# Patient Record
Sex: FEMALE | Race: BLACK OR AFRICAN AMERICAN | NOT HISPANIC OR LATINO | Employment: OTHER | URBAN - METROPOLITAN AREA
[De-identification: names, ages, dates, MRNs, and addresses within clinical notes are randomized per-mention and may not be internally consistent; named-entity substitution may affect disease eponyms.]

---

## 2017-01-03 ENCOUNTER — OFFICE VISIT (OUTPATIENT)
Dept: PODIATRY | Facility: CLINIC | Age: 67
End: 2017-01-03
Payer: MEDICARE

## 2017-01-03 VITALS — HEIGHT: 62 IN | WEIGHT: 192.88 LBS | BODY MASS INDEX: 35.49 KG/M2

## 2017-01-03 DIAGNOSIS — E08.9 DIABETES MELLITUS DUE TO UNDERLYING CONDITION, CONTROLLED, WITHOUT COMPLICATION, WITHOUT LONG-TERM CURRENT USE OF INSULIN: Primary | ICD-10-CM

## 2017-01-03 DIAGNOSIS — B35.1 ONYCHOMYCOSIS DUE TO DERMATOPHYTE: ICD-10-CM

## 2017-01-03 DIAGNOSIS — M20.41 HAMMER TOES OF BOTH FEET: ICD-10-CM

## 2017-01-03 DIAGNOSIS — I87.2 VENOUS INSUFFICIENCY OF LOWER EXTREMITY, UNSPECIFIED LATERALITY: ICD-10-CM

## 2017-01-03 DIAGNOSIS — M20.42 HAMMER TOES OF BOTH FEET: ICD-10-CM

## 2017-01-03 PROCEDURE — 99214 OFFICE O/P EST MOD 30 MIN: CPT | Mod: S$PBB,,, | Performed by: PODIATRIST

## 2017-01-03 PROCEDURE — 99999 PR PBB SHADOW E&M-EST. PATIENT-LVL II: CPT | Mod: PBBFAC,,, | Performed by: PODIATRIST

## 2017-01-03 PROCEDURE — 99212 OFFICE O/P EST SF 10 MIN: CPT | Mod: PBBFAC,PO | Performed by: PODIATRIST

## 2017-01-03 NOTE — PROGRESS NOTES
Subjective:      Patient ID: Eugenie Coates is a 66 y.o. female.    Chief Complaint: Diabetic Foot Exam (PCP Ryan 12/23/16 A1C 11/25/16 7.2) and Nail Care    Eugenie is a 66 y.o. female who presents to the clinic for routine evaluation and treatment of diabetic feet. Eugenie has a past medical history of Allergy; Anemia; Anxiety; Arthritis; Asthma; Bell palsy; Depression; Diabetes mellitus; Endometriosis; Eye injury (2014); GERD (gastroesophageal reflux disease); Glaucoma; History of uterine fibroid; Hypertension; Nuclear sclerosis of both eyes (9/27/2016); and Sleep apnea. Patient relates no major problem with feet. Only complaints today consist of toenails in need of trimming.  Denies being painful with wearing shoe gear, however, states they are difficult to trim on her own.  Has been self treating by applying vicks vaporub to toenails daily x 3 months and continues to soak in a combination of listerine and apple cider vinegar.  Has noted no visible difference in the nails.  Denies any additional pedal complaints.      PCP: Rosalinda Davis MD    Date Last Seen by PCP: 12/23/16    Current shoe gear: Casual shoes    Hemoglobin A1C   Date Value Ref Range Status   11/25/2016 7.2 (H) 4.5 - 6.2 % Final     Comment:     According to ADA guidelines, hemoglobin A1C <7.0% represents  optimal control in non-pregnant diabetic patients.  Different  metrics may apply to specific populations.   Standards of Medical Care in Diabetes - 2016.  For the purpose of screening for the presence of diabetes:  <5.7%     Consistent with the absence of diabetes  5.7-6.4%  Consistent with increasing risk for diabetes   (prediabetes)  >or=6.5%  Consistent with diabetes  Currently no consensus exists for use of hemoglobin A1C  for diagnosis of diabetes for children.     05/11/2016 6.6 (H) 4.5 - 6.2 % Final   11/10/2015 6.7 (H) 4.5 - 6.2 % Final           Past Medical History   Diagnosis Date    Allergy     Anemia     Anxiety      Arthritis     Asthma     Bell palsy     Depression     Diabetes mellitus     Endometriosis     Eye injury      stuck with tree branch od ?     GERD (gastroesophageal reflux disease)     Glaucoma     History of uterine fibroid     Hypertension     Nuclear sclerosis of both eyes 2016    Sleep apnea      uses C pap       Past Surgical History   Procedure Laterality Date    Cholecystectomy       section      Hernia repair      Vein surgery  ,      Rt leg x2    Knee surgery Right 2008     (R) knee scope; torn meniscus    Glaucoma laser Bilateral        Family History   Problem Relation Age of Onset    No Known Problems Mother     No Known Problems Father     No Known Problems Sister     No Known Problems Brother     No Known Problems Maternal Aunt     No Known Problems Maternal Uncle     No Known Problems Paternal Aunt     No Known Problems Paternal Uncle     No Known Problems Maternal Grandmother     No Known Problems Maternal Grandfather     No Known Problems Paternal Grandmother     No Known Problems Paternal Grandfather     Amblyopia Neg Hx     Blindness Neg Hx     Cancer Neg Hx     Cataracts Neg Hx     Diabetes Neg Hx     Glaucoma Neg Hx     Hypertension Neg Hx     Macular degeneration Neg Hx     Retinal detachment Neg Hx     Strabismus Neg Hx     Stroke Neg Hx     Thyroid disease Neg Hx        Social History     Social History    Marital status:      Spouse name: N/A    Number of children: N/A    Years of education: N/A     Social History Main Topics    Smoking status: Never Smoker    Smokeless tobacco: Never Used    Alcohol use No    Drug use: No    Sexual activity: Yes     Partners: Male     Other Topics Concern    None     Social History Narrative       Current Outpatient Prescriptions   Medication Sig Dispense Refill    albuterol (PROAIR HFA) 90 mcg/actuation inhaler Inhale 2 puffs into the lungs every 4 (four) hours as needed  for Wheezing. 3 Inhaler 3    albuterol 90 mcg/actuation inhaler Inhale 2 puffs into the lungs every 6 (six) hours as needed for Wheezing. 3 Inhaler 2    amlodipine (NORVASC) 10 MG tablet Take 1 tablet (10 mg total) by mouth once daily. 90 tablet 3    JV ASPIRIN ORAL Take 81 mg by mouth once daily. Instructed to stop 5-23-13 until after surgery.      bimatoprost (LUMIGAN) 0.01 % Drop Place 1 drop into both eyes every evening. 7.5 mL 4    blood sugar diagnostic Strp Test daily.  [employer] viva test strips and lancets. 300 each 3    budesonide-formoterol 160-4.5 mcg (SYMBICORT) 160-4.5 mcg/actuation HFAA Inhale 2 puffs into the lungs every 12 (twelve) hours. 1 Inhaler 11    lancets (ACCU-CHEK SOFTCLIX LANCETS) Misc 1 Units by Misc.(Non-Drug; Combo Route) route once daily. 300 each 3    losartan-hydrochlorothiazide 100-25 mg (HYZAAR) 100-25 mg per tablet Take 1 tablet by mouth once daily. 90 tablet 3    metformin (GLUCOPHAGE) 500 MG tablet Take 1 tablet (500 mg total) by mouth 2 (two) times daily with meals. 180 tablet 3    omeprazole (PRILOSEC) 20 MG capsule TAKE 1 CAPSULE BY MOUTH EVERY DAY 90 capsule 0    pravastatin (PRAVACHOL) 40 MG tablet Take 1 tablet (40 mg total) by mouth once daily. 90 tablet 3    PREVNAR 13, PF, 0.5 mL Syrg ADM 0.5ML IM UTD  0    spironolactone (ALDACTONE) 25 MG tablet Take 1 tablet (25 mg total) by mouth once daily. 30 tablet 11    ZOSTAVAX, PF, 19,400 unit/0.65 mL injection       [DISCONTINUED] omeprazole (PRILOSEC OTC) 20 MG tablet Take 1 tablet (20 mg total) by mouth once daily. 90 tablet 3     No current facility-administered medications for this visit.        Review of patient's allergies indicates:   Allergen Reactions    Percodan [oxycodone hcl-oxycodone-asa] Hives and Itching         Review of Systems   Constitution: Negative for chills and fever.   Cardiovascular: Positive for leg swelling. Negative for claudication.   Skin: Positive for color change and nail  changes.   Musculoskeletal: Positive for joint swelling. Negative for joint pain, muscle cramps and muscle weakness.   Neurological: Negative for numbness and paresthesias.   Psychiatric/Behavioral: Negative for altered mental status.           Objective:      Physical Exam   Constitutional: She is oriented to person, place, and time. She appears well-developed and well-nourished. No distress.   Cardiovascular:   Pulses:       Dorsalis pedis pulses are 2+ on the right side, and 2+ on the left side.        Posterior tibial pulses are 1+ on the right side, and 1+ on the left side.   CFT <3 seconds bilateral.  Pedal hair growth decreased bilateral.  Varicosities noted bilateral.  1+ pitting edema noted to bilateral lower extremity.  Toes are cool to touch bilateral.     Musculoskeletal: She exhibits edema. She exhibits no tenderness.   Muscle strength 5/5 in all muscle groups bilateral.  No tenderness nor crepitation with ROM of foot/ankle joints bilateral.  No tenderness with palpation of bilateral foot and ankle.  Bilateral pes planus foot type.  Bilateral hallux abducto valgus.  Bilateral semi-reducible contracture of toes 2-5.     Neurological: She is alert and oriented to person, place, and time. She has normal strength. No sensory deficit.   Protective sensation per [place] monofilament intact bilateral.    Vibratory sensation intact bilateral.    Light touch intact bilateral.   Skin: Skin is warm, dry and intact. No abrasion, no bruising, no burn, no ecchymosis, no laceration, no lesion, no petechiae and no rash noted. She is not diaphoretic. No cyanosis or erythema. No pallor. Nails show no clubbing.   Pedal skin normal turgor, temperature, and texture bilateral.  Toenails x 10 appear thickened by 2 mm's, elongated by 2 mm's, and discolored with subungual debris. Examination of the skin reveals no evidence of significant maceration, rashes, open lesions, suspicious appearing nevi or other concerning  lesions.              Assessment:       Encounter Diagnoses   Name Primary?    Diabetes mellitus due to underlying condition, controlled, without complication, without long-term current use of insulin Yes    Venous insufficiency of lower extremity, unspecified laterality     Onychomycosis due to dermatophyte     Hammer toes of both feet          Plan:       Eugenie was seen today for diabetic foot exam and nail care.    Diagnoses and all orders for this visit:    Diabetes mellitus due to underlying condition, controlled, without complication, without long-term current use of insulin  -     DIABETIC SHOES FOR HOME USE    Venous insufficiency of lower extremity, unspecified laterality    Onychomycosis due to dermatophyte    Hammer toes of both feet  -     DIABETIC SHOES FOR HOME USE      I counseled the patient on her conditions, their implications and medical management.    Shoe inspection. Diabetic Foot Education. Patient reminded of the importance of good nutrition and blood sugar control to help prevent podiatric complications of diabetes. Patient instructed on proper foot hygeine. We discussed wearing proper shoe gear, daily foot inspections, never walking without protective shoe gear, never putting sharp instruments to feet    Advised to continue elevating the extremities and wearing compression stockings to help manage venous insufficiency.      New prescription written for diabetic shoes, as she failed to have the previous prescription filled last year.    Advised to continue applying vicks vaporub to toenails daily x 6 months to treat fungal nail infection.  Also, discussed regularly disinfecting shoe gear and limiting environmental exposure.    Discussed with patient that future routine nail care can be performed as a Procedure Brief, as she currently does not qualify based on today's physical exam.  Patient is amenable to this course of action and was given a handout in regards to this.      With  patient's permission, nails were aggressively reduced and debrided x 10 to their soft tissue attachment mechanically and with electric , removing all offending nail and debris. Patient relates relief following the procedure.  This was performed as a courtesy with today's exam.  She will continue to monitor the areas daily, inspect her feet, wear protective shoe gear when ambulatory, moisturizer to maintain skin integrity and follow in this office in approximately 6 months, sooner p.r.n.      Return in about 6 months (around 7/3/2017).    Giuliano Garnett DPM

## 2017-01-03 NOTE — MR AVS SNAPSHOT
[facility]  [address] [place] [address]  Phone: [phone]                  Eugenie Coates   1/3/2017 10:40 AM   Office Visit    Description:  Female : 1950   Provider:  Giuliano Garnett DPM   Department:  [facility]           Reason for Visit     Diabetic Foot Exam     Nail Care           Diagnoses this Visit        Comments    Diabetes mellitus due to underlying condition, controlled, without complication, without long-term current use of insulin    -  Primary     Venous insufficiency of lower extremity, unspecified laterality         Onychomycosis due to dermatophyte         Hammer toes of both feet                To Do List           Future Appointments        Provider Department Dept Phone    2017 11:20 AM Lilia Wayne PA-C [facility] [phone]    3/21/2017 9:00 AM Rosalinda Davis MD [facility] [phone]    3/21/2017 10:00 AM CARMEN, ENDOCRINE EDUCATOR [facility] [phone]      Goals (5 Years of Data)     None      [facility]     [facility] On [facility] Nurse Care Line -  Assistance  Registered nurses in the [facility] provide clinical advisement, health education, appointment booking, and other advisory services.  Call for this free service at [phone].             Medications           Message regarding Medications     Verify the changes and/or additions to your medication regime listed below are the same as discussed with your clinician today.  If any of these changes or additions are incorrect, please notify your healthcare provider.             Verify that the below list of medications is an accurate representation of the medications you are currently taking.  If none reported, the list may be blank. If incorrect, please contact your healthcare provider. Carry this list with you in case of emergency.           Current Medications     albuterol (PROAIR HFA) 90 mcg/actuation  "inhaler Inhale 2 puffs into the lungs every 4 (four) hours as needed for Wheezing.    albuterol 90 mcg/actuation inhaler Inhale 2 puffs into the lungs every 6 (six) hours as needed for Wheezing.    amlodipine (NORVASC) 10 MG tablet Take 1 tablet (10 mg total) by mouth once daily.    JV ASPIRIN ORAL Take 81 mg by mouth once daily. Instructed to stop 5-23-13 until after surgery.    bimatoprost (LUMIGAN) 0.01 % Drop Place 1 drop into both eyes every evening.    blood sugar diagnostic Strp Test daily.  [employer] viva test strips and lancets.    budesonide-formoterol 160-4.5 mcg (SYMBICORT) 160-4.5 mcg/actuation HFAA Inhale 2 puffs into the lungs every 12 (twelve) hours.    lancets (ACCU-CHEK SOFTCLIX LANCETS) Misc 1 Units by Misc.(Non-Drug; Combo Route) route once daily.    losartan-hydrochlorothiazide 100-25 mg (HYZAAR) 100-25 mg per tablet Take 1 tablet by mouth once daily.    metformin (GLUCOPHAGE) 500 MG tablet Take 1 tablet (500 mg total) by mouth 2 (two) times daily with meals.    omeprazole (PRILOSEC) 20 MG capsule TAKE 1 CAPSULE BY MOUTH EVERY DAY    pravastatin (PRAVACHOL) 40 MG tablet Take 1 tablet (40 mg total) by mouth once daily.    PREVNAR 13, PF, 0.5 mL Syrg ADM 0.5ML IM UTD    spironolactone (ALDACTONE) 25 MG tablet Take 1 tablet (25 mg total) by mouth once daily.    ZOSTAVAX, PF, 19,400 unit/0.65 mL injection            Clinical Reference Information           Vital Signs - Last Recorded  Most recent update: 1/3/2017 10:24 AM by Mandi Zurita LPN    Ht Wt BMI          5' 2" (1.575 m) 87.5 kg (192 lb 14.4 oz) 35.28 kg/m2        Allergies as of 1/3/2017     Percodan [Oxycodone Hcl-oxycodone-asa]      Immunizations Administered on Date of Encounter - 1/3/2017     None      Orders Placed During Today's Visit      Normal Orders This Visit    DIABETIC SHOES FOR HOME USE       "

## 2017-01-17 ENCOUNTER — TELEPHONE (OUTPATIENT)
Dept: FAMILY MEDICINE | Facility: CLINIC | Age: 67
End: 2017-01-17

## 2017-01-17 NOTE — TELEPHONE ENCOUNTER
----- Message from Tara Araujo sent at 1/12/2017 10:41 AM CST -----  Contact: self  [phone]  Pt is requesting that her diabetic shoe information be sent to [facility] fax # [phone]. Thanks.........Anna

## 2017-01-23 ENCOUNTER — TELEPHONE (OUTPATIENT)
Dept: FAMILY MEDICINE | Facility: CLINIC | Age: 67
End: 2017-01-23

## 2017-01-23 NOTE — TELEPHONE ENCOUNTER
----- Message from Ronny Astudillo sent at 1/19/2017  2:52 PM CST -----  Contact: Self  Pt called regarding obtaining clinical notes from her last visit with  in order to get her diabetic shoes. Pt can be reached @ [phone]

## 2017-01-26 ENCOUNTER — DOCUMENTATION ONLY (OUTPATIENT)
Dept: FAMILY MEDICINE | Facility: CLINIC | Age: 67
End: 2017-01-26

## 2017-01-26 NOTE — PROGRESS NOTES
Pre-Visit Chart Review  For Appointment Scheduled on 01/30/2017    Health Maintenance Due   Topic Date Due    Pneumococcal (65+) (1 of 2 - PCV13) 04/19/2016

## 2017-01-30 ENCOUNTER — OFFICE VISIT (OUTPATIENT)
Dept: FAMILY MEDICINE | Facility: CLINIC | Age: 67
End: 2017-01-30
Payer: MEDICARE

## 2017-01-30 VITALS
TEMPERATURE: 99 F | BODY MASS INDEX: 35.46 KG/M2 | HEART RATE: 70 BPM | WEIGHT: 192.69 LBS | HEIGHT: 62 IN | SYSTOLIC BLOOD PRESSURE: 130 MMHG | DIASTOLIC BLOOD PRESSURE: 68 MMHG

## 2017-01-30 DIAGNOSIS — I10 ESSENTIAL HYPERTENSION: Primary | ICD-10-CM

## 2017-01-30 DIAGNOSIS — E11.9 DIABETES MELLITUS TYPE 2 WITHOUT RETINOPATHY: ICD-10-CM

## 2017-01-30 DIAGNOSIS — K21.9 GASTROESOPHAGEAL REFLUX DISEASE WITHOUT ESOPHAGITIS: ICD-10-CM

## 2017-01-30 PROCEDURE — 99999 PR PBB SHADOW E&M-EST. PATIENT-LVL IV: CPT | Mod: PBBFAC,,, | Performed by: PHYSICIAN ASSISTANT

## 2017-01-30 PROCEDURE — 99213 OFFICE O/P EST LOW 20 MIN: CPT | Mod: S$PBB,,, | Performed by: PHYSICIAN ASSISTANT

## 2017-01-30 PROCEDURE — 99214 OFFICE O/P EST MOD 30 MIN: CPT | Mod: PBBFAC,PO | Performed by: PHYSICIAN ASSISTANT

## 2017-01-30 RX ORDER — AMLODIPINE BESYLATE 10 MG/1
10 TABLET ORAL DAILY
Qty: 90 TABLET | Refills: 3 | Status: SHIPPED | OUTPATIENT
Start: 2017-01-30 | End: 2017-09-18 | Stop reason: SDUPTHER

## 2017-01-30 RX ORDER — OMEPRAZOLE 20 MG/1
20 CAPSULE, DELAYED RELEASE ORAL DAILY
Qty: 90 CAPSULE | Refills: 3 | Status: SHIPPED | OUTPATIENT
Start: 2017-01-30 | End: 2018-02-27

## 2017-01-30 RX ORDER — SPIRONOLACTONE 25 MG/1
25 TABLET ORAL DAILY
Qty: 90 TABLET | Refills: 3 | Status: SHIPPED | OUTPATIENT
Start: 2017-01-30 | End: 2018-02-27 | Stop reason: SDUPTHER

## 2017-01-30 NOTE — PATIENT INSTRUCTIONS
Weight Management: Getting Started  Healthy bodies come in all shapes and sizes. Not all bodies are made to be thin. For some people, a healthy weight is higher than the average weight listed on weight charts. Your healthcare provider can help you decide on a healthy weight for you.    Reasons to lose weight  Losing weight can help with some health problems, such as high blood pressure, heart disease, diabetes, sleep apnea, and arthritis. You may also feel more energy.  Set your long-term goal  Your goal doesn't even have to be a specific weight. You may decide on a fitness goal (such as being able to walk 10 miles a week), or a health goal (such as lowering your blood pressure). Choose a goal that is measurable and reasonable, so you know when you've reached it. A goal of reaching a BMI of less than 25 is not always reasonable (or possible).   Make an action plan  Habits dont change overnight. Setting your goals too high can leave you feeling discouraged if you cant reach them. Be realistic. Choose one or two small changes you can make now. Set an action plan for how you are going to make these changes. When you can stick to this plan, keep making a few more small changes. Taking small steps will help you stay on the path to success.  Track your progress  Write down your goals. Then, keep a daily record of your progress. Write down what you eat and how active you are. This record lets you look back on how much youve done. It may also help when youre feeling frustrated. Reward yourself for success. Even if you dont reach every goal, give yourself credit for what you do get done.  Get support  Encouragement from others can help make losing weight easier. Ask your family members and friends for support. They may even want to join you. Also look to your healthcare provider, registered dietitian, and  for help. Your local hospital can give you more information about nutrition, exercise, and  weight loss.  © 2548-0066 [employer]. [address] [place] [address], [place] [address]. All rights reserved. This information is not intended as a substitute for professional medical care. Always follow your healthcare professional's instructions.        Walking for Fitness  Fitness walking has something for everyone, even people who are already fit. Walking is one of the safest ways to condition your body aerobically. It can boost energy, help you lose weight, and reduce stress.    Physical benefits  · Walking strengthens your heart and lungs, and tones your muscles.  · When walking, your feet land with less impact than in other sports. This reduces chances of muscle, bone, and joint injury.  · Regular walking improves your cholesterol levels and lowers your risk of heart disease. And it helps you control your blood sugar if you have diabetes.  · Walking is a weight-bearing activity, which helps maintain bone density. This can help prevent osteoporosis.  Personal rewards  · Taking walks can help you relax and manage stress. And fitness walking may make you feel better about yourself.  · Walking can help you sleep better at night and make you less likely to be depressed.  · Regular walking may help maintain your memory as you get older.  · Walking is a great way to spend extra time with friends and family members. Be sure to invite your dog along!  Q&A about fitness walking  Q: Will walking keep me fit?  A: Yes. Regular walking at the right pace gives you all the benefits of other aerobic activities, such as jogging and swimming.  Q: Will walking help me lose weight and keep it off?  A: Yes. Per mile, walking can burn as many calories as jogging. Your health care provider can help work walking into your weight-loss plan.  Q: Is walking safe for my health?  A: Yes. Walking is safe if you have high blood pressure, diabetes, heart disease, or other conditions. Talk to your health care provider before you  start.  © 3717-6870 [employer]. [address] [place] [address], [place] [address]. All rights reserved. This information is not intended as a substitute for professional medical care. Always follow your healthcare professional's instructions.        Diabetes (General Information)  Diabetes is a long-term health problem. It means your body does not make enough insulin. Or it may mean that your body cannot use the insulin it makes. Insulin is a hormone in your body. It lets blood sugar (glucose) reach the cells in your body. All of your cells need glucose for fuel.  When you have diabetes, the glucose in your blood builds up because it cannot get into the cells. This buildup is called high blood sugar (hyperglycemia).  Your blood sugar level depends on several things. It depends on what kind of food you eat and how much of it you eat. It also depends on how much exercise you get, and how much insulin you have in your body. Eating too much of the wrong kinds of food or not taking diabetes medicine on time can cause high blood sugar. Infections can cause high blood sugar even if you are taking medicines correctly.  These things can also cause low blood sugar:  · Missing meals  · Not eating enough food  · Taking too much diabetes medicine  Diabetes can cause serious problems over time if you do not get treated. These problems include heart disease, stroke, kidney failure, and blindness. They also include nerve pain or loss of feeling in your legs and feet, and gangrene of the feet. By keeping your blood sugar under control you can prevent or delay these problems.  Normal blood sugar levels are 80 to 100 before a meal and less than 180 in the 1 to 2 hours after a meal.  Home care  Follow these guidelines when caring for yourself at home:  · Follow the diet your healthcare provider gives you. Take insulin or other diabetes medicine exactly as told to.  · Watch your blood sugar as you are told to. Keep a log of your  results. This will help your provider change your medicines to keep your blood sugar under control.  · Try to reach your ideal weight. You may be able to cut back on or not have to take diabetes medicine if you eat the right foods and get exercise.  · Do not smoke. Smoking worsens the effects of diabetes on your circulation. You are much more likely to have a heart attack if you have diabetes and you smoke.  · Take good care of your feet. If you have lost feeling in your feet, you may not see an injury or infection. Check your feet and between your toes at least once a week.  · Wear a medical alert bracelet or necklace, or carry a card in your wallet that says you have diabetes. This will help healthcare providers give you the right care if you get very ill and cannot tell them that you have diabetes.  Sick day plan  If you get a cold, the flu, or a bacterial or viral infection, take these steps:  · Look at your diabetes sick plan and call your healthcare provider as you were told to. You may need to call your provider right away if:  ¨ Your blood sugar is above 240 while taking your diabetes medicine  ¨ Your urine ketone levels are above normal or high  ¨ You have been vomiting more than 6 hours  ¨ You have trouble breathing or your breath ha s a fruity smell  ¨ You have a high fever  ¨ You have a fever for several days and you are not getting better  ¨ You get light-headed and are sleepier than usual  · Keep taking your diabetes pills (oral medicine) even if you have been vomiting and are feeling sick. Call your provider right away because you may need insulin to lower your blood sugar until you recover from your illness.  · Keep taking your insulin even if you have been vomiting and are feeling sick. Call your provider right away to ask if you need to change your insulin dose. This will depend on your blood sugar results.  · Check your blood sugar every 2 to 4 hours, or at least 4 times a day.  · Check your  ketones often. If you are vomiting and having diarrhea, watch them more often.  · Do not skip meals. Try to eat small meals on a regular schedule. Do this even if you do not feel like eating.  · Drink water or other liquids that do not have caffeine or calories. This will keep you from getting dehydrated. If you are nauseated or vomiting, takes small sips every 5 minutes. To prevent dehydration try to drink a cup (8 ounces) of fluids every hour while you are awake.  General care  Always bring a source of fast-acting sugar with you in case you have symptoms of low blood sugar (below 70). At the first sign of low blood sugar, eat or drink 15 to 20 grams of fast-acting sugar to raise your blood sugar. Examples are:  · 3 to 4 glucose tablets. You can buy these at most [employer].  · 4 ounces (1/2 cup) of regular (not diet) soft drinks  · 4 ounces (1/2 cup) of any fruit juice  · 8 ounces (1 cup) of milk  · 5 to 6 pieces of hard candy  · 1 tablespoon of honey  Check your blood sugar 15 minutes after treating yourself. If it is still below 70, take 15 to 20 more grams of fast-acting sugar. Test again in 15 minutes. If it returns to normal (70 or above), eat a snack or meal to keep your blood sugar in a safe range. If it stays low, call your doctor or go to an emergency room.  Follow-up care  Follow-up with your healthcare provider, or as advised. For more information about diabetes, visit the American Diabetes Association website at www.diabetes.org or call 990-123-2247.  When to seek medical advice  Call your healthcare provider right away if you have any of these symptoms of high blood sugar:  · Frequent urination  · Dizziness  · Drowsiness  · Thirst  · Headache  · Nausea or vomiting  · Abdominal pain  · Eyesight changes  · Fast breathing  · Confusion or loss of consciousness  Also call your provider right away if you have any of these signs of low blood sugar:  · Fatigue  · Headache  · Shakes  · Excess  sweating  · Hunger  · Feeling anxious or restless  · Eyesight changes  · Drowsiness  · Weakness  · Confusion or loss of consciousness  Call 911  Call for emergency help right away if any of these occur:  · Chest pain or shortness of breath  · Dizziness or fainting  · Weakness of an arm or leg or one side of the face  · Trouble speaking or seeing   © 2000-2016 [employer]. [address] [place] [address], [place] [address]. All rights reserved. This information is not intended as a substitute for professional medical care. Always follow your healthcare professional's instructions.        Controlling High Blood Pressure  High blood pressure (hypertension) is often called the silent killer. This is because many people who have it dont know it. High blood pressure is defined as 140/90 mm Hg or higher. Know your blood pressure and remember to check it regularly. Doing so can save your life. Here are some things you can do to help control your blood pressure.    Choose heart-healthy foods  · Select low-salt, low-fat foods. Limit sodium intake to 2,400 mg per day or the amount suggested by your healthcare provider.  · Limit canned, dried, cured, packaged, and fast foods. These can contain a lot of salt.  · Eat 8 to 10 servings of fruits and vegetables every day.  · Choose lean meats, fish, or chicken.  · Eat whole-grain pasta, brown rice, and beans.  · Eat 2 to 3 servings of low-fat or fat-free dairy products.  · Ask your doctor about the DASH eating plan. This plan helps reduce blood pressure.  · When you go to a restaurant, ask that your meal be prepared with no added salt.  Maintain a healthy weight  · Ask your healthcare provider how many calories to eat a day. Then stick to that number.  · Ask your healthcare provider what weight range is healthiest for you. If you are overweight, a weight loss of only 3% to 5% of your body weight can help lower blood pressure. Generally, a good weight loss goal is to lose 10%  of your body weight in a year.  · Limit snacks and sweets.  · Get regular exercise.  Get up and get active  · Choose activities you enjoy. Find ones you can do with friends or family. This includes bicycling, dancing, walking, and jogging.  · Park farther away from building entrances.  · Use stairs instead of the elevator.  · When you can, walk or bike instead of driving.  · Mission leaves, garden, or do household repairs.  · Be active at a moderate to vigorous level of physical activity for at least 40 minutes for a minimum of 3 to 4 days a week.   Manage stress  · Make time to relax and enjoy life. Find time to laugh.  · Communicate your concerns with your loved ones and your healthcare provider.  · Visit with family and friends, and keep up with hobbies.  Limit alcohol and quit smoking  · Men should have no more than 2 drinks per day.  · Women should have no more than 1 drink per day.  · Talk with your healthcare provider about quitting smoking. Smoking significantly increases your risk for heart disease and stroke. Ask your healthcare provider about community smoking cessation programs and other options.  Medicines  If lifestyle changes arent enough, your healthcare provider may prescribe high blood pressure medicine. Take all medicines as prescribed. If you have any questions about your medicines, ask your healthcare provider before stopping or changing them.   © 4659-1169 [employer]. [address] [place] [address], [place] [address]. All rights reserved. This information is not intended as a substitute for professional medical care. Always follow your healthcare professional's instructions.

## 2017-01-30 NOTE — MR AVS SNAPSHOT
[facility]  [address]  [place] [address]  Phone: [phone]  Fax: [phone]                  Eugenie Coates   2017 11:40 AM   Office Visit    Description:  Female : 1950   Provider:  Lilia Wayne PA-C   Department:  [facility]           Reason for Visit     Follow-up           Diagnoses this Visit        Comments    Essential hypertension    -  Primary     Diabetes mellitus type 2 without retinopathy         Gastroesophageal reflux disease without esophagitis         Obesity, Class II, BMI 35-39.9, with comorbidity                To Do List           Future Appointments        Provider Department Dept Phone    2017 11:40 AM Lilia Wayne PA-C [facility] [phone]    3/21/2017 9:00 AM Rosalinda Davis MD [facility] Family [facility] [phone]    3/21/2017 10:00 AM COLLINS, ENDOCRINE EDUCATOR [facility] Diabetes Management [phone]      Goals (5 Years of Data)     None       These Medications        Disp Refills Start End    spironolactone (ALDACTONE) 25 MG tablet 90 tablet 3 2017    Take 1 tablet (25 mg total) by mouth once daily. - Oral    Pharmacy: [facility] [address] [place] [address]  AVE AT [facility] [address] Ph #: [phone]       amlodipine (NORVASC) 10 MG tablet 90 tablet 3 2017    Take 1 tablet (10 mg total) by mouth once daily. - Oral    Pharmacy: [facility] [address] [place] [address] [address]VE AT [facility] [address] Ph #: [phone]       omeprazole (PRILOSEC) 20 MG capsule 90 capsule 3 2017     Take 1 capsule (20 mg total) by mouth once daily. - Oral    Pharmacy: [facility] [address] [place] [address], [place] [address] [address] AT [facility] [address] Ph #: [phone]         [facility]     [facility] -  Assistance  Registered nurses in the [facility] provide clinical advisement, health  education, appointment booking, and other advisory services.  Call for this free service at [phone].             Medications           Message regarding Medications     Verify the changes and/or additions to your medication regime listed below are the same as discussed with your clinician today.  If any of these changes or additions are incorrect, please notify your healthcare provider.        CHANGE how you are taking these medications     Start Taking Instead of    omeprazole (PRILOSEC) 20 MG capsule omeprazole (PRILOSEC) 20 MG capsule    Dosage:  Take 1 capsule (20 mg total) by mouth once daily. Dosage:  TAKE 1 CAPSULE BY MOUTH EVERY DAY    Reason for Change:  Reorder       STOP taking these medications     ZOSTAVAX, PF, 19,400 unit/0.65 mL injection            Verify that the below list of medications is an accurate representation of the medications you are currently taking.  If none reported, the list may be blank. If incorrect, please contact your healthcare provider. Carry this list with you in case of emergency.           Current Medications     albuterol (PROAIR HFA) 90 mcg/actuation inhaler Inhale 2 puffs into the lungs every 4 (four) hours as needed for Wheezing.    albuterol 90 mcg/actuation inhaler Inhale 2 puffs into the lungs every 6 (six) hours as needed for Wheezing.    amlodipine (NORVASC) 10 MG tablet Take 1 tablet (10 mg total) by mouth once daily.    JV ASPIRIN ORAL Take 81 mg by mouth once daily. Instructed to stop 5-23-13 until after surgery.    bimatoprost (LUMIGAN) 0.01 % Drop Place 1 drop into both eyes every evening.    blood sugar diagnostic Strp Test daily.  Accucheck viva test strips and lancets.    budesonide-formoterol 160-4.5 mcg (SYMBICORT) 160-4.5 mcg/actuation HFAA Inhale 2 puffs into the lungs every 12 (twelve) hours.    lancets (ACCU-CHEK SOFTCLIX LANCETS) Misc 1 Units by Misc.(Non-Drug; Combo Route) route once daily.    losartan-hydrochlorothiazide 100-25 mg (HYZAAR)  "100-25 mg per tablet Take 1 tablet by mouth once daily.    metformin (GLUCOPHAGE) 500 MG tablet Take 1 tablet (500 mg total) by mouth 2 (two) times daily with meals.    omeprazole (PRILOSEC) 20 MG capsule Take 1 capsule (20 mg total) by mouth once daily.    pravastatin (PRAVACHOL) 40 MG tablet Take 1 tablet (40 mg total) by mouth once daily.    PREVNAR 13, PF, 0.5 mL Syrg ADM 0.5ML IM UTD    spironolactone (ALDACTONE) 25 MG tablet Take 1 tablet (25 mg total) by mouth once daily.           Clinical Reference Information           Vital Signs - Last Recorded  Most recent update: 1/30/2017 11:03 AM by Lynne Miller MA    BP Pulse Temp Ht Wt BMI    130/68 (BP Location: Right arm, Patient Position: Sitting, BP Method: Automatic) 70 99 °F (37.2 °C) (Oral) 5' 2" (1.575 m) 87.4 kg (192 lb 10.9 oz) 35.24 kg/m2      Blood Pressure          Most Recent Value    BP  130/68      Allergies as of 1/30/2017     Percodan [Oxycodone Hcl-oxycodone-asa]      Immunizations Administered on Date of Encounter - 1/30/2017     None      Instructions      Weight Management: Getting Started  Healthy bodies come in all shapes and sizes. Not all bodies are made to be thin. For some people, a healthy weight is higher than the average weight listed on weight charts. Your healthcare provider can help you decide on a healthy weight for you.    Reasons to lose weight  Losing weight can help with some health problems, such as high blood pressure, heart disease, diabetes, sleep apnea, and arthritis. You may also feel more energy.  Set your long-term goal  Your goal doesn't even have to be a specific weight. You may decide on a fitness goal (such as being able to walk 10 miles a week), or a health goal (such as lowering your blood pressure). Choose a goal that is measurable and reasonable, so you know when you've reached it. A goal of reaching a BMI of less than 25 is not always reasonable (or possible).   Make an action plan  Habits dont change " overnight. Setting your goals too high can leave you feeling discouraged if you cant reach them. Be realistic. Choose one or two small changes you can make now. Set an action plan for how you are going to make these changes. When you can stick to this plan, keep making a few more small changes. Taking small steps will help you stay on the path to success.  Track your progress  Write down your goals. Then, keep a daily record of your progress. Write down what you eat and how active you are. This record lets you look back on how much youve done. It may also help when youre feeling frustrated. Reward yourself for success. Even if you dont reach every goal, give yourself credit for what you do get done.  Get support  Encouragement from others can help make losing weight easier. Ask your family members and friends for support. They may even want to join you. Also look to your healthcare provider, registered dietitian, and  for help. Your local hospital can give you more information about nutrition, exercise, and weight loss.  © 8322-9521 [employer]. [address] [place] [address], [place] [address]. All rights reserved. This information is not intended as a substitute for professional medical care. Always follow your healthcare professional's instructions.        Walking for Fitness  Fitness walking has something for everyone, even people who are already fit. Walking is one of the safest ways to condition your body aerobically. It can boost energy, help you lose weight, and reduce stress.    Physical benefits  · Walking strengthens your heart and lungs, and tones your muscles.  · When walking, your feet land with less impact than in other sports. This reduces chances of muscle, bone, and joint injury.  · Regular walking improves your cholesterol levels and lowers your risk of heart disease. And it helps you control your blood sugar if you have diabetes.  · Walking is a weight-bearing  activity, which helps maintain bone density. This can help prevent osteoporosis.  Personal rewards  · Taking walks can help you relax and manage stress. And fitness walking may make you feel better about yourself.  · Walking can help you sleep better at night and make you less likely to be depressed.  · Regular walking may help maintain your memory as you get older.  · Walking is a great way to spend extra time with friends and family members. Be sure to invite your dog along!  Q&A about fitness walking  Q: Will walking keep me fit?  A: Yes. Regular walking at the right pace gives you all the benefits of other aerobic activities, such as jogging and swimming.  Q: Will walking help me lose weight and keep it off?  A: Yes. Per mile, walking can burn as many calories as jogging. Your health care provider can help work walking into your weight-loss plan.  Q: Is walking safe for my health?  A: Yes. Walking is safe if you have high blood pressure, diabetes, heart disease, or other conditions. Talk to your health care provider before you start.  © 4564-9764 [employer]. [address] [place] [address]. All rights reserved. This information is not intended as a substitute for professional medical care. Always follow your healthcare professional's instructions.        Diabetes (General Information)  Diabetes is a long-term health problem. It means your body does not make enough insulin. Or it may mean that your body cannot use the insulin it makes. Insulin is a hormone in your body. It lets blood sugar (glucose) reach the cells in your body. All of your cells need glucose for fuel.  When you have diabetes, the glucose in your blood builds up because it cannot get into the cells. This buildup is called high blood sugar (hyperglycemia).  Your blood sugar level depends on several things. It depends on what kind of food you eat and how much of it you eat. It also depends on how much exercise you get, and  how much insulin you have in your body. Eating too much of the wrong kinds of food or not taking diabetes medicine on time can cause high blood sugar. Infections can cause high blood sugar even if you are taking medicines correctly.  These things can also cause low blood sugar:  · Missing meals  · Not eating enough food  · Taking too much diabetes medicine  Diabetes can cause serious problems over time if you do not get treated. These problems include heart disease, stroke, kidney failure, and blindness. They also include nerve pain or loss of feeling in your legs and feet, and gangrene of the feet. By keeping your blood sugar under control you can prevent or delay these problems.  Normal blood sugar levels are 80 to 100 before a meal and less than 180 in the 1 to 2 hours after a meal.  Home care  Follow these guidelines when caring for yourself at home:  · Follow the diet your healthcare provider gives you. Take insulin or other diabetes medicine exactly as told to.  · Watch your blood sugar as you are told to. Keep a log of your results. This will help your provider change your medicines to keep your blood sugar under control.  · Try to reach your ideal weight. You may be able to cut back on or not have to take diabetes medicine if you eat the right foods and get exercise.  · Do not smoke. Smoking worsens the effects of diabetes on your circulation. You are much more likely to have a heart attack if you have diabetes and you smoke.  · Take good care of your feet. If you have lost feeling in your feet, you may not see an injury or infection. Check your feet and between your toes at least once a week.  · Wear a medical alert bracelet or necklace, or carry a card in your wallet that says you have diabetes. This will help healthcare providers give you the right care if you get very ill and cannot tell them that you have diabetes.  Sick day plan  If you get a cold, the flu, or a bacterial or viral infection, take these  steps:  · Look at your diabetes sick plan and call your healthcare provider as you were told to. You may need to call your provider right away if:  ¨ Your blood sugar is above 240 while taking your diabetes medicine  ¨ Your urine ketone levels are above normal or high  ¨ You have been vomiting more than 6 hours  ¨ You have trouble breathing or your breath ha s a fruity smell  ¨ You have a high fever  ¨ You have a fever for several days and you are not getting better  ¨ You get light-headed and are sleepier than usual  · Keep taking your diabetes pills (oral medicine) even if you have been vomiting and are feeling sick. Call your provider right away because you may need insulin to lower your blood sugar until you recover from your illness.  · Keep taking your insulin even if you have been vomiting and are feeling sick. Call your provider right away to ask if you need to change your insulin dose. This will depend on your blood sugar results.  · Check your blood sugar every 2 to 4 hours, or at least 4 times a day.  · Check your ketones often. If you are vomiting and having diarrhea, watch them more often.  · Do not skip meals. Try to eat small meals on a regular schedule. Do this even if you do not feel like eating.  · Drink water or other liquids that do not have caffeine or calories. This will keep you from getting dehydrated. If you are nauseated or vomiting, takes small sips every 5 minutes. To prevent dehydration try to drink a cup (8 ounces) of fluids every hour while you are awake.  General care  Always bring a source of fast-acting sugar with you in case you have symptoms of low blood sugar (below 70). At the first sign of low blood sugar, eat or drink 15 to 20 grams of fast-acting sugar to raise your blood sugar. Examples are:  · 3 to 4 glucose tablets. You can buy these at most drugstores.  · 4 ounces (1/2 cup) of regular (not diet) soft drinks  · 4 ounces (1/2 cup) of any fruit juice  · 8 ounces (1 cup) of  milk  · 5 to 6 pieces of hard candy  · 1 tablespoon of honey  Check your blood sugar 15 minutes after treating yourself. If it is still below 70, take 15 to 20 more grams of fast-acting sugar. Test again in 15 minutes. If it returns to normal (70 or above), eat a snack or meal to keep your blood sugar in a safe range. If it stays low, call your doctor or go to an emergency room.  Follow-up care  Follow-up with your healthcare provider, or as advised. For more information about diabetes, visit the American Diabetes Association website at www.diabetes.org or call 764-496-3971.  When to seek medical advice  Call your healthcare provider right away if you have any of these symptoms of high blood sugar:  · Frequent urination  · Dizziness  · Drowsiness  · Thirst  · Headache  · Nausea or vomiting  · Abdominal pain  · Eyesight changes  · Fast breathing  · Confusion or loss of consciousness  Also call your provider right away if you have any of these signs of low blood sugar:  · Fatigue  · Headache  · Shakes  · Excess sweating  · Hunger  · Feeling anxious or restless  · Eyesight changes  · Drowsiness  · Weakness  · Confusion or loss of consciousness  Call 911  Call for emergency help right away if any of these occur:  · Chest pain or shortness of breath  · Dizziness or fainting  · Weakness of an arm or leg or one side of the face  · Trouble speaking or seeing   © 6666-8659 [employer]. [address] [place] [address]. All rights reserved. This information is not intended as a substitute for professional medical care. Always follow your healthcare professional's instructions.        Controlling High Blood Pressure  High blood pressure (hypertension) is often called the silent killer. This is because many people who have it dont know it. High blood pressure is defined as 140/90 mm Hg or higher. Know your blood pressure and remember to check it regularly. Doing so can save your life. Here are some  things you can do to help control your blood pressure.    Choose heart-healthy foods  · Select low-salt, low-fat foods. Limit sodium intake to 2,400 mg per day or the amount suggested by your healthcare provider.  · Limit canned, dried, cured, packaged, and fast foods. These can contain a lot of salt.  · Eat 8 to 10 servings of fruits and vegetables every day.  · Choose lean meats, fish, or chicken.  · Eat whole-grain pasta, brown rice, and beans.  · Eat 2 to 3 servings of low-fat or fat-free dairy products.  · Ask your doctor about the DASH eating plan. This plan helps reduce blood pressure.  · When you go to a restaurant, ask that your meal be prepared with no added salt.  Maintain a healthy weight  · Ask your healthcare provider how many calories to eat a day. Then stick to that number.  · Ask your healthcare provider what weight range is healthiest for you. If you are overweight, a weight loss of only 3% to 5% of your body weight can help lower blood pressure. Generally, a good weight loss goal is to lose 10% of your body weight in a year.  · Limit snacks and sweets.  · Get regular exercise.  Get up and get active  · Choose activities you enjoy. Find ones you can do with friends or family. This includes bicycling, dancing, walking, and jogging.  · Park farther away from building entrances.  · Use stairs instead of the elevator.  · When you can, walk or bike instead of driving.  · [place] leaves, garden, or do household repairs.  · Be active at a moderate to vigorous level of physical activity for at least 40 minutes for a minimum of 3 to 4 days a week.   Manage stress  · Make time to relax and enjoy life. Find time to laugh.  · Communicate your concerns with your loved ones and your healthcare provider.  · Visit with family and friends, and keep up with hobbies.  Limit alcohol and quit smoking  · Men should have no more than 2 drinks per day.  · Women should have no more than 1 drink per day.  · Talk with your  healthcare provider about quitting smoking. Smoking significantly increases your risk for heart disease and stroke. Ask your healthcare provider about community smoking cessation programs and other options.  Medicines  If lifestyle changes arent enough, your healthcare provider may prescribe high blood pressure medicine. Take all medicines as prescribed. If you have any questions about your medicines, ask your healthcare provider before stopping or changing them.   © 1917-8715 [employer]. [address] [place] [address], [place] [address]. All rights reserved. This information is not intended as a substitute for professional medical care. Always follow your healthcare professional's instructions.

## 2017-01-30 NOTE — PROGRESS NOTES
Subjective:       Patient ID: Eugenie Coates is a 66 y.o. female.    Chief Complaint: Follow-up (HTN)    HPI Comments: Mrs. Coates is a 66 year old female who presents to clinic for follow up on uncontrolled hypertension, diabetes, and obesity. Since last visit, she states BP has been well controlled at home and brings in log with readings ranging from 116/60s-130s/70 with only 2 readings >140/90 in the last month. She states at some point she ran out of norvasc 10 mg and started taking 5 mg daily by mistake. She is tolerating higher dose of metformin and pravastatin without side effects. She was seen by Pulmonology this morning and sleep study was ordered. She states blood sugar this morning was 113. She is scheduled for diabetic education in March. She started riding her stationary bike at home one day per week. She reports getting pneumonia vaccine at her local pharmacy last year.     Review of Systems   Constitutional: Negative for activity change, appetite change, fatigue and fever.   Eyes: Negative for visual disturbance.   Respiratory: Negative for cough, chest tightness, shortness of breath and wheezing.    Cardiovascular: Negative for chest pain and palpitations.   Gastrointestinal: Negative for abdominal pain, constipation, diarrhea, nausea and vomiting.   Genitourinary: Negative for dysuria, flank pain and urgency.   Musculoskeletal: Negative for arthralgias and myalgias.   Neurological: Negative for dizziness, weakness, light-headedness and headaches.   Hematological: Negative for adenopathy.       Objective:      Vitals:    01/30/17 1103   BP: 130/68   Pulse:    Temp:      Physical Exam   Constitutional: She is oriented to person, place, and time. She appears well-developed.   Obese body habitus.   HENT:   Head: Normocephalic and atraumatic.   Right Ear: Hearing, tympanic membrane, external ear and ear canal normal.   Left Ear: Hearing, tympanic membrane, external ear and ear canal normal.   Eyes:  Conjunctivae, EOM and lids are normal. Pupils are equal, round, and reactive to light.   Cardiovascular: Regular rhythm, S1 normal and S2 normal.    Trace lower ext edema.   Pulmonary/Chest: Effort normal and breath sounds normal.   Neurological: She is alert and oriented to person, place, and time.   Skin: Skin is warm and dry.   Psychiatric: She has a normal mood and affect. Her speech is normal.   Vitals reviewed.      Assessment:       1. Essential hypertension    2. Diabetes mellitus type 2 without retinopathy    3. Gastroesophageal reflux disease without esophagitis    4. Obesity, Class II, BMI 35-39.9, with comorbidity        Plan:       Essential hypertension        - Improving control, continue current medications below:  -     spironolactone (ALDACTONE) 25 MG tablet; Take 1 tablet (25 mg total) by mouth once daily.  Dispense: 90 tablet; Refill: 3  -     amlodipine (NORVASC) 10 MG tablet; Take 1 tablet (10 mg total) by mouth once daily.  Dispense: 90 tablet; Refill: 3  - Losartan/hctz 100/25 mg daily  - I counseled the patient on HTN education, management and recommendations.  I recommended weight loss toward a BMI < 25, avoidance of salt and the DASH diet, regular cardio exercise a minimum of 150 minutes per week and medications if indicated.    - Encouraged weight loss and increasing exercise    Diabetes mellitus type 2 without retinopathy        - Uncontrolled, continue metformin as prescribed. F/U with Diabetic education as scheduled.     Gastroesophageal reflux disease without esophagitis        Stable, continue current medication  -     omeprazole (PRILOSEC) 20 MG capsule; Take 1 capsule (20 mg total) by mouth once daily.  Dispense: 90 capsule; Refill: 3    Obesity, Class II, BMI 35-39.9, with comorbidity        - Uncontrolled, encouraged weight loss and increasing physical activity    Patient readiness: acceptance and barriers:none    During the course of the visit the patient was educated and  counseled about the following:     Diabetes:  Discussed general issues about diabetes pathophysiology and management.  Hypertension:   Medication: no change.  Dietary sodium restriction.  Regular aerobic exercise.  Check blood pressures daily and record.  Obesity:   Regular aerobic exercise program discussed.    Goals: Diabetes: Maintain Hemoglobin A1C below 7, Hypertension: Reduce Blood Pressure and Obesity: Reduce calorie intake and BMI    Did patient meet goals/outcomes: Yes to HTN, No to DM/Obesity    The following self management tools provided: declined    Patient Instructions (the written plan) was given to the patient/family.     Time spent with patient: 15 minutes        Follow up with Dr. Davis in March 2017.

## 2017-03-20 ENCOUNTER — DOCUMENTATION ONLY (OUTPATIENT)
Dept: FAMILY MEDICINE | Facility: CLINIC | Age: 67
End: 2017-03-20

## 2017-03-20 NOTE — PROGRESS NOTES
Pre-Visit Chart Review  For Appointment Scheduled on 3-21-17    There are no preventive care reminders to display for this patient.

## 2017-03-20 NOTE — PATIENT INSTRUCTIONS
Diabetes (General Information)  Diabetes is a long-term health problem. It means your body does not make enough insulin. Or it may mean that your body cannot use the insulin it makes. Insulin is a hormone in your body. It lets blood sugar (glucose) reach the cells in your body. All of your cells need glucose for fuel.  When you have diabetes, the glucose in your blood builds up because it cannot get into the cells. This buildup is called high blood sugar (hyperglycemia).  Your blood sugar level depends on several things. It depends on what kind of food you eat and how much of it you eat. It also depends on how much exercise you get, and how much insulin you have in your body. Eating too much of the wrong kinds of food or not taking diabetes medicine on time can cause high blood sugar. Infections can cause high blood sugar even if you are taking medicines correctly.  These things can also cause low blood sugar:  · Missing meals  · Not eating enough food  · Taking too much diabetes medicine  Diabetes can cause serious problems over time if you do not get treated. These problems include heart disease, stroke, kidney failure, and blindness. They also include nerve pain or loss of feeling in your legs and feet, and gangrene of the feet. By keeping your blood sugar under control you can prevent or delay these problems.  Normal blood sugar levels are 80 to 100 before a meal and less than 180 in the 1 to 2 hours after a meal.  Home care  Follow these guidelines when caring for yourself at home:  · Follow the diet your healthcare provider gives you. Take insulin or other diabetes medicine exactly as told to.  · Watch your blood sugar as you are told to. Keep a log of your results. This will help your provider change your medicines to keep your blood sugar under control.  · Try to reach your ideal weight. You may be able to cut back on or not have to take diabetes medicine if you eat the right foods and get exercise.  · Do  not smoke. Smoking worsens the effects of diabetes on your circulation. You are much more likely to have a heart attack if you have diabetes and you smoke.  · Take good care of your feet. If you have lost feeling in your feet, you may not see an injury or infection. Check your feet and between your toes at least once a week.  · Wear a medical alert bracelet or necklace, or carry a card in your wallet that says you have diabetes. This will help healthcare providers give you the right care if you get very ill and cannot tell them that you have diabetes.  Sick day plan  If you get a cold, the flu, or a bacterial or viral infection, take these steps:  · Look at your diabetes sick plan and call your healthcare provider as you were told to. You may need to call your provider right away if:  ¨ Your blood sugar is above 240 while taking your diabetes medicine  ¨ Your urine ketone levels are above normal or high  ¨ You have been vomiting more than 6 hours  ¨ You have trouble breathing or your breath ha s a fruity smell  ¨ You have a high fever  ¨ You have a fever for several days and you are not getting better  ¨ You get light-headed and are sleepier than usual  · Keep taking your diabetes pills (oral medicine) even if you have been vomiting and are feeling sick. Call your provider right away because you may need insulin to lower your blood sugar until you recover from your illness.  · Keep taking your insulin even if you have been vomiting and are feeling sick. Call your provider right away to ask if you need to change your insulin dose. This will depend on your blood sugar results.  · Check your blood sugar every 2 to 4 hours, or at least 4 times a day.  · Check your ketones often. If you are vomiting and having diarrhea, watch them more often.  · Do not skip meals. Try to eat small meals on a regular schedule. Do this even if you do not feel like eating.  · Drink water or other liquids that do not have caffeine or  calories. This will keep you from getting dehydrated. If you are nauseated or vomiting, takes small sips every 5 minutes. To prevent dehydration try to drink a cup (8 ounces) of fluids every hour while you are awake.  General care  Always bring a source of fast-acting sugar with you in case you have symptoms of low blood sugar (below 70). At the first sign of low blood sugar, eat or drink 15 to 20 grams of fast-acting sugar to raise your blood sugar. Examples are:  · 3 to 4 glucose tablets. You can buy these at most [employer].  · 4 ounces (1/2 cup) of regular (not diet) soft drinks  · 4 ounces (1/2 cup) of any fruit juice  · 8 ounces (1 cup) of milk  · 5 to 6 pieces of hard candy  · 1 tablespoon of honey  Check your blood sugar 15 minutes after treating yourself. If it is still below 70, take 15 to 20 more grams of fast-acting sugar. Test again in 15 minutes. If it returns to normal (70 or above), eat a snack or meal to keep your blood sugar in a safe range. If it stays low, call your doctor or go to an emergency room.  Follow-up care  Follow-up with your healthcare provider, or as advised. For more information about diabetes, visit the American Diabetes Association website at www.diabetes.org or call 319-303-7936.  When to seek medical advice  Call your healthcare provider right away if you have any of these symptoms of high blood sugar:  · Frequent urination  · Dizziness  · Drowsiness  · Thirst  · Headache  · Nausea or vomiting  · Abdominal pain  · Eyesight changes  · Fast breathing  · Confusion or loss of consciousness  Also call your provider right away if you have any of these signs of low blood sugar:  · Fatigue  · Headache  · Shakes  · Excess sweating  · Hunger  · Feeling anxious or restless  · Eyesight changes  · Drowsiness  · Weakness  · Confusion or loss of consciousness  Call 911  Call for emergency help right away if any of these occur:  · Chest pain or shortness of breath  · Dizziness or  fainting  · Weakness of an arm or leg or one side of the face  · Trouble speaking or seeing   Date Last Reviewed: 6/1/2016 © 2000-2016 [employer]. [address] [place] [address], [place] [address]. All rights reserved. This information is not intended as a substitute for professional medical care. Always follow your healthcare professional's instructions.        Walking for Fitness  Fitness walking has something for everyone, even people who are already fit. Walking is one of the safest ways to condition your body aerobically. It can boost energy, help you lose weight, and reduce stress.    Physical benefits  · Walking strengthens your heart and lungs, and tones your muscles.  · When walking, your feet land with less impact than in other sports. This reduces chances of muscle, bone, and joint injury.  · Regular walking improves your cholesterol levels and lowers your risk of heart disease. And it helps you control your blood sugar if you have diabetes.  · Walking is a weight-bearing activity, which helps maintain bone density. This can help prevent osteoporosis.  Personal rewards  · Taking walks can help you relax and manage stress. And fitness walking may make you feel better about yourself.  · Walking can help you sleep better at night and make you less likely to be depressed.  · Regular walking may help maintain your memory as you get older.  · Walking is a great way to spend extra time with friends and family members. Be sure to invite your dog along!  Q&A about fitness walking  Q: Will walking keep me fit?  A: Yes. Regular walking at the right pace gives you all the benefits of other aerobic activities, such as jogging and swimming.  Q: Will walking help me lose weight and keep it off?  A: Yes. Per mile, walking can burn as many calories as jogging. Your health care provider can help work walking into your weight-loss plan.  Q: Is walking safe for my health?  A: Yes. Walking is safe if you have high  blood pressure, diabetes, heart disease, or other conditions. Talk to your health care provider before you start.  Date Last Reviewed: 5/9/2015 © 2000-2016 [employer]. [address] [place] [address], [place] [address]. All rights reserved. This information is not intended as a substitute for professional medical care. Always follow your healthcare professional's instructions.        Weight Management: Getting Started  Healthy bodies come in all shapes and sizes. Not all bodies are made to be thin. For some people, a healthy weight is higher than the average weight listed on weight charts. Your healthcare provider can help you decide on a healthy weight for you.    Reasons to lose weight  Losing weight can help with some health problems, such as high blood pressure, heart disease, diabetes, sleep apnea, and arthritis. You may also feel more energy.  Set your long-term goal  Your goal doesn't even have to be a specific weight. You may decide on a fitness goal (such as being able to walk 10 miles a week), or a health goal (such as lowering your blood pressure). Choose a goal that is measurable and reasonable, so you know when you've reached it. A goal of reaching a BMI of less than 25 is not always reasonable (or possible).   Make an action plan  Habits dont change overnight. Setting your goals too high can leave you feeling discouraged if you cant reach them. Be realistic. Choose one or two small changes you can make now. Set an action plan for how you are going to make these changes. When you can stick to this plan, keep making a few more small changes. Taking small steps will help you stay on the path to success.  Track your progress  Write down your goals. Then, keep a daily record of your progress. Write down what you eat and how active you are. This record lets you look back on how much youve done. It may also help when youre feeling frustrated. Reward yourself for success. Even if you dont reach  every goal, give yourself credit for what you do get done.  Get support  Encouragement from others can help make losing weight easier. Ask your family members and friends for support. They may even want to join you. Also look to your healthcare provider, registered dietitian, and  for help. Your local hospital can give you more information about nutrition, exercise, and weight loss.  Date Last Reviewed: 1/31/2016  © 1215-2362 [employer], [employer]. [address] [place] [address], [place] [address]. All rights reserved. This information is not intended as a substitute for professional medical care. Always follow your healthcare professional's instructions.

## 2017-03-21 ENCOUNTER — LAB VISIT (OUTPATIENT)
Dept: LAB | Facility: HOSPITAL | Age: 67
End: 2017-03-21
Attending: FAMILY MEDICINE
Payer: MEDICARE

## 2017-03-21 ENCOUNTER — OFFICE VISIT (OUTPATIENT)
Dept: FAMILY MEDICINE | Facility: CLINIC | Age: 67
End: 2017-03-21
Payer: MEDICARE

## 2017-03-21 ENCOUNTER — CLINICAL SUPPORT (OUTPATIENT)
Dept: DIABETES | Facility: CLINIC | Age: 67
End: 2017-03-21
Payer: MEDICARE

## 2017-03-21 VITALS — HEIGHT: 62 IN | WEIGHT: 192 LBS | BODY MASS INDEX: 35.33 KG/M2

## 2017-03-21 VITALS
SYSTOLIC BLOOD PRESSURE: 134 MMHG | HEART RATE: 78 BPM | BODY MASS INDEX: 35.38 KG/M2 | HEIGHT: 62 IN | TEMPERATURE: 98 F | WEIGHT: 192.25 LBS | DIASTOLIC BLOOD PRESSURE: 78 MMHG

## 2017-03-21 DIAGNOSIS — E78.5 HYPERLIPIDEMIA ASSOCIATED WITH TYPE 2 DIABETES MELLITUS: ICD-10-CM

## 2017-03-21 DIAGNOSIS — I10 HYPERTENSION, BENIGN: ICD-10-CM

## 2017-03-21 DIAGNOSIS — E11.9 DIABETES MELLITUS TYPE 2 WITHOUT RETINOPATHY: ICD-10-CM

## 2017-03-21 DIAGNOSIS — E11.69 HYPERLIPIDEMIA ASSOCIATED WITH TYPE 2 DIABETES MELLITUS: ICD-10-CM

## 2017-03-21 DIAGNOSIS — E55.9 VITAMIN D DEFICIENCY: ICD-10-CM

## 2017-03-21 DIAGNOSIS — I10 HYPERTENSION, BENIGN: Primary | ICD-10-CM

## 2017-03-21 DIAGNOSIS — M85.80 OSTEOPENIA: ICD-10-CM

## 2017-03-21 LAB
25(OH)D3+25(OH)D2 SERPL-MCNC: 47 NG/ML
ALBUMIN SERPL BCP-MCNC: 3.7 G/DL
ALP SERPL-CCNC: 102 U/L
ALT SERPL W/O P-5'-P-CCNC: 24 U/L
ANION GAP SERPL CALC-SCNC: 13 MMOL/L
AST SERPL-CCNC: 22 U/L
BASOPHILS # BLD AUTO: 0.04 K/UL
BASOPHILS NFR BLD: 0.4 %
BILIRUB SERPL-MCNC: 1.1 MG/DL
BUN SERPL-MCNC: 20 MG/DL
CALCIUM SERPL-MCNC: 10.3 MG/DL
CHLORIDE SERPL-SCNC: 103 MMOL/L
CHOLEST/HDLC SERPL: 4.4 {RATIO}
CO2 SERPL-SCNC: 25 MMOL/L
CREAT SERPL-MCNC: 1.1 MG/DL
DIFFERENTIAL METHOD: NORMAL
EOSINOPHIL # BLD AUTO: 0.2 K/UL
EOSINOPHIL NFR BLD: 1.6 %
ERYTHROCYTE [DISTWIDTH] IN BLOOD BY AUTOMATED COUNT: 14.5 %
EST. GFR  (AFRICAN AMERICAN): >60 ML/MIN/1.73 M^2
EST. GFR  (NON AFRICAN AMERICAN): 52.8 ML/MIN/1.73 M^2
GLUCOSE SERPL-MCNC: 129 MG/DL
HCT VFR BLD AUTO: 41.6 %
HDL/CHOLESTEROL RATIO: 22.7 %
HDLC SERPL-MCNC: 247 MG/DL
HDLC SERPL-MCNC: 56 MG/DL
HGB BLD-MCNC: 13.8 G/DL
LDLC SERPL CALC-MCNC: 168.4 MG/DL
LYMPHOCYTES # BLD AUTO: 3.1 K/UL
LYMPHOCYTES NFR BLD: 30.7 %
MCH RBC QN AUTO: 28 PG
MCHC RBC AUTO-ENTMCNC: 33.2 %
MCV RBC AUTO: 85 FL
MONOCYTES # BLD AUTO: 0.6 K/UL
MONOCYTES NFR BLD: 5.6 %
NEUTROPHILS # BLD AUTO: 6.2 K/UL
NEUTROPHILS NFR BLD: 61.1 %
NONHDLC SERPL-MCNC: 191 MG/DL
PLATELET # BLD AUTO: 309 K/UL
PMV BLD AUTO: 10.8 FL
POTASSIUM SERPL-SCNC: 3.5 MMOL/L
PROT SERPL-MCNC: 8 G/DL
RBC # BLD AUTO: 4.92 M/UL
SODIUM SERPL-SCNC: 141 MMOL/L
TRIGL SERPL-MCNC: 113 MG/DL
WBC # BLD AUTO: 10.19 K/UL

## 2017-03-21 PROCEDURE — 99213 OFFICE O/P EST LOW 20 MIN: CPT | Mod: PBBFAC,PO

## 2017-03-21 PROCEDURE — G0108 DIAB MANAGE TRN  PER INDIV: HCPCS | Mod: PBBFAC,PO | Performed by: DIETITIAN, REGISTERED

## 2017-03-21 PROCEDURE — 99214 OFFICE O/P EST MOD 30 MIN: CPT | Mod: S$PBB,,, | Performed by: FAMILY MEDICINE

## 2017-03-21 PROCEDURE — 99999 PR PBB SHADOW E&M-EST. PATIENT-LVL III: CPT | Mod: PBBFAC,,, | Performed by: FAMILY MEDICINE

## 2017-03-21 PROCEDURE — 99999 PR PBB SHADOW E&M-EST. PATIENT-LVL III: CPT | Mod: PBBFAC,,,

## 2017-03-21 NOTE — MR AVS SNAPSHOT
[facility]  [address]  [place] [address]  Phone: [phone]  Fax: [phone]                  Eugenie Coates   3/21/2017 9:00 AM   Office Visit    Description:  Female : 1950   Provider:  Rosalinda Davis MD   Department:  [facility]           Reason for Visit     Follow-up           Diagnoses this Visit        Comments    Hypertension, benign    -  Primary     Diabetes mellitus type 2 without retinopathy         Obesity, Class II, BMI 35-39.9, with comorbidity         Osteopenia         Hyperlipidemia associated with type 2 diabetes mellitus         Vitamin D deficiency                To Do List           Future Appointments        Provider Department Dept Phone    3/21/2017 9:45 AM LAB, COLL[facility] - Lab [phone]    3/21/2017 10:00 AM COLLINS, ENDOCRINE EDUCATOR [facility] [phone]    3/21/2017 11:00 AM SPECIMEN, COLLINS [facility] - Lab [phone]    2017 9:20 AM Rosalinda Davis MD [facility] [phone]      Goals (5 Years of Data)     None      Follow-Up and Disposition     Return in about 4 months (around 2017).      [facility] On Call     [facility] On Call Nurse Care Line -  Assistance  Registered nurses in the [facility] On Call Center provide clinical advisement, health education, appointment booking, and other advisory services.  Call for this free service at [phone].             Medications           Message regarding Medications     Verify the changes and/or additions to your medication regime listed below are the same as discussed with your clinician today.  If any of these changes or additions are incorrect, please notify your healthcare provider.        STOP taking these medications     PREVNAR 13, PF, 0.5 mL Syrg ADM 0.5ML IM UTD           Verify that the below list of medications is an accurate representation of the medications you are currently taking.  If none  "reported, the list may be blank. If incorrect, please contact your healthcare provider. Carry this list with you in case of emergency.           Current Medications     albuterol (PROAIR HFA) 90 mcg/actuation inhaler Inhale 2 puffs into the lungs every 4 (four) hours as needed for Wheezing.    amlodipine (NORVASC) 10 MG tablet Take 1 tablet (10 mg total) by mouth once daily.    JV ASPIRIN ORAL Take 81 mg by mouth once daily. Instructed to stop 5-23-13 until after surgery.    bimatoprost (LUMIGAN) 0.01 % Drop Place 1 drop into both eyes every evening.    blood sugar diagnostic Strp Test daily.  Accucheck viva test strips and lancets.    budesonide-formoterol 160-4.5 mcg (SYMBICORT) 160-4.5 mcg/actuation HFAA Inhale 2 puffs into the lungs every 12 (twelve) hours.    lancets (ACCU-CHEK SOFTCLIX LANCETS) Misc 1 Units by Misc.(Non-Drug; Combo Route) route once daily.    losartan-hydrochlorothiazide 100-25 mg (HYZAAR) 100-25 mg per tablet Take 1 tablet by mouth once daily.    metformin (GLUCOPHAGE) 500 MG tablet Take 1 tablet (500 mg total) by mouth 2 (two) times daily with meals.    omeprazole (PRILOSEC) 20 MG capsule Take 1 capsule (20 mg total) by mouth once daily.    pravastatin (PRAVACHOL) 40 MG tablet Take 1 tablet (40 mg total) by mouth once daily.    spironolactone (ALDACTONE) 25 MG tablet Take 1 tablet (25 mg total) by mouth once daily.           Clinical Reference Information           Your Vitals Were     BP Pulse Temp Height Weight BMI    134/78 (BP Location: Left arm, Patient Position: Sitting, BP Method: Manual) 78 98 °F (36.7 °C) (Oral) 5' 2" (1.575 m) 87.2 kg (192 lb 3.9 oz) 35.16 kg/m2      Blood Pressure          Most Recent Value    BP  134/78      Allergies as of 3/21/2017     Percodan [Oxycodone Hcl-oxycodone-asa]      Immunizations Administered on Date of Encounter - 3/21/2017     None      Orders Placed During Today's Visit     Future Labs/Procedures Expected by Expires    CBC auto differential  " 3/21/2017 5/20/2018    Comprehensive metabolic panel  3/21/2017 5/20/2018    Hemoglobin A1c  3/21/2017 5/20/2018    Lipid panel  3/21/2017 5/20/2018    Microalbumin/creatinine urine ratio  3/21/2017 5/20/2018    Vitamin D  3/21/2017 5/20/2018      Instructions      Diabetes (General Information)  Diabetes is a long-term health problem. It means your body does not make enough insulin. Or it may mean that your body cannot use the insulin it makes. Insulin is a hormone in your body. It lets blood sugar (glucose) reach the cells in your body. All of your cells need glucose for fuel.  When you have diabetes, the glucose in your blood builds up because it cannot get into the cells. This buildup is called high blood sugar (hyperglycemia).  Your blood sugar level depends on several things. It depends on what kind of food you eat and how much of it you eat. It also depends on how much exercise you get, and how much insulin you have in your body. Eating too much of the wrong kinds of food or not taking diabetes medicine on time can cause high blood sugar. Infections can cause high blood sugar even if you are taking medicines correctly.  These things can also cause low blood sugar:  · Missing meals  · Not eating enough food  · Taking too much diabetes medicine  Diabetes can cause serious problems over time if you do not get treated. These problems include heart disease, stroke, kidney failure, and blindness. They also include nerve pain or loss of feeling in your legs and feet, and gangrene of the feet. By keeping your blood sugar under control you can prevent or delay these problems.  Normal blood sugar levels are 80 to 100 before a meal and less than 180 in the 1 to 2 hours after a meal.  Home care  Follow these guidelines when caring for yourself at home:  · Follow the diet your healthcare provider gives you. Take insulin or other diabetes medicine exactly as told to.  · Watch your blood sugar as you are told to. Keep a log  of your results. This will help your provider change your medicines to keep your blood sugar under control.  · Try to reach your ideal weight. You may be able to cut back on or not have to take diabetes medicine if you eat the right foods and get exercise.  · Do not smoke. Smoking worsens the effects of diabetes on your circulation. You are much more likely to have a heart attack if you have diabetes and you smoke.  · Take good care of your feet. If you have lost feeling in your feet, you may not see an injury or infection. Check your feet and between your toes at least once a week.  · Wear a medical alert bracelet or necklace, or carry a card in your wallet that says you have diabetes. This will help healthcare providers give you the right care if you get very ill and cannot tell them that you have diabetes.  Sick day plan  If you get a cold, the flu, or a bacterial or viral infection, take these steps:  · Look at your diabetes sick plan and call your healthcare provider as you were told to. You may need to call your provider right away if:  ¨ Your blood sugar is above 240 while taking your diabetes medicine  ¨ Your urine ketone levels are above normal or high  ¨ You have been vomiting more than 6 hours  ¨ You have trouble breathing or your breath ha s a fruity smell  ¨ You have a high fever  ¨ You have a fever for several days and you are not getting better  ¨ You get light-headed and are sleepier than usual  · Keep taking your diabetes pills (oral medicine) even if you have been vomiting and are feeling sick. Call your provider right away because you may need insulin to lower your blood sugar until you recover from your illness.  · Keep taking your insulin even if you have been vomiting and are feeling sick. Call your provider right away to ask if you need to change your insulin dose. This will depend on your blood sugar results.  · Check your blood sugar every 2 to 4 hours, or at least 4 times a day.  · Check  your ketones often. If you are vomiting and having diarrhea, watch them more often.  · Do not skip meals. Try to eat small meals on a regular schedule. Do this even if you do not feel like eating.  · Drink water or other liquids that do not have caffeine or calories. This will keep you from getting dehydrated. If you are nauseated or vomiting, takes small sips every 5 minutes. To prevent dehydration try to drink a cup (8 ounces) of fluids every hour while you are awake.  General care  Always bring a source of fast-acting sugar with you in case you have symptoms of low blood sugar (below 70). At the first sign of low blood sugar, eat or drink 15 to 20 grams of fast-acting sugar to raise your blood sugar. Examples are:  · 3 to 4 glucose tablets. You can buy these at most [facility].  · 4 ounces (1/2 cup) of regular (not diet) soft drinks  · 4 ounces (1/2 cup) of any fruit juice  · 8 ounces (1 cup) of milk  · 5 to 6 pieces of hard candy  · 1 tablespoon of honey  Check your blood sugar 15 minutes after treating yourself. If it is still below 70, take 15 to 20 more grams of fast-acting sugar. Test again in 15 minutes. If it returns to normal (70 or above), eat a snack or meal to keep your blood sugar in a safe range. If it stays low, call your doctor or go to an emergency room.  Follow-up care  Follow-up with your healthcare provider, or as advised. For more information about diabetes, visit the American Diabetes Association website at www.diabetes.org or call 506-285-1811.  When to seek medical advice  Call your healthcare provider right away if you have any of these symptoms of high blood sugar:  · Frequent urination  · Dizziness  · Drowsiness  · Thirst  · Headache  · Nausea or vomiting  · Abdominal pain  · Eyesight changes  · Fast breathing  · Confusion or loss of consciousness  Also call your provider right away if you have any of these signs of low blood sugar:  · Fatigue  · Headache  · Shakes  · Excess  sweating  · Hunger  · Feeling anxious or restless  · Eyesight changes  · Drowsiness  · Weakness  · Confusion or loss of consciousness  Call 911  Call for emergency help right away if any of these occur:  · Chest pain or shortness of breath  · Dizziness or fainting  · Weakness of an arm or leg or one side of the face  · Trouble speaking or seeing   Date Last Reviewed: 6/1/2016 © 2000-2016 "[employer]. [address] [place] [address], [place] [address]. All rights reserved. This information is not intended as a substitute for professional medical care. Always follow your healthcare professional's instructions.        Walking for Fitness  Fitness walking has something for everyone, even people who are already fit. Walking is one of the safest ways to condition your body aerobically. It can boost energy, help you lose weight, and reduce stress.    Physical benefits  · Walking strengthens your heart and lungs, and tones your muscles.  · When walking, your feet land with less impact than in other sports. This reduces chances of muscle, bone, and joint injury.  · Regular walking improves your cholesterol levels and lowers your risk of heart disease. And it helps you control your blood sugar if you have diabetes.  · Walking is a weight-bearing activity, which helps maintain bone density. This can help prevent osteoporosis.  Personal rewards  · Taking walks can help you relax and manage stress. And fitness walking may make you feel better about yourself.  · Walking can help you sleep better at night and make you less likely to be depressed.  · Regular walking may help maintain your memory as you get older.  · Walking is a great way to spend extra time with friends and family members. Be sure to invite your dog along!  Q&A about fitness walking  Q: Will walking keep me fit?  A: Yes. Regular walking at the right pace gives you all the benefits of other aerobic activities, such as jogging and swimming.  Q: Will walking  help me lose weight and keep it off?  A: Yes. Per mile, walking can burn as many calories as jogging. Your health care provider can help work walking into your weight-loss plan.  Q: Is walking safe for my health?  A: Yes. Walking is safe if you have high blood pressure, diabetes, heart disease, or other conditions. Talk to your health care provider before you start.  Date Last Reviewed: 5/9/2015 © 2000-2016 [employer]. [address] [place] [address], [place] [address]. All rights reserved. This information is not intended as a substitute for professional medical care. Always follow your healthcare professional's instructions.        Weight Management: Getting Started  Healthy bodies come in all shapes and sizes. Not all bodies are made to be thin. For some people, a healthy weight is higher than the average weight listed on weight charts. Your healthcare provider can help you decide on a healthy weight for you.    Reasons to lose weight  Losing weight can help with some health problems, such as high blood pressure, heart disease, diabetes, sleep apnea, and arthritis. You may also feel more energy.  Set your long-term goal  Your goal doesn't even have to be a specific weight. You may decide on a fitness goal (such as being able to walk 10 miles a week), or a health goal (such as lowering your blood pressure). Choose a goal that is measurable and reasonable, so you know when you've reached it. A goal of reaching a BMI of less than 25 is not always reasonable (or possible).   Make an action plan  Habits dont change overnight. Setting your goals too high can leave you feeling discouraged if you cant reach them. Be realistic. Choose one or two small changes you can make now. Set an action plan for how you are going to make these changes. When you can stick to this plan, keep making a few more small changes. Taking small steps will help you stay on the path to success.  Track your progress  Write down your  goals. Then, keep a daily record of your progress. Write down what you eat and how active you are. This record lets you look back on how much youve done. It may also help when youre feeling frustrated. Reward yourself for success. Even if you dont reach every goal, give yourself credit for what you do get done.  Get support  Encouragement from others can help make losing weight easier. Ask your family members and friends for support. They may even want to join you. Also look to your healthcare provider, registered dietitian, and  for help. Your local hospital can give you more information about nutrition, exercise, and weight loss.  Date Last Reviewed: 1/31/2016 © 2000-2016 [employer]. [address] [place] [address], [place] [address]. All rights reserved. This information is not intended as a substitute for professional medical care. Always follow your healthcare professional's instructions.             Language Assistance Services     ATTENTION: Language assistance services are available, free of charge. Please call [phone].      ATENCIÓN: Si habla el, tiene a daniels disposición servicios gratuitos de asistencia lingüística. Llame al [phone].     ELIECER Ý: N?u b?n nói Ti?ng Vi?t, có các d?ch v? h? tr? ngôn ng? mi?n phí dành cho b?n. G?i s? [phone].         [facility] complies with applicable Federal civil rights laws and does not discriminate on the basis of race, color, national origin, age, disability, or sex.

## 2017-03-21 NOTE — PROGRESS NOTES
03/21/17 0000   Diabetes Education Visit   Diabetes Education Record Assessment/Progress Initial   Diabetes Type   Diabetes Type  Type II   Diabetes History   Diabetes Diagnosis 1-3 years   Nutrition   Meal Planning skipping meals;water   Meal Plan 24 Hour Recall - Breakfast (Did not eat this am but states she usually has oatmeal with 3 strips of chong or cheerios)   Meal Plan 24 Hour Recall - Lunch (Usually a sandwich with diet green tea or milk or water)   Meal Plan 24 Hour Recall - Dinner (Last night for dinner she had Jambalaya and gumbo with water)   Meal Plan 24 Hour Recall - Snack (She does not snack)   Monitoring    Monitoring Other   Self Monitoring  (Test blood sugar once per week on Monday, on 3/20 fasting was 124)   Blood Glucose Logs No   Exercise    Exercise Type use exercise equipment   Intensity Moderate   Frequency 3-5 Times per week   Duration 30 min   Current Diabetes Treatment    Current Treatment Oral Medication  (1000 mg Metformin BID)   Social History   Preferred Learning Method Face to Face   Primary Support Self;Family   Smoking Status Never a Smoker   Alcohol Use Never   Barriers to Change   Barriers to Change None   Learning Challenges  None   Readiness to Learn    Readiness to Learn  Acceptance   Diabetes Education Assessment/Progress   Acute Complications (preventing, detecting, and treating acute complications) DC   Chronic Complications (preventing, detecting, and treating chronic complications) DC;W Educated patient on the importance of improving A1C to prevent complications.   Diabetes Disease Process (diabetes disease process and treatment options) DC   Nutrition (Incorporating nutritional management into one's lifestyle) DC;W Educated patient on plate method with carb limit set to 30 grams per meal and 15 grams or less per snack.  Educated patient on how to read nutrition labels for carb, protein, fiber and fat content.  Provided Carb counting and meal planning book for  reference. Instructed patient on the importance of eating three times per day.   Physical Activity (incorporating physical activity into one's lifestyle) DC   Medications (states correct name, dose, onset, peak, duration, side effects & timing of meds) DC   Monitoring (monitoring blood glucose/other parameters & using results) DC Provided log sheet for patient to test at varying times for better assessment of glucose ranges.  Patient agreed to report back in 2 weeks.   Goal Setting and Problem Solving (verbalizes behavior change strategies & sets realistic goals) DC   Behavior Change (developing personal strategies to health & behavior change) DC   Psychosocial Issues (developing personal srategies to address psychosocial concerns) DC   Goals   Healthy Eating Set   Start Date 03/21/17   Target Date 06/21/17   Physical Activity In Progress   Monitoring In Progress   Medications In Progress   Problem Solving In Progress   Healthy Coping In Progress   Reducing Risks In Progress   Diabetes Self-Management Support Plan   Diabetes Learning other   Other learning (Carb counting and meal planning book)   Stress Management family;friends   Review Status Patient has selected and agrees to support plan.   Diabetes Care Plan/Intervention   Education Plan/Intervention In F/U DSMT   Diabetes Meal Plan   Restrictions Restricted Carbohydrate   Calories 1400   Carbohydrate Per Meal 20-30g   Carbohydrate Per Snack  7-15g   Protein (Lean protein with meals)   Education Units of Time    Time Spent 60 min

## 2017-03-22 LAB
ESTIMATED AVG GLUCOSE: 157 MG/DL
HBA1C MFR BLD HPLC: 7.1 %

## 2017-03-24 NOTE — PROGRESS NOTES
Subjective:       Patient ID: Eugenie Coates is a 66 y.o. female.    Chief Complaint: Follow-up    Patient Active Problem List   Diagnosis    Ventral hernia    Hypertension, benign    Obesity, Class II, BMI 35-39.9, with comorbidity    Depression    Primary open angle glaucoma of both eyes    Diabetes mellitus type 2 without retinopathy    Nuclear sclerosis of both eyes    Moderate persistent asthma without complication    Osteopenia   Sleep studies confirmed AFSHIN.  CPAP order for home use pending    HPI  Review of Systems   Constitutional: Negative for fatigue and unexpected weight change.   Respiratory: Negative for chest tightness and shortness of breath.    Cardiovascular: Negative for chest pain, palpitations and leg swelling.   Gastrointestinal: Negative for abdominal pain.   Musculoskeletal: Negative for arthralgias.   Neurological: Negative for dizziness, syncope, light-headedness and headaches.       Objective:      Physical Exam   Constitutional: She is oriented to person, place, and time. She appears well-developed and well-nourished.   Cardiovascular: Normal rate, regular rhythm and normal heart sounds.    Pulmonary/Chest: Effort normal and breath sounds normal.   Musculoskeletal: She exhibits no edema.   Neurological: She is alert and oriented to person, place, and time.   Skin: Skin is warm and dry.   Psychiatric: She has a normal mood and affect.   Nursing note and vitals reviewed.      Assessment:       1. Hypertension, benign    2. Diabetes mellitus type 2 without retinopathy    3. Obesity, Class II, BMI 35-39.9, with comorbidity    4. Osteopenia    5. Hyperlipidemia associated with type 2 diabetes mellitus    6. Vitamin D deficiency        Plan:       1. Hypertension, benign  Controlled on current medications.  Continue current medications.    - CBC auto differential; Future    2. Diabetes mellitus type 2 without retinopathy  Stable condition.  Continue current medications.  Will  "adjust based on lab findings or if condition changes.    - Comprehensive metabolic panel; Future  - Hemoglobin A1c; Future  - Microalbumin/creatinine urine ratio; Future    3. Obesity, Class II, BMI 35-39.9, with comorbidity  Counseled patient on his ideal body weight, health consequences of being obese and current recommendations including weekly exercise and a heart healthy diet.  Current BMI is:Estimated body mass index is 35.16 kg/(m^2) as calculated from the following:    Height as of this encounter: 5' 2" (1.575 m).    Weight as of this encounter: 87.2 kg (192 lb 3.9 oz)..  Patient is aware that ideal BMI < 25 or Weight in (lb) to have BMI = 25: 136.4.        4. Osteopenia  Stable condition.  Continue current medications.  Will adjust based on lab findings or if condition changes.      5. Hyperlipidemia associated with type 2 diabetes mellitus  Stable condition.  Continue current medications.  Will adjust based on lab findings or if condition changes.    - Lipid panel; Future    6. Vitamin D deficiency  Stable condition.  Continue current medications.  Will adjust based on lab findings or if condition changes.    - Vitamin D; Future    [facility] goal documentation:  Patient readiness: acceptance and barriers:readiness  During the course of the visit the patient was educated and counseled about the following: Diabetes:  Discussed general issues about diabetes pathophysiology and management.  Obesity:   Informal exercise measures discussed, e.g. taking stairs instead of elevator.  Regular aerobic exercise program discussed.  Goals: Diabetes: Maintain Hemoglobin A1C below 7 and Obesity: Reduce calorie intake and BMI  Goal/Outcomes met:type 2 DM  The following self management tools provided:none  Patient Instructions (the written plan) was given to the patient/family: Yes  Time spent with patient: 20 minutes    Patient with be reevaluated in 4 months or sooner prn    Greater than 50% of this visit was spent counseling " as described in above documentation:Yes

## 2017-03-30 ENCOUNTER — TELEPHONE (OUTPATIENT)
Dept: DIABETES | Facility: CLINIC | Age: 67
End: 2017-03-30

## 2017-03-30 NOTE — TELEPHONE ENCOUNTER
Patient called to report her blood sugars are doing well.  She states she is counting her carbs and eating no sugar.    Fasting 117, 127, 126, 113, 129  Before Dinner 120, 102, 116, 129

## 2017-04-23 RX ORDER — ROSUVASTATIN CALCIUM 10 MG/1
10 TABLET, COATED ORAL DAILY
Qty: 90 TABLET | Refills: 3 | Status: SHIPPED | OUTPATIENT
Start: 2017-04-23 | End: 2018-01-24

## 2017-04-25 ENCOUNTER — TELEPHONE (OUTPATIENT)
Dept: FAMILY MEDICINE | Facility: CLINIC | Age: 67
End: 2017-04-25

## 2017-04-25 NOTE — TELEPHONE ENCOUNTER
----- Message from Kait Alejandreza sent at 4/25/2017  7:45 AM CDT -----  Contact: self [phone]  The pharmacist told her that she cannot take pravastatin and rosuvastatin together.  Please call her with advice.  Thank you!

## 2017-04-25 NOTE — TELEPHONE ENCOUNTER
Patient states she didn't see her lab results on patient portal.  Results were given to patient along with medication change from Pravastain to Rosuvastain.  Patient verbalized understanding

## 2017-07-08 ENCOUNTER — DOCUMENTATION ONLY (OUTPATIENT)
Dept: FAMILY MEDICINE | Facility: CLINIC | Age: 67
End: 2017-07-08

## 2017-07-08 NOTE — PROGRESS NOTES
Pre-Visit Chart Review  For Appointment Scheduled on 7-18-17    There are no preventive care reminders to display for this patient.

## 2017-07-18 ENCOUNTER — OFFICE VISIT (OUTPATIENT)
Dept: FAMILY MEDICINE | Facility: CLINIC | Age: 67
End: 2017-07-18
Payer: MEDICARE

## 2017-07-18 ENCOUNTER — LAB VISIT (OUTPATIENT)
Dept: LAB | Facility: HOSPITAL | Age: 67
End: 2017-07-18
Attending: FAMILY MEDICINE
Payer: MEDICARE

## 2017-07-18 ENCOUNTER — OFFICE VISIT (OUTPATIENT)
Dept: PODIATRY | Facility: CLINIC | Age: 67
End: 2017-07-18
Payer: MEDICARE

## 2017-07-18 VITALS
BODY MASS INDEX: 34.69 KG/M2 | TEMPERATURE: 98 F | WEIGHT: 188.06 LBS | SYSTOLIC BLOOD PRESSURE: 129 MMHG | HEART RATE: 58 BPM | HEIGHT: 62 IN | BODY MASS INDEX: 34.61 KG/M2 | DIASTOLIC BLOOD PRESSURE: 78 MMHG | WEIGHT: 188.5 LBS | HEIGHT: 62 IN

## 2017-07-18 DIAGNOSIS — E11.69 HYPERLIPIDEMIA ASSOCIATED WITH TYPE 2 DIABETES MELLITUS: ICD-10-CM

## 2017-07-18 DIAGNOSIS — B35.1 ONYCHOMYCOSIS DUE TO DERMATOPHYTE: ICD-10-CM

## 2017-07-18 DIAGNOSIS — E78.5 HYPERLIPIDEMIA ASSOCIATED WITH TYPE 2 DIABETES MELLITUS: ICD-10-CM

## 2017-07-18 DIAGNOSIS — R60.0 PERIPHERAL EDEMA: ICD-10-CM

## 2017-07-18 DIAGNOSIS — L84 CORN OR CALLUS: ICD-10-CM

## 2017-07-18 DIAGNOSIS — E11.9 DIABETES MELLITUS TYPE 2 WITHOUT RETINOPATHY: ICD-10-CM

## 2017-07-18 DIAGNOSIS — E08.9 DIABETES MELLITUS DUE TO UNDERLYING CONDITION, CONTROLLED, WITHOUT COMPLICATION, WITHOUT LONG-TERM CURRENT USE OF INSULIN: Primary | ICD-10-CM

## 2017-07-18 DIAGNOSIS — M20.42 HAMMER TOES OF BOTH FEET: ICD-10-CM

## 2017-07-18 DIAGNOSIS — M20.41 HAMMER TOES OF BOTH FEET: ICD-10-CM

## 2017-07-18 DIAGNOSIS — I10 HYPERTENSION, BENIGN: Primary | ICD-10-CM

## 2017-07-18 DIAGNOSIS — I87.2 VENOUS INSUFFICIENCY OF LOWER EXTREMITY, UNSPECIFIED LATERALITY: ICD-10-CM

## 2017-07-18 LAB
ALBUMIN SERPL BCP-MCNC: 3.3 G/DL
ALP SERPL-CCNC: 92 U/L
ALT SERPL W/O P-5'-P-CCNC: 17 U/L
ANION GAP SERPL CALC-SCNC: 9 MMOL/L
AST SERPL-CCNC: 20 U/L
BASOPHILS # BLD AUTO: 0.05 K/UL
BASOPHILS NFR BLD: 0.5 %
BILIRUB SERPL-MCNC: 0.6 MG/DL
BUN SERPL-MCNC: 18 MG/DL
CALCIUM SERPL-MCNC: 9.5 MG/DL
CHLORIDE SERPL-SCNC: 105 MMOL/L
CHOLEST/HDLC SERPL: 4.9 {RATIO}
CO2 SERPL-SCNC: 26 MMOL/L
CREAT SERPL-MCNC: 1 MG/DL
DIFFERENTIAL METHOD: NORMAL
EOSINOPHIL # BLD AUTO: 0.2 K/UL
EOSINOPHIL NFR BLD: 2.3 %
ERYTHROCYTE [DISTWIDTH] IN BLOOD BY AUTOMATED COUNT: 13.7 %
EST. GFR  (AFRICAN AMERICAN): >60 ML/MIN/1.73 M^2
EST. GFR  (NON AFRICAN AMERICAN): 58.8 ML/MIN/1.73 M^2
GLUCOSE SERPL-MCNC: 129 MG/DL
HCT VFR BLD AUTO: 40.6 %
HDL/CHOLESTEROL RATIO: 20.5 %
HDLC SERPL-MCNC: 200 MG/DL
HDLC SERPL-MCNC: 41 MG/DL
HGB BLD-MCNC: 13.4 G/DL
LDLC SERPL CALC-MCNC: 129.4 MG/DL
LYMPHOCYTES # BLD AUTO: 2.5 K/UL
LYMPHOCYTES NFR BLD: 27.4 %
MCH RBC QN AUTO: 27.5 PG
MCHC RBC AUTO-ENTMCNC: 33 %
MCV RBC AUTO: 83 FL
MONOCYTES # BLD AUTO: 0.6 K/UL
MONOCYTES NFR BLD: 6.4 %
NEUTROPHILS # BLD AUTO: 5.7 K/UL
NEUTROPHILS NFR BLD: 63 %
NONHDLC SERPL-MCNC: 159 MG/DL
PLATELET # BLD AUTO: 255 K/UL
PMV BLD AUTO: 10.9 FL
POTASSIUM SERPL-SCNC: 3.8 MMOL/L
PROT SERPL-MCNC: 7 G/DL
RBC # BLD AUTO: 4.87 M/UL
SODIUM SERPL-SCNC: 140 MMOL/L
TRIGL SERPL-MCNC: 148 MG/DL
WBC # BLD AUTO: 9.12 K/UL

## 2017-07-18 PROCEDURE — 11721 DEBRIDE NAIL 6 OR MORE: CPT | Mod: S$PBB,Q9,59, | Performed by: PODIATRIST

## 2017-07-18 PROCEDURE — 11056 PARNG/CUTG B9 HYPRKR LES 2-4: CPT | Mod: PBBFAC,PO | Performed by: PODIATRIST

## 2017-07-18 PROCEDURE — 99214 OFFICE O/P EST MOD 30 MIN: CPT | Mod: S$PBB,25,, | Performed by: FAMILY MEDICINE

## 2017-07-18 PROCEDURE — 99999 PR PBB SHADOW E&M-EST. PATIENT-LVL III: CPT | Mod: PBBFAC,,, | Performed by: FAMILY MEDICINE

## 2017-07-18 PROCEDURE — 99499 UNLISTED E&M SERVICE: CPT | Mod: S$PBB,,, | Performed by: PODIATRIST

## 2017-07-18 PROCEDURE — 99213 OFFICE O/P EST LOW 20 MIN: CPT | Mod: PBBFAC,27,PO | Performed by: PODIATRIST

## 2017-07-18 PROCEDURE — 11056 PARNG/CUTG B9 HYPRKR LES 2-4: CPT | Mod: S$PBB,Q9,, | Performed by: PODIATRIST

## 2017-07-18 PROCEDURE — 99999 PR PBB SHADOW E&M-EST. PATIENT-LVL III: CPT | Mod: PBBFAC,,, | Performed by: PODIATRIST

## 2017-07-18 PROCEDURE — 11721 DEBRIDE NAIL 6 OR MORE: CPT | Mod: PBBFAC,PO,59 | Performed by: PODIATRIST

## 2017-07-18 NOTE — PROGRESS NOTES
Subjective:      Patient ID: Eugenie Coates is a 67 y.o. female.    Chief Complaint: Diabetic Foot Exam (PCP Ryan 7/18/17  A1C 3/21/17  7.1) and Nail Care    Eugenie is a 67 y.o. female who presents to the clinic for routine evaluation and treatment of diabetic feet. Eugenie has a past medical history of Allergy; Anemia; Anxiety; Arthritis; Asthma; Bell palsy; Depression; Diabetes mellitus; Endometriosis; Eye injury (2014); GERD (gastroesophageal reflux disease); Glaucoma; History of uterine fibroid; Hypertension; Nuclear sclerosis of both eyes (9/27/2016); and Sleep apnea. Patient relates no major problem with feet. Only complaints today consist of toenails in need of trimming.  Denies being painful with wearing shoe gear, however, states they are difficult to trim on her own.  Continues applying vicks vaporub to toenails daily and soaks nails in apple cider vinegar.  Has noted no visible difference in the nails.  Denies any additional pedal complaints.      PCP: Rosalinda Davis MD    Date Last Seen by PCP: 7/18/17    Hemoglobin A1C   Date Value Ref Range Status   03/21/2017 7.1 (H) 4.5 - 6.2 % Final     Comment:     According to ADA guidelines, hemoglobin A1C <7.0% represents  optimal control in non-pregnant diabetic patients.  Different  metrics may apply to specific populations.   Standards of Medical Care in Diabetes - 2016.  For the purpose of screening for the presence of diabetes:  <5.7%     Consistent with the absence of diabetes  5.7-6.4%  Consistent with increasing risk for diabetes   (prediabetes)  >or=6.5%  Consistent with diabetes  Currently no consensus exists for use of hemoglobin A1C  for diagnosis of diabetes for children.     11/25/2016 7.2 (H) 4.5 - 6.2 % Final     Comment:     According to ADA guidelines, hemoglobin A1C <7.0% represents  optimal control in non-pregnant diabetic patients.  Different  metrics may apply to specific populations.   Standards of Medical Care in Diabetes -  .  For the purpose of screening for the presence of diabetes:  <5.7%     Consistent with the absence of diabetes  5.7-6.4%  Consistent with increasing risk for diabetes   (prediabetes)  >or=6.5%  Consistent with diabetes  Currently no consensus exists for use of hemoglobin A1C  for diagnosis of diabetes for children.     2016 6.6 (H) 4.5 - 6.2 % Final           Past Medical History:   Diagnosis Date    Allergy     Anemia     Anxiety     Arthritis     Asthma     Bell palsy     Depression     Diabetes mellitus     Endometriosis     Eye injury     stuck with tree branch od ?     GERD (gastroesophageal reflux disease)     Glaucoma     History of uterine fibroid     Hypertension     Nuclear sclerosis of both eyes 2016    Sleep apnea     uses C pap       Past Surgical History:   Procedure Laterality Date     SECTION      CHOLECYSTECTOMY      glaucoma laser Bilateral     HERNIA REPAIR      KNEE SURGERY Right 2008    (R) knee scope; torn meniscus    VEIN SURGERY  ,     Rt leg x2       Family History   Problem Relation Age of Onset    No Known Problems Mother     No Known Problems Father     No Known Problems Sister     No Known Problems Brother     No Known Problems Maternal Aunt     No Known Problems Maternal Uncle     No Known Problems Paternal Aunt     No Known Problems Paternal Uncle     No Known Problems Maternal Grandmother     No Known Problems Maternal Grandfather     No Known Problems Paternal Grandmother     No Known Problems Paternal Grandfather     Amblyopia Neg Hx     Blindness Neg Hx     Cancer Neg Hx     Cataracts Neg Hx     Diabetes Neg Hx     Glaucoma Neg Hx     Hypertension Neg Hx     Macular degeneration Neg Hx     Retinal detachment Neg Hx     Strabismus Neg Hx     Stroke Neg Hx     Thyroid disease Neg Hx        Social History     Social History    Marital status:      Spouse name: N/A    Number of children: N/A     Years of education: N/A     Social History Main Topics    Smoking status: Never Smoker    Smokeless tobacco: Never Used    Alcohol use No    Drug use: No    Sexual activity: Yes     Partners: Male     Other Topics Concern    None     Social History Narrative    None       Current Outpatient Prescriptions   Medication Sig Dispense Refill    albuterol (PROAIR HFA) 90 mcg/actuation inhaler Inhale 2 puffs into the lungs every 4 (four) hours as needed for Wheezing. 3 Inhaler 3    amlodipine (NORVASC) 10 MG tablet Take 1 tablet (10 mg total) by mouth once daily. 90 tablet 3    JV ASPIRIN ORAL Take 81 mg by mouth once daily. Instructed to stop 5-23-13 until after surgery.      bimatoprost (LUMIGAN) 0.01 % Drop Place 1 drop into both eyes every evening. 7.5 mL 4    blood sugar diagnostic Strp Test daily.  Dali Wirelesseck viva test strips and lancets. 300 each 3    budesonide-formoterol 160-4.5 mcg (SYMBICORT) 160-4.5 mcg/actuation HFAA Inhale 2 puffs into the lungs every 12 (twelve) hours. 1 Inhaler 11    lancets (ACCU-CHEK SOFTCLIX LANCETS) Misc 1 Units by Misc.(Non-Drug; Combo Route) route once daily. 300 each 3    losartan-hydrochlorothiazide 100-25 mg (HYZAAR) 100-25 mg per tablet Take 1 tablet by mouth once daily. 90 tablet 3    metformin (GLUCOPHAGE) 500 MG tablet Take 1 tablet (500 mg total) by mouth 2 (two) times daily with meals. 180 tablet 3    omeprazole (PRILOSEC) 20 MG capsule Take 1 capsule (20 mg total) by mouth once daily. 90 capsule 3    rosuvastatin (CRESTOR) 10 MG tablet Take 1 tablet (10 mg total) by mouth once daily. 90 tablet 3    spironolactone (ALDACTONE) 25 MG tablet Take 1 tablet (25 mg total) by mouth once daily. 90 tablet 3     No current facility-administered medications for this visit.        Review of patient's allergies indicates:   Allergen Reactions    Percodan [oxycodone hcl-oxycodone-asa] Hives and Itching         Review of Systems   Constitution: Negative for chills and  fever.   Cardiovascular: Positive for leg swelling. Negative for claudication.   Skin: Positive for color change and nail changes.   Musculoskeletal: Positive for joint swelling. Negative for joint pain, muscle cramps and muscle weakness.   Neurological: Negative for numbness and paresthesias.   Psychiatric/Behavioral: Negative for altered mental status.           Objective:      Physical Exam   Constitutional: She is oriented to person, place, and time. She appears well-developed and well-nourished. No distress.   Cardiovascular:   Pulses:       Dorsalis pedis pulses are 2+ on the right side, and 2+ on the left side.        Posterior tibial pulses are 1+ on the right side, and 1+ on the left side.   CFT <3 seconds bilateral.  Pedal hair growth decreased bilateral.  Varicosities noted bilateral.  1+ pitting edema noted to bilateral lower extremity.  Toes are cool to touch bilateral.     Musculoskeletal: She exhibits edema. She exhibits no tenderness.   Muscle strength 5/5 in all muscle groups bilateral.  No tenderness nor crepitation with ROM of foot/ankle joints bilateral.  No tenderness with palpation of bilateral foot and ankle.  Bilateral pes planus foot type.  Bilateral hallux abducto valgus.  Bilateral semi-reducible contracture of toes 2-5.     Neurological: She is alert and oriented to person, place, and time. She has normal strength. No sensory deficit.   Protective sensation per [place] monofilament intact bilateral.    Vibratory sensation intact bilateral.    Light touch intact bilateral.   Skin: Skin is warm, dry and intact. No abrasion, no bruising, no burn, no ecchymosis, no laceration, no lesion, no petechiae and no rash noted. She is not diaphoretic. No cyanosis or erythema. No pallor. Nails show no clubbing.   Pedal skin appear edematous bilateral.  Toenails x 10 appear thickened by 2 mm's, elongated by 2 mm's, and discolored with subungual debris.  Focal hyperkeratotic lesion noted to  bilateral sub 5th metatarsal head and to the Lt. 5th toe lateral nail fold.  Examination of the skin reveals no evidence of significant maceration, rashes, open lesions, suspicious appearing nevi or other concerning lesions.              Assessment:       Encounter Diagnoses   Name Primary?    Diabetes mellitus due to underlying condition, controlled, without complication, without long-term current use of insulin Yes    Venous insufficiency of lower extremity, unspecified laterality     Peripheral edema     Hammer toes of both feet     Onychomycosis due to dermatophyte     Corn or callus          Plan:       Eugenie was seen today for diabetic foot exam and nail care.    Diagnoses and all orders for this visit:    Diabetes mellitus due to underlying condition, controlled, without complication, without long-term current use of insulin    Venous insufficiency of lower extremity, unspecified laterality    Peripheral edema    Hammer toes of both feet    Onychomycosis due to dermatophyte    Corn or callus      I counseled the patient on her conditions, their implications and medical management.    Advised to continue wearing shoes that accommodate for digital deformities.    Shoe inspection. Diabetic Foot Education. Patient reminded of the importance of good nutrition and blood sugar control to help prevent podiatric complications of diabetes. Patient instructed on proper foot hygeine. We discussed wearing proper shoe gear, daily foot inspections, never walking without protective shoe gear, never putting sharp instruments to feet    Advised to continue elevating the extremities and wearing compression stockings to help manage venous insufficiency.      Advised to continue applying vicks vaporub to toenails and soaking in apple cidar vinegar.  Also, discussed regularly disinfecting shoe gear and limiting environmental exposure.  Will prescribe a topical antifungal if no improvement is noted at the next visit.    Due  to patient venous insufficiency, she qualifies for routine nail care.  With patient's permission, nails were aggressively reduced and debrided x 10 to their soft tissue attachment mechanically and with electric , removing all offending nail and debris.  Also, a sterile #15 scalpel was used to trim lesions x 3 down to smooth appearing skinPatient relates relief following the procedure.  This was performed as a courtesy with today's exam.  She will continue to monitor the areas daily, inspect her feet, wear protective shoe gear when ambulatory, moisturizer to maintain skin integrity and follow in this office in approximately 6 months, sooner p.r.n.    Return in about 6 months (around 1/18/2018).    Giuliano Garnett DPM

## 2017-07-18 NOTE — PROGRESS NOTES
Subjective:       Patient ID: Eugenie Coates is a 67 y.o. female.    Chief Complaint: Follow-up    Patient Active Problem List   Diagnosis    Ventral hernia    Hypertension, benign    Obesity, Class II, BMI 35-39.9, with comorbidity    Depression    Primary open angle glaucoma of both eyes    Diabetes mellitus type 2 without retinopathy    Nuclear sclerosis of both eyes    Moderate persistent asthma without complication    Osteopenia   lost 4 lbs since 3/17.  Been riding bike and working in yard  Diabetes follow-up:  Patient is compliant widiet and meds  Patient checks blood sugars-1 x q 3 d  Hypoglycemic episodes? no   Last Hemoglobin A1c:   Lab Results   Component Value Date    HGBA1C 7.1 (H) 03/21/2017     Patient does not smoke.  Smoking cessation counseling was provided if yes.  Last eye exam: 12/19/16  Last foot exam: Dr. fenton today  Blood pressure is well controlled.  Patient is on an ACE/ARB.  Last urine microalbumin was No results found for: MICROALBUR, [id number]  Lipids are well controlled.  Patient is on a statin.  Last lipids were   Lab Results   Component Value Date    CHOL 247 (H) 03/21/2017    CHOL 248 (H) 11/25/2016    CHOL 220 (H) 05/11/2016     Lab Results   Component Value Date    HDL 56 03/21/2017    HDL 42 11/25/2016    HDL 43 05/11/2016     Lab Results   Component Value Date    LDLCALC 168.4 (H) 03/21/2017    LDLCALC 166.4 (H) 11/25/2016    LDLCALC 143.0 05/11/2016     Lab Results   Component Value Date    TRIG 113 03/21/2017    TRIG 198 (H) 11/25/2016    TRIG 170 (H) 05/11/2016     Lab Results   Component Value Date    CHOLHDL 22.7 03/21/2017    CHOLHDL 16.9 (L) 11/25/2016    CHOLHDL 19.5 (L) 05/11/2016     Patient is on aspirin therapy.    HPI  Review of Systems   Constitutional: Negative for fatigue and unexpected weight change.   Respiratory: Negative for chest tightness and shortness of breath.    Cardiovascular: Negative for chest pain, palpitations and leg swelling.  "  Gastrointestinal: Negative for abdominal pain.   Endocrine: Negative for polydipsia, polyphagia and polyuria.   Musculoskeletal: Negative for arthralgias.   Neurological: Negative for dizziness, syncope, light-headedness and headaches.       Objective:      Physical Exam   Constitutional: She is oriented to person, place, and time. She appears well-developed and well-nourished.   Cardiovascular: Normal rate, regular rhythm and normal heart sounds.    Pulmonary/Chest: Effort normal and breath sounds normal.   Musculoskeletal: She exhibits no edema.   Neurological: She is alert and oriented to person, place, and time.   Skin: Skin is warm and dry.   Psychiatric: She has a normal mood and affect.   Nursing note and vitals reviewed.      Assessment:       1. Hypertension, benign    2. Diabetes mellitus type 2 without retinopathy    3. Obesity, Class II, BMI 35-39.9, with comorbidity    4. Hyperlipidemia associated with type 2 diabetes mellitus        Plan:       1. Hypertension, benign  Controlled on current medications.  Continue current medications.      2. Diabetes mellitus type 2 without retinopathy  Stable condition.  Continue current medications.  Will adjust based on lab findings or if condition changes.    - CBC auto differential; Future  - Comprehensive metabolic panel; Future  - Hemoglobin A1c; Future    3. Obesity, Class II, BMI 35-39.9, with comorbidity  Counseled patient on his ideal body weight, health consequences of being obese and current recommendations including weekly exercise and a heart healthy diet.  Current BMI is:Estimated body mass index is 34.48 kg/m² as calculated from the following:    Height as of this encounter: 5' 2" (1.575 m).    Weight as of this encounter: 85.5 kg (188 lb 7.9 oz)..  Patient is aware that ideal BMI < 25 or Weight in (lb) to have BMI = 25: 136.4.        4. Hyperlipidemia associated with type 2 diabetes mellitus  Stable condition.  Continue current medications.  Will " adjust based on lab findings or if condition changes.    - Lipid panel; Future      [facility] goal documentation:  Patient readiness: acceptance and barriers:readiness  During the course of the visit the patient was educated and counseled about the following: Diabetes:  Discussed general issues about diabetes pathophysiology and management.  Hypertension:   Dietary sodium restriction.  Regular aerobic exercise.  Obesity:   General weight loss/lifestyle modification strategies discussed (elicit support from others; identify saboteurs; non-food rewards, etc).  Goals: Diabetes: Maintain Hemoglobin A1C below 7, Hypertension: Reduce Blood Pressure and Obesity: Reduce calorie intake and BMI  Goal/Outcomes met:Hypertension and type 2 DM  The following self management tools provided:none  Patient Instructions (the written plan) was given to the patient/family: Yes  Time spent with patient: 20 minutes    Patient with be reevaluated in 4 months or sooner prn    Greater than 50% of this visit was spent counseling as described in above documentation:Yes

## 2017-07-18 NOTE — PATIENT INSTRUCTIONS
Diabetes (General Information)  Diabetes is a long-term health problem. It means your body does not make enough insulin. Or it may mean that your body cannot use the insulin it makes. Insulin is a hormone in your body. It lets blood sugar (glucose) reach the cells in your body. All of your cells need glucose for fuel.  When you have diabetes, the glucose in your blood builds up because it cannot get into the cells. This buildup is called high blood sugar (hyperglycemia).  Your blood sugar level depends on several things. It depends on what kind of food you eat and how much of it you eat. It also depends on how much exercise you get, and how much insulin you have in your body. Eating too much of the wrong kinds of food or not taking diabetes medicine on time can cause high blood sugar. Infections can cause high blood sugar even if you are taking medicines correctly.  These things can also cause low blood sugar:  · Missing meals  · Not eating enough food  · Taking too much diabetes medicine  Diabetes can cause serious problems over time if you do not get treated. These problems include heart disease, stroke, kidney failure, and blindness. They also include nerve pain or loss of feeling in your legs and feet, and gangrene of the feet. By keeping your blood sugar under control you can prevent or delay these problems.  Normal blood sugar levels are 80 to 100 before a meal and less than 180 in the 1 to 2 hours after a meal.  Home care  Follow these guidelines when caring for yourself at home:  · Follow the diet your healthcare provider gives you. Take insulin or other diabetes medicine exactly as told to.  · Watch your blood sugar as you are told to. Keep a log of your results. This will help your provider change your medicines to keep your blood sugar under control.  · Try to reach your ideal weight. You may be able to cut back on or not have to take diabetes medicine if you eat the right foods and get exercise.  · Do  not smoke. Smoking worsens the effects of diabetes on your circulation. You are much more likely to have a heart attack if you have diabetes and you smoke.  · Take good care of your feet. If you have lost feeling in your feet, you may not see an injury or infection. Check your feet and between your toes at least once a week.  · Wear a medical alert bracelet or necklace, or carry a card in your wallet that says you have diabetes. This will help healthcare providers give you the right care if you get very ill and cannot tell them that you have diabetes.  Sick day plan  If you get a cold, the flu, or a bacterial or viral infection, take these steps:  · Look at your diabetes sick plan and call your healthcare provider as you were told to. You may need to call your provider right away if:  ¨ Your blood sugar is above 240 while taking your diabetes medicine  ¨ Your urine ketone levels are above normal or high  ¨ You have been vomiting more than 6 hours  ¨ You have trouble breathing or your breath ha s a fruity smell  ¨ You have a high fever  ¨ You have a fever for several days and you are not getting better  ¨ You get light-headed and are sleepier than usual  · Keep taking your diabetes pills (oral medicine) even if you have been vomiting and are feeling sick. Call your provider right away because you may need insulin to lower your blood sugar until you recover from your illness.  · Keep taking your insulin even if you have been vomiting and are feeling sick. Call your provider right away to ask if you need to change your insulin dose. This will depend on your blood sugar results.  · Check your blood sugar every 2 to 4 hours, or at least 4 times a day.  · Check your ketones often. If you are vomiting and having diarrhea, watch them more often.  · Do not skip meals. Try to eat small meals on a regular schedule. Do this even if you do not feel like eating.  · Drink water or other liquids that do not have caffeine or  calories. This will keep you from getting dehydrated. If you are nauseated or vomiting, takes small sips every 5 minutes. To prevent dehydration try to drink a cup (8 ounces) of fluids every hour while you are awake.  General care  Always bring a source of fast-acting sugar with you in case you have symptoms of low blood sugar (below 70). At the first sign of low blood sugar, eat or drink 15 to 20 grams of fast-acting sugar to raise your blood sugar. Examples are:  · 3 to 4 glucose tablets. You can buy these at most [employer].  · 4 ounces (1/2 cup) of regular (not diet) soft drinks  · 4 ounces (1/2 cup) of any fruit juice  · 8 ounces (1 cup) of milk  · 5 to 6 pieces of hard candy  · 1 tablespoon of honey  Check your blood sugar 15 minutes after treating yourself. If it is still below 70, take 15 to 20 more grams of fast-acting sugar. Test again in 15 minutes. If it returns to normal (70 or above), eat a snack or meal to keep your blood sugar in a safe range. If it stays low, call your doctor or go to an emergency room.  Follow-up care  Follow-up with your healthcare provider, or as advised. For more information about diabetes, visit the American Diabetes Association website at www.diabetes.org or call 294-725-3119.  When to seek medical advice  Call your healthcare provider right away if you have any of these symptoms of high blood sugar:  · Frequent urination  · Dizziness  · Drowsiness  · Thirst  · Headache  · Nausea or vomiting  · Abdominal pain  · Eyesight changes  · Fast breathing  · Confusion or loss of consciousness  Also call your provider right away if you have any of these signs of low blood sugar:  · Fatigue  · Headache  · Shakes  · Excess sweating  · Hunger  · Feeling anxious or restless  · Eyesight changes  · Drowsiness  · Weakness  · Confusion or loss of consciousness  Call 911  Call for emergency help right away if any of these occur:  · Chest pain or shortness of breath  · Dizziness or  fainting  · Weakness of an arm or leg or one side of the face  · Trouble speaking or seeing   Date Last Reviewed: 6/1/2016 © 2000-2016 [employer]. [address] [place] [address], [place] [address]. All rights reserved. This information is not intended as a substitute for professional medical care. Always follow your healthcare professional's instructions.        Walking for Fitness  Fitness walking has something for everyone, even people who are already fit. Walking is one of the safest ways to condition your body aerobically. It can boost energy, help you lose weight, and reduce stress.    Physical benefits  · Walking strengthens your heart and lungs, and tones your muscles.  · When walking, your feet land with less impact than in other sports. This reduces chances of muscle, bone, and joint injury.  · Regular walking improves your cholesterol levels and lowers your risk of heart disease. And it helps you control your blood sugar if you have diabetes.  · Walking is a weight-bearing activity, which helps maintain bone density. This can help prevent osteoporosis.  Personal rewards  · Taking walks can help you relax and manage stress. And fitness walking may make you feel better about yourself.  · Walking can help you sleep better at night and make you less likely to be depressed.  · Regular walking may help maintain your memory as you get older.  · Walking is a great way to spend extra time with friends and family members. Be sure to invite your dog along!  Q&A about fitness walking  Q: Will walking keep me fit?  A: Yes. Regular walking at the right pace gives you all the benefits of other aerobic activities, such as jogging and swimming.  Q: Will walking help me lose weight and keep it off?  A: Yes. Per mile, walking can burn as many calories as jogging. Your health care provider can help work walking into your weight-loss plan.  Q: Is walking safe for my health?  A: Yes. Walking is safe if you have high  blood pressure, diabetes, heart disease, or other conditions. Talk to your health care provider before you start.  Date Last Reviewed: 5/9/2015 © 2000-2016 [employer]. [address] [place] [address], [place] [address]. All rights reserved. This information is not intended as a substitute for professional medical care. Always follow your healthcare professional's instructions.        Weight Management: Getting Started  Healthy bodies come in all shapes and sizes. Not all bodies are made to be thin. For some people, a healthy weight is higher than the average weight listed on weight charts. Your healthcare provider can help you decide on a healthy weight for you.    Reasons to lose weight  Losing weight can help with some health problems, such as high blood pressure, heart disease, diabetes, sleep apnea, and arthritis. You may also feel more energy.  Set your long-term goal  Your goal doesn't even have to be a specific weight. You may decide on a fitness goal (such as being able to walk 10 miles a week), or a health goal (such as lowering your blood pressure). Choose a goal that is measurable and reasonable, so you know when you've reached it. A goal of reaching a BMI of less than 25 is not always reasonable (or possible).   Make an action plan  Habits dont change overnight. Setting your goals too high can leave you feeling discouraged if you cant reach them. Be realistic. Choose one or two small changes you can make now. Set an action plan for how you are going to make these changes. When you can stick to this plan, keep making a few more small changes. Taking small steps will help you stay on the path to success.  Track your progress  Write down your goals. Then, keep a daily record of your progress. Write down what you eat and how active you are. This record lets you look back on how much youve done. It may also help when youre feeling frustrated. Reward yourself for success. Even if you dont reach  every goal, give yourself credit for what you do get done.  Get support  Encouragement from others can help make losing weight easier. Ask your family members and friends for support. They may even want to join you. Also look to your healthcare provider, registered dietitian, and  for help. Your local hospital can give you more information about nutrition, exercise, and weight loss.  Date Last Reviewed: 1/31/2016  © 9864-8673 [employer], [employer]. [address] [place] [address], [place] [address]. All rights reserved. This information is not intended as a substitute for professional medical care. Always follow your healthcare professional's instructions.

## 2017-07-19 LAB
ESTIMATED AVG GLUCOSE: 151 MG/DL
HBA1C MFR BLD HPLC: 6.9 %

## 2017-07-25 ENCOUNTER — OFFICE VISIT (OUTPATIENT)
Dept: OPTOMETRY | Facility: CLINIC | Age: 67
End: 2017-07-25
Payer: MEDICARE

## 2017-07-25 DIAGNOSIS — H40.1134 PRIMARY OPEN ANGLE GLAUCOMA OF BOTH EYES, INDETERMINATE STAGE: ICD-10-CM

## 2017-07-25 DIAGNOSIS — H11.32 SUBCONJUNCTIVAL HEMORRHAGE, LEFT: ICD-10-CM

## 2017-07-25 DIAGNOSIS — H57.89 EYE IRRITATION: Primary | ICD-10-CM

## 2017-07-25 PROCEDURE — 92012 INTRM OPH EXAM EST PATIENT: CPT | Mod: S$PBB,,, | Performed by: OPTOMETRIST

## 2017-07-25 PROCEDURE — 99212 OFFICE O/P EST SF 10 MIN: CPT | Mod: PBBFAC,PO | Performed by: OPTOMETRIST

## 2017-07-25 PROCEDURE — 99999 PR PBB SHADOW E&M-EST. PATIENT-LVL II: CPT | Mod: PBBFAC,,, | Performed by: OPTOMETRIST

## 2017-07-25 NOTE — PROGRESS NOTES
HPI     Presenting Complaint: Pt here today red left eye x 5 days. Pt states she   was out in the yard and something blew in her eye. She rinsed eye with   water. Since Friday left eye has been irritated and red.     (+) Pain level today 5 left eye  (+) Pt has been using cool compress a few times a day  (+) Lumigan drops both eyes at bedtime    (-) No vision changes    (-) headaches  (-) diplopia   (-) flashes / (-) floaters      Last edited by Maurilio Doran, OD on 7/25/2017  9:36 AM. (History)            Assessment /Plan     For exam results, see Encounter Report.    Eye irritation    Subconjunctival hemorrhage, left    Primary open angle glaucoma of both eyes, indeterminate stage  -     bimatoprost (LUMIGAN) 0.01 % Drop; Place 1 drop into both eyes every evening.  Dispense: 2.5 mL; Refill: 4      Eye irritation OS with subconj heme nasal. No fb on upper lid eversion. Neg corneal stain. AC quiet. Recommend OTC refresh gel drop qid OS, caution transient blurring of vision with gel use. Return if symptoms worsening or fail to resolve.     Increased IOP today, pt has not been using lumigan. Discussed possible permanent loss of vision if uncompliant with drops. Pt reports good understanding. Resume lumigan qPM OU. Return in 1 month for IOP check.       RTC in 1 month for IOP check, or sooner prn.

## 2017-08-24 ENCOUNTER — OFFICE VISIT (OUTPATIENT)
Dept: OPTOMETRY | Facility: CLINIC | Age: 67
End: 2017-08-24
Payer: MEDICARE

## 2017-08-24 DIAGNOSIS — H40.1134 PRIMARY OPEN ANGLE GLAUCOMA OF BOTH EYES, INDETERMINATE STAGE: Primary | ICD-10-CM

## 2017-08-24 PROCEDURE — 99212 OFFICE O/P EST SF 10 MIN: CPT | Mod: PBBFAC,PO | Performed by: OPTOMETRIST

## 2017-08-24 PROCEDURE — 99999 PR PBB SHADOW E&M-EST. PATIENT-LVL II: CPT | Mod: PBBFAC,,, | Performed by: OPTOMETRIST

## 2017-08-24 PROCEDURE — 92012 INTRM OPH EXAM EST PATIENT: CPT | Mod: S$PBB,,, | Performed by: OPTOMETRIST

## 2017-08-24 NOTE — PROGRESS NOTES
HPI     Presenting Complaint: Pt here today for 1 month IOP check.    (+)Pt states left eye has healed up and has no issues from last month eye   injury. Pt uses OTC gel drops 3 to 4 x day in left eye.   (+) Lumigan 1 % at evening both eyes      (-) headaches  (-) diplopia   (-) flashes / (-) floaters      Last edited by Maurilio Doran, OD on 8/24/2017 10:52 AM. (History)            Assessment /Plan     For exam results, see Encounter Report.    Primary open angle glaucoma of both eyes, indeterminate stage      Much improved IOP today with use of consistent drops. Continue Lumigan: 1 gt qPM OU. Scheduled to return for yearly glc eval and comprehensive eye exam.

## 2017-08-25 ENCOUNTER — DOCUMENTATION ONLY (OUTPATIENT)
Dept: FAMILY MEDICINE | Facility: CLINIC | Age: 67
End: 2017-08-25

## 2017-08-25 NOTE — PROGRESS NOTES
Pre-Visit Chart Review  For Appointment Scheduled on 9/1/17    Health Maintenance Due   Topic Date Due    Influenza Vaccine  08/01/2017    Foot Exam  09/27/2017

## 2017-09-01 ENCOUNTER — OFFICE VISIT (OUTPATIENT)
Dept: FAMILY MEDICINE | Facility: CLINIC | Age: 67
End: 2017-09-01
Payer: MEDICARE

## 2017-09-01 ENCOUNTER — HOSPITAL ENCOUNTER (OUTPATIENT)
Dept: RADIOLOGY | Facility: HOSPITAL | Age: 67
Discharge: HOME | End: 2017-09-01
Attending: PHYSICIAN ASSISTANT
Payer: MEDICARE

## 2017-09-01 VITALS
TEMPERATURE: 99 F | SYSTOLIC BLOOD PRESSURE: 148 MMHG | RESPIRATION RATE: 14 BRPM | BODY MASS INDEX: 36.1 KG/M2 | DIASTOLIC BLOOD PRESSURE: 88 MMHG | WEIGHT: 196.19 LBS | HEART RATE: 63 BPM | HEIGHT: 62 IN | OXYGEN SATURATION: 96 %

## 2017-09-01 DIAGNOSIS — R29.898 LEG HEAVINESS: ICD-10-CM

## 2017-09-01 DIAGNOSIS — H61.21 IMPACTED CERUMEN OF RIGHT EAR: ICD-10-CM

## 2017-09-01 DIAGNOSIS — R42 DIZZINESS: ICD-10-CM

## 2017-09-01 DIAGNOSIS — R82.90 ABNORMAL URINALYSIS: ICD-10-CM

## 2017-09-01 DIAGNOSIS — E11.9 DIABETES MELLITUS TYPE 2 WITHOUT RETINOPATHY: ICD-10-CM

## 2017-09-01 DIAGNOSIS — R26.89 BALANCE PROBLEM: ICD-10-CM

## 2017-09-01 DIAGNOSIS — I10 HYPERTENSION, BENIGN: Primary | ICD-10-CM

## 2017-09-01 PROCEDURE — 3079F DIAST BP 80-89 MM HG: CPT | Mod: ,,, | Performed by: PHYSICIAN ASSISTANT

## 2017-09-01 PROCEDURE — 99999 PR PBB SHADOW E&M-EST. PATIENT-LVL V: CPT | Mod: PBBFAC,,, | Performed by: PHYSICIAN ASSISTANT

## 2017-09-01 PROCEDURE — 99214 OFFICE O/P EST MOD 30 MIN: CPT | Mod: S$PBB,,, | Performed by: PHYSICIAN ASSISTANT

## 2017-09-01 PROCEDURE — 93010 ELECTROCARDIOGRAM REPORT: CPT | Mod: ,,, | Performed by: INTERNAL MEDICINE

## 2017-09-01 PROCEDURE — 70450 CT HEAD/BRAIN W/O DYE: CPT | Mod: TC

## 2017-09-01 PROCEDURE — 3077F SYST BP >= 140 MM HG: CPT | Mod: ,,, | Performed by: PHYSICIAN ASSISTANT

## 2017-09-01 PROCEDURE — 87086 URINE CULTURE/COLONY COUNT: CPT

## 2017-09-01 PROCEDURE — 1159F MED LIST DOCD IN RCRD: CPT | Mod: ,,, | Performed by: PHYSICIAN ASSISTANT

## 2017-09-01 PROCEDURE — 70450 CT HEAD/BRAIN W/O DYE: CPT | Mod: 26,,, | Performed by: RADIOLOGY

## 2017-09-01 PROCEDURE — 4010F ACE/ARB THERAPY RXD/TAKEN: CPT | Mod: ,,, | Performed by: PHYSICIAN ASSISTANT

## 2017-09-01 PROCEDURE — 1125F AMNT PAIN NOTED PAIN PRSNT: CPT | Mod: ,,, | Performed by: PHYSICIAN ASSISTANT

## 2017-09-01 PROCEDURE — 93005 ELECTROCARDIOGRAM TRACING: CPT | Mod: PBBFAC,PO | Performed by: PHYSICIAN ASSISTANT

## 2017-09-01 PROCEDURE — 3044F HG A1C LEVEL LT 7.0%: CPT | Mod: ,,, | Performed by: PHYSICIAN ASSISTANT

## 2017-09-01 PROCEDURE — 99215 OFFICE O/P EST HI 40 MIN: CPT | Mod: PBBFAC,PO | Performed by: PHYSICIAN ASSISTANT

## 2017-09-01 NOTE — PATIENT INSTRUCTIONS
Diabetes (General Information)  Diabetes is a long-term health problem. It means your body does not make enough insulin. Or it may mean that your body cannot use the insulin it makes. Insulin is a hormone in your body. It lets blood sugar (glucose) reach the cells in your body. All of your cells need glucose for fuel.  When you have diabetes, the glucose in your blood builds up because it cannot get into the cells. This buildup is called high blood sugar (hyperglycemia).  Your blood sugar level depends on several things. It depends on what kind of food you eat and how much of it you eat. It also depends on how much exercise you get, and how much insulin you have in your body. Eating too much of the wrong kinds of food or not taking diabetes medicine on time can cause high blood sugar. Infections can cause high blood sugar even if you are taking medicines correctly.  These things can also cause low blood sugar:  · Missing meals  · Not eating enough food  · Taking too much diabetes medicine  Diabetes can cause serious problems over time if you do not get treated. These problems include heart disease, stroke, kidney failure, and blindness. They also include nerve pain or loss of feeling in your legs and feet, and gangrene of the feet. By keeping your blood sugar under control you can prevent or delay these problems.  Normal blood sugar levels are 80 to 100 before a meal and less than 180 in the 1 to 2 hours after a meal.  Home care  Follow these guidelines when caring for yourself at home:  · Follow the diet your healthcare provider gives you. Take insulin or other diabetes medicine exactly as told to.  · Watch your blood sugar as you are told to. Keep a log of your results. This will help your provider change your medicines to keep your blood sugar under control.  · Try to reach your ideal weight. You may be able to cut back on or not have to take diabetes medicine if you eat the right foods and get exercise.  · Do  not smoke. Smoking worsens the effects of diabetes on your circulation. You are much more likely to have a heart attack if you have diabetes and you smoke.  · Take good care of your feet. If you have lost feeling in your feet, you may not see an injury or infection. Check your feet and between your toes at least once a week.  · Wear a medical alert bracelet or necklace, or carry a card in your wallet that says you have diabetes. This will help healthcare providers give you the right care if you get very ill and cannot tell them that you have diabetes.  Sick day plan  If you get a cold, the flu, or a bacterial or viral infection, take these steps:  · Look at your diabetes sick plan and call your healthcare provider as you were told to. You may need to call your provider right away if:  ¨ Your blood sugar is above 240 while taking your diabetes medicine  ¨ Your urine ketone levels are above normal or high  ¨ You have been vomiting more than 6 hours  ¨ You have trouble breathing or your breath ha s a fruity smell  ¨ You have a high fever  ¨ You have a fever for several days and you are not getting better  ¨ You get light-headed and are sleepier than usual  · Keep taking your diabetes pills (oral medicine) even if you have been vomiting and are feeling sick. Call your provider right away because you may need insulin to lower your blood sugar until you recover from your illness.  · Keep taking your insulin even if you have been vomiting and are feeling sick. Call your provider right away to ask if you need to change your insulin dose. This will depend on your blood sugar results.  · Check your blood sugar every 2 to 4 hours, or at least 4 times a day.  · Check your ketones often. If you are vomiting and having diarrhea, watch them more often.  · Do not skip meals. Try to eat small meals on a regular schedule. Do this even if you do not feel like eating.  · Drink water or other liquids that do not have caffeine or  calories. This will keep you from getting dehydrated. If you are nauseated or vomiting, takes small sips every 5 minutes. To prevent dehydration try to drink a cup (8 ounces) of fluids every hour while you are awake.  General care  Always bring a source of fast-acting sugar with you in case you have symptoms of low blood sugar (below 70). At the first sign of low blood sugar, eat or drink 15 to 20 grams of fast-acting sugar to raise your blood sugar. Examples are:  · 3 to 4 glucose tablets. You can buy these at most [employer].  · 4 ounces (1/2 cup) of regular (not diet) soft drinks  · 4 ounces (1/2 cup) of any fruit juice  · 8 ounces (1 cup) of milk  · 5 to 6 pieces of hard candy  · 1 tablespoon of honey  Check your blood sugar 15 minutes after treating yourself. If it is still below 70, take 15 to 20 more grams of fast-acting sugar. Test again in 15 minutes. If it returns to normal (70 or above), eat a snack or meal to keep your blood sugar in a safe range. If it stays low, call your doctor or go to an emergency room.  Follow-up care  Follow-up with your healthcare provider, or as advised. For more information about diabetes, visit the American Diabetes Association website at www.diabetes.org or call 345-895-3620.  When to seek medical advice  Call your healthcare provider right away if you have any of these symptoms of high blood sugar:  · Frequent urination  · Dizziness  · Drowsiness  · Thirst  · Headache  · Nausea or vomiting  · Abdominal pain  · Eyesight changes  · Fast breathing  · Confusion or loss of consciousness  Also call your provider right away if you have any of these signs of low blood sugar:  · Fatigue  · Headache  · Shakes  · Excess sweating  · Hunger  · Feeling anxious or restless  · Eyesight changes  · Drowsiness  · Weakness  · Confusion or loss of consciousness  Call 911  Call for emergency help right away if any of these occur:  · Chest pain or shortness of breath  · Dizziness or  fainting  · Weakness of an arm or leg or one side of the face  · Trouble speaking or seeing   Date Last Reviewed: 6/1/2016 © 2000-2016 [employer]. [address] [place] [address], [place] [address]. All rights reserved. This information is not intended as a substitute for professional medical care. Always follow your healthcare professional's instructions.            Walking for Fitness  Fitness walking has something for everyone, even people who are already fit. Walking is one of the safest ways to condition your body aerobically. It can boost energy, help you lose weight, and reduce stress.    Physical benefits  · Walking strengthens your heart and lungs, and tones your muscles.  · When walking, your feet land with less impact than in other sports. This reduces chances of muscle, bone, and joint injury.  · Regular walking improves your cholesterol levels and lowers your risk of heart disease. And it helps you control your blood sugar if you have diabetes.  · Walking is a weight-bearing activity, which helps maintain bone density. This can help prevent osteoporosis.  Personal rewards  · Taking walks can help you relax and manage stress. And fitness walking may make you feel better about yourself.  · Walking can help you sleep better at night and make you less likely to be depressed.  · Regular walking may help maintain your memory as you get older.  · Walking is a great way to spend extra time with friends and family members. Be sure to invite your dog along!  Q&A about fitness walking  Q: Will walking keep me fit?  A: Yes. Regular walking at the right pace gives you all the benefits of other aerobic activities, such as jogging and swimming.  Q: Will walking help me lose weight and keep it off?  A: Yes. Per mile, walking can burn as many calories as jogging. Your health care provider can help work walking into your weight-loss plan.  Q: Is walking safe for my health?  A: Yes. Walking is safe if you have high  blood pressure, diabetes, heart disease, or other conditions. Talk to your health care provider before you start.  Date Last Reviewed: 5/9/2015 © 2000-2016 [employer]. [address] [place] [address], [place] [address]. All rights reserved. This information is not intended as a substitute for professional medical care. Always follow your healthcare professional's instructions.            Weight Management: Getting Started  Healthy bodies come in all shapes and sizes. Not all bodies are made to be thin. For some people, a healthy weight is higher than the average weight listed on weight charts. Your healthcare provider can help you decide on a healthy weight for you.    Reasons to lose weight  Losing weight can help with some health problems, such as high blood pressure, heart disease, diabetes, sleep apnea, and arthritis. You may also feel more energy.  Set your long-term goal  Your goal doesn't even have to be a specific weight. You may decide on a fitness goal (such as being able to walk 10 miles a week), or a health goal (such as lowering your blood pressure). Choose a goal that is measurable and reasonable, so you know when you've reached it. A goal of reaching a BMI of less than 25 is not always reasonable (or possible).   Make an action plan  Habits dont change overnight. Setting your goals too high can leave you feeling discouraged if you cant reach them. Be realistic. Choose one or two small changes you can make now. Set an action plan for how you are going to make these changes. When you can stick to this plan, keep making a few more small changes. Taking small steps will help you stay on the path to success.  Track your progress  Write down your goals. Then, keep a daily record of your progress. Write down what you eat and how active you are. This record lets you look back on how much youve done. It may also help when youre feeling frustrated. Reward yourself for success. Even if you dont reach  every goal, give yourself credit for what you do get done.  Get support  Encouragement from others can help make losing weight easier. Ask your family members and friends for support. They may even want to join you. Also look to your healthcare provider, registered dietitian, and  for help. Your local hospital can give you more information about nutrition, exercise, and weight loss.  Date Last Reviewed: 1/31/2016  © 1171-1305 [employer], [employer]. [address] [place] [address], [place] [address]. All rights reserved. This information is not intended as a substitute for professional medical care. Always follow your healthcare professional's instructions.

## 2017-09-01 NOTE — PROGRESS NOTES
"Subjective:       Patient ID: Eugenie Coates is a 67 y.o. female.    Chief Complaint: Other (Feeling off balance)    Ms. Coates comes to clinic today complaining that her "balance is off." She reports that she feels like she is going to fall. She also admits to a heaviness in her legs. She reports that she feels like she has to work hard to move her legs.She reports this sensation happens about 2 times per day. The patient also admits to some dizziness and "itching ears." The patient denies recent fall, head trauma, LOC, or headache. She denies confusion, word finding difficulties, or weakness.       Review of Systems   Constitutional: Negative for activity change, appetite change and fever.   HENT: Negative for postnasal drip, rhinorrhea and sinus pressure.    Eyes: Negative for visual disturbance.   Respiratory: Negative for cough and shortness of breath.    Cardiovascular: Negative for chest pain.   Gastrointestinal: Negative for abdominal distention and abdominal pain.   Genitourinary: Negative for difficulty urinating and dysuria.   Musculoskeletal: Negative for arthralgias and myalgias.   Neurological: Positive for dizziness and light-headedness. Negative for headaches.   Hematological: Negative for adenopathy.   Psychiatric/Behavioral: The patient is not nervous/anxious.        Objective:      Physical Exam   Constitutional: She is oriented to person, place, and time.   HENT:   Mouth/Throat: Oropharynx is clear and moist. No oropharyngeal exudate.   Right TM unable to be visualized due to presence of cerumen  Cerumen removed by irrigation  No erythema or bulging of TM   Eyes: Conjunctivae are normal. Pupils are equal, round, and reactive to light.   Cardiovascular: Normal rate and regular rhythm.    Pulmonary/Chest: Effort normal and breath sounds normal. She has no wheezes.   Abdominal: Soft. Bowel sounds are normal. She exhibits no distension. There is no tenderness.   Musculoskeletal: She exhibits no " edema.   Lymphadenopathy:     She has no cervical adenopathy.   Neurological: She is alert and oriented to person, place, and time.   Skin: No erythema.   Psychiatric: Her behavior is normal.       Assessment:       1. Hypertension, benign    2. Diabetes mellitus type 2 without retinopathy    3. Balance problem    4. Leg heaviness    5. Abnormal urinalysis    6. Impacted cerumen of right ear    7. Dizziness        Plan:   Eugenie was seen today for other.    Diagnoses and all orders for this visit:    Hypertension, benign  -     TSH; Future  -     Comprehensive metabolic panel; Future  -     CBC auto differential; Future  -     Hemoglobin A1c; Future  -     EKG 12-lead  -     Creatinine, serum; Future  Elevated; home readings controlled  Monitor and record blood pressures  Follow up in 2 weeks, at that time bring blood pressure log and meter with her  Diabetes mellitus type 2 without retinopathy  -     Comprehensive metabolic panel; Future  -     Hemoglobin A1c; Future    Balance problem  -     TSH; Future  -     Comprehensive metabolic panel; Future  -     CBC auto differential; Future  -     Hemoglobin A1c; Future  -     POCT URINE DIPSTICK WITHOUT MICROSCOPE  -     Urine culture  -     EKG 12-lead  -     CT Head W Wo Contrast; Future  -     Creatinine, serum; Future    Leg heaviness  -     TSH; Future  -     Comprehensive metabolic panel; Future  -     CBC auto differential; Future  -     Hemoglobin A1c; Future  -     POCT URINE DIPSTICK WITHOUT MICROSCOPE  -     Urine culture  -     CT Head W Wo Contrast; Future  -     Creatinine, serum; Future    Abnormal urinalysis  -     Urine culture    Impacted cerumen of right ear  Cerumen removed by irrigation; patient tolerated this well.   Dizziness  -     CT Head W Wo Contrast; Future  -     Creatinine, serum; Future      If all blood work and imaging returns normal, we will consider using meclizine to help with possible vertigo.   Follow up in 2 weeks or sooner  If  symptoms worsen or if new concerning symptoms develop, seek urgent medical attention.

## 2017-09-03 LAB — BACTERIA UR CULT: NORMAL

## 2017-09-13 ENCOUNTER — DOCUMENTATION ONLY (OUTPATIENT)
Dept: FAMILY MEDICINE | Facility: CLINIC | Age: 67
End: 2017-09-13

## 2017-09-13 NOTE — PROGRESS NOTES
Pre-Visit Chart Review  For Appointment Scheduled on 9/18/17    Health Maintenance Due   Topic Date Due    Influenza Vaccine  08/01/2017    Pneumococcal (65+) (2 of 2 - PPSV23) 09/06/2017    Foot Exam  09/27/2017

## 2017-09-18 ENCOUNTER — OFFICE VISIT (OUTPATIENT)
Dept: FAMILY MEDICINE | Facility: CLINIC | Age: 67
End: 2017-09-18
Payer: MEDICARE

## 2017-09-18 VITALS
WEIGHT: 194.88 LBS | DIASTOLIC BLOOD PRESSURE: 80 MMHG | BODY MASS INDEX: 35.86 KG/M2 | HEART RATE: 59 BPM | SYSTOLIC BLOOD PRESSURE: 122 MMHG | OXYGEN SATURATION: 97 % | HEIGHT: 62 IN | RESPIRATION RATE: 14 BRPM | TEMPERATURE: 99 F

## 2017-09-18 DIAGNOSIS — Z23 NEED FOR PNEUMOCOCCAL VACCINATION: ICD-10-CM

## 2017-09-18 DIAGNOSIS — E11.9 DIABETES MELLITUS TYPE 2 WITHOUT RETINOPATHY: ICD-10-CM

## 2017-09-18 DIAGNOSIS — I10 ESSENTIAL HYPERTENSION: Primary | ICD-10-CM

## 2017-09-18 PROCEDURE — 90732 PPSV23 VACC 2 YRS+ SUBQ/IM: CPT | Mod: PBBFAC,PO

## 2017-09-18 PROCEDURE — 3044F HG A1C LEVEL LT 7.0%: CPT | Mod: ,,, | Performed by: PHYSICIAN ASSISTANT

## 2017-09-18 PROCEDURE — 1159F MED LIST DOCD IN RCRD: CPT | Mod: ,,, | Performed by: PHYSICIAN ASSISTANT

## 2017-09-18 PROCEDURE — 99214 OFFICE O/P EST MOD 30 MIN: CPT | Mod: PBBFAC,PO,25 | Performed by: PHYSICIAN ASSISTANT

## 2017-09-18 PROCEDURE — G0009 ADMIN PNEUMOCOCCAL VACCINE: HCPCS | Mod: PBBFAC,PO

## 2017-09-18 PROCEDURE — 3074F SYST BP LT 130 MM HG: CPT | Mod: ,,, | Performed by: PHYSICIAN ASSISTANT

## 2017-09-18 PROCEDURE — 4010F ACE/ARB THERAPY RXD/TAKEN: CPT | Mod: ,,, | Performed by: PHYSICIAN ASSISTANT

## 2017-09-18 PROCEDURE — 1126F AMNT PAIN NOTED NONE PRSNT: CPT | Mod: ,,, | Performed by: PHYSICIAN ASSISTANT

## 2017-09-18 PROCEDURE — 99214 OFFICE O/P EST MOD 30 MIN: CPT | Mod: S$PBB,,, | Performed by: PHYSICIAN ASSISTANT

## 2017-09-18 PROCEDURE — 3079F DIAST BP 80-89 MM HG: CPT | Mod: ,,, | Performed by: PHYSICIAN ASSISTANT

## 2017-09-18 PROCEDURE — 99999 PR PBB SHADOW E&M-EST. PATIENT-LVL IV: CPT | Mod: PBBFAC,,, | Performed by: PHYSICIAN ASSISTANT

## 2017-09-18 RX ORDER — AMLODIPINE BESYLATE 10 MG/1
10 TABLET ORAL NIGHTLY
Qty: 90 TABLET | Refills: 3 | Status: SHIPPED | OUTPATIENT
Start: 2017-09-18 | End: 2018-02-27 | Stop reason: SDUPTHER

## 2017-09-18 RX ORDER — TERAZOSIN 1 MG/1
1 CAPSULE ORAL NIGHTLY
Qty: 30 CAPSULE | Refills: 2 | Status: SHIPPED | OUTPATIENT
Start: 2017-09-18 | End: 2017-09-18 | Stop reason: CLARIF

## 2017-09-18 NOTE — PROGRESS NOTES
Subjective:       Patient ID: Eugenie Coates is a 67 y.o. female.    Chief Complaint: Follow-up (2wk f/u hypertension)    Ms. Coates comes to clinic today for 2 week follow up. The dizziness she was experiencing has now resolved. The patient has been monitoring and recording her blood pressures. The blood pressure record reveals elevated morning readings before taking her blood pressure medications. She reports that she takes all of her blood pressure medication together in the morning. The patient takes no blood pressure medications before bed at night. The patient reports overall she is feeling well and she has no complaints today. She is due for pneumonia shot today and for a foot exam.       Review of Systems   Constitutional: Negative for activity change, appetite change and fever.   HENT: Negative for postnasal drip, rhinorrhea and sinus pressure.    Eyes: Negative for visual disturbance.   Respiratory: Negative for cough and shortness of breath.    Cardiovascular: Negative for chest pain.   Gastrointestinal: Negative for abdominal distention and abdominal pain.   Genitourinary: Negative for difficulty urinating and dysuria.   Musculoskeletal: Negative for arthralgias and myalgias.   Neurological: Negative for headaches.        Dizziness has now resolved   Hematological: Negative for adenopathy.   Psychiatric/Behavioral: The patient is not nervous/anxious.        Objective:      Physical Exam   Constitutional: She is oriented to person, place, and time.   HENT:   Mouth/Throat: Oropharynx is clear and moist. No oropharyngeal exudate.   Eyes: Conjunctivae are normal. Pupils are equal, round, and reactive to light.   Cardiovascular: Normal rate and regular rhythm.    Pulses:       Dorsalis pedis pulses are 1+ on the right side, and 1+ on the left side.        Posterior tibial pulses are 1+ on the right side, and 1+ on the left side.   Pulmonary/Chest: Effort normal and breath sounds normal. She has no wheezes.    Abdominal: Soft. Bowel sounds are normal. She exhibits no distension. There is no tenderness.   Musculoskeletal: She exhibits no edema.   Feet:   Right Foot:   Protective Sensation: 10 sites tested. 10 sites sensed.   Skin Integrity: Negative for ulcer, blister, callus or dry skin.   Left Foot:   Protective Sensation: 10 sites tested. 10 sites sensed.   Skin Integrity: Negative for ulcer, blister, callus or dry skin.   Lymphadenopathy:     She has no cervical adenopathy.   Neurological: She is alert and oriented to person, place, and time.   Skin: No erythema.   Psychiatric: Her behavior is normal.       Assessment:       1. Essential hypertension    2. Obesity, Class II, BMI 35-39.9, with comorbidity    3. Hypertension, benign    4. Diabetes mellitus type 2 without retinopathy    5. Need for pneumococcal vaccination        Plan:   Eugenie was seen today for follow-up.    Diagnoses and all orders for this visit:    Essential hypertension  -     amlodipine (NORVASC) 10 MG tablet; Take 1 tablet (10 mg total) by mouth every evening.  Continue current medications but start taking amlodipine at night  Low salt diet  Continue to monitor and record blood pressures and follow up with Dr. Davis as scheduled.   Obesity, Class II, BMI 35-39.9, with comorbidity  Low carbohydrate, high fiber diet    Diabetes mellitus type 2 without retinopathy  Diabetic diet  Continue current medication  Need for pneumococcal vaccination  -     (In Office Administered) Pneumococcal Polysaccharide Vaccine (23 Valent) (SQ/IM)            Patient readiness: acceptance and barriers:none    During the course of the visit the patient was educated and counseled about the following:     Diabetes:  Discussed general issues about diabetes pathophysiology and management.  Educational material distributed.  Addressed ADA diet.  Suggested low cholesterol diet.  Encouraged aerobic exercise.  Discussed foot care.  Reminded to get yearly retinal  exam.  Hypertension:   Medication: start taking amlodipine at night. Continue hyzaar and spironolactone in the morning  Dietary sodium restriction.  Regular aerobic exercise.  Obesity:   General weight loss/lifestyle modification strategies discussed (elicit support from others; identify saboteurs; non-food rewards, etc).  Informal exercise measures discussed, e.g. taking stairs instead of elevator.  Regular aerobic exercise program discussed.    Goals: Diabetes: Maintain Hemoglobin A1C below 7, Hypertension: Reduce Blood Pressure and Obesity: Reduce calorie intake and BMI    Did patient meet goals/outcomes: Yes    The following self management tools provided: declined    Patient Instructions (the written plan) was given to the patient/family.     Time spent with patient: 30 minutes

## 2017-10-16 ENCOUNTER — OFFICE VISIT (OUTPATIENT)
Dept: OPTOMETRY | Facility: CLINIC | Age: 67
End: 2017-10-16
Payer: MEDICARE

## 2017-10-16 ENCOUNTER — CLINICAL SUPPORT (OUTPATIENT)
Dept: OPHTHALMOLOGY | Facility: CLINIC | Age: 67
End: 2017-10-16
Payer: MEDICARE

## 2017-10-16 DIAGNOSIS — H40.1134 PRIMARY OPEN ANGLE GLAUCOMA OF BOTH EYES, INDETERMINATE STAGE: ICD-10-CM

## 2017-10-16 DIAGNOSIS — H40.1134 PRIMARY OPEN ANGLE GLAUCOMA OF BOTH EYES, INDETERMINATE STAGE: Primary | ICD-10-CM

## 2017-10-16 PROCEDURE — 99211 OFF/OP EST MAY X REQ PHY/QHP: CPT | Mod: PBBFAC,PO,25 | Performed by: OPTOMETRIST

## 2017-10-16 PROCEDURE — 76514 ECHO EXAM OF EYE THICKNESS: CPT | Mod: 26,S$PBB,, | Performed by: OPTOMETRIST

## 2017-10-16 PROCEDURE — 92133 CPTRZD OPH DX IMG PST SGM ON: CPT | Mod: PBBFAC,PO

## 2017-10-16 PROCEDURE — 92083 EXTENDED VISUAL FIELD XM: CPT | Mod: PBBFAC,PO

## 2017-10-16 PROCEDURE — 99999 PR PBB SHADOW E&M-EST. PATIENT-LVL I: CPT | Mod: PBBFAC,,, | Performed by: OPTOMETRIST

## 2017-10-16 PROCEDURE — 92012 INTRM OPH EXAM EST PATIENT: CPT | Mod: S$PBB,,, | Performed by: OPTOMETRIST

## 2017-10-16 PROCEDURE — 92133 CPTRZD OPH DX IMG PST SGM ON: CPT | Mod: 26,S$PBB,, | Performed by: OPTOMETRIST

## 2017-10-16 PROCEDURE — 92083 EXTENDED VISUAL FIELD XM: CPT | Mod: 26,S$PBB,, | Performed by: OPTOMETRIST

## 2017-10-16 PROCEDURE — 76514 ECHO EXAM OF EYE THICKNESS: CPT | Mod: PBBFAC,PO | Performed by: OPTOMETRIST

## 2017-10-16 NOTE — PROGRESS NOTES
HPI     Glaucoma    Additional comments: Glaucoma work up           Comments   DLS: 8/24/17    Pt states no changes since last visit. Does she still need to use the gel   drops?    Gtts: Lumigan QHS OU       Last edited by Cheryle Quintana on 10/16/2017  1:36 PM. (History)            Assessment /Plan     For exam results, see Encounter Report.    Primary open angle glaucoma of both eyes, indeterminate stage      POAG OU vs ocular HTN. OCT and HVF within normal limits, continue lumigan: 1 gt qPM OU. Pt to report mail-order pharmacy when ready for refills. Return in 4 months for IOP check.

## 2017-10-23 ENCOUNTER — PATIENT MESSAGE (OUTPATIENT)
Dept: OPTOMETRY | Facility: CLINIC | Age: 67
End: 2017-10-23

## 2017-10-23 DIAGNOSIS — H40.1134 PRIMARY OPEN ANGLE GLAUCOMA OF BOTH EYES, INDETERMINATE STAGE: ICD-10-CM

## 2017-10-30 ENCOUNTER — PATIENT MESSAGE (OUTPATIENT)
Dept: OPTOMETRY | Facility: CLINIC | Age: 67
End: 2017-10-30

## 2017-11-13 ENCOUNTER — DOCUMENTATION ONLY (OUTPATIENT)
Dept: FAMILY MEDICINE | Facility: CLINIC | Age: 67
End: 2017-11-13

## 2017-11-13 NOTE — PROGRESS NOTES
Pre-Visit Chart Review  For Appointment Scheduled on 11-14-17    Health Maintenance Due   Topic Date Due    Influenza Vaccine  08/01/2017

## 2017-11-14 ENCOUNTER — OFFICE VISIT (OUTPATIENT)
Dept: FAMILY MEDICINE | Facility: CLINIC | Age: 67
End: 2017-11-14
Payer: MEDICARE

## 2017-11-14 VITALS
HEIGHT: 62 IN | HEART RATE: 85 BPM | TEMPERATURE: 98 F | DIASTOLIC BLOOD PRESSURE: 80 MMHG | WEIGHT: 194.69 LBS | SYSTOLIC BLOOD PRESSURE: 150 MMHG | BODY MASS INDEX: 35.83 KG/M2

## 2017-11-14 DIAGNOSIS — E11.69 HYPERLIPIDEMIA ASSOCIATED WITH TYPE 2 DIABETES MELLITUS: ICD-10-CM

## 2017-11-14 DIAGNOSIS — I10 HYPERTENSION, BENIGN: Primary | ICD-10-CM

## 2017-11-14 DIAGNOSIS — Z23 FLU VACCINE NEED: ICD-10-CM

## 2017-11-14 DIAGNOSIS — E78.5 HYPERLIPIDEMIA ASSOCIATED WITH TYPE 2 DIABETES MELLITUS: ICD-10-CM

## 2017-11-14 DIAGNOSIS — E11.9 DIABETES MELLITUS TYPE 2 WITHOUT RETINOPATHY: ICD-10-CM

## 2017-11-14 PROCEDURE — 99213 OFFICE O/P EST LOW 20 MIN: CPT | Mod: PBBFAC,PO | Performed by: FAMILY MEDICINE

## 2017-11-14 PROCEDURE — 99214 OFFICE O/P EST MOD 30 MIN: CPT | Mod: S$PBB,,, | Performed by: FAMILY MEDICINE

## 2017-11-14 PROCEDURE — 99999 PR PBB SHADOW E&M-EST. PATIENT-LVL III: CPT | Mod: PBBFAC,,, | Performed by: FAMILY MEDICINE

## 2017-11-14 RX ORDER — HYDRALAZINE HYDROCHLORIDE 25 MG/1
25 TABLET, FILM COATED ORAL EVERY 12 HOURS
Qty: 60 TABLET | Refills: 11 | Status: SHIPPED | OUTPATIENT
Start: 2017-11-14 | End: 2018-02-27 | Stop reason: SDUPTHER

## 2017-11-14 NOTE — PATIENT INSTRUCTIONS
Hold statin (crestor-rosuvastatin) for 2 weeks.  Restart and if muscle pain returns or worsen stop it and notify MD.    OTC tylenol safe for daily use.  Use Ibuprofen as needed as directed for occasional pain        Controlling High Blood Pressure  High blood pressure (hypertension) is often called the silent killer. This is because many people who have it dont know it. High blood pressure is defined as 140/90 mm Hg or higher. Know your blood pressure and remember to check it regularly. Doing so can save your life. Here are some things you can do to help control your blood pressure.    Choose heart-healthy foods  · Select low-salt, low-fat foods. Limit sodium intake to 2,400 mg per day or the amount suggested by your healthcare provider.  · Limit canned, dried, cured, packaged, and fast foods. These can contain a lot of salt.  · Eat 8 to 10 servings of fruits and vegetables every day.  · Choose lean meats, fish, or chicken.  · Eat whole-grain pasta, brown rice, and beans.  · Eat 2 to 3 servings of low-fat or fat-free dairy products.  · Ask your doctor about the DASH eating plan. This plan helps reduce blood pressure.  · When you go to a restaurant, ask that your meal be prepared with no added salt.  Maintain a healthy weight  · Ask your healthcare provider how many calories to eat a day. Then stick to that number.  · Ask your healthcare provider what weight range is healthiest for you. If you are overweight, a weight loss of only 3% to 5% of your body weight can help lower blood pressure. Generally, a good weight loss goal is to lose 10% of your body weight in a year.  · Limit snacks and sweets.  · Get regular exercise.  Get up and get active  · Choose activities you enjoy. Find ones you can do with friends or family. This includes bicycling, dancing, walking, and jogging.  · Park farther away from building entrances.  · Use stairs instead of the elevator.  · When you can, walk or bike instead of driving.  · [place]  leaves, garden, or do household repairs.  · Be active at a moderate to vigorous level of physical activity for at least 40 minutes for a minimum of 3 to 4 days a week.   Manage stress  · Make time to relax and enjoy life. Find time to laugh.  · Communicate your concerns with your loved ones and your healthcare provider.  · Visit with family and friends, and keep up with hobbies.  Limit alcohol and quit smoking  · Men should have no more than 2 drinks per day.  · Women should have no more than 1 drink per day.  · Talk with your healthcare provider about quitting smoking. Smoking significantly increases your risk for heart disease and stroke. Ask your healthcare provider about community smoking cessation programs and other options.  Medicines  If lifestyle changes arent enough, your healthcare provider may prescribe high blood pressure medicine. Take all medicines as prescribed. If you have any questions about your medicines, ask your healthcare provider before stopping or changing them.   Date Last Reviewed: 4/27/2016 © 2000-2017 [employer]. [address] [place] [address]. All rights reserved. This information is not intended as a substitute for professional medical care. Always follow your healthcare professional's instructions.        Walking for Fitness  Fitness walking has something for everyone, even people who are already fit. Walking is one of the safest ways to condition your body aerobically. It can boost energy, help you lose weight, and reduce stress.    Physical benefits  · Walking strengthens your heart and lungs, and tones your muscles.  · When walking, your feet land with less impact than in other sports. This reduces chances of muscle, bone, and joint injury.  · Regular walking improves your cholesterol levels and lowers your risk of heart disease. And it helps you control your blood sugar if you have diabetes.  · Walking is a weight-bearing activity, which helps maintain  bone density. This can help prevent osteoporosis.  Personal rewards  · Taking walks can help you relax and manage stress. And fitness walking may make you feel better about yourself.  · Walking can help you sleep better at night and make you less likely to be depressed.  · Regular walking may help maintain your memory as you get older.  · Walking is a great way to spend extra time with friends and family members. Be sure to invite your dog along!  Q&A about fitness walking  Q: Will walking keep me fit?  A: Yes. Regular walking at the right pace gives you all the benefits of other aerobic activities, such as jogging and swimming.  Q: Will walking help me lose weight and keep it off?  A: Yes. Per mile, walking can burn as many calories as jogging. Your health care provider can help work walking into your weight-loss plan.  Q: Is walking safe for my health?  A: Yes. Walking is safe if you have high blood pressure, diabetes, heart disease, or other conditions. Talk to your healthcare provider before you start.  Date Last Reviewed: 4/1/2017 © 2000-2017 [employer]. [address] [place] [address]. All rights reserved. This information is not intended as a substitute for professional medical care. Always follow your healthcare professional's instructions.        Weight Management: Getting Started  Healthy bodies come in all shapes and sizes. Not all bodies are made to be thin. For some people, a healthy weight is higher than the average weight listed on weight charts. Your healthcare provider can help you decide on a healthy weight for you.    Reasons to lose weight  Losing weight can help with some health problems, such as high blood pressure, heart disease, diabetes, sleep apnea, and arthritis. You may also feel more energy.  Set your long-term goal  Your goal doesn't even have to be a specific weight. You may decide on a fitness goal (such as being able to walk 10 miles a week), or a health  goal (such as lowering your blood pressure). Choose a goal that is measurable and reasonable, so you know when you've reached it. A goal of reaching a BMI of less than 25 is not always reasonable (or possible).   Make an action plan  Habits dont change overnight. Setting your goals too high can leave you feeling discouraged if you cant reach them. Be realistic. Choose one or two small changes you can make now. Set an action plan for how you are going to make these changes. When you can stick to this plan, keep making a few more small changes. Taking small steps will help you stay on the path to success.  Track your progress  Write down your goals. Then, keep a daily record of your progress. Write down what you eat and how active you are. This record lets you look back on how much youve done. It may also help when youre feeling frustrated. Reward yourself for success. Even if you dont reach every goal, give yourself credit for what you do get done.  Get support  Encouragement from others can help make losing weight easier. Ask your family members and friends for support. They may even want to join you. Also look to your healthcare provider, registered dietitian, and  for help. Your local hospital can give you more information about nutrition, exercise, and weight loss.  Date Last Reviewed: 1/31/2016  © 9469-7667 [employer]. [address] [place] [address], [place] [address]. All rights reserved. This information is not intended as a substitute for professional medical care. Always follow your healthcare professional's instructions.

## 2017-11-14 NOTE — PROGRESS NOTES
Subjective:       Patient ID: Eugenie Coates is a 67 y.o. female.    Chief Complaint: Follow-up    Patient Active Problem List   Diagnosis    Ventral hernia    Hypertension, benign    Obesity, Class II, BMI 35-39.9, with comorbidity    Depression    Primary open angle glaucoma of both eyes    Diabetes mellitus type 2 without retinopathy    Nuclear sclerosis of both eyes    Moderate persistent asthma without complication    Osteopenia   Reviewed recent labs   No visits with results within 1 Month(s) from this visit.   Latest known visit with results is:   Lab Visit on 09/01/2017   Component Date Value Ref Range Status    TSH 09/01/2017 0.487  0.400 - 4.000 uIU/mL Final    Sodium 09/01/2017 142  136 - 145 mmol/L Final    Potassium 09/01/2017 4.1  3.5 - 5.1 mmol/L Final    Chloride 09/01/2017 107  95 - 110 mmol/L Final    CO2 09/01/2017 27  23 - 29 mmol/L Final    Glucose 09/01/2017 119* 70 - 110 mg/dL Final    BUN, Bld 09/01/2017 19  8 - 23 mg/dL Final    Creatinine 09/01/2017 1.1  0.5 - 1.4 mg/dL Final    Calcium 09/01/2017 9.6  8.7 - 10.5 mg/dL Final    Total Protein 09/01/2017 7.1  6.0 - 8.4 g/dL Final    Albumin 09/01/2017 3.3* 3.5 - 5.2 g/dL Final    Total Bilirubin 09/01/2017 0.7  0.1 - 1.0 mg/dL Final    Alkaline Phosphatase 09/01/2017 85  55 - 135 U/L Final    AST 09/01/2017 20  10 - 40 U/L Final    ALT 09/01/2017 26  10 - 44 U/L Final    Anion Gap 09/01/2017 8  8 - 16 mmol/L Final    eGFR if African American 09/01/2017 >60  >60 mL/min/1.73 m^2 Final    eGFR if non African American 09/01/2017 52* >60 mL/min/1.73 m^2 Final    WBC 09/01/2017 8.50  3.90 - 12.70 K/uL Final    RBC 09/01/2017 4.80  4.00 - 5.40 M/uL Final    Hemoglobin 09/01/2017 13.3  12.0 - 16.0 g/dL Final    Hematocrit 09/01/2017 40.2  37.0 - 48.5 % Final    MCV 09/01/2017 84  82 - 98 fL Final    MCH 09/01/2017 27.6  27.0 - 31.0 pg Final    MCHC 09/01/2017 33.0  32.0 - 36.0 g/dL Final    RDW 09/01/2017 14.2   11.5 - 14.5 % Final    Platelets 09/01/2017 230  150 - 350 K/uL Final    MPV 09/01/2017 8.1* 9.2 - 12.9 fL Final    Gran # 09/01/2017 5.2  1.8 - 7.7 K/uL Final    Lymph # 09/01/2017 2.4  1.0 - 4.8 K/uL Final    Mono # 09/01/2017 0.7  0.3 - 1.0 K/uL Final    Eos # 09/01/2017 0.3  0.0 - 0.5 K/uL Final    Baso # 09/01/2017 0.00  0.00 - 0.20 K/uL Final    Gran% 09/01/2017 60.6  38.0 - 73.0 % Final    Lymph% 09/01/2017 27.9  18.0 - 48.0 % Final    Mono% 09/01/2017 7.7  4.0 - 15.0 % Final    Eosinophil% 09/01/2017 3.3  0.0 - 8.0 % Final    Basophil% 09/01/2017 0.5  0.0 - 1.9 % Final    Differential Method 09/01/2017 Automated   Final    Hemoglobin A1C 09/02/2017 6.7* 4.0 - 5.6 % Final    Estimated Avg Glucose 09/02/2017 146* 68 - 131 mg/dL Final    Creatinine 09/01/2017 1.1  0.5 - 1.4 mg/dL Final    eGFR if African American 09/01/2017 >60  >60 mL/min/1.73 m^2 Final    eGFR if non African American 09/01/2017 52* >60 mL/min/1.73 m^2 Final   4 month follow up    HPI  Review of Systems   Constitutional: Negative for activity change and unexpected weight change.   HENT: Negative for hearing loss and rhinorrhea.    Eyes: Negative for discharge and visual disturbance.   Respiratory: Negative for chest tightness and wheezing.    Cardiovascular: Negative for chest pain and palpitations.   Gastrointestinal: Negative for blood in stool, constipation, diarrhea and vomiting.   Endocrine: Negative for polyuria.   Genitourinary: Negative for difficulty urinating, dysuria, hematuria and menstrual problem.   Musculoskeletal: Negative for arthralgias, joint swelling and neck pain.   Neurological: Negative for weakness and headaches.   Psychiatric/Behavioral: Negative for dysphoric mood.       Objective:      Physical Exam   Constitutional: She is oriented to person, place, and time. She appears well-developed and well-nourished.   Cardiovascular: Normal rate, regular rhythm and normal heart sounds.    Pulmonary/Chest:  "Effort normal and breath sounds normal.   Musculoskeletal: She exhibits no edema.   Neurological: She is alert and oriented to person, place, and time.   Skin: Skin is warm and dry.   Psychiatric: She has a normal mood and affect.   Nursing note and vitals reviewed.      Assessment:       1. Hypertension, benign    2. Obesity, Class II, BMI 35-39.9, with comorbidity    3. Diabetes mellitus type 2 without retinopathy    4. Hyperlipidemia associated with type 2 diabetes mellitus    5. Flu vaccine need        Plan:       1. Hypertension, benign  Uncontrolled.  Add:  - hydrALAZINE (APRESOLINE) 25 MG tablet; Take 1 tablet (25 mg total) by mouth every 12 (twelve) hours.  Dispense: 60 tablet; Refill: 11    2. Obesity, Class II, BMI 35-39.9, with comorbidity  Counseled patient on his ideal body weight, health consequences of being obese and current recommendations including weekly exercise and a heart healthy diet.  Current BMI is:Estimated body mass index is 35.6 kg/m² as calculated from the following:    Height as of this encounter: 5' 2" (1.575 m).    Weight as of this encounter: 88.3 kg (194 lb 10.7 oz)..  Patient is aware that ideal BMI < 25 or Weight in (lb) to have BMI = 25: 136.4.        3. Diabetes mellitus type 2 without retinopathy  Stable condition.  Continue current medications.  Will adjust based on lab findings or if condition changes.    - Comprehensive metabolic panel; Future  - Hemoglobin A1c; Future  - Microalbumin/creatinine urine ratio; Future    4. Hyperlipidemia associated with type 2 diabetes mellitus  Stable condition.  Continue current medications.  Will adjust based on lab findings or if condition changes.\  - CBC auto differential; Future  - Lipid panel; Future    5. Flu vaccine need  Immunize today.  Counseled patient on risks, benefits and side effects.  Patient elected to proceed with vaccination.      [facility] goal documentation:  Patient readiness: acceptance and barriers:readiness  During the " course of the visit the patient was educated and counseled about the following: Diabetes:  Discussed general issues about diabetes pathophysiology and management.  Hypertension:   Dietary sodium restriction.  Regular aerobic exercise.  Obesity:   General weight loss/lifestyle modification strategies discussed (elicit support from others; identify saboteurs; non-food rewards, etc).  Goals: Diabetes: Maintain Hemoglobin A1C below 7, Hypertension: Reduce Blood Pressure and Obesity: Reduce calorie intake and BMI  Goal/Outcomes met:Hypertension and type 2 DM  The following self management tools provided:none  Patient Instructions (the written plan) was given to the patient/family: Yes  Time spent with patient: 20 minutes    Patient with be reevaluated in 3 months or sooner prn    Greater than 50% of this visit was spent counseling as described in above documentation:yes

## 2017-11-20 ENCOUNTER — HOSPITAL ENCOUNTER (EMERGENCY)
Facility: HOSPITAL | Age: 67
Discharge: HOME | End: 2017-11-20
Attending: EMERGENCY MEDICINE
Payer: MEDICARE

## 2017-11-20 VITALS
SYSTOLIC BLOOD PRESSURE: 208 MMHG | RESPIRATION RATE: 12 BRPM | WEIGHT: 195 LBS | HEART RATE: 77 BPM | TEMPERATURE: 99 F | HEIGHT: 62 IN | OXYGEN SATURATION: 96 % | BODY MASS INDEX: 35.88 KG/M2 | DIASTOLIC BLOOD PRESSURE: 108 MMHG

## 2017-11-20 DIAGNOSIS — I82.4Y1 ACUTE DEEP VEIN THROMBOSIS (DVT) OF PROXIMAL VEIN OF RIGHT LOWER EXTREMITY: Primary | ICD-10-CM

## 2017-11-20 PROCEDURE — 99284 EMERGENCY DEPT VISIT MOD MDM: CPT

## 2017-11-20 PROCEDURE — 25000003 PHARM REV CODE 250: Performed by: EMERGENCY MEDICINE

## 2017-11-20 RX ADMIN — APIXABAN 10 MG: 2.5 TABLET, FILM COATED ORAL at 11:11

## 2017-11-20 NOTE — ED PROVIDER NOTES
"Encounter Date: 2017    SCRIBE #1 NOTE: I, Mandi Eng, am scribing for, and in the presence of, Dr. Gaxiola.       History     Chief Complaint   Patient presents with    Leg Swelling     R leg x 1 week, denies injury.       2017 10:17 AM     Chief complaint: RLE Swelling      Eugenie Coates is a 67 y.o. female with history of HTN, DM, arthritis who presents to the ED with complaint of right lower extremity swelling x 1 week. She reports associated "burning" pain in the RLE. Patient states she was possibly bitten by red ants prior to onset of the symptoms. No known injury to the leg. She has history of arthritis and venous insuffiencey and has baseline LE pain, but denies significant LE swelling in the past. She states that her legs are normally the same size. She denies numbness, weakness, abdominal pain, or abdominal distension. Patient reports taking two antihypertensive medications in the morning and one at night and she is compliant with these medications.       The history is provided by the patient.     Review of patient's allergies indicates:   Allergen Reactions    Percodan [oxycodone hcl-oxycodone-asa] Hives and Itching     Past Medical History:   Diagnosis Date    Allergy     Anemia     Anxiety     Arthritis     Asthma     Bell palsy     Depression     Diabetes mellitus     Endometriosis     Eye injury     stuck with tree branch od ?     GERD (gastroesophageal reflux disease)     Glaucoma     History of uterine fibroid     Hypertension     Nuclear sclerosis of both eyes 2016    Sleep apnea     uses C pap     Past Surgical History:   Procedure Laterality Date     SECTION      CHOLECYSTECTOMY      glaucoma laser Bilateral     HERNIA REPAIR      KNEE SURGERY Right     (R) knee scope; torn meniscus    VEIN SURGERY  ,     Rt leg x2     Family History   Problem Relation Age of Onset    No Known Problems Mother     No Known Problems " Father     No Known Problems Sister     No Known Problems Brother     No Known Problems Maternal Aunt     No Known Problems Maternal Uncle     No Known Problems Paternal Aunt     No Known Problems Paternal Uncle     No Known Problems Maternal Grandmother     No Known Problems Maternal Grandfather     No Known Problems Paternal Grandmother     No Known Problems Paternal Grandfather     Amblyopia Neg Hx     Blindness Neg Hx     Cancer Neg Hx     Cataracts Neg Hx     Diabetes Neg Hx     Glaucoma Neg Hx     Hypertension Neg Hx     Macular degeneration Neg Hx     Retinal detachment Neg Hx     Strabismus Neg Hx     Stroke Neg Hx     Thyroid disease Neg Hx      Social History   Substance Use Topics    Smoking status: Never Smoker    Smokeless tobacco: Never Used    Alcohol use No     Review of Systems   Constitutional: Negative for fever.   HENT: Negative for sore throat.    Respiratory: Negative for shortness of breath.    Cardiovascular: Positive for leg swelling (RLE). Negative for chest pain.   Gastrointestinal: Negative for abdominal distention and abdominal pain.   Genitourinary: Negative for dysuria.   Musculoskeletal: Positive for myalgias (RLE pain).   Skin: Negative for rash.   Neurological: Negative for weakness and numbness.   Hematological: Does not bruise/bleed easily.       Physical Exam     Initial Vitals [11/20/17 0945]   BP Pulse Resp Temp SpO2   (!) 229/100 77 12 98.6 °F (37 °C) 96 %      MAP       143         Physical Exam    Nursing note and vitals reviewed.  Constitutional: She appears well-developed and well-nourished. No distress.   HENT:   Head: Normocephalic and atraumatic.   Mouth/Throat: Oropharynx is clear and moist.   Eyes: Conjunctivae are normal.   Neck: Neck supple.   Cardiovascular: Normal rate, regular rhythm, normal heart sounds and intact distal pulses. Exam reveals no gallop and no friction rub.    No murmur heard.  Pulses:       Dorsalis pedis pulses are 2+ on  the right side, and 2+ on the left side.        Posterior tibial pulses are 2+ on the right side, and 2+ on the left side.   Pulmonary/Chest: Breath sounds normal. She has no wheezes. She has no rhonchi. She has no rales.   Abdominal: Soft. She exhibits no distension. There is no tenderness.   Musculoskeletal: She exhibits edema and tenderness.   Mild diffuse TTP to the RLE between the knee and ankle. No joint effusion. Full active ROM of the knee and ankle without pain. Right calf tenderness present with 1+ edema on the right.   Neurological: She is alert and oriented to person, place, and time.   5/5 strength and sensation.   Skin: No erythema.   Increased pigmentation to the anterior tibial area on the right, appears chronic. No erythema.   Psychiatric: She has a normal mood and affect. Her speech is normal and behavior is normal. Judgment and thought content normal.         ED Course   Procedures  Labs Reviewed - No data to display     Imaging Results          US Lower Extremity Veins Right (Final result)  Result time 11/20/17 11:19:49    Final result by Greg Gómez MD (11/20/17 11:19:49)                 Impression:     Positive examination demonstrating extensive right lower extremity DVT.        Dr. Greg Gómez observed the finding of right lower extremity DVT at 11:15 AM and discussed these results with Dr. VANESA DICKEY at 11:18 AM 11/20/17.      Electronically signed by: Greg Gómez MD  Date:     11/20/17  Time:    11:19              Narrative:    RIGHT LOWER EXTREMITY VENOUS DOPPLER    Comparison: None    Technique: Grayscale, color Doppler, and spectral Doppler sonographic imaging of the right lower extremity venous systems was performed.    Findings: There is occlusive thrombus extending from the proximal right femoral vein through the popliteal vein, and extending into the posterior tibial and one of 2 peroneal veins.                          (discussed with interpreting  radiologist)       Medical Decision Making:   History:   Old Medical Records: I decided to obtain old medical records.  Clinical Tests:   Radiological Study: Ordered and Reviewed            Scribe Attestation:   Scribe #1: I performed the above scribed service and the documentation accurately describes the services I performed. I attest to the accuracy of the note.    I, Dr. Daniel Gaxiola, personally performed the services described in this documentation. All medical record entries made by the scribe were at my direction and in my presence.  I have reviewed the chart and agree that the record reflects my personal performance and is accurate and complete. Daniel Gaxiola MD.  3:26 PM 11/20/2017    Eugenie Coates is a 67 y.o. female presenting with right leg swelling and pain consistent with DVT.    No sign of compartment syndrome or neurovascular compromise.  Apixaban initiated after discussion of bleeding/fall risks.  Follow up with PCP.  No sign of arterial compromise.  Low suspicion for cellulitis or fracture.  Return precautions reviewed.          ED Course      Clinical Impression:   The encounter diagnosis was Acute deep vein thrombosis (DVT) of proximal vein of right lower extremity.    Disposition:   Disposition: Discharged  Condition: Stable                        Daniel Gaxiola MD  11/20/17 1528

## 2017-12-13 DIAGNOSIS — J45.40 MODERATE PERSISTENT ASTHMA WITHOUT COMPLICATION: ICD-10-CM

## 2017-12-13 RX ORDER — BUDESONIDE AND FORMOTEROL FUMARATE DIHYDRATE 160; 4.5 UG/1; UG/1
AEROSOL RESPIRATORY (INHALATION)
Qty: 10.2 G | Refills: 11 | Status: SHIPPED | OUTPATIENT
Start: 2017-12-13 | End: 2018-08-08

## 2018-01-03 DIAGNOSIS — I10 ESSENTIAL HYPERTENSION: ICD-10-CM

## 2018-01-03 DIAGNOSIS — E11.9 TYPE 2 DIABETES MELLITUS WITHOUT COMPLICATION, WITHOUT LONG-TERM CURRENT USE OF INSULIN: ICD-10-CM

## 2018-01-04 RX ORDER — LOSARTAN POTASSIUM AND HYDROCHLOROTHIAZIDE 25; 100 MG/1; MG/1
1 TABLET ORAL DAILY
Qty: 90 TABLET | Refills: 3 | Status: SHIPPED | OUTPATIENT
Start: 2018-01-04 | End: 2018-02-27 | Stop reason: SDUPTHER

## 2018-01-04 RX ORDER — METFORMIN HYDROCHLORIDE 500 MG/1
500 TABLET ORAL 2 TIMES DAILY WITH MEALS
Qty: 180 TABLET | Refills: 3 | Status: SHIPPED | OUTPATIENT
Start: 2018-01-04 | End: 2018-02-27 | Stop reason: SDUPTHER

## 2018-01-23 ENCOUNTER — LAB VISIT (OUTPATIENT)
Dept: LAB | Facility: HOSPITAL | Age: 68
End: 2018-01-23
Attending: FAMILY MEDICINE
Payer: MEDICARE

## 2018-01-23 ENCOUNTER — OFFICE VISIT (OUTPATIENT)
Dept: FAMILY MEDICINE | Facility: CLINIC | Age: 68
End: 2018-01-23
Payer: MEDICARE

## 2018-01-23 VITALS
HEIGHT: 62 IN | OXYGEN SATURATION: 97 % | HEART RATE: 97 BPM | TEMPERATURE: 98 F | WEIGHT: 193.81 LBS | BODY MASS INDEX: 35.66 KG/M2 | DIASTOLIC BLOOD PRESSURE: 78 MMHG | SYSTOLIC BLOOD PRESSURE: 128 MMHG

## 2018-01-23 DIAGNOSIS — Z12.11 COLON CANCER SCREENING: ICD-10-CM

## 2018-01-23 DIAGNOSIS — F32.A DEPRESSION, UNSPECIFIED DEPRESSION TYPE: ICD-10-CM

## 2018-01-23 DIAGNOSIS — Z12.39 SCREENING FOR MALIGNANT NEOPLASM OF BREAST: ICD-10-CM

## 2018-01-23 DIAGNOSIS — E78.5 HYPERLIPIDEMIA, UNSPECIFIED HYPERLIPIDEMIA TYPE: ICD-10-CM

## 2018-01-23 DIAGNOSIS — Z11.59 NEED FOR HEPATITIS C SCREENING TEST: ICD-10-CM

## 2018-01-23 DIAGNOSIS — E11.65 TYPE 2 DIABETES MELLITUS WITH HYPERGLYCEMIA, WITHOUT LONG-TERM CURRENT USE OF INSULIN: ICD-10-CM

## 2018-01-23 DIAGNOSIS — Z53.1 REFUSAL OF BLOOD TRANSFUSIONS AS PATIENT IS JEHOVAH'S WITNESS: ICD-10-CM

## 2018-01-23 DIAGNOSIS — H40.1130 PRIMARY OPEN ANGLE GLAUCOMA OF BOTH EYES, UNSPECIFIED GLAUCOMA STAGE: ICD-10-CM

## 2018-01-23 DIAGNOSIS — Z23 NEED FOR INFLUENZA VACCINATION: ICD-10-CM

## 2018-01-23 DIAGNOSIS — I82.4Y9 ACUTE DEEP VEIN THROMBOSIS (DVT) OF PROXIMAL VEIN OF LOWER EXTREMITY, UNSPECIFIED LATERALITY: Primary | ICD-10-CM

## 2018-01-23 DIAGNOSIS — I10 HYPERTENSION, BENIGN: ICD-10-CM

## 2018-01-23 DIAGNOSIS — J45.40 MODERATE PERSISTENT ASTHMA WITHOUT COMPLICATION: ICD-10-CM

## 2018-01-23 DIAGNOSIS — K21.9 GASTROESOPHAGEAL REFLUX DISEASE, ESOPHAGITIS PRESENCE NOT SPECIFIED: ICD-10-CM

## 2018-01-23 DIAGNOSIS — M85.80 OSTEOPENIA, UNSPECIFIED LOCATION: ICD-10-CM

## 2018-01-23 DIAGNOSIS — F41.9 ANXIETY: ICD-10-CM

## 2018-01-23 PROCEDURE — 36415 COLL VENOUS BLD VENIPUNCTURE: CPT | Mod: PO

## 2018-01-23 PROCEDURE — 80053 COMPREHEN METABOLIC PANEL: CPT

## 2018-01-23 PROCEDURE — 84443 ASSAY THYROID STIM HORMONE: CPT

## 2018-01-23 PROCEDURE — 84439 ASSAY OF FREE THYROXINE: CPT

## 2018-01-23 PROCEDURE — 83036 HEMOGLOBIN GLYCOSYLATED A1C: CPT

## 2018-01-23 PROCEDURE — 86803 HEPATITIS C AB TEST: CPT

## 2018-01-23 PROCEDURE — 99214 OFFICE O/P EST MOD 30 MIN: CPT | Mod: PBBFAC,PO | Performed by: FAMILY MEDICINE

## 2018-01-23 PROCEDURE — 99999 PR PBB SHADOW E&M-EST. PATIENT-LVL IV: CPT | Mod: PBBFAC,,, | Performed by: FAMILY MEDICINE

## 2018-01-23 PROCEDURE — 80061 LIPID PANEL: CPT

## 2018-01-23 PROCEDURE — G0402 INITIAL PREVENTIVE EXAM: HCPCS | Mod: ,,, | Performed by: FAMILY MEDICINE

## 2018-01-23 NOTE — PATIENT INSTRUCTIONS
Understanding Fat and Cholesterol  Too much cholesterol in your blood can lead to many problems such as blocked arteries. This can lead to heart attack and stroke. One of the best ways to manage heart and blood vessel disease is to lower your blood cholesterol. Planning meals that are low in saturated fat and cholesterol helps reduce the level of cholesterol in your blood. Below are eating tips to help lower your blood cholesterol levels.  Eat less fat  A healthy goal is to have less than 25% to 35% of your daily calories come from fat. Instead of fats, eat more fruits, whole-grains, and vegetables. This also helps control your weight, and can even reduce your risk for some cancers. There are different kinds of fats in foods. Fats can be saturated, unsaturated, or trans fats. The best fats to choose are unsaturated fats. But fats are high in calories, so eat even unsaturated fats sparingly.  Limit foods high in saturated fats  Saturated fats come from animals and certain plants (such as coconut and palm). Eating too much saturated fat can raise your blood cholesterol levels and make your artery problems worse. Your goal is to eat less saturated fat. Below are some examples of foods that contain lots of saturated fat:  · Fatty cuts of meat (lamb, ham, beef)  · Many pastries, cakes, cookies, and candies  · Cream, ice cream, sour cream, cheese, and butter, and foods made with them  · Sauces made with butter or cream  · Salad dressings with saturated fats  · Foods that contain palm or coconut oil  Choose unsaturated fats  Unsaturated fats are usually liquid at room temperature. They are better choices for your heart than saturated fat. There are two types of unsaturated fats: polyunsaturated fat and monounsaturated fat. Aim to replace saturated fats with polyunsaturated or monounsaturated fats.  · Polyunsaturated fats are found in corn oil, safflower oil, sunflower oil, and other vegetable oils.  · Monounsaturated  fats are found in olive oil, canola oil, and peanut oil. Some margarines and spreads are now made with these oils, too. Avocados are also high in monounsaturated fat.  Of all fats, monounsaturated fats are the least harmful to your heart.  Avoid trans fats  Like saturated fats, trans fats have been linked to heart disease. Even a small amount can harm your health. Trans fats are found in liquid oils that have been changed to be solid at room temperature. Margarine, which is often made from vegetable oil, is one example. Vegetable shortening is another. Trans fats are often found in packaged goods. Check ingredients for the words hydrogenated or partially hydrogenated. They mean the foods contain trans fat.  What about triglycerides?  Triglycerides are a type of fat in your blood. Like cholesterol, high levels of triglycerides can lead to blocked arteries. High triglyceride levels can be reduced 20% to 50%  by limiting added sugars in your diet, susbstituting healthier fats for saturated and trans fats, getting more physical activity, and losing weight if your are overweight. You may also be advised to avoid or limi alcohol.    Reading food labels  Luckily, most foods now have labels giving you the facts about what youre eating. Reading food labels helps you make healthy choices. Look for the words highlighted below.  · Serving Size. This is the amount of food in 1 serving. If you eat larger portions, be sure to count more of everything: fat, calories, and cholesterol.  · Total Fat. Tells you how many grams (g) of fat are in 1 serving.  · Calories from Fat. This tells you the total number of calories from fat in 1 serving (there are 9 calories per gram of fat). Look for foods with the fewest calories from fat.  · Saturated Fat. Tells you how many grams (g) of saturated fat are in 1 serving.  · Trans Fat. Tells how many grams (g) of trans fat are in 1 serving.  · Cholesterol. Tells you how many milligrams (mg) of  cholesterol are in 1 serving.  Date Last Reviewed: 5/11/2015  © 9891-9504 [employer]. [address] [place] [address], [place] [address]. All rights reserved. This information is not intended as a substitute for professional medical care. Always follow your healthcare professional's instructions.      Low-Salt Diet  This diet removes foods that are high in salt. It also limits the amount of salt you use when cooking. It is most often used for people with high blood pressure, edema (fluid retention), and kidney, liver, or heart disease.  Table salt contains the mineral sodium. Your body needs sodium to work normally. But too much sodium can make your health problems worse. Your healthcare provider is recommending a low-salt (also called low-sodium) diet for you. Your total daily allowance of salt is 1,500 to 2,300 milligrams (mg). It is less than 1 teaspoon of table salt. This means you can have only about 500 to 700 mg of sodium at each meal. People with certain health problems should limit salt intake to the lower end of the recommended range.    When you cook, dont add much salt. If you can cook without using salt, even better. Dont add salt to your food at the table.  When shopping, read food labels. Salt is often called sodium on the label. Choose foods that are salt-free, low salt, or very low salt. Note that foods with reduced salt may not lower your salt intake enough.    Beans, potatoes, and pasta  Ok: Dry beans, split peas, lentils, potatoes, rice, macaroni, pasta, spaghetti without added salt  Avoid: Potato chips, tortilla chips, and similar products  Breads and cereals  Ok: Low-sodium breads, rolls, cereals, and cakes; low-salt crackers, matzo crackers  Avoid: Salted crackers, pretzels, popcorn, Kyrgyz toast, pancakes, muffins  Dairy  Ok: Milk, chocolate milk, hot chocolate mix, low-salt cheeses, and yogurt  Avoid: Processed cheese and cheese spreads; Roquefort, Camembert, and cottage cheese;  buttermilk, instant breakfast drink  Desserts  Ok: Ice cream, frozen yogurt, juice bars, gelatin, cookies and pies, sugar, honey, jelly, hard candy  Avoid: Most pies, cakes and cookies prepared or processed with salt; instant pudding  Drinks  Ok: Tea, coffee, fizzy (carbonated) drinks, juices  Avoid: Flavored coffees, electrolyte replacement drinks, sports drinks  Meats  Ok: All fresh meat, fish, poultry, low-salt tuna, eggs, egg substitute  Avoid: Smoked, pickled, brine-cured, or salted meats and fish. This includes chong, chipped beef, corned beef, hot dogs, deli meats, ham, kosher meats, salt pork, sausage, canned tuna, salted codfish, smoked salmon, herring, sardines, or anchovies.  Seasonings and spices  Ok: Most seasonings are okay. Good substitutes for salt include: fresh herb blends, hot sauce, lemon, garlic, geronimo, vinegar, dry mustard, parsley, cilantro, horseradish, tomato paste, regular margarine, mayonnaise, unsalted butter, cream cheese, vegetable oil, cream, low-salt salad dressing and gravy.  Avoid: Regular ketchup, relishes, pickles, soy sauce, teriyaki sauce, Worcestershire sauce, BBQ sauce, tartar sauce, meat tenderizer, chili sauce, regular gravy, regular salad dressing, salted butter  Soups  Ok: Low-salt soups and broths made with allowed foods  Avoid: Bouillon cubes, soups with smoked or salted meats, regular soup and broth  Vegetables  Ok: Most vegetables are okay; also low-salt tomato and vegetable juices  Avoid: Sauerkraut and other brine-soaked vegetables; pickles and other pickled vegetables; tomato juice, olives  Date Last Reviewed: 8/1/2016  © 5589-2934 [employer]. [address] [place] [address]. All rights reserved. This information is not intended as a substitute for professional medical care. Always follow your healthcare professional's instructions.        4 Steps for Eating Healthier  Changing the way you eat can improve your health. It can lower your  cholesterol and blood pressure, and help you stay at a healthy weight. Your diet doesnt have to be bland and boring to be healthy. Just watch your calories and follow these steps:    1. Eat fewer unhealthy fats  · Choose more fish and lean meats instead of fatty cuts of meat.  · Skip butter and lard, and use less margarine.  · Pass on foods that have palm, coconut, or hydrogenated oils.  · Eat fewer high-fat dairy foods like cheese, ice cream, and whole milk.  · Get a heart-healthy cookbook and try some low-fat recipes.  2. Go light on salt  · Keep the saltshaker off the table.  · Limit high-salt ingredients, such as soy sauce, bouillon, and garlic salt.  · Instead of adding salt when cooking, season your food with herbs and flavorings. Try lemon, garlic, and onion.  · Limit convenience foods, such as boxed or canned foods and restaurant food.  · Read food labels and choose lower-sodium options.  3. Limit sugar  · Pause before you add sugars to pancakes, cereal, coffee, or tea. This includes white and brown table sugar, syrup, honey, and molasses. Cut your usual amount by half.  · Use non-sugar sweeteners. Stevia, aspartame, and sucralose can satisfy a sweet tooth without adding calories.  · Swap out sugar-filled soda and other drinks. Buy sugar-free or low-calorie beverages. Remember water is always the best choice.  · Read labels and choose foods with less added sugar. Keep in mind that dairy foods and foods with fruit will have some natural sugar.  · Cut the sugar in recipes by 1/3 to 1/2. Boost the flavor with extracts like almond, vanilla, or orange. Or add spices such as cinnamon or nutmeg.  4. Eat more fiber  · Eat fresh fruits and vegetables every day.  · Boost your diet with whole grains. Go for oats, whole-grain rice, and bran.  · Add beans and lentils to your meals.  · Drink more water to match your fiber increase. This is to help prevent constipation.  Date Last Reviewed: 5/11/2015  © 2100-3664 [employer]. [address] [place] [address], [place] [address]. All rights reserved. This information is not intended as a substitute for professional medical care. Always follow your healthcare professional's instructions.      Diabetes Management Status  Statin: Taking  ACE/ARB: Taking  Screening or Prevention Patient's value Goal Complete/Controlled?   HgA1C Testing and Control   Lab Results   Component Value Date    HGBA1C 6.7 (H) 09/01/2017      Annually/Less than 8% Yes   Lipid profile : 07/18/2017 Annually Yes   LDL control Lab Results   Component Value Date    LDLCALC 129.4 07/18/2017    Annually/Less than 100 mg/dl  No   Nephropathy screening Lab Results   Component Value Date    LABMICR 4.0 03/21/2017     Lab Results   Component Value Date    PROTEINUA Negative 11/29/2016    Annually Yes   Blood pressure BP Readings from Last 1 Encounters:   01/23/18 128/78    Less than 140/90 Yes   Dilated retinal exam : 10/16/2017 Annually Yes   Foot exam   : 09/18/2017 Annually Yes

## 2018-01-23 NOTE — PROGRESS NOTES
"Subjective:       Patient ID: Eugenie Coates is a 67 y.o. female.    Chief Complaint: Annual Exam (Est care)    Patient ID: Eugenie Coates is a 67 y.o. female who presents today for a follow-up Medicare AWV today.     Abdominal aortic aneurysm screening: N/A  Alcohol misuse screening and counseling: N/A  Bone mass measurements: reviewed results from 2016  Cardiovascular disease screening laboratory tests: Lipid panel   Colorectal Cancer Screenin, in [place], records pending  Depression screening: PHQ9 Score: 18  Diabetes/Nutrition: referral given to educator  Glaucoma test: to see eye doctor, Dr. Doran  Hepatitis C screening test: ordered  Influenza vaccine: received at [facility] in [place]  Lung cancer screening - Low dose computed tomography (LD-CT): N/A  Pneumococcal vaccination: UTD  Shingles Vaccine: UTD  Sexually transmitted infection (STI) screenings and high intensity behavioral counseling: discussed with patient    Gender Specific:  Mammogram: discussed risks and benefits with patient. One year ago, in [place], [place]  Pap Smear: discussed risks and benefits with patient. 5 years ago, in [place], [place]. Has OB/GYN, Dr. Mary Jane Pimentel    Last eye exam:   Glaucoma History: sees eye doctor     Safety Screening:   Help with the phone, transportation, shopping, meals, housework, laundry, medications, or managing money: No  Rugs at home in the hallway, lack grab bars in the bathroom, or have poor lighting: No. She does endorse that she lacks handrails on the stairs, however she doesn't have a "real staircase" just a few steps on her porch and the front door  Have you noticed any hearing difficulties: No    Advanced Directives:  Full Code, POA: . Patient is a Jain, no blood products    Social: lives with her . He travels back and forth with his wife to [place] and back to [place]. Her son lives in [place]. She is retired; she used to work at " the [employer] until 2011. No pets at home. No smokers at home.     She feels like her health makes her feel anxious/depression.     The following components were reviewed and updated:  Medical history, Family History, Social history, Allergies and Current Medications, Health Risk Assessment, Health Maintenance, Care Team    The following assessments were completed:  Depression Screening: PHQ9: 16  Cognitive function Screening: RCS Score: 8  Timed Get Up Test: normal  Whisper Test: normal    Recommendations were developed using the USPSTF age appropriate recommendations. Education, counseling, and referrals were provided as needed.  After Visit Summary printed and given to patient which includes a list of additional screenings\tests needed.        Review of Systems   Constitutional: Negative for chills, fever and unexpected weight change.   Respiratory: Negative for cough.    Gastrointestinal: Negative for constipation, diarrhea, nausea and vomiting.   Endocrine: Negative for polyphagia and polyuria.   Genitourinary: Negative for difficulty urinating and dyspareunia.   Musculoskeletal: Positive for gait problem.   Neurological: Negative for dizziness and headaches.   Psychiatric/Behavioral: Negative for suicidal ideas.         There are no preventive care reminders to display for this patient.  Immunization History   Administered Date(s) Administered    Influenza 11/25/2016    Pneumococcal Conjugate - 13 Valent 09/06/2016    Pneumococcal Polysaccharide - 23 Valent 09/18/2017    Tdap 11/30/2016    Zoster 11/25/2016    influenza - Quadrivalent 11/03/2014, 11/10/2015       Objective:     Vitals:    01/23/18 1345   BP: 128/78   Pulse: 97   Temp: 98.4 °F (36.9 °C)        Physical Exam   Constitutional: She is oriented to person, place, and time. She appears well-developed and well-nourished.   HENT:   Head: Normocephalic and atraumatic.   Eyes: Conjunctivae are normal.   Cardiovascular: Normal rate and regular  rhythm.    Pulmonary/Chest: Effort normal and breath sounds normal.   Abdominal: Soft.   Musculoskeletal: She exhibits no edema.   Neurological: She is alert and oriented to person, place, and time.   Skin: Skin is warm.   Psychiatric: She has a normal mood and affect. Her behavior is normal. Judgment and thought content normal.   Nursing note and vitals reviewed.      Assessment:       1. Acute deep vein thrombosis (DVT) of proximal vein of lower extremity, unspecified laterality    2. Depression, unspecified depression type    3. Primary open angle glaucoma of both eyes, unspecified glaucoma stage    4. Moderate persistent asthma without complication    5. Hypertension, benign    6. Obesity, Class II, BMI 35-39.9, with comorbidity    7. Osteopenia, unspecified location    8. Hyperlipidemia, unspecified hyperlipidemia type    9. Gastroesophageal reflux disease, esophagitis presence not specified    10. Type 2 diabetes mellitus with hyperglycemia, without long-term current use of insulin    11. Refusal of blood transfusions as patient is Druze    12. Anxiety    13. Screening for malignant neoplasm of breast    14. Need for influenza vaccination    15. Colon cancer screening    16. Need for hepatitis C screening test        Plan:       Travels between here and [place]  Records pending  PHQ9: 18  GAD7: 16  RCS: 8  SNAQ: 14  FRAIL: 2 (prefrail).   Sees ortho for chronic arthritis  Sees cardiology, follow up in May is scheduled  Sees optho    Acute deep vein thrombosis (DVT) of proximal vein of lower extremity, unspecified laterality  Continue blood thinner per cardiology    Depression, unspecified depression type  -     TSH; Future; Expected date: 01/23/2018  -     Ambulatory consult to Psychiatry    Primary open angle glaucoma of both eyes, unspecified glaucoma stage  See's optho    Moderate persistent asthma without complication  Controlled for now  Continue medication regimen    Hypertension,  benign  Continue medication regimen  -     Comprehensive metabolic panel; Future; Expected date: 01/23/2018    Obesity, Class II, BMI 35-39.9, with comorbidity  Small sustainable changes  Increase intake of fiber  Decrease carb intake  Decrease drinks with added sugar such as soda or juice  Increase exercise  Handouts given    Osteopenia, unspecified location  Reviewed previous DEXA    Hyperlipidemia, unspecified hyperlipidemia type  Continue statin for now, will likely increase to HIS  -     Lipid panel; Future; Expected date: 01/23/2018    Gastroesophageal reflux disease, esophagitis presence not specified  Continue PPI for now    Type 2 diabetes mellitus with hyperglycemia, without long-term current use of insulin  -     Hemoglobin A1c; Future; Expected date: 01/23/2018  -     Microalbumin/creatinine urine ratio; Standing  -     Ambulatory Referral to Diabetes Education    Refusal of blood transfusions as patient is Jewish  Noted    Anxiety  -     Ambulatory consult to Psychiatry    Screening for malignant neoplasm of breast  Records pending    Need for influenza vaccination  Records pending    Colon cancer screening  Records pending    Need for hepatitis C screening test  -     Hepatitis C antibody; Future; Expected date: 01/23/2018      Warning signs discussed, patient to call with any further issues or worsening of symptoms.

## 2018-01-24 ENCOUNTER — TELEPHONE (OUTPATIENT)
Dept: FAMILY MEDICINE | Facility: CLINIC | Age: 68
End: 2018-01-24

## 2018-01-24 DIAGNOSIS — E78.5 HYPERLIPIDEMIA, UNSPECIFIED HYPERLIPIDEMIA TYPE: ICD-10-CM

## 2018-01-24 DIAGNOSIS — E05.90 SUBCLINICAL HYPERTHYROIDISM: Primary | ICD-10-CM

## 2018-01-24 DIAGNOSIS — N18.30 CKD (CHRONIC KIDNEY DISEASE) STAGE 3, GFR 30-59 ML/MIN: ICD-10-CM

## 2018-01-24 LAB
ALBUMIN SERPL BCP-MCNC: 3.8 G/DL
ALP SERPL-CCNC: 94 U/L
ALT SERPL W/O P-5'-P-CCNC: 16 U/L
ANION GAP SERPL CALC-SCNC: 11 MMOL/L
AST SERPL-CCNC: 17 U/L
BILIRUB SERPL-MCNC: 0.7 MG/DL
BUN SERPL-MCNC: 22 MG/DL
CALCIUM SERPL-MCNC: 10 MG/DL
CHLORIDE SERPL-SCNC: 104 MMOL/L
CHOLEST SERPL-MCNC: 244 MG/DL
CHOLEST/HDLC SERPL: 5.5 {RATIO}
CO2 SERPL-SCNC: 27 MMOL/L
CREAT SERPL-MCNC: 1.3 MG/DL
EST. GFR  (AFRICAN AMERICAN): 49.4 ML/MIN/1.73 M^2
EST. GFR  (NON AFRICAN AMERICAN): 42.9 ML/MIN/1.73 M^2
ESTIMATED AVG GLUCOSE: 146 MG/DL
GLUCOSE SERPL-MCNC: 137 MG/DL
HBA1C MFR BLD HPLC: 6.7 %
HCV AB SERPL QL IA: NEGATIVE
HDLC SERPL-MCNC: 44 MG/DL
HDLC SERPL: 18 %
LDLC SERPL CALC-MCNC: 168.2 MG/DL
NONHDLC SERPL-MCNC: 200 MG/DL
POTASSIUM SERPL-SCNC: 3.6 MMOL/L
PROT SERPL-MCNC: 8 G/DL
SODIUM SERPL-SCNC: 142 MMOL/L
T4 FREE SERPL-MCNC: 1.35 NG/DL
TRIGL SERPL-MCNC: 159 MG/DL
TSH SERPL DL<=0.005 MIU/L-ACNC: 0.33 UIU/ML

## 2018-01-24 RX ORDER — ATORVASTATIN CALCIUM 40 MG/1
40 TABLET, FILM COATED ORAL DAILY
Qty: 90 TABLET | Refills: 3 | Status: SHIPPED | OUTPATIENT
Start: 2018-01-24 | End: 2018-02-27 | Stop reason: SDUPTHER

## 2018-01-24 NOTE — TELEPHONE ENCOUNTER
Please call patient and let her know that her thyroid function was slightly decreased. I have ordered some follow up labs, please schedule them for her. Her A1c was well controlled at 6.7, but given her history of Htn and DM along with her elevated cholesterol results, she should be on a different cholesterol medication. I have sent that medication to her pharmacy (it is atorvastatin 40 mg)

## 2018-01-24 NOTE — TELEPHONE ENCOUNTER
Spoke with patient and informed her of lab results and scheduled patient for labs on 01/30/2018 for 10:30 patient voice understanding.

## 2018-01-26 ENCOUNTER — TELEPHONE (OUTPATIENT)
Dept: FAMILY MEDICINE | Facility: CLINIC | Age: 68
End: 2018-01-26

## 2018-01-26 NOTE — TELEPHONE ENCOUNTER
Spoke with patient and Informed her that she have to call psychiatry to schedule appointment. Patient voice understanding.

## 2018-01-26 NOTE — TELEPHONE ENCOUNTER
----- Message from Kacey Naqvi sent at 1/25/2018  2:29 PM CST -----  Contact: [phone]/self  Patient requesting to speak with you regarding getting an appt to see a psychiatrist. Please call and advise.

## 2018-01-30 ENCOUNTER — LAB VISIT (OUTPATIENT)
Dept: LAB | Facility: HOSPITAL | Age: 68
End: 2018-01-30
Attending: FAMILY MEDICINE
Payer: MEDICARE

## 2018-01-30 DIAGNOSIS — E05.90 SUBCLINICAL HYPERTHYROIDISM: ICD-10-CM

## 2018-01-30 LAB
T3FREE SERPL-MCNC: 2.5 PG/ML
T4 FREE SERPL-MCNC: 1.17 NG/DL
TSH SERPL DL<=0.005 MIU/L-ACNC: 0.34 UIU/ML

## 2018-01-30 PROCEDURE — 84439 ASSAY OF FREE THYROXINE: CPT

## 2018-01-30 PROCEDURE — 84481 FREE ASSAY (FT-3): CPT

## 2018-01-30 PROCEDURE — 84443 ASSAY THYROID STIM HORMONE: CPT

## 2018-01-31 LAB — TSH RECEP AB SER-ACNC: <1 IU/L

## 2018-02-01 ENCOUNTER — TELEPHONE (OUTPATIENT)
Dept: FAMILY MEDICINE | Facility: CLINIC | Age: 68
End: 2018-02-01

## 2018-02-01 DIAGNOSIS — E05.90 SUBCLINICAL HYPERTHYROIDISM: Primary | ICD-10-CM

## 2018-02-01 NOTE — TELEPHONE ENCOUNTER
Called patient and discussed her lab results with her.  She has subclinical hyperthyroidism with negative antibodies.  As she does not have Graves' disease, I will defer to endocrine to further manage this along with possibly ordering uptake tests.  All of this was discussed with the patient and she expressed understanding.  Referral to endocrine placed.

## 2018-02-08 ENCOUNTER — OFFICE VISIT (OUTPATIENT)
Dept: OPTOMETRY | Facility: CLINIC | Age: 68
End: 2018-02-08
Payer: MEDICARE

## 2018-02-08 DIAGNOSIS — H40.1134 PRIMARY OPEN ANGLE GLAUCOMA OF BOTH EYES, INDETERMINATE STAGE: Primary | ICD-10-CM

## 2018-02-08 PROCEDURE — 99212 OFFICE O/P EST SF 10 MIN: CPT | Mod: PBBFAC,PO | Performed by: OPTOMETRIST

## 2018-02-08 PROCEDURE — 99999 PR PBB SHADOW E&M-EST. PATIENT-LVL II: CPT | Mod: PBBFAC,,, | Performed by: OPTOMETRIST

## 2018-02-08 PROCEDURE — 92012 INTRM OPH EXAM EST PATIENT: CPT | Mod: S$PBB,,, | Performed by: OPTOMETRIST

## 2018-02-08 RX ORDER — BRIMONIDINE TARTRATE 2 MG/ML
1 SOLUTION/ DROPS OPHTHALMIC 2 TIMES DAILY
Qty: 5 ML | Refills: 1 | Status: SHIPPED | OUTPATIENT
Start: 2018-02-08 | End: 2018-03-08 | Stop reason: SDUPTHER

## 2018-02-08 NOTE — PROGRESS NOTES
HPI     Pt here today for 4 month IOP check.     (+) Lumigan QHS OU Pt states compliant with drops  (+) Art gel drops at bedtime     Last edited by Vasu Wilhelm on 2/8/2018 10:08 AM. (History)            Assessment /Plan     For exam results, see Encounter Report.    Primary open angle glaucoma of both eyes, indeterminate stage  -     brimonidine 0.2% (ALPHAGAN) 0.2 % Drop; Place 1 drop into both eyes 2 (two) times daily.  Dispense: 5 mL; Refill: 1  -     bimatoprost (LUMIGAN) 0.01 % Drop; Place 1 drop into both eyes every evening.  Dispense: 3 Bottle; Refill: 4      POAG OU, increased IOP today. Discussed options. Continue Lumigan: 1 gt qPM OU. Start Alphagan 0.2%: 1 gt bid OU. Return in 1 month for IOP check, sooner prn.

## 2018-02-12 ENCOUNTER — OFFICE VISIT (OUTPATIENT)
Dept: PODIATRY | Facility: CLINIC | Age: 68
End: 2018-02-12
Payer: MEDICARE

## 2018-02-12 VITALS — BODY MASS INDEX: 35.73 KG/M2 | WEIGHT: 194.13 LBS | HEIGHT: 62 IN

## 2018-02-12 DIAGNOSIS — E08.9 DIABETES MELLITUS DUE TO UNDERLYING CONDITION, CONTROLLED, WITHOUT COMPLICATION, WITHOUT LONG-TERM CURRENT USE OF INSULIN: Primary | ICD-10-CM

## 2018-02-12 DIAGNOSIS — B35.1 ONYCHOMYCOSIS DUE TO DERMATOPHYTE: ICD-10-CM

## 2018-02-12 DIAGNOSIS — M20.42 HAMMER TOES OF BOTH FEET: ICD-10-CM

## 2018-02-12 DIAGNOSIS — L84 CORN OR CALLUS: ICD-10-CM

## 2018-02-12 DIAGNOSIS — M20.41 HAMMER TOES OF BOTH FEET: ICD-10-CM

## 2018-02-12 PROCEDURE — 1159F MED LIST DOCD IN RCRD: CPT | Mod: ,,, | Performed by: PODIATRIST

## 2018-02-12 PROCEDURE — 11721 DEBRIDE NAIL 6 OR MORE: CPT | Mod: PBBFAC,59,PN | Performed by: PODIATRIST

## 2018-02-12 PROCEDURE — 99213 OFFICE O/P EST LOW 20 MIN: CPT | Mod: 25,S$PBB,, | Performed by: PODIATRIST

## 2018-02-12 PROCEDURE — 11056 PARNG/CUTG B9 HYPRKR LES 2-4: CPT | Mod: PBBFAC,PN | Performed by: PODIATRIST

## 2018-02-12 PROCEDURE — 99999 PR PBB SHADOW E&M-EST. PATIENT-LVL III: CPT | Mod: PBBFAC,,, | Performed by: PODIATRIST

## 2018-02-12 PROCEDURE — 99213 OFFICE O/P EST LOW 20 MIN: CPT | Mod: PBBFAC,PN,25 | Performed by: PODIATRIST

## 2018-02-12 PROCEDURE — 11056 PARNG/CUTG B9 HYPRKR LES 2-4: CPT | Mod: S$PBB,Q9,, | Performed by: PODIATRIST

## 2018-02-12 PROCEDURE — 1126F AMNT PAIN NOTED NONE PRSNT: CPT | Mod: ,,, | Performed by: PODIATRIST

## 2018-02-12 PROCEDURE — 11721 DEBRIDE NAIL 6 OR MORE: CPT | Mod: S$PBB,59,Q9, | Performed by: PODIATRIST

## 2018-02-13 NOTE — PROGRESS NOTES
Subjective:      Patient ID: Eugenie Coates is a 67 y.o. female.    Chief Complaint: Diabetes Mellitus ([place] 1/23/18 A1c 1/23/18 6.7) and Diabetic Foot Exam    Eugenie is a 67 y.o. female who presents to the clinic for routine evaluation and treatment of diabetic feet. Eugenie has a past medical history of Allergy; Anemia; Anxiety; Arthritis; Asthma; Bell palsy; Depression; Diabetes mellitus; DVT (deep venous thrombosis); Endometriosis; Eye injury (2014); GERD (gastroesophageal reflux disease); Glaucoma; History of uterine fibroid; Hypertension; Nuclear sclerosis of both eyes (9/27/2016); and Sleep apnea. Patient relates no major problem with feet. Only complaints today consist of toenails in need of trimming.  Denies being painful with wearing shoe gear.  Has not attempted to self treat.  Denies any additional pedal complaints.      PCP: Haile Paul DO    Date Last Seen by PCP: 1/23/18    Hemoglobin A1C   Date Value Ref Range Status   01/23/2018 6.7 (H) 4.0 - 5.6 % Final     Comment:     According to ADA guidelines, hemoglobin A1c <7.0% represents  optimal control in non-pregnant diabetic patients. Different  metrics may apply to specific patient populations.   Standards of Medical Care in Diabetes-2016.  For the purpose of screening for the presence of diabetes:  <5.7%     Consistent with the absence of diabetes  5.7-6.4%  Consistent with increasing risk for diabetes   (prediabetes)  >or=6.5%  Consistent with diabetes  Currently, no consensus exists for use of hemoglobin A1c  for diagnosis of diabetes for children.  This Hemoglobin A1c assay has significant interference with fetal   hemoglobin   (HbF). The results are invalid for patients with abnormal amounts of   HbF,   including those with known Hereditary Persistence   of Fetal Hemoglobin. Heterozygous hemoglobin variants (HbAS, HbAC,   HbAD, HbAE, HbA2) do not significantly interfere with this assay;   however, presence of multiple variants in a  sample may impact the %   interference.     09/01/2017 6.7 (H) 4.0 - 5.6 % Final     Comment:     According to ADA guidelines, hemoglobin A1c <7.0% represents  optimal control in non-pregnant diabetic patients. Different  metrics may apply to specific patient populations.   Standards of Medical Care in Diabetes-2016.  For the purpose of screening for the presence of diabetes:  <5.7%     Consistent with the absence of diabetes  5.7-6.4%  Consistent with increasing risk for diabetes   (prediabetes)  >or=6.5%  Consistent with diabetes  Currently, no consensus exists for use of hemoglobin A1c  for diagnosis of diabetes for children.  This Hemoglobin A1c assay has significant interference with fetal   hemoglobin   (HbF). The results are invalid for patients with abnormal amounts of   HbF,   including those with known Hereditary Persistence   of Fetal Hemoglobin. Heterozygous hemoglobin variants (HbAS, HbAC,   HbAD, HbAE, HbA2) do not significantly interfere with this assay;   however, presence of multiple variants in a sample may impact the %   interference.     07/18/2017 6.9 (H) 4.0 - 5.6 % Final     Comment:     According to ADA guidelines, hemoglobin A1c <7.0% represents  optimal control in non-pregnant diabetic patients. Different  metrics may apply to specific patient populations.   Standards of Medical Care in Diabetes-2016.  For the purpose of screening for the presence of diabetes:  <5.7%     Consistent with the absence of diabetes  5.7-6.4%  Consistent with increasing risk for diabetes   (prediabetes)  >or=6.5%  Consistent with diabetes  Currently, no consensus exists for use of hemoglobin A1c  for diagnosis of diabetes for children.  This Hemoglobin A1c assay has significant interference with fetal   hemoglobin   (HbF). The results are invalid for patients with abnormal amounts of   HbF,   including those with known Hereditary Persistence   of Fetal Hemoglobin. Heterozygous hemoglobin variants (HbAS, HbAC,    HbAD, HbAE, HbA2) do not significantly interfere with this assay;   however, presence of multiple variants in a sample may impact the %   interference.             Past Medical History:   Diagnosis Date    Allergy     Anemia     Anxiety     Arthritis     Asthma     Bell palsy     Depression     Diabetes mellitus     DVT (deep venous thrombosis)     Endometriosis     Eye injury     stuck with tree branch od ?     GERD (gastroesophageal reflux disease)     Glaucoma     History of uterine fibroid     Hypertension     Nuclear sclerosis of both eyes 2016    Sleep apnea     uses C pap       Past Surgical History:   Procedure Laterality Date     SECTION      CHOLECYSTECTOMY      glaucoma laser Bilateral     HERNIA REPAIR      KNEE SURGERY Right 2008    (R) knee scope; torn meniscus    VEIN SURGERY  ,     Rt leg x2       Family History   Problem Relation Age of Onset    No Known Problems Mother     No Known Problems Father     No Known Problems Sister     No Known Problems Brother     No Known Problems Maternal Aunt     No Known Problems Maternal Uncle     No Known Problems Paternal Aunt     No Known Problems Paternal Uncle     No Known Problems Maternal Grandmother     No Known Problems Maternal Grandfather     No Known Problems Paternal Grandmother     No Known Problems Paternal Grandfather     Amblyopia Neg Hx     Blindness Neg Hx     Cancer Neg Hx     Cataracts Neg Hx     Diabetes Neg Hx     Glaucoma Neg Hx     Hypertension Neg Hx     Macular degeneration Neg Hx     Retinal detachment Neg Hx     Strabismus Neg Hx     Stroke Neg Hx     Thyroid disease Neg Hx        Social History     Social History    Marital status:      Spouse name: N/A    Number of children: N/A    Years of education: N/A     Social History Main Topics    Smoking status: Never Smoker    Smokeless tobacco: Never Used    Alcohol use No    Drug use: No    Sexual  activity: Yes     Partners: Male     Other Topics Concern    None     Social History Narrative    None       Current Outpatient Prescriptions   Medication Sig Dispense Refill    albuterol (PROAIR HFA) 90 mcg/actuation inhaler Inhale 2 puffs into the lungs every 4 (four) hours as needed for Wheezing. 3 Inhaler 3    amlodipine (NORVASC) 10 MG tablet Take 1 tablet (10 mg total) by mouth every evening. 90 tablet 3    atorvastatin (LIPITOR) 40 MG tablet Take 1 tablet (40 mg total) by mouth once daily. 90 tablet 3    JV ASPIRIN ORAL Take 81 mg by mouth once daily. Instructed to stop 5-23-13 until after surgery.      bimatoprost (LUMIGAN) 0.01 % Drop Place 1 drop into both eyes every evening. 3 Bottle 4    blood sugar diagnostic Strp Test daily.  Accucheck viva test strips and lancets. 300 each 3    brimonidine 0.2% (ALPHAGAN) 0.2 % Drop Place 1 drop into both eyes 2 (two) times daily. 5 mL 1    ELIQUIS 5 mg Tab TAKE 1 TABLET(5 MG) BY MOUTH TWICE DAILY 60 tablet 0    hydrALAZINE (APRESOLINE) 25 MG tablet Take 1 tablet (25 mg total) by mouth every 12 (twelve) hours. 60 tablet 11    lancets (ACCU-CHEK SOFTCLIX LANCETS) Misc 1 Units by Misc.(Non-Drug; Combo Route) route once daily. 300 each 3    losartan-hydrochlorothiazide 100-25 mg (HYZAAR) 100-25 mg per tablet Take 1 tablet by mouth once daily. 90 tablet 3    metFORMIN (GLUCOPHAGE) 500 MG tablet Take 1 tablet (500 mg total) by mouth 2 (two) times daily with meals. 180 tablet 3    omeprazole (PRILOSEC) 20 MG capsule Take 1 capsule (20 mg total) by mouth once daily. 90 capsule 3    spironolactone (ALDACTONE) 25 MG tablet Take 1 tablet (25 mg total) by mouth once daily. 90 tablet 3    SYMBICORT 160-4.5 mcg/actuation HFAA INHALE 2 PUFFS INTO THE LUNGS EVERY 12 HOURS 10.2 g 11     No current facility-administered medications for this visit.        Review of patient's allergies indicates:   Allergen Reactions    Percodan [oxycodone hcl-oxycodone-asa] Hives  and Itching         Review of Systems   Constitution: Negative for chills and fever.   Cardiovascular: Positive for leg swelling. Negative for claudication.   Skin: Positive for color change and nail changes.   Musculoskeletal: Positive for joint swelling. Negative for joint pain, muscle cramps and muscle weakness.   Neurological: Positive for paresthesias. Negative for numbness.   Psychiatric/Behavioral: Negative for altered mental status.           Objective:      Physical Exam   Constitutional: She is oriented to person, place, and time. She appears well-developed and well-nourished. No distress.   Cardiovascular:   Pulses:       Dorsalis pedis pulses are 2+ on the right side, and 2+ on the left side.        Posterior tibial pulses are 1+ on the right side, and 1+ on the left side.   CFT <3 seconds bilateral.  Pedal hair growth decreased bilateral.  Varicosities noted bilateral.  1+ pitting edema noted to bilateral lower extremity.  Toes are cool to touch bilateral.     Musculoskeletal: She exhibits edema. She exhibits no tenderness.   Muscle strength 5/5 in all muscle groups bilateral.  No tenderness nor crepitation with ROM of foot/ankle joints bilateral.  No tenderness with palpation of bilateral foot and ankle.  Bilateral pes planus foot type.  Bilateral hallux abducto valgus.  Bilateral semi-reducible contracture of toes 2-5.     Neurological: She is alert and oriented to person, place, and time. She has normal strength. A sensory deficit is present.   Protective sensation per [place]-Heron monofilament intact bilateral.    Vibratory sensation decreased bilateral.    Light touch intact bilateral.   Skin: Skin is warm, dry and intact. Lesion noted. No abrasion, no bruising, no burn, no ecchymosis, no laceration, no petechiae and no rash noted. She is not diaphoretic. No cyanosis or erythema. No pallor. Nails show no clubbing.   Pedal skin appear edematous bilateral.  Toenails x 10 appear thickened by 2  mm's, elongated by 2 mm's, and discolored with subungual debris.  Focal hyperkeratotic lesion noted to bilateral sub 5th metatarsal head and to the Lt. 5th toe lateral nail fold.  Examination of the skin reveals no evidence of significant maceration, rashes, open lesions, suspicious appearing nevi or other concerning lesions.              Assessment:       Encounter Diagnoses   Name Primary?    Diabetes mellitus due to underlying condition, controlled, without complication, without long-term current use of insulin Yes    Hammer toes of both feet     Corn or callus     Onychomycosis due to dermatophyte          Plan:       Eugenie was seen today for diabetes mellitus and diabetic foot exam.    Diagnoses and all orders for this visit:    Diabetes mellitus due to underlying condition, controlled, without complication, without long-term current use of insulin  -     DIABETIC SHOES FOR HOME USE    Hammer toes of both feet  -     DIABETIC SHOES FOR HOME USE    Corn or callus  -     DIABETIC SHOES FOR HOME USE    Onychomycosis due to dermatophyte      I counseled the patient on her conditions, their implications and medical management.    Advised to continue wearing shoes that accommodate for digital deformities.    Shoe inspection. Diabetic Foot Education. Patient reminded of the importance of good nutrition and blood sugar control to help prevent podiatric complications of diabetes. Patient instructed on proper foot hygeine. We discussed wearing proper shoe gear, daily foot inspections, never walking without protective shoe gear, never putting sharp instruments to feet    Advised to continue wearing shoe gear that accommodates digital deformities until diabetic shoes/insoles are obtained.    Advised to regularly apply moisturizer to bilateral foot.    Due to patient venous insufficiency, she qualifies for routine nail care.  With patient's permission, nails were aggressively reduced and debrided x 10 to their soft  tissue attachment mechanically and with electric , removing all offending nail and debris.  Also, a sterile #15 scalpel was used to trim lesions x 3 down to smooth appearing skin.  Patient relates relief following the procedure.  She will continue to monitor the areas daily, inspect her feet, wear protective shoe gear when ambulatory, moisturizer to maintain skin integrity and follow in this office in approximately 3 months, sooner p.r.n.    Follow-up in about 3 months (around 5/12/2018).    Giuliano Garnett DPM

## 2018-02-16 ENCOUNTER — CLINICAL SUPPORT (OUTPATIENT)
Dept: DIABETES | Facility: CLINIC | Age: 68
End: 2018-02-16
Payer: MEDICARE

## 2018-02-16 VITALS — WEIGHT: 192 LBS | BODY MASS INDEX: 35.12 KG/M2

## 2018-02-16 DIAGNOSIS — E11.9 DIABETES MELLITUS TYPE 2 WITHOUT RETINOPATHY: ICD-10-CM

## 2018-02-16 PROCEDURE — 99999 PR PBB SHADOW E&M-EST. PATIENT-LVL I: CPT | Mod: PBBFAC,,,

## 2018-02-16 PROCEDURE — G0108 DIAB MANAGE TRN  PER INDIV: HCPCS | Mod: PBBFAC,PO | Performed by: REGISTERED NURSE

## 2018-02-16 PROCEDURE — 99211 OFF/OP EST MAY X REQ PHY/QHP: CPT | Mod: PBBFAC,PO | Performed by: REGISTERED NURSE

## 2018-02-16 NOTE — PROGRESS NOTES
Diabetes Education  Author: Gianluca Jackson RN  Date: 2/16/2018    Diabetes Education Visit  Diabetes Education Record Assessment/Progress: Initial  Diabetes Type  Diabetes Type : Type II  Diabetes History  Diabetes Diagnosis: 3-5 years  Nutrition  Meal Planning: water, 3 meals per day, eats out seldom, snacks between meal  What type of sweetener do you use?: Equal  What type of beverages do you drink?: milk, water  Meal Plan 24 Hour Recall - Breakfast: eggs, toast  Meal Plan 24 Hour Recall - Lunch: baked shrimp, rice  Meal Plan 24 Hour Recall - Dinner: steak, mashed potatoes  Monitoring   Self Monitoring : pt has a meter and is testing every 3 days fasting. Pt instructed to test once a day fasting. instructed pt on the normal bs ranges. instructed pt to keep a record of her bs's and to bring the record to her md visits.  Blood Glucose Logs: No  Exercise   Exercise Type: bike riding  Intensity: Low  Frequency: 3-5 Times per week  Duration: 30 min  Current Diabetes Treatment   Current Treatment: Oral Medication, Diet, Exercise  Social History  Preferred Learning Method: Face to Face, Group Education, Demonstration, Reading Materials  Primary Support: Spouse  Educational Level: High School  Occupation: retired  Smoking Status: Never a Smoker  Alcohol Use: Never  DDS-2 Score  ( > 3 = SIGNIFICANT DISTRESS): 1      Barriers to Change  Barriers to Change: None  Learning Challenges : None  Readiness to Learn   Readiness to Learn : Acceptance  Cultural Influences  Cultural Influences: None  Diabetes Education Assessment/Progress  Diabetes Disease Process (diabetes disease process and treatment options): Discussion, Instructed, Individual Session, Demonstrates Understanding/Competency(verbalizes/demonstrates), Written Materials Provided  Nutrition (Incorporating nutritional management into one's lifestyle): Discussion, Instructed, Individual Session, Demonstrates Understanding/Competency (verbalizes/demonstrates), Written  Materials Provided  Physical Activity (incorporating physical activity into one's lifestyle): Discussion, Instructed, Individual Session, Demonstrates Understanding/Competency (verbalizes/demonstrates), Written Materials Provided  Medications (states correct name, dose, onset, peak, duration, side effects & timing of meds): Discussion, Instructed, Individual Session, Demonstrates Understanding/Competency(verbalizes/demonstrates), Written Materials Provided  Monitoring (monitoring blood glucose/other parameters & using results): Discussion, Instructed, Individual Session, Demonstrates Understanding/Competency (verbalizes/demonstrates), Written Materials Provided  Acute Complications (preventing, detecting, and treating acute complications): Discussion, Instructed, Individual Session, Demonstrates Understanding/Competency (verbalizes/demonstrates), Written Materials Provided  Chronic Complications (preventing, detecting, and treating chronic complications): Discussion, Instructed, Individual Session, Demonstrates Understanding/Competency (verbalizes/demonstrates), Written Materials Provided  Clinical (diabetes and other pertinent medical history): Discussion, Instructed, Individual Session, Demonstrates Understanding/Competency (verbalizes/demonstrates)  Cognitive (knowledge of self-management skills, functional health literacy): Discussion, Instructed, Individual Session, Demonstrates Understanding/Competency (verbalizes/demonstrates)  Psychosocial (emotional response to diabetes): Discussion, Instructed, Individual Session, Demonstrates Understanding/Competency (verbalizes/demonstrates)  Diabetes Distress and Support Systems: Discussion, Instructed, Individual Session, Demonstrates Understanding/Competency (verbalizes/demonstrates)  Behavioral (readiness for change, lifestyle practices, self-care behaviors): Discussion, Instructed, Individual Session, Demonstrates Understanding/Competency  (verbalizes/demonstrates)  Goals  Patient has selected/evaluated goals during today's session: Yes, selected  Healthy Eating: Set  Start Date: 02/16/18  Target Date: 05/16/18  Diabetes Care Plan/Intervention  Education Plan/Intervention: Group Follow-Up MNT, Dental Exam  Diabetes Meal Plan  Restrictions: Restricted Carbohydrate  Calories: 1800  Carbohydrate Per Meal: 30-45g  Carbohydrate Per Snack : 15-20g  Discussed eating balanced and portioned meals with pt. Pt needs to add food to her meal plan to eat more balanced.          Health Maintenance Topics with due status: Not Due       Topic Last Completion Date    Colonoscopy 08/01/2012    Mammogram 09/15/2016    DEXA SCAN 11/25/2016    TETANUS VACCINE 11/30/2016    Foot Exam 09/18/2017    Lipid Panel 01/23/2018    Hemoglobin A1c 01/23/2018    Low Dose Statin 02/08/2018    Eye Exam 02/08/2018     There are no preventive care reminders to display for this patient.

## 2018-02-22 ENCOUNTER — OFFICE VISIT (OUTPATIENT)
Dept: ENDOCRINOLOGY | Facility: CLINIC | Age: 68
End: 2018-02-22
Payer: MEDICARE

## 2018-02-22 VITALS
DIASTOLIC BLOOD PRESSURE: 68 MMHG | WEIGHT: 189.63 LBS | HEIGHT: 62 IN | HEART RATE: 72 BPM | BODY MASS INDEX: 34.89 KG/M2 | SYSTOLIC BLOOD PRESSURE: 126 MMHG

## 2018-02-22 DIAGNOSIS — E05.90 SUBCLINICAL HYPERTHYROIDISM: Primary | ICD-10-CM

## 2018-02-22 DIAGNOSIS — E11.65 TYPE 2 DIABETES MELLITUS WITH HYPERGLYCEMIA, WITHOUT LONG-TERM CURRENT USE OF INSULIN: ICD-10-CM

## 2018-02-22 DIAGNOSIS — E78.5 HYPERLIPIDEMIA, UNSPECIFIED HYPERLIPIDEMIA TYPE: ICD-10-CM

## 2018-02-22 DIAGNOSIS — M85.80 OSTEOPENIA, UNSPECIFIED LOCATION: ICD-10-CM

## 2018-02-22 DIAGNOSIS — E11.9 DIABETES MELLITUS TYPE 2 WITHOUT RETINOPATHY: ICD-10-CM

## 2018-02-22 DIAGNOSIS — E11.9 TYPE 2 DIABETES MELLITUS WITHOUT COMPLICATION, WITHOUT LONG-TERM CURRENT USE OF INSULIN: ICD-10-CM

## 2018-02-22 DIAGNOSIS — I10 HYPERTENSION, BENIGN: ICD-10-CM

## 2018-02-22 DIAGNOSIS — K21.9 GASTROESOPHAGEAL REFLUX DISEASE, ESOPHAGITIS PRESENCE NOT SPECIFIED: ICD-10-CM

## 2018-02-22 DIAGNOSIS — N18.30 CKD (CHRONIC KIDNEY DISEASE) STAGE 3, GFR 30-59 ML/MIN: ICD-10-CM

## 2018-02-22 PROCEDURE — 99204 OFFICE O/P NEW MOD 45 MIN: CPT | Mod: S$PBB,GC,, | Performed by: INTERNAL MEDICINE

## 2018-02-22 PROCEDURE — 1159F MED LIST DOCD IN RCRD: CPT | Mod: GC,,, | Performed by: INTERNAL MEDICINE

## 2018-02-22 PROCEDURE — 99214 OFFICE O/P EST MOD 30 MIN: CPT | Mod: PBBFAC | Performed by: INTERNAL MEDICINE

## 2018-02-22 PROCEDURE — 99999 PR PBB SHADOW E&M-EST. PATIENT-LVL IV: CPT | Mod: PBBFAC,GC,, | Performed by: INTERNAL MEDICINE

## 2018-02-22 PROCEDURE — 1126F AMNT PAIN NOTED NONE PRSNT: CPT | Mod: GC,,, | Performed by: INTERNAL MEDICINE

## 2018-02-22 RX ORDER — LANCETS
1 EACH MISCELLANEOUS 2 TIMES DAILY
Qty: 300 EACH | Refills: 3 | Status: SHIPPED | OUTPATIENT
Start: 2018-02-22

## 2018-02-22 NOTE — PROGRESS NOTES
Subjective:      Patient ID: Eugenie Coates is a 67 y.o. female who presents to clinic today for initial visit  Chief Complaint   Patient presents with    Hyperthyroidism       With regards to the diabetes:    Diagnosed:     Current regimen:  Metformin 500 mg bid    Missed doses?   no    # times a day testin   BG - today 136, usually 110s -150s.     Hypoglycemic event? None       Last eye exam: 2018  Last podiatry exam: 18     Typical meals: trying to keep to a healthy diet  Avoiding carbs, increasing salad and protein.  Does not drink soda.  Drinks crystal lite, water.  Sugar substitutes used if needed.   Avoids cakes, cookies, sweets, chips     Education - last visit: 18, scheduled to see them again on Friday.    Exercise - tried to stay active, does 30min on the bike daily.  Also walks.    Endorses nocturia, polyuria, history of glaucoma  Denies polydipsia, vision changes  Denies numbness or tingling of hands/feet     Has medic card with medical history.    With regards to hypertension:  Tolerating ACEI or ARB    With regards to dyslipidemia:  Tolerating statin     With regards to hyperthyroidism:  Current hyperthyroid medication: none    Notes that she has heartburn, on antacids.      current symptoms:   Nightly CP - sharp, feels like indigestion    Denies:  Diarrhea/constipation  Palpations  Weight change - just normal fluctuation  Hair loss  Brittle nails  No skin changes  tremor   anxiety    Depression - since , used to see psychologist from [id number].  Never prescribed medication, solely managed by verbal therapy.  Retired in .  Dr. Paul requested psyc, but she is currently on the waiting list on the [facility]    Last BMD:   see below    Denies new eye symptoms:    With regards to osteopenia:  On calcium 500mg   On Vit D3 1000iU daily    Recent falls:  No  No history of fracture.    No family history of osteoporosis.    Fall proofed home - stairs with rails,  lighting, rugs etc.    No history of cancer, radiation, kidney disease, bone disease,   Does have significant reflux, CKD 3      Review of Systems   Constitutional: Negative for unexpected weight change.   Eyes: Negative for visual disturbance.   Respiratory: Negative for shortness of breath.    Cardiovascular: Negative for chest pain.   Gastrointestinal: Negative for abdominal pain.   Genitourinary: Negative for urgency.   Musculoskeletal: Negative for arthralgias.   Skin: Negative for wound.   Neurological: Negative for headaches.   Hematological: Does not bruise/bleed easily.   Psychiatric/Behavioral: Negative for sleep disturbance.       Objective:     Vitals:    02/22/18 1016   BP: 126/68   Pulse: 72     Body mass index is 34.68 kg/m².    Physical Exam   Constitutional: She appears well-developed.   HENT:   Right Ear: External ear normal.   Left Ear: External ear normal.   Nose: Nose normal.   Hearing normal  Dentition good   Neck: No tracheal deviation present. No thyromegaly present.   Cardiovascular: Normal rate.    No murmur heard.  Pulmonary/Chest: Effort normal and breath sounds normal.   Abdominal: Soft. There is no tenderness. No hernia.   Musculoskeletal: She exhibits no edema.   Feet no cuts or  scratches  Shoes appropriate   Neurological: She has normal reflexes. No cranial nerve deficit.   Skin: No rash noted.   No nodules     Psychiatric: She has a normal mood and affect. Judgment normal.   Vitals reviewed.         DXA 11/2016  Findings: The L1 to L4 vertebral bone mineral density is equal to 1.22 g/cm squared with a T score of -0.5 and a Z score of 1.6.    The left femoral neck bone mineral density is equal to 0.75 g/cm squared with a T score of -1.4 and a Z score of -0.1.    IMPRESSION:  Osteopenia of the left hip.  Normal bone mineral density of the lumbar spine -- there is an approximately 5.1% risk of a major osteoporotic fracture and a 0.4% risk of hip fracture in the next 10 years  (FRAX).    Relevant labs and images have been reviewed.     BMP  Lab Results   Component Value Date     01/23/2018    K 3.6 01/23/2018     01/23/2018    CO2 27 01/23/2018    BUN 22 01/23/2018    CREATININE 1.3 01/23/2018    CALCIUM 10.0 01/23/2018    ANIONGAP 11 01/23/2018    ESTGFRAFRICA 49.4 (A) 01/23/2018    EGFRNONAA 42.9 (A) 01/23/2018       Lab Results   Component Value Date    HGBA1C 6.7 (H) 01/23/2018       Lab Results   Component Value Date    CHOL 244 (H) 01/23/2018    CHOL 200 (H) 07/18/2017    CHOL 247 (H) 03/21/2017     Lab Results   Component Value Date    HDL 44 01/23/2018    HDL 41 07/18/2017    HDL 56 03/21/2017     Lab Results   Component Value Date    LDLCALC 168.2 (H) 01/23/2018    LDLCALC 129.4 07/18/2017    LDLCALC 168.4 (H) 03/21/2017     Lab Results   Component Value Date    TRIG 159 (H) 01/23/2018    TRIG 148 07/18/2017    TRIG 113 03/21/2017     Lab Results   Component Value Date    CHOLHDL 18.0 (L) 01/23/2018    CHOLHDL 20.5 07/18/2017    CHOLHDL 22.7 03/21/2017         Assessment/Plan:     Osteopenia  Risk factors - chronic steroid injections, menopausal    Reassured no recent fracture.    On calcium/vitamin D supplements.  [email or web address]    Repeat DXA 11/2018.    Gastroesophageal reflux disease  Contraindication to oral bisphosphonates    Type 2 diabetes mellitus with hyperglycemia, without long-term current use of insulin  Reviewed goals of therapy are to get the best control we can without hypoglycemia    Well controlled on current regimen. No change at this time.    Reviewed patient's current insulin regimen. Clarified proper insulin dose and timing in relation to meals, etc. Insulin injection sites and proper rotation instructed.      Advised frequent self blood glucose monitoring.  Patient encouraged to document glucose results and bring them to every clinic visit      Hypoglycemia precautions discussed. Instructed on precautions before driving.      Close adherence to  lifestyle changes recommended.      Periodic follow ups for eye evaluations, foot care and dental care suggested.    Vaccinations - up to date.  Defer to PCP      Obesity, Class II, BMI 35-39.9, with comorbidity  Discussed continued weight loss.    Subclinical hyperthyroidism  Biochemical evidence of subclinical hyperthyroidism.  Patient is currently asymptomatic.     Risks and benefits discussed with patient.  Increased risk of mortality and/or conversion to overt hyperthyroidism when:  TSH <0.1, heart disease (heart failure, CMPY), arrhythmia (ie afib), bone disease, and underlying nodular thyroid disease.    Diagnosis confirmed.  US ordered.    Discussed possible treatment options - observation, Methimazole, ANGULO.   If we observe, patient is at risk for spontaneous resolution, stable disease, or progression to overt hyperthyroidism.  Patient should proceed with caution if he/she needs iodine load for imaging or requires iodine containing medication (ie. Amiodarone).  Medication side effects discussed. ANGULO is associated with resolution of disease and/or progression to hypothyroidism.    Patient chose to proceed with observation. Will repeat TFTs in 6 months.       Diabetes mellitus type 2 without retinopathy  See diabetes as above    Hypertension, benign  Tolerating ARB    Hyperlipidemia  Tolerating statin    CKD (chronic kidney disease) stage 3, GFR 30-59 ml/min  Avoid hypoglycemia.      Jess Harris MD  Endocrinology Fellow       This case has been discussed with staff, [place].

## 2018-02-22 NOTE — ASSESSMENT & PLAN NOTE
Risk factors - chronic steroid injections, menopausal    Reassured no recent fracture.    On calcium/vitamin D supplements.  [email or web address]    Repeat DXA 11/2018.

## 2018-02-23 ENCOUNTER — CLINICAL SUPPORT (OUTPATIENT)
Dept: DIABETES | Facility: CLINIC | Age: 68
End: 2018-02-23
Payer: MEDICARE

## 2018-02-23 DIAGNOSIS — E11.9 DIABETES MELLITUS TYPE 2 WITHOUT RETINOPATHY: ICD-10-CM

## 2018-02-23 PROCEDURE — G0109 DIAB MANAGE TRN IND/GROUP: HCPCS | Mod: PBBFAC,PO | Performed by: REGISTERED NURSE

## 2018-02-23 NOTE — PROGRESS NOTES
Diabetes Education  Author: Gianluca Jackson RN  Date: 2/23/2018    Diabetes Education Visit  Diabetes Education Record Assessment/Progress: Comprehensive/Group    Diabetes Education Assessment/Progress  Diabetes Disease Process (diabetes disease process and treatment options): Discussion, Lecture, Instructed, Class, Demonstrates Understanding/Competency(verbalizes/demonstrates), Written Materials Provided  Nutrition (Incorporating nutritional management into one's lifestyle): Discussion, Lecture, Instructed, Class, Demonstrates Understanding/Competency (verbalizes/demonstrates), Written Materials Provided  Physical Activity (incorporating physical activity into one's lifestyle): Discussion, Lecture, Instructed, Class, Demonstrates Understanding/Competency (verbalizes/demonstrates), Written Materials Provided  Medications (states correct name, dose, onset, peak, duration, side effects & timing of meds): Not Covered/Deferred  Acute Complications (preventing, detecting, and treating acute complications): Discussion, Lecture, Instructed, Class, Demonstrates Understanding/Competency (verbalizes/demonstrates), Written Materials Provided  Chronic Complications (preventing, detecting, and treating chronic complications): Discussion, Lecture, Instructed, Class, Demonstrates Understanding/Competency (verbalizes/demonstrates), Written Materials Provided  Clinical (diabetes, other pertinent medical history, and relevant comorbidities reviewed during visit): Not Covered/Deferred  Cognitive (knowledge of self-management skills, functional health literacy): Not Covered/Deferred  Psychosocial (emotional response to diabetes): Lecture, Instructed, Class, Demonstrates Understanding/Competency (verbalizes/demonstrates), Written Materials Provided  Diabetes Distress and Support Systems: Not Covered/Deferred  Behavioral (readiness for change, lifestyle practices, self-care behaviors): Discussion, Lecture, Instructed, Class,  Demonstrates Understanding/Competency (verbalizes/demonstrates)    Education Units of Time   Time Spent: 180 min  Post test score 93  Health Maintenance was reviewed today with patient. Discussed with patient importance of routine eye exams, foot exams/foot care, blood work (i.e.: A1c, microalbumin, and lipid), dental visits, yearly flu vaccine, and pneumonia vaccine as indicated by PCP. Patient verbalized understanding.     Health Maintenance Topics with due status: Not Due       Topic Last Completion Date    Colonoscopy 08/01/2012    Mammogram 09/15/2016    DEXA SCAN 11/25/2016    TETANUS VACCINE 11/30/2016    Foot Exam 09/18/2017    Lipid Panel 01/23/2018    Hemoglobin A1c 01/23/2018    Low Dose Statin 02/08/2018    Eye Exam 02/08/2018     There are no preventive care reminders to display for this patient.

## 2018-02-26 NOTE — ASSESSMENT & PLAN NOTE
Biochemical evidence of subclinical hyperthyroidism.  Patient is currently asymptomatic.     Risks and benefits discussed with patient.  Increased risk of mortality and/or conversion to overt hyperthyroidism when:  TSH <0.1, heart disease (heart failure, CMPY), arrhythmia (ie afib), bone disease, and underlying nodular thyroid disease.    Diagnosis confirmed.  US ordered.    Discussed possible treatment options - observation, Methimazole, ANGULO.   If we observe, patient is at risk for spontaneous resolution, stable disease, or progression to overt hyperthyroidism.  Patient should proceed with caution if he/she needs iodine load for imaging or requires iodine containing medication (ie. Amiodarone).  Medication side effects discussed. ANGULO is associated with resolution of disease and/or progression to hypothyroidism.    Patient chose to proceed with observation. Will repeat TFTs in 6 months.

## 2018-02-26 NOTE — ASSESSMENT & PLAN NOTE
Reviewed goals of therapy are to get the best control we can without hypoglycemia    Well controlled on current regimen. No change at this time.    Reviewed patient's current insulin regimen. Clarified proper insulin dose and timing in relation to meals, etc. Insulin injection sites and proper rotation instructed.      Advised frequent self blood glucose monitoring.  Patient encouraged to document glucose results and bring them to every clinic visit      Hypoglycemia precautions discussed. Instructed on precautions before driving.      Close adherence to lifestyle changes recommended.      Periodic follow ups for eye evaluations, foot care and dental care suggested.    Vaccinations - up to date.  Defer to PCP

## 2018-02-27 ENCOUNTER — TELEPHONE (OUTPATIENT)
Dept: CARDIOLOGY | Facility: CLINIC | Age: 68
End: 2018-02-27

## 2018-02-27 ENCOUNTER — OFFICE VISIT (OUTPATIENT)
Dept: FAMILY MEDICINE | Facility: CLINIC | Age: 68
End: 2018-02-27
Payer: MEDICARE

## 2018-02-27 VITALS
TEMPERATURE: 99 F | DIASTOLIC BLOOD PRESSURE: 80 MMHG | BODY MASS INDEX: 34.89 KG/M2 | HEART RATE: 108 BPM | HEIGHT: 62 IN | OXYGEN SATURATION: 97 % | WEIGHT: 189.63 LBS | SYSTOLIC BLOOD PRESSURE: 140 MMHG

## 2018-02-27 DIAGNOSIS — I82.4Y9 ACUTE DEEP VEIN THROMBOSIS (DVT) OF PROXIMAL VEIN OF LOWER EXTREMITY, UNSPECIFIED LATERALITY: ICD-10-CM

## 2018-02-27 DIAGNOSIS — I10 ESSENTIAL HYPERTENSION: ICD-10-CM

## 2018-02-27 DIAGNOSIS — K21.9 GASTROESOPHAGEAL REFLUX DISEASE WITHOUT ESOPHAGITIS: ICD-10-CM

## 2018-02-27 DIAGNOSIS — E78.5 HYPERLIPIDEMIA, UNSPECIFIED HYPERLIPIDEMIA TYPE: ICD-10-CM

## 2018-02-27 DIAGNOSIS — E11.9 TYPE 2 DIABETES MELLITUS WITHOUT COMPLICATION, WITHOUT LONG-TERM CURRENT USE OF INSULIN: ICD-10-CM

## 2018-02-27 DIAGNOSIS — E05.90 SUBCLINICAL HYPERTHYROIDISM: ICD-10-CM

## 2018-02-27 DIAGNOSIS — N18.30 CKD (CHRONIC KIDNEY DISEASE) STAGE 3, GFR 30-59 ML/MIN: ICD-10-CM

## 2018-02-27 DIAGNOSIS — I10 HYPERTENSION, BENIGN: ICD-10-CM

## 2018-02-27 DIAGNOSIS — R07.89 CHEST TIGHTNESS: Primary | ICD-10-CM

## 2018-02-27 PROCEDURE — 99999 PR PBB SHADOW E&M-EST. PATIENT-LVL IV: CPT | Mod: PBBFAC,,, | Performed by: FAMILY MEDICINE

## 2018-02-27 PROCEDURE — 1159F MED LIST DOCD IN RCRD: CPT | Mod: ,,, | Performed by: FAMILY MEDICINE

## 2018-02-27 PROCEDURE — 1126F AMNT PAIN NOTED NONE PRSNT: CPT | Mod: ,,, | Performed by: FAMILY MEDICINE

## 2018-02-27 PROCEDURE — 99214 OFFICE O/P EST MOD 30 MIN: CPT | Mod: S$PBB,,, | Performed by: FAMILY MEDICINE

## 2018-02-27 PROCEDURE — 93005 ELECTROCARDIOGRAM TRACING: CPT | Mod: PBBFAC,PO | Performed by: FAMILY MEDICINE

## 2018-02-27 PROCEDURE — 99214 OFFICE O/P EST MOD 30 MIN: CPT | Mod: PBBFAC,PO | Performed by: FAMILY MEDICINE

## 2018-02-27 PROCEDURE — 93010 ELECTROCARDIOGRAM REPORT: CPT | Mod: ,,, | Performed by: INTERNAL MEDICINE

## 2018-02-27 RX ORDER — AMLODIPINE BESYLATE 10 MG/1
10 TABLET ORAL NIGHTLY
Qty: 90 TABLET | Refills: 2 | Status: SHIPPED | OUTPATIENT
Start: 2018-02-27 | End: 2019-03-14 | Stop reason: SDUPTHER

## 2018-02-27 RX ORDER — SPIRONOLACTONE 25 MG/1
25 TABLET ORAL DAILY
Qty: 90 TABLET | Refills: 2 | Status: SHIPPED | OUTPATIENT
Start: 2018-02-27 | End: 2018-11-29 | Stop reason: SDUPTHER

## 2018-02-27 RX ORDER — HYDRALAZINE HYDROCHLORIDE 25 MG/1
25 TABLET, FILM COATED ORAL EVERY 12 HOURS
Qty: 180 TABLET | Refills: 2 | Status: SHIPPED | OUTPATIENT
Start: 2018-02-27 | End: 2018-06-14 | Stop reason: SDUPTHER

## 2018-02-27 RX ORDER — METFORMIN HYDROCHLORIDE 500 MG/1
500 TABLET ORAL 2 TIMES DAILY WITH MEALS
Qty: 180 TABLET | Refills: 2 | Status: SHIPPED | OUTPATIENT
Start: 2018-02-27 | End: 2018-05-08 | Stop reason: SDUPTHER

## 2018-02-27 RX ORDER — LOSARTAN POTASSIUM AND HYDROCHLOROTHIAZIDE 25; 100 MG/1; MG/1
1 TABLET ORAL DAILY
Qty: 90 TABLET | Refills: 2 | Status: SHIPPED | OUTPATIENT
Start: 2018-02-27 | End: 2018-08-08 | Stop reason: ALTCHOICE

## 2018-02-27 RX ORDER — ATORVASTATIN CALCIUM 40 MG/1
40 TABLET, FILM COATED ORAL DAILY
Qty: 90 TABLET | Refills: 2 | Status: SHIPPED | OUTPATIENT
Start: 2018-02-27 | End: 2019-01-18 | Stop reason: SDUPTHER

## 2018-02-27 RX ORDER — OMEPRAZOLE 40 MG/1
40 CAPSULE, DELAYED RELEASE ORAL DAILY
Qty: 90 CAPSULE | Refills: 2 | Status: SHIPPED | OUTPATIENT
Start: 2018-02-27 | End: 2018-08-08

## 2018-02-27 NOTE — TELEPHONE ENCOUNTER
----- Message from Caryl Higgins sent at 2/27/2018  2:16 PM CST -----  Good afternoon,   Dr. Paul is referring the patient. She is scheduled for some testing in May and Dr. Paul would like to know if Dr. Carrizales would like her to have it sooner. Please let me know.    Thank you

## 2018-02-27 NOTE — PATIENT INSTRUCTIONS
4 Steps for Eating Healthier  Changing the way you eat can improve your health. It can lower your cholesterol and blood pressure, and help you stay at a healthy weight. Your diet doesnt have to be bland and boring to be healthy. Just watch your calories and follow these steps:    1. Eat fewer unhealthy fats  · Choose more fish and lean meats instead of fatty cuts of meat.  · Skip butter and lard, and use less margarine.  · Pass on foods that have palm, coconut, or hydrogenated oils.  · Eat fewer high-fat dairy foods like cheese, ice cream, and whole milk.  · Get a heart-healthy cookbook and try some low-fat recipes.  2. Go light on salt  · Keep the saltshaker off the table.  · Limit high-salt ingredients, such as soy sauce, bouillon, and garlic salt.  · Instead of adding salt when cooking, season your food with herbs and flavorings. Try lemon, garlic, and onion.  · Limit convenience foods, such as boxed or canned foods and restaurant food.  · Read food labels and choose lower-sodium options.  3. Limit sugar  · Pause before you add sugars to pancakes, cereal, coffee, or tea. This includes white and brown table sugar, syrup, honey, and molasses. Cut your usual amount by half.  · Use non-sugar sweeteners. Stevia, aspartame, and sucralose can satisfy a sweet tooth without adding calories.  · Swap out sugar-filled soda and other drinks. Buy sugar-free or low-calorie beverages. Remember water is always the best choice.  · Read labels and choose foods with less added sugar. Keep in mind that dairy foods and foods with fruit will have some natural sugar.  · Cut the sugar in recipes by 1/3 to 1/2. Boost the flavor with extracts like almond, vanilla, or orange. Or add spices such as cinnamon or nutmeg.  4. Eat more fiber  · Eat fresh fruits and vegetables every day.  · Boost your diet with whole grains. Go for oats, whole-grain rice, and bran.  · Add beans and lentils to your meals.  · Drink more water to match your  fiber increase. This is to help prevent constipation.  Date Last Reviewed: 5/11/2015  © 0518-1652 [employer]. [address] [place] [address], [place] [address]. All rights reserved. This information is not intended as a substitute for professional medical care. Always follow your healthcare professional's instructions.        Noncardiac Chest Pain    Based on your visit today, the healthcare provider doesnt know what is causing your chest pain. In most cases, people who come to the emergency department with chest pain dont have a problem with their heart. Instead, the pain is caused by other conditions. It's important for the healthcare team to be sure you are not having a life threatening cause for chest pain such as a heart attack, blood clot in the lungs, collapsed lung, ruptured esophagus, or tearing of the aorta. Once these major causes have been ruled out, you may have further evaluation for non-heart causes of chest pain. These may be problems with the lungs, muscles, bones, digestive tract, nerves, or mental health.  Lung problems  · Inflammation around the lungs (pleurisy)  · Collapsed lung (pneumothorax)  · Fluid around the lungs (pleural effusion)  · Lung cancer (a rare cause of chest pain)  Muscle or bone problems  · Inflamed cartilage between the ribs (costochondritis)  · Fibromyalgia  · Rheumatoid arthritis  · Chest wall strain  Digestive system problems  · Reflux  · Stomach ulcer  · Spasms of the esophagus  · Gall stones  · Gallbladder inflammation  Mental health conditions  · Panic or anxiety attacks  · Emotional distress  Your condition doesnt seem serious and your pain doesnt appear to be coming from your heart. But sometimes the signs of a serious problem take more time to appear. Watch for the warning signs listed below.  Home care  Follow these guidelines when caring for yourself at home:  · Rest today and avoid strenuous activity.  · Take any prescribed medicine as  directed.  Follow-up care  Follow up with your healthcare provider, or as advised, if you dont start to feel better within 24 hours.  When to seek medical advice  Call your healthcare provider right away if any of these occur:  · A change in the type of pain. Call if it feels different, becomes more serious, lasts longer, or begins to spread into your shoulder, arm, neck, jaw, or back.  · Shortness of breath  · You feel more pain when you breathe  · Cough with dark-colored mucus or blood  · Weakness, dizziness, or fainting  · Fever of 100.4ºF (38ºC) or higher, or as directed by your healthcare provider  · Swelling, pain, or redness in one leg  Date Last Reviewed: 12/1/2016  © 4519-8824 [employer]. [address] [place] [address]. All rights reserved. This information is not intended as a substitute for professional medical care. Always follow your healthcare professional's instructions.            Diabetes Management Status  Statin: Taking  ACE/ARB: Taking  Screening or Prevention Patient's value Goal Complete/Controlled?   HgA1C Testing and Control   Lab Results   Component Value Date    HGBA1C 6.7 (H) 01/23/2018      Annually/Less than 8% Yes   Lipid profile : 01/23/2018 Annually Yes   LDL control Lab Results   Component Value Date    LDLCALC 168.2 (H) 01/23/2018    Annually/Less than 100 mg/dl  No   Nephropathy screening Lab Results   Component Value Date    LABMICR 12.0 01/23/2018     Lab Results   Component Value Date    PROTEINUA Negative 11/29/2016    Annually Yes   Blood pressure BP Readings from Last 1 Encounters:   02/27/18 (!) 160/80    Less than 140/90 No   Dilated retinal exam : 02/08/2018 Annually Yes   Foot exam   : 09/18/2017 Annually Yes

## 2018-02-27 NOTE — PROGRESS NOTES
Subjective:       Patient ID: Eugenie Coates is a 67 y.o. female.    Chief Complaint: Follow-up (1 month HTN)    Yazmin is a 67 y.o. female who presents today as a follow up on her HTN and other medical issues.     She is having chest tightness that occurs at night. This seems to be slightly above her epigastric region and goes around her left side. She states that it only happens at night. She states that it spontaneously resolves in the Am. She is not having chest pain currently.     Since her last visit she has seen, treated.  She was diagnosed with glaucoma and given drops.    She has seen orthopedics for her chronic knee pain.  They did steroid injections and her knee pain has improved.    She also saw podiatry for her diabetic foot exam.  She was given diabetic shoes.  She was also diagnosed with onychomycosis and due to her venous insufficiency she apparently qualifies for nail care.  Her nails were reduced and debrided at this visit.    She has been seeing diabetic education and states this is being very helpful.    She is also seeing endocrine for her subclinical hyperthyroidism.  Endocrine reviewed her many medical issues including her diabetes.  She does not currently want any medication for her subclinical hyperthyroidism.      Chest Pain    This is a chronic problem. The current episode started more than 1 month ago. The onset quality is undetermined. The problem has been gradually worsening. The pain is present in the lateral region. The pain is mild. The quality of the pain is described as sharp. Radiates to: across her chest. Pertinent negatives include no abdominal pain, diaphoresis, dizziness, exertional chest pressure, fever, headaches, hemoptysis, irregular heartbeat, leg pain, nausea, near-syncope, palpitations, shortness of breath, syncope, vomiting or weakness. The pain is aggravated by nothing. She has tried nothing for the symptoms. The treatment provided no relief. Risk factors include  being elderly and obesity.     Review of Systems   Constitutional: Negative for diaphoresis and fever.   Respiratory: Negative for hemoptysis and shortness of breath.    Cardiovascular: Positive for chest pain. Negative for palpitations, syncope and near-syncope.   Gastrointestinal: Negative for abdominal pain, nausea and vomiting.   Neurological: Negative for dizziness, weakness and headaches.       Results for orders placed or performed in visit on 01/30/18   T3, free   Result Value Ref Range    T3, Free 2.5 2.3 - 4.2 pg/mL   THYROTROPIN RECEPTOR ANTIBODY   Result Value Ref Range    Thyrotropin Receptor Ab <1.00 0.00 - 1.75 IU/L   TSH   Result Value Ref Range    TSH 0.345 (L) 0.400 - 4.000 uIU/mL   T4, FREE   Result Value Ref Range    Free T4 1.17 0.71 - 1.51 ng/dL     Objective:     Vitals:    02/27/18 1307   BP: (!) 140/80   Pulse: 108   Temp: 98.7 °F (37.1 °C)        Physical Exam   Constitutional: She is oriented to person, place, and time. She appears well-developed and well-nourished.   HENT:   Head: Normocephalic and atraumatic.   Neck: Normal range of motion.   Cardiovascular: Normal rate and regular rhythm.    Pulmonary/Chest: Effort normal and breath sounds normal. No respiratory distress. She has no wheezes. She has no rales.   Neurological: She is alert and oriented to person, place, and time.   Skin: Skin is warm.   Psychiatric: She has a normal mood and affect. Her behavior is normal. Judgment and thought content normal.   Nursing note and vitals reviewed.      Assessment:       1. Chest tightness    2. Essential hypertension    3. Gastroesophageal reflux disease without esophagitis    4. Type 2 diabetes mellitus without complication, without long-term current use of insulin    5. Hypertension, benign    6. Hyperlipidemia, unspecified hyperlipidemia type    7. Acute deep vein thrombosis (DVT) of proximal vein of lower extremity, unspecified laterality    8. CKD (chronic kidney disease) stage 3, GFR  30-59 ml/min    9. Subclinical hyperthyroidism        Plan:         Chest tightness  Has seen cardiology in the past and a stress test was ordered for this May.  A new cardiology referral was placed in a location that is more convenient for the patient  Message was sent to cardiologist to see if he wanted this test done prior  EKG was done today and that did not show any acute changes from her previous exams  No ST segment elevation was noted  Warning signs such as chest pain that will not go away or chest pain that gets worse with exertion or chest pain associated with diaphoresis was discussed extensively with the patient.  She is aware if any new symptoms develop she is to go to the ED immediately.  -     Ambulatory referral to Cardiology  -     EKG 12-lead; Future    Essential hypertension  Show blood pressure was 160/80 repeat was 140/80  Continue blood pressure medications  DASH diet  Weight loss  Consider referral to renal if pressures stay elevated  -     spironolactone (ALDACTONE) 25 MG tablet; Take 1 tablet (25 mg total) by mouth once daily.  Dispense: 90 tablet; Refill: 2  -     losartan-hydrochlorothiazide 100-25 mg (HYZAAR) 100-25 mg per tablet; Take 1 tablet by mouth once daily.  Dispense: 90 tablet; Refill: 2  -     hydrALAZINE (APRESOLINE) 25 MG tablet; Take 1 tablet (25 mg total) by mouth every 12 (twelve) hours.  Dispense: 180 tablet; Refill: 2  -     amLODIPine (NORVASC) 10 MG tablet; Take 1 tablet (10 mg total) by mouth every evening.  Dispense: 90 tablet; Refill: 2  -     Ambulatory referral to Cardiology    Gastroesophageal reflux disease without esophagitis  The chest pain may be related to her reflux  I've increased her medication from 20 mg to 40 mg  take it every morning  Handouts and been given for GERD  -     omeprazole (PRILOSEC) 40 MG capsule; Take 1 capsule (40 mg total) by mouth once daily.  Dispense: 90 capsule; Refill: 2    Type 2 diabetes mellitus without complication, without  long-term current use of insulin  -     metFORMIN (GLUCOPHAGE) 500 MG tablet; Take 1 tablet (500 mg total) by mouth 2 (two) times daily with meals.  Dispense: 180 tablet; Refill: 2  -     Ambulatory referral to Cardiology  -     Hemoglobin A1c; Future; Expected date: 02/27/2018  -     Comprehensive metabolic panel; Future; Expected date: 02/27/2018    Hypertension, benign  -     Comprehensive metabolic panel; Future; Expected date: 02/27/2018    Hyperlipidemia, unspecified hyperlipidemia type  -     atorvastatin (LIPITOR) 40 MG tablet; Take 1 tablet (40 mg total) by mouth once daily.  Dispense: 90 tablet; Refill: 2  -     Ambulatory referral to Cardiology    Acute deep vein thrombosis (DVT) of proximal vein of lower extremity, unspecified laterality  Continue blood thinner    CKD (chronic kidney disease) stage 3, GFR 30-59 ml/min  Repeat labs and a1c in 10 weeks  -     Comprehensive metabolic panel; Future; Expected date: 02/27/2018    Subclinical hyperthyroidism  Followed by endocrine    Warning signs discussed, patient to call with any further issues or worsening of symptoms.   Parts of the above note were dictated using a voice dictation software. Please excuse any grammatical or typographical errors.

## 2018-02-27 NOTE — PROGRESS NOTES
I have reviewed the case, examined the patient, discussed with the fellow, and concur with the fellow's history, physical, assessment, and plan.    Alba Cast MD

## 2018-03-08 ENCOUNTER — TELEPHONE (OUTPATIENT)
Dept: PODIATRY | Facility: CLINIC | Age: 68
End: 2018-03-08

## 2018-03-08 ENCOUNTER — OFFICE VISIT (OUTPATIENT)
Dept: OPTOMETRY | Facility: CLINIC | Age: 68
End: 2018-03-08
Payer: MEDICARE

## 2018-03-08 DIAGNOSIS — H40.1134 PRIMARY OPEN ANGLE GLAUCOMA OF BOTH EYES, INDETERMINATE STAGE: ICD-10-CM

## 2018-03-08 PROCEDURE — 99999 PR PBB SHADOW E&M-EST. PATIENT-LVL II: CPT | Mod: PBBFAC,,, | Performed by: OPTOMETRIST

## 2018-03-08 PROCEDURE — 92012 INTRM OPH EXAM EST PATIENT: CPT | Mod: S$PBB,,, | Performed by: OPTOMETRIST

## 2018-03-08 PROCEDURE — 99212 OFFICE O/P EST SF 10 MIN: CPT | Mod: PBBFAC,PO | Performed by: OPTOMETRIST

## 2018-03-08 RX ORDER — BRIMONIDINE TARTRATE 2 MG/ML
1 SOLUTION/ DROPS OPHTHALMIC 3 TIMES DAILY
Qty: 15 ML | Refills: 4 | Status: SHIPPED | OUTPATIENT
Start: 2018-03-08 | End: 2018-06-07 | Stop reason: SDUPTHER

## 2018-03-08 NOTE — PROGRESS NOTES
HPI     Presenting Complaint: Pt here today for 1 month glaucoma IOP check  Pt states complaint with drops.  (+) Alphagan 1 drop both eyes 2 times day  (+) Lumigan 1 drop both eyes at bedtime  OTC art tear gel at bedtime. Pt states dryness has improved.     Last edited by Vasu Wilhelm on 3/8/2018 10:07 AM. (History)            Assessment /Plan     For exam results, see Encounter Report.    Primary open angle glaucoma of both eyes, indeterminate stage  -     brimonidine 0.2% (ALPHAGAN) 0.2 % Drop; Place 1 drop into both eyes 3 (three) times daily.  Dispense: 15 mL; Refill: 4      Mild improvement in IOP, will increase Alphagan to tid OU. Continue latanoprost qPM OU. OTC gel drop at bedtime. Wait 10 minutes between drops. Return in 3 months for IOP check, sooner prn.

## 2018-03-08 NOTE — TELEPHONE ENCOUNTER
----- Message from Pa Nolan sent at 3/8/2018 11:21 AM CST -----  Contact: Favian/Emilia Nieto called in regarding the attached patient and stated she is still waiting for clinical notes for a Rx that was sent over in early February?  Rx is for patients diabetic foot ware.     Fax number is: [phone] and call back number is [phone]

## 2018-03-12 ENCOUNTER — OFFICE VISIT (OUTPATIENT)
Dept: CARDIOLOGY | Facility: CLINIC | Age: 68
End: 2018-03-12
Payer: MEDICARE

## 2018-03-12 VITALS
HEIGHT: 63 IN | BODY MASS INDEX: 33.04 KG/M2 | WEIGHT: 186.5 LBS | DIASTOLIC BLOOD PRESSURE: 75 MMHG | HEART RATE: 89 BPM | OXYGEN SATURATION: 97 % | SYSTOLIC BLOOD PRESSURE: 127 MMHG

## 2018-03-12 DIAGNOSIS — G47.33 OSA (OBSTRUCTIVE SLEEP APNEA): ICD-10-CM

## 2018-03-12 DIAGNOSIS — R01.1 CARDIAC MURMUR: ICD-10-CM

## 2018-03-12 DIAGNOSIS — I10 HYPERTENSION, BENIGN: ICD-10-CM

## 2018-03-12 DIAGNOSIS — E78.2 MIXED HYPERLIPIDEMIA: ICD-10-CM

## 2018-03-12 DIAGNOSIS — I82.4Y1 ACUTE DEEP VEIN THROMBOSIS (DVT) OF PROXIMAL VEIN OF RIGHT LOWER EXTREMITY: Primary | ICD-10-CM

## 2018-03-12 PROCEDURE — 99213 OFFICE O/P EST LOW 20 MIN: CPT | Mod: PBBFAC,PO | Performed by: INTERNAL MEDICINE

## 2018-03-12 PROCEDURE — 99204 OFFICE O/P NEW MOD 45 MIN: CPT | Mod: S$PBB,,, | Performed by: INTERNAL MEDICINE

## 2018-03-12 PROCEDURE — 99999 PR PBB SHADOW E&M-EST. PATIENT-LVL III: CPT | Mod: PBBFAC,,, | Performed by: INTERNAL MEDICINE

## 2018-03-12 NOTE — PROGRESS NOTES
Subjective:   Patient ID:  Eugenie Coates is a 67 y.o. female who presents for follow-up of Establish Care and Hypertension      Problem List Items Addressed This Visit        Cardiac/Vascular    Hypertension, benign    Relevant Orders    2D Echo w/ Color Flow Doppler    Hyperlipidemia       Hematology    Acute deep vein thrombosis (DVT) of proximal vein of lower extremity - Primary    Relevant Orders    2D Echo w/ Color Flow Doppler       Endocrine    Obesity, Class II, BMI 35-39.9, with comorbidity       Other    AFSHIN (obstructive sleep apnea)      Other Visit Diagnoses     Cardiac murmur        Relevant Orders    2D Echo w/ Color Flow Doppler          HPI: 68 y/o A female with PMH of HTN, obesity and unprovoked DVT of right leg on Eliquis present secondary to continue chest pain describe as tightness located mid sternally with radiation under both breast. Pain occurs only at night. Pain is affected by changing positions. She does not get dyspnea with exertion unless climbing stairs. BP is controlled. No orthopnea but does have PND. Interestingly she is already diagnosed with AFSHIN and wearing CPAP. She never smoked.   She works out on a exercise bike daily for 30 minutes alternating between slow and fast and never have chest pain.     Review of Systems   Constitution: Negative.   HENT: Negative.    Eyes: Negative.    Cardiovascular: Positive for dyspnea on exertion.   Respiratory: Negative.    Endocrine: Negative.    Hematologic/Lymphatic: Negative.    Skin: Negative.    Musculoskeletal: Negative.    Gastrointestinal: Negative.    Neurological: Negative.    Psychiatric/Behavioral: Negative.    Allergic/Immunologic: Negative.        Patient's Medications   New Prescriptions    No medications on file   Previous Medications    ALBUTEROL (PROAIR HFA) 90 MCG/ACTUATION INHALER    Inhale 2 puffs into the lungs every 4 (four) hours as needed for Wheezing.    AMLODIPINE (NORVASC) 10 MG TABLET    Take 1 tablet (10 mg  total) by mouth every evening.    ATORVASTATIN (LIPITOR) 40 MG TABLET    Take 1 tablet (40 mg total) by mouth once daily.    JV ASPIRIN ORAL    Take 81 mg by mouth once daily. Instructed to stop 5-23-13 until after surgery.    BIMATOPROST (LUMIGAN) 0.01 % DROP    Place 1 drop into both eyes every evening.    BLOOD SUGAR DIAGNOSTIC STRP    Test twice daily.  Accucheck viva test strips and lancets.    BRIMONIDINE 0.2% (ALPHAGAN) 0.2 % DROP    Place 1 drop into both eyes 3 (three) times daily.    ELIQUIS 5 MG TAB    TAKE 1 TABLET(5 MG) BY MOUTH TWICE DAILY    HYDRALAZINE (APRESOLINE) 25 MG TABLET    Take 1 tablet (25 mg total) by mouth every 12 (twelve) hours.    LANCETS (ACCU-CHEK SOFTCLIX LANCETS) MISC    1 Units by Misc.(Non-Drug; Combo Route) route 2 (two) times daily.    LOSARTAN-HYDROCHLOROTHIAZIDE 100-25 MG (HYZAAR) 100-25 MG PER TABLET    Take 1 tablet by mouth once daily.    METFORMIN (GLUCOPHAGE) 500 MG TABLET    Take 1 tablet (500 mg total) by mouth 2 (two) times daily with meals.    OMEPRAZOLE (PRILOSEC) 40 MG CAPSULE    Take 1 capsule (40 mg total) by mouth once daily.    SPIRONOLACTONE (ALDACTONE) 25 MG TABLET    Take 1 tablet (25 mg total) by mouth once daily.    SYMBICORT 160-4.5 MCG/ACTUATION HFAA    INHALE 2 PUFFS INTO THE LUNGS EVERY 12 HOURS   Modified Medications    No medications on file   Discontinued Medications    No medications on file       Objective:   Physical Exam   Constitutional: She is oriented to person, place, and time. She appears well-developed and well-nourished. No distress.   Examination of the digits showed no clubbing or cyanosis   HENT:   Head: Normocephalic and atraumatic.   Eyes: Conjunctivae are normal. Pupils are equal, round, and reactive to light. Right eye exhibits no discharge.   Neck: Normal range of motion. Neck supple. No JVD present. No thyromegaly present.   No carotid bruits   Cardiovascular: Normal rate, regular rhythm, S1 normal, S2 normal, intact distal  pulses and normal pulses.  PMI is not displaced.  Exam reveals no gallop, no friction rub and no opening snap.    Murmur heard.  Pulmonary/Chest: Effort normal and breath sounds normal. No respiratory distress. She has no wheezes. She has no rales. She exhibits no tenderness.   Abdominal: Soft. Bowel sounds are normal. She exhibits no distension and no mass. There is no tenderness. There is no guarding.   No hepatosplenomegaly   Musculoskeletal: Normal range of motion. She exhibits no edema or tenderness.   Lymphadenopathy:     She has no cervical adenopathy.   Neurological: She is alert and oriented to person, place, and time.   Skin: Skin is warm. No rash noted. She is not diaphoretic. No erythema.   Psychiatric: She has a normal mood and affect.   Nursing note and vitals reviewed.      ECGs reviewed-NSR with LAD and LVH  LABS reviewed      Assessment:     1. Acute deep vein thrombosis (DVT) of proximal vein of right lower extremity    2. Mixed hyperlipidemia    3. Hypertension, benign    4. Obesity, Class II, BMI 35-39.9, with comorbidity    5. AFSHIN (obstructive sleep apnea)    6. Cardiac murmur        Plan:     Chest discomfort non cardiac  2d echo to investigate cardiac murmur  Continu current medications  Weight loss  Activity as tolerate  Low salt diet  F/u in 3 months. Will stop Eliquis. First occurrence of DVT, and I think DVT is provoked by traveling. She travels a lot between [facility] and [facility].     Clinic time spent with patient discussing and treating medical condition including reviewing images, ECG, labs and medications > 20 minutes.

## 2018-03-12 NOTE — LETTER
March 12, 2018      Haile Paul, DO  [address]  [place] [address]           [place] [facility]  [address], [address]  [facility] [address]  Phone: [phone]          Patient: Eugenie Coates   MR Number: [id number]   YOB: 1950   Date of Visit: 3/12/2018       Dear Dr. Haile Paul:    Thank you for referring Eugenie Coates to me for evaluation. Attached you will find relevant portions of my assessment and plan of care.    If you have questions, please do not hesitate to call me. I look forward to following Eugenie Coates along with you.    Sincerely,    Lonnie Carrizales MD    Enclosure  CC:  No Recipients    If you would like to receive this communication electronically, please contact [email or web address] or [phone] to request more information on [facility] [facility] access.    For providers and/or their staff who would like to refer a patient to [facility], please contact us through our one-stop-shop provider referral line, [facility] , at [phone].    If you feel you have received this communication in error or would no longer like to receive these types of communications, please e-mail [email or web address]

## 2018-03-16 ENCOUNTER — PATIENT MESSAGE (OUTPATIENT)
Dept: FAMILY MEDICINE | Facility: CLINIC | Age: 68
End: 2018-03-16

## 2018-03-16 ENCOUNTER — TELEPHONE (OUTPATIENT)
Dept: FAMILY MEDICINE | Facility: CLINIC | Age: 68
End: 2018-03-16

## 2018-03-16 NOTE — TELEPHONE ENCOUNTER
Spoke with patient and verified that paper work was sent to the [employer]. Patient voice understanding.

## 2018-04-17 ENCOUNTER — TELEPHONE (OUTPATIENT)
Dept: OPTOMETRY | Facility: CLINIC | Age: 68
End: 2018-04-17

## 2018-04-17 ENCOUNTER — PATIENT MESSAGE (OUTPATIENT)
Dept: OPTOMETRY | Facility: CLINIC | Age: 68
End: 2018-04-17

## 2018-04-17 NOTE — TELEPHONE ENCOUNTER
Spoke to Mrs. Coates about drops and she found glaucoma drops and has 3 bottles. Pt states does not need a refill at the time.

## 2018-04-27 ENCOUNTER — TELEPHONE (OUTPATIENT)
Dept: ENDOCRINOLOGY | Facility: CLINIC | Age: 68
End: 2018-04-27

## 2018-04-27 ENCOUNTER — HOSPITAL ENCOUNTER (OUTPATIENT)
Dept: ENDOCRINOLOGY | Facility: CLINIC | Age: 68
Discharge: HOME | End: 2018-04-27
Attending: INTERNAL MEDICINE
Payer: MEDICARE

## 2018-04-27 DIAGNOSIS — E05.90 SUBCLINICAL HYPERTHYROIDISM: ICD-10-CM

## 2018-04-27 PROCEDURE — 76536 US EXAM OF HEAD AND NECK: CPT | Mod: ,,, | Performed by: INTERNAL MEDICINE

## 2018-04-27 NOTE — TELEPHONE ENCOUNTER
Called patient to discuss US results.    Thyroid nodules on US, with isthmus nodule recommended for FNA.  Given her history of subclinical hyperthyroidism, would like to perform NM Uptake scan prior to biopsy (determine if the nodules are hyperfunctioning or cold).    If cold, will proceed with FNA.    If hot, will need to discuss plan of care with patient.   With subclinical hyperthyroidism, there is an increased associated risk of atrial fibrillation in patients over age 60 to 65 years and, in postmenopausal women, a decrease in BMD.  Whether we start treatment or not will depend on patient decision and degree of TSH suppression on repeat.  If she would like treatment at that time, will start low dose.        Jess Harris MD  Endocrinology Fellow

## 2018-05-01 ENCOUNTER — TELEPHONE (OUTPATIENT)
Dept: ENDOCRINOLOGY | Facility: CLINIC | Age: 68
End: 2018-05-01

## 2018-05-01 DIAGNOSIS — E04.2 MULTINODULAR THYROID: ICD-10-CM

## 2018-05-01 DIAGNOSIS — E05.90 SUBCLINICAL HYPERTHYROIDISM: Primary | ICD-10-CM

## 2018-05-01 NOTE — TELEPHONE ENCOUNTER
Called patient to discuss thyroid US, in lieu of subclinical hyperthyroidism.      US results reviewed.  The thyroid is moderately enlarged, normal echogenicity, with normal vascularity.  There is a large dominant isthmus nodule and a smaller L sided nodule.   Cervical LN are benign appearing.    Patient to have NM scan.  Notes that she is menopausal and not pregnant or breastfeeding. Uptake scan to help determine status of nodule.    If cold, will proceed with FNA.     If hot, will need to discuss plan of care with patient. Whether we start treatment or not will depend on patient decision and degree of TSH suppression on repeat.  If she would like treatment at that time, will start low dose.    Likely to require low dose MMZ daily.    She is not on antithyroid medication at this time.  She understands that this test is completed over the span of two days.   No food or drink 2 hours before and after pill.

## 2018-05-01 NOTE — TELEPHONE ENCOUNTER
----- Message from Roseline Carter sent at 5/1/2018 10:56 AM CDT -----  Contact: Self [phone]  PT returned call.

## 2018-05-02 ENCOUNTER — TELEPHONE (OUTPATIENT)
Dept: ENDOCRINOLOGY | Facility: CLINIC | Age: 68
End: 2018-05-02

## 2018-05-02 NOTE — TELEPHONE ENCOUNTER
----- Message from Jackie Arango MA sent at 5/1/2018  4:25 PM CDT -----      ----- Message -----  From: Jess Harris MD  Sent: 5/1/2018   4:24 PM  To: [facility] Staff    Please let patient know that she has been scheduled for May 10th and 11th for her Uptake Scan - both days are scheduled for 1130.  Advise no food or drink two hours before pill or 2 hours after pill on the first day.  No iodinated contrasted studies prior to Uptake scan. Thanks.

## 2018-05-02 NOTE — TELEPHONE ENCOUNTER
Left vm telling her dr erickson instruction for her for her nuclear med christiano. I sent out a reminder and wrote instruction on their so patient will know.

## 2018-05-04 ENCOUNTER — LAB VISIT (OUTPATIENT)
Dept: LAB | Facility: HOSPITAL | Age: 68
End: 2018-05-04
Attending: FAMILY MEDICINE
Payer: MEDICARE

## 2018-05-04 DIAGNOSIS — N18.30 CKD (CHRONIC KIDNEY DISEASE) STAGE 3, GFR 30-59 ML/MIN: ICD-10-CM

## 2018-05-04 DIAGNOSIS — E11.9 TYPE 2 DIABETES MELLITUS WITHOUT COMPLICATION, WITHOUT LONG-TERM CURRENT USE OF INSULIN: ICD-10-CM

## 2018-05-04 DIAGNOSIS — I10 HYPERTENSION, BENIGN: ICD-10-CM

## 2018-05-04 LAB
ALBUMIN SERPL BCP-MCNC: 3.3 G/DL
ALP SERPL-CCNC: 71 U/L
ALT SERPL W/O P-5'-P-CCNC: 12 U/L
ANION GAP SERPL CALC-SCNC: 10 MMOL/L
AST SERPL-CCNC: 15 U/L
BILIRUB SERPL-MCNC: 0.5 MG/DL
BUN SERPL-MCNC: 21 MG/DL
CALCIUM SERPL-MCNC: 9.7 MG/DL
CHLORIDE SERPL-SCNC: 107 MMOL/L
CO2 SERPL-SCNC: 23 MMOL/L
CREAT SERPL-MCNC: 1.1 MG/DL
EST. GFR  (AFRICAN AMERICAN): >60 ML/MIN/1.73 M^2
EST. GFR  (NON AFRICAN AMERICAN): 52.1 ML/MIN/1.73 M^2
ESTIMATED AVG GLUCOSE: 137 MG/DL
GLUCOSE SERPL-MCNC: 121 MG/DL
HBA1C MFR BLD HPLC: 6.4 %
POTASSIUM SERPL-SCNC: 3.7 MMOL/L
PROT SERPL-MCNC: 6.8 G/DL
SODIUM SERPL-SCNC: 140 MMOL/L

## 2018-05-04 PROCEDURE — 36415 COLL VENOUS BLD VENIPUNCTURE: CPT | Mod: PO

## 2018-05-04 PROCEDURE — 80053 COMPREHEN METABOLIC PANEL: CPT

## 2018-05-04 PROCEDURE — 83036 HEMOGLOBIN GLYCOSYLATED A1C: CPT

## 2018-05-08 ENCOUNTER — CLINICAL SUPPORT (OUTPATIENT)
Dept: DIABETES | Facility: CLINIC | Age: 68
End: 2018-05-08
Payer: MEDICARE

## 2018-05-08 ENCOUNTER — OFFICE VISIT (OUTPATIENT)
Dept: FAMILY MEDICINE | Facility: CLINIC | Age: 68
End: 2018-05-08
Payer: MEDICARE

## 2018-05-08 VITALS
DIASTOLIC BLOOD PRESSURE: 76 MMHG | HEIGHT: 63 IN | WEIGHT: 183.44 LBS | TEMPERATURE: 98 F | HEART RATE: 57 BPM | OXYGEN SATURATION: 97 % | SYSTOLIC BLOOD PRESSURE: 130 MMHG | BODY MASS INDEX: 32.5 KG/M2

## 2018-05-08 VITALS — WEIGHT: 183 LBS | BODY MASS INDEX: 32.94 KG/M2

## 2018-05-08 DIAGNOSIS — E78.2 MIXED HYPERLIPIDEMIA: ICD-10-CM

## 2018-05-08 DIAGNOSIS — N18.30 CKD (CHRONIC KIDNEY DISEASE) STAGE 3, GFR 30-59 ML/MIN: ICD-10-CM

## 2018-05-08 DIAGNOSIS — E11.9 DIABETES MELLITUS TYPE 2 WITHOUT RETINOPATHY: ICD-10-CM

## 2018-05-08 DIAGNOSIS — Z51.81 ENCOUNTER FOR MONITORING LONG-TERM PROTON PUMP INHIBITOR THERAPY: ICD-10-CM

## 2018-05-08 DIAGNOSIS — E11.65 TYPE 2 DIABETES MELLITUS WITH HYPERGLYCEMIA, WITHOUT LONG-TERM CURRENT USE OF INSULIN: Primary | ICD-10-CM

## 2018-05-08 DIAGNOSIS — Z79.899 ENCOUNTER FOR MONITORING LONG-TERM PROTON PUMP INHIBITOR THERAPY: ICD-10-CM

## 2018-05-08 DIAGNOSIS — I10 HYPERTENSION, BENIGN: ICD-10-CM

## 2018-05-08 DIAGNOSIS — K21.9 GASTROESOPHAGEAL REFLUX DISEASE, ESOPHAGITIS PRESENCE NOT SPECIFIED: ICD-10-CM

## 2018-05-08 PROCEDURE — 99214 OFFICE O/P EST MOD 30 MIN: CPT | Mod: PBBFAC,27,PO | Performed by: FAMILY MEDICINE

## 2018-05-08 PROCEDURE — 99214 OFFICE O/P EST MOD 30 MIN: CPT | Mod: S$PBB,,, | Performed by: FAMILY MEDICINE

## 2018-05-08 PROCEDURE — 99211 OFF/OP EST MAY X REQ PHY/QHP: CPT | Mod: PBBFAC,PO | Performed by: REGISTERED NURSE

## 2018-05-08 PROCEDURE — G0108 DIAB MANAGE TRN  PER INDIV: HCPCS | Mod: PBBFAC,PO | Performed by: REGISTERED NURSE

## 2018-05-08 PROCEDURE — 99999 PR PBB SHADOW E&M-EST. PATIENT-LVL I: CPT | Mod: PBBFAC,,,

## 2018-05-08 PROCEDURE — 99999 PR PBB SHADOW E&M-EST. PATIENT-LVL IV: CPT | Mod: PBBFAC,,, | Performed by: FAMILY MEDICINE

## 2018-05-08 RX ORDER — METFORMIN HYDROCHLORIDE 500 MG/1
500 TABLET ORAL
Qty: 90 TABLET | Refills: 3
Start: 2018-05-08 | End: 2019-01-18 | Stop reason: SDUPTHER

## 2018-05-08 NOTE — LETTER
May 8, 2018      Haile Paul, DO  [address]  [place] [address]         Patient: Yazmin Coates   MR Number: [id number]   YOB: 1950   Date of Visit: 5/8/2018       Dear Dr. Paul:    Thank you for referring Yazmin for diabetes self-management education and support. She has completed all components of our Diabetes Management Program and her Self-Management Support Plan. Below is a summary of her clinical outcomes and goal progress.    Patient Outcomes:    A1c Status:   Lab Results   Component Value Date    HGBA1C 6.4 (H) 05/04/2018    HGBA1C 6.7 (H) 01/23/2018     Goals  Patient has selected/evaluated goals during today's session: Yes, evaluated  Healthy Eating: % Met  Met Percentage : 75%    Diabetes Self-Management Support Plan  Diabetes Learning: DM support group  Exercise/Nutrition: join a gym  Review Status: Patient has selected and agrees to support plan., Patient was provided Diabetes Self-Management Support Plan document that includes support options.    Follow up:   · Yazmin to follow diabetes support plan indicated above  · Yazmin to attend medical appointments as scheduled  · Yazmin to update you on her DM education progress as needed      If you have questions, please do not hesitate to call me. I look forward to providing additional education and support as needed.    Sincerely,    Gianluca Jackson RN

## 2018-05-08 NOTE — PATIENT INSTRUCTIONS
Follow up in 3 months. Labs before if able and convenient     Diabetes Management Status  Statin: Taking  ACE/ARB: Taking  Screening or Prevention Patient's value Goal Complete/Controlled?   HgA1C Testing and Control   Lab Results   Component Value Date    HGBA1C 6.4 (H) 05/04/2018      Annually/Less than 8% Yes   Lipid profile : 01/23/2018 Annually Yes   LDL control Lab Results   Component Value Date    LDLCALC 168.2 (H) 01/23/2018    Annually/Less than 100 mg/dl  No   Nephropathy screening Lab Results   Component Value Date    LABMICR 12.0 01/23/2018     Lab Results   Component Value Date    PROTEINUA Negative 11/29/2016    Annually Yes   Blood pressure BP Readings from Last 1 Encounters:   05/08/18 130/76    Less than 140/90 Yes   Dilated retinal exam : 03/08/2018 Annually Yes   Foot exam   : 09/18/2017 Annually Yes     Long-term risks of PPI therapy have been reviewed with the patient. Periodic bone density studies and annual Vitamin B12, Vitamin D and magnesium levels are recommended. GERD precautions reviewed and educational material given      Tips to Control Acid Reflux    To control acid reflux, youll need to make some basic diet and lifestyle changes. The simple steps outlined below may be all youll need to ease discomfort.  Watch what you eat  · Avoid fatty foods and spicy foods.  · Eat fewer acidic foods, such as citrus and tomato-based foods. These can increase symptoms.  · Limit drinking alcohol, caffeine, and fizzy beverages. All increase acid reflux.  · Try limiting chocolate, peppermint, and spearmint. These can worsen acid reflux in some people.  Watch when you eat  · Avoid lying down for 3 hours after eating.  · Do not snack before going to bed.  Raise your head  Raising your head and upper body by 4 to 6 inches helps limit reflux when youre lying down. Put blocks under the head of your bed frame to raise it.  Other changes  · Lose weight, if you need to  · Dont exercise near bedtime  · Avoid  tight-fitting clothes  · Limit aspirin and ibuprofen  · Stop smoking   Date Last Reviewed: 7/1/2016  © 2496-3511 [email or web address] [address] [place] [address], [place] [address]. All rights reserved. This information is not intended as a substitute for professional medical care. Always follow your healthcare professional's instructions.          Low-Salt Diet  This diet removes foods that are high in salt. It also limits the amount of salt you use when cooking. It is most often used for people with high blood pressure, edema (fluid retention), and kidney, liver, or heart disease.  Table salt contains the mineral sodium. Your body needs sodium to work normally. But too much sodium can make your health problems worse. Your healthcare provider is recommending a low-salt (also called low-sodium) diet for you. Your total daily allowance of salt is 1,500 to 2,300 milligrams (mg). It is less than 1 teaspoon of table salt. This means you can have only about 500 to 700 mg of sodium at each meal. People with certain health problems should limit salt intake to the lower end of the recommended range.    When you cook, dont add much salt. If you can cook without using salt, even better. Dont add salt to your food at the table.  When shopping, read food labels. Salt is often called sodium on the label. Choose foods that are salt-free, low salt, or very low salt. Note that foods with reduced salt may not lower your salt intake enough.    Beans, potatoes, and pasta  Ok: Dry beans, split peas, lentils, potatoes, rice, macaroni, pasta, spaghetti without added salt  Avoid: Potato chips, tortilla chips, and similar products  Breads and cereals  Ok: Low-sodium breads, rolls, cereals, and cakes; low-salt crackers, matzo crackers  Avoid: Salted crackers, pretzels, popcorn, Croatian toast, pancakes, muffins  Dairy  Ok: Milk, chocolate milk, hot chocolate mix, low-salt cheeses, and yogurt  Avoid: Processed cheese and cheese spreads;  Roquefort, Camembert, and cottage cheese; buttermilk, instant breakfast drink  Desserts  Ok: Ice cream, frozen yogurt, juice bars, gelatin, cookies and pies, sugar, honey, jelly, hard candy  Avoid: Most pies, cakes and cookies prepared or processed with salt; instant pudding  Drinks  Ok: Tea, coffee, fizzy (carbonated) drinks, juices  Avoid: Flavored coffees, electrolyte replacement drinks, sports drinks  Meats  Ok: All fresh meat, fish, poultry, low-salt tuna, eggs, egg substitute  Avoid: Smoked, pickled, brine-cured, or salted meats and fish. This includes chong, chipped beef, corned beef, hot dogs, deli meats, ham, kosher meats, salt pork, sausage, canned tuna, salted codfish, smoked salmon, herring, sardines, or anchovies.  Seasonings and spices  Ok: Most seasonings are okay. Good substitutes for salt include: fresh herb blends, hot sauce, lemon, garlic, geronimo, vinegar, dry mustard, parsley, cilantro, horseradish, tomato paste, regular margarine, mayonnaise, unsalted butter, cream cheese, vegetable oil, cream, low-salt salad dressing and gravy.  Avoid: Regular ketchup, relishes, pickles, soy sauce, teriyaki sauce, Worcestershire sauce, BBQ sauce, tartar sauce, meat tenderizer, chili sauce, regular gravy, regular salad dressing, salted butter  Soups  Ok: Low-salt soups and broths made with allowed foods  Avoid: Bouillon cubes, soups with smoked or salted meats, regular soup and broth  Vegetables  Ok: Most vegetables are okay; also low-salt tomato and vegetable juices  Avoid: Sauerkraut and other brine-soaked vegetables; pickles and other pickled vegetables; tomato juice, olives  Date Last Reviewed: 8/1/2016  © 9378-6412 [employer]. [address] [place] [address], [place] [address]. All rights reserved. This information is not intended as a substitute for professional medical care. Always follow your healthcare professional's instructions.      Understanding Fat and Cholesterol  Too much cholesterol in your  blood can lead to many problems such as blocked arteries. This can lead to heart attack and stroke. One of the best ways to manage heart and blood vessel disease is to lower your blood cholesterol. Planning meals that are low in saturated fat and cholesterol helps reduce the level of cholesterol in your blood. Below are eating tips to help lower your blood cholesterol levels.  Eat less fat  A healthy goal is to have less than 25% to 35% of your daily calories come from fat. Instead of fats, eat more fruits, whole-grains, and vegetables. This also helps control your weight, and can even reduce your risk for some cancers. There are different kinds of fats in foods. Fats can be saturated, unsaturated, or trans fats. The best fats to choose are unsaturated fats. But fats are high in calories, so eat even unsaturated fats sparingly.  Limit foods high in saturated fats  Saturated fats come from animals and certain plants (such as coconut and palm). Eating too much saturated fat can raise your blood cholesterol levels and make your artery problems worse. Your goal is to eat less saturated fat. Below are some examples of foods that contain lots of saturated fat:  · Fatty cuts of meat (lamb, ham, beef)  · Many pastries, cakes, cookies, and candies  · Cream, ice cream, sour cream, cheese, and butter, and foods made with them  · Sauces made with butter or cream  · Salad dressings with saturated fats  · Foods that contain palm or coconut oil  Choose unsaturated fats  Unsaturated fats are usually liquid at room temperature. They are better choices for your heart than saturated fat. There are two types of unsaturated fats: polyunsaturated fat and monounsaturated fat. Aim to replace saturated fats with polyunsaturated or monounsaturated fats.  · Polyunsaturated fats are found in corn oil, safflower oil, sunflower oil, and other vegetable oils.  · Monounsaturated fats are found in olive oil, canola oil, and peanut oil. Some  margarines and spreads are now made with these oils, too. Avocados are also high in monounsaturated fat.  Of all fats, monounsaturated fats are the least harmful to your heart.  Avoid trans fats  Like saturated fats, trans fats have been linked to heart disease. Even a small amount can harm your health. Trans fats are found in liquid oils that have been changed to be solid at room temperature. Margarine, which is often made from vegetable oil, is one example. Vegetable shortening is another. Trans fats are often found in packaged goods. Check ingredients for the words hydrogenated or partially hydrogenated. They mean the foods contain trans fat.  What about triglycerides?  Triglycerides are a type of fat in your blood. Like cholesterol, high levels of triglycerides can lead to blocked arteries. High triglyceride levels can be reduced 20% to 50%  by limiting added sugars in your diet, susbstituting healthier fats for saturated and trans fats, getting more physical activity, and losing weight if your are overweight. You may also be advised to avoid or limi alcohol.    Reading food labels  Luckily, most foods now have labels giving you the facts about what youre eating. Reading food labels helps you make healthy choices. Look for the words highlighted below.  · Serving Size. This is the amount of food in 1 serving. If you eat larger portions, be sure to count more of everything: fat, calories, and cholesterol.  · Total Fat. Tells you how many grams (g) of fat are in 1 serving.  · Calories from Fat. This tells you the total number of calories from fat in 1 serving (there are 9 calories per gram of fat). Look for foods with the fewest calories from fat.  · Saturated Fat. Tells you how many grams (g) of saturated fat are in 1 serving.  · Trans Fat. Tells how many grams (g) of trans fat are in 1 serving.  · Cholesterol. Tells you how many milligrams (mg) of cholesterol are in 1 serving.  Date Last Reviewed:  5/11/2015  © 3693-6497 [employer]. [address] [place] [address], [place] [address]. All rights reserved. This information is not intended as a substitute for professional medical care. Always follow your healthcare professional's instructions.

## 2018-05-08 NOTE — PROGRESS NOTES
Diabetes Education  Author: Gianluca Jackson RN  Date: 5/8/2018    Diabetes Education Visit  Diabetes Education Record Assessment/Progress: Post Program/Follow-up  Diabetes Type  Diabetes Type : Type II  Nutrition  Meal Planning: water, 3 meals per day, eats out seldom, snacks between meal  What type of sweetener do you use?: Splenda  What type of beverages do you drink?: diet soda/tea, milk, water  Meal Plan 24 Hour Recall - Breakfast: 1 slice bread, margarine, egg/ham  Meal Plan 24 Hour Recall - Lunch: chicken, salad, salad dressing  Meal Plan 24 Hour Recall - Dinner: talapia, mustard greens, cabbage  Meal Plan 24 Hour Recall - Snack: fruit  Monitoring   Self Monitoring : pt has a meter and is checking once a day fasting in the am.   Blood Glucose Logs: Yes  Do you use a personal glucose monitor?: No  In the last month, how often have you had a low blood sugar reaction?: once  What are your symptoms of low blood sugar?: felt weak, horrible  How do you treat low blood sugar?: drank glucerna  Can you tell when your blood sugar is too high?: no  Exercise   Exercise Type: walking, bike riding  Intensity: Low  Frequency: 3-5 Times per week  Duration: 30 min  Current Diabetes Treatment   Current Treatment: Oral Medication, Diet, Exercise  Social History  Preferred Learning Method: Face to Face, Reading Materials  DDS-2 Score  ( > 3 = SIGNIFICANT DISTRESS): 1   Barriers to Change  Barriers to Change: None  Readiness to Learn   Readiness to Learn : Acceptance  Cultural Influences  Cultural Influences: None  Diabetes Education Assessment/Progress  Diabetes Disease Process (diabetes disease process and treatment options): Discussion, Instructed, Individual Session, Demonstrates Understanding/Competency(verbalizes/demonstrates)  Nutrition (Incorporating nutritional management into one's lifestyle): Discussion, Demonstration, Instructed, Individual Session, Demonstrates Understanding/Competency (verbalizes/demonstrates),  Written Materials Provided  Physical Activity (incorporating physical activity into one's lifestyle): Discussion, Instructed, Individual Session, Demonstrates Understanding/Competency (verbalizes/demonstrates)  Medications (states correct name, dose, onset, peak, duration, side effects & timing of meds): Discussion, Instructed, Individual Session, Demonstrates Understanding/Competency(verbalizes/demonstrates)  Monitoring (monitoring blood glucose/other parameters & using results): Discussion, Instructed, Individual Session, Demonstrates Understanding/Competency (verbalizes/demonstrates)  Acute Complications (preventing, detecting, and treating acute complications): Discussion, Instructed, Individual Session, Demonstrates Understanding/Competency (verbalizes/demonstrates)  Chronic Complications (preventing, detecting, and treating chronic complications): Not Covered/Deferred  Clinical (diabetes, other pertinent medical history, and relevant comorbidities reviewed during visit): Not Covered/Deferred  Cognitive (knowledge of self-management skills, functional health literacy): Instructed, Individual Session, Demonstrates Understanding/Competency (verbalizes/demonstrates)  Psychosocial (emotional response to diabetes): Discussion, Instructed, Individual Session, Demonstrates Understanding/Competency (verbalizes/demonstrates)  Diabetes Distress and Support Systems: Discussion, Instructed, Individual Session, Demonstrates Understanding/Competency (verbalizes/demonstrates)  Behavioral (readiness for change, lifestyle practices, self-care behaviors): Discussion, Instructed, Individual Session, Demonstrates Understanding/Competency (verbalizes/demonstrates)  Goals  Patient has selected/evaluated goals during today's session: Yes, evaluated  Healthy Eating: % Met  Met Percentage : 75%  Diabetes Self-Management Support Plan  Diabetes Learning: DM support group  Exercise/Nutrition: join a gym  Review Status: Patient has  selected and agrees to support plan., Patient was provided Diabetes Self-Management Support Plan document that includes support options.  Diabetes Meal Plan  Restrictions: Restricted Carbohydrate  Calories: 1800  Carbohydrate Per Meal: 30-45g  Carbohydrate Per Snack : 15-20g  24 hour meal recall showed that pt is not eating enough carbs at her meals. Pt states that she is trying very hard to eat the balanced meals. Pt did have some misunderstanding about the amount of carbs that she needs at a meal. The food groups, how to read labels and put meals together was discussed at length with pt. Pt had better understanding of meal plan and how to put her meals together. Compliance with meal planning is hoped for. Pt was advised to call for any problems or questions. Pt will return to clinic prn.  Education Units of Time   Time Spent: 60 min    Health Maintenance was reviewed today with patient. Discussed with patient importance of routine eye exams, foot exams/foot care, blood work (i.e.: A1c, microalbumin, and lipid), dental visits, yearly flu vaccine, and pneumonia vaccine as indicated by PCP. Patient verbalized understanding.     Health Maintenance Topics with due status: Not Due       Topic Last Completion Date    Colonoscopy 08/01/2012    DEXA SCAN 11/25/2016    TETANUS VACCINE 11/30/2016    Foot Exam 09/18/2017    Mammogram 09/28/2017    Influenza Vaccine 10/02/2017    Lipid Panel 01/23/2018    Eye Exam 03/08/2018    Hemoglobin A1c 05/04/2018    Low Dose Statin 05/08/2018     There are no preventive care reminders to display for this patient.

## 2018-05-08 NOTE — PROGRESS NOTES
"Subjective:       Patient ID: Eugenie Coates is a 68 y.o. female.    Chief Complaint: Follow-up    Yazmin is a 68 y.o. female who presents today for her chronic medical conditions including DM, HTN, DLD, GERD, and obesity.   She has lost 3 pounds! She went to the diabetes education and found that to be beneficial. She has been drinking more water, lemon water.     DM: a1c improved to 6.4!   HTN: improved from previous visit. She is taking "all of her medication now." No side effects  GERD: improved significantly with PPI  DLD: tolerating statin well. No myalgias, calf pain.       Diabetes   She presents for her follow-up diabetic visit. She has type 2 diabetes mellitus. Her disease course has been improving. There are no hypoglycemic associated symptoms. Pertinent negatives for hypoglycemia include no headaches or sweats. Associated symptoms include weight loss. Pertinent negatives for diabetes include no blurred vision, no chest pain, no fatigue, no foot paresthesias, no foot ulcerations, no polydipsia, no polyphagia, no polyuria, no visual change and no weakness. Symptoms are stable. Risk factors for coronary artery disease include diabetes mellitus and dyslipidemia. Current diabetic treatment includes oral agent (monotherapy). She is compliant with treatment all of the time. She is following a diabetic diet. She has had a previous visit with a dietitian. Her home blood glucose trend is decreasing steadily. An ACE inhibitor/angiotensin II receptor blocker is being taken. She sees a podiatrist.Eye exam is current.   Hypertension   This is a chronic problem. The current episode started more than 1 year ago. The problem has been gradually improving since onset. The problem is controlled. Pertinent negatives include no blurred vision, chest pain, headaches, orthopnea, palpitations, peripheral edema, shortness of breath or sweats. There are no associated agents to hypertension. Risk factors for coronary artery " disease include diabetes mellitus, dyslipidemia and obesity. Past treatments include calcium channel blockers, diuretics, angiotensin blockers and lifestyle changes. The current treatment provides significant improvement. There are no compliance problems.    Hyperlipidemia   This is a chronic problem. The problem is uncontrolled. Exacerbating diseases include diabetes. Pertinent negatives include no chest pain, focal sensory loss, focal weakness, leg pain, myalgias or shortness of breath. Current antihyperlipidemic treatment includes diet change and statins. There are no compliance problems.  Risk factors for coronary artery disease include dyslipidemia, diabetes mellitus, hypertension and obesity.     Review of Systems   Constitutional: Positive for weight loss. Negative for fatigue.   Eyes: Negative for blurred vision.   Respiratory: Negative for shortness of breath.    Cardiovascular: Negative for chest pain, palpitations and orthopnea.   Endocrine: Negative for polydipsia, polyphagia and polyuria.   Musculoskeletal: Negative for myalgias.   Neurological: Negative for focal weakness, weakness and headaches.       Results for orders placed or performed in visit on 05/04/18   Hemoglobin A1c   Result Value Ref Range    Hemoglobin A1C 6.4 (H) 4.0 - 5.6 %    Estimated Avg Glucose 137 (H) 68 - 131 mg/dL   Comprehensive metabolic panel    eGFR if African American >60.0 >60 mL/min/1.73 m^2    eGFR if non  52.1 (A) >60 mL/min/1.73 m^2     Objective:     Vitals:    05/08/18 1007   BP: 130/76   Pulse: (!) 57   Temp: 98.2 °F (36.8 °C)        Physical Exam   Constitutional: She is oriented to person, place, and time. She appears well-developed and well-nourished.   HENT:   Head: Normocephalic and atraumatic.   Eyes: Conjunctivae are normal.   Cardiovascular: Normal rate and regular rhythm.    Pulmonary/Chest: Effort normal and breath sounds normal.   Abdominal: Soft. There is no tenderness.   Musculoskeletal:  She exhibits no edema.   Neurological: She is alert and oriented to person, place, and time.   Skin: Skin is warm. No rash noted.   Psychiatric: She has a normal mood and affect. Her behavior is normal. Judgment and thought content normal.   Nursing note and vitals reviewed.      Assessment:       1. Type 2 diabetes mellitus with hyperglycemia, without long-term current use of insulin    2. Hypertension, benign    3. Mixed hyperlipidemia    4. CKD (chronic kidney disease) stage 3, GFR 30-59 ml/min    5. Gastroesophageal reflux disease, esophagitis presence not specified    6. Encounter for monitoring long-term proton pump inhibitor therapy    7. BMI 33.0-33.9,adult        Plan:         Type 2 diabetes mellitus with hyperglycemia, without long-term current use of insulin  Decrease metformin to once daily  No refills needed  Foot exam at next visit  Continue healthy eating  a1c in 3 months  Log as below at the end of this note  -     metFORMIN (GLUCOPHAGE) 500 MG tablet; Take 1 tablet (500 mg total) by mouth daily with breakfast.  Dispense: 90 tablet; Refill: 3  -     Hemoglobin A1c; Future; Expected date: 05/08/2018    Hypertension, benign  Continue current medications  BP well controlled  Weight loss, can consider decreasing medications if patient loses weight  DASH diet  Ambulatory BP monitoring PRN  Follow up in 3 months  -     Comprehensive metabolic panel; Future; Expected date: 05/08/2018    Mixed hyperlipidemia  Continue statin  Recheck in 3 months  -     Lipid panel; Future; Expected date: 05/08/2018    CKD (chronic kidney disease) stage 3, GFR 30-59 ml/min  Improved significantly  No NSAIDs  Avoid red meat  Increase fluids  Repeat one more time then annually  -     Comprehensive metabolic panel; Future; Expected date: 05/08/2018    Gastroesophageal reflux disease, esophagitis presence not specified/Encounter for monitoring long-term proton pump inhibitor therapy  Long-term risks of PPI therapy have been  reviewed with the patient. Periodic bone density studies and annual Vitamin B12, Vitamin D and magnesium levels are recommended. GERD precautions reviewed and educational material given  -     Magnesium; Future; Expected date: 05/08/2018  -     Vitamin D; Future; Expected date: 05/08/2018  -     Vitamin B12; Future; Expected date: 05/08/2018    BMI 33.0-33.9,adult  Continue good eating habits  See AVS              Warning signs discussed, patient to call with any further issues or worsening of symptoms.

## 2018-05-10 ENCOUNTER — HOSPITAL ENCOUNTER (OUTPATIENT)
Dept: RADIOLOGY | Facility: HOSPITAL | Age: 68
Discharge: HOME | End: 2018-05-10
Attending: INTERNAL MEDICINE
Payer: MEDICARE

## 2018-05-10 DIAGNOSIS — E04.2 MULTINODULAR THYROID: ICD-10-CM

## 2018-05-10 DIAGNOSIS — E05.90 SUBCLINICAL HYPERTHYROIDISM: ICD-10-CM

## 2018-05-10 PROCEDURE — 78014 THYROID IMAGING W/BLOOD FLOW: CPT | Mod: 26,,, | Performed by: RADIOLOGY

## 2018-05-11 ENCOUNTER — HOSPITAL ENCOUNTER (OUTPATIENT)
Dept: RADIOLOGY | Facility: HOSPITAL | Age: 68
Discharge: HOME | End: 2018-05-11
Attending: INTERNAL MEDICINE
Payer: MEDICARE

## 2018-05-11 PROCEDURE — A9516 IODINE I-123 SOD IODIDE MIC: HCPCS

## 2018-05-14 ENCOUNTER — OFFICE VISIT (OUTPATIENT)
Dept: PODIATRY | Facility: CLINIC | Age: 68
End: 2018-05-14
Payer: MEDICARE

## 2018-05-14 VITALS — BODY MASS INDEX: 32.5 KG/M2 | HEIGHT: 63 IN | WEIGHT: 183.44 LBS

## 2018-05-14 DIAGNOSIS — B35.1 ONYCHOMYCOSIS DUE TO DERMATOPHYTE: ICD-10-CM

## 2018-05-14 DIAGNOSIS — M20.42 HAMMER TOES OF BOTH FEET: ICD-10-CM

## 2018-05-14 DIAGNOSIS — E11.9 TYPE 2 DIABETES MELLITUS WITHOUT COMPLICATION, WITHOUT LONG-TERM CURRENT USE OF INSULIN: Primary | ICD-10-CM

## 2018-05-14 DIAGNOSIS — M20.41 HAMMER TOES OF BOTH FEET: ICD-10-CM

## 2018-05-14 PROCEDURE — 11721 DEBRIDE NAIL 6 OR MORE: CPT | Mod: PBBFAC,PN | Performed by: PODIATRIST

## 2018-05-14 PROCEDURE — 99499 UNLISTED E&M SERVICE: CPT | Mod: S$PBB,,, | Performed by: PODIATRIST

## 2018-05-14 PROCEDURE — 99999 PR PBB SHADOW E&M-EST. PATIENT-LVL III: CPT | Mod: PBBFAC,,, | Performed by: PODIATRIST

## 2018-05-14 PROCEDURE — 99213 OFFICE O/P EST LOW 20 MIN: CPT | Mod: PBBFAC,PN | Performed by: PODIATRIST

## 2018-05-14 PROCEDURE — 11721 DEBRIDE NAIL 6 OR MORE: CPT | Mod: S$PBB,Q9,, | Performed by: PODIATRIST

## 2018-05-14 NOTE — PROGRESS NOTES
Subjective:      Patient ID: Eugenie Coates is a 68 y.o. female.    Chief Complaint: Diabetes Mellitus ([place] 2/27*/18 A1c 5/4/18 6.4) and Diabetic Foot Exam    Eugenie is a 68 y.o. female who presents to the clinic for routine evaluation and treatment of diabetic feet. Eugenie has a past medical history of Allergy; Anemia; Anxiety; Arthritis; Asthma; Bell palsy; Depression; Diabetes mellitus, type 2; DVT (deep venous thrombosis); Endometriosis; Eye injury (2014); GERD (gastroesophageal reflux disease); Glaucoma; History of uterine fibroid; Hypertension; Nuclear sclerosis of both eyes (9/27/2016); and Sleep apnea. Patient relates no major problem with feet. Only complaints today consist of toenails in need of trimming.  Denies being painful with wearing shoe gear.  Has not attempted to self treat.  Denies any additional pedal complaints.      PCP: Haile Paul,     Date Last Seen by PCP: 2/27/18    Hemoglobin A1C   Date Value Ref Range Status   05/04/2018 6.4 (H) 4.0 - 5.6 % Final     Comment:     According to ADA guidelines, hemoglobin A1c <7.0% represents  optimal control in non-pregnant diabetic patients. Different  metrics may apply to specific patient populations.   Standards of Medical Care in Diabetes-2016.  For the purpose of screening for the presence of diabetes:  <5.7%     Consistent with the absence of diabetes  5.7-6.4%  Consistent with increasing risk for diabetes   (prediabetes)  >or=6.5%  Consistent with diabetes  Currently, no consensus exists for use of hemoglobin A1c  for diagnosis of diabetes for children.  This Hemoglobin A1c assay has significant interference with fetal   hemoglobin   (HbF). The results are invalid for patients with abnormal amounts of   HbF,   including those with known Hereditary Persistence   of Fetal Hemoglobin. Heterozygous hemoglobin variants (HbAS, HbAC,   HbAD, HbAE, HbA2) do not significantly interfere with this assay;   however, presence of multiple  variants in a sample may impact the %   interference.     01/23/2018 6.7 (H) 4.0 - 5.6 % Final     Comment:     According to ADA guidelines, hemoglobin A1c <7.0% represents  optimal control in non-pregnant diabetic patients. Different  metrics may apply to specific patient populations.   Standards of Medical Care in Diabetes-2016.  For the purpose of screening for the presence of diabetes:  <5.7%     Consistent with the absence of diabetes  5.7-6.4%  Consistent with increasing risk for diabetes   (prediabetes)  >or=6.5%  Consistent with diabetes  Currently, no consensus exists for use of hemoglobin A1c  for diagnosis of diabetes for children.  This Hemoglobin A1c assay has significant interference with fetal   hemoglobin   (HbF). The results are invalid for patients with abnormal amounts of   HbF,   including those with known Hereditary Persistence   of Fetal Hemoglobin. Heterozygous hemoglobin variants (HbAS, HbAC,   HbAD, HbAE, HbA2) do not significantly interfere with this assay;   however, presence of multiple variants in a sample may impact the %   interference.     09/01/2017 6.7 (H) 4.0 - 5.6 % Final     Comment:     According to ADA guidelines, hemoglobin A1c <7.0% represents  optimal control in non-pregnant diabetic patients. Different  metrics may apply to specific patient populations.   Standards of Medical Care in Diabetes-2016.  For the purpose of screening for the presence of diabetes:  <5.7%     Consistent with the absence of diabetes  5.7-6.4%  Consistent with increasing risk for diabetes   (prediabetes)  >or=6.5%  Consistent with diabetes  Currently, no consensus exists for use of hemoglobin A1c  for diagnosis of diabetes for children.  This Hemoglobin A1c assay has significant interference with fetal   hemoglobin   (HbF). The results are invalid for patients with abnormal amounts of   HbF,   including those with known Hereditary Persistence   of Fetal Hemoglobin. Heterozygous hemoglobin variants  (HbAS, HbAC,   HbAD, HbAE, HbA2) do not significantly interfere with this assay;   however, presence of multiple variants in a sample may impact the %   interference.             Past Medical History:   Diagnosis Date    Allergy     Anemia     Anxiety     Arthritis     Asthma     Bell palsy     Depression     Diabetes mellitus, type 2     DVT (deep venous thrombosis)     Endometriosis     Eye injury     stuck with tree branch od ?     GERD (gastroesophageal reflux disease)     Glaucoma     History of uterine fibroid     Hypertension     Nuclear sclerosis of both eyes 2016    Sleep apnea     uses C pap       Past Surgical History:   Procedure Laterality Date     SECTION      CHOLECYSTECTOMY      glaucoma laser Bilateral     HERNIA REPAIR      KNEE SURGERY Right 2008    (R) knee scope; torn meniscus    VEIN SURGERY  ,     Rt leg x2       Family History   Problem Relation Age of Onset    Diabetes Mother     No Known Problems Father     No Known Problems Sister     No Known Problems Brother     No Known Problems Maternal Aunt     No Known Problems Maternal Uncle     No Known Problems Paternal Aunt     No Known Problems Paternal Uncle     No Known Problems Maternal Grandmother     No Known Problems Maternal Grandfather     No Known Problems Paternal Grandmother     No Known Problems Paternal Grandfather     Amblyopia Neg Hx     Blindness Neg Hx     Cancer Neg Hx     Cataracts Neg Hx     Glaucoma Neg Hx     Hypertension Neg Hx     Macular degeneration Neg Hx     Retinal detachment Neg Hx     Strabismus Neg Hx     Stroke Neg Hx     Thyroid disease Neg Hx        Social History     Social History    Marital status:      Spouse name: N/A    Number of children: N/A    Years of education: N/A     Social History Main Topics    Smoking status: Never Smoker    Smokeless tobacco: Never Used    Alcohol use No    Drug use: No    Sexual activity: Yes      Partners: Male     Other Topics Concern    None     Social History Narrative    None       Current Outpatient Prescriptions   Medication Sig Dispense Refill    albuterol (PROAIR HFA) 90 mcg/actuation inhaler Inhale 2 puffs into the lungs every 4 (four) hours as needed for Wheezing. 3 Inhaler 3    amLODIPine (NORVASC) 10 MG tablet Take 1 tablet (10 mg total) by mouth every evening. 90 tablet 2    atorvastatin (LIPITOR) 40 MG tablet Take 1 tablet (40 mg total) by mouth once daily. 90 tablet 2    blood sugar diagnostic Strp Test twice daily.  [place] viva test strips and lancets. 300 each 3    brimonidine 0.2% (ALPHAGAN) 0.2 % Drop Place 1 drop into both eyes 3 (three) times daily. 15 mL 4    ELIQUIS 5 mg Tab TAKE 1 TABLET(5 MG) BY MOUTH TWICE DAILY 60 tablet 0    hydrALAZINE (APRESOLINE) 25 MG tablet Take 1 tablet (25 mg total) by mouth every 12 (twelve) hours. 180 tablet 2    losartan-hydrochlorothiazide 100-25 mg (HYZAAR) 100-25 mg per tablet Take 1 tablet by mouth once daily. 90 tablet 2    metFORMIN (GLUCOPHAGE) 500 MG tablet Take 1 tablet (500 mg total) by mouth daily with breakfast. 90 tablet 3    omeprazole (PRILOSEC) 40 MG capsule Take 1 capsule (40 mg total) by mouth once daily. 90 capsule 2    spironolactone (ALDACTONE) 25 MG tablet Take 1 tablet (25 mg total) by mouth once daily. 90 tablet 2    SYMBICORT 160-4.5 mcg/actuation HFAA INHALE 2 PUFFS INTO THE LUNGS EVERY 12 HOURS 10.2 g 11    JV ASPIRIN ORAL Take 81 mg by mouth once daily. Instructed to stop 5-23-13 until after surgery.      bimatoprost (LUMIGAN) 0.01 % Drop Place 1 drop into both eyes every evening. 3 Bottle 4    lancets (ACCU-CHEK SOFTCLIX LANCETS) Misc 1 Units by Misc.(Non-Drug; Combo Route) route 2 (two) times daily. 300 each 3     No current facility-administered medications for this visit.        Review of patient's allergies indicates:   Allergen Reactions    Percodan [oxycodone hcl-oxycodone-asa] Hives and  Itching         Review of Systems   Constitution: Negative for chills and fever.   Cardiovascular: Positive for leg swelling. Negative for claudication.   Skin: Positive for color change and nail changes.   Musculoskeletal: Positive for joint swelling. Negative for joint pain, muscle cramps and muscle weakness.   Neurological: Positive for paresthesias. Negative for numbness.   Psychiatric/Behavioral: Negative for altered mental status.           Objective:      Physical Exam   Constitutional: She is oriented to person, place, and time. She appears well-developed and well-nourished. No distress.   Cardiovascular:   Pulses:       Dorsalis pedis pulses are 2+ on the right side, and 2+ on the left side.        Posterior tibial pulses are 1+ on the right side, and 1+ on the left side.   CFT <3 seconds bilateral.  Pedal hair growth decreased bilateral.  Varicosities noted bilateral.  1+ pitting edema noted to bilateral lower extremity.  Toes are cool to touch bilateral.     Musculoskeletal: She exhibits edema. She exhibits no tenderness.   Muscle strength 5/5 in all muscle groups bilateral.  No tenderness nor crepitation with ROM of foot/ankle joints bilateral.  No tenderness with palpation of bilateral foot and ankle.  Bilateral pes planus foot type.  Bilateral hallux abducto valgus.  Bilateral semi-reducible contracture of toes 2-5.     Neurological: She is alert and oriented to person, place, and time. She has normal strength. A sensory deficit is present.   Protective sensation per Chilo-Heron monofilament intact bilateral.    Vibratory sensation decreased bilateral.    Light touch intact bilateral.   Skin: Skin is warm, dry and intact. No abrasion, no bruising, no burn, no ecchymosis, no laceration, no lesion, no petechiae and no rash noted. She is not diaphoretic. No cyanosis or erythema. No pallor. Nails show no clubbing.   Pedal skin appear edematous bilateral.  Toenails x 10 appear thickened by 2 mm's,  elongated by 2 mm's, and discolored with subungual debris.  Examination of the skin reveals no evidence of significant maceration, rashes, open lesions, suspicious appearing nevi or other concerning lesions.              Assessment:       Encounter Diagnoses   Name Primary?    Hammer toes of both feet     Onychomycosis due to dermatophyte     Type 2 diabetes mellitus without complication, without long-term current use of insulin Yes         Plan:       Eugenie was seen today for diabetes mellitus and diabetic foot exam.    Diagnoses and all orders for this visit:    Type 2 diabetes mellitus without complication, without long-term current use of insulin    Hammer toes of both feet    Onychomycosis due to dermatophyte      I counseled the patient on her conditions, their implications and medical management.    Shoe inspection. Diabetic Foot Education. Patient reminded of the importance of good nutrition and blood sugar control to help prevent podiatric complications of diabetes. Patient instructed on proper foot hygeine. We discussed wearing proper shoe gear, daily foot inspections, never walking without protective shoe gear, never putting sharp instruments to feet    Advised to continue wearing diabetic shoes/insoles that accommodate for digital deformities.    With patient's permission, nails were aggressively reduced and debrided x 10 to their soft tissue attachment mechanically and with electric , removing all offending nail and debris. Patient relates relief following the procedure.  She will continue to monitor the areas daily, inspect her feet, wear protective shoe gear when ambulatory, moisturizer to maintain skin integrity and follow in this office in approximately 3 months, sooner p.r.n.    Follow-up in about 3 months (around 8/14/2018).    Giuliano Garnett DPM

## 2018-05-21 ENCOUNTER — TELEPHONE (OUTPATIENT)
Dept: ENDOCRINOLOGY | Facility: CLINIC | Age: 68
End: 2018-05-21

## 2018-05-21 NOTE — TELEPHONE ENCOUNTER
----- Message from Virgen Alfonso sent at 5/21/2018  9:59 AM CDT -----  Contact: Self  Pt is calling asking for her test results on her thyroid on 5/11.

## 2018-05-22 ENCOUNTER — TELEPHONE (OUTPATIENT)
Dept: ENDOCRINOLOGY | Facility: CLINIC | Age: 68
End: 2018-05-22

## 2018-05-22 ENCOUNTER — PATIENT MESSAGE (OUTPATIENT)
Dept: ENDOCRINOLOGY | Facility: CLINIC | Age: 68
End: 2018-05-22

## 2018-05-22 NOTE — PATIENT INSTRUCTIONS
Common Thyroid Problems    The thyroid is a gland in the neck. It makes thyroid hormone. A hormone is a chemical messenger for the body. If there is a problem with the thyroid, the level of thyroid hormone may change. This can lead to symptoms.  Hypothyroidism  This is when the thyroid gland doesnt make enough hormone. The most common cause of hypothyroidism is Hashimoto thyroiditis. This occurs when the bodys immune system attacks the thyroid gland. Hypothyroidism may also occur if theres a severe deficiency of iodine in the body. The thyroid needs iodine to make hormone. Problems with the pituitary gland can lead to not enough production of thyroid hormone. Hypothyroidism will also occur if the thyroid gland is removed during surgery.  Common symptoms include:  · Low energy and tiredness  · Depression  · Feeling cold  · Muscle pain  · Slowed thinking      · Constipation  · Heavier menstrual periods with prolonged bleeding   · Weight gain  · Dry and brittle skin, hair, nails  · Excessive sleepiness  Hyperthyroidism  This is when the thyroid gland makes too much hormone. The most common cause is Graves disease. This is due to the bodys immune system telling the thyroid to make too much hormone. Another cause is a small bump (nodule) in the thyroid gland. This can cause hyperthyroidism if the cells in the nodule make too much thyroid hormone and stop hormone production in the rest of the thyroid gland.  Common symptoms include:  · Shaking, nervousness, irritability  · Feeling hot  · A rapid, irregular heartbeat  · Muscle weakness, fatigue  · More frequent bowel movements  · [place], lighter menstrual periods  · Weight loss  · Hair loss  · Bulging eyes  Nodules  Nodules are lumps of tissue in the thyroid gland. Most often, the cause of nodules isnt known. But they may be more common in people whove had medical radiation to the head or neck. Sometimes nodules can be felt on the outside of the neck. Most of the  time, cause no symptoms and dont affect the amount of thyroid hormone. Most nodules are not cancer. But sometimes a nodule may be cancer.  What is a goiter?  A goiter is the enlargement of the thyroid gland. When the gland enlarges, you may see or feel a swelling on your neck. A goiter can develop in someone with a normal thyroid, overactive thyroid, or underactive thyroid.   Date Last Reviewed: 11/1/2016 © 2000-2017 [employer]. [address] [place] [address], [place] [address]. All rights reserved. This information is not intended as a substitute for professional medical care. Always follow your healthcare professional's instructions.        Treating Thyroid Problems    Your healthcare provider has diagnosed a thyroid problem and will work with you to create a treatment plan. Even if you dont have symptoms, getting proper care is important. These are the most common types of thyroid disorders and their treatments.  Treating hypothyroidism  Hypothyroidism is an underactive thyroid. There is no cure for this condition. But treatment can relieve most or all of your symptoms caused by the low thyroid hormone levels. Treatment is done with daily thyroid hormone pills. Thyroid hormone pills replace the hormone your thyroid doesnt make. You will likely need to take a daily pill for the rest of your life.  Your healthcare provider will adjust your dose to achieve the right hormone levels. Take the thyroid hormone pill on an empty stomach, without other medicines. This is to make sure it works as it should.  Over time, your dose may be adjusted. The medicine has minimal side effects if the dosage is correct. But if the dosage is too high, you may have symptoms of an overactive thyroid. These include nervousness, irritability, fast heartbeat, tremors, trouble sleeping, and brittle hair. If the dosage is too low, you may have symptoms of an underactive thyroid. These include dry skin, low energy, sleepiness, and  memory problems. Tell your healthcare provider if you notice any symptoms of thyroid problems.  Treating hyperthyroidism  Hyperthyroidism is an overactive thyroid. The treatments include:  · Anti-thyroid medicine. This can reduce the amount of thyroid hormone made by your thyroid gland. Reactions from this medicine are rare. But in some cases, it can cause a dangerous drop in white blood cells, and liver damage. Talk with your healthcare provider for more information. If you have a fever or sore throat while taking this medicine, tell your healthcare provider right away.  · Radioactive iodine ablation. This is the most common treatment for hyperthyroidism. Its done by taking a pill or liquid dose of radioactive iodine. This treatment destroys the thyroid cells that are making too much hormone. You may need daily thyroid hormone pills after this treatment.  · Surgery. This is done by removing part or all of your thyroid gland. After surgery, you may need to take daily thyroid hormone pills.  · Beta-blockers. The treatments above may be used alone or with beta-blockers. These are medicines that can reduce symptoms caused by too much thyroid hormone. In some case, symptoms from too much thyroid hormone can be controlled by beta-blockers alone.  Treating nodules  If you have benign nodules, you may not need treatment right away. Instead, your healthcare provider may advise regular exams and ultrasound tests to see if the nodules grow. If treatment is needed, it may include:  · Anti-thyroid medicine. If a benign thyroid nodule is causing an overactive thyroid, your healthcare provider may first treat it with anti-thyroid medicine. If this doesnt work, you may need one of the below treatments.  · Radioactive iodine ablation. This is the most common treatment for hyperthyroidism. Its done by taking a pill or liquid dose of radioactive iodine. This treatment destroys the thyroid cells that are making too much hormone.  You may need daily thyroid hormone pills after this treatment.  · Surgery. This may be done to treat nodules that are causing symptoms, such as choking, or trouble swallowing. Surgery is done by removing part or all of your thyroid gland. Surgery to remove cancerous nodules may be followed by radioiodine iodine ablation. After surgery, you may need to take daily thyroid hormone pills.  If you have radioactive iodine ablation  Even though it uses tiny amounts of radiation, radioactive iodine ablation is a safe treatment. Your healthcare provider will talk with you about any risks and possible complications. You will likely receive the iodine at the hospital and go home the same day. The risk from the radiation to yourself and others is very small. However, you may need to stay away from other people for several days. It is most important to avoid children and pregnant women during this time.   Date Last Reviewed: 11/1/2016  © 6223-7088 [employer]. [address] [place] [address], [place] [address]. All rights reserved. This information is not intended as a substitute for professional medical care. Always follow your healthcare professional's instructions.

## 2018-05-22 NOTE — TELEPHONE ENCOUNTER
Called patient to discuss NM Uptake and scan.    No answer.    Patient has subclinical hyperthyroidism, with a hyperfunctioning nodule.  Would likely benefit from ANGULO (recommended 30 mCi) for treatment.     Will wait until able to discuss this with the patient.       Jess Harris MD  Endocrinology Fellow

## 2018-05-31 ENCOUNTER — HISTORICAL (OUTPATIENT)
Dept: ADMINISTRATIVE | Facility: HOSPITAL | Age: 68
End: 2018-05-31

## 2018-06-04 ENCOUNTER — TELEPHONE (OUTPATIENT)
Dept: ENDOCRINOLOGY | Facility: CLINIC | Age: 68
End: 2018-06-04

## 2018-06-04 LAB
LAB AP COMMENTS: NORMAL
LAB AP GENERAL CAT - HISTORICAL: NORMAL
LAB AP INTERPRETATION/RESULT - HISTORICAL: NORMAL
LAB AP SPECIMEN ADEQUACY - HISTORICAL: NORMAL
LAB AP SPECIMEN SUBMITTED - HISTORICAL: NORMAL

## 2018-06-04 NOTE — TELEPHONE ENCOUNTER
Subclinical hyperthyroidism with nodular disease.    I am still unable to discuss diagnosis and results of NM scan with patient.  Will attempt to call patient again tomorrow.    Jess Harris MD  Endocrinology Fellow

## 2018-06-07 ENCOUNTER — OFFICE VISIT (OUTPATIENT)
Dept: OPTOMETRY | Facility: CLINIC | Age: 68
End: 2018-06-07
Payer: MEDICARE

## 2018-06-07 DIAGNOSIS — H40.1134 PRIMARY OPEN ANGLE GLAUCOMA OF BOTH EYES, INDETERMINATE STAGE: ICD-10-CM

## 2018-06-07 PROCEDURE — 92012 INTRM OPH EXAM EST PATIENT: CPT | Mod: S$PBB,,, | Performed by: OPTOMETRIST

## 2018-06-07 PROCEDURE — 99212 OFFICE O/P EST SF 10 MIN: CPT | Mod: PBBFAC,PO | Performed by: OPTOMETRIST

## 2018-06-07 PROCEDURE — 99999 PR PBB SHADOW E&M-EST. PATIENT-LVL II: CPT | Mod: PBBFAC,,, | Performed by: OPTOMETRIST

## 2018-06-07 RX ORDER — BRIMONIDINE TARTRATE 2 MG/ML
1 SOLUTION/ DROPS OPHTHALMIC 3 TIMES DAILY
Qty: 15 ML | Refills: 3 | Status: SHIPPED | OUTPATIENT
Start: 2018-06-07 | End: 2018-10-15 | Stop reason: SDUPTHER

## 2018-06-07 NOTE — PROGRESS NOTES
HPI     Presenting Complaint: Pt here  Today for 3 month IOP check. No new ocular   complaints. Pt states complaint with drops.     Ophthalmic medication / drops:  (+) Alphagan 1 drop both eyes 2 x day  (+) Lumigan 1 drop both eyes at bedtime    (-) headaches  (-) diplopia   (-) flashes / (-) floaters      Last edited by Vasu Wilhelm on 6/7/2018  9:13 AM. (History)            Assessment /Plan     For exam results, see Encounter Report.    Primary open angle glaucoma of both eyes, indeterminate stage  -     brimonidine 0.2% (ALPHAGAN) 0.2 % Drop; Place 1 drop into both eyes 3 (three) times daily.  Dispense: 15 mL; Refill: 3  -     bimatoprost (LUMIGAN) 0.01 % Drop; Place 1 drop into both eyes every evening.  Dispense: 3 Bottle; Refill: 4      POAG OU, pt only using alphagan twice daily. Discussed importance of using tid, pt reports good understanding. Will increase Alphagan: 1 gt three times daily in both eyes  Lumigan: 1 gt at bedtime in both eyes. Return as scheduled for yearly eye exam, glc eval. If no improvement to pressures, will add medication.

## 2018-06-14 ENCOUNTER — OFFICE VISIT (OUTPATIENT)
Dept: CARDIOLOGY | Facility: CLINIC | Age: 68
End: 2018-06-14
Payer: MEDICARE

## 2018-06-14 VITALS
SYSTOLIC BLOOD PRESSURE: 165 MMHG | BODY MASS INDEX: 32.6 KG/M2 | DIASTOLIC BLOOD PRESSURE: 84 MMHG | HEART RATE: 67 BPM | OXYGEN SATURATION: 99 % | HEIGHT: 63 IN | WEIGHT: 184 LBS

## 2018-06-14 DIAGNOSIS — I82.4Y1 ACUTE DEEP VEIN THROMBOSIS (DVT) OF PROXIMAL VEIN OF RIGHT LOWER EXTREMITY: ICD-10-CM

## 2018-06-14 DIAGNOSIS — G47.33 OSA (OBSTRUCTIVE SLEEP APNEA): ICD-10-CM

## 2018-06-14 DIAGNOSIS — I10 HYPERTENSION, BENIGN: Primary | ICD-10-CM

## 2018-06-14 DIAGNOSIS — I10 ESSENTIAL HYPERTENSION: ICD-10-CM

## 2018-06-14 DIAGNOSIS — E78.2 MIXED HYPERLIPIDEMIA: ICD-10-CM

## 2018-06-14 PROCEDURE — 99999 PR PBB SHADOW E&M-EST. PATIENT-LVL III: CPT | Mod: PBBFAC,,, | Performed by: INTERNAL MEDICINE

## 2018-06-14 PROCEDURE — 99214 OFFICE O/P EST MOD 30 MIN: CPT | Mod: S$PBB,,, | Performed by: INTERNAL MEDICINE

## 2018-06-14 PROCEDURE — 99213 OFFICE O/P EST LOW 20 MIN: CPT | Mod: PBBFAC,PO | Performed by: INTERNAL MEDICINE

## 2018-06-14 RX ORDER — HYDRALAZINE HYDROCHLORIDE 100 MG/1
100 TABLET, FILM COATED ORAL EVERY 12 HOURS
Qty: 180 TABLET | Refills: 2 | Status: SHIPPED | OUTPATIENT
Start: 2018-06-14 | End: 2019-02-06

## 2018-06-14 NOTE — PROGRESS NOTES
Subjective:   Patient ID:  Eugenie Coates is a 68 y.o. female who presents for follow-up of Follow-up      Problem List Items Addressed This Visit        Cardiac/Vascular    Hypertension, benign - Primary    Hyperlipidemia       Hematology    Acute deep vein thrombosis (DVT) of proximal vein of lower extremity       Endocrine    Obesity, Class II, BMI 35-39.9, with comorbidity       Other    AFSHIN (obstructive sleep apnea)          HPI: 68 y/o A female with PMH of HTN, obesity and DVT present as f/u. Patient is doing well except for continued Dyspnea on exertion. She denies chest pain. Echo was ordered on previous visit but was not done. BP is elevated today. Patient said she took medications today and everyday and is compliant. Patient is still on Eliquis. One episode of DVT.   Repeat /86. She is currently on norvasc 10 mg po daily, Hyzaar 100-25 mg po daily, aldactone 25 mg po daily and hydralazine 25 mg po bid.   Weight is stable.     Review of Systems   Constitution: Negative.   HENT: Negative.    Eyes: Negative.    Cardiovascular: Positive for dyspnea on exertion.   Respiratory: Negative.    Endocrine: Negative.    Hematologic/Lymphatic: Negative.    Skin: Negative.    Musculoskeletal: Negative.    Gastrointestinal: Negative.    Neurological: Negative.    Psychiatric/Behavioral: Negative.    Allergic/Immunologic: Negative.        Patient's Medications   New Prescriptions    No medications on file   Previous Medications    ALBUTEROL (PROAIR HFA) 90 MCG/ACTUATION INHALER    Inhale 2 puffs into the lungs every 4 (four) hours as needed for Wheezing.    AMLODIPINE (NORVASC) 10 MG TABLET    Take 1 tablet (10 mg total) by mouth every evening.    ATORVASTATIN (LIPITOR) 40 MG TABLET    Take 1 tablet (40 mg total) by mouth once daily.    JV ASPIRIN ORAL    Take 81 mg by mouth once daily. Instructed to stop 5-23-13 until after surgery.    BIMATOPROST (LUMIGAN) 0.01 % DROP    Place 1 drop into both eyes every  evening.    BLOOD SUGAR DIAGNOSTIC STRP    Test twice daily.  [employer] viva test strips and lancets.    BRIMONIDINE 0.2% (ALPHAGAN) 0.2 % DROP    Place 1 drop into both eyes 3 (three) times daily.    ELIQUIS 5 MG TAB    TAKE 1 TABLET(5 MG) BY MOUTH TWICE DAILY    HYDRALAZINE (APRESOLINE) 25 MG TABLET    Take 1 tablet (25 mg total) by mouth every 12 (twelve) hours.    LANCETS (ACCU-CHEK SOFTCLIX LANCETS) MISC    1 Units by Misc.(Non-Drug; Combo Route) route 2 (two) times daily.    LOSARTAN-HYDROCHLOROTHIAZIDE 100-25 MG (HYZAAR) 100-25 MG PER TABLET    Take 1 tablet by mouth once daily.    METFORMIN (GLUCOPHAGE) 500 MG TABLET    Take 1 tablet (500 mg total) by mouth daily with breakfast.    OMEPRAZOLE (PRILOSEC) 40 MG CAPSULE    Take 1 capsule (40 mg total) by mouth once daily.    SPIRONOLACTONE (ALDACTONE) 25 MG TABLET    Take 1 tablet (25 mg total) by mouth once daily.    SYMBICORT 160-4.5 MCG/ACTUATION HFAA    INHALE 2 PUFFS INTO THE LUNGS EVERY 12 HOURS   Modified Medications    No medications on file   Discontinued Medications    No medications on file       Objective:   Physical Exam   Constitutional: She is oriented to person, place, and time. She appears well-developed and well-nourished. No distress.   Examination of the digits showed no clubbing or cyanosis   HENT:   Head: Normocephalic and atraumatic.   Eyes: Conjunctivae are normal. Pupils are equal, round, and reactive to light. Right eye exhibits no discharge.   Neck: Normal range of motion. Neck supple. No JVD present. No thyromegaly present.   No carotid bruits   Cardiovascular: Normal rate, regular rhythm, S1 normal, S2 normal, intact distal pulses and normal pulses.  PMI is not displaced.  Exam reveals no gallop, no friction rub and no opening snap.    Murmur heard.  Pulmonary/Chest: Effort normal and breath sounds normal. No respiratory distress. She has no wheezes. She has no rales. She exhibits no tenderness.   Abdominal: Soft. Bowel sounds are  normal. She exhibits no distension and no mass. There is no tenderness. There is no guarding.   No hepatosplenomegaly   Musculoskeletal: Normal range of motion. She exhibits no edema or tenderness.   Lymphadenopathy:     She has no cervical adenopathy.   Neurological: She is alert and oriented to person, place, and time.   Skin: Skin is warm. No rash noted. She is not diaphoretic. No erythema.   Psychiatric: She has a normal mood and affect.   Nursing note and vitals reviewed.      ECGs reviewed-NSR with LAD and LVH  LABS reviewed      Assessment:     1. Hypertension, benign    2. Mixed hyperlipidemia    3. AFSHIN (obstructive sleep apnea)    4. Obesity, Class II, BMI 35-39.9, with comorbidity    5. Acute deep vein thrombosis (DVT) of proximal vein of right lower extremity        Plan:     Continu current medications. Except Eliquis will stop. Now 6 months of treatment. Will also increase hydralazine to 100 mg po bid for uncontrolled HTN.  Weight loss  Activity as tolerate  Venous US of legs bilaterally  Low salt diet  F/u in 2 months      Clinic time spent with patient discussing and treating medical condition including reviewing images, ECG, labs and medications > 20 minutes.

## 2018-07-11 ENCOUNTER — TELEPHONE (OUTPATIENT)
Dept: CARDIOLOGY | Facility: CLINIC | Age: 68
End: 2018-07-11

## 2018-07-11 NOTE — TELEPHONE ENCOUNTER
----- Message from Deepika Soto sent at 7/11/2018 10:01 AM CDT -----  Contact: self / [phone]  Patient is returning a call from your office. Please advise

## 2018-07-11 NOTE — TELEPHONE ENCOUNTER
----- Message from Deepika Soto sent at 7/11/2018 10:01 AM CDT -----  Contact: self / [phone]  Patient is returning a call from your office. Please advise

## 2018-07-11 NOTE — TELEPHONE ENCOUNTER
----- Message from Helena Rangel sent at 7/11/2018 10:26 AM CDT -----  Contact: [phone]/ self   Patient called in returning your call. Please advise

## 2018-08-06 ENCOUNTER — LAB VISIT (OUTPATIENT)
Dept: LAB | Facility: HOSPITAL | Age: 68
End: 2018-08-06
Attending: FAMILY MEDICINE
Payer: MEDICARE

## 2018-08-06 DIAGNOSIS — N18.30 CKD (CHRONIC KIDNEY DISEASE) STAGE 3, GFR 30-59 ML/MIN: ICD-10-CM

## 2018-08-06 DIAGNOSIS — E11.65 TYPE 2 DIABETES MELLITUS WITH HYPERGLYCEMIA, WITHOUT LONG-TERM CURRENT USE OF INSULIN: ICD-10-CM

## 2018-08-06 DIAGNOSIS — Z51.81 ENCOUNTER FOR MONITORING LONG-TERM PROTON PUMP INHIBITOR THERAPY: ICD-10-CM

## 2018-08-06 DIAGNOSIS — Z79.899 ENCOUNTER FOR MONITORING LONG-TERM PROTON PUMP INHIBITOR THERAPY: ICD-10-CM

## 2018-08-06 DIAGNOSIS — E78.2 MIXED HYPERLIPIDEMIA: ICD-10-CM

## 2018-08-06 DIAGNOSIS — I10 HYPERTENSION, BENIGN: ICD-10-CM

## 2018-08-06 LAB
25(OH)D3+25(OH)D2 SERPL-MCNC: 39 NG/ML
ALBUMIN SERPL BCP-MCNC: 3.3 G/DL
ALP SERPL-CCNC: 85 U/L
ALT SERPL W/O P-5'-P-CCNC: 13 U/L
ANION GAP SERPL CALC-SCNC: 8 MMOL/L
AST SERPL-CCNC: 15 U/L
BILIRUB SERPL-MCNC: 0.7 MG/DL
BUN SERPL-MCNC: 18 MG/DL
CALCIUM SERPL-MCNC: 9.9 MG/DL
CHLORIDE SERPL-SCNC: 106 MMOL/L
CHOLEST SERPL-MCNC: 205 MG/DL
CHOLEST/HDLC SERPL: 4.7 {RATIO}
CO2 SERPL-SCNC: 26 MMOL/L
CREAT SERPL-MCNC: 1 MG/DL
EST. GFR  (AFRICAN AMERICAN): >60 ML/MIN/1.73 M^2
EST. GFR  (NON AFRICAN AMERICAN): 58.4 ML/MIN/1.73 M^2
ESTIMATED AVG GLUCOSE: 140 MG/DL
GLUCOSE SERPL-MCNC: 114 MG/DL
HBA1C MFR BLD HPLC: 6.5 %
HDLC SERPL-MCNC: 44 MG/DL
HDLC SERPL: 21.5 %
LDLC SERPL CALC-MCNC: 134.8 MG/DL
MAGNESIUM SERPL-MCNC: 1.9 MG/DL
NONHDLC SERPL-MCNC: 161 MG/DL
POTASSIUM SERPL-SCNC: 4 MMOL/L
PROT SERPL-MCNC: 6.9 G/DL
SODIUM SERPL-SCNC: 140 MMOL/L
TRIGL SERPL-MCNC: 131 MG/DL
VIT B12 SERPL-MCNC: 627 PG/ML

## 2018-08-06 PROCEDURE — 82607 VITAMIN B-12: CPT

## 2018-08-06 PROCEDURE — 82306 VITAMIN D 25 HYDROXY: CPT

## 2018-08-06 PROCEDURE — 80053 COMPREHEN METABOLIC PANEL: CPT

## 2018-08-06 PROCEDURE — 83735 ASSAY OF MAGNESIUM: CPT

## 2018-08-06 PROCEDURE — 36415 COLL VENOUS BLD VENIPUNCTURE: CPT | Mod: PO

## 2018-08-06 PROCEDURE — 83036 HEMOGLOBIN GLYCOSYLATED A1C: CPT

## 2018-08-06 PROCEDURE — 80061 LIPID PANEL: CPT

## 2018-08-08 ENCOUNTER — TELEPHONE (OUTPATIENT)
Dept: FAMILY MEDICINE | Facility: CLINIC | Age: 68
End: 2018-08-08

## 2018-08-08 ENCOUNTER — OFFICE VISIT (OUTPATIENT)
Dept: FAMILY MEDICINE | Facility: CLINIC | Age: 68
End: 2018-08-08
Payer: MEDICARE

## 2018-08-08 VITALS
OXYGEN SATURATION: 96 % | BODY MASS INDEX: 31.68 KG/M2 | SYSTOLIC BLOOD PRESSURE: 160 MMHG | DIASTOLIC BLOOD PRESSURE: 90 MMHG | HEIGHT: 63 IN | WEIGHT: 178.81 LBS | TEMPERATURE: 99 F

## 2018-08-08 DIAGNOSIS — E11.9 DIABETES MELLITUS TYPE 2 WITHOUT RETINOPATHY: ICD-10-CM

## 2018-08-08 DIAGNOSIS — E11.65 TYPE 2 DIABETES MELLITUS WITH HYPERGLYCEMIA, WITHOUT LONG-TERM CURRENT USE OF INSULIN: Primary | ICD-10-CM

## 2018-08-08 DIAGNOSIS — I10 HYPERTENSION, BENIGN: ICD-10-CM

## 2018-08-08 DIAGNOSIS — N18.30 CKD (CHRONIC KIDNEY DISEASE) STAGE 3, GFR 30-59 ML/MIN: ICD-10-CM

## 2018-08-08 DIAGNOSIS — K21.9 GASTROESOPHAGEAL REFLUX DISEASE, ESOPHAGITIS PRESENCE NOT SPECIFIED: ICD-10-CM

## 2018-08-08 DIAGNOSIS — J45.909 ASTHMA, UNSPECIFIED ASTHMA SEVERITY, UNSPECIFIED WHETHER COMPLICATED, UNSPECIFIED WHETHER PERSISTENT: ICD-10-CM

## 2018-08-08 DIAGNOSIS — E78.2 MIXED HYPERLIPIDEMIA: ICD-10-CM

## 2018-08-08 PROCEDURE — 99214 OFFICE O/P EST MOD 30 MIN: CPT | Mod: S$PBB,,, | Performed by: FAMILY MEDICINE

## 2018-08-08 PROCEDURE — 99999 PR PBB SHADOW E&M-EST. PATIENT-LVL III: CPT | Mod: PBBFAC,,, | Performed by: FAMILY MEDICINE

## 2018-08-08 PROCEDURE — 99213 OFFICE O/P EST LOW 20 MIN: CPT | Mod: PBBFAC,PO | Performed by: FAMILY MEDICINE

## 2018-08-08 RX ORDER — OMEPRAZOLE 20 MG/1
20 CAPSULE, DELAYED RELEASE ORAL DAILY
Qty: 90 CAPSULE | Refills: 1 | Status: SHIPPED | OUTPATIENT
Start: 2018-08-08 | End: 2018-12-10 | Stop reason: SDUPTHER

## 2018-08-08 RX ORDER — LISINOPRIL AND HYDROCHLOROTHIAZIDE 20; 25 MG/1; MG/1
1 TABLET ORAL DAILY
Qty: 90 TABLET | Refills: 0 | Status: SHIPPED | OUTPATIENT
Start: 2018-08-08 | End: 2018-09-19 | Stop reason: ALTCHOICE

## 2018-08-08 RX ORDER — ALBUTEROL SULFATE 90 UG/1
2 AEROSOL, METERED RESPIRATORY (INHALATION) EVERY 4 HOURS PRN
Qty: 3 INHALER | Refills: 3 | Status: SHIPPED | OUTPATIENT
Start: 2018-08-08 | End: 2018-09-19

## 2018-08-08 NOTE — PROGRESS NOTES
"Subjective:       Patient ID: Eugenie Coates is a 68 y.o. female.    Chief Complaint: Follow-up    Yazmin is a 68 y.o. female who presents today for her chronic medical conditions including DM, HTN, DLD, GERD, and obesity.     She has lost 6 pounds! She went to the diabetes education and found that to be beneficial. She has been drinking more water, lemon water.      DM: a1c stable at 6.5 from 6.4 previously. She was decreased from metformin 1000 mg daily to 500 mg daily! She is watching "that sugar." She has been reading labels and she has been watching her diet.   HTN: her pressures have been mildly elevated. Her BP medication (hydralazine) was increased by cards at the last visit. She states that her previous combination pill, lisinopril/HCTZ controlled her pressures better and that she prefers that.   GERD: improved significantly with PPI. It has also improved as she has changed her diet.   DLD: tolerating statin well. No myalgias, calf pain.   Breathing: she is unsure why she was started on symbicort. She has been using it but it has not changed her "breathing." she find that it is expensive.     Labs reviewed as below, in detail.       Review of Systems   Constitutional: Negative for activity change and unexpected weight change.   HENT: Negative for hearing loss, rhinorrhea and trouble swallowing.    Eyes: Negative for discharge and visual disturbance.   Respiratory: Negative for chest tightness and wheezing.    Cardiovascular: Negative for chest pain and palpitations.   Gastrointestinal: Negative for blood in stool, constipation, diarrhea and vomiting.   Endocrine: Negative for polydipsia and polyuria.   Genitourinary: Negative for difficulty urinating, dysuria, hematuria and menstrual problem.   Musculoskeletal: Positive for arthralgias and joint swelling. Negative for neck pain.   Neurological: Negative for weakness and headaches.   Psychiatric/Behavioral: Negative for confusion and dysphoric mood.     "   Results for orders placed or performed in visit on 08/06/18   Hemoglobin A1c   Result Value Ref Range    Hemoglobin A1C 6.5 (H) 4.0 - 5.6 %    Estimated Avg Glucose 140 (H) 68 - 131 mg/dL   Comprehensive metabolic panel   Result Value Ref Range    Sodium 140 136 - 145 mmol/L    Potassium 4.0 3.5 - 5.1 mmol/L    Chloride 106 95 - 110 mmol/L    CO2 26 23 - 29 mmol/L    Glucose 114 (H) 70 - 110 mg/dL    BUN, Bld 18 8 - 23 mg/dL    Creatinine 1.0 0.5 - 1.4 mg/dL    Calcium 9.9 8.7 - 10.5 mg/dL    Total Protein 6.9 6.0 - 8.4 g/dL    Albumin 3.3 (L) 3.5 - 5.2 g/dL    Total Bilirubin 0.7 0.1 - 1.0 mg/dL    Alkaline Phosphatase 85 55 - 135 U/L    AST 15 10 - 40 U/L    ALT 13 10 - 44 U/L    Anion Gap 8 8 - 16 mmol/L    eGFR if African American >60.0 >60 mL/min/1.73 m^2    eGFR if non  58.4 (A) >60 mL/min/1.73 m^2   Lipid panel   Result Value Ref Range    Cholesterol 205 (H) 120 - 199 mg/dL    Triglycerides 131 30 - 150 mg/dL    HDL 44 40 - 75 mg/dL    LDL Cholesterol 134.8 63.0 - 159.0 mg/dL    HDL/Chol Ratio 21.5 20.0 - 50.0 %    Total Cholesterol/HDL Ratio 4.7 2.0 - 5.0    Non-HDL Cholesterol 161 mg/dL   Magnesium   Result Value Ref Range    Magnesium 1.9 1.6 - 2.6 mg/dL   Vitamin D   Result Value Ref Range    Vit D, 25-Hydroxy 39 30 - 96 ng/mL   Vitamin B12   Result Value Ref Range    Vitamin B-12 627 210 - 950 pg/mL       Objective:     Vitals:    08/08/18 1035   BP: (!) 160/90   Temp: 98.9 °F (37.2 °C)        Physical Exam   Constitutional: She is oriented to person, place, and time. She appears well-developed and well-nourished.   HENT:   Head: Normocephalic and atraumatic.   Eyes: Conjunctivae are normal.   Neck: Normal range of motion. Neck supple.   Cardiovascular: Normal rate and regular rhythm.    Murmur heard.  Pulmonary/Chest: Effort normal and breath sounds normal.   Abdominal: Soft.   Musculoskeletal: She exhibits no edema.   Feet:   Right Foot:   Protective Sensation: 10 sites tested. 9  "sites sensed.   Skin Integrity: Negative for ulcer, blister, skin breakdown, erythema, warmth, callus or dry skin.   Left Foot:   Protective Sensation: 10 sites tested. 9 sites sensed.   Skin Integrity: Negative for ulcer, blister, skin breakdown, erythema, warmth, callus or dry skin.   Neurological: She is alert and oriented to person, place, and time. No sensory deficit.   Skin: Skin is warm.   Psychiatric: She has a normal mood and affect. Her behavior is normal. Judgment and thought content normal.   Nursing note and vitals reviewed.      Assessment:       1. Type 2 diabetes mellitus with hyperglycemia, without long-term current use of insulin    2. Diabetes mellitus type 2 without retinopathy    3. BMI 32.0-32.9,adult    4. CKD (chronic kidney disease) stage 3, GFR 30-59 ml/min    5. Hypertension, benign    6. Mixed hyperlipidemia    7. Gastroesophageal reflux disease, esophagitis presence not specified    8. Asthma, unspecified asthma severity, unspecified whether complicated, unspecified whether persistent        Plan:       Switch to old BP medication: from losartan/hctz to lisinopril/hctz (patient reports that is controlled her pressures better)  Continue aldactone, amlodipine, and hydralazine (recently increased by cards)  Decrease PPI to 20 mg qAM  Continue metformin once daily  Continue statin    Continue eliquis for now? Message sent to [email or web address] regarding this.     Please decrease your intake of white bread, white rice, corn, pasta, potatoes and sugar to prevent worsening diabetes. Regular daily exercise will also decrease your risk of developing worsening diabetes.    Diet changes as per last AVS handout    Decrease salt intake, increase fluid intake, avoid NSAIDS and red meat for your kidneys    Spirometry evaluation for breathing. None noted on file. Patient doesn't want to take her symbicort. She states that it "doesn't help" and it "too expensive"    Type 2 diabetes mellitus with hyperglycemia, " without long-term current use of insulin    Diabetes mellitus type 2 without retinopathy    BMI 32.0-32.9,adult    CKD (chronic kidney disease) stage 3, GFR 30-59 ml/min    Hypertension, benign  -     lisinopril-hydrochlorothiazide (PRINZIDE,ZESTORETIC) 20-25 mg Tab; Take 1 tablet by mouth once daily.  Dispense: 90 tablet; Refill: 0    Mixed hyperlipidemia    Gastroesophageal reflux disease, esophagitis presence not specified  -     omeprazole (PRILOSEC) 20 MG capsule; Take 1 capsule (20 mg total) by mouth once daily.  Dispense: 90 capsule; Refill: 1    Asthma, unspecified asthma severity, unspecified whether complicated, unspecified whether persistent  -     Complete PFT with bronchodilator; Future    Other orders  -     albuterol (PROAIR HFA) 90 mcg/actuation inhaler; Inhale 2 puffs into the lungs every 4 (four) hours as needed for Wheezing.  Dispense: 3 Inhaler; Refill: 3      Follow up in 6 weeks for HTN then in January for MAWV      Warning signs discussed, patient to call with any further issues or worsening of symptoms.

## 2018-08-08 NOTE — PATIENT INSTRUCTIONS
Switch to old BP medication: from losartan/hctz to lisinopril/hctz  Decrease PPI to 20 mg  Continue metformin once daily    Please decrease your intake of white bread, white rice, corn, pasta, potatoes and sugar to prevent worsening diabetes. Regular daily exercise will also decrease your risk of developing worsening diabetes.    Diet changes as per last AVS handout    Decrease salt intake, increase fluid intake, avoid NSAIDS and red meat for your kidneys        LDL - bad type - improves with diet and meds. (typically statins)             May not improve significantly with exercise alone             Ideal range 60 -100         HDL - good type - improves with exercise             Ideal range 50 - 65    TGs (triglycerides) - also bad - can change very quickly and considerably with food - improves with diet and exercise            In some cases a low carbohydrate diet will lower TGs better than a low fat diet.            Ideal range       Fat and cholesterol in food:    Most foods that are high in cholesterol are also high in saturated fat. A handful of foods are high in cholesterol but do not contain any saturated fat: eggs, shrimp, crab legs and crawfish. Its OK to eat these foods but do not deep maya them.    Saturated fat is the bad fat - you should limit your intake of that. Deep fried foods, meats and other animal fats are high in saturated fat. Cookies and most dessert and cakes are usually high in saturated fat and sugar.      Unsaturated fat is the good fat. It contains the same number of calories as saturated fat but does not contribute to blockages. The Mediterranean style diet encourages the intake of unsaturated fat - olive oil, avocado and unsalted nuts.    Do not use stick butter or stick margarine. Butter that comes in a tub or margarine such as Benecol is OK to use.    Trans fats should also be avoided. Most foods that are labelled as containing 0 gms of trans fat can still contain several hundred  milligrams of trans fat: creamer, margarine, refrigerator dough, deep fried foods, ready made frosting, potato, corn and torilla chips, cakes, cookies, pie crusts and crackers containing shortening made with hydrogenated vegetable oil.    Diabetes Management Status    Statin: Taking  ACE/ARB: Taking    Screening or Prevention Patient's value Goal Complete/Controlled?   HgA1C Testing and Control   Lab Results   Component Value Date    HGBA1C 6.5 (H) 08/06/2018      Annually/Less than 8% Yes   Lipid profile : 08/06/2018 Annually Yes   LDL control Lab Results   Component Value Date    LDLCALC 134.8 08/06/2018    Annually/Less than 100 mg/dl  No   Nephropathy screening Lab Results   Component Value Date    LABMICR 12.0 01/23/2018     Lab Results   Component Value Date    PROTEINUA Negative 11/29/2016    Annually Yes   Blood pressure BP Readings from Last 1 Encounters:   08/08/18 (!) 160/90    Less than 140/90 No   Dilated retinal exam : 06/07/2018 Annually Yes   Foot exam   : 09/18/2017 Annually Yes

## 2018-08-08 NOTE — TELEPHONE ENCOUNTER
----- Message from Lonnie Carrizales MD sent at 8/8/2018 12:26 PM CDT -----  She should stop Eliquis. It has been more than 6 months for treatment of 1 episode of DVT.    ----- Message -----  From: Haile Paul DO  Sent: 8/8/2018  10:49 AM  To: Lonnie Carrizales MD    Hi,   I was just contacting you to clarify Ms. Coates's anticoagulation regimen. It appears that in your last note you wanted to stop her Eliquis, however she is saying that this was not done. Just wanted to make sure that she is to continue taking this medication until she follows up with you.     Thanks.    Haile Paul

## 2018-08-14 ENCOUNTER — OFFICE VISIT (OUTPATIENT)
Dept: PODIATRY | Facility: CLINIC | Age: 68
End: 2018-08-14
Payer: MEDICARE

## 2018-08-14 VITALS — HEIGHT: 63 IN | WEIGHT: 178 LBS | BODY MASS INDEX: 31.54 KG/M2 | RESPIRATION RATE: 20 BRPM

## 2018-08-14 DIAGNOSIS — E11.65 TYPE 2 DIABETES MELLITUS WITH HYPERGLYCEMIA, WITHOUT LONG-TERM CURRENT USE OF INSULIN: Primary | ICD-10-CM

## 2018-08-14 DIAGNOSIS — M20.42 HAMMER TOES OF BOTH FEET: ICD-10-CM

## 2018-08-14 DIAGNOSIS — B35.1 ONYCHOMYCOSIS DUE TO DERMATOPHYTE: ICD-10-CM

## 2018-08-14 DIAGNOSIS — M20.41 HAMMER TOES OF BOTH FEET: ICD-10-CM

## 2018-08-14 PROCEDURE — 99999 PR PBB SHADOW E&M-EST. PATIENT-LVL III: CPT | Mod: PBBFAC,,, | Performed by: PODIATRIST

## 2018-08-14 PROCEDURE — 11721 DEBRIDE NAIL 6 OR MORE: CPT | Mod: S$PBB,Q9,, | Performed by: PODIATRIST

## 2018-08-14 PROCEDURE — 99499 UNLISTED E&M SERVICE: CPT | Mod: S$PBB,,, | Performed by: PODIATRIST

## 2018-08-14 PROCEDURE — 11721 DEBRIDE NAIL 6 OR MORE: CPT | Mod: PBBFAC,PN | Performed by: PODIATRIST

## 2018-08-14 PROCEDURE — 99213 OFFICE O/P EST LOW 20 MIN: CPT | Mod: PBBFAC,PN,25 | Performed by: PODIATRIST

## 2018-08-15 NOTE — PROGRESS NOTES
Subjective:      Patient ID: Eugenie Coates is a 68 y.o. female.    Chief Complaint: Diabetic Foot Exam (3 month follow up); Diabetes Mellitus; and Other Misc (PCP: Beverly 8/8/18 ---- A1C: 6.5)    Eugenie is a 68 y.o. female who presents to the clinic for routine evaluation and treatment of diabetic feet. Eugenie has a past medical history of Allergy, Anemia, Anxiety, Arthritis, Asthma, Bell palsy, Depression, Diabetes mellitus, type 2, DVT (deep venous thrombosis), Endometriosis, Eye injury (2014), GERD (gastroesophageal reflux disease), Glaucoma, History of uterine fibroid, Hypertension, Nuclear sclerosis of both eyes (9/27/2016), and Sleep apnea. Patient relates no major problem with feet. Only complaints today consist of toenails in need of trimming.  Denies being painful with wearing shoe gear.  Has not attempted to self treat.  Relates decent control of her blood glucose through dieting,  Denies any additional pedal complaints.      PCP: Haile Paul, DO    Date Last Seen by PCP: 8/8/18    Hemoglobin A1C   Date Value Ref Range Status   08/06/2018 6.5 (H) 4.0 - 5.6 % Final     Comment:     ADA Screening Guidelines:  5.7-6.4%  Consistent with prediabetes  >or=6.5%  Consistent with diabetes  High levels of fetal hemoglobin interfere with the HbA1C  assay. Heterozygous hemoglobin variants (HbS, HgC, etc)do  not significantly interfere with this assay.   However, presence of multiple variants may affect accuracy.     05/04/2018 6.4 (H) 4.0 - 5.6 % Final     Comment:     According to ADA guidelines, hemoglobin A1c <7.0% represents  optimal control in non-pregnant diabetic patients. Different  metrics may apply to specific patient populations.   Standards of Medical Care in Diabetes-2016.  For the purpose of screening for the presence of diabetes:  <5.7%     Consistent with the absence of diabetes  5.7-6.4%  Consistent with increasing risk for diabetes   (prediabetes)  >or=6.5%  Consistent with  diabetes  Currently, no consensus exists for use of hemoglobin A1c  for diagnosis of diabetes for children.  This Hemoglobin A1c assay has significant interference with fetal   hemoglobin   (HbF). The results are invalid for patients with abnormal amounts of   HbF,   including those with known Hereditary Persistence   of Fetal Hemoglobin. Heterozygous hemoglobin variants (HbAS, HbAC,   HbAD, HbAE, HbA2) do not significantly interfere with this assay;   however, presence of multiple variants in a sample may impact the %   interference.     01/23/2018 6.7 (H) 4.0 - 5.6 % Final     Comment:     According to ADA guidelines, hemoglobin A1c <7.0% represents  optimal control in non-pregnant diabetic patients. Different  metrics may apply to specific patient populations.   Standards of Medical Care in Diabetes-2016.  For the purpose of screening for the presence of diabetes:  <5.7%     Consistent with the absence of diabetes  5.7-6.4%  Consistent with increasing risk for diabetes   (prediabetes)  >or=6.5%  Consistent with diabetes  Currently, no consensus exists for use of hemoglobin A1c  for diagnosis of diabetes for children.  This Hemoglobin A1c assay has significant interference with fetal   hemoglobin   (HbF). The results are invalid for patients with abnormal amounts of   HbF,   including those with known Hereditary Persistence   of Fetal Hemoglobin. Heterozygous hemoglobin variants (HbAS, HbAC,   HbAD, HbAE, HbA2) do not significantly interfere with this assay;   however, presence of multiple variants in a sample may impact the %   interference.             Past Medical History:   Diagnosis Date    Allergy     Anemia     Anxiety     Arthritis     Asthma     Bell palsy     Depression     Diabetes mellitus, type 2     DVT (deep venous thrombosis)     Endometriosis     Eye injury 2014    stuck with tree branch od ?     GERD (gastroesophageal reflux disease)     Glaucoma     History of uterine fibroid      Hypertension     Nuclear sclerosis of both eyes 2016    Sleep apnea     uses C pap       Past Surgical History:   Procedure Laterality Date     SECTION      CHOLECYSTECTOMY      glaucoma laser Bilateral     HERNIA REPAIR      KNEE SURGERY Right 2008    (R) knee scope; torn meniscus    VEIN SURGERY  ,     Rt leg x2       Family History   Problem Relation Age of Onset    Diabetes Mother     No Known Problems Father     No Known Problems Sister     No Known Problems Brother     No Known Problems Maternal Aunt     No Known Problems Maternal Uncle     No Known Problems Paternal Aunt     No Known Problems Paternal Uncle     No Known Problems Maternal Grandmother     No Known Problems Maternal Grandfather     No Known Problems Paternal Grandmother     No Known Problems Paternal Grandfather     Amblyopia Neg Hx     Blindness Neg Hx     Cancer Neg Hx     Cataracts Neg Hx     Glaucoma Neg Hx     Hypertension Neg Hx     Macular degeneration Neg Hx     Retinal detachment Neg Hx     Strabismus Neg Hx     Stroke Neg Hx     Thyroid disease Neg Hx        Social History     Socioeconomic History    Marital status:      Spouse name: None    Number of children: None    Years of education: None    Highest education level: None   Social Needs    Financial resource strain: None    Food insecurity - worry: None    Food insecurity - inability: None    Transportation needs - medical: None    Transportation needs - non-medical: None   Occupational History    None   Tobacco Use    Smoking status: Never Smoker    Smokeless tobacco: Never Used   Substance and Sexual Activity    Alcohol use: No     Alcohol/week: 0.0 oz    Drug use: No    Sexual activity: Yes     Partners: Male   Other Topics Concern    None   Social History Narrative    None       Current Outpatient Medications   Medication Sig Dispense Refill    albuterol (PROAIR HFA) 90 mcg/actuation inhaler Inhale 2  puffs into the lungs every 4 (four) hours as needed for Wheezing. 3 Inhaler 3    amLODIPine (NORVASC) 10 MG tablet Take 1 tablet (10 mg total) by mouth every evening. 90 tablet 2    atorvastatin (LIPITOR) 40 MG tablet Take 1 tablet (40 mg total) by mouth once daily. 90 tablet 2    JV ASPIRIN ORAL Take 81 mg by mouth once daily. Instructed to stop 5-23-13 until after surgery.      bimatoprost (LUMIGAN) 0.01 % Drop Place 1 drop into both eyes every evening. 3 Bottle 4    blood sugar diagnostic Strp Test twice daily.  Accucheck viva test strips and lancets. 300 each 3    brimonidine 0.2% (ALPHAGAN) 0.2 % Drop Place 1 drop into both eyes 3 (three) times daily. 15 mL 3    hydrALAZINE (APRESOLINE) 100 MG tablet Take 1 tablet (100 mg total) by mouth every 12 (twelve) hours. 180 tablet 2    lancets (ACCU-CHEK SOFTCLIX LANCETS) Misc 1 Units by Misc.(Non-Drug; Combo Route) route 2 (two) times daily. 300 each 3    lisinopril-hydrochlorothiazide (PRINZIDE,ZESTORETIC) 20-25 mg Tab Take 1 tablet by mouth once daily. 90 tablet 0    metFORMIN (GLUCOPHAGE) 500 MG tablet Take 1 tablet (500 mg total) by mouth daily with breakfast. 90 tablet 3    omeprazole (PRILOSEC) 20 MG capsule Take 1 capsule (20 mg total) by mouth once daily. 90 capsule 1    spironolactone (ALDACTONE) 25 MG tablet Take 1 tablet (25 mg total) by mouth once daily. 90 tablet 2     No current facility-administered medications for this visit.        Review of patient's allergies indicates:   Allergen Reactions    Percodan [oxycodone hcl-oxycodone-asa] Hives and Itching         Review of Systems   Constitution: Negative for chills and fever.   Cardiovascular: Positive for leg swelling. Negative for claudication.   Skin: Positive for color change and nail changes.   Musculoskeletal: Positive for joint swelling. Negative for joint pain, muscle cramps and muscle weakness.   Neurological: Positive for paresthesias. Negative for numbness.    Psychiatric/Behavioral: Negative for altered mental status.           Objective:      Physical Exam   Constitutional: She is oriented to person, place, and time. She appears well-developed and well-nourished. No distress.   Cardiovascular:   Pulses:       Dorsalis pedis pulses are 2+ on the right side, and 2+ on the left side.        Posterior tibial pulses are 1+ on the right side, and 1+ on the left side.   CFT <3 seconds bilateral.  Pedal hair growth decreased bilateral.  Varicosities noted bilateral.  Moderate non pitting edema noted to bilateral lower extremity.  Toes are cool to touch bilateral.     Musculoskeletal: She exhibits edema. She exhibits no tenderness.   Muscle strength 5/5 in all muscle groups bilateral.  No tenderness nor crepitation with ROM of foot/ankle joints bilateral.  No tenderness with palpation of bilateral foot and ankle.  Bilateral pes planus foot type.  Bilateral hallux abducto valgus.  Bilateral semi-reducible contracture of toes 2-5.     Neurological: She is alert and oriented to person, place, and time. She has normal strength. A sensory deficit is present.   Protective sensation per [place]on-Heron monofilament intact bilateral.    Vibratory sensation decreased bilateral.    Light touch intact bilateral.   Skin: Skin is warm, dry and intact. No abrasion, no bruising, no burn, no ecchymosis, no laceration, no lesion, no petechiae and no rash noted. She is not diaphoretic. No cyanosis or erythema. No pallor. Nails show no clubbing.   Pedal skin appear edematous bilateral.  Toenails x 10 appear thickened by 2 mm's, elongated by 2 mm's, and discolored with subungual debris.  Examination of the skin reveals no evidence of significant maceration, rashes, open lesions, suspicious appearing nevi or other concerning lesions.              Assessment:       Encounter Diagnoses   Name Primary?    Type 2 diabetes mellitus with hyperglycemia, without long-term current use of insulin Yes     Hammer toes of both feet     Onychomycosis due to dermatophyte          Plan:       Eugenie was seen today for diabetic foot exam, diabetes mellitus and other misc.    Diagnoses and all orders for this visit:    Type 2 diabetes mellitus with hyperglycemia, without long-term current use of insulin    Hammer toes of both feet    Onychomycosis due to dermatophyte      I counseled the patient on her conditions, their implications and medical management.    Shoe inspection. Diabetic Foot Education. Patient reminded of the importance of good nutrition and blood sugar control to help prevent podiatric complications of diabetes. Patient instructed on proper foot hygeine. We discussed wearing proper shoe gear, daily foot inspections, never walking without protective shoe gear, never putting sharp instruments to feet    Advised to continue wearing diabetic shoes/insoles that accommodate for digital deformities.    With patient's permission, nails were aggressively reduced and debrided x 10 to their soft tissue attachment mechanically and with electric , removing all offending nail and debris. Patient relates relief following the procedure.  She will continue to monitor the areas daily, inspect her feet, wear protective shoe gear when ambulatory, moisturizer to maintain skin integrity and follow in this office in approximately 3 months, sooner p.r.n.    Follow-up in about 3 months (around 11/14/2018).    Giuliano Garnett DPM

## 2018-08-21 ENCOUNTER — HOSPITAL ENCOUNTER (OUTPATIENT)
Dept: CARDIOLOGY | Facility: HOSPITAL | Age: 68
Discharge: HOME | End: 2018-08-21
Attending: INTERNAL MEDICINE
Payer: MEDICARE

## 2018-08-21 ENCOUNTER — OFFICE VISIT (OUTPATIENT)
Dept: CARDIOLOGY | Facility: CLINIC | Age: 68
End: 2018-08-21
Payer: MEDICARE

## 2018-08-21 VITALS
HEART RATE: 82 BPM | BODY MASS INDEX: 35.79 KG/M2 | HEIGHT: 60 IN | DIASTOLIC BLOOD PRESSURE: 79 MMHG | SYSTOLIC BLOOD PRESSURE: 139 MMHG | WEIGHT: 182.31 LBS

## 2018-08-21 DIAGNOSIS — I10 HYPERTENSION, BENIGN: ICD-10-CM

## 2018-08-21 DIAGNOSIS — R01.1 CARDIAC MURMUR: ICD-10-CM

## 2018-08-21 DIAGNOSIS — E78.2 MIXED HYPERLIPIDEMIA: ICD-10-CM

## 2018-08-21 DIAGNOSIS — I10 HYPERTENSION, BENIGN: Primary | ICD-10-CM

## 2018-08-21 DIAGNOSIS — I82.4Y1 ACUTE DEEP VEIN THROMBOSIS (DVT) OF PROXIMAL VEIN OF RIGHT LOWER EXTREMITY: ICD-10-CM

## 2018-08-21 DIAGNOSIS — G47.33 OSA (OBSTRUCTIVE SLEEP APNEA): ICD-10-CM

## 2018-08-21 LAB
DIASTOLIC DYSFUNCTION: YES
ESTIMATED PA SYSTOLIC PRESSURE: 33.91
MITRAL VALVE MOBILITY: NORMAL
RETIRED EF AND QEF - SEE NOTES: 60 (ref 55–65)
TRICUSPID VALVE REGURGITATION: ABNORMAL

## 2018-08-21 PROCEDURE — 99213 OFFICE O/P EST LOW 20 MIN: CPT | Mod: PBBFAC,PO,25 | Performed by: INTERNAL MEDICINE

## 2018-08-21 PROCEDURE — 93306 TTE W/DOPPLER COMPLETE: CPT

## 2018-08-21 PROCEDURE — 93306 TTE W/DOPPLER COMPLETE: CPT | Mod: 26,,, | Performed by: INTERNAL MEDICINE

## 2018-08-21 PROCEDURE — 99999 PR PBB SHADOW E&M-EST. PATIENT-LVL III: CPT | Mod: PBBFAC,,, | Performed by: INTERNAL MEDICINE

## 2018-08-21 PROCEDURE — 99213 OFFICE O/P EST LOW 20 MIN: CPT | Mod: S$PBB,,, | Performed by: INTERNAL MEDICINE

## 2018-08-21 NOTE — PROGRESS NOTES
Subjective:   Patient ID:  Eugenie Coates is a 68 y.o. female who presents for follow-up of Follow-up      Problem List Items Addressed This Visit        Cardiac/Vascular    Hypertension, benign - Primary    Hyperlipidemia       Other    AFSHIN (obstructive sleep apnea)          HPI: 68 y/o A female with PMH of HTN, obesity and DVT present as f/u. She is doing better with medication changes. BP now controlled. No chest pain and dyspnea is better. She is compliant with medications.   Losartan/HCTZ changed to lisinopril/HCTZ.    Review of Systems   Constitution: Negative.   HENT: Negative.    Eyes: Negative.    Cardiovascular: Positive for dyspnea on exertion.   Respiratory: Negative.    Endocrine: Negative.    Hematologic/Lymphatic: Negative.    Skin: Negative.    Musculoskeletal: Negative.    Gastrointestinal: Negative.    Neurological: Negative.    Psychiatric/Behavioral: Negative.    Allergic/Immunologic: Negative.        Patient's Medications   New Prescriptions    No medications on file   Previous Medications    ALBUTEROL (PROAIR HFA) 90 MCG/ACTUATION INHALER    Inhale 2 puffs into the lungs every 4 (four) hours as needed for Wheezing.    AMLODIPINE (NORVASC) 10 MG TABLET    Take 1 tablet (10 mg total) by mouth every evening.    ATORVASTATIN (LIPITOR) 40 MG TABLET    Take 1 tablet (40 mg total) by mouth once daily.    BIMATOPROST (LUMIGAN) 0.01 % DROP    Place 1 drop into both eyes every evening.    BLOOD SUGAR DIAGNOSTIC STRP    Test twice daily.  Accucheck viva test strips and lancets.    BRIMONIDINE 0.2% (ALPHAGAN) 0.2 % DROP    Place 1 drop into both eyes 3 (three) times daily.    HYDRALAZINE (APRESOLINE) 100 MG TABLET    Take 1 tablet (100 mg total) by mouth every 12 (twelve) hours.    LANCETS (ACCU-CHEK SOFTCLIX LANCETS) MISC    1 Units by Misc.(Non-Drug; Combo Route) route 2 (two) times daily.    LISINOPRIL-HYDROCHLOROTHIAZIDE (PRINZIDE,ZESTORETIC) 20-25 MG TAB    Take 1 tablet by mouth once daily.     METFORMIN (GLUCOPHAGE) 500 MG TABLET    Take 1 tablet (500 mg total) by mouth daily with breakfast.    OMEPRAZOLE (PRILOSEC) 20 MG CAPSULE    Take 1 capsule (20 mg total) by mouth once daily.    SPIRONOLACTONE (ALDACTONE) 25 MG TABLET    Take 1 tablet (25 mg total) by mouth once daily.   Modified Medications    No medications on file   Discontinued Medications    JV ASPIRIN ORAL    Take 81 mg by mouth once daily. Instructed to stop 5-23-13 until after surgery.       Objective:   Physical Exam   Constitutional: She is oriented to person, place, and time. She appears well-developed and well-nourished. No distress.   Examination of the digits showed no clubbing or cyanosis   HENT:   Head: Normocephalic and atraumatic.   Eyes: Conjunctivae are normal. Pupils are equal, round, and reactive to light. Right eye exhibits no discharge.   Neck: Normal range of motion. Neck supple. No JVD present. No thyromegaly present.   No carotid bruits   Cardiovascular: Normal rate, regular rhythm, S1 normal, S2 normal, intact distal pulses and normal pulses. PMI is not displaced. Exam reveals no gallop, no friction rub and no opening snap.   Murmur heard.  Pulmonary/Chest: Effort normal and breath sounds normal. No respiratory distress. She has no wheezes. She has no rales. She exhibits no tenderness.   Abdominal: Soft. Bowel sounds are normal. She exhibits no distension and no mass. There is no tenderness. There is no guarding.   No hepatosplenomegaly   Musculoskeletal: Normal range of motion. She exhibits no edema or tenderness.   Lymphadenopathy:     She has no cervical adenopathy.   Neurological: She is alert and oriented to person, place, and time.   Skin: Skin is warm. No rash noted. She is not diaphoretic. No erythema.   Psychiatric: She has a normal mood and affect.   Nursing note and vitals reviewed.      ECGs reviewed-NSR with LAD and LVH  LABS reviewed      Assessment:     1. Hypertension, benign    2. Mixed hyperlipidemia     3. AFSHIN (obstructive sleep apnea)        Plan:     Continu current medications. .   Weight loss  Activity as tolerate  Low salt diet  No AS found on echo likely high flow murmur  F/u in 6 months      Clinic time spent with patient discussing and treating medical condition including reviewing images, ECG, labs and medications > 20 minutes.

## 2018-08-22 ENCOUNTER — HOSPITAL ENCOUNTER (OUTPATIENT)
Dept: RESPIRATORY THERAPY | Facility: HOSPITAL | Age: 68
Discharge: HOME | End: 2018-08-22
Attending: FAMILY MEDICINE
Payer: MEDICARE

## 2018-08-22 DIAGNOSIS — E04.2 MULTINODULAR THYROID: ICD-10-CM

## 2018-08-22 DIAGNOSIS — E05.90 SUBCLINICAL HYPERTHYROIDISM: Primary | ICD-10-CM

## 2018-08-22 DIAGNOSIS — J45.909 ASTHMA, UNSPECIFIED ASTHMA SEVERITY, UNSPECIFIED WHETHER COMPLICATED, UNSPECIFIED WHETHER PERSISTENT: ICD-10-CM

## 2018-08-22 PROCEDURE — 94060 EVALUATION OF WHEEZING: CPT

## 2018-08-22 PROCEDURE — 94727 GAS DIL/WSHOT DETER LNG VOL: CPT

## 2018-08-22 PROCEDURE — 94729 DIFFUSING CAPACITY: CPT

## 2018-08-23 LAB
BRPFT: ABNORMAL
DLCO ADJ PRE: 16.16 ML/(MIN*MMHG) (ref 14.63–26.1)
DLCO PRE: 16.16 ML/(MIN*MMHG) (ref 14.63–26.1)
DLCO SINGLE BREATH LLN: 14.63
DLCO SINGLE BREATH PRE REF: 79.4 %
DLCO SINGLE BREATH REF: 20.36
DLCOC SBVA LLN: 2.87
DLCOC SBVA PRE REF: 116.9 %
DLCOC SBVA REF: 4.45
DLCOC SINGLE BREATH LLN: 14.63
DLCOC SINGLE BREATH PRE REF: 79.4 %
DLCOC SINGLE BREATH REF: 20.36
DLCOVA LLN: 2.87
DLCOVA PRE REF: 116.9 %
DLCOVA PRE: 5.21 ML/(MIN*MMHG*L) (ref 2.87–6.04)
DLCOVA REF: 4.45
DLVAADJ PRE: 5.21 ML/(MIN*MMHG*L) (ref 2.87–6.04)
ERV LLN: 0.67
ERV REF: 0.67
ERVN2 LLN: 0.67
ERVN2 PRE REF: 57.8 %
ERVN2 PRE: 0.39 L (ref 0.67–0.67)
ERVN2 REF: 0.67
FEF 25 75 CHG: 14.9 %
FEF 25 75 LLN: 0.65
FEF 25 75 POST REF: 83.6 %
FEF 25 75 POST: 1.39 L/S (ref 0.65–2.66)
FEF 25 75 PRE REF: 72.7 %
FEF 25 75 PRE: 1.21 L/S (ref 0.65–2.66)
FEF 25 75 REF: 1.66
FET100 CHG: -8.9 %
FET100 POST: 7.46 SEC
FET100 PRE: 8.18 SEC
FEV1 CHG: -1.7 %
FEV1 FVC CHG: 5.1 %
FEV1 FVC LLN: 67
FEV1 FVC POST REF: 98.6 %
FEV1 FVC POST: 78.1 % (ref 66.58–91.79)
FEV1 FVC PRE REF: 93.8 %
FEV1 FVC PRE: 74.29 % (ref 66.58–91.79)
FEV1 FVC REF: 79
FEV1 LLN: 1.29
FEV1 POST REF: 84.5 %
FEV1 POST: 1.55 L (ref 1.29–2.37)
FEV1 PRE REF: 86 %
FEV1 PRE: 1.57 L (ref 1.29–2.37)
FEV1 REF: 1.83
FEV6 CHG: -5.9 %
FEV6 LLN: 1.47
FEV6 POST REF: 92.4 %
FEV6 POST: 1.97 L (ref 1.47–2.78)
FEV6 PRE REF: 98.2 %
FEV6 PRE: 2.09 L (ref 1.47–2.78)
FEV6 REF: 2.13
FRCN2 LLN: 1.76
FRCN2 PRE REF: 62.9 %
FRCN2 PRE: 1.63 L
FRCN2 REF: 2.58
FRCPLETH LLN: 1.76
FRCPLETH REF: 2.58
FVC CHG: -6.5 %
FVC LLN: 1.66
FVC POST REF: 85.4 %
FVC POST: 1.98 L (ref 1.66–2.98)
FVC PRE REF: 91.3 %
FVC PRE: 2.12 L (ref 1.66–2.98)
FVC REF: 2.32
IVC PRE: 2 L (ref 1.66–2.98)
IVC SINGLE BREATH LLN: 1.66
IVC SINGLE BREATH PRE REF: 86 %
IVC SINGLE BREATH REF: 2.32
MVV LLN: 66
MVV REF: 77
PEF CHG: -6.9 %
PEF LLN: 2.87
PEF POST REF: 85.8 %
PEF POST: 4.07 L/S (ref 2.87–6.61)
PEF PRE REF: 92.1 %
PEF PRE: 4.37 L/S (ref 2.87–6.61)
PEF REF: 4.74
RAW LLN: 3.06
RAW REF: 3.06
RV LLN: 1.34
RV REF: 1.91
RVN2 LLN: 1.34
RVN2 PRE REF: 64.7 %
RVN2 PRE: 1.24 L (ref 1.34–2.49)
RVN2 REF: 1.91
RVN2TLCN2 LLN: 32
RVN2TLCN2 PRE REF: 84.8 %
RVN2TLCN2 PRE: 35.39 % (ref 32.15–51.33)
RVN2TLCN2 REF: 42
RVTLC LLN: 32
RVTLC REF: 42
TLC LLN: 3.59
TLC REF: 4.57
TLCN2 LLN: 3.59
TLCN2 PRE REF: 76.5 %
TLCN2 PRE: 3.5 L (ref 3.58–5.56)
TLCN2 REF: 4.57
VA PRE: 3.1 L (ref 4.42–4.42)
VA SINGLE BREATH LLN: 4.42
VA SINGLE BREATH PRE REF: 70.2 %
VA SINGLE BREATH REF: 4.42
VC LLN: 1.66
VC REF: 2.32
VCMAXN2 LLN: 1.66
VCMAXN2 PRE REF: 97.3 %
VCMAXN2 PRE: 2.26 L (ref 1.66–2.98)
VCMAXN2 REF: 2.32

## 2018-09-19 ENCOUNTER — OFFICE VISIT (OUTPATIENT)
Dept: FAMILY MEDICINE | Facility: CLINIC | Age: 68
End: 2018-09-19
Payer: MEDICARE

## 2018-09-19 ENCOUNTER — TELEPHONE (OUTPATIENT)
Dept: ENDOCRINOLOGY | Facility: CLINIC | Age: 68
End: 2018-09-19

## 2018-09-19 VITALS
OXYGEN SATURATION: 97 % | HEIGHT: 60 IN | SYSTOLIC BLOOD PRESSURE: 148 MMHG | HEART RATE: 85 BPM | TEMPERATURE: 99 F | BODY MASS INDEX: 35.62 KG/M2 | WEIGHT: 181.44 LBS | DIASTOLIC BLOOD PRESSURE: 72 MMHG

## 2018-09-19 DIAGNOSIS — I10 HYPERTENSION, BENIGN: Primary | ICD-10-CM

## 2018-09-19 DIAGNOSIS — I50.30 HEART FAILURE WITH PRESERVED EJECTION FRACTION: ICD-10-CM

## 2018-09-19 DIAGNOSIS — J98.4 RESTRICTIVE LUNG DISEASE: ICD-10-CM

## 2018-09-19 DIAGNOSIS — E05.90 SUBCLINICAL HYPERTHYROIDISM: ICD-10-CM

## 2018-09-19 DIAGNOSIS — Z23 NEED FOR INFLUENZA VACCINATION: ICD-10-CM

## 2018-09-19 PROCEDURE — 99213 OFFICE O/P EST LOW 20 MIN: CPT | Mod: PBBFAC,PO,25 | Performed by: FAMILY MEDICINE

## 2018-09-19 PROCEDURE — 99999 PR PBB SHADOW E&M-EST. PATIENT-LVL III: CPT | Mod: PBBFAC,,, | Performed by: FAMILY MEDICINE

## 2018-09-19 PROCEDURE — 90662 IIV NO PRSV INCREASED AG IM: CPT | Mod: PBBFAC,PO

## 2018-09-19 PROCEDURE — 99214 OFFICE O/P EST MOD 30 MIN: CPT | Mod: S$PBB,,, | Performed by: FAMILY MEDICINE

## 2018-09-19 RX ORDER — LISINOPRIL AND HYDROCHLOROTHIAZIDE 12.5; 2 MG/1; MG/1
2 TABLET ORAL DAILY
Qty: 180 TABLET | Refills: 0 | Status: SHIPPED | OUTPATIENT
Start: 2018-09-19 | End: 2018-12-01 | Stop reason: SDUPTHER

## 2018-09-19 NOTE — PROGRESS NOTES
Subjective:       Patient ID: Eugenie Coates is a 68 y.o. female.    Chief Complaint: Follow-up and Hypertension    Yazmin is a 68 y.o. female who presents today for follow up on her chronic medical conditions.    HTN: continues to be elevated despite change from ARB to ACE. Increase to lisinopril 40. Patient prefers 2 of the ACE/HCTZ combination pill daily.   Obesity: 1 pound weight loss. Still net increase since initial visits  Thyroid Issues: following with endocrine. Most recent labs were normal. Unclear if she needs medication? Message sent to endocrine  Pulmonary Issues: mild restriction. No obstruction on PFT. These results were reviewed with her in detail. She is having no benefit from inhalers. Stop these today          Hypertension   This is a chronic problem. The current episode started more than 1 year ago. The problem is uncontrolled. Pertinent negatives include no anxiety, blurred vision, chest pain, headaches, malaise/fatigue, neck pain, orthopnea, palpitations, peripheral edema, shortness of breath or sweats. There are no associated agents to hypertension. Risk factors for coronary artery disease include obesity. Past treatments include calcium channel blockers, diuretics and ACE inhibitors. The current treatment provides moderate improvement. There are no compliance problems.      Review of Systems   Constitutional: Negative for activity change, malaise/fatigue and unexpected weight change.   HENT: Negative for hearing loss, rhinorrhea and trouble swallowing.    Eyes: Negative for blurred vision, discharge and visual disturbance.   Respiratory: Negative for chest tightness, shortness of breath and wheezing.    Cardiovascular: Negative for chest pain, palpitations and orthopnea.   Gastrointestinal: Negative for blood in stool, constipation, diarrhea and vomiting.   Endocrine: Negative for polydipsia and polyuria.   Genitourinary: Negative for difficulty urinating, dysuria, hematuria and  menstrual problem.   Musculoskeletal: Negative for arthralgias, joint swelling and neck pain.   Neurological: Negative for weakness and headaches.   Psychiatric/Behavioral: Negative for confusion and dysphoric mood.       Objective:     Vitals:    09/19/18 1000   BP: (!) 148/72   Pulse: 85   Temp: 98.8 °F (37.1 °C)        Physical Exam   Constitutional: She is oriented to person, place, and time. She appears well-developed and well-nourished. No distress.   HENT:   Head: Normocephalic and atraumatic.   Neck: Normal range of motion. Neck supple.   Cardiovascular: Normal rate and regular rhythm.   Murmur heard.  Pulmonary/Chest: Effort normal and breath sounds normal.   Abdominal: Soft. There is no tenderness.   obese   Musculoskeletal: She exhibits no edema.   Neurological: She is alert and oriented to person, place, and time. No sensory deficit.   Skin: Skin is warm. She is not diaphoretic.   Psychiatric: She has a normal mood and affect. Her behavior is normal. Judgment and thought content normal.   Nursing note and vitals reviewed.      Assessment:       1. Hypertension, benign    2. Restrictive lung disease    3. Heart failure with preserved ejection fraction    4. BMI 35.0-35.9,adult    5. Subclinical hyperthyroidism    6. Need for influenza vaccination        Plan:       HTN: continue aldactone 25, amlodipine, and hydralazine. Increase ACE/HCTZ to 20/12.5, two pills daily. Recheck in 3 weeks. Weight loss. Dash Diet. Check BMP prior to next visit.     Stop albuterol. Can send to pulm for mild restrictive lung disease of symptoms develop    HFwPEF: sees cardiology    Message sent to endocrine. Unclear if she needs to be on 30.0 millicuries of iodine 131 orally? Last labs were normal. Will defer to endocrine.     Anticoagulation was stopped at last visit per cardiology recommendation.     Hypertension, benign  -     lisinopril-hydrochlorothiazide (PRINZIDE,ZESTORETIC) 20-12.5 mg per tablet; Take 2 tablets by mouth  once daily.  Dispense: 180 tablet; Refill: 0  -     Basic metabolic panel; Future; Expected date: 09/19/2018    Restrictive lung disease    Heart failure with preserved ejection fraction    BMI 35.0-35.9,adult    Subclinical hyperthyroidism    Need for influenza vaccination  -     Influenza - High Dose (65+) (PF) (IM)      Warning signs discussed, patient to call with any further issues or worsening of symptoms.

## 2018-09-19 NOTE — PATIENT INSTRUCTIONS
Low-Salt Diet  This diet removes foods that are high in salt. It also limits the amount of salt you use when cooking. It is most often used for people with high blood pressure, edema (fluid retention), and kidney, liver, or heart disease.  Table salt contains the mineral sodium. Your body needs sodium to work normally. But too much sodium can make your health problems worse. Your healthcare provider is recommending a low-salt (also called low-sodium) diet for you. Your total daily allowance of salt is 1,500 to 2,300 milligrams (mg). It is less than 1 teaspoon of table salt. This means you can have only about 500 to 700 mg of sodium at each meal. People with certain health problems should limit salt intake to the lower end of the recommended range.    When you cook, dont add much salt. If you can cook without using salt, even better. Dont add salt to your food at the table.  When shopping, read food labels. Salt is often called sodium on the label. Choose foods that are salt-free, low salt, or very low salt. Note that foods with reduced salt may not lower your salt intake enough.    Beans, potatoes, and pasta  Ok: Dry beans, split peas, lentils, potatoes, rice, macaroni, pasta, spaghetti without added salt  Avoid: Potato chips, tortilla chips, and similar products  Breads and cereals  Ok: Low-sodium breads, rolls, cereals, and cakes; low-salt crackers, matzo crackers  Avoid: Salted crackers, pretzels, popcorn, [place] toast, pancakes, muffins  Dairy  Ok: Milk, chocolate milk, hot chocolate mix, low-salt cheeses, and yogurt  Avoid: Processed cheese and cheese spreads; Roquefort, Camembert, and cottage cheese; buttermilk, instant breakfast drink  Desserts  Ok: Ice cream, frozen yogurt, juice bars, gelatin, cookies and pies, sugar, honey, jelly, hard candy  Avoid: Most pies, cakes and cookies prepared or processed with salt; instant pudding  Drinks  Ok: Tea, coffee, fizzy (carbonated) drinks, juices  Avoid: Flavored  coffees, electrolyte replacement drinks, sports drinks  Meats  Ok: All fresh meat, fish, poultry, low-salt tuna, eggs, egg substitute  Avoid: Smoked, pickled, brine-cured, or salted meats and fish. This includes chong, chipped beef, corned beef, hot dogs, deli meats, ham, kosher meats, salt pork, sausage, canned tuna, salted codfish, smoked salmon, herring, sardines, or anchovies.  Seasonings and spices  Ok: Most seasonings are okay. Good substitutes for salt include: fresh herb blends, hot sauce, lemon, garlic, geronimo, vinegar, dry mustard, parsley, cilantro, horseradish, tomato paste, regular margarine, mayonnaise, unsalted butter, cream cheese, vegetable oil, cream, low-salt salad dressing and gravy.  Avoid: Regular ketchup, relishes, pickles, soy sauce, teriyaki sauce, Worcestershire sauce, BBQ sauce, tartar sauce, meat tenderizer, chili sauce, regular gravy, regular salad dressing, salted butter  Soups  Ok: Low-salt soups and broths made with allowed foods  Avoid: Bouillon cubes, soups with smoked or salted meats, regular soup and broth  Vegetables  Ok: Most vegetables are okay; also low-salt tomato and vegetable juices  Avoid: Sauerkraut and other brine-soaked vegetables; pickles and other pickled vegetables; tomato juice, olives  Date Last Reviewed: 8/1/2016 © 2000-2017 [employer]. [address] [place] [id number]. All rights reserved. This information is not intended as a substitute for professional medical care. Always follow your healthcare professional's instructions.        4 Steps for Eating Healthier  Changing the way you eat can improve your health. It can lower your cholesterol and blood pressure, and help you stay at a healthy weight. Your diet doesnt have to be bland and boring to be healthy. Just watch your calories and follow these steps:    1. Eat fewer unhealthy fats  · Choose more fish and lean meats instead of fatty cuts of meat.  · Skip butter and lard, and use less  margarine.  · Pass on foods that have palm, coconut, or hydrogenated oils.  · Eat fewer high-fat dairy foods like cheese, ice cream, and whole milk.  · Get a heart-healthy cookbook and try some low-fat recipes.  2. Go light on salt  · Keep the saltshaker off the table.  · Limit high-salt ingredients, such as soy sauce, bouillon, and garlic salt.  · Instead of adding salt when cooking, season your food with herbs and flavorings. Try lemon, garlic, and onion.  · Limit convenience foods, such as boxed or canned foods and restaurant food.  · Read food labels and choose lower-sodium options.  3. Limit sugar  · Pause before you add sugars to pancakes, cereal, coffee, or tea. This includes white and brown table sugar, syrup, honey, and molasses. Cut your usual amount by half.  · Use non-sugar sweeteners. Stevia, aspartame, and sucralose can satisfy a sweet tooth without adding calories.  · Swap out sugar-filled soda and other drinks. Buy sugar-free or low-calorie beverages. Remember water is always the best choice.  · Read labels and choose foods with less added sugar. Keep in mind that dairy foods and foods with fruit will have some natural sugar.  · Cut the sugar in recipes by 1/3 to 1/2. Boost the flavor with extracts like almond, vanilla, or orange. Or add spices such as cinnamon or nutmeg.  4. Eat more fiber  · Eat fresh fruits and vegetables every day.  · Boost your diet with whole grains. Go for oats, whole-grain rice, and bran.  · Add beans and lentils to your meals.  · Drink more water to match your fiber increase. This is to help prevent constipation.  Date Last Reviewed: 5/11/2015  © 4762-5941 [employer]. [address] [place] [address], [place] [address]. All rights reserved. This information is not intended as a substitute for professional medical care. Always follow your healthcare professional's instructions.

## 2018-09-19 NOTE — TELEPHONE ENCOUNTER
----- Message from Jackie Arango MA sent at 9/19/2018 10:50 AM CDT -----  Contact: [phone] / self       ----- Message -----  From: Caryl Higgins  Sent: 9/19/2018  10:34 AM  To: Steven Mercado Staff    Patient was seen today at [facility] by Dr. Haile Paul. The patient is requesting a follow up visit with Dr. Harris to discuss her thyroid. I attempted to schedule but nothing shows available. The patient's last visit with Dr. Harris was 02/2018. Can someone contact the patient to schedule?    Thank you

## 2018-09-20 ENCOUNTER — TELEPHONE (OUTPATIENT)
Dept: ENDOCRINOLOGY | Facility: CLINIC | Age: 68
End: 2018-09-20

## 2018-09-20 NOTE — TELEPHONE ENCOUNTER
Talk to pt I apologize our December scheduled is not out yet. I will send a reminder letter in October for pt I make a appt with dr erickson.

## 2018-09-20 NOTE — TELEPHONE ENCOUNTER
Talk to pt let her know that she will see dr erickson Tuwilnerday sept 25 at 10:30am. She agreed and will come in.

## 2018-09-25 ENCOUNTER — OFFICE VISIT (OUTPATIENT)
Dept: ENDOCRINOLOGY | Facility: CLINIC | Age: 68
End: 2018-09-25
Payer: MEDICARE

## 2018-09-25 VITALS
DIASTOLIC BLOOD PRESSURE: 52 MMHG | HEIGHT: 60 IN | HEART RATE: 68 BPM | SYSTOLIC BLOOD PRESSURE: 111 MMHG | WEIGHT: 181.69 LBS | BODY MASS INDEX: 35.67 KG/M2

## 2018-09-25 DIAGNOSIS — E05.90 SUBCLINICAL HYPERTHYROIDISM: ICD-10-CM

## 2018-09-25 DIAGNOSIS — G47.33 OSA (OBSTRUCTIVE SLEEP APNEA): ICD-10-CM

## 2018-09-25 DIAGNOSIS — M85.80 OSTEOPENIA, UNSPECIFIED LOCATION: ICD-10-CM

## 2018-09-25 DIAGNOSIS — E04.2 MULTINODULAR THYROID: Primary | ICD-10-CM

## 2018-09-25 DIAGNOSIS — I10 HYPERTENSION, BENIGN: ICD-10-CM

## 2018-09-25 DIAGNOSIS — E78.2 MIXED HYPERLIPIDEMIA: ICD-10-CM

## 2018-09-25 PROCEDURE — 99214 OFFICE O/P EST MOD 30 MIN: CPT | Mod: PBBFAC | Performed by: INTERNAL MEDICINE

## 2018-09-25 PROCEDURE — 99999 PR PBB SHADOW E&M-EST. PATIENT-LVL IV: CPT | Mod: PBBFAC,GC,, | Performed by: INTERNAL MEDICINE

## 2018-09-25 PROCEDURE — 99214 OFFICE O/P EST MOD 30 MIN: CPT | Mod: S$PBB,GC,, | Performed by: INTERNAL MEDICINE

## 2018-09-25 NOTE — PROGRESS NOTES
Subjective:      Patient ID: Eugenie Coates is a 68 y.o. female who presents to clinic today for initial visit  Chief Complaint   Patient presents with    Hyperthyroidism       With regards to the diabetes:    Diagnosed:     Current regimen:  Metformin 500 mg bid    Missed doses?   no    # times a day testin   BG - today 136, usually 110s -150s.     Hypoglycemic event? None       Last eye exam: 2018  Last podiatry exam: 18     Typical meals: trying to keep to a healthy diet  Avoiding carbs, increasing salad and protein.  Does not drink soda.  Drinks crystal lite, water.  Sugar substitutes used if needed.   Avoids cakes, cookies, sweets, chips     Education - last visit: 18, scheduled to see them again on Friday.    Exercise - tried to stay active, does 30min on the bike daily.  Also walks.    Endorses nocturia, polyuria, history of glaucoma  Denies polydipsia, vision changes  Denies numbness or tingling of hands/feet     Has medic card with medical history.    With regards to hypertension:  Tolerating ACEI or ARB    With regards to dyslipidemia:  Tolerating statin     With regards to hyperthyroidism:  Current hyperthyroid medication: none    Notes that she has heartburn, on antacids.      current symptoms:   Nightly CP - sharp, feels like indigestion    Denies:  Diarrhea/constipation  Palpations  Weight change - just normal fluctuation  Hair loss  Brittle nails  No skin changes  tremor   anxiety    Depression - since , used to see psychologist from [id number].  Never prescribed medication, solely managed by verbal therapy.  Retired in .  Dr. Paul requested psyc, but she is currently on the waiting list on the [employer] [facility]    Last BMD:   see below    Denies new eye symptoms:    With regards to osteopenia:  On calcium 500mg   On Vit D3 1000iU daily    Recent falls:  No  No history of fracture.    No family history of osteoporosis.    Fall proofed home - stairs with rails,  lighting, rugs etc.    No history of cancer, radiation, kidney disease, bone disease,   Does have significant reflux, CKD 3      Review of Systems   Constitutional: Negative for unexpected weight change.   Eyes: Negative for visual disturbance.   Respiratory: Negative for shortness of breath.    Cardiovascular: Negative for chest pain.   Gastrointestinal: Negative for abdominal pain.   Genitourinary: Negative for urgency.   Musculoskeletal: Negative for arthralgias.   Skin: Negative for wound.   Neurological: Negative for headaches.   Hematological: Does not bruise/bleed easily.   Psychiatric/Behavioral: Negative for sleep disturbance.       Objective:     Vitals:    09/25/18 1033   BP: (!) 111/52   Pulse: 68     Body mass index is 35.48 kg/m².    Physical Exam   Constitutional: She appears well-developed.   HENT:   Right Ear: External ear normal.   Left Ear: External ear normal.   Nose: Nose normal.   Hearing normal  Dentition good   Neck: No tracheal deviation present. No thyromegaly present.   Cardiovascular: Normal rate.   No murmur heard.  Pulmonary/Chest: Effort normal and breath sounds normal.   Abdominal: Soft. There is no tenderness. No hernia.   Musculoskeletal: She exhibits no edema.   Feet no cuts or  scratches  Shoes appropriate   Neurological: She has normal reflexes. No cranial nerve deficit.   Skin: No rash noted.   No nodules     Psychiatric: She has a normal mood and affect. Judgment normal.   Vitals reviewed.         DXA 11/2016  Findings: The L1 to L4 vertebral bone mineral density is equal to 1.22 g/cm squared with a T score of -0.5 and a Z score of 1.6.    The left femoral neck bone mineral density is equal to 0.75 g/cm squared with a T score of -1.4 and a Z score of -0.1.    IMPRESSION:  Osteopenia of the left hip.  Normal bone mineral density of the lumbar spine -- there is an approximately 5.1% risk of a major osteoporotic fracture and a 0.4% risk of hip fracture in the next 10 years  (FRAX).    Relevant labs and images have been reviewed.     BMP  Lab Results   Component Value Date     08/06/2018    K 4.0 08/06/2018     08/06/2018    CO2 26 08/06/2018    BUN 18 08/06/2018    CREATININE 1.0 08/06/2018    CALCIUM 9.9 08/06/2018    ANIONGAP 8 08/06/2018    ESTGFRAFRICA >60.0 08/06/2018    EGFRNONAA 58.4 (A) 08/06/2018       Lab Results   Component Value Date    HGBA1C 6.5 (H) 08/06/2018       Lab Results   Component Value Date    CHOL 205 (H) 08/06/2018    CHOL 244 (H) 01/23/2018    CHOL 200 (H) 07/18/2017     Lab Results   Component Value Date    HDL 44 08/06/2018    HDL 44 01/23/2018    HDL 41 07/18/2017     Lab Results   Component Value Date    LDLCALC 134.8 08/06/2018    LDLCALC 168.2 (H) 01/23/2018    LDLCALC 129.4 07/18/2017     Lab Results   Component Value Date    TRIG 131 08/06/2018    TRIG 159 (H) 01/23/2018    TRIG 148 07/18/2017     Lab Results   Component Value Date    CHOLHDL 21.5 08/06/2018    CHOLHDL 18.0 (L) 01/23/2018    CHOLHDL 20.5 07/18/2017     TSH   Date Value Ref Range Status   08/22/2018 0.656 0.400 - 4.000 uIU/mL Final     Free T4   Date Value Ref Range Status   08/22/2018 1.11 0.71 - 1.51 ng/dL Final     T3, Free   Date Value Ref Range Status   08/22/2018 2.2 (L) 2.3 - 4.2 pg/mL Final     Thyroid US - 4/27/18 personally reviewed  The thyroid is moderately enlarged, normal echogenicity, with normal vascularity.    There is a large dominant isthmus nodule and a smaller L sided nodule.    Cervical LN are benign appearing.    NM Uptake and Scan 5/11/18  The 24 hr uptake is 18.3%.  There is a hyperfunctioning hot nodule lower pole left suppressing the rest of the gland.   disease.  Recommended treatment dose 30.0 millicuries of iodine 131 orally.    Assessment:     1. Multinodular thyroid    2. Subclinical hyperthyroidism    3. Osteopenia, unspecified location    4. AFSHIN (obstructive sleep apnea)    5. BMI 35.0-35.9,adult    6. Hypertension, benign    7.  Mixed hyperlipidemia      Plan:     Multinodular thyroid  US confirmed multiple nodules.  In setting of subclinical hyperthyroidism, it was correlated with NM uptake scan.  NM U&S showed hot nodules, recommending ANGULO.    Repeat US prior to next visit.  Consider FNA at that time of isthmus nodule.    Subclinical hyperthyroidism  Biochemical evidence of subclinical hyperthyroidism, now normal.  Patient is currently asymptomatic.      Risks and benefits discussed with patient.  Increased risk of mortality and/or conversion to overt hyperthyroidism when:  TSH <0.1, heart disease (heart failure, CMPY), arrhythmia (ie afib), bone disease, and underlying nodular thyroid disease.     Diagnosis confirmed.    US showed nodules, and NM uptake and scan showed hot nodule.       Discussed possible treatment options - observation, Methimazole, ANGULO.     If we observe, patient is at risk for spontaneous resolution, stable disease, or progression to overt hyperthyroidism.  Patient should proceed with caution if she needs iodine load for imaging or requires iodine containing medication (ie. Amiodarone).  Medication side effects discussed. ANGULO is associated with resolution of disease and/or progression to hypothyroidism.     Patient chose to proceed with observation. Will repeat TFTs in 6 months. Okay with observation at this time as her TSH is 0.6 and she remains asx.  Will keep in mind that TSH levels tend to increase with age, and will not maintain false re-assurance with normal TSH.  But it is important that she is not <0.1.    Osteopenia  Risk factors - chronic steroid injections, menopausal     Reassured no recent fracture.    On calcium/vitamin D supplements.  [email or web address]      Repeat DXA 11/2018 - requested primary care to order at the same facility.  If bone disease has progressed, consider ANGULO treatment.    Type 2 diabetes mellitus with hyperglycemia, without long-term current use of insulin  Reviewed goals of therapy are to get  the best control we can without hypoglycemia     Well controlled on current regimen. No change at this time.  Lab Results   Component Value Date    HGBA1C 6.5 (H) 08/06/2018        Reviewed patient's current insulin regimen. Clarified proper insulin dose and timing in relation to meals, etc. Insulin injection sites and proper rotation instructed.       Advised frequent self blood glucose monitoring.  Patient encouraged to document glucose results and bring them to every clinic visit       Hypoglycemia precautions discussed. Instructed on precautions before driving.       Close adherence to lifestyle changes recommended.       Periodic follow ups for eye evaluations, foot care and dental care suggested.     Vaccinations - up to date.  Defer to PCP    Obesity, Class II, BMI 35-39.9, with comorbidity  Body mass index is 35.48 kg/m².  Discussed continued weight loss and compliance with medical therapy (CPAP, DM diet, and exercise).  Notes 5lbs weight loss over last 6 months.    Diabetes mellitus type 2 without retinopathy  See diabetes as above     Hypertension, benign  Tolerating ARB  Titrating per primary.  BP today at goal.     Hyperlipidemia  Tolerating statin     CKD (chronic kidney disease) stage 3, GFR 30-59 ml/min  Avoid hypoglycemia.    AFSHIN  Tolerating CPAP at night.  Compliant with therapy.        RTC in 6 months - after US and repeat labs.      Jess Harris MD  Endocrinology Fellow       This case has been discussed with staff, Dr. Lucio.

## 2018-09-27 ENCOUNTER — TELEPHONE (OUTPATIENT)
Dept: FAMILY MEDICINE | Facility: CLINIC | Age: 68
End: 2018-09-27

## 2018-09-27 NOTE — TELEPHONE ENCOUNTER
----- Message from Kacey Naqvi sent at 9/27/2018  1:42 PM CDT -----  Contact: [phone]/self  Patient requesting to speak with you regarding her upcoming appt.

## 2018-09-27 NOTE — TELEPHONE ENCOUNTER
patient called requesting an order for Dexa but she is not due until 2019.  Patient verbalized understanding.

## 2018-10-08 ENCOUNTER — LAB VISIT (OUTPATIENT)
Dept: LAB | Facility: HOSPITAL | Age: 68
End: 2018-10-08
Attending: FAMILY MEDICINE
Payer: MEDICARE

## 2018-10-08 DIAGNOSIS — I10 HYPERTENSION, BENIGN: ICD-10-CM

## 2018-10-08 LAB
ANION GAP SERPL CALC-SCNC: 10 MMOL/L
BUN SERPL-MCNC: 26 MG/DL
CALCIUM SERPL-MCNC: 9.9 MG/DL
CHLORIDE SERPL-SCNC: 107 MMOL/L
CO2 SERPL-SCNC: 24 MMOL/L
CREAT SERPL-MCNC: 1.1 MG/DL
EST. GFR  (AFRICAN AMERICAN): >60 ML/MIN/1.73 M^2
EST. GFR  (NON AFRICAN AMERICAN): 52.1 ML/MIN/1.73 M^2
GLUCOSE SERPL-MCNC: 121 MG/DL
POTASSIUM SERPL-SCNC: 4.3 MMOL/L
SODIUM SERPL-SCNC: 141 MMOL/L

## 2018-10-08 PROCEDURE — 80048 BASIC METABOLIC PNL TOTAL CA: CPT

## 2018-10-08 PROCEDURE — 36415 COLL VENOUS BLD VENIPUNCTURE: CPT | Mod: PO

## 2018-10-15 ENCOUNTER — OFFICE VISIT (OUTPATIENT)
Dept: OPTOMETRY | Facility: CLINIC | Age: 68
End: 2018-10-15
Payer: MEDICARE

## 2018-10-15 ENCOUNTER — CLINICAL SUPPORT (OUTPATIENT)
Dept: OPHTHALMOLOGY | Facility: CLINIC | Age: 68
End: 2018-10-15
Payer: MEDICARE

## 2018-10-15 DIAGNOSIS — H40.1134 PRIMARY OPEN ANGLE GLAUCOMA OF BOTH EYES, INDETERMINATE STAGE: Primary | ICD-10-CM

## 2018-10-15 DIAGNOSIS — H25.13 NUCLEAR SCLEROSIS, BILATERAL: ICD-10-CM

## 2018-10-15 DIAGNOSIS — E11.9 DIABETES MELLITUS WITHOUT OPHTHALMIC MANIFESTATIONS: ICD-10-CM

## 2018-10-15 DIAGNOSIS — H52.7 REFRACTIVE ERROR: ICD-10-CM

## 2018-10-15 PROCEDURE — 76514 ECHO EXAM OF EYE THICKNESS: CPT | Mod: 26,S$PBB,, | Performed by: OPTOMETRIST

## 2018-10-15 PROCEDURE — 99999 PR PBB SHADOW E&M-EST. PATIENT-LVL II: CPT | Mod: PBBFAC,,, | Performed by: OPTOMETRIST

## 2018-10-15 PROCEDURE — 76514 ECHO EXAM OF EYE THICKNESS: CPT | Mod: PBBFAC,PO | Performed by: OPTOMETRIST

## 2018-10-15 PROCEDURE — 92014 COMPRE OPH EXAM EST PT 1/>: CPT | Mod: S$PBB,,, | Performed by: OPTOMETRIST

## 2018-10-15 PROCEDURE — 92083 EXTENDED VISUAL FIELD XM: CPT | Mod: PBBFAC,PO | Performed by: OPTOMETRIST

## 2018-10-15 PROCEDURE — 99212 OFFICE O/P EST SF 10 MIN: CPT | Mod: PBBFAC,PO | Performed by: OPTOMETRIST

## 2018-10-15 PROCEDURE — 92015 DETERMINE REFRACTIVE STATE: CPT | Mod: ,,, | Performed by: OPTOMETRIST

## 2018-10-15 PROCEDURE — 92133 CPTRZD OPH DX IMG PST SGM ON: CPT | Mod: PBBFAC,PO | Performed by: OPTOMETRIST

## 2018-10-15 RX ORDER — BRIMONIDINE TARTRATE 2 MG/ML
1 SOLUTION/ DROPS OPHTHALMIC 3 TIMES DAILY
Qty: 15 ML | Refills: 4 | Status: SHIPPED | OUTPATIENT
Start: 2018-10-15 | End: 2019-02-25 | Stop reason: SDUPTHER

## 2018-10-15 NOTE — PROGRESS NOTES
HPI     Presenting Complaint: Pt here today for yearly diabetic eye exam and   yearly glaucoma eval. Pt states blood sugar levels have been controlled.     Ophthalmic medication / drops: Pt states compliant with drops.   Lumigan 1% 1 drop both eyes at bedtime  Brimonidine1 drop both eyes 3 times a day    Pt states distance vision has been blurry occasionally.     (-) headaches  (-) diplopia   (-) flashes / (-) floaters    Hemoglobin A1C       Date                     Value               Ref Range             Status                08/06/2018               6.5 (H)             4.0 - 5.6 %           Final                 05/04/2018               6.4 (H)             4.0 - 5.6 %           Final                 01/23/2018               6.7 (H)             4.0 - 5.6 %           Final            ----------        Last edited by Maurilio Doran, OD on 10/15/2018  1:53 PM. (History)            Assessment /Plan     For exam results, see Encounter Report.    Primary open angle glaucoma of both eyes, indeterminate stage  -     Posterior Segment OCT Optic Nerve- Both eyes  -     Hancock Visual Field - OU - Extended - Both Eyes  -     bimatoprost (LUMIGAN) 0.01 % Drop; Place 1 drop into both eyes every evening.  Dispense: 3 Bottle; Refill: 4  -     brimonidine 0.2% (ALPHAGAN) 0.2 % Drop; Place 1 drop into both eyes 3 (three) times daily.  Dispense: 15 mL; Refill: 4    Diabetes mellitus without ophthalmic manifestations    Nuclear sclerosis, bilateral    Refractive error      POAG OU, IOP stable with use of drops. Refilled today to [facility] mail order. Continue lumigan qpm ou  alphagan tid ou. OCT no RNFL thinning, unreliable fields. Scheduled to return for repeat HVF 24-2 SS, will call with results. Return in 4 months for IOP check, sooner prn.     DM type 2 w/o ocular retinopathy OU. Discussed possible ocular affects of uncontrolled blood sugar with patient. Recommended continued strong blood sugar control and continued care  with PCP. Monitor yearly.     Moderate NS OU. Discussed possible ocular affects of cataracts. Acceptable BCVA OU. Discussed treatment options. Surgery not recommended at this time. Monitor yearly.     Dispensed updated spectacle Rx. Discussed various spectacle lens options. Discussed adaptation period to new specs.  Demonstrated new spec Rx vs current specs in phoropter with patient satisfaction.        RTC for repeat HVF 24-2 SS.

## 2018-10-15 NOTE — PROGRESS NOTES
Pt had HVF 24-2 sf done today and OCT N started test over times 2. Pt would not follow directions per eye moving with flashes.  TF

## 2018-10-18 ENCOUNTER — OFFICE VISIT (OUTPATIENT)
Dept: FAMILY MEDICINE | Facility: CLINIC | Age: 68
End: 2018-10-18
Payer: MEDICARE

## 2018-10-18 VITALS
OXYGEN SATURATION: 96 % | HEART RATE: 80 BPM | WEIGHT: 181 LBS | HEIGHT: 60 IN | BODY MASS INDEX: 35.53 KG/M2 | DIASTOLIC BLOOD PRESSURE: 64 MMHG | SYSTOLIC BLOOD PRESSURE: 152 MMHG

## 2018-10-18 DIAGNOSIS — I50.30 HEART FAILURE WITH PRESERVED EJECTION FRACTION: ICD-10-CM

## 2018-10-18 DIAGNOSIS — I10 UNCONTROLLED HYPERTENSION: Primary | ICD-10-CM

## 2018-10-18 DIAGNOSIS — I10 HYPERTENSION, BENIGN: ICD-10-CM

## 2018-10-18 DIAGNOSIS — N18.30 CKD (CHRONIC KIDNEY DISEASE) STAGE 3, GFR 30-59 ML/MIN: ICD-10-CM

## 2018-10-18 PROCEDURE — 99999 PR PBB SHADOW E&M-EST. PATIENT-LVL IV: CPT | Mod: PBBFAC,,, | Performed by: FAMILY MEDICINE

## 2018-10-18 PROCEDURE — 99214 OFFICE O/P EST MOD 30 MIN: CPT | Mod: PBBFAC,PO | Performed by: FAMILY MEDICINE

## 2018-10-18 PROCEDURE — 99214 OFFICE O/P EST MOD 30 MIN: CPT | Mod: S$PBB,,, | Performed by: FAMILY MEDICINE

## 2018-10-18 NOTE — PROGRESS NOTES
"Subjective:       Patient ID: Eugenie Coates is a 68 y.o. female.    Chief Complaint: Follow-up (3 wks recheck BP) and Hypertension    Yazmin is a 68 y.o. female who presents today for follow up on her chronic medical issues and her uncontrolled HTN.     HTN: taking aldactone 25, amlodipine, and hydralazine. Increase ACE/HCTZ to 20/12.5, two pills daily. She takes her pressures at home and her ambulatory readings are around 120/70. She has chronic LE edema but this is improving. Her pressure is not controlled today.     Chest Pain: she has this intermittently at night when she "gets up." She has discussed this with her cardiologist. ECHO done by cards showed diastolic dysfunction. There is no orthopnea at night. This doesn't happen with activity. She is not having any pain currently. She has no issues walking up stairs or walking in general.   Breathing: has been good without inhalers.   Exercise: she has been biking about 30 min about 5 days a week. She is also doing yard work. She has no issues with biking or doing yard work.       Hypertension   This is a chronic problem. The current episode started more than 1 year ago. The problem has been waxing and waning since onset. The problem is uncontrolled. Associated symptoms include chest pain, neck pain and peripheral edema. Pertinent negatives include no anxiety, blurred vision, headaches, malaise/fatigue, orthopnea, palpitations, PND, shortness of breath or sweats. There are no associated agents to hypertension. Risk factors for coronary artery disease include obesity and dyslipidemia. Past treatments include ACE inhibitors, diuretics, calcium channel blockers and lifestyle changes. The current treatment provides significant improvement. There are no compliance problems.      Review of Systems   Constitutional: Negative for malaise/fatigue.   Eyes: Negative for blurred vision.   Respiratory: Negative for shortness of breath.    Cardiovascular: Positive for " chest pain. Negative for palpitations, orthopnea and PND.   Musculoskeletal: Positive for neck pain.   Neurological: Negative for headaches.         Objective:     Vitals:    10/18/18 0951   BP: (!) 152/64   Pulse: 80        Physical Exam   Constitutional: She is oriented to person, place, and time. She appears well-developed and well-nourished. No distress.   HENT:   Head: Normocephalic and atraumatic.   Neck: Normal range of motion. Neck supple.   Cardiovascular: Normal rate and regular rhythm.   Murmur heard.  Pulmonary/Chest: Effort normal and breath sounds normal.   Abdominal: Soft. There is no tenderness.   obese   Musculoskeletal: She exhibits no edema.   Neurological: She is alert and oriented to person, place, and time. No sensory deficit.   Skin: Skin is warm. She is not diaphoretic.   Psychiatric: She has a normal mood and affect. Her behavior is normal. Judgment and thought content normal.   Nursing note and vitals reviewed.      Assessment:       1. Uncontrolled hypertension    2. BMI 35.0-35.9,adult    3. Hypertension, benign    4. Heart failure with preserved ejection fraction    5. CKD (chronic kidney disease) stage 3, GFR 30-59 ml/min        Plan:       BP still elevated despite 5 medications. Repeat on left and right arm were similar to nurses reading.     No NSAIDS such as ibuprofen or naproxen. Avoid Red meats. Aggressive fluids. Continue monitoring CMP q3-6 months. Renal consult and US of the kidneys.     Continue taking current BP medication for now.   BRING HOME CUFF TO RENAL DOCTORS APPOINTMENT    No breathing issues. Chest pain is likely MSK. She has discussed this with cardiology in the past, follow up and when to go to the ED was discussed with her.     Uncontrolled hypertension  -     US Retroperitoneal Complete; Future; Expected date: 10/18/2018  -     Ambulatory Referral to Nephrology    BMI 35.0-35.9,adult    Hypertension, benign  -     US Retroperitoneal Complete; Future; Expected  date: 10/18/2018  -     Ambulatory Referral to Nephrology    Heart failure with preserved ejection fraction    CKD (chronic kidney disease) stage 3, GFR 30-59 ml/min  -     US Retroperitoneal Complete; Future; Expected date: 10/18/2018  -     Ambulatory Referral to Nephrology      Warning signs discussed, patient to call with any further issues or worsening of symptoms.

## 2018-10-18 NOTE — PATIENT INSTRUCTIONS
No NSAIDS such as ibuprofen or naproxen. Avoid Red meats. Aggressive fluids. Continue monitoring CMP q3-6 months. Renal consult    Continue taking current BP medication  BRING HOME CUFF TO RENAL DOCTORS APPOINTMENT

## 2018-10-21 NOTE — PROGRESS NOTES
I, Munira Lucio, have personally taken the history and physical exam and I agree with Dr. Harris's assessment and plan.

## 2018-10-30 ENCOUNTER — HOSPITAL ENCOUNTER (OUTPATIENT)
Dept: RADIOLOGY | Facility: CLINIC | Age: 68
Discharge: HOME | End: 2018-10-30
Attending: FAMILY MEDICINE
Payer: MEDICARE

## 2018-10-30 DIAGNOSIS — N18.30 CKD (CHRONIC KIDNEY DISEASE) STAGE 3, GFR 30-59 ML/MIN: ICD-10-CM

## 2018-10-30 DIAGNOSIS — I10 UNCONTROLLED HYPERTENSION: ICD-10-CM

## 2018-10-30 DIAGNOSIS — I10 HYPERTENSION, BENIGN: ICD-10-CM

## 2018-10-30 PROCEDURE — 76770 US EXAM ABDO BACK WALL COMP: CPT | Mod: 26,,, | Performed by: RADIOLOGY

## 2018-10-30 PROCEDURE — 76770 US EXAM ABDO BACK WALL COMP: CPT | Mod: TC,PO

## 2018-11-05 ENCOUNTER — OFFICE VISIT (OUTPATIENT)
Dept: NEPHROLOGY | Facility: CLINIC | Age: 68
End: 2018-11-05
Payer: MEDICARE

## 2018-11-05 VITALS
BODY MASS INDEX: 35.17 KG/M2 | OXYGEN SATURATION: 98 % | DIASTOLIC BLOOD PRESSURE: 66 MMHG | HEART RATE: 66 BPM | SYSTOLIC BLOOD PRESSURE: 148 MMHG | WEIGHT: 179.13 LBS | HEIGHT: 60 IN

## 2018-11-05 DIAGNOSIS — I10 ESSENTIAL HYPERTENSION: Primary | ICD-10-CM

## 2018-11-05 PROCEDURE — 99213 OFFICE O/P EST LOW 20 MIN: CPT | Mod: PBBFAC,PO | Performed by: INTERNAL MEDICINE

## 2018-11-05 PROCEDURE — 99204 OFFICE O/P NEW MOD 45 MIN: CPT | Mod: S$PBB,,, | Performed by: INTERNAL MEDICINE

## 2018-11-05 PROCEDURE — 99999 PR PBB SHADOW E&M-EST. PATIENT-LVL III: CPT | Mod: PBBFAC,,, | Performed by: INTERNAL MEDICINE

## 2018-11-05 NOTE — LETTER
November 5, 2018      Haile Paul, DO  [address] [address]           [facility]  [address] [facility] [address] [place] [address]  Phone: [phone]          Patient: Eugenie Coates   MR Number: [id number]   YOB: 1950   Date of Visit: 11/5/2018       Dear Dr. Haile Paul:    Thank you for referring Eugenie Coates to me for evaluation. Attached you will find relevant portions of my assessment and plan of care.    If you have questions, please do not hesitate to call me. I look forward to following Eugenie Coates along with you.    Sincerely,    Zac Chacon MD    Enclosure  CC:  No Recipients    If you would like to receive this communication electronically, please contact [email or web address] or [phone] to request more information on [facility] access.    For providers and/or their staff who would like to refer a patient to [facility], please contact us through our one-stop-shop provider referral line, [facility] , at [phone].    If you feel you have received this communication in error or would no longer like to receive these types of communications, please e-mail [email or web address]

## 2018-11-05 NOTE — PROGRESS NOTES
Subjective:       Patient ID: Eugenie Coates is a 68 y.o. Black or  female who presents for new patient evaluation for hypertension.    Eugenie Coates is referred by Haile Paul DO to be evaluated for hypertension.  She has an 18 year history of HTN and 4 year history of diabetes.  She did take NSAIDs regularly up until late last year.  She does have some intermittent chronic right flank pain.  She states that lisinopril works well for her blood pressure which at home has been 120s/60s.      Review of Systems   Constitutional: Negative for appetite change, chills and fever.   Eyes: Negative for visual disturbance.   Respiratory: Negative for cough and shortness of breath.    Cardiovascular: Positive for leg swelling. Negative for chest pain.   Gastrointestinal: Negative for diarrhea, nausea and vomiting.   Genitourinary: Positive for flank pain (right). Negative for difficulty urinating, dysuria and hematuria.   Musculoskeletal: Positive for arthralgias. Negative for myalgias.   Skin: Negative for rash.   Neurological: Negative for headaches.   Psychiatric/Behavioral: Negative for sleep disturbance.       The past medical, family and social histories were reviewed for this encounter.     Past Medical History:   Diagnosis Date    Allergy     Anemia     Anxiety     Arthritis     Asthma     Bell palsy     Depression     Diabetes mellitus, type 2     DVT (deep venous thrombosis)     Endometriosis     Eye injury     stuck with tree branch od ?     GERD (gastroesophageal reflux disease)     Glaucoma     History of uterine fibroid     Hypertension     Nuclear sclerosis of both eyes 2016    Sleep apnea     uses C pap     Past Surgical History:   Procedure Laterality Date     SECTION      CHOLECYSTECTOMY      glaucoma laser Bilateral     HERNIA REPAIR      KNEE SURGERY Right     (R) knee scope; torn meniscus    REPAIR, HERNIA, VENTRAL, LAPAROSCOPIC  N/A 5/29/2013    [facility]    VEIN SURGERY  2003, 2004    Rt leg x2     Social History     Socioeconomic History    Marital status:      Spouse name: Not on file    Number of children: Not on file    Years of education: Not on file    Highest education level: Not on file   Social Needs    Financial resource strain: Not on file    Food insecurity - worry: Not on file    Food insecurity - inability: Not on file    Transportation needs - medical: Not on file    Transportation needs - non-medical: Not on file   Occupational History    Not on file   Tobacco Use    Smoking status: Never Smoker    Smokeless tobacco: Never Used   Substance and Sexual Activity    Alcohol use: No     Alcohol/week: 0.0 oz    Drug use: No    Sexual activity: Yes     Partners: Male   Other Topics Concern    Not on file   Social History Narrative    Not on file     Current Outpatient Medications   Medication Sig    amLODIPine (NORVASC) 10 MG tablet Take 1 tablet (10 mg total) by mouth every evening.    atorvastatin (LIPITOR) 40 MG tablet Take 1 tablet (40 mg total) by mouth once daily.    bimatoprost (LUMIGAN) 0.01 % Drop Place 1 drop into both eyes every evening.    blood sugar diagnostic Strp Test twice daily.  Accucheck viva test strips and lancets.    brimonidine 0.2% (ALPHAGAN) 0.2 % Drop Place 1 drop into both eyes 3 (three) times daily.    hydrALAZINE (APRESOLINE) 100 MG tablet Take 1 tablet (100 mg total) by mouth every 12 (twelve) hours.    lancets (ACCU-CHEK SOFTCLIX LANCETS) Misc 1 Units by Misc.(Non-Drug; Combo Route) route 2 (two) times daily.    lisinopril-hydrochlorothiazide (PRINZIDE,ZESTORETIC) 20-12.5 mg per tablet Take 2 tablets by mouth once daily.    metFORMIN (GLUCOPHAGE) 500 MG tablet Take 1 tablet (500 mg total) by mouth daily with breakfast.    omeprazole (PRILOSEC) 20 MG capsule Take 1 capsule (20 mg total) by mouth once daily.    spironolactone  (ALDACTONE) 25 MG tablet Take 1 tablet (25 mg total) by mouth once daily.     No current facility-administered medications for this visit.        BP (!) 148/66 (BP Location: Left arm, Patient Position: Sitting)   Pulse 66   Ht 5' (1.524 m)   Wt 81.2 kg (179 lb 1.6 oz)   SpO2 98%   BMI 34.98 kg/m²     Objective:      Physical Exam   Constitutional: She is oriented to person, place, and time. She appears well-developed and well-nourished. No distress.   HENT:   Head: Normocephalic and atraumatic.   Eyes: Conjunctivae are normal.   Neck: Neck supple. No JVD present.   Cardiovascular: Normal rate, regular rhythm and normal heart sounds. Exam reveals no gallop and no friction rub.   No murmur heard.  Pulmonary/Chest: Effort normal and breath sounds normal. No respiratory distress. She has no wheezes. She has no rales.   Abdominal: Soft. Bowel sounds are normal. She exhibits no distension. There is no tenderness.   Musculoskeletal: She exhibits no edema.   Neurological: She is alert and oriented to person, place, and time.   Skin: Skin is warm and dry. No rash noted.   Psychiatric: She has a normal mood and affect.   Vitals reviewed.      Assessment:       1. Essential hypertension        Plan:   Return to clinic in 3 months.  Labs for next visit include rp, upc.  Baseline creatinine is 1.1.  Renal US shows R 9.5 cm, L 10.1 cm.  My preference would be to increase the lisinopril and attempt to stop the hydralazine.  I gave her the option of participating in Digital HTN Medicine and she was receptive to this.  I do not see evidence of secondary HTN in her evaluation but will follow her.

## 2018-11-06 ENCOUNTER — TELEPHONE (OUTPATIENT)
Dept: NEPHROLOGY | Facility: CLINIC | Age: 68
End: 2018-11-06

## 2018-11-06 NOTE — TELEPHONE ENCOUNTER
----- Message from Joselito Bravo sent at 11/6/2018  9:10 AM CST -----  Contact: patient  Eugenie, [phone]. Requesting to speak with the nurse. Please advise. Thanks.

## 2018-11-20 ENCOUNTER — OFFICE VISIT (OUTPATIENT)
Dept: PODIATRY | Facility: CLINIC | Age: 68
End: 2018-11-20
Payer: MEDICARE

## 2018-11-20 ENCOUNTER — PATIENT MESSAGE (OUTPATIENT)
Dept: ADMINISTRATIVE | Facility: OTHER | Age: 68
End: 2018-11-20

## 2018-11-20 VITALS — BODY MASS INDEX: 35.14 KG/M2 | HEIGHT: 60 IN | WEIGHT: 179 LBS

## 2018-11-20 DIAGNOSIS — M20.42 HAMMER TOES OF BOTH FEET: ICD-10-CM

## 2018-11-20 DIAGNOSIS — E11.65 TYPE 2 DIABETES MELLITUS WITH HYPERGLYCEMIA, WITHOUT LONG-TERM CURRENT USE OF INSULIN: Primary | ICD-10-CM

## 2018-11-20 DIAGNOSIS — M20.41 HAMMER TOES OF BOTH FEET: ICD-10-CM

## 2018-11-20 DIAGNOSIS — B35.1 ONYCHOMYCOSIS DUE TO DERMATOPHYTE: ICD-10-CM

## 2018-11-20 PROCEDURE — 11721 DEBRIDE NAIL 6 OR MORE: CPT | Mod: PBBFAC,PN | Performed by: PODIATRIST

## 2018-11-20 PROCEDURE — 11721 DEBRIDE NAIL 6 OR MORE: CPT | Mod: Q9,S$PBB,, | Performed by: PODIATRIST

## 2018-11-20 PROCEDURE — 99212 OFFICE O/P EST SF 10 MIN: CPT | Mod: PBBFAC,PN | Performed by: PODIATRIST

## 2018-11-20 PROCEDURE — 99999 PR PBB SHADOW E&M-EST. PATIENT-LVL II: CPT | Mod: PBBFAC,,, | Performed by: PODIATRIST

## 2018-11-20 PROCEDURE — 99499 UNLISTED E&M SERVICE: CPT | Mod: S$PBB,,, | Performed by: PODIATRIST

## 2018-11-20 NOTE — PROGRESS NOTES
Subjective:      Patient ID: Eugenie Coates is a 68 y.o. female.    Chief Complaint: Diabetes Mellitus ([place] 8/6/18 6.5) and Diabetic Foot Exam    Eugenie is a 68 y.o. female who presents to the clinic for routine evaluation and treatment of diabetic feet. Eugenie has a past medical history of Allergy, Anemia, Anxiety, Arthritis, Asthma, Bell palsy, Depression, Diabetes mellitus, type 2, DVT (deep venous thrombosis), Endometriosis, Eye injury (2014), GERD (gastroesophageal reflux disease), Glaucoma, History of uterine fibroid, Hypertension, Nuclear sclerosis of both eyes (9/27/2016), and Sleep apnea. Patient relates no major problem with feet. Only complaints today consist of toenails in need of trimming.  Denies being painful with wearing shoe gear.  Has not attempted to self treat. Denies any additional pedal complaints.      PCP: Haile Paul, DO    Date Last Seen by PCP: 8/6/18    Hemoglobin A1C   Date Value Ref Range Status   08/06/2018 6.5 (H) 4.0 - 5.6 % Final     Comment:     ADA Screening Guidelines:  5.7-6.4%  Consistent with prediabetes  >or=6.5%  Consistent with diabetes  High levels of fetal hemoglobin interfere with the HbA1C  assay. Heterozygous hemoglobin variants (HbS, HgC, etc)do  not significantly interfere with this assay.   However, presence of multiple variants may affect accuracy.     05/04/2018 6.4 (H) 4.0 - 5.6 % Final     Comment:     According to ADA guidelines, hemoglobin A1c <7.0% represents  optimal control in non-pregnant diabetic patients. Different  metrics may apply to specific patient populations.   Standards of Medical Care in Diabetes-2016.  For the purpose of screening for the presence of diabetes:  <5.7%     Consistent with the absence of diabetes  5.7-6.4%  Consistent with increasing risk for diabetes   (prediabetes)  >or=6.5%  Consistent with diabetes  Currently, no consensus exists for use of hemoglobin A1c  for diagnosis of diabetes for children.  This  Hemoglobin A1c assay has significant interference with fetal   hemoglobin   (HbF). The results are invalid for patients with abnormal amounts of   HbF,   including those with known Hereditary Persistence   of Fetal Hemoglobin. Heterozygous hemoglobin variants (HbAS, HbAC,   HbAD, HbAE, HbA2) do not significantly interfere with this assay;   however, presence of multiple variants in a sample may impact the %   interference.     01/23/2018 6.7 (H) 4.0 - 5.6 % Final     Comment:     According to ADA guidelines, hemoglobin A1c <7.0% represents  optimal control in non-pregnant diabetic patients. Different  metrics may apply to specific patient populations.   Standards of Medical Care in Diabetes-2016.  For the purpose of screening for the presence of diabetes:  <5.7%     Consistent with the absence of diabetes  5.7-6.4%  Consistent with increasing risk for diabetes   (prediabetes)  >or=6.5%  Consistent with diabetes  Currently, no consensus exists for use of hemoglobin A1c  for diagnosis of diabetes for children.  This Hemoglobin A1c assay has significant interference with fetal   hemoglobin   (HbF). The results are invalid for patients with abnormal amounts of   HbF,   including those with known Hereditary Persistence   of Fetal Hemoglobin. Heterozygous hemoglobin variants (HbAS, HbAC,   HbAD, HbAE, HbA2) do not significantly interfere with this assay;   however, presence of multiple variants in a sample may impact the %   interference.             Past Medical History:   Diagnosis Date    Allergy     Anemia     Anxiety     Arthritis     Asthma     Bell palsy     Depression     Diabetes mellitus, type 2     DVT (deep venous thrombosis)     Endometriosis     Eye injury 2014    stuck with tree branch od ?     GERD (gastroesophageal reflux disease)     Glaucoma     History of uterine fibroid     Hypertension     Nuclear sclerosis of both eyes 9/27/2016    Sleep apnea     uses C pap       Past Surgical  History:   Procedure Laterality Date     SECTION      CHOLECYSTECTOMY      glaucoma laser Bilateral     HERNIA REPAIR      KNEE SURGERY Right 2008    (R) knee scope; torn meniscus    REPAIR, HERNIA, VENTRAL, LAPAROSCOPIC N/A 2013    [facility]    VEIN SURGERY  ,     Rt leg x2       Family History   Problem Relation Age of Onset    Diabetes Mother     No Known Problems Father     No Known Problems Sister     No Known Problems Brother     No Known Problems Maternal Aunt     No Known Problems Maternal Uncle     No Known Problems Paternal Aunt     No Known Problems Paternal Uncle     No Known Problems Maternal Grandmother     No Known Problems Maternal Grandfather     No Known Problems Paternal Grandmother     No Known Problems Paternal Grandfather     Amblyopia Neg Hx     Blindness Neg Hx     Cancer Neg Hx     Cataracts Neg Hx     Glaucoma Neg Hx     Hypertension Neg Hx     Macular degeneration Neg Hx     Retinal detachment Neg Hx     Strabismus Neg Hx     Stroke Neg Hx     Thyroid disease Neg Hx        Social History     Socioeconomic History    Marital status:      Spouse name: None    Number of children: None    Years of education: None    Highest education level: None   Social Needs    Financial resource strain: None    Food insecurity - worry: None    Food insecurity - inability: None    Transportation needs - medical: None    Transportation needs - non-medical: None   Occupational History    None   Tobacco Use    Smoking status: Never Smoker    Smokeless tobacco: Never Used   Substance and Sexual Activity    Alcohol use: No     Alcohol/week: 0.0 oz    Drug use: No    Sexual activity: Yes     Partners: Male   Other Topics Concern    None   Social History Narrative    None       Current Outpatient Medications   Medication Sig Dispense Refill    amLODIPine (NORVASC) 10 MG tablet Take 1 tablet (10 mg total) by  mouth every evening. 90 tablet 2    atorvastatin (LIPITOR) 40 MG tablet Take 1 tablet (40 mg total) by mouth once daily. 90 tablet 2    bimatoprost (LUMIGAN) 0.01 % Drop Place 1 drop into both eyes every evening. 3 Bottle 4    blood sugar diagnostic Strp Test twice daily.  Accucheck viva test strips and lancets. 300 each 3    brimonidine 0.2% (ALPHAGAN) 0.2 % Drop Place 1 drop into both eyes 3 (three) times daily. 15 mL 4    hydrALAZINE (APRESOLINE) 100 MG tablet Take 1 tablet (100 mg total) by mouth every 12 (twelve) hours. 180 tablet 2    lancets (ACCU-CHEK SOFTCLIX LANCETS) Misc 1 Units by Misc.(Non-Drug; Combo Route) route 2 (two) times daily. 300 each 3    lisinopril-hydrochlorothiazide (PRINZIDE,ZESTORETIC) 20-12.5 mg per tablet Take 2 tablets by mouth once daily. 180 tablet 0    metFORMIN (GLUCOPHAGE) 500 MG tablet Take 1 tablet (500 mg total) by mouth daily with breakfast. 90 tablet 3    omeprazole (PRILOSEC) 20 MG capsule Take 1 capsule (20 mg total) by mouth once daily. 90 capsule 1    spironolactone (ALDACTONE) 25 MG tablet Take 1 tablet (25 mg total) by mouth once daily. 90 tablet 2     No current facility-administered medications for this visit.        Review of patient's allergies indicates:   Allergen Reactions    Percodan [oxycodone hcl-oxycodone-asa] Hives and Itching         Review of Systems   Constitution: Negative for chills and fever.   Cardiovascular: Positive for leg swelling. Negative for claudication.   Skin: Positive for color change and nail changes.   Musculoskeletal: Positive for joint swelling. Negative for joint pain, muscle cramps and muscle weakness.   Neurological: Positive for paresthesias. Negative for numbness.   Psychiatric/Behavioral: Negative for altered mental status.           Objective:      Physical Exam   Constitutional: She is oriented to person, place, and time. She appears well-developed and well-nourished. No distress.   Cardiovascular:   Pulses:        Dorsalis pedis pulses are 2+ on the right side, and 2+ on the left side.        Posterior tibial pulses are 1+ on the right side, and 1+ on the left side.   CFT <3 seconds bilateral.  Pedal hair growth decreased bilateral.  Varicosities noted bilateral.  Moderate non pitting edema noted to bilateral lower extremity.  Toes are cool to touch bilateral.     Musculoskeletal: She exhibits edema. She exhibits no tenderness.   Muscle strength 5/5 in all muscle groups bilateral.  No tenderness nor crepitation with ROM of foot/ankle joints bilateral.  No tenderness with palpation of bilateral foot and ankle.  Bilateral pes planus foot type.  Bilateral hallux abducto valgus.  Bilateral semi-reducible contracture of toes 2-5.     Neurological: She is alert and oriented to person, place, and time. She has normal strength. A sensory deficit is present.   Protective sensation per [place] monofilament intact bilateral.    Vibratory sensation decreased bilateral.    Light touch intact bilateral.   Skin: Skin is warm, dry and intact. No abrasion, no bruising, no burn, no ecchymosis, no laceration, no lesion, no petechiae and no rash noted. She is not diaphoretic. No cyanosis or erythema. No pallor. Nails show no clubbing.   Pedal skin appear edematous bilateral.  Toenails x 10 appear thickened by 2 mm's, elongated by 2 mm's, and discolored with subungual debris.  Examination of the skin reveals no evidence of significant maceration, rashes, open lesions, suspicious appearing nevi or other concerning lesions.              Assessment:       Encounter Diagnoses   Name Primary?    Type 2 diabetes mellitus with hyperglycemia, without long-term current use of insulin Yes    Hammer toes of both feet     Onychomycosis due to dermatophyte          Plan:       Eugenie was seen today for diabetes mellitus and diabetic foot exam.    Diagnoses and all orders for this visit:    Type 2 diabetes mellitus with hyperglycemia, without  long-term current use of insulin    Hammer toes of both feet    Onychomycosis due to dermatophyte      I counseled the patient on her conditions, their implications and medical management.    Exam largely unchanged in comparison to the prior one.  Continues to maintain tight control over her blood glucose.    Shoe inspection. Diabetic Foot Education. Patient reminded of the importance of good nutrition and blood sugar control to help prevent podiatric complications of diabetes. Patient instructed on proper foot hygeine. We discussed wearing proper shoe gear, daily foot inspections, never walking without protective shoe gear, never putting sharp instruments to feet    Advised to continue wearing diabetic shoes/insoles that accommodate for digital deformities.    With patient's permission, nails were aggressively reduced and debrided x 10 to their soft tissue attachment mechanically and with electric , removing all offending nail and debris. Patient relates relief following the procedure.  She will continue to monitor the areas daily, inspect her feet, wear protective shoe gear when ambulatory, moisturizer to maintain skin integrity and follow in this office in approximately 3 months, sooner p.r.n.    Follow-up in about 3 months (around 2/20/2019).    Giuliano Garnett DPM

## 2018-11-21 ENCOUNTER — PATIENT MESSAGE (OUTPATIENT)
Dept: ADMINISTRATIVE | Facility: OTHER | Age: 68
End: 2018-11-21

## 2018-11-29 DIAGNOSIS — I10 ESSENTIAL HYPERTENSION: ICD-10-CM

## 2018-11-30 RX ORDER — SPIRONOLACTONE 25 MG/1
TABLET ORAL
Qty: 90 TABLET | Refills: 0 | Status: SHIPPED | OUTPATIENT
Start: 2018-11-30 | End: 2019-02-06 | Stop reason: ALTCHOICE

## 2018-12-01 DIAGNOSIS — I10 HYPERTENSION, BENIGN: ICD-10-CM

## 2018-12-02 RX ORDER — LISINOPRIL AND HYDROCHLOROTHIAZIDE 12.5; 2 MG/1; MG/1
TABLET ORAL
Qty: 180 TABLET | Refills: 0 | Status: SHIPPED | OUTPATIENT
Start: 2018-12-02 | End: 2019-01-18 | Stop reason: SDUPTHER

## 2018-12-10 DIAGNOSIS — K21.9 GASTROESOPHAGEAL REFLUX DISEASE, ESOPHAGITIS PRESENCE NOT SPECIFIED: ICD-10-CM

## 2018-12-11 ENCOUNTER — PATIENT OUTREACH (OUTPATIENT)
Dept: OTHER | Facility: OTHER | Age: 68
End: 2018-12-11

## 2018-12-11 RX ORDER — OMEPRAZOLE 20 MG/1
CAPSULE, DELAYED RELEASE ORAL
Qty: 90 CAPSULE | Refills: 0 | Status: SHIPPED | OUTPATIENT
Start: 2018-12-11 | End: 2019-01-18 | Stop reason: SDUPTHER

## 2018-12-11 NOTE — LETTER
Ann Bryant, PharmD  [address] [place] [address]     Dear Eugenie Coates,    Welcome to the [facility]!           My name is Ann Bryant PharmD and I am your dedicated Digital Medicine clinician.  As an expert in medication management, I will help ensure that the medications you are taking continue to provide you with the intended benefits.        I am Giuliano Lyman and I will be your health  for the duration of the program.  My  job is to help you identify lifestyle changes to improve your blood pressure control.  We will talk about nutrition, exercise, and other ways that you may be able to adjust your current habits to better your health. Together, we will work to improve your overall health and encourage you to meet your goals for a healthier lifestyle.    What we expect from YOU:    You will need to take blood pressure readings multiple times a week and no less than one reading per week.   It is important that you take your measurements at different times during the day, when possible.     What you should expect from your Digital Medicine Care Team:   We will provide you with education about high blood pressure, including lifestyle changes that could help you to control your blood pressure.   We will review your weekly readings and provide you with monthly blood pressure progress reports after you have been in the program for more than 30 days.   We will send monthly progress reports on your blood pressure control to your physician so they can follow along with your progress as well.    You will be able to reach me by phone at [phone] or through your [facility] account by clicking my name under Care Team on the right side of the home screen.    I look forward to working with you to achieve your blood pressure goals!    Sincerely,  Ann Bryant PharmD  Your personal clinician    Please visit  [email or web address] to learn more about high blood pressure and what you can do lower your blood pressure.                                                                                           Eugenie Coates  [address] [place] [address]

## 2018-12-11 NOTE — LETTER
Lillie Brunner, PharmD  [address] [place] [address]     Dear Eugenie Coates,    Welcome to the [facility]!           My name is Lillie Brunner PharmD and I am your dedicated Digital Medicine clinician.  As an expert in medication management, I will help ensure that the medications you are taking continue to provide you with the intended benefits.        I am Giuliano Lyman and I will be your health  for the duration of the program.  My  job is to help you identify lifestyle changes to improve your blood pressure control.  We will talk about nutrition, exercise, and other ways that you may be able to adjust your current habits to better your health. Together, we will work to improve your overall health and encourage you to meet your goals for a healthier lifestyle.    What we expect from YOU:    You will need to take blood pressure readings multiple times a week and no less than one reading per week.   It is important that you take your measurements at different times during the day, when possible.     What you should expect from your Digital Medicine Care Team:   We will provide you with education about high blood pressure, including lifestyle changes that could help you to control your blood pressure.   We will review your weekly readings and provide you with monthly blood pressure progress reports after you have been in the program for more than 30 days.   We will send monthly progress reports on your blood pressure control to your physician so they can follow along with your progress as well.    You will be able to reach me by phone at [phone] or through your [facility] account by clicking my name under Care Team on the right side of the home screen.    I look forward to working with you to achieve your blood pressure goals!    Sincerely,  Lillie Brunner PharmD  Your personal clinician    Please visit  [email or web address] to learn more about high blood pressure and what you can do lower your blood pressure.                                                                                           Eugenie Coates  [address] [place] [address]

## 2018-12-11 NOTE — PROGRESS NOTES
Digital Medicine Enrollment Call    Introduced Mrs. Eugenie Coates to Digital Medicine.     Discussed program expectations and requirements.  Introduced digital medicine care team.   Reviewed the importance of self-monitoring and proper blood pressure technique.      Reviewed that the Digital Medicine team is not available for emergencies and instructed the patient to call 911 or [facility] ([phone] or [phone]) if one arises.        1st attempt enrollment call. Left voicemail.      Last 5 Patient Entered Readings                                      Current 30 Day Average: 128/72     Recent Readings 12/10/2018 12/10/2018 12/9/2018 12/8/2018 12/7/2018    SBP (mmHg) 115 141 131 135 124    DBP (mmHg) 66 78 66 69 73    Pulse 57 57 63 79 78

## 2018-12-26 ENCOUNTER — PATIENT OUTREACH (OUTPATIENT)
Dept: OTHER | Facility: OTHER | Age: 68
End: 2018-12-26

## 2018-12-26 NOTE — PROGRESS NOTES
Last 5 Patient Entered Readings                                      Current 30 Day Average: 128/71     Recent Readings 12/25/2018 12/24/2018 12/24/2018 12/22/2018 12/21/2018    SBP (mmHg) 107 147 145 122 126    DBP (mmHg) 54 73 74 77 70    Pulse 60 58 56 71 57          Digital Medicine: Health  Introduction Call     12/26: Left voicemail and requested call back in order to complete digital medicine health  introduction call.

## 2018-12-28 ENCOUNTER — TELEPHONE (OUTPATIENT)
Dept: OPHTHALMOLOGY | Facility: CLINIC | Age: 68
End: 2018-12-28

## 2018-12-28 ENCOUNTER — PATIENT MESSAGE (OUTPATIENT)
Dept: OPHTHALMOLOGY | Facility: CLINIC | Age: 68
End: 2018-12-28

## 2018-12-28 NOTE — TELEPHONE ENCOUNTER
----- Message from Adelina Aaron sent at 12/28/2018  8:03 AM CST -----  Type: Needs to reschedule this morning's appointment    Who Called:  Patient  Best Call Back Number: [phone]  Additional Information: Patient requesting to reschedule this morning's 10:00 appointment/please call patient back to reschedule or advise.

## 2019-01-02 ENCOUNTER — PATIENT MESSAGE (OUTPATIENT)
Dept: ENDOCRINOLOGY | Facility: CLINIC | Age: 69
End: 2019-01-02

## 2019-01-03 NOTE — TELEPHONE ENCOUNTER
Talk to pt she just wanted to rescheduled her current appt for US and f/u visited to see dr erickson. Pt is aware of her new appts.

## 2019-01-15 NOTE — PROGRESS NOTES
Last 5 Patient Entered Readings                                      Current 30 Day Average: 131/71     Recent Readings 1/15/2019 1/14/2019 1/14/2019 1/12/2019 1/12/2019    SBP (mmHg) 107 146 144 136 152    DBP (mmHg) 69 75 72 69 70    Pulse 83 57 52 78 82          Digital Medicine: Health  Introduction Call     1/15: Left voicemail and requested call back in order to complete Digital Medicine health  introduction call.   Sending [employer] message.

## 2019-01-18 ENCOUNTER — LAB VISIT (OUTPATIENT)
Dept: LAB | Facility: HOSPITAL | Age: 69
End: 2019-01-18
Attending: FAMILY MEDICINE
Payer: MEDICARE

## 2019-01-18 ENCOUNTER — OFFICE VISIT (OUTPATIENT)
Dept: FAMILY MEDICINE | Facility: CLINIC | Age: 69
End: 2019-01-18
Payer: MEDICARE

## 2019-01-18 VITALS
DIASTOLIC BLOOD PRESSURE: 78 MMHG | SYSTOLIC BLOOD PRESSURE: 144 MMHG | WEIGHT: 183.44 LBS | BODY MASS INDEX: 36.02 KG/M2 | HEART RATE: 81 BPM | TEMPERATURE: 98 F | HEIGHT: 60 IN | OXYGEN SATURATION: 98 %

## 2019-01-18 DIAGNOSIS — Z00.00 ROUTINE MEDICAL EXAM: Primary | ICD-10-CM

## 2019-01-18 DIAGNOSIS — K21.9 GASTROESOPHAGEAL REFLUX DISEASE, ESOPHAGITIS PRESENCE NOT SPECIFIED: ICD-10-CM

## 2019-01-18 DIAGNOSIS — E05.90 SUBCLINICAL HYPERTHYROIDISM: ICD-10-CM

## 2019-01-18 DIAGNOSIS — I10 HYPERTENSION, BENIGN: ICD-10-CM

## 2019-01-18 DIAGNOSIS — F32.A DEPRESSION, UNSPECIFIED DEPRESSION TYPE: ICD-10-CM

## 2019-01-18 DIAGNOSIS — E11.9 DIABETES MELLITUS TYPE 2 WITHOUT RETINOPATHY: ICD-10-CM

## 2019-01-18 DIAGNOSIS — I50.30 HEART FAILURE WITH PRESERVED EJECTION FRACTION: ICD-10-CM

## 2019-01-18 DIAGNOSIS — N18.30 CKD (CHRONIC KIDNEY DISEASE) STAGE 3, GFR 30-59 ML/MIN: ICD-10-CM

## 2019-01-18 DIAGNOSIS — Z53.1 REFUSAL OF BLOOD TRANSFUSIONS AS PATIENT IS JEHOVAH'S WITNESS: ICD-10-CM

## 2019-01-18 DIAGNOSIS — H40.1130 PRIMARY OPEN ANGLE GLAUCOMA OF BOTH EYES, UNSPECIFIED GLAUCOMA STAGE: ICD-10-CM

## 2019-01-18 DIAGNOSIS — M85.859 OSTEOPENIA OF HIP, UNSPECIFIED LATERALITY: ICD-10-CM

## 2019-01-18 DIAGNOSIS — N95.9 MENOPAUSAL AND PERIMENOPAUSAL DISORDER: ICD-10-CM

## 2019-01-18 DIAGNOSIS — E11.65 TYPE 2 DIABETES MELLITUS WITH HYPERGLYCEMIA, WITHOUT LONG-TERM CURRENT USE OF INSULIN: ICD-10-CM

## 2019-01-18 DIAGNOSIS — E78.2 MIXED HYPERLIPIDEMIA: ICD-10-CM

## 2019-01-18 LAB
ANION GAP SERPL CALC-SCNC: 8 MMOL/L
BASOPHILS # BLD AUTO: 0.05 K/UL
BASOPHILS NFR BLD: 0.6 %
BUN SERPL-MCNC: 20 MG/DL
CALCIUM SERPL-MCNC: 9.9 MG/DL
CHLORIDE SERPL-SCNC: 105 MMOL/L
CO2 SERPL-SCNC: 28 MMOL/L
CREAT SERPL-MCNC: 1.1 MG/DL
DIFFERENTIAL METHOD: ABNORMAL
EOSINOPHIL # BLD AUTO: 0.2 K/UL
EOSINOPHIL NFR BLD: 2.9 %
ERYTHROCYTE [DISTWIDTH] IN BLOOD BY AUTOMATED COUNT: 13.2 %
EST. GFR  (AFRICAN AMERICAN): >60 ML/MIN/1.73 M^2
EST. GFR  (NON AFRICAN AMERICAN): 52.1 ML/MIN/1.73 M^2
ESTIMATED AVG GLUCOSE: 151 MG/DL
GLUCOSE SERPL-MCNC: 110 MG/DL
HBA1C MFR BLD HPLC: 6.9 %
HCT VFR BLD AUTO: 43.4 %
HGB BLD-MCNC: 13.4 G/DL
IMM GRANULOCYTES # BLD AUTO: 0.05 K/UL
IMM GRANULOCYTES NFR BLD AUTO: 0.6 %
LYMPHOCYTES # BLD AUTO: 2.4 K/UL
LYMPHOCYTES NFR BLD: 28.9 %
MCH RBC QN AUTO: 27.2 PG
MCHC RBC AUTO-ENTMCNC: 30.9 G/DL
MCV RBC AUTO: 88 FL
MONOCYTES # BLD AUTO: 0.6 K/UL
MONOCYTES NFR BLD: 7.5 %
NEUTROPHILS # BLD AUTO: 5 K/UL
NEUTROPHILS NFR BLD: 59.5 %
NRBC BLD-RTO: 0 /100 WBC
PLATELET # BLD AUTO: 260 K/UL
PMV BLD AUTO: 10.6 FL
POTASSIUM SERPL-SCNC: 4 MMOL/L
RBC # BLD AUTO: 4.93 M/UL
SODIUM SERPL-SCNC: 141 MMOL/L
WBC # BLD AUTO: 8.35 K/UL

## 2019-01-18 PROCEDURE — 36415 COLL VENOUS BLD VENIPUNCTURE: CPT | Mod: PO

## 2019-01-18 PROCEDURE — 99397 PR PREVENTIVE VISIT,EST,65 & OVER: ICD-10-PCS | Mod: S$GLB,,, | Performed by: FAMILY MEDICINE

## 2019-01-18 PROCEDURE — 99214 OFFICE O/P EST MOD 30 MIN: CPT | Mod: PBBFAC,PO | Performed by: FAMILY MEDICINE

## 2019-01-18 PROCEDURE — 80048 BASIC METABOLIC PNL TOTAL CA: CPT

## 2019-01-18 PROCEDURE — 99999 PR PBB SHADOW E&M-EST. PATIENT-LVL IV: CPT | Mod: PBBFAC,,, | Performed by: FAMILY MEDICINE

## 2019-01-18 PROCEDURE — 83036 HEMOGLOBIN GLYCOSYLATED A1C: CPT

## 2019-01-18 PROCEDURE — 85025 COMPLETE CBC W/AUTO DIFF WBC: CPT

## 2019-01-18 PROCEDURE — 99397 PER PM REEVAL EST PAT 65+ YR: CPT | Mod: S$GLB,,, | Performed by: FAMILY MEDICINE

## 2019-01-18 PROCEDURE — 99999 PR PBB SHADOW E&M-EST. PATIENT-LVL IV: ICD-10-PCS | Mod: PBBFAC,,, | Performed by: FAMILY MEDICINE

## 2019-01-18 RX ORDER — ATORVASTATIN CALCIUM 40 MG/1
40 TABLET, FILM COATED ORAL DAILY
Qty: 90 TABLET | Refills: 2 | Status: SHIPPED | OUTPATIENT
Start: 2019-01-18 | End: 2019-09-11

## 2019-01-18 RX ORDER — METFORMIN HYDROCHLORIDE 500 MG/1
500 TABLET ORAL
Qty: 90 TABLET | Refills: 3 | Status: SHIPPED | OUTPATIENT
Start: 2019-01-18 | End: 2019-01-20

## 2019-01-18 RX ORDER — OMEPRAZOLE 20 MG/1
CAPSULE, DELAYED RELEASE ORAL
Qty: 90 CAPSULE | Refills: 1 | Status: SHIPPED | OUTPATIENT
Start: 2019-01-18 | End: 2019-06-05 | Stop reason: SDUPTHER

## 2019-01-18 RX ORDER — LISINOPRIL AND HYDROCHLOROTHIAZIDE 12.5; 2 MG/1; MG/1
2 TABLET ORAL DAILY
Qty: 180 TABLET | Refills: 0 | Status: SHIPPED | OUTPATIENT
Start: 2019-01-18 | End: 2019-05-14

## 2019-01-18 NOTE — PATIENT INSTRUCTIONS
Follow up with the kidney doctor for blood pressure  Endocrine for the thyroid  Continue seeing the eye doctor for glaucoma  Continue your atorvastatin 40 mg (cholesterol)  Continue metformin (diabetes)  DEXA scan was ordered  Follow up in 8 weeks for depression, call psychology and the counselor, consider medication?  BRING ALL MEDICATIONS TO YOUR NEXT VISIT  Continue monitoring pressures at home.     No TV in bedroom  Sleep hygiene   Exercise 3 x/week  Make plans at least once a week with a friend  Journal - 3 good things every night along with anything else on your mind  Every bad day is a good day to journal  Consider Counseling visit in the future  Consider SSRI    Books to Read:  The Feeling Good Handbook by Abel Diaz  What you can change and what you can't by Pilo Crowe    Diabetes Management Status  Statin: Taking  ACE/ARB: Taking    Screening or Prevention Patient's value Goal Complete/Controlled?   HgA1C Testing and Control   Lab Results   Component Value Date    HGBA1C 6.5 (H) 08/06/2018      Annually/Less than 8% Yes   Lipid profile : 08/06/2018 Annually Yes   LDL control Lab Results   Component Value Date    LDLCALC 134.8 08/06/2018    Annually/Less than 100 mg/dl  No   Nephropathy screening Lab Results   Component Value Date    LABMICR 12.0 01/23/2018     Lab Results   Component Value Date    PROTEINUA Negative 11/29/2016    Annually Yes   Blood pressure BP Readings from Last 1 Encounters:   01/18/19 (!) 144/78    Less than 140/90 No   Dilated retinal exam : 10/15/2018 Annually Yes   Foot exam   : 08/08/2018 Annually Yes

## 2019-01-18 NOTE — PROGRESS NOTES
Subjective:       Patient ID: Eugenie Coates is a 68 y.o. female.    Chief Complaint: Medicare AWV    Patient ID: Eugenie Coates is a 68 y.o. female who presents today for a follow-up Medicare AWV today.     Thyroid Abnormalities: followed by endocrine  Elevated BP with HTN: on many medications. Followed by renal  Depression: declined medication. See AVS for full handout details    Abdominal aortic aneurysm screening: N/A  Alcohol misuse screening and counseling: N/A  Bone mass measurements: ordered  Cardiovascular disease screening laboratory tests: Lipid panel done in August  Colorectal Cancer Screenin  Depression screening: PHQ9 Score: 13  Diabetes/Nutrition: referral given to educator  Glaucoma test: to see eye doctor  Hepatitis C screening test: negative  Influenza vaccine: UTD  Lung cancer screening - Low dose computed tomography (LD-CT): N/A  Pneumococcal vaccination: UTD  Shingles Vaccine: UTD  Sexually transmitted infection (STI) screenings and high intensity behavioral counseling: discussed with patient    Gender Specific:  Mammogram: done in [place]. Records are pending.     Last eye exam:   Glaucoma History: yes, see eye doctor. Used two eye drops.     Safety Screening:   Help with the phone, transportation, shopping, meals, housework, laundry, medications, or managing money: No  Rugs at home in the hallway, lack grab bars in the bathroom, lack handrails on the stairs, or have poor lighting: No  Have you noticed any hearing difficulties: No    Advanced Directives:  Full Code, POA: her .     Social: lives with her . They have multiple children, but the youngest is 38. No children at home right now. No pets at home. Splits time between here and [place]. Her son lives in [place].     The following components were reviewed and updated:  Medical history, Family History, Social history, Allergies and Current Medications, Health Risk Assessment, Health Maintenance, Care  Team    The following assessments were completed:  Depression Screening: PHQ9: 13  Cognitive function Screening: RCS Score: 9/10  Timed Get Up Test: normal  Whisper Test: normal      Recommendations were developed using the USPSTF age appropriate recommendations. Education, counseling, and referrals were provided as needed.  After Visit Summary printed and given to patient which includes a list of additional screenings\tests needed.        Review of Systems   Constitutional: Negative for activity change and unexpected weight change.   HENT: Negative for hearing loss, rhinorrhea and trouble swallowing.    Eyes: Negative for discharge and visual disturbance.   Respiratory: Negative for chest tightness and wheezing.    Cardiovascular: Negative for chest pain and palpitations.   Gastrointestinal: Negative for blood in stool, constipation, diarrhea and vomiting.   Endocrine: Negative for polydipsia and polyuria.   Genitourinary: Negative for difficulty urinating, dysuria, hematuria and menstrual problem.   Musculoskeletal: Positive for arthralgias. Negative for joint swelling and neck pain.   Neurological: Negative for weakness and headaches.   Psychiatric/Behavioral: Positive for dysphoric mood. Negative for confusion.         Health Maintenance Due   Topic Date Due    DEXA SCAN  11/25/2018     Immunization History   Administered Date(s) Administered    Influenza 11/25/2016    Influenza - High Dose 09/19/2018    Influenza - Quadrivalent 11/03/2014, 11/10/2015    Pneumococcal Conjugate - 13 Valent 09/06/2016    Pneumococcal Polysaccharide - 23 Valent 09/18/2017    Tdap 11/30/2016    Zoster 11/25/2016         Objective:     Vitals:    01/18/19 1016   BP: (!) 144/78   Pulse: 81   Temp: 98.4 °F (36.9 °C)        Physical Exam   Constitutional: She is oriented to person, place, and time. She appears well-developed and well-nourished. No distress.   HENT:   Head: Normocephalic and atraumatic.   Neck: Normal range of  motion. Neck supple.   Cardiovascular: Normal rate and regular rhythm.   Murmur heard.  Pulmonary/Chest: Effort normal and breath sounds normal.   Abdominal: Soft. There is no tenderness.   obese   Musculoskeletal: She exhibits no edema.   Neurological: She is alert and oriented to person, place, and time. No sensory deficit.   Skin: Skin is warm. She is not diaphoretic.   Psychiatric: She has a normal mood and affect. Her behavior is normal. Judgment and thought content normal.   Nursing note and vitals reviewed.      Assessment:       1. Routine medical exam    2. Depression, unspecified depression type    3. Primary open angle glaucoma of both eyes, unspecified glaucoma stage    4. Hypertension, benign    5. Mixed hyperlipidemia    6. Heart failure with preserved ejection fraction    7. CKD (chronic kidney disease) stage 3, GFR 30-59 ml/min    8. Refusal of blood transfusions as patient is Taoist    9. Type 2 diabetes mellitus with hyperglycemia, without long-term current use of insulin    10. Diabetes mellitus type 2 without retinopathy    11. Subclinical hyperthyroidism    12. Gastroesophageal reflux disease, esophagitis presence not specified    13. Osteopenia of hip, unspecified laterality    14. Menopausal and perimenopausal disorder     15. BMI 35.0-35.9,adult        Plan:       Follow up with the kidney doctor for blood pressure. Continue BP medications for now. Appears to be uncontrolled today and at home (Media Tab).  Continue monitoring pressures at home. (digital HTN)  Endocrine for the thyroid  Continue seeing the eye doctor for glaucoma  Continue your atorvastatin 40 mg (cholesterol)  Continue metformin (diabetes)  DEXA scan was ordered  PHQ9: positive. Follow up in 8 weeks for depression, call psychology and the counselor, consider medication?  BRING ALL MEDICATIONS TO YOUR NEXT VISIT    See AVS for full handout details.     Routine medical exam    Depression, unspecified depression  type  -     Ambulatory referral to Psychology    Primary open angle glaucoma of both eyes, unspecified glaucoma stage    Hypertension, benign  -     lisinopril-hydrochlorothiazide (PRINZIDE,ZESTORETIC) 20-12.5 mg per tablet; Take 2 tablets by mouth once daily.  Dispense: 180 tablet; Refill: 0    Mixed hyperlipidemia  -     Cancel: Lipid panel; Future; Expected date: 01/18/2019  -     atorvastatin (LIPITOR) 40 MG tablet; Take 1 tablet (40 mg total) by mouth once daily.  Dispense: 90 tablet; Refill: 2    Heart failure with preserved ejection fraction    CKD (chronic kidney disease) stage 3, GFR 30-59 ml/min  -     Basic metabolic panel; Future; Expected date: 01/18/2019  -     CBC auto differential; Future; Expected date: 01/18/2019    Refusal of blood transfusions as patient is Orthodox    Type 2 diabetes mellitus with hyperglycemia, without long-term current use of insulin  -     Hemoglobin A1c; Future; Expected date: 01/18/2019  -     metFORMIN (GLUCOPHAGE) 500 MG tablet; Take 1 tablet (500 mg total) by mouth daily with breakfast.  Dispense: 90 tablet; Refill: 3    Diabetes mellitus type 2 without retinopathy    Subclinical hyperthyroidism    Gastroesophageal reflux disease, esophagitis presence not specified  -     omeprazole (PRILOSEC) 20 MG capsule; TAKE 1 CAPSULE(20 MG) BY MOUTH EVERY DAY  Dispense: 90 capsule; Refill: 1    Osteopenia of hip, unspecified laterality  -     DXA Bone Density Spine And Hip; Future; Expected date: 01/18/2019    Menopausal and perimenopausal disorder   -     DXA Bone Density Spine And Hip; Future; Expected date: 01/18/2019    BMI 35.0-35.9,adult

## 2019-01-20 DIAGNOSIS — E11.65 TYPE 2 DIABETES MELLITUS WITH HYPERGLYCEMIA, WITHOUT LONG-TERM CURRENT USE OF INSULIN: ICD-10-CM

## 2019-01-20 RX ORDER — METFORMIN HYDROCHLORIDE 500 MG/1
500 TABLET ORAL 2 TIMES DAILY WITH MEALS
Qty: 180 TABLET | Refills: 3
Start: 2019-01-20 | End: 2019-05-14 | Stop reason: SDUPTHER

## 2019-01-22 NOTE — PROGRESS NOTES
"Last 5 Patient Entered Readings                                      Current 30 Day Average: 128/70     Recent Readings 1/22/2019 1/22/2019 1/21/2019 1/20/2019 1/19/2019    SBP (mmHg) 135 150 117 122 129    DBP (mmHg) 77 73 67 71 72    Pulse 72 74 74 88 81        Digital Medicine: Health  Introduction    Introduced Ms. Eugenie Coates to Digital Medicine. Discussed health  role and recommended lifestyle modifications.  Reviewed proper timing, body position, and technique for taking BP readings. Patient acknowledged.     Lifestyle Assessment:  Current Dietary Habits(i.e. low sodium, food labels, dining out):  Patient reports she uses herbs in place salt. Patient states she uses garlic, onions, and bell peppers.  Patient reports she has not used a salt shaker in "years".  Patient states she also pays attention to food labels when grocery shopping and opts for low/no sodium options.  Encouraged patient to stay under the recommended <2,000mg/day.     Exercise:  Patient reports she walks around the house or will do her stationary bike for 30 minutes almost every day.  Patient states she will take a break on the weekends "sometimes".  I encouraged patient to keep up the great work.      Alcohol/Tobacco:  None    Medication Adherence:   has been compliant with the medicaiton regimen     Other goals:  Will discuss next call.     Reviewed AHA/AACE recommendations:  Limit sodium intake to <2000mg/day. Patient acknowledged     Reviewed the importance of self-monitoring, medication adherence, and that the health  can be used as a resource for lifestyle modifications to help reduce or maintain a healthy lifestyle.  Reviewed that the Digital Medicine team is not available for emergencies and instructed the patient to call 911 or [facility] ([phone] or [phone]) if one arises.        "

## 2019-01-23 ENCOUNTER — PATIENT OUTREACH (OUTPATIENT)
Dept: OTHER | Facility: OTHER | Age: 69
End: 2019-01-23

## 2019-01-23 NOTE — PROGRESS NOTES
Last 5 Patient Entered Readings                                      Current 30 Day Average: 128/70     Recent Readings 1/22/2019 1/22/2019 1/21/2019 1/20/2019 1/19/2019    SBP (mmHg) 135 150 117 122 129    DBP (mmHg) 77 73 67 71 72    Pulse 72 74 74 88 81        LMFCB to do her initial pharmacist call. Patient seen by her PCP 1/18 who informed patient to follow-up with nephrology for her BP.

## 2019-01-23 NOTE — LETTER
February 6, 2019     Eugenie Coates  [address] [place] [address]       Dear Eugenie,    Thank you for enrolling in [facility] Program. To participate, we ask that you submit information at least once weekly through your [facility] account and maintain regular contact with your Care Team. We have not received any data or heard from you in some time.     The Digital Medicine Care Team has attempted to reach you on multiple occasions to determine if you would like to continue participating in the program. While we encourage you to continue participating fully, we understand that circumstances may change.     To continue participating in the program, please contact me at . If we do not hear back, you will be un-enrolled, and your physician will be notified of your decision.    If you have submitted data and believe you are receiving this letter in error, please call the Digital Medicine Patient Support Line at [phone] for troubleshooting.      We look forward to hearing from you soon.    Sincerely,     Giuliano Lyman  [facility]

## 2019-02-06 NOTE — PROGRESS NOTES
Last 5 Patient Entered Readings                                      Current 30 Day Average: 125/70     Recent Readings 2/6/2019 2/5/2019 2/3/2019 2/2/2019 2/2/2019    SBP (mmHg) 95 127 134 125 144    DBP (mmHg) 58 61 79 65 72    Pulse 83 84 98 61 60      HPI:  69 yo female with a PMH listed below.   Past Medical History:   Diagnosis Date    Allergy     Anemia     Anxiety     Arthritis     Asthma     Bell palsy     Depression     Diabetes mellitus, type 2     DVT (deep venous thrombosis)     Endometriosis     Eye injury 2014    stuck with tree branch od ?     GERD (gastroesophageal reflux disease)     Glaucoma     History of uterine fibroid     Hypertension     Nuclear sclerosis of both eyes 9/27/2016    Sleep apnea     uses C pap      Patient endorses adherence to medication regimen. Patient has hypotensive s/sx of fatigue and denies lightheadedness, dizziness, nausea. Patient denies hypertensive s/sx (SOB, CP, severe headaches, changes in vision, dizziness, fatigue, confusion, anxiety, nosebleeds). Instructed patient to seek medical care if BP > 180/110 and is accompanied by hypertensive s/sx associated, patient confirms understanding.     Assessment:  Reviewed recent readings. Per 2017 ACC/ AHA HTN guidelines (goal of BP < 130/80), current 30-day average is well controlled.     Plan:  Tonight avoid amlodipine unless your blood pressure rises above 120/80. Resume her HTN medications tomorrow. I will continue to monitor regularly and will follow-up in 2 to 3 weeks, sooner if blood pressure begins to trend upward or downward.     Current medication regimen:  Hypertension Medications             amLODIPine (NORVASC) 10 MG tablet Take 1 tablet (10 mg total) by mouth every evening.    lisinopril-hydrochlorothiazide (PRINZIDE,ZESTORETIC) 20-12.5 mg per tablet Take 2 tablets by mouth once daily.        Patient denies having questions or concerns. Patient has my contact information and knows to call with  any concerns or clinical changes.

## 2019-02-20 ENCOUNTER — PATIENT OUTREACH (OUTPATIENT)
Dept: OTHER | Facility: OTHER | Age: 69
End: 2019-02-20

## 2019-02-20 NOTE — PROGRESS NOTES
Last 5 Patient Entered Readings                                      Current 30 Day Average: 124/68     Recent Readings 2/19/2019 2/18/2019 2/17/2019 2/14/2019 2/13/2019    SBP (mmHg) 131 121 136 131 106    DBP (mmHg) 64 62 73 73 61    Pulse 64 54 89 73 71      LMFCB to discuss if she's continued to experience hypotension symptoms.    3/1: LMFCB to discuss the above and discuss her BP after taking medications. Patient is concerned that it isn't coming down fast enough.

## 2019-02-20 NOTE — LETTER
April 10, 2019     Eugenie Coates  [address] [place] [address]       Dear Eugenie,    Thank you for enrolling in [facility] Program. To participate, we ask that you submit information at least once weekly through your [facility] account and maintain regular contact with your Care Team. We have not received any data or heard from you in some time.     The Digital Medicine Care Team has attempted to reach you on multiple occasions to determine if you would like to continue participating in the program. While we encourage you to continue participating fully, we understand that circumstances may change.     To continue participating in the program, please contact me at . If we do not hear back, you will be un-enrolled, and your physician will be notified of your decision.    If you have submitted data and believe you are receiving this letter in error, please call the Digital Medicine Patient Support Line at [phone] for troubleshooting.      We look forward to hearing from you soon.    Sincerely,     Giuilano Lyman  [facility]

## 2019-02-22 ENCOUNTER — HOSPITAL ENCOUNTER (OUTPATIENT)
Dept: RADIOLOGY | Facility: HOSPITAL | Age: 69
Discharge: HOME | End: 2019-02-22
Attending: FAMILY MEDICINE
Payer: MEDICARE

## 2019-02-22 ENCOUNTER — HOSPITAL ENCOUNTER (OUTPATIENT)
Dept: RADIOLOGY | Facility: CLINIC | Age: 69
Discharge: HOME | End: 2019-02-22
Attending: INTERNAL MEDICINE
Payer: MEDICARE

## 2019-02-22 DIAGNOSIS — E04.2 MULTINODULAR THYROID: ICD-10-CM

## 2019-02-22 DIAGNOSIS — N95.9 MENOPAUSAL AND PERIMENOPAUSAL DISORDER: ICD-10-CM

## 2019-02-22 DIAGNOSIS — M85.859 OSTEOPENIA OF HIP, UNSPECIFIED LATERALITY: ICD-10-CM

## 2019-02-22 PROCEDURE — 76536 US EXAM OF HEAD AND NECK: CPT | Mod: TC,PO

## 2019-02-22 PROCEDURE — 77080 DEXA BONE DENSITY SPINE HIP: ICD-10-PCS | Mod: 26,,, | Performed by: RADIOLOGY

## 2019-02-22 PROCEDURE — 77080 DXA BONE DENSITY AXIAL: CPT | Mod: 26,,, | Performed by: RADIOLOGY

## 2019-02-22 PROCEDURE — 77080 DXA BONE DENSITY AXIAL: CPT | Mod: TC

## 2019-02-22 PROCEDURE — 76536 US SOFT TISSUE HEAD NECK THYROID: ICD-10-PCS | Mod: 26,,, | Performed by: RADIOLOGY

## 2019-02-22 PROCEDURE — 76536 US EXAM OF HEAD AND NECK: CPT | Mod: 26,,, | Performed by: RADIOLOGY

## 2019-02-25 ENCOUNTER — OFFICE VISIT (OUTPATIENT)
Dept: OPTOMETRY | Facility: CLINIC | Age: 69
End: 2019-02-25
Attending: OPTOMETRIST
Payer: MEDICARE

## 2019-02-25 ENCOUNTER — CLINICAL SUPPORT (OUTPATIENT)
Dept: OPHTHALMOLOGY | Facility: CLINIC | Age: 69
End: 2019-02-25
Attending: OPTOMETRIST
Payer: MEDICARE

## 2019-02-25 DIAGNOSIS — H40.1134 PRIMARY OPEN ANGLE GLAUCOMA OF BOTH EYES, INDETERMINATE STAGE: ICD-10-CM

## 2019-02-25 PROCEDURE — 92083 EXTENDED VISUAL FIELD XM: CPT | Mod: 26,S$PBB,, | Performed by: OPTOMETRIST

## 2019-02-25 PROCEDURE — 92083 EXTENDED VISUAL FIELD XM: CPT | Mod: PBBFAC,PO

## 2019-02-25 PROCEDURE — 92012 PR EYE EXAM, EST PATIENT,INTERMED: ICD-10-PCS | Mod: S$PBB,,, | Performed by: OPTOMETRIST

## 2019-02-25 PROCEDURE — 99999 PR PBB SHADOW E&M-EST. PATIENT-LVL I: CPT | Mod: PBBFAC,,,

## 2019-02-25 PROCEDURE — 99999 PR PBB SHADOW E&M-EST. PATIENT-LVL III: CPT | Mod: PBBFAC,,, | Performed by: OPTOMETRIST

## 2019-02-25 PROCEDURE — 99999 PR PBB SHADOW E&M-EST. PATIENT-LVL I: ICD-10-PCS | Mod: PBBFAC,,,

## 2019-02-25 PROCEDURE — 99999 PR PBB SHADOW E&M-EST. PATIENT-LVL III: ICD-10-PCS | Mod: PBBFAC,,, | Performed by: OPTOMETRIST

## 2019-02-25 PROCEDURE — 99211 OFF/OP EST MAY X REQ PHY/QHP: CPT | Mod: PBBFAC,PO,25

## 2019-02-25 PROCEDURE — 99213 OFFICE O/P EST LOW 20 MIN: CPT | Mod: PBBFAC,25,27,PO | Performed by: OPTOMETRIST

## 2019-02-25 PROCEDURE — 92012 INTRM OPH EXAM EST PATIENT: CPT | Mod: S$PBB,,, | Performed by: OPTOMETRIST

## 2019-02-25 PROCEDURE — 92083 HUMPHREY VISUAL FIELD - OU - BOTH EYES: ICD-10-PCS | Mod: 26,S$PBB,, | Performed by: OPTOMETRIST

## 2019-02-25 RX ORDER — BRIMONIDINE TARTRATE 2 MG/ML
1 SOLUTION/ DROPS OPHTHALMIC 3 TIMES DAILY
Qty: 15 ML | Refills: 4 | Status: SHIPPED | OUTPATIENT
Start: 2019-02-25 | End: 2019-06-25 | Stop reason: SDUPTHER

## 2019-02-25 NOTE — PROGRESS NOTES
HPI     Pt here today for 4 month IOP check and repeat HVF 24-2. Pt states vision   has been stable. No new ocular complaints.     (+) Brimonidine 3 x day both eyes  (+) Lumigan 1 drop both eyes at bedtime     Last edited by Maurilio Doran, OD on 2/25/2019 10:31 AM. (History)            Assessment /Plan     For exam results, see Encounter Report.    Primary open angle glaucoma of both eyes, indeterminate stage  -     bimatoprost (LUMIGAN) 0.01 % Drop; Place 1 drop into both eyes every evening.  Dispense: 3 Bottle; Refill: 4  -     brimonidine 0.2% (ALPHAGAN) 0.2 % Drop; Place 1 drop into both eyes 3 (three) times daily.  Dispense: 15 mL; Refill: 4      POAG OU, mild stage. IOP doing well with use of drops. Continue lumigan qPM OU  alphagan tid OU. Refilled drops today. Return in 4 months for IOP check, sooner prn.

## 2019-02-26 ENCOUNTER — OFFICE VISIT (OUTPATIENT)
Dept: NEPHROLOGY | Facility: CLINIC | Age: 69
End: 2019-02-26
Payer: MEDICARE

## 2019-02-26 ENCOUNTER — OFFICE VISIT (OUTPATIENT)
Dept: PODIATRY | Facility: CLINIC | Age: 69
End: 2019-02-26
Payer: MEDICARE

## 2019-02-26 VITALS — HEIGHT: 60 IN | BODY MASS INDEX: 35.67 KG/M2 | WEIGHT: 181.69 LBS

## 2019-02-26 VITALS
SYSTOLIC BLOOD PRESSURE: 160 MMHG | WEIGHT: 181.69 LBS | HEART RATE: 59 BPM | BODY MASS INDEX: 35.67 KG/M2 | HEIGHT: 60 IN | OXYGEN SATURATION: 98 % | DIASTOLIC BLOOD PRESSURE: 72 MMHG

## 2019-02-26 DIAGNOSIS — I10 ESSENTIAL HYPERTENSION: Primary | ICD-10-CM

## 2019-02-26 DIAGNOSIS — B35.1 ONYCHOMYCOSIS DUE TO DERMATOPHYTE: ICD-10-CM

## 2019-02-26 DIAGNOSIS — L84 CORN OR CALLUS: ICD-10-CM

## 2019-02-26 DIAGNOSIS — E11.65 TYPE 2 DIABETES MELLITUS WITH HYPERGLYCEMIA, WITHOUT LONG-TERM CURRENT USE OF INSULIN: Primary | ICD-10-CM

## 2019-02-26 DIAGNOSIS — E66.01 SEVERE OBESITY (BMI 35.0-35.9 WITH COMORBIDITY): ICD-10-CM

## 2019-02-26 DIAGNOSIS — M20.42 HAMMER TOES OF BOTH FEET: ICD-10-CM

## 2019-02-26 DIAGNOSIS — M20.41 HAMMER TOES OF BOTH FEET: ICD-10-CM

## 2019-02-26 PROCEDURE — 99999 PR PBB SHADOW E&M-EST. PATIENT-LVL III: ICD-10-PCS | Mod: PBBFAC,,, | Performed by: PODIATRIST

## 2019-02-26 PROCEDURE — 99213 OFFICE O/P EST LOW 20 MIN: CPT | Mod: PBBFAC,27,PN,25 | Performed by: PODIATRIST

## 2019-02-26 PROCEDURE — 11721 PR DEBRIDEMENT OF NAILS, 6 OR MORE: ICD-10-PCS | Mod: S$PBB,Q9,, | Performed by: PODIATRIST

## 2019-02-26 PROCEDURE — 99213 PR OFFICE/OUTPT VISIT, EST, LEVL III, 20-29 MIN: ICD-10-PCS | Mod: 25,S$PBB,, | Performed by: PODIATRIST

## 2019-02-26 PROCEDURE — 99213 OFFICE O/P EST LOW 20 MIN: CPT | Mod: 25,S$PBB,, | Performed by: PODIATRIST

## 2019-02-26 PROCEDURE — 11721 DEBRIDE NAIL 6 OR MORE: CPT | Mod: S$PBB,Q9,, | Performed by: PODIATRIST

## 2019-02-26 PROCEDURE — 99214 OFFICE O/P EST MOD 30 MIN: CPT | Mod: S$PBB,,, | Performed by: INTERNAL MEDICINE

## 2019-02-26 PROCEDURE — 99999 PR PBB SHADOW E&M-EST. PATIENT-LVL III: ICD-10-PCS | Mod: PBBFAC,,, | Performed by: INTERNAL MEDICINE

## 2019-02-26 PROCEDURE — 11721 DEBRIDE NAIL 6 OR MORE: CPT | Mod: PBBFAC,PN | Performed by: PODIATRIST

## 2019-02-26 PROCEDURE — 99214 PR OFFICE/OUTPT VISIT, EST, LEVL IV, 30-39 MIN: ICD-10-PCS | Mod: S$PBB,,, | Performed by: INTERNAL MEDICINE

## 2019-02-26 PROCEDURE — 99213 OFFICE O/P EST LOW 20 MIN: CPT | Mod: PBBFAC,PO,25 | Performed by: INTERNAL MEDICINE

## 2019-02-26 PROCEDURE — 99999 PR PBB SHADOW E&M-EST. PATIENT-LVL III: CPT | Mod: PBBFAC,,, | Performed by: PODIATRIST

## 2019-02-26 PROCEDURE — 99999 PR PBB SHADOW E&M-EST. PATIENT-LVL III: CPT | Mod: PBBFAC,,, | Performed by: INTERNAL MEDICINE

## 2019-02-26 NOTE — PROGRESS NOTES
Subjective:      Patient ID: Eugenie Coates is a 68 y.o. female.    Chief Complaint: Diabetes Mellitus ([place] 3/14/19 1/18/19 6.9) and Diabetic Foot Exam    Eugenie is a 68 y.o. female who presents to the clinic for routine evaluation and treatment of diabetic feet. Eugenie has a past medical history of Allergy, Anemia, Anxiety, Arthritis, Asthma, Bell palsy, Depression, Diabetes mellitus, type 2, DVT (deep venous thrombosis), Endometriosis, Eye injury (2014), GERD (gastroesophageal reflux disease), Glaucoma, History of uterine fibroid, Hypertension, Nuclear sclerosis of both eyes (9/27/2016), and Sleep apnea. Patient relates no major problem with feet. Only complaints today consist of toenails in need of trimming.  Denies being painful with wearing shoe gear.  Has not attempted to self treat. Denies any additional pedal complaints.      PCP: Haile Paul,     Date Last Seen by PCP: 3/14/19    Hemoglobin A1C   Date Value Ref Range Status   01/18/2019 6.9 (H) 4.0 - 5.6 % Final     Comment:     ADA Screening Guidelines:  5.7-6.4%  Consistent with prediabetes  >or=6.5%  Consistent with diabetes  High levels of fetal hemoglobin interfere with the HbA1C  assay. Heterozygous hemoglobin variants (HbS, HgC, etc)do  not significantly interfere with this assay.   However, presence of multiple variants may affect accuracy.     08/06/2018 6.5 (H) 4.0 - 5.6 % Final     Comment:     ADA Screening Guidelines:  5.7-6.4%  Consistent with prediabetes  >or=6.5%  Consistent with diabetes  High levels of fetal hemoglobin interfere with the HbA1C  assay. Heterozygous hemoglobin variants (HbS, HgC, etc)do  not significantly interfere with this assay.   However, presence of multiple variants may affect accuracy.     05/04/2018 6.4 (H) 4.0 - 5.6 % Final     Comment:     According to ADA guidelines, hemoglobin A1c <7.0% represents  optimal control in non-pregnant diabetic patients. Different  metrics may apply to specific  patient populations.   Standards of Medical Care in Diabetes-2016.  For the purpose of screening for the presence of diabetes:  <5.7%     Consistent with the absence of diabetes  5.7-6.4%  Consistent with increasing risk for diabetes   (prediabetes)  >or=6.5%  Consistent with diabetes  Currently, no consensus exists for use of hemoglobin A1c  for diagnosis of diabetes for children.  This Hemoglobin A1c assay has significant interference with fetal   hemoglobin   (HbF). The results are invalid for patients with abnormal amounts of   HbF,   including those with known Hereditary Persistence   of Fetal Hemoglobin. Heterozygous hemoglobin variants (HbAS, HbAC,   HbAD, HbAE, HbA2) do not significantly interfere with this assay;   however, presence of multiple variants in a sample may impact the %   interference.             Past Medical History:   Diagnosis Date    Allergy     Anemia     Anxiety     Arthritis     Asthma     Bell palsy     Depression     Diabetes mellitus, type 2     DVT (deep venous thrombosis)     Endometriosis     Eye injury     stuck with tree branch od ?     GERD (gastroesophageal reflux disease)     Glaucoma     History of uterine fibroid     Hypertension     Nuclear sclerosis of both eyes 2016    Sleep apnea     uses C pap       Past Surgical History:   Procedure Laterality Date     SECTION      CHOLECYSTECTOMY      glaucoma laser Bilateral     HERNIA REPAIR      KNEE SURGERY Right     (R) knee scope; torn meniscus    REPAIR, HERNIA, VENTRAL, LAPAROSCOPIC N/A 2013    Performed by Aron Bourne MD at [facility] OR    VEIN SURGERY  ,     Rt leg x2       Family History   Problem Relation Age of Onset    Diabetes Mother     No Known Problems Father     No Known Problems Sister     No Known Problems Brother     No Known Problems Maternal Aunt     No Known Problems Maternal Uncle     No Known Problems Paternal Aunt     No Known Problems  Paternal Uncle     No Known Problems Maternal Grandmother     No Known Problems Maternal Grandfather     No Known Problems Paternal Grandmother     No Known Problems Paternal Grandfather     Amblyopia Neg Hx     Blindness Neg Hx     Cancer Neg Hx     Cataracts Neg Hx     Glaucoma Neg Hx     Hypertension Neg Hx     Macular degeneration Neg Hx     Retinal detachment Neg Hx     Strabismus Neg Hx     Stroke Neg Hx     Thyroid disease Neg Hx        Social History     Socioeconomic History    Marital status:      Spouse name: Not on file    Number of children: Not on file    Years of education: Not on file    Highest education level: Not on file   Social Needs    Financial resource strain: Not on file    Food insecurity - worry: Not on file    Food insecurity - inability: Not on file    Transportation needs - medical: Not on file    Transportation needs - non-medical: Not on file   Occupational History    Not on file   Tobacco Use    Smoking status: Never Smoker    Smokeless tobacco: Never Used   Substance and Sexual Activity    Alcohol use: No     Alcohol/week: 0.0 oz    Drug use: No    Sexual activity: Yes     Partners: Male   Other Topics Concern    Not on file   Social History Narrative    1/18/19: lives with her . They have multiple children, but the youngest is 38. No children at home right now. No pets at home. Splits time between here and [place]. Her son lives in [place].        Current Outpatient Medications   Medication Sig Dispense Refill    amLODIPine (NORVASC) 10 MG tablet Take 1 tablet (10 mg total) by mouth every evening. 90 tablet 2    atorvastatin (LIPITOR) 40 MG tablet Take 1 tablet (40 mg total) by mouth once daily. 90 tablet 2    bimatoprost (LUMIGAN) 0.01 % Drop Place 1 drop into both eyes every evening. 3 Bottle 4    blood sugar diagnostic Strp Test twice daily.  Accucheck viva test strips and lancets. 300 each 3    brimonidine 0.2% (ALPHAGAN) 0.2  % Drop Place 1 drop into both eyes 3 (three) times daily. 15 mL 4    lancets (ACCU-CHEK SOFTCLIX LANCETS) Misc 1 Units by Misc.(Non-Drug; Combo Route) route 2 (two) times daily. 300 each 3    lisinopril-hydrochlorothiazide (PRINZIDE,ZESTORETIC) 20-12.5 mg per tablet Take 2 tablets by mouth once daily. 180 tablet 0    metFORMIN (GLUCOPHAGE) 500 MG tablet Take 1 tablet (500 mg total) by mouth 2 (two) times daily with meals. (Patient taking differently: Take 500 mg by mouth daily with breakfast. ) 180 tablet 3    omeprazole (PRILOSEC) 20 MG capsule TAKE 1 CAPSULE(20 MG) BY MOUTH EVERY DAY 90 capsule 1     No current facility-administered medications for this visit.        Review of patient's allergies indicates:   Allergen Reactions    Percodan [oxycodone hcl-oxycodone-asa] Hives and Itching         Review of Systems   Constitution: Negative for chills and fever.   Cardiovascular: Positive for leg swelling. Negative for claudication.   Skin: Positive for color change and nail changes.   Musculoskeletal: Positive for joint swelling. Negative for joint pain, muscle cramps and muscle weakness.   Neurological: Positive for paresthesias. Negative for numbness.   Psychiatric/Behavioral: Negative for altered mental status.           Objective:      Physical Exam   Constitutional: She is oriented to person, place, and time. She appears well-developed and well-nourished. No distress.   Cardiovascular:   Pulses:       Dorsalis pedis pulses are 2+ on the right side, and 2+ on the left side.        Posterior tibial pulses are 1+ on the right side, and 1+ on the left side.   CFT <3 seconds bilateral.  Pedal hair growth decreased bilateral.  Varicosities noted bilateral.  Mild non pitting edema noted to bilateral lower extremity.  Toes are cool to touch bilateral.     Musculoskeletal: She exhibits edema. She exhibits no tenderness.   Muscle strength 5/5 in all muscle groups bilateral.  No tenderness nor crepitation with ROM of  foot/ankle joints bilateral.  No tenderness with palpation of bilateral foot and ankle.  Bilateral pes planus foot type.  Bilateral hallux abducto valgus.  Bilateral semi-reducible contracture of toes 2-5.     Neurological: She is alert and oriented to person, place, and time. She has normal strength. A sensory deficit is present.   Protective sensation per [place] monofilament intact bilateral.    Vibratory sensation decreased bilateral.    Light touch intact bilateral.   Skin: Skin is warm, dry and intact. No abrasion, no bruising, no burn, no ecchymosis, no laceration, no lesion, no petechiae and no rash noted. She is not diaphoretic. No cyanosis or erythema. No pallor. Nails show no clubbing.   Pedal skin appears edematous bilateral.  Toenails x 10 appear thickened by 2 mm's, elongated by 2 mm's, and discolored with subungual debris.  Light hyperkeratotic build up noted to the tips of the toes. Examination of the skin reveals no evidence of significant maceration, rashes, open lesions, suspicious appearing nevi or other concerning lesions.              Assessment:       Encounter Diagnoses   Name Primary?    Type 2 diabetes mellitus with hyperglycemia, without long-term current use of insulin Yes    Hammer toes of both feet     Corn or callus     Severe obesity (BMI 35.0-35.9 with comorbidity)     Onychomycosis due to dermatophyte          Plan:       Eugenie was seen today for diabetes mellitus and diabetic foot exam.    Diagnoses and all orders for this visit:    Type 2 diabetes mellitus with hyperglycemia, without long-term current use of insulin  -     DIABETIC SHOES FOR HOME USE    Hammer toes of both feet  -     DIABETIC SHOES FOR HOME USE    Corn or callus  -     DIABETIC SHOES FOR HOME USE    Severe obesity (BMI 35.0-35.9 with comorbidity)    Onychomycosis due to dermatophyte      I counseled the patient on her conditions, their implications and medical management.    Shoe inspection.  Diabetic Foot Education. Patient reminded of the importance of good nutrition and blood sugar control to help prevent podiatric complications of diabetes. Patient instructed on proper foot hygeine. We discussed wearing proper shoe gear, daily foot inspections, never walking without protective shoe gear, never putting sharp instruments to feet    Advised to continue wearing diabetic shoes/insoles that accommodate for digital deformities.  New prescription written with today's exam.    Discussed the importance of diet and exercise as they pertain to diabetes management and maintaining a healthy BMI.    With patient's permission, nails were aggressively reduced and debrided x 10 to their soft tissue attachment mechanically and with electric , removing all offending nail and debris. Patient relates relief following the procedure.  She will continue to monitor the areas daily, inspect her feet, wear protective shoe gear when ambulatory, moisturizer to maintain skin integrity and follow in this office in approximately 3 months, sooner p.r.n.    Follow-up in about 3 months (around 5/26/2019).    Giuliano Garnett DPM

## 2019-02-26 NOTE — PROGRESS NOTES
"Subjective:       Patient ID: Eugenie Coates is a 68 y.o. Black or  female who presents for return patient evaluation for hypertension.    She has no uremic or urinary symptoms and is in her usual state of health.  There have been no recent illnesses, hospitalizations or procedures.  She states her blood pressures are high in the am before she takes her medication.  She then states that she uses her "old cuff" when she checks this.  Otherwise she is feeling better.      Review of Systems   Constitutional: Negative for appetite change, chills and fever.   Eyes: Negative for visual disturbance.   Respiratory: Negative for cough and shortness of breath.    Cardiovascular: Positive for leg swelling (occasional). Negative for chest pain.   Gastrointestinal: Negative for diarrhea, nausea and vomiting.   Genitourinary: Negative for difficulty urinating, dysuria, flank pain and hematuria.   Musculoskeletal: Positive for arthralgias. Negative for myalgias.   Skin: Negative for rash.   Neurological: Negative for headaches.   Psychiatric/Behavioral: Negative for sleep disturbance.       The past medical, family and social histories were reviewed for this encounter.     BP (!) 160/72 (BP Location: Right arm, Patient Position: Sitting)   Pulse (!) 59   Ht 5' (1.524 m)   Wt 82.4 kg (181 lb 11.2 oz)   SpO2 98%   BMI 35.49 kg/m²     Objective:      Physical Exam   Constitutional: She is oriented to person, place, and time. She appears well-developed and well-nourished. No distress.   HENT:   Head: Normocephalic and atraumatic.   Eyes: Conjunctivae are normal.   Neck: Neck supple. No JVD present.   Cardiovascular: Normal rate and regular rhythm. Exam reveals no gallop and no friction rub.   Murmur heard.  Pulmonary/Chest: Effort normal and breath sounds normal. No respiratory distress. She has no wheezes. She has no rales.   Abdominal: Soft. Bowel sounds are normal. She exhibits no distension. There is no " tenderness.   Musculoskeletal: She exhibits no edema.   Neurological: She is alert and oriented to person, place, and time.   Skin: Skin is warm and dry. No rash noted.   Psychiatric: She has a normal mood and affect.   Vitals reviewed.      Assessment:       1. Essential hypertension        Plan:   Return to clinic in 9 months.  Baseline creatinine is 1.1.  Renal US shows R 9.5 cm, L 10.1 cm.  Her pressure is well controlled according to her [facility] data.  I asked her to stop using her old BP cuff.  Continue current medications as prescribed and reviewed.

## 2019-02-27 ENCOUNTER — TELEPHONE (OUTPATIENT)
Dept: ENDOCRINOLOGY | Facility: CLINIC | Age: 69
End: 2019-02-27

## 2019-02-27 DIAGNOSIS — E04.2 MULTINODULAR THYROID: Primary | ICD-10-CM

## 2019-02-27 DIAGNOSIS — E05.90 SUBCLINICAL HYPERTHYROIDISM: ICD-10-CM

## 2019-02-27 NOTE — TELEPHONE ENCOUNTER
Called patient in regards to thyroid nodules found on US and thyroid labs.    History of subclinical hyperthyroidism, now euthyroid x2 lab draws.  No medication necessary.    In regards to thyroid US, two lesions requiring biopsy (isthmus and L inferior nodule).  Discussed extensively with patient about her options for nodule management including observation, FNA, and surgery.    Discussed surgical indications which she does not meet at this time.  Provided s/sx to look for in regards to compressive symptoms.  Patient v/u.    Discussed option and indications for FNA, which she does meet.  Discussed possible FNA results (benign, FLUS,  follicular or hurthle lesion, suspicious for cancer, cancer and non diagnostic)     Reviewed that a non diagnostic or FLUS would require a repeat FNA.  But with repeat FNA, we would also send off molecular markers to assist in conclusive diagnosis on repeat.    Will proceed with multi FNA - isthmus and L inferior.    Discussed indications for repeat FNA as well as surgical indications (abnormal FNA, compressive symptoms or interval change)       If FNA is negative then plan follow up in 1 year with TSH and thyroid u/s prior    Discussed procedure in detail along with risks and benefits associated with procedure.  Patient is not on any blood thinner type medications.  Patient was able to ask questions.      Jess Harris MD  Endocrinology Fellow

## 2019-03-01 ENCOUNTER — PATIENT OUTREACH (OUTPATIENT)
Dept: OTHER | Facility: OTHER | Age: 69
End: 2019-03-01

## 2019-03-01 NOTE — PROGRESS NOTES
Last 5 Patient Entered Readings                                      Current 30 Day Average: 124/68     Recent Readings 2/27/2019 2/27/2019 2/24/2019 2/23/2019 2/22/2019    SBP (mmHg) 148 159 135 113 130    DBP (mmHg) 71 70 73 59 58    Pulse 79 68 66 59 55        Digital Medicine: Health  Follow Up    Confirmed patent is no longer feeling s/sx of hypotension.  Patient denies feeling s/sx of hypertension.     When asked about elevated readings as of late, patient states she is worried they will be high because she will take her medication and her readings are not coming down fast enough.  Reminded patient to wait 1hour or longer to take her BP reading after taking BP medication.  Patient acknowledged.     Lifestyle Modifications:    1.Dietary Modifications (Sodium intake <2,000mg/day, food labels, dining out):   Deferred    2.Physical Activity:   Deferred    3.Medication Therapy:   Patient has been compliant with the medication regimen.    4.Patient has the following medication side effects/concerns: None  (Frequency/Alleviating factors/Precipitating factors, etc.)     Follow up with Ms. Eugenie Coates completed. No further questions or concerns. Will continue to follow up to achieve health goals.  Patient is currently at goal, 124/68 mmHg does not exceed <130/80 mmHg.

## 2019-03-06 DIAGNOSIS — E04.2 MULTINODULAR THYROID: Primary | ICD-10-CM

## 2019-03-13 ENCOUNTER — HOSPITAL ENCOUNTER (OUTPATIENT)
Dept: ENDOCRINOLOGY | Facility: CLINIC | Age: 69
Discharge: HOME | End: 2019-03-13
Attending: INTERNAL MEDICINE
Payer: MEDICARE

## 2019-03-13 DIAGNOSIS — E04.2 MULTINODULAR THYROID: ICD-10-CM

## 2019-03-13 PROCEDURE — 10006 FNA BX W/US GDN EA ADDL: CPT

## 2019-03-13 PROCEDURE — 10005 FNA BX W/US GDN 1ST LES: CPT

## 2019-03-13 PROCEDURE — 10006 FNA BX W/US GDN EA ADDL: CPT | Mod: ,,, | Performed by: INTERNAL MEDICINE

## 2019-03-13 PROCEDURE — 10005 US FINE NEEDLE ASPIRATION THYROID, FIRST LESION: ICD-10-PCS | Mod: ,,, | Performed by: INTERNAL MEDICINE

## 2019-03-13 PROCEDURE — 10005 FNA BX W/US GDN 1ST LES: CPT | Mod: ,,, | Performed by: INTERNAL MEDICINE

## 2019-03-13 PROCEDURE — 88173 CYTOLOGY SPECIMEN-FNA NON-RADIOLOGY CLINICIAN PERFORMED W/O ON SITE: ICD-10-PCS | Mod: 26,,, | Performed by: PATHOLOGY

## 2019-03-13 PROCEDURE — 10006 US FINE NEEDLE ASPIRATION THYROID EA ADDITIONAL LESION: ICD-10-PCS | Mod: ,,, | Performed by: INTERNAL MEDICINE

## 2019-03-13 PROCEDURE — 88173 CYTOPATH EVAL FNA REPORT: CPT | Performed by: PATHOLOGY

## 2019-03-14 ENCOUNTER — OFFICE VISIT (OUTPATIENT)
Dept: FAMILY MEDICINE | Facility: CLINIC | Age: 69
End: 2019-03-14
Payer: MEDICARE

## 2019-03-14 VITALS
DIASTOLIC BLOOD PRESSURE: 90 MMHG | SYSTOLIC BLOOD PRESSURE: 160 MMHG | HEART RATE: 86 BPM | BODY MASS INDEX: 35.67 KG/M2 | OXYGEN SATURATION: 97 % | WEIGHT: 181.69 LBS | TEMPERATURE: 98 F | HEIGHT: 60 IN

## 2019-03-14 DIAGNOSIS — E11.65 TYPE 2 DIABETES MELLITUS WITH HYPERGLYCEMIA, WITHOUT LONG-TERM CURRENT USE OF INSULIN: ICD-10-CM

## 2019-03-14 DIAGNOSIS — E78.2 MIXED HYPERLIPIDEMIA: ICD-10-CM

## 2019-03-14 DIAGNOSIS — E66.01 SEVERE OBESITY (BMI 35.0-35.9 WITH COMORBIDITY): ICD-10-CM

## 2019-03-14 DIAGNOSIS — I10 WHITE COAT SYNDROME WITH DIAGNOSIS OF HYPERTENSION: Primary | ICD-10-CM

## 2019-03-14 DIAGNOSIS — F41.9 ANXIETY: ICD-10-CM

## 2019-03-14 DIAGNOSIS — N18.30 CKD (CHRONIC KIDNEY DISEASE) STAGE 3, GFR 30-59 ML/MIN: ICD-10-CM

## 2019-03-14 DIAGNOSIS — I10 ESSENTIAL HYPERTENSION: ICD-10-CM

## 2019-03-14 PROCEDURE — 99214 OFFICE O/P EST MOD 30 MIN: CPT | Mod: S$PBB,,, | Performed by: FAMILY MEDICINE

## 2019-03-14 PROCEDURE — 99214 PR OFFICE/OUTPT VISIT, EST, LEVL IV, 30-39 MIN: ICD-10-PCS | Mod: S$PBB,,, | Performed by: FAMILY MEDICINE

## 2019-03-14 PROCEDURE — 99999 PR PBB SHADOW E&M-EST. PATIENT-LVL III: CPT | Mod: PBBFAC,,, | Performed by: FAMILY MEDICINE

## 2019-03-14 PROCEDURE — 99999 PR PBB SHADOW E&M-EST. PATIENT-LVL III: ICD-10-PCS | Mod: PBBFAC,,, | Performed by: FAMILY MEDICINE

## 2019-03-14 PROCEDURE — 99213 OFFICE O/P EST LOW 20 MIN: CPT | Mod: PBBFAC,PO | Performed by: FAMILY MEDICINE

## 2019-03-14 RX ORDER — AMLODIPINE BESYLATE 10 MG/1
10 TABLET ORAL NIGHTLY
Qty: 90 TABLET | Refills: 2 | Status: SHIPPED | OUTPATIENT
Start: 2019-03-14 | End: 2019-05-23 | Stop reason: SDUPTHER

## 2019-03-14 NOTE — PROGRESS NOTES
"Subjective:       Patient ID: Eugenie Coates is a 68 y.o. female.    Chief Complaint: Follow-up    Yazmin is a 68 y.o. female who presents today for follow up on her mood and on her HTN.     HTN: she is taking 2 lisinopril 20-12.5 mg tables in the AM and 10 mg of amlodipine at night time. We tried multiple different BP regimens but this one seems to control her pressure optimally. We have tried ARB, hydralazine, multiple other medications. At home, reviewed digital HTN log, and pressures over the last month have all been controlled! At this point, I believe that there is a component of white coat syndrome in addition to underlying HTN. Will not change regimen at this time, continue digital HTN!    Today's visit was for follow up on her mood. She states that she "tries not to talk mind of what other people say." She tries not to worry about other people's problems. She has tried medication over the years, but doesn't want any medications at this time. They are still trying to move, but she is not sure yet. She has been doing the stationary bike for 30 minutes every day. This has been helping her mood. She has been out in the sun, in her yard and that is helping. The winter weather was making her mood worse also. She has not started using a journal yet.     She had a thyroid biopsy yesterday. This was due to thyroid nodules. She is a little sore, but is otherwise doing well.     Chest pain that she self reports is chronic and stable and she has discussed this with cardiology in the past.         Review of Systems   Constitutional: Negative for activity change and unexpected weight change.   HENT: Negative for hearing loss, rhinorrhea and trouble swallowing.    Eyes: Negative for discharge and visual disturbance.   Respiratory: Negative for chest tightness and wheezing.    Cardiovascular: Negative for palpitations.   Gastrointestinal: Negative for blood in stool, constipation, diarrhea and vomiting.   Endocrine: " Negative for polydipsia and polyuria.   Genitourinary: Negative for difficulty urinating, dysuria, hematuria and menstrual problem.   Musculoskeletal: Negative for arthralgias, joint swelling and neck pain.   Neurological: Negative for weakness and headaches.   Psychiatric/Behavioral: Negative for confusion and dysphoric mood.         Objective:     Vitals:    03/14/19 1031   BP: (!) 160/90   Pulse: 86   Temp: 98 °F (36.7 °C)        Physical Exam   Constitutional: She is oriented to person, place, and time. She appears well-developed and well-nourished. No distress.   HENT:   Head: Normocephalic and atraumatic.   Neck: Normal range of motion. Neck supple.   Cardiovascular: Normal rate and regular rhythm.   Murmur heard.  Pulmonary/Chest: Effort normal and breath sounds normal.   Abdominal: Soft. There is no tenderness.   obese   Musculoskeletal: She exhibits no edema.   Neurological: She is alert and oriented to person, place, and time.   Skin: Skin is warm. She is not diaphoretic.   Psychiatric: She has a normal mood and affect. Her behavior is normal. Judgment and thought content normal.   Nursing note and vitals reviewed.      Assessment:       1. White coat syndrome with diagnosis of hypertension    2. Type 2 diabetes mellitus with hyperglycemia, without long-term current use of insulin    3. CKD (chronic kidney disease) stage 3, GFR 30-59 ml/min    4. Mixed hyperlipidemia    5. Severe obesity (BMI 35.0-35.9 with comorbidity)    6. Anxiety    7. Essential hypertension        Plan:         CMP and a1c prior to visit in May  Will need to order Lipids and further tests prior to august visit for DM.   Continue current BP regimen. She has white coat syndrome also.  We tried multiple different BP regimens but this one seems to control her pressure optimally. At home, reviewed digital HTN log, and pressures over the last month have all been controlled! Will not change regimen at this time, continue digital HTN!    See  AVS for behavioral treatment of anxiety; continue no medications at this time.       White coat syndrome with diagnosis of hypertension  -     amLODIPine (NORVASC) 10 MG tablet; Take 1 tablet (10 mg total) by mouth every evening.  Dispense: 90 tablet; Refill: 2    Type 2 diabetes mellitus with hyperglycemia, without long-term current use of insulin  -     Hemoglobin A1c; Future; Expected date: 03/14/2019    CKD (chronic kidney disease) stage 3, GFR 30-59 ml/min  -     Comprehensive metabolic panel; Future; Expected date: 03/14/2019    Mixed hyperlipidemia    Severe obesity (BMI 35.0-35.9 with comorbidity)    Anxiety    Essential hypertension  -     amLODIPine (NORVASC) 10 MG tablet; Take 1 tablet (10 mg total) by mouth every evening.  Dispense: 90 tablet; Refill: 2        Warning signs discussed, patient to call with any further issues or worsening of symptoms.

## 2019-03-14 NOTE — PATIENT INSTRUCTIONS
No TV in bedroom  Sleep hygiene   Exercise 3 x/week  Make plans at least once a week with a friend  Find hobbies that you enjoy  Journal - 3 good things every night  Every bad day is a good day to journal  Consider Counseling visit in the future    Books to Read:  The Feeling Good Handbook by Abel Diaz  What you can change and what you can't by Pilo Crowe

## 2019-03-29 ENCOUNTER — PATIENT OUTREACH (OUTPATIENT)
Dept: OTHER | Facility: OTHER | Age: 69
End: 2019-03-29

## 2019-03-29 NOTE — PROGRESS NOTES
"Last 5 Patient Entered Readings                                      Current 30 Day Average: 118/66     Recent Readings 3/28/2019 3/26/2019 3/24/2019 3/23/2019 3/22/2019    SBP (mmHg) 112 116 136 123 115    DBP (mmHg) 65 65 64 71 59    Pulse 77 69 58 67 86        Digital Medicine: Health  Follow Up    Patient reports she is doing much better than last time and has learned to calm herself down from her "depression and stress".  Gave praises and encouragement to patient.    Lifestyle Modifications:    1.Dietary Modifications (Sodium intake <2,000mg/day, food labels, dining out):   Patient reports watching her sugar and salt intake in particular.     2.Physical Activity:   Patient reports she has been using her stationary bike every day. Patient reports she also has been doing yard work outside and has been walking whenever she can.  Encouraged patient to keep up the great work.      3.Medication Therapy:   Patient has been compliant with the medication regimen.    4.Patient has the following medication side effects/concerns: None  (Frequency/Alleviating factors/Precipitating factors, etc.)     Follow up with Ms. Eugenie Coates completed. No further questions or concerns. Will continue to follow up to achieve health goals.  Patient is currently at goal, 118/66 mmHg does not exceed <130/80 mmHg.      "

## 2019-04-03 ENCOUNTER — TELEPHONE (OUTPATIENT)
Dept: ENDOCRINOLOGY | Facility: HOSPITAL | Age: 69
End: 2019-04-03

## 2019-04-03 DIAGNOSIS — E04.2 MULTINODULAR THYROID: Primary | ICD-10-CM

## 2019-04-03 NOTE — TELEPHONE ENCOUNTER
Called patient to discuss biopsy results.    Unsat in the isthmus, and AUS in the L lobe.  Discussed significance, and risk of cancer.    Recommended repeat biopsy.  Discussed possibility of repeat results.  Will hold for molecular markers.    Patient feels well, with some soreness in her throat.  Drinking warm tea.  Discussed tylenol and warm compresses for further management.  Recommended to call if doesn't improve soon, or if it worsens.  Patient v/u.    Plan for repeat biopsy 6-8 weeks.      Jess Harris MD  Endocrinology Fellow

## 2019-04-04 ENCOUNTER — PATIENT MESSAGE (OUTPATIENT)
Dept: ENDOCRINOLOGY | Facility: CLINIC | Age: 69
End: 2019-04-04

## 2019-04-15 ENCOUNTER — PATIENT OUTREACH (OUTPATIENT)
Dept: OTHER | Facility: OTHER | Age: 69
End: 2019-04-15

## 2019-04-15 NOTE — PROGRESS NOTES
Last 5 Patient Entered Readings                                      Current 30 Day Average: 110/61     Recent Readings 4/15/2019 4/10/2019 4/8/2019 4/7/2019 4/5/2019    SBP (mmHg) 106 92 98 92 107    DBP (mmHg) 58 54 53 60 58    Pulse 67 83 64 85 82      HPI:  Called patient to follow up on her lower readings.   Patient endorses adherence to medication regimen.   Patient has hypotensive s/sx (lightheadedness, dizziness, nausea, fatigue) associated with lower readings.  Patient denies hypertensive s/sx (SOB, CP, severe headaches, changes in vision). Instructed patient to seek medical care if BP > 180/110 and is accompanied by hypertensive s/sx associated, patient confirms understanding.     Assessment:  Reviewed recent readings. Per 2017 ACC/ AHA HTN guidelines (goal of BP < 130/80), current 30-day average is well controlled.     Plan:  Reduce amlodipine from 10mg daily to 5mg daily.   I will continue to monitor regularly and will follow-up in 2 to 3 weeks, sooner if blood pressure begins to trend upward or downward.     Current medication regimen:  Hypertension Medications             amLODIPine (NORVASC) 10 MG tablet Take 1 tablet (10 mg total) by mouth every evening.    lisinopril-hydrochlorothiazide (PRINZIDE,ZESTORETIC) 20-12.5 mg per tablet Take 2 tablets by mouth once daily.          Patient denies having questions or concerns. Patient has my contact information and knows to call with any concerns or clinical changes.

## 2019-04-15 NOTE — PROGRESS NOTES
"Last 5 Patient Entered Readings                                      Current 30 Day Average: 110/61     Recent Readings 4/15/2019 4/10/2019 4/8/2019 4/7/2019 4/5/2019    SBP (mmHg) 106 92 98 92 107    DBP (mmHg) 58 54 53 60 58    Pulse 67 83 64 85 82        4/15: Patient called and left message with tech support requesting that I call her back.  Returned call to patient.  Patient reports "she received my letter".  Patient was referring to the [place] letter that her PharmD sent.  Instructed patient to call PharmD and leave a VM if she did not answer.  Patient verbalized understanding.     "

## 2019-04-29 ENCOUNTER — PATIENT OUTREACH (OUTPATIENT)
Dept: OTHER | Facility: OTHER | Age: 69
End: 2019-04-29

## 2019-04-29 NOTE — PROGRESS NOTES
Last 5 Patient Entered Readings                                      Current 30 Day Average: 116/62     Recent Readings 4/29/2019 4/28/2019 4/27/2019 4/25/2019 4/23/2019    SBP (mmHg) 115 115 141 136 113    DBP (mmHg) 69 66 75 65 63    Pulse 70 69 58 51 55      LMFCB to assess how she is feeling since reducing amlodipine to 5mg daily from 10mg daily.

## 2019-05-10 ENCOUNTER — LAB VISIT (OUTPATIENT)
Dept: LAB | Facility: HOSPITAL | Age: 69
End: 2019-05-10
Attending: FAMILY MEDICINE
Payer: MEDICARE

## 2019-05-10 DIAGNOSIS — E11.65 TYPE 2 DIABETES MELLITUS WITH HYPERGLYCEMIA, WITHOUT LONG-TERM CURRENT USE OF INSULIN: ICD-10-CM

## 2019-05-10 DIAGNOSIS — N18.30 CKD (CHRONIC KIDNEY DISEASE) STAGE 3, GFR 30-59 ML/MIN: ICD-10-CM

## 2019-05-10 LAB
ALBUMIN SERPL BCP-MCNC: 3.5 G/DL (ref 3.5–5.2)
ALP SERPL-CCNC: 90 U/L (ref 55–135)
ALT SERPL W/O P-5'-P-CCNC: 14 U/L (ref 10–44)
ANION GAP SERPL CALC-SCNC: 9 MMOL/L (ref 8–16)
AST SERPL-CCNC: 18 U/L (ref 10–40)
BILIRUB SERPL-MCNC: 0.8 MG/DL (ref 0.1–1)
BUN SERPL-MCNC: 19 MG/DL (ref 8–23)
CALCIUM SERPL-MCNC: 9.8 MG/DL (ref 8.7–10.5)
CHLORIDE SERPL-SCNC: 107 MMOL/L (ref 95–110)
CO2 SERPL-SCNC: 26 MMOL/L (ref 23–29)
CREAT SERPL-MCNC: 1.1 MG/DL (ref 0.5–1.4)
EST. GFR  (AFRICAN AMERICAN): 59.6 ML/MIN/1.73 M^2
EST. GFR  (NON AFRICAN AMERICAN): 51.7 ML/MIN/1.73 M^2
ESTIMATED AVG GLUCOSE: 146 MG/DL (ref 68–131)
GLUCOSE SERPL-MCNC: 113 MG/DL (ref 70–110)
HBA1C MFR BLD HPLC: 6.7 % (ref 4–5.6)
POTASSIUM SERPL-SCNC: 3.9 MMOL/L (ref 3.5–5.1)
PROT SERPL-MCNC: 7.2 G/DL (ref 6–8.4)
SODIUM SERPL-SCNC: 142 MMOL/L (ref 136–145)

## 2019-05-10 PROCEDURE — 36415 COLL VENOUS BLD VENIPUNCTURE: CPT | Mod: PO

## 2019-05-10 PROCEDURE — 80053 COMPREHEN METABOLIC PANEL: CPT

## 2019-05-10 PROCEDURE — 83036 HEMOGLOBIN GLYCOSYLATED A1C: CPT

## 2019-05-14 ENCOUNTER — HOSPITAL ENCOUNTER (EMERGENCY)
Facility: HOSPITAL | Age: 69
Discharge: HOME | End: 2019-05-14
Attending: EMERGENCY MEDICINE
Payer: MEDICARE

## 2019-05-14 ENCOUNTER — OFFICE VISIT (OUTPATIENT)
Dept: FAMILY MEDICINE | Facility: CLINIC | Age: 69
End: 2019-05-14
Payer: MEDICARE

## 2019-05-14 VITALS
SYSTOLIC BLOOD PRESSURE: 190 MMHG | HEIGHT: 60 IN | HEART RATE: 76 BPM | TEMPERATURE: 98 F | WEIGHT: 182.75 LBS | BODY MASS INDEX: 35.88 KG/M2 | OXYGEN SATURATION: 98 % | DIASTOLIC BLOOD PRESSURE: 92 MMHG

## 2019-05-14 VITALS
DIASTOLIC BLOOD PRESSURE: 74 MMHG | SYSTOLIC BLOOD PRESSURE: 169 MMHG | HEIGHT: 63 IN | WEIGHT: 182 LBS | TEMPERATURE: 98 F | BODY MASS INDEX: 32.25 KG/M2 | RESPIRATION RATE: 17 BRPM | OXYGEN SATURATION: 98 % | HEART RATE: 51 BPM

## 2019-05-14 DIAGNOSIS — I10 HYPERTENSION, BENIGN: ICD-10-CM

## 2019-05-14 DIAGNOSIS — E05.90 SUBCLINICAL HYPERTHYROIDISM: ICD-10-CM

## 2019-05-14 DIAGNOSIS — I16.1 HYPERTENSIVE EMERGENCY: ICD-10-CM

## 2019-05-14 DIAGNOSIS — E11.65 TYPE 2 DIABETES MELLITUS WITH HYPERGLYCEMIA, WITHOUT LONG-TERM CURRENT USE OF INSULIN: ICD-10-CM

## 2019-05-14 DIAGNOSIS — R07.89 OTHER CHEST PAIN: Primary | ICD-10-CM

## 2019-05-14 DIAGNOSIS — R06.02 SOB (SHORTNESS OF BREATH): ICD-10-CM

## 2019-05-14 DIAGNOSIS — R06.00 DYSPNEA: ICD-10-CM

## 2019-05-14 DIAGNOSIS — E11.9 DIABETES MELLITUS TYPE 2 WITHOUT RETINOPATHY: ICD-10-CM

## 2019-05-14 DIAGNOSIS — I47.10 PAROXYSMAL SVT (SUPRAVENTRICULAR TACHYCARDIA): Primary | ICD-10-CM

## 2019-05-14 DIAGNOSIS — R00.2 PALPITATIONS: ICD-10-CM

## 2019-05-14 LAB
ALBUMIN SERPL BCP-MCNC: 3.7 G/DL (ref 3.5–5.2)
ALP SERPL-CCNC: 91 U/L (ref 55–135)
ALT SERPL W/O P-5'-P-CCNC: 13 U/L (ref 10–44)
ANION GAP SERPL CALC-SCNC: 4 MMOL/L (ref 8–16)
AST SERPL-CCNC: 15 U/L (ref 10–40)
BASOPHILS # BLD AUTO: 0.03 K/UL (ref 0–0.2)
BASOPHILS NFR BLD: 0.4 % (ref 0–1.9)
BILIRUB SERPL-MCNC: 0.6 MG/DL (ref 0.1–1)
BUN SERPL-MCNC: 15 MG/DL (ref 8–23)
CALCIUM SERPL-MCNC: 9.8 MG/DL (ref 8.7–10.5)
CHLORIDE SERPL-SCNC: 106 MMOL/L (ref 95–110)
CO2 SERPL-SCNC: 31 MMOL/L (ref 23–29)
CREAT SERPL-MCNC: 1 MG/DL (ref 0.5–1.4)
DIFFERENTIAL METHOD: NORMAL
EOSINOPHIL # BLD AUTO: 0.2 K/UL (ref 0–0.5)
EOSINOPHIL NFR BLD: 2.5 % (ref 0–8)
ERYTHROCYTE [DISTWIDTH] IN BLOOD BY AUTOMATED COUNT: 13.7 % (ref 11.5–14.5)
EST. GFR  (AFRICAN AMERICAN): >60 ML/MIN/1.73 M^2
EST. GFR  (NON AFRICAN AMERICAN): 58 ML/MIN/1.73 M^2
GLUCOSE SERPL-MCNC: 109 MG/DL (ref 70–110)
HCT VFR BLD AUTO: 40.6 % (ref 37–48.5)
HGB BLD-MCNC: 13.1 G/DL (ref 12–16)
LYMPHOCYTES # BLD AUTO: 2.3 K/UL (ref 1–4.8)
LYMPHOCYTES NFR BLD: 31.5 % (ref 18–48)
MCH RBC QN AUTO: 27.5 PG (ref 27–31)
MCHC RBC AUTO-ENTMCNC: 32.3 G/DL (ref 32–36)
MCV RBC AUTO: 85 FL (ref 82–98)
MONOCYTES # BLD AUTO: 0.5 K/UL (ref 0.3–1)
MONOCYTES NFR BLD: 6.5 % (ref 4–15)
NEUTROPHILS # BLD AUTO: 4.3 K/UL (ref 1.8–7.7)
NEUTROPHILS NFR BLD: 58.8 % (ref 38–73)
PLATELET # BLD AUTO: 242 K/UL (ref 150–350)
PMV BLD AUTO: 9.9 FL (ref 9.2–12.9)
POTASSIUM SERPL-SCNC: 3.7 MMOL/L (ref 3.5–5.1)
PROT SERPL-MCNC: 7.5 G/DL (ref 6–8.4)
RBC # BLD AUTO: 4.76 M/UL (ref 4–5.4)
SODIUM SERPL-SCNC: 141 MMOL/L (ref 136–145)
T4 FREE SERPL-MCNC: 1.12 NG/DL (ref 0.71–1.51)
TROPONIN I SERPL DL<=0.01 NG/ML-MCNC: 0.01 NG/ML (ref 0–0.03)
TSH SERPL DL<=0.005 MIU/L-ACNC: 0.3 UIU/ML (ref 0.4–4)
WBC # BLD AUTO: 7.24 K/UL (ref 3.9–12.7)

## 2019-05-14 PROCEDURE — 25000003 PHARM REV CODE 250: Performed by: EMERGENCY MEDICINE

## 2019-05-14 PROCEDURE — 93005 ELECTROCARDIOGRAM TRACING: CPT

## 2019-05-14 PROCEDURE — 99999 PR PBB SHADOW E&M-EST. PATIENT-LVL IV: CPT | Mod: PBBFAC,,, | Performed by: FAMILY MEDICINE

## 2019-05-14 PROCEDURE — 85025 COMPLETE CBC W/AUTO DIFF WBC: CPT

## 2019-05-14 PROCEDURE — 84484 ASSAY OF TROPONIN QUANT: CPT

## 2019-05-14 PROCEDURE — 99999 PR PBB SHADOW E&M-EST. PATIENT-LVL IV: ICD-10-PCS | Mod: PBBFAC,,, | Performed by: FAMILY MEDICINE

## 2019-05-14 PROCEDURE — 80053 COMPREHEN METABOLIC PANEL: CPT

## 2019-05-14 PROCEDURE — 99214 OFFICE O/P EST MOD 30 MIN: CPT | Mod: PBBFAC,25,PO | Performed by: FAMILY MEDICINE

## 2019-05-14 PROCEDURE — 93010 ELECTROCARDIOGRAM REPORT: CPT | Mod: S$PBB,,, | Performed by: STUDENT IN AN ORGANIZED HEALTH CARE EDUCATION/TRAINING PROGRAM

## 2019-05-14 PROCEDURE — 84439 ASSAY OF FREE THYROXINE: CPT

## 2019-05-14 PROCEDURE — 99285 EMERGENCY DEPT VISIT HI MDM: CPT | Mod: 25,27

## 2019-05-14 PROCEDURE — 99214 OFFICE O/P EST MOD 30 MIN: CPT | Mod: S$PBB,,, | Performed by: FAMILY MEDICINE

## 2019-05-14 PROCEDURE — 99214 PR OFFICE/OUTPT VISIT, EST, LEVL IV, 30-39 MIN: ICD-10-PCS | Mod: S$PBB,,, | Performed by: FAMILY MEDICINE

## 2019-05-14 PROCEDURE — 84443 ASSAY THYROID STIM HORMONE: CPT

## 2019-05-14 PROCEDURE — 93010 EKG 12-LEAD: ICD-10-PCS | Mod: S$PBB,,, | Performed by: STUDENT IN AN ORGANIZED HEALTH CARE EDUCATION/TRAINING PROGRAM

## 2019-05-14 PROCEDURE — 93005 ELECTROCARDIOGRAM TRACING: CPT | Mod: PBBFAC,PO | Performed by: STUDENT IN AN ORGANIZED HEALTH CARE EDUCATION/TRAINING PROGRAM

## 2019-05-14 RX ORDER — METOPROLOL TARTRATE 50 MG/1
50 TABLET ORAL 2 TIMES DAILY
Qty: 60 TABLET | Refills: 0 | Status: SHIPPED | OUTPATIENT
Start: 2019-05-14 | End: 2019-05-23 | Stop reason: SDUPTHER

## 2019-05-14 RX ORDER — METFORMIN HYDROCHLORIDE 500 MG/1
500 TABLET ORAL
Qty: 90 TABLET | Refills: 3 | Status: SHIPPED | OUTPATIENT
Start: 2019-05-14 | End: 2020-01-22

## 2019-05-14 RX ORDER — METOPROLOL TARTRATE 50 MG/1
50 TABLET ORAL
Status: COMPLETED | OUTPATIENT
Start: 2019-05-14 | End: 2019-05-14

## 2019-05-14 RX ORDER — LISINOPRIL 40 MG/1
40 TABLET ORAL DAILY
Qty: 30 TABLET | Refills: 0 | Status: SHIPPED | OUTPATIENT
Start: 2019-05-14 | End: 2019-05-23 | Stop reason: SDUPTHER

## 2019-05-14 RX ORDER — ADENOSINE 3 MG/ML
6 INJECTION, SOLUTION INTRAVENOUS
Status: DISCONTINUED | OUTPATIENT
Start: 2019-05-14 | End: 2019-05-14

## 2019-05-14 RX ADMIN — METOPROLOL TARTRATE 50 MG: 50 TABLET ORAL at 12:05

## 2019-05-14 NOTE — PATIENT INSTRUCTIONS
GO to the ED  eval for causes of symptoms  Check thyroid  DVT?  Check labs    Follow up based on hospital course

## 2019-05-14 NOTE — ED NOTES
Pt states she has been feeling like her heart is racing intermittently  x 1 month. This episode started this AM epigastric CP, HTN, and palpitations.

## 2019-05-14 NOTE — PROGRESS NOTES
"Subjective:       Patient ID: Eugenie Coates is a 69 y.o. female.    Chief Complaint: Follow-up and Diabetes    Yazmin is a 69 y.o. female who presents today for follow up on her chronic medical issues.     However, she is complaining of palpitations, chest pain, and SOB today. She has a history of a DVT. Her heart has been racing "since her thyroid biopsy" in April. Her BP has been elevated since then also. She is also having CP that started around the same time. She feels her hear racing all the time. At rest, at night, and with activity. The chest pain is present even at rest. She is not sure what makes it go away.     DVT in 2017.     HTN: Elevated. Taking lisinopril 20/12.5 2 pills daily and amlodipine.  DM: taking metformin once daily. A1c has decreased. She has been watching what she eats. She has also been watching her sugar intake.   DLD: on statin.         Hypertension   This is a recurrent problem. The current episode started more than 1 year ago. The problem has been gradually improving since onset. The problem is controlled. Associated symptoms include chest pain, palpitations and shortness of breath. Pertinent negatives include no orthopnea, peripheral edema, PND or sweats. There are no associated agents to hypertension. There are no known risk factors for coronary artery disease. Past treatments include nothing. The current treatment provides mild improvement. There are no compliance problems.      Review of Systems   Constitutional: Negative for chills and fever.   Respiratory: Positive for shortness of breath.    Cardiovascular: Positive for chest pain and palpitations. Negative for orthopnea and PND.             Results for orders placed or performed in visit on 05/10/19   Comprehensive metabolic panel   Result Value Ref Range    Sodium 142 136 - 145 mmol/L    Potassium 3.9 3.5 - 5.1 mmol/L    Chloride 107 95 - 110 mmol/L    CO2 26 23 - 29 mmol/L    Glucose 113 (H) 70 - 110 mg/dL    BUN, Bld " 19 8 - 23 mg/dL    Creatinine 1.1 0.5 - 1.4 mg/dL    Calcium 9.8 8.7 - 10.5 mg/dL    Total Protein 7.2 6.0 - 8.4 g/dL    Albumin 3.5 3.5 - 5.2 g/dL    Total Bilirubin 0.8 0.1 - 1.0 mg/dL    Alkaline Phosphatase 90 55 - 135 U/L    AST 18 10 - 40 U/L    ALT 14 10 - 44 U/L    Anion Gap 9 8 - 16 mmol/L    eGFR if African American 59.6 (A) >60 mL/min/1.73 m^2    eGFR if non  51.7 (A) >60 mL/min/1.73 m^2   Hemoglobin A1c   Result Value Ref Range    Hemoglobin A1C 6.7 (H) 4.0 - 5.6 %    Estimated Avg Glucose 146 (H) 68 - 131 mg/dL       Objective:     Vitals:    05/14/19 0947   BP: (!) 190/92   Pulse: 76   Temp: 98.3 °F (36.8 °C)        Physical Exam   Constitutional: She appears well-developed. She appears distressed.   Cardiovascular: Normal rate and regular rhythm.   Murmur heard.  Pulmonary/Chest: Effort normal and breath sounds normal. No stridor. No respiratory distress.   Musculoskeletal: She exhibits edema.   Trace pitting edema BL  Right calf: 40 cm  Left calf: 38 cm   Neurological: She is alert.   Skin: Skin is warm.   Psychiatric: She has a normal mood and affect. Her behavior is normal. Judgment and thought content normal.   Nursing note and vitals reviewed.      Assessment:       1. Other chest pain    2. Hypertensive emergency    3. SOB (shortness of breath)    4. Type 2 diabetes mellitus with hyperglycemia, without long-term current use of insulin    5. Diabetes mellitus type 2 without retinopathy    6. Subclinical hyperthyroidism    7. Hypertension, benign        Plan:         Repeat BP: 200/90 x 2 (BL Arms) taken manually  Discussed with endocrine, possible overt hyperthyroidism?  Recommend TSH/free T4/total T3 eval  Do not believe this can be done in the outpatient given her symptoms of Chest pain and SOB, history of DVT, and elevated BP which is much higher then baseline in a historically compliant patient who reports taking her medications    Refer to ED, recommend CBC, CMP, possible  cards eval along with labs as above.     In regards to chronic medical issues: stable. Continue current regimen. Will reiterate again at hospital d/c follow up.     Other chest pain  -     EKG 12-lead; Future  -     Refer to Emergency Dept.    Hypertensive emergency    SOB (shortness of breath)  -     EKG 12-lead; Future  -     Refer to Emergency Dept.    Type 2 diabetes mellitus with hyperglycemia, without long-term current use of insulin  -     metFORMIN (GLUCOPHAGE) 500 MG tablet; Take 1 tablet (500 mg total) by mouth daily with breakfast.  Dispense: 90 tablet; Refill: 3    Diabetes mellitus type 2 without retinopathy    Subclinical hyperthyroidism    Hypertension, benign        Warning signs discussed, patient to call with any further issues or worsening of symptoms.

## 2019-05-14 NOTE — ED PROVIDER NOTES
"Encounter Date: 2019    SCRIBE #1 NOTE: I, Naresh Eagle, am scribing for, and in the presence of,  Dr. Raoul Cano. I have scribed the entire note.       History     Chief Complaint   Patient presents with    Palpitations     Reports intermittent episodes of palpitations over past month, reports this episode started this AM. Also reports has been having HTN.      69 year old female presents to the ED due to palpitations and chest pain x 1 month. Patient reports she has intermittent episodes of palpitations associated with chest pain, SOB, and some dizziness. States her episodes improve with rest. She reports typical chest pain is "under my breasts" and only present with palpitations. States she is fully compliant with home medications but her blood pressure has been elevated lately.     The history is provided by the patient.     Review of patient's allergies indicates:   Allergen Reactions    Percodan [oxycodone hcl-oxycodone-asa] Hives and Itching     Past Medical History:   Diagnosis Date    Allergy     Anemia     Anxiety     Arthritis     Asthma     Bell palsy     Depression     Diabetes mellitus, type 2     DVT (deep venous thrombosis)     Endometriosis     Eye injury     stuck with tree branch od ?     GERD (gastroesophageal reflux disease)     Glaucoma     History of uterine fibroid     Hypertension     Nuclear sclerosis of both eyes 2016    Sleep apnea     uses C pap     Past Surgical History:   Procedure Laterality Date     SECTION      CHOLECYSTECTOMY      glaucoma laser Bilateral     HERNIA REPAIR      KNEE SURGERY Right     (R) knee scope; torn meniscus    REPAIR, HERNIA, VENTRAL, LAPAROSCOPIC N/A 2013    [facility]formed [facility]    VEIN SURGERY  , 2004    Rt leg x2     Family History   Problem Relation Age of Onset    Diabetes Mother     No Known Problems Father     No Known Problems Sister     No Known Problems Brother  "    No Known Problems Maternal Aunt     No Known Problems Maternal Uncle     No Known Problems Paternal Aunt     No Known Problems Paternal Uncle     No Known Problems Maternal Grandmother     No Known Problems Maternal Grandfather     No Known Problems Paternal Grandmother     No Known Problems Paternal Grandfather     Amblyopia Neg Hx     Blindness Neg Hx     Cancer Neg Hx     Cataracts Neg Hx     Glaucoma Neg Hx     Hypertension Neg Hx     Macular degeneration Neg Hx     Retinal detachment Neg Hx     Strabismus Neg Hx     Stroke Neg Hx     Thyroid disease Neg Hx      Social History     Tobacco Use    Smoking status: Never Smoker    Smokeless tobacco: Never Used   Substance Use Topics    Alcohol use: No     Alcohol/week: 0.0 oz     Frequency: Never     Drinks per session: Patient refused     Binge frequency: Never    Drug use: No     Review of Systems   Constitutional: Negative for chills and fever.   HENT: Negative for congestion, rhinorrhea and sore throat.    Eyes: Negative for redness and visual disturbance.   Respiratory: Positive for shortness of breath. Negative for cough and wheezing.    Cardiovascular: Positive for chest pain and palpitations.   Gastrointestinal: Negative for abdominal pain, diarrhea, nausea and vomiting.   Genitourinary: Negative for dysuria and hematuria.   Musculoskeletal: Negative for back pain, myalgias and neck pain.   Skin: Negative for rash.   Neurological: Positive for dizziness. Negative for weakness and light-headedness.   Psychiatric/Behavioral: Negative for confusion.   All other systems reviewed and are negative.      Physical Exam     Initial Vitals [05/14/19 1107]   BP Pulse Resp Temp SpO2   (!) 202/87 61 20 97.6 °F (36.4 °C) 97 %      MAP       --         Physical Exam    Nursing note and vitals reviewed.  Constitutional: She appears well-developed and well-nourished. No distress.   HENT:   Head: Normocephalic and atraumatic.   Eyes: EOM are normal.  Pupils are equal, round, and reactive to light.   Neck: Normal range of motion. Neck supple.   Cardiovascular: Normal rate, regular rhythm, normal heart sounds and intact distal pulses.   Pulmonary/Chest: Breath sounds normal. No respiratory distress. She has no wheezes.   Abdominal: Soft. She exhibits no distension. There is no tenderness.   Musculoskeletal: Normal range of motion. She exhibits edema.   1+ bilateral pedal edema   Neurological: She is alert and oriented to person, place, and time.   Skin: Skin is warm and dry.         ED Course   Procedures  Labs Reviewed   COMPREHENSIVE METABOLIC PANEL - Abnormal; Notable for the following components:       Result Value    CO2 31 (*)     Anion Gap 4 (*)     eGFR if non  58 (*)     All other components within normal limits   TSH - Abnormal; Notable for the following components:    TSH 0.302 (*)     All other components within normal limits   CBC W/ AUTO DIFFERENTIAL   TROPONIN I   T4, FREE     EKG Readings: (Independently Interpreted)   Initial Reading: No STEMI. Rhythm: Normal Sinus Rhythm. Heart Rate: 75. Ectopy: No Ectopy. Conduction: Normal. ST Segments: Normal ST Segments. T Waves: Normal. Clinical Impression: Normal Sinus Rhythm       Imaging Results          X-Ray Chest PA And Lateral (Final result)  Result time 05/14/19 11:53:00    Final result by Olivia Hodges MD (05/14/19 11:53:00)                 Impression:      No acute abnormality.      Electronically signed by: Olivia Hodges MD  Date:    05/14/2019  Time:    11:53             Narrative:    EXAMINATION:  XR CHEST PA AND LATERAL    CLINICAL HISTORY:  Dyspnea, unspecified    TECHNIQUE:  PA and lateral views of the chest were performed.    COMPARISON:  None    FINDINGS:  EKG wires.The lungs are clear, with normal appearance of pulmonary vasculature and no pleural effusion or pneumothorax.    The cardiac silhouette is normal in size. The hilar and mediastinal contours are  unremarkable.    Bones are intact. Surgical clips right upper abdominal quadrant.                                 Medical Decision Making:   Clinical Tests:   Lab Tests: Ordered and Reviewed  Radiological Study: Ordered and Reviewed  Medical Tests: Ordered and Reviewed  ED Management:  12:22 PM  Case discussed with Cardiology, Dr. Carrizales. He recommends changing lisinopril-hctz to lisinopril and add metoprolol 50mg bid. F/u in clinic    Labs wnl. Thyroid studies unremarkable. Pt observed for over an hour following metoprolol with no further arrhythmias. She was instructed to return to the ed if no improvement in her condition              Attending Attestation:           Physician Attestation for Scribe:  Physician Attestation Statement for Scribe #1: I, Dr. Raoul Cano, reviewed documentation, as scribed by Naresh Eagle in my presence, and it is both accurate and complete.                    Clinical Impression:       ICD-10-CM ICD-9-CM   1. Paroxysmal SVT (supraventricular tachycardia) I47.1 427.0   2. Palpitations R00.2 785.1   3. Dyspnea R06.00 786.09           Disposition:   Disposition: Discharged  Condition: Stable                        Raoul Cano MD  05/14/19 4442

## 2019-05-14 NOTE — ED NOTES
MD order for adenosine. Pads applied, pt hooked up to zoll. MD @ bedside. Pt converted to normal rhythm by herself no meds given at this time

## 2019-05-15 ENCOUNTER — PATIENT MESSAGE (OUTPATIENT)
Dept: FAMILY MEDICINE | Facility: CLINIC | Age: 69
End: 2019-05-15

## 2019-05-15 NOTE — TELEPHONE ENCOUNTER
"Patient would like to see a different cardiologist (not her current cardiologist). Can you call her and set her up with one at a location she is willing to go to.    Can you also schedule her an apt to see me in 3-4 weeks.       Of note, patient reports that the lisinopril 40 mg "is too strong" and makes "her heart pump fast." I have advised her to take 1/2 pill and continue digital HTN monitoring.   "

## 2019-05-16 ENCOUNTER — OFFICE VISIT (OUTPATIENT)
Dept: ENDOCRINOLOGY | Facility: CLINIC | Age: 69
End: 2019-05-16
Payer: MEDICARE

## 2019-05-16 VITALS
SYSTOLIC BLOOD PRESSURE: 100 MMHG | DIASTOLIC BLOOD PRESSURE: 54 MMHG | HEIGHT: 62 IN | HEART RATE: 50 BPM | BODY MASS INDEX: 33.76 KG/M2 | WEIGHT: 183.44 LBS

## 2019-05-16 DIAGNOSIS — E11.65 TYPE 2 DIABETES MELLITUS WITH HYPERGLYCEMIA, WITHOUT LONG-TERM CURRENT USE OF INSULIN: ICD-10-CM

## 2019-05-16 DIAGNOSIS — E04.2 MULTINODULAR THYROID: ICD-10-CM

## 2019-05-16 DIAGNOSIS — E05.90 SUBCLINICAL HYPERTHYROIDISM: Primary | ICD-10-CM

## 2019-05-16 DIAGNOSIS — I10 HYPERTENSION, BENIGN: ICD-10-CM

## 2019-05-16 DIAGNOSIS — M85.80 OSTEOPENIA, UNSPECIFIED LOCATION: ICD-10-CM

## 2019-05-16 DIAGNOSIS — E66.01 SEVERE OBESITY (BMI 35.0-35.9 WITH COMORBIDITY): ICD-10-CM

## 2019-05-16 DIAGNOSIS — E78.2 MIXED HYPERLIPIDEMIA: ICD-10-CM

## 2019-05-16 PROCEDURE — 99999 PR PBB SHADOW E&M-EST. PATIENT-LVL IV: CPT | Mod: PBBFAC,GC,, | Performed by: INTERNAL MEDICINE

## 2019-05-16 PROCEDURE — 99999 PR PBB SHADOW E&M-EST. PATIENT-LVL IV: ICD-10-PCS | Mod: PBBFAC,GC,, | Performed by: INTERNAL MEDICINE

## 2019-05-16 PROCEDURE — 99214 PR OFFICE/OUTPT VISIT, EST, LEVL IV, 30-39 MIN: ICD-10-PCS | Mod: S$PBB,GC,, | Performed by: INTERNAL MEDICINE

## 2019-05-16 PROCEDURE — 99214 OFFICE O/P EST MOD 30 MIN: CPT | Mod: PBBFAC | Performed by: INTERNAL MEDICINE

## 2019-05-16 PROCEDURE — 99214 OFFICE O/P EST MOD 30 MIN: CPT | Mod: S$PBB,GC,, | Performed by: INTERNAL MEDICINE

## 2019-05-16 NOTE — PROGRESS NOTES
Subjective:      Patient ID: Eugenie Coates is a 69 y.o. female who presents to clinic today for initial visit  Chief Complaint   Patient presents with    Follow-up       With regards to the diabetes:    Diagnosed:     Current regimen:  Metformin 500 mg bid    Missed doses?   no    # times a day testin   BG - today 136, usually 110s -150s.     Hypoglycemic event? None       Last eye exam: 2018  Last podiatry exam: 18     Typical meals: trying to keep to a healthy diet  Avoiding carbs, increasing salad and protein.  Does not drink soda.  Drinks crystal lite, water.  Sugar substitutes used if needed.   Avoids cakes, cookies, sweets, chips     Education - last visit: 18, scheduled to see them again on Friday.    Exercise - tried to stay active, does 30min on the bike daily.  Also walks.    Endorses nocturia, polyuria, history of glaucoma  Denies polydipsia, vision changes  Denies numbness or tingling of hands/feet     Has medic card with medical history.    With regards to hypertension:  Tolerating ACEI or ARB    With regards to dyslipidemia:  Tolerating statin     With regards to hyperthyroidism:  Current hyperthyroid medication: none    Notes that she has heartburn, on antacids.      current symptoms:   Nightly CP - sharp, feels like indigestion  Recently hospitalized for elevated BP and abnormal heart rate.  Now on multiple anti-HTN medications with low BP and bradycardia (50s).    Denies:  Diarrhea/constipation  Palpations  Weight change - just normal fluctuation  Hair loss  Brittle nails  No skin changes  tremor   anxiety    Depression - since , used to see psychologist from [id number].  Never prescribed medication, solely managed by verbal therapy.  Retired in .      Last BMD:  2019 see below    Denies new eye symptoms:    With regards to osteopenia:  On calcium 500mg   On Vit D3 1000iU daily    Recent falls:  No  No history of fracture.    No family history of  osteoporosis.    Fall proofed home - stairs with rails, lighting, rugs etc.    No history of cancer, radiation, kidney disease, bone disease,   Does have significant reflux, CKD 3      Review of Systems   Constitutional: Negative for unexpected weight change.   Eyes: Negative for visual disturbance.   Respiratory: Negative for shortness of breath.    Cardiovascular: Negative for chest pain.   Gastrointestinal: Negative for abdominal pain.   Genitourinary: Negative for urgency.   Musculoskeletal: Negative for arthralgias.   Skin: Negative for wound.   Neurological: Negative for headaches.   Hematological: Does not bruise/bleed easily.   Psychiatric/Behavioral: Negative for sleep disturbance.       Objective:     Vitals:    05/16/19 1055   BP: (!) 100/54   Pulse: (!) 50     Body mass index is 33.55 kg/m².    Physical Exam   Neck: No thyromegaly present.   Cardiovascular: Normal rate.   Pulmonary/Chest: Effort normal.   Abdominal: Soft.   Musculoskeletal: She exhibits no edema.   Vitals reviewed.   No tremors  DTRs normal      DXA2/2019  FINDINGS:  The L1 to L4 vertebral bone mineral density is equal to 1.046 g/cm squared with a T score of 0.0.    The left femoral neck bone mineral density is equal to 0.740 g/cm squared with a T score of -1.0.      Impression       No evidence of significant bone density loss    Consider FDA approved medical therapies in postmenopausal women and men aged 50 years and older, based on the following:         Relevant labs and images have been reviewed.     BMP  Lab Results   Component Value Date     05/14/2019    K 3.7 05/14/2019     05/14/2019    CO2 31 (H) 05/14/2019    BUN 15 05/14/2019    CREATININE 1.0 05/14/2019    CALCIUM 9.8 05/14/2019    ANIONGAP 4 (L) 05/14/2019    ESTGFRAFRICA >60 05/14/2019    EGFRNONAA 58 (A) 05/14/2019       Lab Results   Component Value Date    HGBA1C 6.7 (H) 05/10/2019       Lab Results   Component Value Date    CHOL 205 (H) 08/06/2018    CHOL  244 (H) 01/23/2018    CHOL 200 (H) 07/18/2017     Lab Results   Component Value Date    HDL 44 08/06/2018    HDL 44 01/23/2018    HDL 41 07/18/2017     Lab Results   Component Value Date    LDLCALC 134.8 08/06/2018    LDLCALC 168.2 (H) 01/23/2018    LDLCALC 129.4 07/18/2017     Lab Results   Component Value Date    TRIG 131 08/06/2018    TRIG 159 (H) 01/23/2018    TRIG 148 07/18/2017     Lab Results   Component Value Date    CHOLHDL 21.5 08/06/2018    CHOLHDL 18.0 (L) 01/23/2018    CHOLHDL 20.5 07/18/2017     TSH   Date Value Ref Range Status   05/14/2019 0.302 (L) 0.400 - 4.000 uIU/mL Final     Free T4   Date Value Ref Range Status   05/14/2019 1.12 0.71 - 1.51 ng/dL Final     T3, Free   Date Value Ref Range Status   08/22/2018 2.2 (L) 2.3 - 4.2 pg/mL Final     Thyroid US - 4/27/18 personally reviewed  The thyroid is moderately enlarged, normal echogenicity, with normal vascularity.    There is a large dominant isthmus nodule and a smaller L sided nodule.    Cervical LN are benign appearing.    NM Uptake and Scan 5/11/18  The 24 hr uptake is 18.3%.  There is a hyperfunctioning hot nodule lower pole left suppressing the rest of the gland.   disease.  Recommended treatment dose 30.0 millicuries of iodine 131 orally.    Assessment:     No diagnosis found.  Plan:     Multinodular thyroid  US confirmed multiple nodules.  In setting of subclinical hyperthyroidism, it was correlated with NM uptake scan.  NM U&S showed hot nodules, recommending ANGULO.    Repeat US prior to next visit.  Consider FNA at that time of isthmus nodule.    Subclinical hyperthyroidism  Biochemical evidence of subclinical hyperthyroidism, now normal.  Patient is currently asymptomatic.      Risks and benefits discussed with patient.  Increased risk of mortality and/or conversion to overt hyperthyroidism when:  TSH <0.1, heart disease (heart failure, CMPY), arrhythmia (ie afib), bone disease, and underlying nodular thyroid  disease.     Diagnosis confirmed.    US showed nodules, and NM uptake and scan showed hot nodule.       Discussed possible treatment options - observation, Methimazole, ANGULO.     If we observe, patient is at risk for spontaneous resolution, stable disease, or progression to overt hyperthyroidism.  Patient should proceed with caution if she needs iodine load for imaging or requires iodine containing medication (ie. Amiodarone).  Medication side effects discussed. ANGULO is associated with resolution of disease and/or progression to hypothyroidism.     Patient chose to proceed with low dose therapy, especially given recent hospitalization.   Will repeat TFTs in 8 weeks post therapy initiation.  Will keep in mind that TSH levels tend to increase with age, and will not maintain false re-assurance with normal TSH.  But it is important that she is not <0.1.    Osteopenia  Risk factors - chronic steroid injections, menopausal, subclinical hyperthyroid     Reassured no recent fracture.    On calcium/vitamin D supplements.  [email or web address]      Repeat DXA 11/2018 - requested primary care to order at the same facility.  If bone disease has progressed, consider ANGULO treatment.    Type 2 diabetes mellitus with hyperglycemia, without long-term current use of insulin  Reviewed goals of therapy are to get the best control we can without hypoglycemia     Well controlled on current regimen. No change at this time.  Lab Results   Component Value Date    HGBA1C 6.7 (H) 05/10/2019        Reviewed patient's current insulin regimen. Clarified proper insulin dose and timing in relation to meals, etc. Insulin injection sites and proper rotation instructed.       Advised frequent self blood glucose monitoring.  Patient encouraged to document glucose results and bring them to every clinic visit       Hypoglycemia precautions discussed. Instructed on precautions before driving.       Close adherence to lifestyle changes recommended.       Periodic follow  ups for eye evaluations, foot care and dental care suggested.     Vaccinations - up to date.  Defer to PCP    Obesity, Class II, BMI 35-39.9, with comorbidity  Body mass index is 33.55 kg/m².  Discussed continued weight loss and compliance with medical therapy (CPAP, DM diet, and exercise).  Notes 5lbs weight loss over last 6 months.    Diabetes mellitus type 2 without retinopathy  See diabetes as above     Hypertension, benign  Discussed with PCP to decrease anti-HTN regimen, along with decreasing BB.  Will make adjustments to thyroid medication if needed.      Hyperlipidemia  Tolerating statin     CKD (chronic kidney disease) stage 3, GFR 30-59 ml/min  Avoid hypoglycemia.    AFSHIN  Tolerating CPAP at night.  Compliant with therapy.        RTC in 6 months.  Plan to start low dose MMZ discussed side effects with medications.  Handout given in respect to new medications.       Jess Harris MD  Endocrinology Fellow       This case has been discussed with staff, Dr. Lucio.

## 2019-05-16 NOTE — PATIENT INSTRUCTIONS
Plan to coordinate care with Dr. Paul in weaning or adjusting your blood pressure medications and heart rate medications PRIOR to starting a very low dose of anti-thyroid hormone medications (methimazole).  See information below for information on this drug.    Plan to repeat your thyroid labs in 10 weeks (2.5 months).        Methimazole tablets  What is this medicine?  METHIMAZOLE (meth IM a zole) prevents the thyroid gland from producing too much thyroid hormone. It is used to treat a condition known as hyperthyroidism.  How should I use this medicine?  Take this medicine by mouth with a glass of water. Follow the directions on the prescription label. You can take this medicine with or without food. However, you should always take it the same way to make sure the effects are the same. Take your doses at regular intervals. Do not take your medicine more often than directed. Do not stop taking this medicine except on the advice of your doctor or health care professional.  Talk to your pediatrician regarding the use of this medicine in children. Special care may be needed. While this drug may be prescribed for children for selected conditions, precautions do apply.  What side effects may I notice from receiving this medicine?  Side effects that you should report to your doctor or health care professional as soon as possible:  · black, tarry stools  · fever, sore throat, hoarseness  · numbness or tingling in the hands or feet  · severe redness or itching of the skin, or dry cracked skin  · stomach pain  · swelling of the feet or legs  · unusual bleeding or bruising, pinpoint red spots on the skin  · unusual or sudden weight increase  · unusually weak or tired  · yellowing of skin or eyes  Side effects that usually do not require medical attention (report to your doctor or health care professional if they continue or are bothersome):  · headache  · nausea, vomiting  · mild skin rash, itching  · muscle aches and  pains  What may interact with this medicine?  Do not take this medicine with any of the following medications:  · sodium iodide  · thyroid hormones  This medicine may also interact with the following medications:  · certain medicines for high blood pressure like metoprolol and propranolol  · digoxin  · theophylline  · warfarin  What if I miss a dose?  If you miss a dose, take it as soon as you can. If it is almost time for your next dose, take only that dose. Do not take double or extra doses.  Where should I keep my medicine?  Keep out of the reach of children.  Store at room temperature between 15 and 30 degrees C (59 and 86 degrees F). Throw away any unused medicine after the expiration date.  What should I tell my health care provider before I take this medicine?  They need to know if you have any of these conditions:  · bone marrow disease  · liver disease  · an unusual or allergic reaction to methimazole, other medicines, foods, dyes, or preservatives  · pregnant or trying to get pregnant  · breast-feeding  What should I watch for while using this medicine?  Visit your doctor or health care professional for regular checks on your progress. Your thyroid hormone levels will need to be checked.  This medicine can reduce your resistance to infection. Contact your doctor or health care professional if you have any infection or injury. Avoid people who have colds, flu, bronchitis or other infectious disease. Do not have any vaccinations without asking your doctor or health care professional. Avoid people who have recently received oral polio vaccine.  NOTE:This sheet is a summary. It may not cover all possible information. If you have questions about this medicine, talk to your doctor, pharmacist, or health care provider. Copyright© 2017 [employer]

## 2019-05-23 ENCOUNTER — OFFICE VISIT (OUTPATIENT)
Dept: CARDIOLOGY | Facility: CLINIC | Age: 69
End: 2019-05-23
Payer: MEDICARE

## 2019-05-23 VITALS
DIASTOLIC BLOOD PRESSURE: 80 MMHG | SYSTOLIC BLOOD PRESSURE: 150 MMHG | BODY MASS INDEX: 33.34 KG/M2 | HEART RATE: 52 BPM | WEIGHT: 181.19 LBS | OXYGEN SATURATION: 96 % | HEIGHT: 62 IN

## 2019-05-23 DIAGNOSIS — I10 ESSENTIAL HYPERTENSION: ICD-10-CM

## 2019-05-23 DIAGNOSIS — I10 HYPERTENSION, BENIGN: ICD-10-CM

## 2019-05-23 DIAGNOSIS — E66.01 SEVERE OBESITY (BMI 35.0-35.9 WITH COMORBIDITY): ICD-10-CM

## 2019-05-23 DIAGNOSIS — I47.19 AV REENTRANT TACHYCARDIA: Primary | ICD-10-CM

## 2019-05-23 PROCEDURE — 99214 OFFICE O/P EST MOD 30 MIN: CPT | Mod: S$PBB,,, | Performed by: INTERNAL MEDICINE

## 2019-05-23 PROCEDURE — 99214 OFFICE O/P EST MOD 30 MIN: CPT | Mod: PBBFAC | Performed by: INTERNAL MEDICINE

## 2019-05-23 PROCEDURE — 99999 PR PBB SHADOW E&M-EST. PATIENT-LVL IV: CPT | Mod: PBBFAC,,, | Performed by: INTERNAL MEDICINE

## 2019-05-23 PROCEDURE — 99214 PR OFFICE/OUTPT VISIT, EST, LEVL IV, 30-39 MIN: ICD-10-PCS | Mod: S$PBB,,, | Performed by: INTERNAL MEDICINE

## 2019-05-23 PROCEDURE — 99999 PR PBB SHADOW E&M-EST. PATIENT-LVL IV: ICD-10-PCS | Mod: PBBFAC,,, | Performed by: INTERNAL MEDICINE

## 2019-05-23 RX ORDER — AMLODIPINE BESYLATE 10 MG/1
10 TABLET ORAL NIGHTLY
Qty: 90 TABLET | Refills: 3 | Status: SHIPPED | OUTPATIENT
Start: 2019-05-23 | End: 2020-03-12 | Stop reason: ALTCHOICE

## 2019-05-23 RX ORDER — METOPROLOL TARTRATE 50 MG/1
50 TABLET ORAL 2 TIMES DAILY
Qty: 60 TABLET | Refills: 11 | Status: ON HOLD | OUTPATIENT
Start: 2019-05-23 | End: 2019-07-09 | Stop reason: SDUPTHER

## 2019-05-23 RX ORDER — LISINOPRIL 40 MG/1
40 TABLET ORAL DAILY
Qty: 90 TABLET | Refills: 3 | Status: SHIPPED | OUTPATIENT
Start: 2019-05-23 | End: 2019-08-07

## 2019-05-23 NOTE — LETTER
May 23, 2019      Haile Paul, DO  [address]  [place] [address]           [facility] - Cardiology  [address]  [place] [address]  Phone: [phone]          Patient: Eugenie Coates   MR Number: [id number]   YOB: 1950   Date of Visit: 5/23/2019       Dear Dr. Haile Paul:    Thank you for referring Eugenie Coates to me for evaluation. Attached you will find relevant portions of my assessment and plan of care.    If you have questions, please do not hesitate to call me. I look forward to following Eugenie Coates along with you.    Sincerely,    Rio Roberts MD    Enclosure  CC:  No Recipients    If you would like to receive this communication electronically, please contact [email or web address] or [phone] to request more information on [employer] access.    For providers and/or their staff who would like to refer a patient to [facility], please contact us through our one-stop-shop provider referral line, [facility] , at [phone].    If you feel you have received this communication in error or would no longer like to receive these types of communications, please e-mail [email or web address]

## 2019-05-30 DIAGNOSIS — R00.0 TACHYCARDIA: Primary | ICD-10-CM

## 2019-05-31 ENCOUNTER — PATIENT OUTREACH (OUTPATIENT)
Dept: OTHER | Facility: OTHER | Age: 69
End: 2019-05-31

## 2019-05-31 NOTE — PROGRESS NOTES
"Last 5 Patient Entered Readings                                      Current 30 Day Average: 139/73     Recent Readings 5/29/2019 5/29/2019 5/28/2019 5/27/2019 5/26/2019    SBP (mmHg) 159 155 143 110 125    DBP (mmHg) 77 84 90 61 77    Pulse 67 74 51 54 50        Digital Medicine: Health  Follow Up    Patient reports she had to go to the ED "a couple of weeks ago" because she was having heart palpitations.  Patient reports she was put on some different medications and different dosages.   Patient reports she will be going to the doctor on the 5th, and states "they may be doing surgery on her heart".  Patient reports she will be seen by a couple of doctors that day.     Lifestyle Modifications:    1.Dietary Modifications (Sodium intake <2,000mg/day, food labels, dining out):   Deferred    2.Physical Activity:   Deferred    3.Medication Therapy:   Patient has been compliant with the medication regimen.    4.Patient has the following medication side effects/concerns: None  (Frequency/Alleviating factors/Precipitating factors, etc.)     Follow up with Ms. Eugenie Coates completed. No further questions or concerns. Will continue to follow up to achieve health goals.  Patient is currently not at goal, 139/73 mmHg does exceed <130/80 mmHg.      "

## 2019-06-03 ENCOUNTER — TELEPHONE (OUTPATIENT)
Dept: ENDOCRINOLOGY | Facility: CLINIC | Age: 69
End: 2019-06-03

## 2019-06-03 NOTE — TELEPHONE ENCOUNTER
----- Message from Jackie Arango MA sent at 6/3/2019  1:32 PM CDT -----  Contact: Self [phone]      ----- Message -----  From: Roseline Carter  Sent: 6/3/2019   1:15 PM  To: [facility] Mercado Staff    PT returned call.

## 2019-06-03 NOTE — TELEPHONE ENCOUNTER
Returned patient's call.  She notes that my department called her, but I did not, nor did my staff.    Unclear if she received a call from a separate department.  She has appointments with [facility] cardiology 6/5/19.  Possible that she received calls for appointment confirmations.   Again not clear. No notes in chart documenting outgoing calls.    Patient doing well.   No complaints.    Apologized for any inconvenience.      Jess Harris MD  Endocrinology Fellow

## 2019-06-05 ENCOUNTER — OFFICE VISIT (OUTPATIENT)
Dept: FAMILY MEDICINE | Facility: CLINIC | Age: 69
End: 2019-06-05
Payer: MEDICARE

## 2019-06-05 ENCOUNTER — HOSPITAL ENCOUNTER (OUTPATIENT)
Dept: CARDIOLOGY | Facility: CLINIC | Age: 69
Discharge: HOME | End: 2019-06-05
Payer: MEDICARE

## 2019-06-05 ENCOUNTER — INITIAL CONSULT (OUTPATIENT)
Dept: ELECTROPHYSIOLOGY | Facility: CLINIC | Age: 69
End: 2019-06-05
Payer: MEDICARE

## 2019-06-05 VITALS
BODY MASS INDEX: 33.87 KG/M2 | HEIGHT: 62 IN | SYSTOLIC BLOOD PRESSURE: 145 MMHG | WEIGHT: 184.06 LBS | HEART RATE: 48 BPM | DIASTOLIC BLOOD PRESSURE: 72 MMHG

## 2019-06-05 VITALS
WEIGHT: 183.44 LBS | HEART RATE: 69 BPM | DIASTOLIC BLOOD PRESSURE: 70 MMHG | BODY MASS INDEX: 33.76 KG/M2 | TEMPERATURE: 98 F | HEIGHT: 62 IN | SYSTOLIC BLOOD PRESSURE: 150 MMHG | OXYGEN SATURATION: 98 %

## 2019-06-05 DIAGNOSIS — Z09 HOSPITAL DISCHARGE FOLLOW-UP: Primary | ICD-10-CM

## 2019-06-05 DIAGNOSIS — K21.9 GASTROESOPHAGEAL REFLUX DISEASE, ESOPHAGITIS PRESENCE NOT SPECIFIED: ICD-10-CM

## 2019-06-05 DIAGNOSIS — I10 HYPERTENSION, BENIGN: ICD-10-CM

## 2019-06-05 DIAGNOSIS — I47.19 AV REENTRANT TACHYCARDIA: ICD-10-CM

## 2019-06-05 DIAGNOSIS — E78.2 MIXED HYPERLIPIDEMIA: Primary | ICD-10-CM

## 2019-06-05 DIAGNOSIS — E04.2 MULTINODULAR THYROID: ICD-10-CM

## 2019-06-05 DIAGNOSIS — E78.2 MIXED HYPERLIPIDEMIA: ICD-10-CM

## 2019-06-05 DIAGNOSIS — G47.33 OSA (OBSTRUCTIVE SLEEP APNEA): ICD-10-CM

## 2019-06-05 DIAGNOSIS — E11.65 TYPE 2 DIABETES MELLITUS WITH HYPERGLYCEMIA, WITHOUT LONG-TERM CURRENT USE OF INSULIN: ICD-10-CM

## 2019-06-05 DIAGNOSIS — I47.10 PSVT (PAROXYSMAL SUPRAVENTRICULAR TACHYCARDIA): ICD-10-CM

## 2019-06-05 DIAGNOSIS — E05.90 SUBCLINICAL HYPERTHYROIDISM: ICD-10-CM

## 2019-06-05 DIAGNOSIS — N18.30 CKD (CHRONIC KIDNEY DISEASE) STAGE 3, GFR 30-59 ML/MIN: ICD-10-CM

## 2019-06-05 DIAGNOSIS — R00.0 TACHYCARDIA: ICD-10-CM

## 2019-06-05 PROCEDURE — 99999 PR PBB SHADOW E&M-EST. PATIENT-LVL III: CPT | Mod: PBBFAC,,, | Performed by: INTERNAL MEDICINE

## 2019-06-05 PROCEDURE — 99214 PR OFFICE/OUTPT VISIT, EST, LEVL IV, 30-39 MIN: ICD-10-PCS | Mod: S$PBB,,, | Performed by: FAMILY MEDICINE

## 2019-06-05 PROCEDURE — 99999 PR PBB SHADOW E&M-EST. PATIENT-LVL III: ICD-10-PCS | Mod: PBBFAC,,, | Performed by: FAMILY MEDICINE

## 2019-06-05 PROCEDURE — 99205 OFFICE O/P NEW HI 60 MIN: CPT | Mod: S$PBB,,, | Performed by: INTERNAL MEDICINE

## 2019-06-05 PROCEDURE — 99205 PR OFFICE/OUTPT VISIT, NEW, LEVL V, 60-74 MIN: ICD-10-PCS | Mod: S$PBB,,, | Performed by: INTERNAL MEDICINE

## 2019-06-05 PROCEDURE — 93005 ELECTROCARDIOGRAM TRACING: CPT | Mod: PBBFAC | Performed by: INTERNAL MEDICINE

## 2019-06-05 PROCEDURE — 93010 ELECTROCARDIOGRAM REPORT: CPT | Mod: S$PBB,,, | Performed by: INTERNAL MEDICINE

## 2019-06-05 PROCEDURE — 99213 OFFICE O/P EST LOW 20 MIN: CPT | Mod: PBBFAC,PO,25 | Performed by: FAMILY MEDICINE

## 2019-06-05 PROCEDURE — 99999 PR PBB SHADOW E&M-EST. PATIENT-LVL III: ICD-10-PCS | Mod: PBBFAC,,, | Performed by: INTERNAL MEDICINE

## 2019-06-05 PROCEDURE — 99213 OFFICE O/P EST LOW 20 MIN: CPT | Mod: PBBFAC,27,25 | Performed by: INTERNAL MEDICINE

## 2019-06-05 PROCEDURE — 99214 OFFICE O/P EST MOD 30 MIN: CPT | Mod: S$PBB,,, | Performed by: FAMILY MEDICINE

## 2019-06-05 PROCEDURE — 99999 PR PBB SHADOW E&M-EST. PATIENT-LVL III: CPT | Mod: PBBFAC,,, | Performed by: FAMILY MEDICINE

## 2019-06-05 PROCEDURE — 93010 RHYTHM STRIP: ICD-10-PCS | Mod: S$PBB,,, | Performed by: INTERNAL MEDICINE

## 2019-06-05 RX ORDER — OMEPRAZOLE 40 MG/1
CAPSULE, DELAYED RELEASE ORAL
Qty: 90 CAPSULE | Refills: 1 | Status: SHIPPED | OUTPATIENT
Start: 2019-06-05 | End: 2019-08-07 | Stop reason: SDUPTHER

## 2019-06-05 NOTE — LETTER
June 5, 2019      Rio Roberts MD  [address]  [facility] [address]           [facility] - Arrhythmia  [address]  [facility] [address]  Phone: [phone]  Fax: [phone]          Patient: Eugenie Coates   MR Number: [id number]   YOB: 1950   Date of Visit: 6/5/2019       Dear Dr. Rio Roberts:    Thank you for referring Eugenie Coates to me for evaluation. Attached you will find relevant portions of my assessment and plan of care.    If you have questions, please do not hesitate to call me. I look forward to following Eugenie Coates along with you.    Sincerely,    Jose Joe MD    Enclosure  CC:  No Recipients    If you would like to receive this communication electronically, please contact [email or web address] or [phone] to request more information on [employer] Link access.    For providers and/or their staff who would like to refer a patient to [facility], please contact us through our one-stop-shop provider referral line, [facility], at [phone].    If you feel you have received this communication in error or would no longer like to receive these types of communications, please e-mail [email or web address]

## 2019-06-05 NOTE — PATIENT INSTRUCTIONS
Initial /78. Repeat 150/70. Continue current regimen pending EP evaluation and discussion with endocrine. Endocrine note reports that patient should be on methimazole but patient states that she is not taking it. Message sent to endocrine today via epic.     Continue CCB, lisinopril 40 mg and metoprolol 50 mg BID for now  May have to change regimen based on response to EP treatment.     Will continue to monitor closely and have her continue digital HTN    Lipid panel today per cards request.

## 2019-06-05 NOTE — PROGRESS NOTES
Subjective:    Patient ID:  Eugenie Coates is a 69 y.o. female who presents for evaluation of Tachycardia      HPI   69 y.o. F Rast[employer] and retired [employer]   HTN on meds  HL on meds  DM on meds  AFSHIN, on CPAP    Has had palps c/w SVT x2 years. Lasts up to 20 mins. SOB, TRAN, chest discomf.  Went to [facility] ER with SVT recently. Rx BB.  Palpitations are SS and radiate to neck.     bpm. Short RP.    8/18 60-65% LVEF    My interpretation of today's ECG is NSR. No preexcitation.    Review of Systems   Constitution: Negative. Negative for malaise/fatigue.   HENT: Negative.  Negative for ear pain and tinnitus.    Eyes: Negative for blurred vision.   Cardiovascular: Positive for palpitations. Negative for chest pain, dyspnea on exertion, near-syncope and syncope.   Respiratory: Negative.  Negative for shortness of breath.    Endocrine: Negative.  Negative for polyuria.   Hematologic/Lymphatic: Does not bruise/bleed easily.   Skin: Negative.  Negative for rash.   Musculoskeletal: Negative.  Negative for joint pain and muscle weakness.   Gastrointestinal: Negative.  Negative for abdominal pain and change in bowel habit.   Genitourinary: Negative for frequency.   Neurological: Negative.  Negative for dizziness and weakness.   Psychiatric/Behavioral: Negative.  Negative for depression. The patient is not nervous/anxious.    Allergic/Immunologic: Negative for environmental allergies.        Objective:    Physical Exam   Constitutional: She is oriented to person, place, and time. Vital signs are normal. She appears well-developed and well-nourished. She is active and cooperative.   HENT:   Head: Normocephalic and atraumatic.   Eyes: Conjunctivae and EOM are normal.   Neck: Normal range of motion. Carotid bruit is not present. No tracheal deviation and no edema present. No thyroid mass and no thyromegaly present.   Cardiovascular: Normal rate, regular rhythm, normal heart sounds, intact  distal pulses and normal pulses.  No extrasystoles are present. PMI is not displaced. Exam reveals no gallop and no friction rub.   No murmur heard.  Pulmonary/Chest: Effort normal and breath sounds normal. No respiratory distress. She has no wheezes. She has no rales.   Abdominal: Soft. Normal appearance. She exhibits no distension. There is no hepatosplenomegaly.   Musculoskeletal: Normal range of motion.   Neurological: She is alert and oriented to person, place, and time. Coordination normal.   Skin: Skin is warm and dry. No rash noted.   Psychiatric: She has a normal mood and affect. Her speech is normal and behavior is normal. Thought content normal. Cognition and memory are normal.   Nursing note and vitals reviewed.        Assessment:       1. Mixed hyperlipidemia    2. Hypertension, benign    3. AV reentrant tachycardia    4. PSVT (paroxysmal supraventricular tachycardia)    5. AFSHIN (obstructive sleep apnea)         Plan:       Discussed with patient basic cardiac electrophysiology and in general possible mechanisms of SVT, including AVNRT, AVRT, AT, AFL.  I spent about a half hour discussing the nature of EP study and ablation, including possible transseptal puncture. We discused risks and benefits at length. Our discussion included, but was not limited to the risk of death, infection, bleeding, stroke, MI, cardiac perforation, embolism, cardiac tamponade, skin burns, and other organic injury including the possibility for need for surgery or pacemaker implantation.  I discussed with patient risks, indications, benefits, and alternatives of the planned procedure. All questions were answered. Patient understands and wishes to proceed.    Plan RFA SVT  No BB x 3 days before.  Note: Hinduism; no transfusions.

## 2019-06-05 NOTE — PROGRESS NOTES
"Subjective:       Patient ID: Eugenie Coates is a 69 y.o. female.    Chief Complaint: Hospital Follow Up    Yazmin is a 69 y.o. female who presents today for follow up on her recent ED visit and BP changes.  She was sent to the hospital on March 14th due to elevated blood pressure.  She was found to have paroxysmal SVT.  Her blood pressure medication was changed and her hydrochlorothiazide was stopped.  A beta-blocker was added.  She was continued on lisinopril by Cardiology.    She is also being followed by endocrine for her thyroid issues. It is unclear if she is taking methimazole.     She saw Cardiology on May 23rd.  She was referred to electrophysiology and has appointment with them today. patient reports cardiology asked her to have me check her lipid panel.     Today, she is feeling better. Her BP is slightly high. She is not feeling tachycardia or palpitations currently. She does state that she might feel palpitations when she "exercises." She states that when she has palpitations, she feels occasionally light headed.     She is taking lisinopril 40 mg, amlodipine 10 mg, and metoprolol 50 mg BID.         Review of Systems   Constitutional: Negative for chills and fever.   Respiratory: Negative for cough and shortness of breath.    Cardiovascular: Positive for palpitations. Negative for chest pain.   Gastrointestinal: Negative for blood in stool, constipation, diarrhea, nausea and vomiting.   Neurological: Negative for dizziness, tremors, seizures, syncope, facial asymmetry, weakness, light-headedness and numbness.         Objective:     Vitals:    06/05/19 0911   BP: (!) 150/70   Pulse: 69   Temp: 98.1 °F (36.7 °C)        Physical Exam   Constitutional: She is oriented to person, place, and time. She appears well-developed and well-nourished. No distress.   HENT:   Head: Normocephalic and atraumatic.   Eyes: Conjunctivae are normal.   Cardiovascular: Normal rate and regular rhythm.   Murmur " heard.  Pulmonary/Chest: Effort normal and breath sounds normal. No stridor. No respiratory distress.   Abdominal: Soft. There is no tenderness.   Musculoskeletal: She exhibits edema.   Trace pitting edema BL   Neurological: She is alert and oriented to person, place, and time.   Skin: Skin is warm. She is not diaphoretic.   Psychiatric: She has a normal mood and affect. Her behavior is normal. Judgment and thought content normal.   Nursing note and vitals reviewed.      Assessment:       1. Hospital discharge follow-up    2. Hypertension, benign    3. Multinodular thyroid    4. AV reentrant tachycardia    5. Subclinical hyperthyroidism    6. Mixed hyperlipidemia    7. Gastroesophageal reflux disease, esophagitis presence not specified    8. CKD (chronic kidney disease) stage 3, GFR 30-59 ml/min    9. Type 2 diabetes mellitus with hyperglycemia, without long-term current use of insulin        Plan:       Initial /78. Repeat 150/70. Continue current regimen pending EP evaluation and discussion with endocrine. Endocrine note reports that patient should be on methimazole but patient states that she is not taking it. Message sent to endocrine today via epic.     Continue CCB, lisinopril 40 mg and metoprolol 50 mg BID for now. CCB may be increasing leg swelling, but would not change at this time.     May have to change regimen based on response to EP treatment.     Will continue to monitor closely and have her continue digital HTN    Lipid panel today per cards request. A1c and CMP prior to f/u in 2 months for DM and HTN.     Hospital discharge follow-up    Hypertension, benign    Multinodular thyroid    AV reentrant tachycardia    Subclinical hyperthyroidism    Mixed hyperlipidemia  -     Lipid panel; Future; Expected date: 06/05/2019    Gastroesophageal reflux disease, esophagitis presence not specified  -     omeprazole (PRILOSEC) 40 MG capsule; TAKE 1 CAPSULE(20 MG) BY MOUTH EVERY DAY  Dispense: 90 capsule;  Refill: 1    CKD (chronic kidney disease) stage 3, GFR 30-59 ml/min  -     Comprehensive metabolic panel; Future; Expected date: 06/05/2019    Type 2 diabetes mellitus with hyperglycemia, without long-term current use of insulin  -     Hemoglobin A1c; Future; Expected date: 06/05/2019      30 minutes spent with patient, of which >50% was spent on counseling and coordination of care.     Warning signs discussed, patient to call with any further issues or worsening of symptoms.

## 2019-06-07 NOTE — PROGRESS NOTES
Last 5 Patient Entered Readings                                      Current 30 Day Average: 139/75     Recent Readings 6/7/2019 6/1/2019 5/31/2019 5/29/2019 5/29/2019    SBP (mmHg) 171 157 136 159 155    DBP (mmHg) 71 84 64 77 84    Pulse 49 46 51 67 74        HPI:  Called patient to follow up.   Patient endorses adherence to medication regimen.   Patient denies hypotensive s/sx (lightheadedness, dizziness, nausea, fatigue); patient denies hypertensive s/sx (SOB, CP, severe headaches, changes in vision). Instructed patient to seek medical care if BP > 180/110 and is accompanied by hypertensive s/sx associated, patient confirms understanding.     Assessment:  Reviewed recent readings. Per 2017 ACC/ AHA HTN guidelines (goal of BP < 130/80), current 30-day average needs to be addressed more thoroughly today.   She went to the ED in [place] for tachycardia and HCTZ was stopped. No electrolyte abnormalities were found.    Plan:  Continue current medication regimen.   I messaged Dr. Joe regarding resuming HCTZ.  She will undergo an ablation on July 9th.  I will continue to monitor regularly and will follow-up in 2 to 3 weeks, sooner if blood pressure begins to trend upward or downward.     Current medication regimen:  Hypertension Medications             amLODIPine (NORVASC) 10 MG tablet Take 1 tablet (10 mg total) by mouth every evening.    lisinopril (PRINIVIL,ZESTRIL) 40 MG tablet Take 1 tablet (40 mg total) by mouth once daily.    metoprolol tartrate (LOPRESSOR) 50 MG tablet Take 1 tablet (50 mg total) by mouth 2 (two) times daily.          Patient denies having questions or concerns. Patient has my contact information and knows to call with any concerns or clinical changes.

## 2019-06-10 ENCOUNTER — PATIENT MESSAGE (OUTPATIENT)
Dept: CARDIOLOGY | Facility: CLINIC | Age: 69
End: 2019-06-10

## 2019-06-12 ENCOUNTER — PATIENT OUTREACH (OUTPATIENT)
Dept: OTHER | Facility: OTHER | Age: 69
End: 2019-06-12

## 2019-06-12 NOTE — PROGRESS NOTES
Last 5 Patient Entered Readings                                      Current 30 Day Average: 145/75     Recent Readings 6/11/2019 6/8/2019 6/7/2019 6/1/2019 5/31/2019    SBP (mmHg) 135 149 171 157 136    DBP (mmHg) 68 77 71 84 64    Pulse 48 77 49 46 51        LMFCB to resume HCTZ as Dr. Joe has approved this change.    7/9: Patient underwent an ablation.

## 2019-06-18 ENCOUNTER — OFFICE VISIT (OUTPATIENT)
Dept: PODIATRY | Facility: CLINIC | Age: 69
End: 2019-06-18
Payer: MEDICARE

## 2019-06-18 VITALS — BODY MASS INDEX: 33.87 KG/M2 | HEIGHT: 62 IN | WEIGHT: 184.06 LBS

## 2019-06-18 DIAGNOSIS — B35.1 ONYCHOMYCOSIS DUE TO DERMATOPHYTE: ICD-10-CM

## 2019-06-18 DIAGNOSIS — M20.41 HAMMER TOES OF BOTH FEET: ICD-10-CM

## 2019-06-18 DIAGNOSIS — M20.42 HAMMER TOES OF BOTH FEET: ICD-10-CM

## 2019-06-18 DIAGNOSIS — R60.0 PERIPHERAL EDEMA: ICD-10-CM

## 2019-06-18 DIAGNOSIS — E11.65 TYPE 2 DIABETES MELLITUS WITH HYPERGLYCEMIA, WITHOUT LONG-TERM CURRENT USE OF INSULIN: Primary | ICD-10-CM

## 2019-06-18 PROCEDURE — 11721 DEBRIDE NAIL 6 OR MORE: CPT | Mod: PBBFAC,PN | Performed by: PODIATRIST

## 2019-06-18 PROCEDURE — 99499 NO LOS: ICD-10-PCS | Mod: S$PBB,,, | Performed by: PODIATRIST

## 2019-06-18 PROCEDURE — 99499 UNLISTED E&M SERVICE: CPT | Mod: S$PBB,,, | Performed by: PODIATRIST

## 2019-06-18 PROCEDURE — 11721 DEBRIDE NAIL 6 OR MORE: CPT | Mod: S$PBB,Q9,, | Performed by: PODIATRIST

## 2019-06-18 PROCEDURE — 99999 PR PBB SHADOW E&M-EST. PATIENT-LVL II: CPT | Mod: PBBFAC,,, | Performed by: PODIATRIST

## 2019-06-18 PROCEDURE — 11721 PR DEBRIDEMENT OF NAILS, 6 OR MORE: ICD-10-PCS | Mod: S$PBB,Q9,, | Performed by: PODIATRIST

## 2019-06-18 PROCEDURE — 99999 PR PBB SHADOW E&M-EST. PATIENT-LVL II: ICD-10-PCS | Mod: PBBFAC,,, | Performed by: PODIATRIST

## 2019-06-18 PROCEDURE — 99212 OFFICE O/P EST SF 10 MIN: CPT | Mod: PBBFAC,PN,25 | Performed by: PODIATRIST

## 2019-06-18 NOTE — PROGRESS NOTES
Subjective:      Patient ID: Eugenie Coates is a 69 y.o. female.    Chief Complaint: Diabetes Mellitus (6/5/19 [place] 6.7  5/19); Diabetic Foot Exam; and Foot Problem (swelling on the top of her feet)    Eugenie is a 69 y.o. female who presents to the clinic for routine evaluation and treatment of diabetic feet. Eugenie has a past medical history of Allergy, Anemia, Anxiety, Arthritis, Asthma, Bell palsy, Depression, Diabetes mellitus, type 2, DVT (deep venous thrombosis), Endometriosis, Eye injury (2014), GERD (gastroesophageal reflux disease), Glaucoma, History of uterine fibroid, Hyperlipidemia, Hypertension, Nuclear sclerosis of both eyes (9/27/2016), Sleep apnea, and SVT (supraventricular tachycardia) (05/2019). Patient relates no major problem with feet. Only complaints today consist of toenails in need of trimming.  Denies being painful with wearing shoe gear.  Has not attempted to self treat.  Also, relates having pronounce swelling in the lower extremities.  States this is likely due to her current cardiac issue for which she is receiving an ablation later this month.  Denies any additional pedal complaints.      PCP: Haile Paul,     Date Last Seen by PCP: 6/5/19    Hemoglobin A1C   Date Value Ref Range Status   05/10/2019 6.7 (H) 4.0 - 5.6 % Final     Comment:     ADA Screening Guidelines:  5.7-6.4%  Consistent with prediabetes  >or=6.5%  Consistent with diabetes  High levels of fetal hemoglobin interfere with the HbA1C  assay. Heterozygous hemoglobin variants (HbS, HgC, etc)do  not significantly interfere with this assay.   However, presence of multiple variants may affect accuracy.     01/18/2019 6.9 (H) 4.0 - 5.6 % Final     Comment:     ADA Screening Guidelines:  5.7-6.4%  Consistent with prediabetes  >or=6.5%  Consistent with diabetes  High levels of fetal hemoglobin interfere with the HbA1C  assay. Heterozygous hemoglobin variants (HbS, HgC, etc)do  not significantly interfere with this  assay.   However, presence of multiple variants may affect accuracy.     2018 6.5 (H) 4.0 - 5.6 % Final     Comment:     ADA Screening Guidelines:  5.7-6.4%  Consistent with prediabetes  >or=6.5%  Consistent with diabetes  High levels of fetal hemoglobin interfere with the HbA1C  assay. Heterozygous hemoglobin variants (HbS, HgC, etc)do  not significantly interfere with this assay.   However, presence of multiple variants may affect accuracy.             Past Medical History:   Diagnosis Date    Allergy     Anemia     Anxiety     Arthritis     Asthma     Bell palsy     Depression     Diabetes mellitus, type 2     DVT (deep venous thrombosis)     Endometriosis     Eye injury     stuck with tree branch od ?     GERD (gastroesophageal reflux disease)     Glaucoma     History of uterine fibroid     Hyperlipidemia     Hypertension     Nuclear sclerosis of both eyes 2016    Sleep apnea     uses C pap    SVT (supraventricular tachycardia) 2019       Past Surgical History:   Procedure Laterality Date     SECTION      CHOLECYSTECTOMY      glaucoma laser Bilateral     HERNIA REPAIR      KNEE SURGERY Right 2008    (R) knee scope; torn meniscus    REPAIR, HERNIA, VENTRAL, LAPAROSCOPIC N/A 2013    Performed by [facility] at [facility] OR    VEIN SURGERY  ,     Rt leg x2       Family History   Problem Relation Age of Onset    Diabetes Mother     No Known Problems Father     No Known Problems Sister     No Known Problems Brother     No Known Problems Maternal Aunt     No Known Problems Maternal Uncle     No Known Problems Paternal Aunt     No Known Problems Paternal Uncle     No Known Problems Maternal Grandmother     No Known Problems Maternal Grandfather     No Known Problems Paternal Grandmother     No Known Problems Paternal Grandfather     Amblyopia Neg Hx     Blindness Neg Hx     Cancer Neg Hx     Cataracts Neg Hx     Glaucoma Neg Hx      Hypertension Neg Hx     Macular degeneration Neg Hx     Retinal detachment Neg Hx     Strabismus Neg Hx     Stroke Neg Hx     Thyroid disease Neg Hx        Social History     Socioeconomic History    Marital status:      Spouse name: Not on file    Number of children: Not on file    Years of education: Not on file    Highest education level: Not on file   Occupational History    Not on file   Social Needs    Financial resource strain: Not on file    Food insecurity:     Worry: Not on file     Inability: Not on file    Transportation needs:     Medical: Not on file     Non-medical: Not on file   Tobacco Use    Smoking status: Never Smoker    Smokeless tobacco: Never Used   Substance and Sexual Activity    Alcohol use: No     Alcohol/week: 0.0 oz     Frequency: Never     Drinks per session: Patient refused     Binge frequency: Never    Drug use: No    Sexual activity: Yes     Partners: Male   Lifestyle    Physical activity:     Days per week: 3 days     Minutes per session: 20 min    Stress: Not on file   Relationships    Social connections:     Talks on phone: More than three times a week     Gets together: Once a week     Attends Yazidism service: Not on file     Active member of club or organization: No     Attends meetings of clubs or organizations: Never     Relationship status:    Other Topics Concern    Not on file   Social History Narrative    1/18/19: lives with her . They have multiple children, but the youngest is 38. No children at home right now. No pets at home. Splits time between here and [place]. Her son lives in [place].        Current Outpatient Medications   Medication Sig Dispense Refill    amLODIPine (NORVASC) 10 MG tablet Take 1 tablet (10 mg total) by mouth every evening. 90 tablet 3    atorvastatin (LIPITOR) 40 MG tablet Take 1 tablet (40 mg total) by mouth once daily. 90 tablet 2    bimatoprost (LUMIGAN) 0.01 % Drop Place 1 drop into both eyes  every evening. 3 Bottle 4    blood sugar diagnostic Strp Test twice daily.  Accucheck viva test strips and lancets. 300 each 3    brimonidine 0.2% (ALPHAGAN) 0.2 % Drop Place 1 drop into both eyes 3 (three) times daily. 15 mL 4    lancets (ACCU-CHEK SOFTCLIX LANCETS) Misc 1 Units by Misc.(Non-Drug; Combo Route) route 2 (two) times daily. 300 each 3    lisinopril (PRINIVIL,ZESTRIL) 40 MG tablet Take 1 tablet (40 mg total) by mouth once daily. 90 tablet 3    metFORMIN (GLUCOPHAGE) 500 MG tablet Take 1 tablet (500 mg total) by mouth daily with breakfast. 90 tablet 3    metoprolol tartrate (LOPRESSOR) 50 MG tablet Take 1 tablet (50 mg total) by mouth 2 (two) times daily. 60 tablet 11    omeprazole (PRILOSEC) 40 MG capsule TAKE 1 CAPSULE(20 MG) BY MOUTH EVERY DAY 90 capsule 1     No current facility-administered medications for this visit.        Review of patient's allergies indicates:   Allergen Reactions    Percodan [oxycodone hcl-oxycodone-asa] Hives and Itching         Review of Systems   Constitution: Negative for chills and fever.   Cardiovascular: Positive for leg swelling. Negative for claudication.   Skin: Positive for color change and nail changes.   Musculoskeletal: Positive for joint swelling. Negative for joint pain, muscle cramps and muscle weakness.   Neurological: Positive for paresthesias. Negative for numbness.   Psychiatric/Behavioral: Negative for altered mental status.           Objective:      Physical Exam   Constitutional: She is oriented to person, place, and time. She appears well-developed and well-nourished. No distress.   Cardiovascular:   Pulses:       Dorsalis pedis pulses are 2+ on the right side, and 2+ on the left side.        Posterior tibial pulses are 1+ on the right side, and 1+ on the left side.   CFT <3 seconds bilateral.  Pedal hair growth decreased bilateral.  Varicosities noted bilateral.  1+ pitting edema noted to bilateral lower extremity.  Toes are cool to touch  bilateral.     Musculoskeletal: She exhibits edema. She exhibits no tenderness.   Muscle strength 5/5 in all muscle groups bilateral.  No tenderness nor crepitation with ROM of foot/ankle joints bilateral.  No tenderness with palpation of bilateral foot and ankle.  Bilateral pes planus foot type.  Bilateral hallux abducto valgus.  Bilateral semi-reducible contracture of toes 2-5.     Neurological: She is alert and oriented to person, place, and time. She has normal strength. A sensory deficit is present.   Protective sensation per [place] monofilament intact bilateral.    Vibratory sensation decreased bilateral.    Light touch intact bilateral.   Skin: Skin is warm, dry and intact. No abrasion, no bruising, no burn, no ecchymosis, no laceration, no lesion, no petechiae and no rash noted. She is not diaphoretic. No cyanosis or erythema. No pallor. Nails show no clubbing.   Pedal skin appears edematous bilateral.  Toenails x 10 appear thickened by 2 mm's, elongated by 4 mm's, and discolored with subungual debris.  Examination of the skin reveals no evidence of significant maceration, rashes, open lesions, suspicious appearing nevi or other concerning lesions.              Assessment:       Encounter Diagnoses   Name Primary?    Type 2 diabetes mellitus with hyperglycemia, without long-term current use of insulin Yes    Hammer toes of both feet     Onychomycosis due to dermatophyte     Peripheral edema          Plan:       Eugenie was seen today for diabetes mellitus, diabetic foot exam and foot problem.    Diagnoses and all orders for this visit:    Type 2 diabetes mellitus with hyperglycemia, without long-term current use of insulin    Hammer toes of both feet    Onychomycosis due to dermatophyte    Peripheral edema      I counseled the patient on her conditions, their implications and medical management.    Advised to elevate the lower extremities to help manage peripheral edema.    Shoe inspection.  Diabetic Foot Education. Patient reminded of the importance of good nutrition and blood sugar control to help prevent podiatric complications of diabetes. Patient instructed on proper foot hygeine. We discussed wearing proper shoe gear, daily foot inspections, never walking without protective shoe gear, never putting sharp instruments to feet    Advised to continue wearing diabetic shoes/insoles that accommodate for digital deformities.     With patient's permission, nails were aggressively reduced and debrided x 10 to their soft tissue attachment mechanically and with electric , removing all offending nail and debris. Patient relates relief following the procedure.  She will continue to monitor the areas daily, inspect her feet, wear protective shoe gear when ambulatory, moisturizer to maintain skin integrity and follow in this office in approximately 3 months, sooner p.r.n.    Follow up in about 3 months (around 9/18/2019).    Giuliano Garnett DPM

## 2019-06-23 ENCOUNTER — PATIENT MESSAGE (OUTPATIENT)
Dept: ELECTROPHYSIOLOGY | Facility: CLINIC | Age: 69
End: 2019-06-23

## 2019-06-23 DIAGNOSIS — I47.10 PSVT (PAROXYSMAL SUPRAVENTRICULAR TACHYCARDIA): Primary | ICD-10-CM

## 2019-06-24 NOTE — PROGRESS NOTES
Patient Instructions  Cardiac Ablation    Pre-Procedure Testin/2/19 at 9 AM   Pre-procedure labs have been ordered for you at:  [facility]  · Be sure to arrive at your scheduled time. You do NOT need to fast for this blood work.       Important Medication Information:    · HOLD (do NOT take) METFORMIN (GLUCOPHAGE) on the day of your procedure.  Your last dose should be taken on 19.  · HOLD (do NOT take) LOPRESSOR 3 days prior to your procedure.  Your last dose should be taken on 19.  · You may take your other usual morning medications with a sip of water on the day of your procedure.         Day of Procedure:  19 at 9 AM   Please report to Cardiology Waiting Room on the 3rd floor of the Hospital    · Do not eat or drink anything after 12 midnight on the night before your procedure.  · Please do not wear makeup, especially mascara, when arriving for your procedure.  · You will be GOING HOME AFTER YOUR PROCEDURE  · You will need someone to drive you home from the hospital due to anesthesia.   · You are allowed one guest to stay with you if you are scheduled to stay over night.  There is a pull out chair or sofa in each room for your guest to sleep.  · If you wear a CPAP or BIPAP machine for sleep apnea, please be sure to bring your machine with you if you are scheduled to spend the night.        Directions to Cardiology Waiting Room  If you park in the Parking Garage:  Take elevators to the 2nd floor  Walk up ramp and turn right by Gold Elevators  Take elevator to the 3rd floor  Upon exiting the elevator, turn away from the clinic areas  Walk long knox around to front of hospital to area with windows overlooking [facility]  Check in at Reception Desk  OR  If family is dropping you off:  Have them drop you off at the front of the Hospital  (Near the ER, where all the flags are hung).  Take the E elevators to the 3rd floor.  Check in at the Reception Desk in the waiting room.      Post  Ablation Instructions:     No pushing, pulling, or lifting greater than 5 pounds for ONE week.   No long car rides (greater than 1 hour) for ONE week.  If a long car ride is required, we ask that you stop every hour and walk around for a few minutes.    No driving for 24 hours after procedure due to anesthesia.   No sitting in water for ONE week (this includes baths, pools, and hot tubs).    It is ok to go up and down home stairs as needed.       Any need to reschedule or cancel procedures, or any questions regarding your procedures should be addressed directly with the Arrhythmia Department Nurses at the following phone number: [phone].

## 2019-06-25 ENCOUNTER — OFFICE VISIT (OUTPATIENT)
Dept: OPTOMETRY | Facility: CLINIC | Age: 69
End: 2019-06-25
Payer: MEDICARE

## 2019-06-25 DIAGNOSIS — H40.1134 PRIMARY OPEN ANGLE GLAUCOMA OF BOTH EYES, INDETERMINATE STAGE: ICD-10-CM

## 2019-06-25 PROCEDURE — 99999 PR PBB SHADOW E&M-EST. PATIENT-LVL II: CPT | Mod: PBBFAC,,, | Performed by: OPTOMETRIST

## 2019-06-25 PROCEDURE — 92012 INTRM OPH EXAM EST PATIENT: CPT | Mod: S$PBB,,, | Performed by: OPTOMETRIST

## 2019-06-25 PROCEDURE — 99212 OFFICE O/P EST SF 10 MIN: CPT | Mod: PBBFAC,PO | Performed by: OPTOMETRIST

## 2019-06-25 PROCEDURE — 99999 PR PBB SHADOW E&M-EST. PATIENT-LVL II: ICD-10-PCS | Mod: PBBFAC,,, | Performed by: OPTOMETRIST

## 2019-06-25 PROCEDURE — 92012 PR EYE EXAM, EST PATIENT,INTERMED: ICD-10-PCS | Mod: S$PBB,,, | Performed by: OPTOMETRIST

## 2019-06-25 RX ORDER — BRIMONIDINE TARTRATE 2 MG/ML
1 SOLUTION/ DROPS OPHTHALMIC 3 TIMES DAILY
Qty: 15 ML | Refills: 4 | Status: SHIPPED | OUTPATIENT
Start: 2019-06-25 | End: 2019-11-15 | Stop reason: SDUPTHER

## 2019-06-25 NOTE — PROGRESS NOTES
HPI      Pt here today for 4 month IOP check. Pt states vision has been stable.   No new ocular complaints. Pt states not missing any drops.      (+) Brimonidine 3 x day both eyes  (+) Lumigan 1 drop both eyes at bedtime      Last edited by Maurilio Doran, OD on 6/25/2019 10:10 AM. (History)            Assessment /Plan     For exam results, see Encounter Report.    Primary open angle glaucoma of both eyes, indeterminate stage  -     brimonidine 0.2% (ALPHAGAN) 0.2 % Drop; Place 1 drop into both eyes 3 (three) times daily.  Dispense: 15 mL; Refill: 4  -     bimatoprost (LUMIGAN) 0.01 % Drop; Place 1 drop into both eyes every evening.  Dispense: 3 Bottle; Refill: 4  -     OCT - Optic Nerve; Future  -     [place] [facility] - OU - Extended - Both Eyes; Future      IOP stable with use of drops (target mid teens). Continue brimonidine 0.2%: 1 gt tid OU  bimatoprost qPM OU. Refilled drops today. Return as scheduled in 4 months for comprehensive eye exam  glc adeal.

## 2019-06-28 NOTE — PROGRESS NOTES
Last 5 Patient Entered Readings                                      Current 30 Day Average: 149/76     Recent Readings 6/25/2019 6/24/2019 6/21/2019 6/17/2019 6/14/2019    SBP (mmHg) 138 111 151 168 167    DBP (mmHg) 69 66 94 86 77    Pulse 56 59 49 52 51        6/28: PharmD is attempting outreach for medication change.  Will push outreach 2-3 weeks.

## 2019-07-02 ENCOUNTER — LAB VISIT (OUTPATIENT)
Dept: LAB | Facility: HOSPITAL | Age: 69
End: 2019-07-02
Attending: INTERNAL MEDICINE
Payer: MEDICARE

## 2019-07-02 DIAGNOSIS — I47.10 PSVT (PAROXYSMAL SUPRAVENTRICULAR TACHYCARDIA): ICD-10-CM

## 2019-07-02 LAB
ANION GAP SERPL CALC-SCNC: 9 MMOL/L (ref 8–16)
BASOPHILS # BLD AUTO: 0.07 K/UL (ref 0–0.2)
BASOPHILS NFR BLD: 0.7 % (ref 0–1.9)
BUN SERPL-MCNC: 19 MG/DL (ref 8–23)
CALCIUM SERPL-MCNC: 10.4 MG/DL (ref 8.7–10.5)
CHLORIDE SERPL-SCNC: 102 MMOL/L (ref 95–110)
CO2 SERPL-SCNC: 25 MMOL/L (ref 23–29)
CREAT SERPL-MCNC: 1 MG/DL (ref 0.5–1.4)
DIFFERENTIAL METHOD: ABNORMAL
EOSINOPHIL # BLD AUTO: 0.1 K/UL (ref 0–0.5)
EOSINOPHIL NFR BLD: 0.8 % (ref 0–8)
ERYTHROCYTE [DISTWIDTH] IN BLOOD BY AUTOMATED COUNT: 12.4 % (ref 11.5–14.5)
EST. GFR  (AFRICAN AMERICAN): >60 ML/MIN/1.73 M^2
EST. GFR  (NON AFRICAN AMERICAN): 58 ML/MIN/1.73 M^2
GLUCOSE SERPL-MCNC: 118 MG/DL (ref 70–110)
HCT VFR BLD AUTO: 43.7 % (ref 37–48.5)
HGB BLD-MCNC: 13.9 G/DL (ref 12–16)
IMM GRANULOCYTES # BLD AUTO: 0.05 K/UL (ref 0–0.04)
IMM GRANULOCYTES NFR BLD AUTO: 0.5 % (ref 0–0.5)
INR PPP: 1 (ref 0.8–1.2)
LYMPHOCYTES # BLD AUTO: 2.8 K/UL (ref 1–4.8)
LYMPHOCYTES NFR BLD: 26.9 % (ref 18–48)
MCH RBC QN AUTO: 27.2 PG (ref 27–31)
MCHC RBC AUTO-ENTMCNC: 31.8 G/DL (ref 32–36)
MCV RBC AUTO: 86 FL (ref 82–98)
MONOCYTES # BLD AUTO: 0.7 K/UL (ref 0.3–1)
MONOCYTES NFR BLD: 6.5 % (ref 4–15)
NEUTROPHILS # BLD AUTO: 6.8 K/UL (ref 1.8–7.7)
NEUTROPHILS NFR BLD: 64.6 % (ref 38–73)
NRBC BLD-RTO: 0 /100 WBC
PLATELET # BLD AUTO: 257 K/UL (ref 150–350)
PMV BLD AUTO: 11.1 FL (ref 9.2–12.9)
POTASSIUM SERPL-SCNC: 4.1 MMOL/L (ref 3.5–5.1)
PROTHROMBIN TIME: 10.1 SEC (ref 9–12.5)
RBC # BLD AUTO: 5.11 M/UL (ref 4–5.4)
SODIUM SERPL-SCNC: 136 MMOL/L (ref 136–145)
WBC # BLD AUTO: 10.53 K/UL (ref 3.9–12.7)

## 2019-07-02 PROCEDURE — 85610 PROTHROMBIN TIME: CPT

## 2019-07-02 PROCEDURE — 80048 BASIC METABOLIC PNL TOTAL CA: CPT

## 2019-07-02 PROCEDURE — 36415 COLL VENOUS BLD VENIPUNCTURE: CPT | Mod: PO

## 2019-07-02 PROCEDURE — 85025 COMPLETE CBC W/AUTO DIFF WBC: CPT

## 2019-07-03 ENCOUNTER — PATIENT MESSAGE (OUTPATIENT)
Dept: ADMINISTRATIVE | Facility: OTHER | Age: 69
End: 2019-07-03

## 2019-07-09 ENCOUNTER — ANESTHESIA EVENT (OUTPATIENT)
Dept: MEDSURG UNIT | Facility: HOSPITAL | Age: 69
End: 2019-07-09
Payer: MEDICARE

## 2019-07-09 ENCOUNTER — ANESTHESIA (OUTPATIENT)
Dept: MEDSURG UNIT | Facility: HOSPITAL | Age: 69
End: 2019-07-09
Payer: MEDICARE

## 2019-07-09 ENCOUNTER — HOSPITAL ENCOUNTER (OUTPATIENT)
Facility: HOSPITAL | Age: 69
Discharge: HOME | End: 2019-07-09
Attending: INTERNAL MEDICINE | Admitting: INTERNAL MEDICINE
Payer: MEDICARE

## 2019-07-09 VITALS
DIASTOLIC BLOOD PRESSURE: 65 MMHG | HEART RATE: 74 BPM | RESPIRATION RATE: 18 BRPM | TEMPERATURE: 97 F | BODY MASS INDEX: 33.68 KG/M2 | OXYGEN SATURATION: 100 % | WEIGHT: 183 LBS | SYSTOLIC BLOOD PRESSURE: 138 MMHG | HEIGHT: 62 IN

## 2019-07-09 DIAGNOSIS — I10 ESSENTIAL HYPERTENSION: ICD-10-CM

## 2019-07-09 DIAGNOSIS — I47.10 SVT (SUPRAVENTRICULAR TACHYCARDIA): ICD-10-CM

## 2019-07-09 DIAGNOSIS — K21.9 GASTROESOPHAGEAL REFLUX DISEASE, ESOPHAGITIS PRESENCE NOT SPECIFIED: Primary | ICD-10-CM

## 2019-07-09 DIAGNOSIS — I47.10 PSVT (PAROXYSMAL SUPRAVENTRICULAR TACHYCARDIA): ICD-10-CM

## 2019-07-09 LAB
POCT GLUCOSE: 127 MG/DL (ref 70–110)
POCT GLUCOSE: 93 MG/DL (ref 70–110)

## 2019-07-09 PROCEDURE — 93621: ICD-10-PCS | Mod: 26,,, | Performed by: INTERNAL MEDICINE

## 2019-07-09 PROCEDURE — C1894 INTRO/SHEATH, NON-LASER: HCPCS | Performed by: INTERNAL MEDICINE

## 2019-07-09 PROCEDURE — 93010 ELECTROCARDIOGRAM REPORT: CPT | Mod: ,,, | Performed by: INTERNAL MEDICINE

## 2019-07-09 PROCEDURE — 93005 ELECTROCARDIOGRAM TRACING: CPT

## 2019-07-09 PROCEDURE — 93010 EKG 12-LEAD: ICD-10-PCS | Mod: ,,, | Performed by: INTERNAL MEDICINE

## 2019-07-09 PROCEDURE — D9220A PRA ANESTHESIA: ICD-10-PCS | Mod: ANES,,, | Performed by: ANESTHESIOLOGY

## 2019-07-09 PROCEDURE — 93621 COMP EP EVL L PAC&REC C SINS: CPT | Performed by: INTERNAL MEDICINE

## 2019-07-09 PROCEDURE — 99220 PR INITIAL OBSERVATION CARE,LEVL III: ICD-10-PCS | Mod: ,,, | Performed by: INTERNAL MEDICINE

## 2019-07-09 PROCEDURE — 93613 INTRACARDIAC EPHYS 3D MAPG: CPT | Mod: ,,, | Performed by: INTERNAL MEDICINE

## 2019-07-09 PROCEDURE — 25000003 PHARM REV CODE 250: Performed by: NURSE ANESTHETIST, CERTIFIED REGISTERED

## 2019-07-09 PROCEDURE — 25000003 PHARM REV CODE 250: Performed by: INTERNAL MEDICINE

## 2019-07-09 PROCEDURE — 93623 PRGRMD STIMJ&PACG IV RX NFS: CPT | Mod: 26,,, | Performed by: INTERNAL MEDICINE

## 2019-07-09 PROCEDURE — 93653 PR ELECTROPHYS EVAL, COMPREHEN, W/SUPRAVENT TACHYCARD TRMT: ICD-10-PCS | Mod: 22,,, | Performed by: INTERNAL MEDICINE

## 2019-07-09 PROCEDURE — 93010 ELECTROCARDIOGRAM REPORT: CPT | Mod: 76,,, | Performed by: INTERNAL MEDICINE

## 2019-07-09 PROCEDURE — 82962 GLUCOSE BLOOD TEST: CPT | Mod: 91 | Performed by: INTERNAL MEDICINE

## 2019-07-09 PROCEDURE — 93653 COMPRE EP EVAL TX SVT: CPT | Mod: 22,,, | Performed by: INTERNAL MEDICINE

## 2019-07-09 PROCEDURE — C1730 CATH, EP, 19 OR FEW ELECT: HCPCS | Performed by: INTERNAL MEDICINE

## 2019-07-09 PROCEDURE — 27201423 OPTIME MED/SURG SUP & DEVICES STERILE SUPPLY: Performed by: INTERNAL MEDICINE

## 2019-07-09 PROCEDURE — 37000009 HC ANESTHESIA EA ADD 15 MINS: Performed by: INTERNAL MEDICINE

## 2019-07-09 PROCEDURE — 25000003 PHARM REV CODE 250: Performed by: NURSE PRACTITIONER

## 2019-07-09 PROCEDURE — 99220 PR INITIAL OBSERVATION CARE,LEVL III: CPT | Mod: ,,, | Performed by: INTERNAL MEDICINE

## 2019-07-09 PROCEDURE — 93613 PR INTRACARD ELECTROPHYS 3-DIMENS MAPPING: ICD-10-PCS | Mod: ,,, | Performed by: INTERNAL MEDICINE

## 2019-07-09 PROCEDURE — D9220A PRA ANESTHESIA: Mod: CRNA,,, | Performed by: NURSE ANESTHETIST, CERTIFIED REGISTERED

## 2019-07-09 PROCEDURE — 93621 COMP EP EVL L PAC&REC C SINS: CPT | Mod: 26,,, | Performed by: INTERNAL MEDICINE

## 2019-07-09 PROCEDURE — C1733 CATH, EP, OTHR THAN COOL-TIP: HCPCS | Performed by: INTERNAL MEDICINE

## 2019-07-09 PROCEDURE — D9220A PRA ANESTHESIA: Mod: ANES,,, | Performed by: ANESTHESIOLOGY

## 2019-07-09 PROCEDURE — 63600175 PHARM REV CODE 636 W HCPCS: Performed by: NURSE ANESTHETIST, CERTIFIED REGISTERED

## 2019-07-09 PROCEDURE — 82962 GLUCOSE BLOOD TEST: CPT

## 2019-07-09 PROCEDURE — 93623 PR STIM/PACING HEART POST IV DRUG INFU: ICD-10-PCS | Mod: 26,,, | Performed by: INTERNAL MEDICINE

## 2019-07-09 PROCEDURE — 93653 COMPRE EP EVAL TX SVT: CPT | Performed by: INTERNAL MEDICINE

## 2019-07-09 PROCEDURE — 93613 INTRACARDIAC EPHYS 3D MAPG: CPT | Performed by: INTERNAL MEDICINE

## 2019-07-09 PROCEDURE — 37000008 HC ANESTHESIA 1ST 15 MINUTES: Performed by: INTERNAL MEDICINE

## 2019-07-09 PROCEDURE — 93623 PRGRMD STIMJ&PACG IV RX NFS: CPT | Performed by: INTERNAL MEDICINE

## 2019-07-09 PROCEDURE — D9220A PRA ANESTHESIA: ICD-10-PCS | Mod: CRNA,,, | Performed by: NURSE ANESTHETIST, CERTIFIED REGISTERED

## 2019-07-09 RX ORDER — PHENYLEPHRINE HYDROCHLORIDE 10 MG/ML
INJECTION INTRAVENOUS
Status: DISCONTINUED | OUTPATIENT
Start: 2019-07-09 | End: 2019-07-09

## 2019-07-09 RX ORDER — PANTOPRAZOLE SODIUM 40 MG/1
40 TABLET, DELAYED RELEASE ORAL DAILY
Status: DISCONTINUED | OUTPATIENT
Start: 2019-07-09 | End: 2019-07-09 | Stop reason: HOSPADM

## 2019-07-09 RX ORDER — LIDOCAINE HCL/PF 100 MG/5ML
SYRINGE (ML) INTRAVENOUS
Status: DISCONTINUED | OUTPATIENT
Start: 2019-07-09 | End: 2019-07-09

## 2019-07-09 RX ORDER — FENTANYL CITRATE 50 UG/ML
INJECTION, SOLUTION INTRAMUSCULAR; INTRAVENOUS
Status: DISCONTINUED | OUTPATIENT
Start: 2019-07-09 | End: 2019-07-09

## 2019-07-09 RX ORDER — HYDROMORPHONE HYDROCHLORIDE 1 MG/ML
0.2 INJECTION, SOLUTION INTRAMUSCULAR; INTRAVENOUS; SUBCUTANEOUS EVERY 5 MIN PRN
Status: DISCONTINUED | OUTPATIENT
Start: 2019-07-09 | End: 2019-07-09 | Stop reason: HOSPADM

## 2019-07-09 RX ORDER — DIPHENHYDRAMINE HYDROCHLORIDE 50 MG/ML
25 INJECTION INTRAMUSCULAR; INTRAVENOUS EVERY 6 HOURS PRN
Status: DISCONTINUED | OUTPATIENT
Start: 2019-07-09 | End: 2019-07-09 | Stop reason: HOSPADM

## 2019-07-09 RX ORDER — LIDOCAINE HYDROCHLORIDE 20 MG/ML
INJECTION, SOLUTION INFILTRATION; PERINEURAL
Status: DISCONTINUED | OUTPATIENT
Start: 2019-07-09 | End: 2019-07-09 | Stop reason: HOSPADM

## 2019-07-09 RX ORDER — FENTANYL CITRATE 50 UG/ML
25 INJECTION, SOLUTION INTRAMUSCULAR; INTRAVENOUS EVERY 5 MIN PRN
Status: DISCONTINUED | OUTPATIENT
Start: 2019-07-09 | End: 2019-07-09

## 2019-07-09 RX ORDER — EPHEDRINE SULFATE 50 MG/ML
INJECTION, SOLUTION INTRAVENOUS
Status: DISCONTINUED | OUTPATIENT
Start: 2019-07-09 | End: 2019-07-09

## 2019-07-09 RX ORDER — PROPOFOL 10 MG/ML
VIAL (ML) INTRAVENOUS CONTINUOUS PRN
Status: DISCONTINUED | OUTPATIENT
Start: 2019-07-09 | End: 2019-07-09

## 2019-07-09 RX ORDER — SODIUM CHLORIDE 9 MG/ML
INJECTION, SOLUTION INTRAVENOUS CONTINUOUS PRN
Status: DISCONTINUED | OUTPATIENT
Start: 2019-07-09 | End: 2019-07-09

## 2019-07-09 RX ORDER — METOPROLOL TARTRATE 25 MG/1
25 TABLET, FILM COATED ORAL 2 TIMES DAILY
Qty: 60 TABLET | Refills: 7 | Status: SHIPPED | OUTPATIENT
Start: 2019-07-09 | End: 2020-04-10 | Stop reason: SDUPTHER

## 2019-07-09 RX ORDER — MIDAZOLAM HYDROCHLORIDE 1 MG/ML
INJECTION, SOLUTION INTRAMUSCULAR; INTRAVENOUS
Status: DISCONTINUED | OUTPATIENT
Start: 2019-07-09 | End: 2019-07-09

## 2019-07-09 RX ADMIN — FENTANYL CITRATE 25 MCG: 50 INJECTION, SOLUTION INTRAMUSCULAR; INTRAVENOUS at 11:07

## 2019-07-09 RX ADMIN — LIDOCAINE HYDROCHLORIDE 100 MG: 20 INJECTION, SOLUTION INTRAVENOUS at 10:07

## 2019-07-09 RX ADMIN — PROPOFOL 100 MCG/KG/MIN: 10 INJECTION, EMULSION INTRAVENOUS at 10:07

## 2019-07-09 RX ADMIN — SODIUM CHLORIDE: 0.9 INJECTION, SOLUTION INTRAVENOUS at 10:07

## 2019-07-09 RX ADMIN — PHENYLEPHRINE HYDROCHLORIDE 200 MCG: 10 INJECTION INTRAVENOUS at 11:07

## 2019-07-09 RX ADMIN — EPHEDRINE SULFATE 5 MG: 50 INJECTION, SOLUTION INTRAMUSCULAR; INTRAVENOUS; SUBCUTANEOUS at 11:07

## 2019-07-09 RX ADMIN — EPHEDRINE SULFATE 10 MG: 50 INJECTION, SOLUTION INTRAMUSCULAR; INTRAVENOUS; SUBCUTANEOUS at 11:07

## 2019-07-09 RX ADMIN — PANTOPRAZOLE SODIUM 40 MG: 40 TABLET, DELAYED RELEASE ORAL at 03:07

## 2019-07-09 RX ADMIN — SODIUM CHLORIDE, SODIUM GLUCONATE, SODIUM ACETATE, POTASSIUM CHLORIDE, MAGNESIUM CHLORIDE, SODIUM PHOSPHATE, DIBASIC, AND POTASSIUM PHOSPHATE: .53; .5; .37; .037; .03; .012; .00082 INJECTION, SOLUTION INTRAVENOUS at 12:07

## 2019-07-09 RX ADMIN — MIDAZOLAM HYDROCHLORIDE 2 MG: 1 INJECTION, SOLUTION INTRAMUSCULAR; INTRAVENOUS at 10:07

## 2019-07-09 RX ADMIN — FENTANYL CITRATE 50 MCG: 50 INJECTION, SOLUTION INTRAMUSCULAR; INTRAVENOUS at 01:07

## 2019-07-09 RX ADMIN — FENTANYL CITRATE 25 MCG: 50 INJECTION, SOLUTION INTRAMUSCULAR; INTRAVENOUS at 01:07

## 2019-07-09 RX ADMIN — SODIUM CHLORIDE 1 MCG/MIN: 9 INJECTION, SOLUTION INTRAVENOUS at 11:07

## 2019-07-09 RX ADMIN — PHENYLEPHRINE HYDROCHLORIDE 100 MCG: 10 INJECTION INTRAVENOUS at 11:07

## 2019-07-09 RX ADMIN — FENTANYL CITRATE 25 MCG: 50 INJECTION, SOLUTION INTRAMUSCULAR; INTRAVENOUS at 12:07

## 2019-07-09 RX ADMIN — FENTANYL CITRATE 50 MCG: 50 INJECTION, SOLUTION INTRAMUSCULAR; INTRAVENOUS at 10:07

## 2019-07-09 NOTE — PLAN OF CARE
Problem: Adult Inpatient Plan of Care  Goal: Plan of Care Review  Outcome: Ongoing (interventions implemented as appropriate)  Pt arrived to unit accompanied by family.  Oriented pt to rm and unit.  Pre op orders and assessment initiated.  Pt remains npo.  Pt in no acute distress and verbalizes no complaints.  Will continue to monitor.  Call bell within reach.

## 2019-07-09 NOTE — DISCHARGE SUMMARY
[facility]  Cardiac Electrophysiology  Discharge Summary      Patient Name: Eugenie Coates  MRN: [id number]  Admission Date: 7/9/2019  Hospital Length of Stay: 0 days  Discharge Date and Time:  07/09/2019  Attending Physician: No att. providers found    Discharging Provider: Monique Sánchez NP  Primary Care Physician: Haile Paul DO    HPI: 69 year old female  Sikh with a PMH HTN, HL, DM, AFSHIN on CPAP, SVT x2 years with symptoms of palpitations, and recent ER visit related to SVT. On last EP clinic with MD Joe, patient elected to proceed for planned SVT RFA.   Patient presented to short stay unit for planned ablation on 7/9/19 > she denies any acute symptoms.     Procedure(s) (LRB):  ABLATION, SVT, ACCESSORY PATHWAY (N/A)         Hospital Course: Pt underwent slow pathway modification  ( see procedure note for details) tolerated procedure with no acute complications. Monitored post procedure , Bilateral groins with no bleeding or hematoma. Pt  to decrease Metoprolol by 50 percent, to 25 mg BID   Pt to follow up with LAURENCE JULIO in 4 months.  Patient referred to outpatient GI for GERD.   Discharge plans/instructions discussed with patient and spouse Mr Coates who verbalized understanding  and agreement of plans of care.  No further questions or concerns  voiced at this time     Physical Exam:   Gen: no acute distress, pleasant patient answering questions appropriately  CVS: regular rate and rhythm, S1S2 normal, no murmurs/rubs/gallops  CHEST: clear to auscultation bilaterally, no wheezes/rales/ronchi  ABD: Soft, non-tender, nondistended, bowel sounds present  GROINS: Bilateral  Soft, non tender, no bleeding no hematoma   Neurologic: Normal strength and tone. No focal numbness or weakness.   Psychiatric: normal mood and affect.  behavior is normal. Alert and oriented X 3.  EXT: No Edema     Consults: Anesthesia     Final Active Diagnoses:    Diagnosis Date Noted POA    PRINCIPAL  PROBLEM:  SVT (supraventricular tachycardia) [I47.1] 07/09/2019 Yes      Problems Resolved During this Admission:       Discharged Condition: good    Disposition: Home or Self Care    Follow Up:  Follow-up Information     Jose Joe MD In 4 months.    Specialties:  Electrophysiology, Cardiology  Contact information:  [address]  [facility] [address]  [phone]                 Patient Instructions:      Diet Cardiac     No driving until:   Order Comments: IF your were driving prior to procedure, NO driving for 24 hours post procedure     Notify your health care provider if you experience any of the following:  temperature >100.4     Notify your health care provider if you experience any of the following:  persistent nausea and vomiting or diarrhea     Notify your health care provider if you experience any of the following:  severe uncontrolled pain     Notify your health care provider if you experience any of the following:  difficulty breathing or increased cough     Notify your health care provider if you experience any of the following:  redness, tenderness, or signs of infection (pain, swelling, redness, odor or green/yellow discharge around incision site)     Notify your health care provider if you experience any of the following:  severe persistent headache     Notify your health care provider if you experience any of the following:  worsening rash     Notify your health care provider if you experience any of the following:  persistent dizziness, light-headedness, or visual disturbances     Notify your health care provider if you experience any of the following:  increased confusion or weakness     Notify your health care provider if you experience any of the following:   Order Comments: For any concerning medical symptoms     Remove dressing in 24 hours     Other restrictions (specify):   Order Comments: 1. Do not strain or lift anything greater than 5 lb for 1 week.   2. Do not drive or  operate any dangerous machinery for 24 hours. .   3. After 24 hours, a shower is allowed.   4. Clean the area with mild soap and water.   5. Once the skin has healed (1 week), bathing in a tub, swimming, or hot tub is allowed.   6. Inspect the groin site daily and report to the physician any signs of infection at the site: redness, pain, fever >100.4, unusual pain at the access site or affected extremity, unusual swelling at the access site, or any yellow, white or green drainage.    Seek immediate medical attention   Bleeding from the puncture site that you cannot stop by doing the following:   Relax and lie down right away. Keep your leg flat and apply firm pressure to the site using your fingers and a gauze pad. Keep the pressure on for 10 minutes. Continue this until the bleeding stops. This may take awhile. When bleeding stops, cover the site with a sterile bandage and keep your leg still as much as possible.    Go to the Emergency Department if you develop:  -Severe bleeding  - Acute Weakness or numbness  -Visual, gait or speech disturbances  -New chest pain, palpitations, shortness of breath, fainting     Lifting restrictions   Order Comments: More than 5 pounds for 1 week     Medications:  No current facility-administered medications for this encounter.      Current Outpatient Medications   Medication Sig Dispense Refill    amLODIPine (NORVASC) 10 MG tablet Take 1 tablet (10 mg total) by mouth every evening. 90 tablet 3    lisinopril (PRINIVIL,ZESTRIL) 40 MG tablet Take 1 tablet (40 mg total) by mouth once daily. 90 tablet 3    atorvastatin (LIPITOR) 40 MG tablet Take 1 tablet (40 mg total) by mouth once daily. 90 tablet 2    bimatoprost (LUMIGAN) 0.01 % Drop Place 1 drop into both eyes every evening. 3 Bottle 4    blood sugar diagnostic Strp Test twice daily.  Accucheck viva test strips and lancets. 300 each 3    brimonidine 0.2% (ALPHAGAN) 0.2 % Drop Place 1 drop into both eyes 3 (three) times daily.  15 mL 4    lancets (ACCU-CHEK SOFTCLIX LANCETS) Misc 1 Units by Misc.(Non-Drug; Combo Route) route 2 (two) times daily. 300 each 3    metFORMIN (GLUCOPHAGE) 500 MG tablet Take 1 tablet (500 mg total) by mouth daily with breakfast. 90 tablet 3    metoprolol tartrate (LOPRESSOR) 25 MG tablet Take 1 tablet (25 mg total) by mouth 2 (two) times daily. 60 tablet 7    omeprazole (PRILOSEC) 40 MG capsule TAKE 1 CAPSULE(20 MG) BY MOUTH EVERY DAY 90 capsule 1       Time spent on the discharge of patient: 30  minutes    Monique Sánchez NP  Cardiac Electrophysiology  [facility]  STAFF MD Jose Joe

## 2019-07-09 NOTE — SUBJECTIVE & OBJECTIVE
Past Medical History:   Diagnosis Date    Allergy     Anemia     Anxiety     Arthritis     Asthma     Bell palsy     Depression     Diabetes mellitus, type 2     DVT (deep venous thrombosis)     Endometriosis     Eye injury     stuck with tree branch od ?     GERD (gastroesophageal reflux disease)     Glaucoma     History of uterine fibroid     Hyperlipidemia     Hypertension     Nuclear sclerosis of both eyes 2016    Sleep apnea     uses C pap    SVT (supraventricular tachycardia) 2019    Thyroid disease        Past Surgical History:   Procedure Laterality Date     SECTION      CHOLECYSTECTOMY      glaucoma laser Bilateral     HERNIA REPAIR      KNEE SURGERY Right 2008    (R) knee scope; torn meniscus    REPAIR, HERNIA, VENTRAL, LAPAROSCOPIC N/A 2013    Performed by Aron Bour[facility] at [facility] OR    VEIN SURGERY  ,     Rt leg x2       Review of patient's allergies indicates:   Allergen Reactions    Percodan [oxycodone hcl-oxycodone-asa] Hives and Itching       No current facility-administered medications on file prior to encounter.      Current Outpatient Medications on File Prior to Encounter   Medication Sig    amLODIPine (NORVASC) 10 MG tablet Take 1 tablet (10 mg total) by mouth every evening.    lisinopril (PRINIVIL,ZESTRIL) 40 MG tablet Take 1 tablet (40 mg total) by mouth once daily.    atorvastatin (LIPITOR) 40 MG tablet Take 1 tablet (40 mg total) by mouth once daily.    blood sugar diagnostic Strp Test twice daily.  Accucheck viva test strips and lancets.    lancets (ACCU-CHEK SOFTCLIX LANCETS) Misc 1 Units by Misc.(Non-Drug; Combo Route) route 2 (two) times daily.    metFORMIN (GLUCOPHAGE) 500 MG tablet Take 1 tablet (500 mg total) by mouth daily with breakfast.    metoprolol tartrate (LOPRESSOR) 50 MG tablet Take 1 tablet (50 mg total) by mouth 2 (two) times daily.    omeprazole (PRILOSEC) 40 MG capsule TAKE 1 CAPSULE(20 MG) BY MOUTH  EVERY DAY    [DISCONTINUED] omeprazole (PRILOSEC OTC) 20 MG tablet Take 1 tablet (20 mg total) by mouth once daily.     Family History     Problem Relation (Age of Onset)    Diabetes Mother    No Known Problems Father, Sister, Brother, Maternal Aunt, Maternal Uncle, Paternal Aunt, Paternal Uncle, Maternal Grandmother, Maternal Grandfather, Paternal Grandmother, Paternal Grandfather        Tobacco Use    Smoking status: Never Smoker    Smokeless tobacco: Never Used   Substance and Sexual Activity    Alcohol use: No     Alcohol/week: 0.0 oz     Frequency: Never     Drinks per session: Patient refused     Binge frequency: Never    Drug use: No    Sexual activity: Yes     Partners: Male     Review of Systems   Constitution: Negative for chills, fever and malaise/fatigue.   HENT: Negative for congestion and nosebleeds.    Eyes: Negative for blurred vision.   Cardiovascular: Negative for chest pain, dyspnea on exertion, irregular heartbeat, leg swelling, near-syncope, orthopnea, palpitations, paroxysmal nocturnal dyspnea and syncope.   Respiratory: Negative for cough, hemoptysis, shortness of breath, sleep disturbances due to breathing, sputum production and wheezing.    Endocrine: Negative for polyphagia.   Hematologic/Lymphatic: Negative for bleeding problem. Does not bruise/bleed easily.   Skin: Negative for itching and rash.   Musculoskeletal: Negative for back pain, joint swelling, muscle cramps and muscle weakness.   Gastrointestinal: Negative for bloating, abdominal pain, hematemesis, hematochezia, nausea and vomiting.   Genitourinary: Negative for dysuria and hematuria.   Neurological: Negative for dizziness, focal weakness, headaches, light-headedness, loss of balance, numbness and weakness.   Psychiatric/Behavioral: Negative for altered mental status.     Objective:     Vital Signs (Most Recent):  Temp: 97.7 °F (36.5 °C) (07/09/19 0842)  Pulse: 66 (07/09/19 0842)  Resp: 18 (07/09/19 0842)  BP: (!) 194/86  (07/09/19 0844)  SpO2: 99 % (07/09/19 0842) Vital Signs (24h Range):  Temp:  [97.7 °F (36.5 °C)] 97.7 °F (36.5 °C)  Pulse:  [66] 66  Resp:  [18] 18  SpO2:  [99 %] 99 %  BP: (178-194)/(84-86) 194/86       Weight: 83 kg (183 lb)  Body mass index is 33.47 kg/m².    SpO2: 99 %  O2 Device (Oxygen Therapy): room air    Physical Exam   Constitutional: She is oriented to person, place, and time. She appears well-developed and well-nourished. No distress.   HENT:   Head: Normocephalic and atraumatic.   Mouth/Throat: No oropharyngeal exudate.   Eyes: Pupils are equal, round, and reactive to light. Conjunctivae are normal. Right eye exhibits no discharge. Left eye exhibits no discharge.   Neck: Normal range of motion. Neck supple.   Cardiovascular: Normal rate, regular rhythm, S1 normal, S2 normal, normal heart sounds, intact distal pulses and normal pulses.  No extrasystoles are present. PMI is not displaced. Exam reveals no gallop and no friction rub.   No murmur heard.  Pulses:       Radial pulses are 2+ on the right side, and 2+ on the left side.        Dorsalis pedis pulses are 2+ on the right side, and 2+ on the left side.   Pulmonary/Chest: Effort normal and breath sounds normal. No accessory muscle usage. No respiratory distress. She has no decreased breath sounds. She has no wheezes. She has no rhonchi. She has no rales. She exhibits no tenderness.   Abdominal: Soft. Bowel sounds are normal. She exhibits no distension and no mass. There is no tenderness. There is no rebound and no guarding.   Musculoskeletal: Normal range of motion. She exhibits no edema.   Neurological: She is alert and oriented to person, place, and time. She has normal strength. She is not disoriented. No cranial nerve deficit or sensory deficit. She exhibits normal muscle tone. Coordination and gait normal.   Skin: Skin is warm and dry. No rash noted. She is not diaphoretic. No erythema.   Psychiatric: She has a normal mood and affect. Her  behavior is normal. Judgment and thought content normal.   Nursing note and vitals reviewed.    Significant Labs: Labs from 7/2/19 through today reviewed.  Significant Imaging: Echocardiogram:   2D echo with color flow doppler:   Results for orders placed or performed during the hospital encounter of 08/21/18   2D Echo w/ Color Flow Doppler   Result Value Ref Range    QEF 60 55 - 65    Diastolic Dysfunction Yes (A)     Est. PA Systolic Pressure 33.91     Mitral Valve Mobility NORMAL     Tricuspid Valve Regurgitation TRIVIAL     Narrative    Date of Procedure: 08/21/2018        TEST DESCRIPTION   Technical Quality: This is a technically adequate study.     Aorta: The aortic root is normal in size, measuring 2.7 cm at sinotubular junction.     Left Atrium: The left atrial volume index is normal, measuring 25.96 cc/m2.     Left Ventricle: The left ventricle is normal in size, with an end-diastolic diameter of 4.6 cm, and an end-systolic diameter of 2.4 cm. LV wall thickness is normal, with the septum measuring 0.8 cm and the posterior wall measuring 0.9 cm across. Relative   wall thickness was normal at 0.39, and the LV mass index was 79.8 g/m2 consistent with normal left ventricular mass. There are no regional wall motion abnormalities. Left ventricular systolic function appears normal. Visually estimated ejection fraction   is 60-65%. The LV Doppler derived stroke volume equals 125.0 ccs.     Diastolic indices: E wave velocity 0.8 m/s, E/A ratio 0.6,  msec., E/e' ratio(lat) 12. Mean left atrial pressures are increased. LVEDP is estimated to be 16 mmHg. There is diastolic dysfunction secondary to relaxation abnormality.     Right Atrium: The right atrium is normal in size, measuring 4.4 cm in length in the apical view.     Right Ventricle: The right ventricle is normal in size. Global right ventricular systolic function appears normal. The estimated PA systolic pressure is 34 mmHg.     Aortic Valve:  The aortic  valve is mildly sclerotic with normal leaflet mobility. The aortic valve is tri-leaflet in structure. The peak velocity obtained across the aortic valve is 2.8 m/s, which translates to a peak gradient of 31 mmHg. The mean   gradient is 17 mmHg. Using a left ventricular outflow tract diameter of 2.0 cm, a left ventricular outflow tract velocity time integral of 41 cm, and a peak instantaneous transvalvular velocity time integral of 59 cm, the calculated aortic valve area is   2.1 cm2(AVAi is 1.15 cm2/m2).     Mitral Valve:  The mitral valve is normal in structure with normal leaflet mobility. The pressure half time is 62 msec. The calculated mitral valve area is 3.55 cm2.     Tricuspid Valve:  The tricuspid valve is mildly sclerotic with normal leaflet mobility. There is trivial tricuspid regurgitation.     Pulmonary Valve:  The pulmonic valve is normal in structure.     IVC: IVC is normal in size and collapses > 50% with a sniff, suggesting normal right atrial pressure of 3 mmHg.     Atrial Septum: The atrial septum is intact.     Intracavitary: There is no evidence of pericardial effusion, intracavity mass, thrombi, or vegetation.         CONCLUSIONS     1 - Normal left ventricular systolic function (EF 60-65%).     2 - Normal right ventricular systolic function .     3 - The estimated PA systolic pressure is 34 mmHg.     4 - Impaired LV relaxation, increased LVEDP.             This document has been electronically    SIGNED BY: Lonnie Carrizales MD On: 08/21/2018 11:07

## 2019-07-09 NOTE — HPI
HPI   69 y.o. F Sikh and retired [employer]   HTN on meds  HL on meds  DM on meds  AFSHIN, on CPAP     Has had palps c/w SVT x2 years. Lasts up to 20 mins. SOB, TRAN, chest discomf.  Went to [facility] ER with SVT recently. Rx BB.  Palpitations are SS and radiate to neck.      bpm. Short RP.     8/18 60-65% LVEF   Discussed with patient basic cardiac electrophysiology and in general possible mechanisms of SVT, including AVNRT, AVRT, AT, AFL.  I spent about a half hour discussing the nature of EP study and ablation, including possible transseptal puncture. We discused risks and benefits at length. Our discussion included, but was not limited to the risk of death, infection, bleeding, stroke, MI, cardiac perforation, embolism, cardiac tamponade, skin burns, and other organic injury including the possibility for need for surgery or pacemaker implantation.  I discussed with patient risks, indications, benefits, and alternatives of the planned procedure. All questions were answered. Patient understands and wishes to proceed.     Plan RFA SVT  No BB x 3 days before.  Note: Sikh; no transfusions.

## 2019-07-09 NOTE — PLAN OF CARE
Received report from Berenice HAYES. Patient s/p RFA, AAOx3. VSS, no c/o pain or discomfort at this time, resp even and unlabored. Gauze/tegaderm dressing to bilateral groins is CDI. No active bleeding. No hematoma noted. Post procedure protocol reviewed with patient. Understanding verbalized. Nurse call bell within reach. Will continue to monitor per post procedure protocol.

## 2019-07-09 NOTE — NURSING TRANSFER
Nursing Transfer Note      7/9/2019     Transfer To: ep pacu 3 to sscu 01    Transfer via stretcher    Transfer with cardiac monitoring, tele box on sscu 01    Transported by elise chong    Medicines sent: none    Chart send with patient: Yes    Notified: spouse    Patient reassessed at: 7-9-19 1500. Next due 1530    Upon arrival to floor: cardiac monitor applied, patient oriented to room, call bell in reach and bed in lowest position

## 2019-07-09 NOTE — ANESTHESIA PREPROCEDURE EVALUATION
07/09/2019  Eugenie Coates is a 69 y.o., female   Patient Active Problem List   Diagnosis    Ventral hernia    Hypertension, benign    Depression    Type 2 diabetes mellitus with hyperglycemia, without long-term current use of insulin    Primary open angle glaucoma of both eyes    Diabetes mellitus type 2 without retinopathy    Nuclear sclerosis of both eyes    Osteopenia    Acute deep vein thrombosis (DVT) of proximal vein of lower extremity    Hyperlipidemia    Refusal of blood transfusions as patient is Mandaen    Gastroesophageal reflux disease    Subclinical hyperthyroidism    CKD (chronic kidney disease) stage 3, GFR 30-59 ml/min    AFSHIN (obstructive sleep apnea)    Multinodular thyroid    Restrictive lung disease    Heart failure with preserved ejection fraction    Severe obesity (BMI 35.0-35.9 with comorbidity)    Hammer toes of both feet    White coat syndrome with diagnosis of hypertension    PSVT (paroxysmal supraventricular tachycardia)    SVT (supraventricular tachycardia)     .    Anesthesia Evaluation         Review of Systems      Physical Exam  General:  Well nourished    Airway/Jaw/Neck:  Airway Findings: Mouth Opening: Normal Tongue: Normal  General Airway Assessment: Adult  Mallampati: II  Improves to II with phonation.  TM Distance: Normal, at least 6 cm      Dental:  Dental Findings: In tact   Chest/Lungs:  Chest/Lungs Findings: Clear to auscultation     Heart/Vascular:  Heart Findings: Rate: Normal  Rhythm: Regular Rhythm  Sounds: Normal        Mental Status:  Mental Status Findings:  Cooperative, Alert and Oriented         Anesthesia Plan  Type of Anesthesia, risks & benefits discussed:  Anesthesia Type:  general  Patient's Preference: General  Intra-op Monitoring Plan: standard ASA monitors  Intra-op Monitoring Plan Comments: Standard ASA monitors.    Post Op Pain Control Plan: per primary service following discharge from PACU  Post Op Pain Control Plan Comments: Per primary service.     Induction:   IV  Beta Blocker:  Patient is not currently on a Beta-Blocker (No further documentation required).       Informed Consent: Patient understands risks and agrees with Anesthesia plan.  Questions answered. Anesthesia consent signed with patient.  ASA Score: 3     Day of Surgery Review of History & Physical:    H&P update referred to the surgeon.     Anesthesia Plan Notes: Chart reviewed, patient interviewed and examined.  The plan for general anesthesia was explained.  Questions were answered and the consent was signed.  Melony PEREZ         Ready For Surgery From Anesthesia Perspective.

## 2019-07-09 NOTE — H&P
[facility]  Cardiac Electrophysiology  History and Physical     Admission Date: 2019  Code Status: No Order   Attending Provider: Jose Joe MD   Principal Problem:SVT (supraventricular tachycardia)    Subjective:     Chief Complaint:  SVT     HPI: Ms. Coates is a 69 year old female with a PMHx of SVT, HTN, HLD, AFSHIN (on CPAP), DM, CKD 3, hx of DVT, Zoroastrianism   Has had palpitations c/w SVT x2 years. Lasts up to 20 mins. SOB, TRAN, chest discomfort.  Went to [facility] with SVT recently. Rx BB.  Palpitations are SS and radiate to neck.     Echo 2018 60-65% LVEF    Plan RFA SVT  No BB x 3 days before.  Note: Zoroastrianism; no transfusions.    She presents today to [facility] for scheduled EP study with SVT RFA with Dr. Joe. She denies any chest pain, palpitations, SOB, dizziness, light headedness, weakness, syncope, or near syncopal episodes. She does state she has TRAN and chest discomfort with SVT episodes. She denies any bleeding, infections, rashes, fevers, or surgeries in the past 30 days. She is currently taking amlodipine, lisinopril, lopressor 50 mg BID (last dose 19). She is currently not on any anticoagulation or aspirin.    I have personally reviewed the patient's EKG today, which shows sinus bradycardia at 56 bpm. Reviewed with Dr. Joe.     Past Medical History:   Diagnosis Date    Allergy     Anemia     Anxiety     Arthritis     Asthma     Bell palsy     Depression     Diabetes mellitus, type 2     DVT (deep venous thrombosis)     Endometriosis     Eye injury     stuck with tree branch od ?     GERD (gastroesophageal reflux disease)     Glaucoma     History of uterine fibroid     Hyperlipidemia     Hypertension     Nuclear sclerosis of both eyes 2016    Sleep apnea     uses C pap    SVT (supraventricular tachycardia) 2019    Thyroid disease        Past Surgical History:   Procedure Laterality Date     SECTION       CHOLECYSTECTOMY      glaucoma laser Bilateral     HERNIA REPAIR      KNEE SURGERY Right 2008    (R) knee scope; torn meniscus    REPAIR, HERNIA, VENTRAL, LAPAROSCOPIC N/A 5/29/2013    Performed by Aron Bourne MD at [facility] OR    VEIN SURGERY  2003, 2004    Rt leg x2       Review of patient's allergies indicates:   Allergen Reactions    Percodan [oxycodone hcl-oxycodone-asa] Hives and Itching       No current facility-administered medications on file prior to encounter.      Current Outpatient Medications on File Prior to Encounter   Medication Sig    amLODIPine (NORVASC) 10 MG tablet Take 1 tablet (10 mg total) by mouth every evening.    lisinopril (PRINIVIL,ZESTRIL) 40 MG tablet Take 1 tablet (40 mg total) by mouth once daily.    atorvastatin (LIPITOR) 40 MG tablet Take 1 tablet (40 mg total) by mouth once daily.    blood sugar diagnostic Strp Test twice daily.  AccForta[employer] viva test strips and lancets.    lancets (ACCU-CHEK SOFTCLIX LANCETS) Misc 1 Units by Misc.(Non-Drug; Combo Route) route 2 (two) times daily.    metFORMIN (GLUCOPHAGE) 500 MG tablet Take 1 tablet (500 mg total) by mouth daily with breakfast.    metoprolol tartrate (LOPRESSOR) 50 MG tablet Take 1 tablet (50 mg total) by mouth 2 (two) times daily.    omeprazole (PRILOSEC) 40 MG capsule TAKE 1 CAPSULE(20 MG) BY MOUTH EVERY DAY    [DISCONTINUED] omeprazole (PRILOSEC OTC) 20 MG tablet Take 1 tablet (20 mg total) by mouth once daily.     Family History     Problem Relation (Age of Onset)    Diabetes Mother    No Known Problems Father, Sister, Brother, Maternal Aunt, Maternal Uncle, Paternal Aunt, Paternal Uncle, Maternal Grandmother, Maternal Grandfather, Paternal Grandmother, Paternal Grandfather        Tobacco Use    Smoking status: Never Smoker    Smokeless tobacco: Never Used   Substance and Sexual Activity    Alcohol use: No     Alcohol/week: 0.0 oz     Frequency: Never     Drinks per session: Patient refused     Binge  frequency: Never    Drug use: No    Sexual activity: Yes     Partners: Male     Review of Systems   Constitution: Negative for chills, fever and malaise/fatigue.   HENT: Negative for congestion and nosebleeds.    Eyes: Negative for blurred vision.   Cardiovascular: Negative for chest pain, irregular heartbeat, leg swelling, near-syncope, orthopnea, palpitations, paroxysmal nocturnal dyspnea and syncope. Positive for dyspnea on exertion and palpitations.   Respiratory: Negative for cough, hemoptysis, shortness of breath, sleep disturbances due to breathing, sputum production and wheezing.    Endocrine: Negative for polyphagia.   Hematologic/Lymphatic: Negative for bleeding problem. Does not bruise/bleed easily.   Skin: Negative for itching and rash.   Musculoskeletal: Negative for back pain, joint swelling, muscle cramps and muscle weakness.   Gastrointestinal: Negative for bloating, abdominal pain, hematemesis, hematochezia, nausea and vomiting.   Genitourinary: Negative for dysuria and hematuria.   Neurological: Negative for dizziness, focal weakness, headaches, light-headedness, loss of balance, numbness and weakness.   Psychiatric/Behavioral: Negative for altered mental status.     Objective:     Vital Signs (Most Recent):  Temp: 97.7 °F (36.5 °C) (07/09/19 0842)  Pulse: 66 (07/09/19 0842)  Resp: 18 (07/09/19 0842)  BP: (!) 194/86 (07/09/19 0844)  SpO2: 99 % (07/09/19 0842) Vital Signs (24h Range):  Temp:  [97.7 °F (36.5 °C)] 97.7 °F (36.5 °C)  Pulse:  [66] 66  Resp:  [18] 18  SpO2:  [99 %] 99 %  BP: (178-194)/(84-86) 194/86     Weight: 83 kg (183 lb)  Body mass index is 33.47 kg/m².    SpO2: 99 %  O2 Device (Oxygen Therapy): room air    Physical Exam   Constitutional: She is oriented to person, place, and time. She appears well-developed and well-nourished. No distress.   HENT:   Head: Normocephalic and atraumatic.   Mouth/Throat: No oropharyngeal exudate.   Eyes: Pupils are equal, round, and reactive to  light. Conjunctivae are normal. Right eye exhibits no discharge. Left eye exhibits no discharge.   Neck: Normal range of motion. Neck supple.   Cardiovascular: Normal rate, regular rhythm, S1 normal, S2 normal, normal heart sounds, intact distal pulses and normal pulses.  No extrasystoles are present. PMI is not displaced. Exam reveals no gallop and no friction rub.   No murmur heard.  Pulses:       Radial pulses are 2+ on the right side, and 2+ on the left side.        Dorsalis pedis pulses are 2+ on the right side, and 2+ on the left side.   Pulmonary/Chest: Effort normal and breath sounds normal. No accessory muscle usage. No respiratory distress. She has no decreased breath sounds. She has no wheezes. She has no rhonchi. She has no rales. She exhibits no tenderness.   Abdominal: Soft. Bowel sounds are normal. She exhibits no distension and no mass. There is no tenderness. There is no rebound and no guarding.   Musculoskeletal: Normal range of motion. She exhibits no edema.   Neurological: She is alert and oriented to person, place, and time. She has normal strength. She is not disoriented. No cranial nerve deficit or sensory deficit. She exhibits normal muscle tone. Coordination and gait normal.   Skin: Skin is warm and dry. No rash noted. She is not diaphoretic. No erythema.   Psychiatric: She has a normal mood and affect. Her behavior is normal. Judgment and thought content normal.   Nursing note and vitals reviewed.    Significant Labs: Labs from 7/2/19 through today reviewed.  Significant Imaging: Echocardiogram:   2D echo with color flow doppler:   Results for orders placed or performed during the hospital encounter of 08/21/18   2D Echo w/ Color Flow Doppler   Result Value Ref Range    QEF 60 55 - 65    Diastolic Dysfunction Yes (A)     Est. PA Systolic Pressure 33.91     Mitral Valve Mobility NORMAL     Tricuspid Valve Regurgitation TRIVIAL     Narrative    Date of Procedure: 08/21/2018        TEST  DESCRIPTION   Technical Quality: This is a technically adequate study.     Aorta: The aortic root is normal in size, measuring 2.7 cm at sinotubular junction.     Left Atrium: The left atrial volume index is normal, measuring 25.96 cc/m2.     Left Ventricle: The left ventricle is normal in size, with an end-diastolic diameter of 4.6 cm, and an end-systolic diameter of 2.4 cm. LV wall thickness is normal, with the septum measuring 0.8 cm and the posterior wall measuring 0.9 cm across. Relative   wall thickness was normal at 0.39, and the LV mass index was 79.8 g/m2 consistent with normal left ventricular mass. There are no regional wall motion abnormalities. Left ventricular systolic function appears normal. Visually estimated ejection fraction   is 60-65%. The LV Doppler derived stroke volume equals 125.0 ccs.     Diastolic indices: E wave velocity 0.8 m/s, E/A ratio 0.6,  msec., E/e' ratio(lat) 12. Mean left atrial pressures are increased. LVEDP is estimated to be 16 mmHg. There is diastolic dysfunction secondary to relaxation abnormality.     Right Atrium: The right atrium is normal in size, measuring 4.4 cm in length in the apical view.     Right Ventricle: The right ventricle is normal in size. Global right ventricular systolic function appears normal. The estimated PA systolic pressure is 34 mmHg.     Aortic Valve:  The aortic valve is mildly sclerotic with normal leaflet mobility. The aortic valve is tri-leaflet in structure. The peak velocity obtained across the aortic valve is 2.8 m/s, which translates to a peak gradient of 31 mmHg. The mean   gradient is 17 mmHg. Using a left ventricular outflow tract diameter of 2.0 cm, a left ventricular outflow tract velocity time integral of 41 cm, and a peak instantaneous transvalvular velocity time integral of 59 cm, the calculated aortic valve area is   2.1 cm2(AVAi is 1.15 cm2/m2).     Mitral Valve:  The mitral valve is normal in structure with normal leaflet  mobility. The pressure half time is 62 msec. The calculated mitral valve area is 3.55 cm2.     Tricuspid Valve:  The tricuspid valve is mildly sclerotic with normal leaflet mobility. There is trivial tricuspid regurgitation.     Pulmonary Valve:  The pulmonic valve is normal in structure.     IVC: IVC is normal in size and collapses > 50% with a sniff, suggesting normal right atrial pressure of 3 mmHg.     Atrial Septum: The atrial septum is intact.     Intracavitary: There is no evidence of pericardial effusion, intracavity mass, thrombi, or vegetation.         CONCLUSIONS     1 - Normal left ventricular systolic function (EF 60-65%).     2 - Normal right ventricular systolic function .     3 - The estimated PA systolic pressure is 34 mmHg.     4 - Impaired LV relaxation, increased LVEDP.             This document has been electronically    SIGNED BY: Lonnie Carrizales MD On: 08/21/2018 11:07     Assessment and Plan:     * SVT (supraventricular tachycardia)  -EPS/SVT RFA.  -Anesthesia for sedation.    Dr. Joe at bedside speaking with patient. Discussed the nature of EP study and ablation, including possible transseptal puncture. We discused risks and benefits at length. Our discussion included, but was not limited to the risk of death, infection, bleeding, stroke, MI, cardiac perforation, embolism, cardiac tamponade, skin burns, and other organic injury including the possibility for need for surgery or pacemaker implantation.  I discussed with patient risks, indications, benefits, and alternatives of the planned procedure. All questions were answered. Patient understands and wishes to proceed. Confirmed with patient, consents signed with Dr. Joe in clinic.      Ingrid Lerner NP  Cardiac Electrophysiology  [facility]    Attending: Jose Joe MD

## 2019-07-09 NOTE — TRANSFER OF CARE
"Anesthesia Transfer of Care Note    Patient: Eugenie Coates    Procedure(s) Performed: Procedure(s) (LRB):  ABLATION, SVT, ACCESSORY PATHWAY (N/A)    Patient location: Cath Lab    Anesthesia Type: MAC    Transport from OR: Transported from OR on 6-10 L/min O2 by face mask with adequate spontaneous ventilation    Post pain: adequate analgesia    Post assessment: no apparent anesthetic complications and tolerated procedure well    Post vital signs: stable    Level of consciousness: awake    Nausea/Vomiting: no nausea/vomiting    Complications: none    Transfer of care protocol was followed      Last vitals:   Visit Vitals  BP (!) 194/86 (BP Location: Left arm, Patient Position: Lying)   Pulse 66   Temp 36.5 °C (97.7 °F) (Oral)   Resp 18   Ht 5' 2" (1.575 m)   Wt 83 kg (183 lb)   SpO2 99%   Breastfeeding? No   BMI 33.47 kg/m²     "

## 2019-07-09 NOTE — NURSING
Pt ambulated around unit without distress or discomfort.  Vss.  ryan groin sites remains luis. 0 bleed, 0 hematoma.

## 2019-07-09 NOTE — ANESTHESIA POSTPROCEDURE EVALUATION
Anesthesia Post Evaluation    Patient: Eugenie Coates    Procedure(s) Performed: Procedure(s) (LRB):  ABLATION, SVT, ACCESSORY PATHWAY (N/A)    Final Anesthesia Type: general  Patient location during evaluation: PACU  Patient participation: Yes- Able to Participate  Level of consciousness: awake and alert  Post-procedure vital signs: reviewed and stable  Pain management: adequate  Airway patency: patent  PONV status at discharge: No PONV  Anesthetic complications: no      Cardiovascular status: hemodynamically stable  Respiratory status: unassisted  Hydration status: euvolemic  Follow-up not needed.          Vitals Value Taken Time   /77 7/9/2019  4:45 PM   Temp 36.2 °C (97.2 °F) 7/9/2019  3:15 PM   Pulse 61 7/9/2019  4:45 PM   Resp 18 7/9/2019  4:45 PM   SpO2 100 % 7/9/2019  3:15 PM         Event Time     Out of Recovery 15:18:32          Pain/Jodi Score: Pain Rating Prior to Med Admin: 0 (7/9/2019  2:58 PM)  Jodi Score: 9 (7/9/2019  2:58 PM)

## 2019-07-09 NOTE — PLAN OF CARE
"Vss. sats 99% on room air.  No pain or sob noted.  Pt states "I have bad heartburn normally, I forgot to take my prilosec."  CAMILLA klein aware ep np.  New orders given per md order.  protonix 40mg po given per md order.  Pt's  updated over phone. Verbalizes understanding.  ryan groin guaze/trans film noted. Hemostasis at 1330.  Palpable pulses. No drainage or hematoma noted. Pt tolerating sips of water in ep pacu.  purewick in place, clear yellow urine noted.  Bedrest x4hrs.  See flowsheet for full assessment.  200ml urine noted.   "

## 2019-07-09 NOTE — NURSING
Pt d/c'd to home per md orders.  Vss.  piv removed intact and without difficulty.  Instructed pt on home medications, post procedure precautions and follow up visits.  Pt verbalizes understanding.   Pt in no acute distress and verbalizes no complaints.  ryan groin sites remains cdi.  0 bleed, 0 hematoma.

## 2019-07-19 ENCOUNTER — PATIENT OUTREACH (OUTPATIENT)
Dept: OTHER | Facility: OTHER | Age: 69
End: 2019-07-19

## 2019-07-19 NOTE — PROGRESS NOTES
Last 5 Patient Entered Readings                                      Current 30 Day Average: 148/72     Recent Readings 7/17/2019 7/16/2019 7/15/2019 7/13/2019 7/12/2019    SBP (mmHg) 142 129 167 156 168    DBP (mmHg) 74 66 72 73 74    Pulse 60 60 49 53 52          Digital Medicine: Health  Follow Up    7/19: Left voicemail to follow up with Ms. Eugenie Coates.  Current BP average 148/72 mmHg is not at goal, <130/80 mmHg.

## 2019-07-25 ENCOUNTER — LAB VISIT (OUTPATIENT)
Dept: LAB | Facility: HOSPITAL | Age: 69
End: 2019-07-25
Attending: INTERNAL MEDICINE
Payer: MEDICARE

## 2019-07-25 DIAGNOSIS — E05.90 SUBCLINICAL HYPERTHYROIDISM: ICD-10-CM

## 2019-07-25 DIAGNOSIS — E04.2 MULTINODULAR THYROID: ICD-10-CM

## 2019-07-25 LAB
T3 SERPL-MCNC: 77 NG/DL (ref 60–180)
T4 FREE SERPL-MCNC: 1.13 NG/DL (ref 0.71–1.51)
TSH SERPL DL<=0.005 MIU/L-ACNC: 0.2 UIU/ML (ref 0.4–4)

## 2019-07-25 PROCEDURE — 84480 ASSAY TRIIODOTHYRONINE (T3): CPT

## 2019-07-25 PROCEDURE — 84443 ASSAY THYROID STIM HORMONE: CPT

## 2019-07-25 PROCEDURE — 84439 ASSAY OF FREE THYROXINE: CPT

## 2019-07-25 PROCEDURE — 36415 COLL VENOUS BLD VENIPUNCTURE: CPT | Mod: PO

## 2019-07-27 ENCOUNTER — PATIENT MESSAGE (OUTPATIENT)
Dept: ENDOCRINOLOGY | Facility: CLINIC | Age: 69
End: 2019-07-27

## 2019-07-27 ENCOUNTER — PATIENT MESSAGE (OUTPATIENT)
Dept: ADMINISTRATIVE | Facility: OTHER | Age: 69
End: 2019-07-27

## 2019-07-27 DIAGNOSIS — E05.90 SUBCLINICAL HYPERTHYROIDISM: Primary | ICD-10-CM

## 2019-08-02 NOTE — PROGRESS NOTES
Last 5 Patient Entered Readings                                      Current 30 Day Average: 141/69     Recent Readings 8/1/2019 7/31/2019 7/30/2019 7/29/2019 7/28/2019    SBP (mmHg) 138 128 119 132 128    DBP (mmHg) 65 68 67 62 69    Pulse 55 55 63 66 60        Digital Medicine: Health  Follow Up    BP appears to be trending down.  Patient reports taking her pressure in the morning before taking her pill then taking her pressure again later on.  Patient likes to see what her pressure is before medication.  Patient denies any other concerns at this time.     Lifestyle Modifications:    1.Dietary Modifications (Sodium intake <2,000mg/day, food labels, dining out):   Patient reports she has been monitoring what she eats more closely.  Patient reports she has been watching salty and sugary snacks.  Gave praises to patient.     2.Physical Activity:   Deferred    3.Medication Therapy:   Patient has been compliant with the medication regimen.    4.Patient has the following medication side effects/concerns: None  (Frequency/Alleviating factors/Precipitating factors, etc.)     Follow up with Ms. Eugenie Coates completed. No further questions or concerns. Will continue to follow up to achieve health goals.  Patient is currently not at goal, 141/69 mmHg does exceed <130/80 mmHg.

## 2019-08-05 ENCOUNTER — LAB VISIT (OUTPATIENT)
Dept: LAB | Facility: HOSPITAL | Age: 69
End: 2019-08-05
Attending: FAMILY MEDICINE
Payer: MEDICARE

## 2019-08-05 DIAGNOSIS — N18.30 CKD (CHRONIC KIDNEY DISEASE) STAGE 3, GFR 30-59 ML/MIN: ICD-10-CM

## 2019-08-05 DIAGNOSIS — E11.65 TYPE 2 DIABETES MELLITUS WITH HYPERGLYCEMIA, WITHOUT LONG-TERM CURRENT USE OF INSULIN: ICD-10-CM

## 2019-08-05 LAB
ALBUMIN SERPL BCP-MCNC: 3.6 G/DL (ref 3.5–5.2)
ALP SERPL-CCNC: 88 U/L (ref 55–135)
ALT SERPL W/O P-5'-P-CCNC: 17 U/L (ref 10–44)
ANION GAP SERPL CALC-SCNC: 10 MMOL/L (ref 8–16)
AST SERPL-CCNC: 18 U/L (ref 10–40)
BILIRUB SERPL-MCNC: 0.6 MG/DL (ref 0.1–1)
BUN SERPL-MCNC: 21 MG/DL (ref 8–23)
CALCIUM SERPL-MCNC: 10.6 MG/DL (ref 8.7–10.5)
CHLORIDE SERPL-SCNC: 106 MMOL/L (ref 95–110)
CO2 SERPL-SCNC: 26 MMOL/L (ref 23–29)
CREAT SERPL-MCNC: 1.1 MG/DL (ref 0.5–1.4)
EST. GFR  (AFRICAN AMERICAN): 59.6 ML/MIN/1.73 M^2
EST. GFR  (NON AFRICAN AMERICAN): 51.7 ML/MIN/1.73 M^2
ESTIMATED AVG GLUCOSE: 148 MG/DL (ref 68–131)
GLUCOSE SERPL-MCNC: 110 MG/DL (ref 70–110)
HBA1C MFR BLD HPLC: 6.8 % (ref 4–5.6)
POTASSIUM SERPL-SCNC: 4.8 MMOL/L (ref 3.5–5.1)
PROT SERPL-MCNC: 7.3 G/DL (ref 6–8.4)
SODIUM SERPL-SCNC: 142 MMOL/L (ref 136–145)

## 2019-08-05 PROCEDURE — 36415 COLL VENOUS BLD VENIPUNCTURE: CPT | Mod: PO

## 2019-08-05 PROCEDURE — 80053 COMPREHEN METABOLIC PANEL: CPT

## 2019-08-05 PROCEDURE — 83036 HEMOGLOBIN GLYCOSYLATED A1C: CPT

## 2019-08-07 ENCOUNTER — OFFICE VISIT (OUTPATIENT)
Dept: FAMILY MEDICINE | Facility: CLINIC | Age: 69
End: 2019-08-07
Payer: MEDICARE

## 2019-08-07 VITALS
HEIGHT: 62 IN | HEART RATE: 58 BPM | OXYGEN SATURATION: 97 % | TEMPERATURE: 98 F | WEIGHT: 172.81 LBS | SYSTOLIC BLOOD PRESSURE: 138 MMHG | DIASTOLIC BLOOD PRESSURE: 70 MMHG | BODY MASS INDEX: 31.8 KG/M2

## 2019-08-07 DIAGNOSIS — I47.10 SVT (SUPRAVENTRICULAR TACHYCARDIA): ICD-10-CM

## 2019-08-07 DIAGNOSIS — E05.90 SUBCLINICAL HYPERTHYROIDISM: ICD-10-CM

## 2019-08-07 DIAGNOSIS — E11.65 TYPE 2 DIABETES MELLITUS WITH HYPERGLYCEMIA, WITHOUT LONG-TERM CURRENT USE OF INSULIN: ICD-10-CM

## 2019-08-07 DIAGNOSIS — E83.52 HYPERCALCEMIA: ICD-10-CM

## 2019-08-07 DIAGNOSIS — E11.9 DIABETES MELLITUS TYPE 2 WITHOUT RETINOPATHY: ICD-10-CM

## 2019-08-07 DIAGNOSIS — E78.2 MIXED HYPERLIPIDEMIA: ICD-10-CM

## 2019-08-07 DIAGNOSIS — I10 ESSENTIAL HYPERTENSION: ICD-10-CM

## 2019-08-07 DIAGNOSIS — N18.30 CKD (CHRONIC KIDNEY DISEASE) STAGE 3, GFR 30-59 ML/MIN: ICD-10-CM

## 2019-08-07 DIAGNOSIS — I10 WHITE COAT SYNDROME WITH DIAGNOSIS OF HYPERTENSION: ICD-10-CM

## 2019-08-07 DIAGNOSIS — K21.9 GASTROESOPHAGEAL REFLUX DISEASE, ESOPHAGITIS PRESENCE NOT SPECIFIED: Primary | ICD-10-CM

## 2019-08-07 PROCEDURE — 99214 OFFICE O/P EST MOD 30 MIN: CPT | Mod: S$PBB,,, | Performed by: FAMILY MEDICINE

## 2019-08-07 PROCEDURE — 99215 OFFICE O/P EST HI 40 MIN: CPT | Mod: PBBFAC,PO | Performed by: FAMILY MEDICINE

## 2019-08-07 PROCEDURE — 99214 PR OFFICE/OUTPT VISIT, EST, LEVL IV, 30-39 MIN: ICD-10-PCS | Mod: S$PBB,,, | Performed by: FAMILY MEDICINE

## 2019-08-07 PROCEDURE — 99999 PR PBB SHADOW E&M-EST. PATIENT-LVL V: ICD-10-PCS | Mod: PBBFAC,,, | Performed by: FAMILY MEDICINE

## 2019-08-07 PROCEDURE — 99999 PR PBB SHADOW E&M-EST. PATIENT-LVL V: CPT | Mod: PBBFAC,,, | Performed by: FAMILY MEDICINE

## 2019-08-07 RX ORDER — LISINOPRIL AND HYDROCHLOROTHIAZIDE 12.5; 2 MG/1; MG/1
2 TABLET ORAL DAILY
Qty: 180 TABLET | Refills: 0 | Status: SHIPPED | OUTPATIENT
Start: 2019-08-07 | End: 2019-12-20

## 2019-08-07 RX ORDER — OMEPRAZOLE 40 MG/1
40 CAPSULE, DELAYED RELEASE ORAL
Qty: 180 CAPSULE | Refills: 1 | Status: SHIPPED | OUTPATIENT
Start: 2019-08-07 | End: 2020-04-09

## 2019-08-07 NOTE — PROGRESS NOTES
Subjective:       Patient ID: Eugenie Coates is a 69 y.o. female.    Chief Complaint: Follow-up (2 mos ); Diabetes; and Hypertension    Yazmin is a 69 y.o. female who presents today for follow up on her chronic medical issues.       HTN: She was taking lisinopril 40 mg, amlodipine 10 mg, and metoprolol 50 mg BID. Recently had EP procedure and beta blocker was decreased. Metoprolol is now 25 mg BID. She is also taking lisinopril/hctz combo instead of lisinopril by itself. The hctz was causing palpitations prior; but no issues currently.   DM: taking metformin once daily. A1c is stable. She has been watching what she eats. She has also been watching her sugar intake.   DLD: on statin.   Thyroid Abnormalities: followed by endocrine  SVT: seeing EP  Hypercalcemia: noted. On hctz. Mild.   GERD: worsening slightly on PPI    Labs as below reviewed in detail.     Review of Systems   Constitutional: Negative for activity change and unexpected weight change.   HENT: Negative for hearing loss, rhinorrhea and trouble swallowing.    Eyes: Negative for discharge and visual disturbance.   Respiratory: Negative for wheezing.    Cardiovascular: Negative for chest pain and palpitations.   Gastrointestinal: Negative for blood in stool, constipation, diarrhea and vomiting.   Endocrine: Negative for polydipsia and polyuria.   Genitourinary: Negative for difficulty urinating, dysuria, hematuria and menstrual problem.   Musculoskeletal: Positive for arthralgias and joint swelling. Negative for neck pain.   Neurological: Negative for weakness and headaches.   Psychiatric/Behavioral: Negative for confusion and dysphoric mood.           Results for orders placed or performed in visit on 08/05/19   Comprehensive metabolic panel   Result Value Ref Range    Sodium 142 136 - 145 mmol/L    Potassium 4.8 3.5 - 5.1 mmol/L    Chloride 106 95 - 110 mmol/L    CO2 26 23 - 29 mmol/L    Glucose 110 70 - 110 mg/dL    BUN, Bld 21 8 - 23 mg/dL     Creatinine 1.1 0.5 - 1.4 mg/dL    Calcium 10.6 (H) 8.7 - 10.5 mg/dL    Total Protein 7.3 6.0 - 8.4 g/dL    Albumin 3.6 3.5 - 5.2 g/dL    Total Bilirubin 0.6 0.1 - 1.0 mg/dL    Alkaline Phosphatase 88 55 - 135 U/L    AST 18 10 - 40 U/L    ALT 17 10 - 44 U/L    Anion Gap 10 8 - 16 mmol/L    eGFR if African American 59.6 (A) >60 mL/min/1.73 m^2    eGFR if non  51.7 (A) >60 mL/min/1.73 m^2   Hemoglobin A1c   Result Value Ref Range    Hemoglobin A1C 6.8 (H) 4.0 - 5.6 %    Estimated Avg Glucose 148 (H) 68 - 131 mg/dL       Objective:     Vitals:    08/07/19 0915   BP: 138/70   Pulse:    Temp:         Physical Exam   Constitutional: She is oriented to person, place, and time. She appears well-developed and well-nourished. No distress.   HENT:   Head: Normocephalic and atraumatic.   Eyes: Conjunctivae are normal.   Cardiovascular: Regular rhythm. Bradycardia present.   Murmur heard.  Pulmonary/Chest: Effort normal and breath sounds normal. No stridor. No respiratory distress.   Musculoskeletal: She exhibits no edema.   Neurological: She is alert and oriented to person, place, and time.   Skin: Skin is warm. She is not diaphoretic.   Psychiatric: She has a normal mood and affect. Her behavior is normal. Judgment and thought content normal.   Nursing note and vitals reviewed.      Assessment:       1. Gastroesophageal reflux disease, esophagitis presence not specified    2. Type 2 diabetes mellitus with hyperglycemia, without long-term current use of insulin    3. Essential hypertension    4. White coat syndrome with diagnosis of hypertension    5. Diabetes mellitus type 2 without retinopathy    6. Subclinical hyperthyroidism    7. CKD (chronic kidney disease) stage 3, GFR 30-59 ml/min    8. Mixed hyperlipidemia    9. SVT (supraventricular tachycardia)    10. Hypercalcemia        Plan:       Repeat pressure improved. Still high normal. BP at home better then noted in clinic, but again still high normal.      Continue metoprolol at 25 mg BID for HTN and SVT  Continue amlodipine for HTN  Restart lisinopril HCTZ combo (two pills daily) for HTN, was taking one pill previously   Continue atorvastatin for cholesterol  Continue metformin once daily for now for DM  Repeat CMP in 3-4 weeks to check calcium and kidney function  Increase PPI to twice daily. If GERD symptoms are persisting, go to GI and may need EGD    Follow up with endocrine, renal, and cardiology. May need renal and/or cardiology input on BP control.     Keep using digital HTN.     Gastroesophageal reflux disease, esophagitis presence not specified  -     omeprazole (PRILOSEC) 40 MG capsule; Take 1 capsule (40 mg total) by mouth 2 (two) times daily before meals. TAKE 1 CAPSULE(20 MG) BY MOUTH EVERY DAY  Dispense: 180 capsule; Refill: 1  -     Ambulatory referral to Gastroenterology    Type 2 diabetes mellitus with hyperglycemia, without long-term current use of insulin  -     Comprehensive metabolic panel; Future; Expected date: 08/07/2019  -     Hemoglobin A1c; Future; Expected date: 08/07/2019    Essential hypertension  -     lisinopril-hydrochlorothiazide (PRINZIDE,ZESTORETIC) 20-12.5 mg per tablet; Take 2 tablets by mouth once daily.  Dispense: 180 tablet; Refill: 0    White coat syndrome with diagnosis of hypertension    Diabetes mellitus type 2 without retinopathy  -     Hemoglobin A1c; Future; Expected date: 08/07/2019    Subclinical hyperthyroidism    CKD (chronic kidney disease) stage 3, GFR 30-59 ml/min  -     Hemoglobin A1c; Future; Expected date: 08/07/2019    Mixed hyperlipidemia    SVT (supraventricular tachycardia)  -     Ambulatory referral to Cardiology    Hypercalcemia  -     Comprehensive metabolic panel; Future; Expected date: 08/07/2019      Warning signs discussed, patient to call with any further issues or worsening of symptoms.

## 2019-08-07 NOTE — PATIENT INSTRUCTIONS
Continue metoprolol at 25 mg BID for HTN and SVT  Continue atorvastatin for cholesterol  Continue metformin once daily for now for DM  Restart lisinopril HCTZ combo (two pills daily) for HTN  Repeat CMP in 3-4 weeks to check calcium and kidney function  Increase PPI to twice daily. If GERD symptoms are persisting, go to GI and may need EGD  Continue amlodipine for HTN    Follow up with endocrine, renal, and cardiology    Keep using digital HTN.     Dear Ms. Mcdonaldcyndi,     My name is Munira Lucio and we met at your last visit with Dr. Harris in the endocrine department.   Your thyroid function demonstrates over activity that is mild, but as we discussed we recommend treatment when it is persistent and patients are over the age of 65.     We had discussed using radioactive iodine to treat the over active areas (or nodules)  Within your thyroid gland. Let me know if you have had time to think about this as I am happy to order it. I do note that you have an appointment with Dr. Dunlap (an endocrinologist) in a few months.     Let me know if you have any questions or concerns.   Best,   Dr. Barbara Lucio      Diabetes Management Status    Statin: Taking  ACE/ARB: Taking    Screening or Prevention Patient's value Goal Complete/Controlled?   HgA1C Testing and Control   Lab Results   Component Value Date    HGBA1C 6.8 (H) 08/05/2019      Annually/Less than 8% Yes   Lipid profile : 06/05/2019 Annually Yes   LDL control Lab Results   Component Value Date    LDLCALC 124.8 06/05/2019    Annually/Less than 100 mg/dl  No   Nephropathy screening Lab Results   Component Value Date    LABMICR 12.0 01/23/2018     Lab Results   Component Value Date    PROTEINUA Negative 11/29/2016    Annually No   Blood pressure BP Readings from Last 1 Encounters:   08/07/19 138/70    Less than 140/90 No   Dilated retinal exam : 06/25/2019 Annually Yes   Foot exam   : 06/18/2019 Annually Yes

## 2019-08-14 ENCOUNTER — TELEPHONE (OUTPATIENT)
Dept: GASTROENTEROLOGY | Facility: CLINIC | Age: 69
End: 2019-08-14

## 2019-08-14 NOTE — TELEPHONE ENCOUNTER
MA contacted pt to confirm appointment on 8/15 at 10 am. Pt verbalized understanding and confirmed.

## 2019-08-15 ENCOUNTER — OFFICE VISIT (OUTPATIENT)
Dept: GASTROENTEROLOGY | Facility: CLINIC | Age: 69
End: 2019-08-15
Payer: MEDICARE

## 2019-08-15 ENCOUNTER — TELEPHONE (OUTPATIENT)
Dept: ENDOSCOPY | Facility: HOSPITAL | Age: 69
End: 2019-08-15

## 2019-08-15 VITALS
DIASTOLIC BLOOD PRESSURE: 78 MMHG | HEART RATE: 63 BPM | BODY MASS INDEX: 31.64 KG/M2 | HEIGHT: 62 IN | SYSTOLIC BLOOD PRESSURE: 156 MMHG | WEIGHT: 171.94 LBS

## 2019-08-15 DIAGNOSIS — R13.10 DYSPHAGIA, UNSPECIFIED TYPE: ICD-10-CM

## 2019-08-15 DIAGNOSIS — K21.9 GASTROESOPHAGEAL REFLUX DISEASE, ESOPHAGITIS PRESENCE NOT SPECIFIED: Primary | ICD-10-CM

## 2019-08-15 PROCEDURE — 99999 PR PBB SHADOW E&M-EST. PATIENT-LVL III: CPT | Mod: PBBFAC,,, | Performed by: NURSE PRACTITIONER

## 2019-08-15 PROCEDURE — 99213 OFFICE O/P EST LOW 20 MIN: CPT | Mod: PBBFAC | Performed by: NURSE PRACTITIONER

## 2019-08-15 PROCEDURE — 99205 OFFICE O/P NEW HI 60 MIN: CPT | Mod: S$PBB,,, | Performed by: NURSE PRACTITIONER

## 2019-08-15 PROCEDURE — 99999 PR PBB SHADOW E&M-EST. PATIENT-LVL III: ICD-10-PCS | Mod: PBBFAC,,, | Performed by: NURSE PRACTITIONER

## 2019-08-15 PROCEDURE — 99205 PR OFFICE/OUTPT VISIT, NEW, LEVL V, 60-74 MIN: ICD-10-PCS | Mod: S$PBB,,, | Performed by: NURSE PRACTITIONER

## 2019-08-15 NOTE — TELEPHONE ENCOUNTER
Patient in office to schedule EGD.  While reviewing medical history, it was noted that she has a cardiac history with a recent ablation.  She has a follow up appointment soon.  Informed patient that she needs to follow up with Cardiology for clearance prior to scheduling procedure.  Stated understanding.  Direct line phone number provided to return call.

## 2019-08-15 NOTE — PATIENT INSTRUCTIONS
Follow up with me two weeks after your scope (EGD)    [email or web address]  Dropping Acid The Reflux Diet Cookbook and Cure -  Faheem Ferreira M.D.    GERD  Worst Foods for Acid Reflux  Chocolate (milk chocolate worse than dark chocolate)  Soda (all carbonated beverages)  Alcohol (beer, liquor, wine)  Fried foods  Mcmahan, sausage, ribs  Cream sauce  Fatty meats (beef)  Butter, margarine, lard, shortening  Coffee, tea  Mint   High fat nuts  Hot sauces and pepper  Citrus fruit/juices      Acidic foods (pH - 1 is MORE acidic, 5 is LESS acidic)     Do not eat or drink these (lower numbers are worse)    Induction diet - For 2 weeks eat nothing below pH 5     Lemon juice 2.3  Grape cranberry juice 2.5  Stomach Acid 2.5  Gelatin Dessert 2.6  Lemon/lime 2.9/2.7  Vinegar 2.9  Gatorade 3.0  Fruits - plums, apricots, strawberries, cherries 3.0  Vitamin C (ascorbic acid) 3.0  Iced tea, Snapple 3.1  Mustard 3.2  Soft drinks 3.3  Nectarines 3.3  Pomegranate 3.3  Applesauce 3.4  Grapefruit 3.4  Kiwi 3.4  Barbecue sauce 3.4  Caesar dressing 3.5  Thousand island dressing 3.6  Strawberries 3.5  Pineapple juice 3.5  Beer 3.5  Wine 3.5  Grape 3.6  Apples 3.6  Pineapple 3.7  Pickle 3.7  Blackberries, blueberries 3.7  Rahat 3.7  Orange 3.8  Cherries 3.9  Red Bull 3.9  Tomatoes 4.2  Coffee 5.1      These are Safe foods:  Agave  Aloe Vera  Apple (only red)  Bagels  Banana (worsens reflux in 1%)  Beans - black, red, lima, lentils  Bread - whole grain, rye  Caramel  Celery  Chamomile tea  Chicken - skinless, never fried  Chicken stock or bouillon  Coffee - one cup/day with milk  Fennel  Fish  Letty  Green vegetables (no green peppers)  Herbs  Honey  Melon  Milk - skin, soy, or Lactaid skim milk  Mushrooms  Oatmeal  Olive oil  Parsley  Pasta  Pears  Popcorn  Potatoes  Red bell peppers  Rice  Soups  Tofu  Turkey Breast  Turnip  Vegetables - no onion, tomatoes, peppers  Vinaigrette  Water - non carbonated  Whole grain breads, crackers,  breakfast cereals      Best Foods for Acid Reflux  Whole grain breads  Oatmeal  Aloe Vera  Salad (no tomatoes, onions, cheese, or high fat dressing)  Banana  Melon  Fennel  Chicken and turkey (skinless, never fried)  Fish/seafood (never fried)  Celery  Parsley  Couscous and Rice    Maybe bad foods (Everyone is unique)  Tomatoes  Garlic  Onion  Nuts (macadamia nuts)  Apples (especially green)  Cucumber  Green peppers  Spicy food  Some herbal teas    GERD tips  Change what you eat:  Eat smaller meals  Eat slowly and chew thoroughly until food is almost liquid  Cut down on junk carbohydrates such as sugar and white flour  Use herbs in your cooking  Eat more raw foods (more than 10 ingredients is not a raw food)  Avoid trans fats and partially hydrogenated oils  Eat more fish and switch to grass fed beef  Switch your cooking oil to macadamia nut or olive oil  Watch extremes of salt intake (too high or too low is bad)    If just cutting out acidic foods is not enough, change how you eat:  Large breakfast, medium lunch, light dinner  Dont mix fruit juices, sweet fruits, and refined starches with meats and heavy food  Dont wash your food down with a lot of liquid      Change these habits:  Stop smoking  Eat dinner earlier (3-4 hours before lying down to sleep)  Elevate the head of your bed 6 inches (blocks under the head of the bed are better than pillows) OR [employer] sells a special MedCline Reflux relief system  That may be purchased online for a comfortable, individual solution for raising the head of the bed.  Exercise (but wait 2 hours after eating)  Drink more water (between meals)    Take these supplements:  Multi vitamin  Probiotic  Fish oil    Most common food allergens: milk, eggs, peanuts, tree nuts, fish, shellfish, wheat, and soy    All natural immediate relief:  Chew 2-3 soft probiotic capsules - Dr. Gray's Probiotics 12 Plus  Chew chewable DGL licorice tablet  Chew papaya tablet with high protein  meal - [employer]  Drink 2 ounces of aloe vera juice  Swedish bitters  Prelief- reduce the acid in food to keep it form burning sensitive tissue  Iberogast  [place]ppery [place]m  Drink Chamomile Tea  Teaspoon of baking soda in water  Spoonful of vinegar in water      All natural ulcer healers:  Zinc carnosine - 75.5 mg with food twice a day x 8 weeks   KhrisI by Riccardo - $8 for 60 pills  DGL (deglycyrrhizinated licorice) - 2 tablets before meals. Heals stomach lining   Natural Factors brand, Enzymatic therapy brand.  Aloe Vera juice  - 2 to 8 ounces a day   [place] or [place]

## 2019-08-15 NOTE — PROGRESS NOTES
[facility] Consultation Note    Reason for Consult:  The primary encounter diagnosis was Gastroesophageal reflux disease, esophagitis presence not specified. A diagnosis of Dysphagia, unspecified type was also pertinent to this visit.    PCP:   Haile Paul    [facility] / [place] [id number]    Referring MD:  Haile Paul,    [facility]  [id number]    HPI:  This is a 69 y.o. female here for evaluation of GERD.  She is a new patient.    Patient reports she has pyrosis and chest pain. All her adult life.  Regurgitation- no  Nausea - occasional   Vomiting - no  Water Brash- yes  Globus Sensation-no  Dysphagia- yes. Pills.   Odynophagia-  Extraesophageal sx - Cough, hoarseness, wheezing  Treatments tried - Omeprazole BID started a week ago. Some improvement in the pyrosis twice a day dosing    NO abd pain, melena, BRBPR    Taina - that helped heart burn in the 80s  Hernia repaired-      ROS:  Constitutional: No fevers, chills, + weight loss  CV: See HPI  Pulm: No cough, + shortness of breath  Ophtho: + vision changes  GI: see HPI  Derm: No rash  MSK: + arthritis  : No dysuria, No hematuria    Medical History:  has a past medical history of Allergy, Anemia, Anxiety, Arthritis, Asthma, Bell palsy, Depression, Diabetes mellitus, type 2, DVT (deep venous thrombosis), Endometriosis, Eye injury (), GERD (gastroesophageal reflux disease), Glaucoma, History of uterine fibroid, Hyperlipidemia, Hypertension, Nuclear sclerosis of both eyes (2016), Sleep apnea, SVT (supraventricular tachycardia) (2019), and Thyroid disease.    Surgical History:  has a past surgical history that includes Cholecystectomy;  section; Hernia repair; Vein Surgery (, 2004); Knee surgery (Right, 2008); and glaucoma laser (Bilateral).    Family History: family history includes Colon cancer in her maternal aunt; Diabetes in her mother; No Known Problems in her brother, father,  maternal grandfather, maternal grandmother, maternal uncle, paternal aunt, paternal grandfather, paternal grandmother, paternal uncle, and sister..     Social History:  reports that she has never smoked. She has never used smokeless tobacco. She reports that she does not drink alcohol or use drugs.    Review of patient's allergies indicates:   Allergen Reactions    Percodan [oxycodone hcl-oxycodone-asa] Hives and Itching       Current Outpatient Medications on File Prior to Visit   Medication Sig Dispense Refill    amLODIPine (NORVASC) 10 MG tablet Take 1 tablet (10 mg total) by mouth every evening. 90 tablet 3    atorvastatin (LIPITOR) 40 MG tablet Take 1 tablet (40 mg total) by mouth once daily. 90 tablet 2    bimatoprost (LUMIGAN) 0.01 % Drop Place 1 drop into both eyes every evening. 3 Bottle 4    blood sugar diagnostic Strp Test twice daily.  Accucheck viva test strips and lancets. 300 each 3    brimonidine 0.2% (ALPHAGAN) 0.2 % Drop Place 1 drop into both eyes 3 (three) times daily. 15 mL 4    lancets (ACCU-CHEK SOFTCLIX LANCETS) Misc 1 Units by Misc.(Non-Drug; Combo Route) route 2 (two) times daily. 300 each 3    lisinopril-hydrochlorothiazide (PRINZIDE,ZESTORETIC) 20-12.5 mg per tablet Take 2 tablets by mouth once daily. 180 tablet 0    metFORMIN (GLUCOPHAGE) 500 MG tablet Take 1 tablet (500 mg total) by mouth daily with breakfast. 90 tablet 3    metoprolol tartrate (LOPRESSOR) 25 MG tablet Take 1 tablet (25 mg total) by mouth 2 (two) times daily. 60 tablet 7    omeprazole (PRILOSEC) 40 MG capsule Take 1 capsule (40 mg total) by mouth 2 (two) times daily before meals. TAKE 1 CAPSULE(20 MG) BY MOUTH EVERY  capsule 1    [DISCONTINUED] omeprazole (PRILOSEC OTC) 20 MG tablet Take 1 tablet (20 mg total) by mouth once daily. 90 tablet 3     No current facility-administered medications on file prior to visit.          Objective Findings:    Vital Signs:  BP (!) 156/78 (BP Location: Left arm)    "Pulse 63   Ht 5' 2" (1.575 m)   Wt 78 kg (171 lb 15.3 oz)   BMI 31.45 kg/m²   Body mass index is 31.45 kg/m².    Physical Exam:  General Appearance: Well appearing in no acute distress  Head:   Normocephalic, without obvious abnormality  Eyes:    No scleral icterus  ENT: Neck supple  Lungs: CTA bilaterally in anterior and posterior fields, no wheezes, no crackles.  Heart:  Regular rate and rhythm, S1, S2 normal, + murmurs heard  Extremities: No edema  Skin: No rash  Neurologic: AAO x 3      Labs:  Lab Results   Component Value Date    WBC 10.53 07/02/2019    HGB 13.9 07/02/2019    HCT 43.7 07/02/2019     07/02/2019    CHOL 189 06/05/2019    TRIG 111 06/05/2019    HDL 42 06/05/2019    ALT 17 08/05/2019    AST 18 08/05/2019     08/05/2019    K 4.8 08/05/2019     08/05/2019    CREATININE 1.1 08/05/2019    BUN 21 08/05/2019    CO2 26 08/05/2019    TSH 0.198 (L) 07/25/2019    INR 1.0 07/02/2019    HGBA1C 6.8 (H) 08/05/2019       Imaging:  None reviewed    Endoscopy:    Colonoscopy report reviewed in media tab.  Procedure performed on August 1, 2012 at [facility].  Good bowel prep.  She had small internal hemorrhoids.  Normal colonoscopy.  Repeat in 10 years.    Assessment:    Ms. Coates is a 69 y.o. BF with:    1. Gastroesophageal reflux disease, esophagitis presence not specified    2. Dysphagia, unspecified type       She has never had an EGD. We discussed she will be performed on 2nd floor due to her cardiac and pulmonary comorbidites.  If her EGD is unremarkable and she is still having the dysphagia and pyrosis with BID PPI therapy, will order esophagram and possible manometry with 24 hour impedance.    Recommendations:  1. Continue with PPI BID  2. GERD Diet as discussed and printed in AVS  3. EGD    F/u 2 weeks after EGD        Order summary:  Orders Placed This Encounter    Case request GI: EGD (ESOPHAGOGASTRODUODENOSCOPY)         Thank you so much for allowing me to " participate in the care of Eugenie Erazo, FNP-C

## 2019-08-15 NOTE — LETTER
August 15, 2019      Haile Paul, DO  [address]  [place] [address]           [facility]  [address]  [facility] [address]  Phone: [phone]  Fax: [phone]          Patient: Eugenie Coates   MR Number: [id number]   YOB: 1950   Date of Visit: 8/15/2019       Dear Dr. Haile Paul:    Thank you for referring Eugenie Coates to me for evaluation. Attached you will find relevant portions of my assessment and plan of care.    If you have questions, please do not hesitate to call me. I look forward to following Eugenie Coates along with you.    Sincerely,    Cindy Erazo, LAURENCE    Enclosure  CC:  No Recipients    If you would like to receive this communication electronically, please contact [email or web address] or [phone] to request more information on [employer] Link access.    For providers and/or their staff who would like to refer a patient to [facility], please contact us through our one-stop-shop provider referral line, [facility], at [phone].    If you feel you have received this communication in error or would no longer like to receive these types of communications, please e-mail [email or web address]

## 2019-09-04 ENCOUNTER — LAB VISIT (OUTPATIENT)
Dept: LAB | Facility: HOSPITAL | Age: 69
End: 2019-09-04
Attending: FAMILY MEDICINE
Payer: MEDICARE

## 2019-09-04 DIAGNOSIS — E83.52 HYPERCALCEMIA: ICD-10-CM

## 2019-09-04 LAB
ALBUMIN SERPL BCP-MCNC: 3.4 G/DL (ref 3.5–5.2)
ALP SERPL-CCNC: 82 U/L (ref 55–135)
ALT SERPL W/O P-5'-P-CCNC: 14 U/L (ref 10–44)
ANION GAP SERPL CALC-SCNC: 10 MMOL/L (ref 8–16)
AST SERPL-CCNC: 17 U/L (ref 10–40)
BILIRUB SERPL-MCNC: 0.8 MG/DL (ref 0.1–1)
BUN SERPL-MCNC: 16 MG/DL (ref 8–23)
CALCIUM SERPL-MCNC: 9.6 MG/DL (ref 8.7–10.5)
CHLORIDE SERPL-SCNC: 110 MMOL/L (ref 95–110)
CO2 SERPL-SCNC: 22 MMOL/L (ref 23–29)
CREAT SERPL-MCNC: 0.8 MG/DL (ref 0.5–1.4)
EST. GFR  (AFRICAN AMERICAN): >60 ML/MIN/1.73 M^2
EST. GFR  (NON AFRICAN AMERICAN): >60 ML/MIN/1.73 M^2
GLUCOSE SERPL-MCNC: 99 MG/DL (ref 70–110)
POTASSIUM SERPL-SCNC: 3.8 MMOL/L (ref 3.5–5.1)
PROT SERPL-MCNC: 6.7 G/DL (ref 6–8.4)
SODIUM SERPL-SCNC: 142 MMOL/L (ref 136–145)

## 2019-09-04 PROCEDURE — 80053 COMPREHEN METABOLIC PANEL: CPT

## 2019-09-04 PROCEDURE — 36415 COLL VENOUS BLD VENIPUNCTURE: CPT | Mod: PO

## 2019-09-11 ENCOUNTER — OFFICE VISIT (OUTPATIENT)
Dept: CARDIOLOGY | Facility: CLINIC | Age: 69
End: 2019-09-11
Payer: MEDICARE

## 2019-09-11 ENCOUNTER — TELEPHONE (OUTPATIENT)
Dept: ENDOSCOPY | Facility: HOSPITAL | Age: 69
End: 2019-09-11

## 2019-09-11 VITALS
DIASTOLIC BLOOD PRESSURE: 76 MMHG | BODY MASS INDEX: 31.58 KG/M2 | SYSTOLIC BLOOD PRESSURE: 166 MMHG | HEART RATE: 56 BPM | WEIGHT: 171.63 LBS | HEIGHT: 62 IN

## 2019-09-11 DIAGNOSIS — I82.4Y1 ACUTE DEEP VEIN THROMBOSIS (DVT) OF PROXIMAL VEIN OF RIGHT LOWER EXTREMITY: ICD-10-CM

## 2019-09-11 DIAGNOSIS — E78.2 MIXED HYPERLIPIDEMIA: ICD-10-CM

## 2019-09-11 DIAGNOSIS — I47.10 SVT (SUPRAVENTRICULAR TACHYCARDIA): ICD-10-CM

## 2019-09-11 DIAGNOSIS — I10 ESSENTIAL HYPERTENSION: ICD-10-CM

## 2019-09-11 DIAGNOSIS — Z01.810 PREOPERATIVE CARDIOVASCULAR EXAMINATION: Primary | ICD-10-CM

## 2019-09-11 PROCEDURE — 99999 PR PBB SHADOW E&M-EST. PATIENT-LVL IV: ICD-10-PCS | Mod: PBBFAC,,, | Performed by: INTERNAL MEDICINE

## 2019-09-11 PROCEDURE — 99214 OFFICE O/P EST MOD 30 MIN: CPT | Mod: PBBFAC,PO | Performed by: INTERNAL MEDICINE

## 2019-09-11 PROCEDURE — 99999 PR PBB SHADOW E&M-EST. PATIENT-LVL IV: CPT | Mod: PBBFAC,,, | Performed by: INTERNAL MEDICINE

## 2019-09-11 PROCEDURE — 99214 OFFICE O/P EST MOD 30 MIN: CPT | Mod: S$PBB,,, | Performed by: INTERNAL MEDICINE

## 2019-09-11 PROCEDURE — 99214 PR OFFICE/OUTPT VISIT, EST, LEVL IV, 30-39 MIN: ICD-10-PCS | Mod: S$PBB,,, | Performed by: INTERNAL MEDICINE

## 2019-09-11 RX ORDER — ATORVASTATIN CALCIUM 80 MG/1
80 TABLET, FILM COATED ORAL DAILY
Qty: 90 TABLET | Refills: 3 | Status: SHIPPED | OUTPATIENT
Start: 2019-09-11 | End: 2019-12-13

## 2019-09-11 NOTE — TELEPHONE ENCOUNTER
Patient called me to inform me that she has seen her Cardiologist but has decided not to have the procedure at this time.  Direct line phone number provided to return call when ready.

## 2019-09-11 NOTE — LETTER
September 11, 2019      Haile Paul, DO  [address] [facility] [place] [address]           [place] - Cardiology  [address] [facility]  [place] [address]  Phone: [phone]          Patient: Eugenie Coates   MR Number: [id number]   YOB: 1950   Date of Visit: 9/11/2019       Dear Dr. Haile Paul:    Thank you for referring Eugenie Coates to me for evaluation. Attached you will find relevant portions of my assessment and plan of care.    If you have questions, please do not hesitate to call me. I look forward to following Eugenie Coates along with you.    Sincerely,    Rio Roberts MD    Enclosure  CC:  No Recipients    If you would like to receive this communication electronically, please contact [email or web address] or [phone] to request more information on [employer] Link access.    For providers and/or their staff who would like to refer a patient to [facility], please contact us through our one-stop-shop provider referral line, [facility], at [phone].    If you feel you have received this communication in error or would no longer like to receive these types of communications, please e-mail [email or web address]

## 2019-09-11 NOTE — PROGRESS NOTES
Subjective:   Chief Complaint: SVT  Last Clinic Visit: 5/23/2019     History of Present Illness: Eugenie Coates is a 69 y.o. lady with hypertension, hyperlipidemia, GERD, diabetes, right lower extremity DVT 2017, restrictive lung disease, AVNRT, who presents for cardiology follow-up.    Interval History:  Since we saw her last she saw Dr. Joe who performed a slow pathway modification for AVNRT, and has had no further episodes of SVT or palpitations.  She denies any pleuritic symptoms, no orthopnea, no lower extremity edema, no access site issues.  She also continued to complain of chest pain, and was referred to GI for evaluation.  She has remote history of hiatal hernia status post repair possibly, and EGD several years ago at [facility], but symptoms resolved.  She reports dysphagia with food, but if she chews it well is able to swallow.  Denies any weight loss, no emesis, no abdominal pain.  He she saw GI, and they would like to perform repeat EGD.  From a cardiovascular perspective she is able to walk 2 flights on level ground without stopping, walk up 2 flights of stairs without stopping, and denies any dyspnea on exertion, no cardiovascular symptoms at all.  Blood pressure per recent review of home health running in the 120s.    5/23/2019  She reports that for the past 2 years she has had palpitations.  Reports that the palpitations come on suddenly, and last anywhere from a few minutes, to 15-20 minutes.  Nothing precipitates the symptoms, happen at rest, and with exertion, and palpitations are associated with significant shortness of breath, dyspnea on exertion, and chest discomfort.  She has not taken anything for the palpitations, and denies any maneuvers to make them go away.  She has some perseveration over antihypertensive medications, and potential relationship of antihypertensive medications to her palpitations.  She was seen recently in the [place] and was noted to be on  hydrochlorothiazide however no hypokalemia was documented, she was found to be in SVT, which resolved spontaneously.  Her hydrochlorothiazide was stopped, and she was placed on metoprolol.  She cheo any palpitations since that episode.ni  She has a secondary chest pain, reports the chest pain is at rest, worse when she lays down, worse when she moves in particular positions, and denies any exertional component.  She has a history of GERD, and is on a PPI., also history of hiatal hernia.  She reports only taking 20 mg lisinopril    PMHx:  Hypertension  Hyperlipidemia  GERD  Diabetes  Restrictive lung disease.    Review of Systems   Constitution: Negative.   HENT: Negative.    Eyes: Negative.    Cardiovascular: Negative for irregular heartbeat and palpitations.   Respiratory: Negative.    Hematologic/Lymphatic: Negative.    Skin: Negative.    Musculoskeletal: Negative.    Gastrointestinal: Negative.    Genitourinary: Negative.      Medications:  Current Outpatient Medications on File Prior to Visit   Medication Sig    amLODIPine (NORVASC) 10 MG tablet Take 1 tablet (10 mg total) by mouth every evening.    bimatoprost (LUMIGAN) 0.01 % Drop Place 1 drop into both eyes every evening.    blood sugar diagnostic Strp Test twice daily.  Accucheck viva test strips and lancets.    brimonidine 0.2% (ALPHAGAN) 0.2 % Drop Place 1 drop into both eyes 3 (three) times daily.    lancets (ACCU-CHEK SOFTCLIX LANCETS) Misc 1 Units by Misc.(Non-Drug; Combo Route) route 2 (two) times daily.    lisinopril-hydrochlorothiazide (PRINZIDE,ZESTORETIC) 20-12.5 mg per tablet Take 2 tablets by mouth once daily. (Patient taking differently: Take 1 tablet by mouth 2 (two) times daily. )    metFORMIN (GLUCOPHAGE) 500 MG tablet Take 1 tablet (500 mg total) by mouth daily with breakfast.    metoprolol tartrate (LOPRESSOR) 25 MG tablet Take 1 tablet (25 mg total) by mouth 2 (two) times daily.    omeprazole (PRILOSEC) 40 MG capsule Take 1  "capsule (40 mg total) by mouth 2 (two) times daily before meals. TAKE 1 CAPSULE(20 MG) BY MOUTH EVERY DAY    [DISCONTINUED] atorvastatin (LIPITOR) 40 MG tablet Take 1 tablet (40 mg total) by mouth once daily.    [DISCONTINUED] omeprazole (PRILOSEC OTC) 20 MG tablet Take 1 tablet (20 mg total) by mouth once daily.     No current facility-administered medications on file prior to visit.      Family History:  Eugenie's family history includes Colon cancer in her maternal aunt; Diabetes in her mother; No Known Problems in her brother, father, maternal grandfather, maternal grandmother, maternal uncle, paternal aunt, paternal grandfather, paternal grandmother, paternal uncle, and sister.    Social History:  Eugenie reports that she has never smoked. She has never used smokeless tobacco. She reports that she does not drink alcohol or use drugs.    Objective:   BP (!) 166/76   Pulse (!) 56   Ht 5' 2" (1.575 m)   Wt 77.9 kg (171 lb 10.1 oz)   BMI 31.39 kg/m²     Physical Exam   Constitutional: She is oriented to person, place, and time and well-developed, well-nourished, and in no distress. No distress.   HENT:   Head: Normocephalic and atraumatic.   Mouth/Throat: No oropharyngeal exudate.   Eyes: EOM are normal. No scleral icterus.   Neck: No JVD present. No tracheal deviation present. No thyromegaly present.   Cardiovascular: Normal rate and regular rhythm. Exam reveals no gallop and no friction rub.   Murmur (Systolic crescendo decrescendo murmur) heard.  Pulmonary/Chest: Effort normal and breath sounds normal. No respiratory distress. She has no wheezes. She has no rales. She exhibits no tenderness.   Abdominal: Soft. She exhibits no distension. There is no tenderness. There is no rebound and no guarding.   Musculoskeletal: Normal range of motion. She exhibits no edema.   Neurological: She is alert and oriented to person, place, and time.   Skin: Skin is warm and dry. She is not diaphoretic. No erythema. "   Psychiatric: Affect normal.     EKG:  My independent visualization of most recent EKG is normal sinus rhythm, poor R-wave progression, left axis deviation, likely left anterior fascicular block    TTE:  08/21/2018  CONCLUSIONS     1 - Normal left ventricular systolic function (EF 60-65%).     2 - Normal right ventricular systolic function .     3 - The estimated PA systolic pressure is 34 mmHg.     4 - Impaired LV relaxation, increased LVEDP.    Mild-to-moderate aortic stenosis    Lipids:  Recent Labs   Lab 06/05/19  1052   LDL Cholesterol 124.8   HDL 42   Cholesterol 189      Renal:  Recent Labs   Lab 09/04/19  0845   Potassium 3.8   CO2 22 L   BUN, Bld 16   Creatinine 0.8     Liver:  Recent Labs   Lab 09/04/19  0845   AST 17   ALT 14         Assessment:     1. Preoperative cardiovascular examination    2. Mixed hyperlipidemia    3. Essential hypertension    4. SVT (supraventricular tachycardia)    5. Acute deep vein thrombosis (DVT) of proximal vein of right lower extremity        Plan:   1. Preoperative cardiovascular examination  She has no active cardiac complaints, surgery is low risk, and she has revised cardiac risk index factors of none, thus she has a 3% risk of major adverse cardiac event, proceed on risk benefit basis.  However after discussing the EGD with her, and explaining the procedure, she is no longer interested in having the procedure negating this evaluation.    2. Mixed hyperlipidemia  Most recent LDL above goal, increasing Lipitor to 80 mg.  Follow-up lipids CMP in 3 months  - atorvastatin (LIPITOR) 80 MG tablet; Take 1 tablet (80 mg total) by mouth once daily.  Dispense: 90 tablet; Refill: 3  - Lipid panel; Future  - Comprehensive metabolic panel; Future    3. Essential hypertension  Blood pressure elevated today, but well controlled at home on most recent home health evaluation continue current meds.    4. SVT (supraventricular tachycardia)  Resolved    5. Acute deep vein thrombosis (DVT)  of proximal vein of right lower extremity  She was taken off Eliquis in 2018 by Dr. Carrizales        Follow up in about 1 year (around 9/11/2020).

## 2019-09-18 ENCOUNTER — OFFICE VISIT (OUTPATIENT)
Dept: PODIATRY | Facility: CLINIC | Age: 69
End: 2019-09-18
Payer: MEDICARE

## 2019-09-18 VITALS
HEIGHT: 62 IN | SYSTOLIC BLOOD PRESSURE: 137 MMHG | HEART RATE: 73 BPM | RESPIRATION RATE: 20 BRPM | DIASTOLIC BLOOD PRESSURE: 78 MMHG | BODY MASS INDEX: 31.6 KG/M2 | WEIGHT: 171.75 LBS

## 2019-09-18 DIAGNOSIS — M20.42 HAMMER TOES OF BOTH FEET: ICD-10-CM

## 2019-09-18 DIAGNOSIS — B35.1 ONYCHOMYCOSIS DUE TO DERMATOPHYTE: ICD-10-CM

## 2019-09-18 DIAGNOSIS — E11.65 TYPE 2 DIABETES MELLITUS WITH HYPERGLYCEMIA, WITHOUT LONG-TERM CURRENT USE OF INSULIN: Primary | ICD-10-CM

## 2019-09-18 DIAGNOSIS — M20.41 HAMMER TOES OF BOTH FEET: ICD-10-CM

## 2019-09-18 PROCEDURE — 99499 NO LOS: ICD-10-PCS | Mod: S$PBB,,, | Performed by: PODIATRIST

## 2019-09-18 PROCEDURE — 99499 UNLISTED E&M SERVICE: CPT | Mod: S$PBB,,, | Performed by: PODIATRIST

## 2019-09-18 PROCEDURE — 11721 DEBRIDE NAIL 6 OR MORE: CPT | Mod: S$PBB,Q9,, | Performed by: PODIATRIST

## 2019-09-18 PROCEDURE — 11721 DEBRIDE NAIL 6 OR MORE: CPT | Mod: PBBFAC,PN | Performed by: PODIATRIST

## 2019-09-18 PROCEDURE — 11721 PR DEBRIDEMENT OF NAILS, 6 OR MORE: ICD-10-PCS | Mod: S$PBB,Q9,, | Performed by: PODIATRIST

## 2019-09-18 PROCEDURE — 99999 PR PBB SHADOW E&M-EST. PATIENT-LVL IV: CPT | Mod: PBBFAC,,, | Performed by: PODIATRIST

## 2019-09-18 PROCEDURE — 99999 PR PBB SHADOW E&M-EST. PATIENT-LVL IV: ICD-10-PCS | Mod: PBBFAC,,, | Performed by: PODIATRIST

## 2019-09-18 PROCEDURE — 99214 OFFICE O/P EST MOD 30 MIN: CPT | Mod: PBBFAC,PN | Performed by: PODIATRIST

## 2019-09-18 NOTE — PROGRESS NOTES
Subjective:      Patient ID: Eugenie Coates is a 69 y.o. female.    Chief Complaint: Diabetes Mellitus (PCP Haile Paul / A1C 6.8 on 8/5/19.)    Eugenie is a 69 y.o. female who presents to the clinic for routine evaluation and treatment of diabetic feet. Eugenie has a past medical history of Allergy, Anemia, Anxiety, Arthritis, Asthma, Bell palsy, Depression, Diabetes mellitus, type 2, DVT (deep venous thrombosis), Endometriosis, Eye injury (2014), GERD (gastroesophageal reflux disease), Glaucoma, History of uterine fibroid, Hyperlipidemia, Hypertension, Nuclear sclerosis of both eyes (9/27/2016), Sleep apnea, SVT (supraventricular tachycardia) (05/2019), and Thyroid disease. Patient relates no major problem with feet. Only complaints today consist of toenails in need of trimming.  Denies being painful with wearing shoe gear.  Has not attempted to self treat. Denies any additional pedal complaints.      PCP: Haile Paul, DO    Date Last Seen by PCP: 8/7/19    Hemoglobin A1C   Date Value Ref Range Status   08/05/2019 6.8 (H) 4.0 - 5.6 % Final     Comment:     ADA Screening Guidelines:  5.7-6.4%  Consistent with prediabetes  >or=6.5%  Consistent with diabetes  High levels of fetal hemoglobin interfere with the HbA1C  assay. Heterozygous hemoglobin variants (HbS, HgC, etc)do  not significantly interfere with this assay.   However, presence of multiple variants may affect accuracy.     05/10/2019 6.7 (H) 4.0 - 5.6 % Final     Comment:     ADA Screening Guidelines:  5.7-6.4%  Consistent with prediabetes  >or=6.5%  Consistent with diabetes  High levels of fetal hemoglobin interfere with the HbA1C  assay. Heterozygous hemoglobin variants (HbS, HgC, etc)do  not significantly interfere with this assay.   However, presence of multiple variants may affect accuracy.     01/18/2019 6.9 (H) 4.0 - 5.6 % Final     Comment:     ADA Screening Guidelines:  5.7-6.4%  Consistent with prediabetes  >or=6.5%  Consistent with  diabetes  High levels of fetal hemoglobin interfere with the HbA1C  assay. Heterozygous hemoglobin variants (HbS, HgC, etc)do  not significantly interfere with this assay.   However, presence of multiple variants may affect accuracy.             Past Medical History:   Diagnosis Date    Allergy     Anemia     Anxiety     Arthritis     Asthma     Bell palsy     Depression     Diabetes mellitus, type 2     DVT (deep venous thrombosis)     Endometriosis     Eye injury     stuck with tree branch od ?     GERD (gastroesophageal reflux disease)     Glaucoma     History of uterine fibroid     Hyperlipidemia     Hypertension     Nuclear sclerosis of both eyes 2016    Sleep apnea     uses C pap    SVT (supraventricular tachycardia) 2019    Thyroid disease        Past Surgical History:   Procedure Laterality Date    ABLATION, SVT, ACCESSORY PATHWAY N/A 2019    Performed by Jose Joe MD at [facility]     SECTION      CHOLECYSTECTOMY      glaucoma laser Bilateral     HERNIA REPAIR      KNEE SURGERY Right     (R) knee scope; torn meniscus    REPAIR, HERNIA, VENTRAL, LAPAROSCOPIC N/A 2013    Performed by [facility]ron Bourne MD at [facility] OR    VEIN SURGERY  ,     Rt leg x2       Family History   Problem Relation Age of Onset    Diabetes Mother     No Known Problems Father     No Known Problems Sister     No Known Problems Brother     Colon cancer Maternal Aunt     No Known Problems Maternal Uncle     No Known Problems Paternal Aunt     No Known Problems Paternal Uncle     No Known Problems Maternal Grandmother     No Known Problems Maternal Grandfather     No Known Problems Paternal Grandmother     No Known Problems Paternal Grandfather     Amblyopia Neg Hx     Blindness Neg Hx     Cancer Neg Hx     Cataracts Neg Hx     Glaucoma Neg Hx     Hypertension Neg Hx     Macular degeneration Neg Hx     Retinal detachment Neg Hx     Strabismus Neg  Hx     Stroke Neg Hx     Thyroid disease Neg Hx     Celiac disease Neg Hx     Colon polyps Neg Hx     Esophageal cancer Neg Hx     Inflammatory bowel disease Neg Hx     Irritable bowel syndrome Neg Hx     Liver cancer Neg Hx     Liver disease Neg Hx     Rectal cancer Neg Hx     Stomach cancer Neg Hx     Ulcerative colitis Neg Hx     Cystic fibrosis Neg Hx     Crohn's disease Neg Hx     Hemochromatosis Neg Hx     Cirrhosis Neg Hx        Social History     Socioeconomic History    Marital status:      Spouse name: Not on file    Number of children: Not on file    Years of education: Not on file    Highest education level: Not on file   Occupational History    Not on file   Social Needs    Financial resource strain: Not on file    Food insecurity:     Worry: Not on file     Inability: Not on file    Transportation needs:     Medical: Not on file     Non-medical: Not on file   Tobacco Use    Smoking status: Never Smoker    Smokeless tobacco: Never Used   Substance and Sexual Activity    Alcohol use: No     Alcohol/week: 0.0 oz     Frequency: Never     Drinks per session: Patient refused     Binge frequency: Never    Drug use: No    Sexual activity: Yes     Partners: Male   Lifestyle    Physical activity:     Days per week: 3 days     Minutes per session: 20 min    Stress: Not on file   Relationships    Social connections:     Talks on phone: More than three times a week     Gets together: Once a week     Attends Zoroastrianism service: Not on file     Active member of club or organization: No     Attends meetings of clubs or organizations: Never     Relationship status:    Other Topics Concern    Not on file   Social History Narrative    1/18/19: lives with her . They have multiple children, but the youngest is 38. No children at home right now. No pets at home. Splits time between here and [place]. Her son lives in [place].        Current Outpatient Medications    Medication Sig Dispense Refill    amLODIPine (NORVASC) 10 MG tablet Take 1 tablet (10 mg total) by mouth every evening. 90 tablet 3    atorvastatin (LIPITOR) 80 MG tablet Take 1 tablet (80 mg total) by mouth once daily. 90 tablet 3    bimatoprost (LUMIGAN) 0.01 % Drop Place 1 drop into both eyes every evening. 3 Bottle 4    blood sugar diagnostic Strp Test twice daily.  Accucheck viva test strips and lancets. 300 each 3    brimonidine 0.2% (ALPHAGAN) 0.2 % Drop Place 1 drop into both eyes 3 (three) times daily. 15 mL 4    lancets (ACCU-CHEK SOFTCLIX LANCETS) Misc 1 Units by Misc.(Non-Drug; Combo Route) route 2 (two) times daily. 300 each 3    lisinopril-hydrochlorothiazide (PRINZIDE,ZESTORETIC) 20-12.5 mg per tablet Take 2 tablets by mouth once daily. (Patient taking differently: Take 1 tablet by mouth 2 (two) times daily. ) 180 tablet 0    metFORMIN (GLUCOPHAGE) 500 MG tablet Take 1 tablet (500 mg total) by mouth daily with breakfast. 90 tablet 3    metoprolol tartrate (LOPRESSOR) 25 MG tablet Take 1 tablet (25 mg total) by mouth 2 (two) times daily. 60 tablet 7    omeprazole (PRILOSEC) 40 MG capsule Take 1 capsule (40 mg total) by mouth 2 (two) times daily before meals. TAKE 1 CAPSULE(20 MG) BY MOUTH EVERY  capsule 1     No current facility-administered medications for this visit.        Review of patient's allergies indicates:   Allergen Reactions    Percodan [oxycodone hcl-oxycodone-asa] Hives and Itching         Review of Systems   Constitution: Negative for chills and fever.   Cardiovascular: Positive for leg swelling. Negative for claudication.   Skin: Positive for color change and nail changes.   Musculoskeletal: Positive for joint swelling. Negative for joint pain, muscle cramps and muscle weakness.   Neurological: Positive for paresthesias. Negative for numbness.   Psychiatric/Behavioral: Negative for altered mental status.           Objective:      Physical Exam   Constitutional: She  is oriented to person, place, and time. She appears well-developed and well-nourished. No distress.   Cardiovascular:   Pulses:       Dorsalis pedis pulses are 2+ on the right side, and 2+ on the left side.        Posterior tibial pulses are 1+ on the right side, and 1+ on the left side.   CFT <3 seconds bilateral.  Pedal hair growth decreased bilateral.  Varicosities noted bilateral.  Moderate non pitting edema noted to bilateral lower extremity.  Toes are cool to touch bilateral.     Musculoskeletal: She exhibits edema. She exhibits no tenderness.   Muscle strength 5/5 in all muscle groups bilateral.  No tenderness nor crepitation with ROM of foot/ankle joints bilateral.  No tenderness with palpation of bilateral foot and ankle.  Bilateral pes planus foot type.  Bilateral hallux abducto valgus.  Bilateral semi-reducible contracture of toes 2-5.     Neurological: She is alert and oriented to person, place, and time. She has normal strength. A sensory deficit is present.   Protective sensation per [place] monofilament intact bilateral.    Vibratory sensation decreased bilateral.    Light touch intact bilateral.   Skin: Skin is warm, dry and intact. No abrasion, no bruising, no burn, no ecchymosis, no laceration, no lesion, no petechiae and no rash noted. She is not diaphoretic. No cyanosis or erythema. No pallor. Nails show no clubbing.   Pedal skin appears edematous bilateral.  Toenails x 10 appear thickened by 2 mm's, elongated by 4 mm's, and discolored with subungual debris.  Examination of the skin reveals no evidence of significant maceration, rashes, open lesions, suspicious appearing nevi or other concerning lesions.              Assessment:       Encounter Diagnoses   Name Primary?    Type 2 diabetes mellitus with hyperglycemia, without long-term current use of insulin Yes    Hammer toes of both feet     Onychomycosis due to dermatophyte          Plan:       Eugenie was seen today for diabetes  mellitus.    Diagnoses and all orders for this visit:    Type 2 diabetes mellitus with hyperglycemia, without long-term current use of insulin    Hammer toes of both feet    Onychomycosis due to dermatophyte      I counseled the patient on her conditions, their implications and medical management.    Shoe inspection. Diabetic Foot Education. Patient reminded of the importance of good nutrition and blood sugar control to help prevent podiatric complications of diabetes. Patient instructed on proper foot hygeine. We discussed wearing proper shoe gear, daily foot inspections, never walking without protective shoe gear, never putting sharp instruments to feet    Advised to continue wearing diabetic shoes/insoles that accommodate for digital deformities.     With patient's permission, nails were aggressively reduced and debrided x 10 to their soft tissue attachment mechanically and with electric , removing all offending nail and debris. Patient relates relief following the procedure.  She will continue to monitor the areas daily, inspect her feet, wear protective shoe gear when ambulatory, moisturizer to maintain skin integrity and follow in this office in approximately 3 months, sooner p.r.n.    Follow up in about 3 months (around 12/18/2019).    Giuliano Garnett DPM

## 2019-09-23 NOTE — PROGRESS NOTES
Digital Medicine: Clinician Follow-Up    Patient is feeling well since having her ablation in July.    The history is provided by the patient.     Follow Up  Follow-up reason(s): reading review      Readings are trending down Patient is trending down and feeling well.    Patient has no reason for the upward trend in BP then downward trend.                  Medication Adherence:   She misses doses: never        INTERVENTION(S)  reviewed appropriate dose schedule    PLAN  patient verbalizes understanding    Patient will maintain her current HTN medications.      There are no preventive care reminders to display for this patient.    Last 5 Patient Entered Readings                                      Current 30 Day Average: 132/69     Recent Readings 9/23/2019 9/22/2019 9/21/2019 9/20/2019 9/17/2019    SBP (mmHg) 139 128 121 129 120    DBP (mmHg) 70 67 70 66 63    Pulse 56 62 75 61 62             Hypertension Medications             amLODIPine (NORVASC) 10 MG tablet Take 1 tablet (10 mg total) by mouth every evening.    lisinopril-hydrochlorothiazide (PRINZIDE,ZESTORETIC) 20-12.5 mg per tablet Take 2 tablets by mouth once daily.    metoprolol tartrate (LOPRESSOR) 25 MG tablet Take 1 tablet (25 mg total) by mouth 2 (two) times daily.

## 2019-10-08 ENCOUNTER — PATIENT OUTREACH (OUTPATIENT)
Dept: ADMINISTRATIVE | Facility: OTHER | Age: 69
End: 2019-10-08

## 2019-10-08 DIAGNOSIS — Z12.31 ENCOUNTER FOR SCREENING MAMMOGRAM FOR MALIGNANT NEOPLASM OF BREAST: Primary | ICD-10-CM

## 2019-10-11 ENCOUNTER — HOSPITAL ENCOUNTER (OUTPATIENT)
Dept: CARDIOLOGY | Facility: CLINIC | Age: 69
Discharge: HOME | End: 2019-10-11
Payer: MEDICARE

## 2019-10-11 ENCOUNTER — OFFICE VISIT (OUTPATIENT)
Dept: ELECTROPHYSIOLOGY | Facility: CLINIC | Age: 69
End: 2019-10-11
Payer: MEDICARE

## 2019-10-11 VITALS
WEIGHT: 172.38 LBS | SYSTOLIC BLOOD PRESSURE: 133 MMHG | BODY MASS INDEX: 31.72 KG/M2 | DIASTOLIC BLOOD PRESSURE: 80 MMHG | HEIGHT: 62 IN | HEART RATE: 53 BPM

## 2019-10-11 DIAGNOSIS — G47.33 OSA (OBSTRUCTIVE SLEEP APNEA): ICD-10-CM

## 2019-10-11 DIAGNOSIS — R00.0 TACHYCARDIA: ICD-10-CM

## 2019-10-11 DIAGNOSIS — E11.65 TYPE 2 DIABETES MELLITUS WITH HYPERGLYCEMIA, WITHOUT LONG-TERM CURRENT USE OF INSULIN: ICD-10-CM

## 2019-10-11 DIAGNOSIS — I47.10 PSVT (PAROXYSMAL SUPRAVENTRICULAR TACHYCARDIA): Primary | ICD-10-CM

## 2019-10-11 PROCEDURE — 93010 ELECTROCARDIOGRAM REPORT: CPT | Mod: S$PBB,,, | Performed by: INTERNAL MEDICINE

## 2019-10-11 PROCEDURE — 93010 RHYTHM STRIP: ICD-10-PCS | Mod: S$PBB,,, | Performed by: INTERNAL MEDICINE

## 2019-10-11 PROCEDURE — 99214 OFFICE O/P EST MOD 30 MIN: CPT | Mod: S$PBB,,, | Performed by: INTERNAL MEDICINE

## 2019-10-11 PROCEDURE — 99999 PR PBB SHADOW E&M-EST. PATIENT-LVL III: ICD-10-PCS | Mod: PBBFAC,,, | Performed by: INTERNAL MEDICINE

## 2019-10-11 PROCEDURE — 99999 PR PBB SHADOW E&M-EST. PATIENT-LVL III: CPT | Mod: PBBFAC,,, | Performed by: INTERNAL MEDICINE

## 2019-10-11 PROCEDURE — 99213 OFFICE O/P EST LOW 20 MIN: CPT | Mod: PBBFAC,25 | Performed by: INTERNAL MEDICINE

## 2019-10-11 PROCEDURE — 99214 PR OFFICE/OUTPT VISIT, EST, LEVL IV, 30-39 MIN: ICD-10-PCS | Mod: S$PBB,,, | Performed by: INTERNAL MEDICINE

## 2019-10-11 PROCEDURE — 93005 ELECTROCARDIOGRAM TRACING: CPT | Mod: PBBFAC | Performed by: INTERNAL MEDICINE

## 2019-10-11 NOTE — PROGRESS NOTES
Subjective:    Patient ID:  Eugenie Coates is a 69 y.o. female who presents for evaluation of psvt      HPI   69 y.o. F [employer] and retired [employer]   HTN on meds  HL on meds  DM on meds  AFSHIN, on CPAP    Has had palps c/w SVT x2 years. Lasts up to 20 mins. SOB, TRAN, chest discomf.  Went to [facility] ER with SVT recently. Rx BB.  Palpitations are SS and radiate to neck.     bpm. Short RP.  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 Since, she feels great! No palpitations at all.    8/18 60-65% LVEF    My interpretation of today's ECG is SB 53 bpm. No preexcitation.    Review of Systems   Constitution: Negative. Negative for malaise/fatigue.   HENT: Negative.  Negative for ear pain and tinnitus.    Eyes: Negative for blurred vision.   Cardiovascular: Negative for chest pain, dyspnea on exertion, near-syncope and syncope.   Respiratory: Negative.  Negative for shortness of breath.    Endocrine: Negative.  Negative for polyuria.   Hematologic/Lymphatic: Does not bruise/bleed easily.   Skin: Negative.  Negative for rash.   Musculoskeletal: Negative.  Negative for joint pain and muscle weakness.   Gastrointestinal: Negative.  Negative for abdominal pain and change in bowel habit.   Genitourinary: Negative for frequency.   Neurological: Negative.  Negative for dizziness and weakness.   Psychiatric/Behavioral: Negative.  Negative for depression. The patient is not nervous/anxious.    Allergic/Immunologic: Negative for environmental allergies.        Objective:    Physical Exam   Constitutional: She is oriented to person, place, and time. Vital signs are normal. She appears well-developed and well-nourished. She is active and cooperative.   HENT:   Head: Normocephalic and atraumatic.   Eyes: Conjunctivae and EOM are normal.   Neck: Normal range of motion. Carotid bruit is not present. No tracheal deviation and no edema present. No thyroid mass and no thyromegaly present.    Cardiovascular: Normal rate, regular rhythm, normal heart sounds, intact distal pulses and normal pulses.  No extrasystoles are present. PMI is not displaced. Exam reveals no gallop and no friction rub.   No murmur heard.  Pulmonary/Chest: Effort normal and breath sounds normal. No respiratory distress. She has no wheezes. She has no rales.   Abdominal: Soft. Normal appearance. She exhibits no distension. There is no hepatosplenomegaly.   Musculoskeletal: Normal range of motion.   Neurological: She is alert and oriented to person, place, and time. Coordination normal.   Skin: Skin is warm and dry. No rash noted.   Psychiatric: She has a normal mood and affect. Her speech is normal and behavior is normal. Thought content normal. Cognition and memory are normal.   Nursing note and vitals reviewed.        Assessment:       1. PSVT (paroxysmal supraventricular tachycardia)    2. Type 2 diabetes mellitus with hyperglycemia, without long-term current use of insulin    3. AFSHIN (obstructive sleep apnea)         Plan:       Doing great since RFA of SVT.    f/u in EP clinic prn.  f/u with general cardiologist Dr Roberts

## 2019-10-18 ENCOUNTER — PATIENT OUTREACH (OUTPATIENT)
Dept: OTHER | Facility: OTHER | Age: 69
End: 2019-10-18

## 2019-10-31 ENCOUNTER — LAB VISIT (OUTPATIENT)
Dept: LAB | Facility: HOSPITAL | Age: 69
End: 2019-10-31
Attending: INTERNAL MEDICINE
Payer: MEDICARE

## 2019-10-31 DIAGNOSIS — E05.90 SUBCLINICAL HYPERTHYROIDISM: ICD-10-CM

## 2019-10-31 LAB
T4 FREE SERPL-MCNC: 1.12 NG/DL (ref 0.71–1.51)
TSH SERPL DL<=0.005 MIU/L-ACNC: 0.32 UIU/ML (ref 0.4–4)

## 2019-10-31 PROCEDURE — 36415 COLL VENOUS BLD VENIPUNCTURE: CPT | Mod: PO

## 2019-10-31 PROCEDURE — 84443 ASSAY THYROID STIM HORMONE: CPT

## 2019-10-31 PROCEDURE — 84439 ASSAY OF FREE THYROXINE: CPT

## 2019-11-05 ENCOUNTER — OFFICE VISIT (OUTPATIENT)
Dept: NEPHROLOGY | Facility: CLINIC | Age: 69
End: 2019-11-05
Payer: MEDICARE

## 2019-11-05 ENCOUNTER — PATIENT OUTREACH (OUTPATIENT)
Dept: ADMINISTRATIVE | Facility: OTHER | Age: 69
End: 2019-11-05

## 2019-11-05 VITALS
WEIGHT: 170.63 LBS | OXYGEN SATURATION: 98 % | HEIGHT: 63 IN | SYSTOLIC BLOOD PRESSURE: 144 MMHG | BODY MASS INDEX: 30.23 KG/M2 | DIASTOLIC BLOOD PRESSURE: 68 MMHG | HEART RATE: 75 BPM

## 2019-11-05 DIAGNOSIS — I10 ESSENTIAL HYPERTENSION: Primary | ICD-10-CM

## 2019-11-05 PROCEDURE — 99999 PR PBB SHADOW E&M-EST. PATIENT-LVL III: ICD-10-PCS | Mod: PBBFAC,,, | Performed by: INTERNAL MEDICINE

## 2019-11-05 PROCEDURE — 99999 PR PBB SHADOW E&M-EST. PATIENT-LVL III: CPT | Mod: PBBFAC,,, | Performed by: INTERNAL MEDICINE

## 2019-11-05 PROCEDURE — 99214 OFFICE O/P EST MOD 30 MIN: CPT | Mod: S$PBB,,, | Performed by: INTERNAL MEDICINE

## 2019-11-05 PROCEDURE — 99213 OFFICE O/P EST LOW 20 MIN: CPT | Mod: PBBFAC,PO | Performed by: INTERNAL MEDICINE

## 2019-11-05 PROCEDURE — 99214 PR OFFICE/OUTPT VISIT, EST, LEVL IV, 30-39 MIN: ICD-10-PCS | Mod: S$PBB,,, | Performed by: INTERNAL MEDICINE

## 2019-11-05 NOTE — PROGRESS NOTES
"Subjective:       Patient ID: Eugenie Coates is a 69 y.o. Black or  female who presents for return patient evaluation for hypertension.    She has no uremic or urinary symptoms and is in her usual state of health.  There have been no recent illnesses, hospitalizations or procedures.  She states her blood pressures are high at times.      Review of Systems   Constitutional: Negative for appetite change, chills and fever.   Eyes: Negative for visual disturbance.   Respiratory: Negative for cough and shortness of breath.    Cardiovascular: Positive for leg swelling (occasional). Negative for chest pain.   Gastrointestinal: Negative for diarrhea, nausea and vomiting.   Genitourinary: Negative for difficulty urinating, dysuria, flank pain and hematuria.   Musculoskeletal: Positive for arthralgias. Negative for myalgias.   Skin: Negative for rash.   Neurological: Negative for headaches.   Psychiatric/Behavioral: Negative for sleep disturbance.       The past medical, family and social histories were reviewed for this encounter.     BP (!) 144/68 (BP Location: Left arm, Patient Position: Sitting)   Pulse 75   Ht 5' 2.5" (1.588 m)   Wt 77.4 kg (170 lb 10.2 oz)   SpO2 98%   BMI 30.71 kg/m²     Objective:      Physical Exam   Constitutional: She is oriented to person, place, and time. She appears well-developed and well-nourished. No distress.   HENT:   Head: Normocephalic and atraumatic.   Eyes: Conjunctivae are normal.   Neck: Neck supple. No JVD present.   Cardiovascular: Normal rate and regular rhythm. Exam reveals no gallop and no friction rub.   Murmur heard.  Pulmonary/Chest: Effort normal and breath sounds normal. No respiratory distress. She has no wheezes. She has no rales.   Abdominal: Soft. Bowel sounds are normal. She exhibits no distension. There is no tenderness.   Musculoskeletal: She exhibits no edema.   Neurological: She is alert and oriented to person, place, and time.   Skin: Skin " is warm and dry. No rash noted.   Psychiatric: She has a normal mood and affect.   Vitals reviewed.      Assessment:       1. Essential hypertension        Plan:   Return to clinic in 12 months.  Baseline creatinine is 1.1.  Renal US shows R 9.5 cm, L 10.1 cm.  Her pressure is well controlled according to her [facility] data.  Consider increasing her metoprolol to 50 mg if Cardiology agrees.  I will send this note to Dr. Joe.  Continue current medications as prescribed and reviewed.

## 2019-11-06 ENCOUNTER — OFFICE VISIT (OUTPATIENT)
Dept: ENDOCRINOLOGY | Facility: CLINIC | Age: 69
End: 2019-11-06
Payer: MEDICARE

## 2019-11-06 VITALS
DIASTOLIC BLOOD PRESSURE: 80 MMHG | HEIGHT: 62 IN | WEIGHT: 169.44 LBS | SYSTOLIC BLOOD PRESSURE: 124 MMHG | BODY MASS INDEX: 31.18 KG/M2

## 2019-11-06 DIAGNOSIS — M85.80 OSTEOPENIA, UNSPECIFIED LOCATION: ICD-10-CM

## 2019-11-06 DIAGNOSIS — E66.01 SEVERE OBESITY (BMI 35.0-35.9 WITH COMORBIDITY): ICD-10-CM

## 2019-11-06 DIAGNOSIS — E11.65 TYPE 2 DIABETES MELLITUS WITH HYPERGLYCEMIA, WITHOUT LONG-TERM CURRENT USE OF INSULIN: ICD-10-CM

## 2019-11-06 DIAGNOSIS — E04.2 MULTINODULAR THYROID: Primary | ICD-10-CM

## 2019-11-06 DIAGNOSIS — E05.90 SUBCLINICAL HYPERTHYROIDISM: ICD-10-CM

## 2019-11-06 PROCEDURE — 99999 PR PBB SHADOW E&M-EST. PATIENT-LVL III: CPT | Mod: PBBFAC,,, | Performed by: INTERNAL MEDICINE

## 2019-11-06 PROCEDURE — 99213 OFFICE O/P EST LOW 20 MIN: CPT | Mod: PBBFAC | Performed by: INTERNAL MEDICINE

## 2019-11-06 PROCEDURE — 99999 PR PBB SHADOW E&M-EST. PATIENT-LVL III: ICD-10-PCS | Mod: PBBFAC,,, | Performed by: INTERNAL MEDICINE

## 2019-11-06 PROCEDURE — 99214 PR OFFICE/OUTPT VISIT, EST, LEVL IV, 30-39 MIN: ICD-10-PCS | Mod: S$PBB,,, | Performed by: INTERNAL MEDICINE

## 2019-11-06 PROCEDURE — 99214 OFFICE O/P EST MOD 30 MIN: CPT | Mod: S$PBB,,, | Performed by: INTERNAL MEDICINE

## 2019-11-06 NOTE — ASSESSMENT & PLAN NOTE
--Patient with multiple thyroid nodules  --Previously with AUS of left lobe nodule and unsatisfactory FNA of isthmus nodule  --I have recommended repeat FNA of both nodules and will hold for molecular markers on the isthmus nodule

## 2019-11-06 NOTE — PROGRESS NOTES
Subjective:      Patient ID: Eugenie Coates is a 69 y.o. female.    Chief Complaint:  Hyperthyroidism      History of Present Illness  Ms. Coates presents for follow up of subclinical hyperthyroidism, thyroid nodules and DM 2.     With regards to the diabetes:     Diagnosed:      Current regimen:  Metformin 500 mg once daily     Missed doses?   no     # times a day testin   BG - 100-120 fasting     Hypoglycemic event? None        Last eye exam:2019  Last podiatry exam: 2019     Typical meals: trying to keep to a healthy diet  Avoiding carbs, increasing salad and protein.  Does not drink soda.  Drinks crystal lite, water.  Sugar substitutes used if needed.   Avoids cakes, cookies, sweets, chips       Education - last visit: 2018  Exercise - tried to stay active, does 30min on the bike daily.  Also walks.     Denies numbness or tingling in the feet.      With regards to hypertension:  Tolerating ACEI or ARB     With regards to dyslipidemia:  Tolerating statin      With regards to hyperthyroidism:  Current hyperthyroid medication: none    S/p RFA in 2019 for SVT and has has no further palpitatiosn      No tremors or increased anxiousness.      Last BMD:   see below     TRAb negative in 2018    NM thyroid scan and uptake 2018:  The 24 hr uptake is 18.3%.  There is a hyperfunctioning hot nodule lower pole left suppressing the rest of the gland.    With regards to osteopenia:  On calcium 500mg   On Vit D3 1000iU daily     Recent falls:  No  No history of fracture.     No family history of osteoporosis.     Fall proofed home - stairs with rails, lighting, rugs etc.     No history of cancer, radiation, kidney disease, bone disease,   Does have significant reflux, CKD 3    With regards to thyroid nodules:  FNA 3/13/2019:      Review of Systems   Constitutional: Negative for chills and fever.   Gastrointestinal: Negative for nausea.       Objective:   Physical Exam   Nursing note and vitals  reviewed.    BP Readings from Last 3 Encounters:   11/06/19 124/80   11/05/19 (!) 144/68   10/31/19 (!) 140/85     Wt Readings from Last 1 Encounters:   11/06/19 1031 76.9 kg (169 lb 6.8 oz)       Body mass index is 30.99 kg/m².    Lab Review:   Lab Results   Component Value Date    HGBA1C 6.2 (H) 10/31/2019     Lab Results   Component Value Date    CHOL 189 06/05/2019    HDL 42 06/05/2019    LDLCALC 124.8 06/05/2019    TRIG 111 06/05/2019    CHOLHDL 22.2 06/05/2019     Lab Results   Component Value Date     10/31/2019    K 4.1 10/31/2019     10/31/2019    CO2 27 10/31/2019     (H) 10/31/2019    BUN 21 10/31/2019    CREATININE 1.1 10/31/2019    CALCIUM 10.2 10/31/2019    PROT 7.4 10/31/2019    ALBUMIN 3.8 10/31/2019    BILITOT 0.7 10/31/2019    ALKPHOS 94 10/31/2019    AST 16 10/31/2019    ALT 14 10/31/2019    ANIONGAP 8 10/31/2019    ESTGFRAFRICA 59.6 (A) 10/31/2019    EGFRNONAA 51.7 (A) 10/31/2019    TSH 0.321 (L) 10/31/2019         Assessment and Plan     Multinodular thyroid  --Patient with multiple thyroid nodules  --Previously with AUS of left lobe nodule and unsatisfactory FNA of isthmus nodule  --I have recommended repeat FNA of both nodules and will hold for molecular markers on the isthmus nodule    Subclinical hyperthyroidism  --Patient with mildly suppressed TSH but has history of SVT  --Given SVT would favor treating the subclinical hyperthyroidism  --Will get repeat FNA results before deciding treatment modality    Osteopenia  --Repeat BMD in 2/2019 showed stable bone density with osteopenia  --Continue vit D supplements  --Weight bearing exercise as tolerated    Type 2 diabetes mellitus with hyperglycemia, without long-term current use of insulin  --continue metformin 500 mg daily  --May be able to stop metformin if next A1c stable or improved    Severe obesity (BMI 35.0-35.9 with comorbidity)  --BMI reviewed      Donte Dunlap M.D. Staff Endocrinology

## 2019-11-06 NOTE — ASSESSMENT & PLAN NOTE
--Patient with mildly suppressed TSH but has history of SVT  --Given SVT would favor treating the subclinical hyperthyroidism  --Will get repeat FNA results before deciding treatment modality

## 2019-11-06 NOTE — ASSESSMENT & PLAN NOTE
--Repeat BMD in 2/2019 showed stable bone density with osteopenia  --Continue vit D supplements  --Weight bearing exercise as tolerated

## 2019-11-07 NOTE — PROGRESS NOTES
Digital Medicine: Health  Follow-Up    Patient denies any other concerns at this time.           Follow Up  Follow-up reason(s): reading review      Addressed elevated reading of 154/71 mmHg on 11/7.  Patient reports she was experiencing some stress that day.        INTERVENTION(S)  encouragement/support    PLAN  continue monitoring      There are no preventive care reminders to display for this patient.    Last 5 Patient Entered Readings                                      Current 30 Day Average: 133/72     Recent Readings 11/7/2019 11/4/2019 11/3/2019 11/3/2019 11/2/2019    SBP (mmHg) 128 154 128 143 151    DBP (mmHg) 61 71 68 69 79    Pulse 64 56 53 58 76            Diet Screening       Patient reports she continues to watch what she eats.  Patient reports limiting salty and sugary snacks.     Physical Activity Screening   When asked if exercising, patient responded: yes    She exercises for 30 minutes per day 5 day(s) a week.      Patient participates in the following activities: biking and walking    Patient reports she rides her stationary bike x30min/day and also walks around her yard.

## 2019-11-13 ENCOUNTER — OFFICE VISIT (OUTPATIENT)
Dept: FAMILY MEDICINE | Facility: CLINIC | Age: 69
End: 2019-11-13
Payer: MEDICARE

## 2019-11-13 VITALS
SYSTOLIC BLOOD PRESSURE: 136 MMHG | HEART RATE: 79 BPM | HEIGHT: 62 IN | OXYGEN SATURATION: 100 % | WEIGHT: 171.31 LBS | TEMPERATURE: 98 F | BODY MASS INDEX: 31.52 KG/M2 | DIASTOLIC BLOOD PRESSURE: 70 MMHG

## 2019-11-13 DIAGNOSIS — E78.2 MIXED HYPERLIPIDEMIA: ICD-10-CM

## 2019-11-13 DIAGNOSIS — E11.65 TYPE 2 DIABETES MELLITUS WITH HYPERGLYCEMIA, WITHOUT LONG-TERM CURRENT USE OF INSULIN: Primary | ICD-10-CM

## 2019-11-13 DIAGNOSIS — I10 ESSENTIAL HYPERTENSION: ICD-10-CM

## 2019-11-13 DIAGNOSIS — E05.90 SUBCLINICAL HYPERTHYROIDISM: ICD-10-CM

## 2019-11-13 DIAGNOSIS — E11.9 DIABETES MELLITUS TYPE 2 WITHOUT RETINOPATHY: ICD-10-CM

## 2019-11-13 DIAGNOSIS — N18.30 CKD (CHRONIC KIDNEY DISEASE) STAGE 3, GFR 30-59 ML/MIN: ICD-10-CM

## 2019-11-13 PROCEDURE — 99213 OFFICE O/P EST LOW 20 MIN: CPT | Mod: PBBFAC,PO | Performed by: FAMILY MEDICINE

## 2019-11-13 PROCEDURE — 99214 OFFICE O/P EST MOD 30 MIN: CPT | Mod: S$PBB,,, | Performed by: FAMILY MEDICINE

## 2019-11-13 PROCEDURE — 99999 PR PBB SHADOW E&M-EST. PATIENT-LVL III: ICD-10-PCS | Mod: PBBFAC,,, | Performed by: FAMILY MEDICINE

## 2019-11-13 PROCEDURE — 99999 PR PBB SHADOW E&M-EST. PATIENT-LVL III: CPT | Mod: PBBFAC,,, | Performed by: FAMILY MEDICINE

## 2019-11-13 PROCEDURE — 99214 PR OFFICE/OUTPT VISIT, EST, LEVL IV, 30-39 MIN: ICD-10-PCS | Mod: S$PBB,,, | Performed by: FAMILY MEDICINE

## 2019-11-13 NOTE — PROGRESS NOTES
Subjective:       Patient ID: Eugenie Coates is a 69 y.o. female.    Chief Complaint: Diabetes    Yazmin is a 69 y.o. female who presents today for follow up on her chronic medical issues.     She has recently lost weight buy watching sugar, carbs, and she has increased her exercise! High was around 184. Now around 171.       HTN: she is taking amlodipine 10 mg, 2 lisinopril/hctz 20/12.5, metoprolol 25 mg BID.   DM: taking metformin once daily. A1c is stable. She has been watching what she eats.   DLD: on statin.   Thyroid Abnormalities: followed by endocrine. Repeat FNA scheduled.   SVT: seeing EP. Had RFA of SVT  Hypercalcemia: resolved  GERD: taking 80 mg once daily. This is helping her GERD.   CKD: saw Dr. Chacon. F/u annually.   DVT: She was taken off Eliquis in 2018 by Dr. Carrizales    Reports feeling a lot better. Labs as below reviewed in detail.     Review of Systems   Constitutional: Negative for activity change and unexpected weight change.   HENT: Negative for hearing loss, rhinorrhea and trouble swallowing.    Eyes: Negative for discharge and visual disturbance.   Respiratory: Negative for wheezing.    Cardiovascular: Negative for chest pain and palpitations.   Gastrointestinal: Negative for blood in stool, constipation, diarrhea and vomiting.   Endocrine: Negative for polydipsia and polyuria.   Genitourinary: Negative for difficulty urinating, dysuria, hematuria and menstrual problem.   Musculoskeletal: Positive for arthralgias and joint swelling. Negative for neck pain.   Neurological: Negative for weakness and headaches.   Psychiatric/Behavioral: Negative for confusion and dysphoric mood.         Lab Review   Lab Visit on 10/31/2019   Component Date Value    Sodium 10/31/2019 142     Potassium 10/31/2019 4.1     Chloride 10/31/2019 107     CO2 10/31/2019 27     Glucose 10/31/2019 111*    BUN, Bld 10/31/2019 21     Creatinine 10/31/2019 1.1     Calcium 10/31/2019 10.2     Total Protein  10/31/2019 7.4     Albumin 10/31/2019 3.8     Total Bilirubin 10/31/2019 0.7     Alkaline Phosphatase 10/31/2019 94     AST 10/31/2019 16     ALT 10/31/2019 14     Anion Gap 10/31/2019 8     eGFR if  10/31/2019 59.6*    eGFR if non African Amer* 10/31/2019 51.7*    Hemoglobin A1C 10/31/2019 6.2*    Estimated Avg Glucose 10/31/2019 131         Objective:     Vitals:    11/13/19 0930   BP: 136/70   Pulse: 79   Temp: 98 °F (36.7 °C)        Physical Exam    Assessment:       1. Type 2 diabetes mellitus with hyperglycemia, without long-term current use of insulin    2. Diabetes mellitus type 2 without retinopathy    3. Subclinical hyperthyroidism    4. CKD (chronic kidney disease) stage 3, GFR 30-59 ml/min    5. Essential hypertension    6. Mixed hyperlipidemia        Plan:       Continue metoprolol at 25 mg BID for HTN and SVT  Continue atorvastatin for cholesterol  Continue metformin once daily for now for DM  Continue lisinopril HCTZ combo (two pills daily) for HTN  Continue amlodipine 10 mg  F/u in 4 months. Labs prior  If A1c <6, RTC q6 months.  For now, q4 months    F/u with endocrine.     Continue diet, exercise, weight loss    Type 2 diabetes mellitus with hyperglycemia, without long-term current use of insulin  -     Hemoglobin A1c; Future; Expected date: 11/13/2019    Diabetes mellitus type 2 without retinopathy    Subclinical hyperthyroidism    CKD (chronic kidney disease) stage 3, GFR 30-59 ml/min  -     Comprehensive metabolic panel; Future; Expected date: 11/13/2019  -     CBC auto differential; Future; Expected date: 11/13/2019    Essential hypertension    Mixed hyperlipidemia        Warning signs discussed, patient to call with any further issues or worsening of symptoms.

## 2019-11-13 NOTE — PATIENT INSTRUCTIONS
Continue metoprolol at 25 mg BID for HTN and SVT  Continue atorvastatin for cholesterol  Continue metformin once daily for now for DM  Continue lisinopril HCTZ combo (two pills daily) for HTN  Continue amlodipine 10 mg  F/u in 4 months. Labs prior  If A1c <6, RTC q6 months.  For now, q4 months    Continue diet, exercise, weight loss    Diabetes health maintenance:  -- advise regular and consistent glucose monitoring and medication compliance.  -- advise daily foot checks  -- advise yearly ophthalmologic exams  -- advise adequate dietary and exercise modification  -- advise regular dental visits  -- Medication management    Diabetes Management Status    Statin: Taking  ACE/ARB: Taking    Screening or Prevention Patient's value Goal Complete/Controlled?   HgA1C Testing and Control   Lab Results   Component Value Date    HGBA1C 6.2 (H) 10/31/2019      Annually/Less than 8% Yes   Lipid profile : 06/05/2019 Annually Yes   LDL control Lab Results   Component Value Date    LDLCALC 124.8 06/05/2019    Annually/Less than 100 mg/dl  No   Nephropathy screening Lab Results   Component Value Date    LABMICR 12.0 01/23/2018     Lab Results   Component Value Date    PROTEINUA Negative 11/29/2016    Annually No   Blood pressure BP Readings from Last 1 Encounters:   11/13/19 136/70    Less than 140/90 Yes   Dilated retinal exam : 06/25/2019 Annually Yes   Foot exam   : 06/18/2019 Annually Yes

## 2019-11-15 ENCOUNTER — PATIENT OUTREACH (OUTPATIENT)
Dept: ADMINISTRATIVE | Facility: HOSPITAL | Age: 69
End: 2019-11-15

## 2019-11-15 ENCOUNTER — CLINICAL SUPPORT (OUTPATIENT)
Dept: OPHTHALMOLOGY | Facility: CLINIC | Age: 69
End: 2019-11-15
Payer: MEDICARE

## 2019-11-15 ENCOUNTER — OFFICE VISIT (OUTPATIENT)
Dept: OPTOMETRY | Facility: CLINIC | Age: 69
End: 2019-11-15
Payer: MEDICARE

## 2019-11-15 DIAGNOSIS — H52.7 REFRACTIVE ERROR: ICD-10-CM

## 2019-11-15 DIAGNOSIS — H40.1134 PRIMARY OPEN ANGLE GLAUCOMA OF BOTH EYES, INDETERMINATE STAGE: Primary | ICD-10-CM

## 2019-11-15 DIAGNOSIS — E11.9 DIABETES MELLITUS WITHOUT OPHTHALMIC MANIFESTATIONS: ICD-10-CM

## 2019-11-15 DIAGNOSIS — H40.1134 PRIMARY OPEN ANGLE GLAUCOMA OF BOTH EYES, INDETERMINATE STAGE: ICD-10-CM

## 2019-11-15 DIAGNOSIS — H25.13 NUCLEAR SCLEROSIS, BILATERAL: ICD-10-CM

## 2019-11-15 PROCEDURE — 92014 COMPRE OPH EXAM EST PT 1/>: CPT | Mod: S$PBB,,, | Performed by: OPTOMETRIST

## 2019-11-15 PROCEDURE — 92133 CPTRZD OPH DX IMG PST SGM ON: CPT | Mod: PBBFAC,PO

## 2019-11-15 PROCEDURE — 99999 PR PBB SHADOW E&M-EST. PATIENT-LVL II: ICD-10-PCS | Mod: PBBFAC,,, | Performed by: OPTOMETRIST

## 2019-11-15 PROCEDURE — 99212 OFFICE O/P EST SF 10 MIN: CPT | Mod: PBBFAC,PO,25 | Performed by: OPTOMETRIST

## 2019-11-15 PROCEDURE — 92083 HUMPHREY VISUAL FIELD - OU - BOTH EYES: ICD-10-PCS | Mod: 26,S$PBB,, | Performed by: OPTOMETRIST

## 2019-11-15 PROCEDURE — 99999 PR PBB SHADOW E&M-EST. PATIENT-LVL II: CPT | Mod: PBBFAC,,, | Performed by: OPTOMETRIST

## 2019-11-15 PROCEDURE — 92014 PR EYE EXAM, EST PATIENT,COMPREHESV: ICD-10-PCS | Mod: S$PBB,,, | Performed by: OPTOMETRIST

## 2019-11-15 PROCEDURE — 92083 EXTENDED VISUAL FIELD XM: CPT | Mod: PBBFAC,PO

## 2019-11-15 PROCEDURE — 92133 CPTRZD OPH DX IMG PST SGM ON: CPT | Mod: 26,S$PBB,, | Performed by: OPTOMETRIST

## 2019-11-15 PROCEDURE — 92133 POSTERIOR SEGMENT OCT OPTIC NERVE(OCULAR COHERENCE TOMOGRAPHY) - OU - BOTH EYES: ICD-10-PCS | Mod: 26,S$PBB,, | Performed by: OPTOMETRIST

## 2019-11-15 PROCEDURE — 92083 EXTENDED VISUAL FIELD XM: CPT | Mod: 26,S$PBB,, | Performed by: OPTOMETRIST

## 2019-11-15 RX ORDER — BRIMONIDINE TARTRATE 2 MG/ML
1 SOLUTION/ DROPS OPHTHALMIC 3 TIMES DAILY
Qty: 15 ML | Refills: 4 | Status: SHIPPED | OUTPATIENT
Start: 2019-11-15 | End: 2020-12-18 | Stop reason: SDUPTHER

## 2019-11-15 NOTE — PROGRESS NOTES
HPI     Annual Exam      Additional comments: DLE 6-19 (ruben)    ocular health exam  //  pt   states no visual complaints              Glaucoma      Additional comments: rev hvf --- +Brimonidine OU tid & Lumigan OU qhs              Diabetic Eye Exam      Additional comments: BSL controlled              Comments     Hemoglobin A1C       Date                     Value               Ref Range             Status                10/31/2019               6.2 (H)             4.0 - 5.6 %           Final                  08/05/2019               6.8 (H)             4.0 - 5.6 %           Final                  05/10/2019               6.7 (H)             4.0 - 5.6 %           Final                          Last edited by Maurilio Doran, OD on 11/15/2019 10:00 AM. (History)            Assessment /Plan     For exam results, see Encounter Report.    Primary open angle glaucoma of both eyes, indeterminate stage  -     brimonidine 0.2% (ALPHAGAN) 0.2 % Drop; Place 1 drop into both eyes 3 (three) times daily.  Dispense: 15 mL; Refill: 4  -     bimatoprost (LUMIGAN) 0.01 % Drop; Place 1 drop into both eyes every evening.  Dispense: 3 Bottle; Refill: 4    Diabetes mellitus without ophthalmic manifestations    Nuclear sclerosis, bilateral    Refractive error      POAG OU, mild stage vs high risk. IOP well controlled with use of drops. Continue alphagan: 1 gt tid OU  lumigan: 1 gt qPM OU. Refilled drops today. Updated OCT and HVF today. Return in 4 months for IOP check.    DM type 2 w/o ocular retinopathy OU. Discussed possible ocular affects of uncontrolled blood sugar with patient. Recommended continued strong blood sugar control and continued care with PCP. Monitor yearly.     Moderate cataracts OU, not yet significant. Discussed possible ocular affects of cataracts. Acceptable BCVA OU. Discussed treatment options. Surgery not recommended at this time. Monitor yearly.     Pt doing well with current specs, denies refraction. Return as  desired for updated spec Rx.      RTC in 4 months for IOP check.

## 2019-11-22 ENCOUNTER — TELEPHONE (OUTPATIENT)
Dept: OPTOMETRY | Facility: CLINIC | Age: 69
End: 2019-11-22

## 2019-11-22 NOTE — TELEPHONE ENCOUNTER
----- Message from Amrita English sent at 11/22/2019  8:07 AM CST -----  Contact: Ivis Mcgowan calling for the bimatoprost (LUMIGAN) 0.01 % Drop the pt insurance requires a min 90 day supply so she is calling to see if can get it changed to this please...[phone] ref [phone]

## 2019-12-10 ENCOUNTER — PATIENT MESSAGE (OUTPATIENT)
Dept: ADMINISTRATIVE | Facility: OTHER | Age: 69
End: 2019-12-10

## 2019-12-11 ENCOUNTER — LAB VISIT (OUTPATIENT)
Dept: LAB | Facility: HOSPITAL | Age: 69
End: 2019-12-11
Attending: INTERNAL MEDICINE
Payer: MEDICARE

## 2019-12-11 DIAGNOSIS — E78.2 MIXED HYPERLIPIDEMIA: ICD-10-CM

## 2019-12-11 LAB
ALBUMIN SERPL BCP-MCNC: 3.5 G/DL (ref 3.5–5.2)
ALP SERPL-CCNC: 95 U/L (ref 55–135)
ALT SERPL W/O P-5'-P-CCNC: 15 U/L (ref 10–44)
ANION GAP SERPL CALC-SCNC: 8 MMOL/L (ref 8–16)
AST SERPL-CCNC: 15 U/L (ref 10–40)
BILIRUB SERPL-MCNC: 0.8 MG/DL (ref 0.1–1)
BUN SERPL-MCNC: 19 MG/DL (ref 8–23)
CALCIUM SERPL-MCNC: 9.7 MG/DL (ref 8.7–10.5)
CHLORIDE SERPL-SCNC: 104 MMOL/L (ref 95–110)
CHOLEST SERPL-MCNC: 241 MG/DL (ref 120–199)
CHOLEST/HDLC SERPL: 4.7 {RATIO} (ref 2–5)
CO2 SERPL-SCNC: 27 MMOL/L (ref 23–29)
CREAT SERPL-MCNC: 1.1 MG/DL (ref 0.5–1.4)
EST. GFR  (AFRICAN AMERICAN): 59.6 ML/MIN/1.73 M^2
EST. GFR  (NON AFRICAN AMERICAN): 51.7 ML/MIN/1.73 M^2
GLUCOSE SERPL-MCNC: 114 MG/DL (ref 70–110)
HDLC SERPL-MCNC: 51 MG/DL (ref 40–75)
HDLC SERPL: 21.2 % (ref 20–50)
LDLC SERPL CALC-MCNC: 159.4 MG/DL (ref 63–159)
NONHDLC SERPL-MCNC: 190 MG/DL
POTASSIUM SERPL-SCNC: 3.6 MMOL/L (ref 3.5–5.1)
PROT SERPL-MCNC: 7.2 G/DL (ref 6–8.4)
SODIUM SERPL-SCNC: 139 MMOL/L (ref 136–145)
TRIGL SERPL-MCNC: 153 MG/DL (ref 30–150)

## 2019-12-11 PROCEDURE — 80061 LIPID PANEL: CPT

## 2019-12-11 PROCEDURE — 80053 COMPREHEN METABOLIC PANEL: CPT

## 2019-12-11 PROCEDURE — 36415 COLL VENOUS BLD VENIPUNCTURE: CPT | Mod: PO

## 2019-12-12 ENCOUNTER — PATIENT MESSAGE (OUTPATIENT)
Dept: CARDIOLOGY | Facility: CLINIC | Age: 69
End: 2019-12-12

## 2019-12-12 NOTE — TELEPHONE ENCOUNTER
Cholesterol elevated, attempted to call patient voicemail left, message sent on gavin  -Rio Roberts

## 2019-12-13 ENCOUNTER — TELEPHONE (OUTPATIENT)
Dept: CARDIOLOGY | Facility: CLINIC | Age: 69
End: 2019-12-13

## 2019-12-13 ENCOUNTER — PATIENT MESSAGE (OUTPATIENT)
Dept: CARDIOLOGY | Facility: CLINIC | Age: 69
End: 2019-12-13

## 2019-12-13 DIAGNOSIS — E78.2 MIXED HYPERLIPIDEMIA: Primary | ICD-10-CM

## 2019-12-13 RX ORDER — ROSUVASTATIN CALCIUM 40 MG/1
40 TABLET, COATED ORAL NIGHTLY
Qty: 90 TABLET | Refills: 3 | Status: SHIPPED | OUTPATIENT
Start: 2019-12-13 | End: 2020-04-09 | Stop reason: SDUPTHER

## 2019-12-13 NOTE — TELEPHONE ENCOUNTER
LDL still not controlled, patient reports compliance with atorvastatin, will send in rosuvastatin 40 mg.    -Rio Roberts

## 2019-12-18 ENCOUNTER — HOSPITAL ENCOUNTER (OUTPATIENT)
Dept: ENDOCRINOLOGY | Facility: CLINIC | Age: 69
Discharge: HOME | End: 2019-12-18
Attending: INTERNAL MEDICINE
Payer: MEDICARE

## 2019-12-18 ENCOUNTER — OFFICE VISIT (OUTPATIENT)
Dept: PODIATRY | Facility: CLINIC | Age: 69
End: 2019-12-18
Payer: MEDICARE

## 2019-12-18 VITALS — BODY MASS INDEX: 32.25 KG/M2 | WEIGHT: 175.25 LBS | HEIGHT: 62 IN

## 2019-12-18 DIAGNOSIS — B35.1 ONYCHOMYCOSIS DUE TO DERMATOPHYTE: ICD-10-CM

## 2019-12-18 DIAGNOSIS — M20.42 HAMMER TOES OF BOTH FEET: ICD-10-CM

## 2019-12-18 DIAGNOSIS — M20.41 HAMMER TOES OF BOTH FEET: ICD-10-CM

## 2019-12-18 DIAGNOSIS — E04.2 MULTINODULAR THYROID: ICD-10-CM

## 2019-12-18 DIAGNOSIS — E04.1 THYROID NODULE: Primary | ICD-10-CM

## 2019-12-18 DIAGNOSIS — L84 CORN OR CALLUS: ICD-10-CM

## 2019-12-18 DIAGNOSIS — E11.65 TYPE 2 DIABETES MELLITUS WITH HYPERGLYCEMIA, WITHOUT LONG-TERM CURRENT USE OF INSULIN: Primary | ICD-10-CM

## 2019-12-18 PROCEDURE — 11721 PR DEBRIDEMENT OF NAILS, 6 OR MORE: ICD-10-PCS | Mod: 59,Q9,S$PBB, | Performed by: PODIATRIST

## 2019-12-18 PROCEDURE — 11055 PR TRIM HYPERKERATOTIC SKIN LESION, ONE: ICD-10-PCS | Mod: Q9,S$PBB,, | Performed by: PODIATRIST

## 2019-12-18 PROCEDURE — 88173 PR  INTERPRETATION OF FNA SMEAR: ICD-10-PCS | Mod: 26,59,, | Performed by: PATHOLOGY

## 2019-12-18 PROCEDURE — 88173 CYTOPATH EVAL FNA REPORT: CPT | Mod: 26,59,, | Performed by: PATHOLOGY

## 2019-12-18 PROCEDURE — 88173 CYTOPATH EVAL FNA REPORT: CPT | Mod: 26,,, | Performed by: PATHOLOGY

## 2019-12-18 PROCEDURE — 88173 CYTOPATH EVAL FNA REPORT: CPT | Mod: 59 | Performed by: PATHOLOGY

## 2019-12-18 PROCEDURE — 99499 NO LOS: ICD-10-PCS | Mod: S$PBB,,, | Performed by: PODIATRIST

## 2019-12-18 PROCEDURE — 88173 PR  INTERPRETATION OF FNA SMEAR: ICD-10-PCS | Mod: 26,,, | Performed by: PATHOLOGY

## 2019-12-18 PROCEDURE — 99212 OFFICE O/P EST SF 10 MIN: CPT | Mod: PBBFAC,PN | Performed by: PODIATRIST

## 2019-12-18 PROCEDURE — 11721 DEBRIDE NAIL 6 OR MORE: CPT | Mod: 59,Q9,S$PBB, | Performed by: PODIATRIST

## 2019-12-18 PROCEDURE — 88173 CYTOPATH EVAL FNA REPORT: CPT | Performed by: PATHOLOGY

## 2019-12-18 PROCEDURE — 10005 FNA BX W/US GDN 1ST LES: CPT | Mod: ,,, | Performed by: INTERNAL MEDICINE

## 2019-12-18 PROCEDURE — 11721 DEBRIDE NAIL 6 OR MORE: CPT | Mod: PBBFAC,PN,59 | Performed by: PODIATRIST

## 2019-12-18 PROCEDURE — 99499 UNLISTED E&M SERVICE: CPT | Mod: S$PBB,,, | Performed by: PODIATRIST

## 2019-12-18 PROCEDURE — 11055 PARING/CUTG B9 HYPRKER LES 1: CPT | Mod: PBBFAC,PN | Performed by: PODIATRIST

## 2019-12-18 PROCEDURE — 99999 PR PBB SHADOW E&M-EST. PATIENT-LVL II: CPT | Mod: PBBFAC,,, | Performed by: PODIATRIST

## 2019-12-18 PROCEDURE — 10005 US FINE NEEDLE ASPIRATION THYROID, FIRST LESION: ICD-10-PCS | Mod: ,,, | Performed by: INTERNAL MEDICINE

## 2019-12-18 PROCEDURE — 10006 US FINE NEEDLE ASPIRATION THYROID EA ADDITIONAL LESION: ICD-10-PCS | Mod: ,,, | Performed by: INTERNAL MEDICINE

## 2019-12-18 PROCEDURE — 10006 FNA BX W/US GDN EA ADDL: CPT | Mod: ,,, | Performed by: INTERNAL MEDICINE

## 2019-12-18 PROCEDURE — 99999 PR PBB SHADOW E&M-EST. PATIENT-LVL II: ICD-10-PCS | Mod: PBBFAC,,, | Performed by: PODIATRIST

## 2019-12-18 PROCEDURE — 11055 PARING/CUTG B9 HYPRKER LES 1: CPT | Mod: Q9,S$PBB,, | Performed by: PODIATRIST

## 2019-12-18 NOTE — PROGRESS NOTES
Subjective:      Patient ID: Eugenie Coates is a 69 y.o. female.    Chief Complaint: Diabetic Foot Exam (DR Paul  2019 6.2 10/2019 ) and Diabetes Mellitus    Eugenie is a 69 y.o. female who presents to the clinic for routine evaluation and treatment of diabetic feet. Eugenie has a past medical history of Allergy, Anemia, Anxiety, Arthritis, Asthma, Bell palsy, Depression, Diabetes mellitus, type 2, DVT (deep venous thrombosis), Endometriosis, Eye injury (2014), GERD (gastroesophageal reflux disease), Glaucoma, History of uterine fibroid, Hyperlipidemia, Hypertension, Nuclear sclerosis of both eyes (9/27/2016), Sleep apnea, Supraventricular tachycardia, SVT (supraventricular tachycardia) (05/2019), and Thyroid disease. Patient relates no major problem with feet. Only complaints today consist of toenails and a corn in need of trimming.  Denies being painful with wearing shoe gear.  Has not attempted to self treat. Denies any additional pedal complaints.      PCP: Haile Paul,     Date Last Seen by PCP: 10/2019    Hemoglobin A1C   Date Value Ref Range Status   10/31/2019 6.2 (H) 4.0 - 5.6 % Final     Comment:     ADA Screening Guidelines:  5.7-6.4%  Consistent with prediabetes  >or=6.5%  Consistent with diabetes  High levels of fetal hemoglobin interfere with the HbA1C  assay. Heterozygous hemoglobin variants (HbS, HgC, etc)do  not significantly interfere with this assay.   However, presence of multiple variants may affect accuracy.     08/05/2019 6.8 (H) 4.0 - 5.6 % Final     Comment:     ADA Screening Guidelines:  5.7-6.4%  Consistent with prediabetes  >or=6.5%  Consistent with diabetes  High levels of fetal hemoglobin interfere with the HbA1C  assay. Heterozygous hemoglobin variants (HbS, HgC, etc)do  not significantly interfere with this assay.   However, presence of multiple variants may affect accuracy.     05/10/2019 6.7 (H) 4.0 - 5.6 % Final     Comment:     ADA Screening Guidelines:  5.7-6.4%   Consistent with prediabetes  >or=6.5%  Consistent with diabetes  High levels of fetal hemoglobin interfere with the HbA1C  assay. Heterozygous hemoglobin variants (HbS, HgC, etc)do  not significantly interfere with this assay.   However, presence of multiple variants may affect accuracy.             Past Medical History:   Diagnosis Date    Allergy     Anemia     Anxiety     Arthritis     Asthma     Bell palsy     Depression     Diabetes mellitus, type 2     DVT (deep venous thrombosis)     Endometriosis     Eye injury     stuck with tree branch od ?     GERD (gastroesophageal reflux disease)     Glaucoma     History of uterine fibroid     Hyperlipidemia     Hypertension     Nuclear sclerosis of both eyes 2016    Sleep apnea     uses C pap    Supraventricular tachycardia     SVT (supraventricular tachycardia) 2019    Thyroid disease        Past Surgical History:   Procedure Laterality Date     SECTION      CHOLECYSTECTOMY      glaucoma laser Bilateral     HERNIA REPAIR      KNEE SURGERY Right 2008    (R) knee scope; torn meniscus    VEIN SURGERY  ,     Rt leg x2       Family History   Problem Relation Age of Onset    Diabetes Mother     No Known Problems Father     No Known Problems Sister     No Known Problems Brother     Colon cancer Maternal Aunt     No Known Problems Maternal Uncle     No Known Problems Paternal Aunt     No Known Problems Paternal Uncle     No Known Problems Maternal Grandmother     No Known Problems Maternal Grandfather     No Known Problems Paternal Grandmother     No Known Problems Paternal Grandfather     Amblyopia Neg Hx     Blindness Neg Hx     Cancer Neg Hx     Cataracts Neg Hx     Glaucoma Neg Hx     Hypertension Neg Hx     Macular degeneration Neg Hx     Retinal detachment Neg Hx     Strabismus Neg Hx     Stroke Neg Hx     Thyroid disease Neg Hx     Celiac disease Neg Hx     Colon polyps Neg Hx      Esophageal cancer Neg Hx     Inflammatory bowel disease Neg Hx     Irritable bowel syndrome Neg Hx     Liver cancer Neg Hx     Liver disease Neg Hx     Rectal cancer Neg Hx     Stomach cancer Neg Hx     Ulcerative colitis Neg Hx     Cystic fibrosis Neg Hx     Crohn's disease Neg Hx     Hemochromatosis Neg Hx     Cirrhosis Neg Hx        Social History     Socioeconomic History    Marital status:      Spouse name: Not on file    Number of children: Not on file    Years of education: Not on file    Highest education level: Not on file   Occupational History    Not on file   Social Needs    Financial resource strain: Not on file    Food insecurity:     Worry: Not on file     Inability: Not on file    Transportation needs:     Medical: Not on file     Non-medical: Not on file   Tobacco Use    Smoking status: Never Smoker    Smokeless tobacco: Never Used   Substance and Sexual Activity    Alcohol use: No     Alcohol/week: 0.0 standard drinks     Frequency: Never     Drinks per session: Patient refused     Binge frequency: Never    Drug use: No    Sexual activity: Yes     Partners: Male   Lifestyle    Physical activity:     Days per week: 3 days     Minutes per session: 20 min    Stress: Not on file   Relationships    Social connections:     Talks on phone: More than three times a week     Gets together: Once a week     Attends Baptism service: Not on file     Active member of club or organization: No     Attends meetings of clubs or organizations: Never     Relationship status:    Other Topics Concern    Not on file   Social History Narrative    1/18/19: lives with her . They have multiple children, but the youngest is 38. No children at home right now. No pets at home. Splits time between here and [place]. Her son lives in [place].        Current Outpatient Medications   Medication Sig Dispense Refill    amLODIPine (NORVASC) 10 MG tablet Take 1 tablet (10 mg total)  by mouth every evening. 90 tablet 3    bimatoprost (LUMIGAN) 0.01 % Drop Place 1 drop into both eyes every evening. 3 Bottle 4    blood sugar diagnostic Strp Test twice daily.  Accucheck viva test strips and lancets. 300 each 3    brimonidine 0.2% (ALPHAGAN) 0.2 % Drop Place 1 drop into both eyes 3 (three) times daily. 15 mL 4    lancets (ACCU-CHEK SOFTCLIX LANCETS) Misc 1 Units by Misc.(Non-Drug; Combo Route) route 2 (two) times daily. 300 each 3    lisinopril-hydrochlorothiazide (PRINZIDE,ZESTORETIC) 20-12.5 mg per tablet Take 2 tablets by mouth once daily. (Patient taking differently: Take 1 tablet by mouth 2 (two) times daily. ) 180 tablet 0    metFORMIN (GLUCOPHAGE) 500 MG tablet Take 1 tablet (500 mg total) by mouth daily with breakfast. 90 tablet 3    metoprolol tartrate (LOPRESSOR) 25 MG tablet Take 1 tablet (25 mg total) by mouth 2 (two) times daily. 60 tablet 7    omeprazole (PRILOSEC) 40 MG capsule Take 1 capsule (40 mg total) by mouth 2 (two) times daily before meals. TAKE 1 CAPSULE(20 MG) BY MOUTH EVERY DAY (Patient taking differently: Take 80 mg by mouth every morning. ) 180 capsule 1    rosuvastatin (CRESTOR) 40 MG Tab Take 1 tablet (40 mg total) by mouth every evening. 90 tablet 3     No current facility-administered medications for this visit.        Review of patient's allergies indicates:   Allergen Reactions    Percodan [oxycodone hcl-oxycodone-asa] Hives and Itching         Review of Systems   Constitution: Negative for chills and fever.   Cardiovascular: Positive for leg swelling. Negative for claudication.   Skin: Positive for color change and nail changes.   Musculoskeletal: Positive for joint swelling. Negative for joint pain, muscle cramps and muscle weakness.   Neurological: Positive for paresthesias. Negative for numbness.   Psychiatric/Behavioral: Negative for altered mental status.           Objective:      Physical Exam   Constitutional: She is oriented to person, place, and  time. She appears well-developed and well-nourished. No distress.   Cardiovascular:   Pulses:       Dorsalis pedis pulses are 2+ on the right side, and 2+ on the left side.        Posterior tibial pulses are 1+ on the right side, and 1+ on the left side.   CFT <3 seconds bilateral.  Pedal hair growth decreased bilateral.  Varicosities noted bilateral.  Mild non pitting edema noted to bilateral lower extremity.  Toes are cool to touch bilateral.     Musculoskeletal: She exhibits edema. She exhibits no tenderness.   Muscle strength 5/5 in all muscle groups bilateral.  No tenderness nor crepitation with ROM of foot/ankle joints bilateral.  No tenderness with palpation of bilateral foot and ankle.  Bilateral pes planus foot type.  Bilateral hallux abducto valgus.  Bilateral semi-reducible contracture of toes 2-5.     Neurological: She is alert and oriented to person, place, and time. She has normal strength. A sensory deficit is present.   Protective sensation per [place] monofilament intact bilateral.    Vibratory sensation decreased bilateral.    Light touch intact bilateral.   Skin: Skin is warm, dry and intact. Lesion noted. No abrasion, no bruising, no burn, no ecchymosis, no laceration, no petechiae and no rash noted. She is not diaphoretic. No cyanosis or erythema. No pallor. Nails show no clubbing.   Pedal skin appears edematous bilateral.  Toenails x 10 appear thickened by 2 mm's, elongated by 2 mm's, and discolored with subungual debris.  Hyperkeratotic lesion noted to the Rt. Dorsal lateral 5th PIP joint.  Examination of the skin reveals no evidence of significant maceration, rashes, open lesions, suspicious appearing nevi or other concerning lesions.              Assessment:       Encounter Diagnoses   Name Primary?    Type 2 diabetes mellitus with hyperglycemia, without long-term current use of insulin Yes    Hammer toes of both feet     Onychomycosis due to dermatophyte     Corn or callus           Plan:       Eugenie was seen today for diabetic foot exam and diabetes mellitus.    Diagnoses and all orders for this visit:    Type 2 diabetes mellitus with hyperglycemia, without long-term current use of insulin    Hammer toes of both feet    Onychomycosis due to dermatophyte    Corn or callus      I counseled the patient on her conditions, their implications and medical management.    Shoe inspection. Diabetic Foot Education. Patient reminded of the importance of good nutrition and blood sugar control to help prevent podiatric complications of diabetes. Patient instructed on proper foot hygeine. We discussed wearing proper shoe gear, daily foot inspections, never walking without protective shoe gear, never putting sharp instruments to feet    Advised to continue wearing diabetic shoes/insoles that accommodate for digital deformities.     With patient's permission, nails were aggressively reduced and debrided x 10 to their soft tissue attachment mechanically and with electric , removing all offending nail and debris.  Also, a sterile #15 scalpel was used to trim lesion x 1 down to smooth appearing skin without incident.  Patient relates relief following the procedure.  She will continue to monitor the areas daily, inspect her feet, wear protective shoe gear when ambulatory, moisturizer to maintain skin integrity and follow in this office in approximately 3 months, sooner p.r.n.    Follow up in about 3 months (around 3/18/2020).    Giuliano Garnett DPM

## 2019-12-20 DIAGNOSIS — I10 ESSENTIAL HYPERTENSION: ICD-10-CM

## 2019-12-20 LAB — FINAL PATHOLOGIC DIAGNOSIS: ABNORMAL

## 2019-12-20 RX ORDER — LISINOPRIL AND HYDROCHLOROTHIAZIDE 12.5; 2 MG/1; MG/1
TABLET ORAL
Qty: 180 TABLET | Refills: 0 | Status: SHIPPED | OUTPATIENT
Start: 2019-12-20 | End: 2020-02-18 | Stop reason: DRUGHIGH

## 2019-12-23 LAB — FINAL PATHOLOGIC DIAGNOSIS: NORMAL

## 2019-12-24 ENCOUNTER — TELEPHONE (OUTPATIENT)
Dept: ENDOCRINOLOGY | Facility: CLINIC | Age: 69
End: 2019-12-24

## 2019-12-24 ENCOUNTER — PATIENT MESSAGE (OUTPATIENT)
Dept: ADMINISTRATIVE | Facility: OTHER | Age: 69
End: 2019-12-24

## 2019-12-24 DIAGNOSIS — E05.90 SUBCLINICAL HYPERTHYROIDISM: ICD-10-CM

## 2019-12-24 DIAGNOSIS — E04.2 MULTINODULAR THYROID: Primary | ICD-10-CM

## 2019-12-24 NOTE — TELEPHONE ENCOUNTER
Called patient regarding thyroid biopsy results. Both nodules returned benign. Discussed options for treating mild subclinical hyperthyroidism. Advised there that given how mild this is I would normally recommend monitoring, however, given SVT requiring RF ablation I recommended treatment with low dose methimazole or ANGULO ablation. She does not want to do either at this time, but agreed that if she has further issues with arrhythmia or the TSH declines further we would have to treat the HELEN.

## 2019-12-25 DIAGNOSIS — E11.9 TYPE 2 DIABETES MELLITUS: ICD-10-CM

## 2020-01-10 ENCOUNTER — PATIENT OUTREACH (OUTPATIENT)
Dept: OTHER | Facility: OTHER | Age: 70
End: 2020-01-10

## 2020-01-10 NOTE — PROGRESS NOTES
Digital Medicine: Health  Follow-Up    Patient denies any other concerns at this time.           Follow Up  Follow-up reason(s): reading review        INTERVENTION(S)  encouragement/support and denied further coaching    PLAN  patient verbalizes understanding and continue monitoring    Patient reports she is working limiting salt and sugar and exercising more.  Encouraged patient to use me as a resources if she needed help.      There are no preventive care reminders to display for this patient.    Last 5 Patient Entered Readings                                      Current 30 Day Average: 127/71     Recent Readings 1/8/2020 1/7/2020 1/4/2020 1/3/2020 1/2/2020    SBP (mmHg) 141 124 114 120 111    DBP (mmHg) 67 73 64 67 63    Pulse 69 87 84 66 68            Diet Screening   No change to diet.      Physical Activity Screening   No change to exercise routine.

## 2020-01-20 ENCOUNTER — TELEPHONE (OUTPATIENT)
Dept: ENDOSCOPY | Facility: HOSPITAL | Age: 70
End: 2020-01-20

## 2020-01-22 DIAGNOSIS — E11.65 TYPE 2 DIABETES MELLITUS WITH HYPERGLYCEMIA, WITHOUT LONG-TERM CURRENT USE OF INSULIN: ICD-10-CM

## 2020-01-22 RX ORDER — METFORMIN HYDROCHLORIDE 500 MG/1
TABLET ORAL
Qty: 90 TABLET | Refills: 0 | Status: SHIPPED | OUTPATIENT
Start: 2020-01-22 | End: 2020-03-12 | Stop reason: SDUPTHER

## 2020-02-10 ENCOUNTER — PATIENT OUTREACH (OUTPATIENT)
Dept: OTHER | Facility: OTHER | Age: 70
End: 2020-02-10

## 2020-02-10 DIAGNOSIS — I10 ESSENTIAL HYPERTENSION: Primary | ICD-10-CM

## 2020-02-10 NOTE — PROGRESS NOTES
LMFCB to discuss her higher blood pressure readings and consider changing lisinopril to valsartan

## 2020-02-12 ENCOUNTER — OFFICE VISIT (OUTPATIENT)
Dept: OPTOMETRY | Facility: CLINIC | Age: 70
End: 2020-02-12
Payer: MEDICARE

## 2020-02-12 DIAGNOSIS — H40.1134 PRIMARY OPEN ANGLE GLAUCOMA OF BOTH EYES, INDETERMINATE STAGE: Primary | ICD-10-CM

## 2020-02-12 PROCEDURE — 99999 PR PBB SHADOW E&M-EST. PATIENT-LVL II: CPT | Mod: PBBFAC,,, | Performed by: OPTOMETRIST

## 2020-02-12 PROCEDURE — 92012 PR EYE EXAM, EST PATIENT,INTERMED: ICD-10-PCS | Mod: S$PBB,,, | Performed by: OPTOMETRIST

## 2020-02-12 PROCEDURE — 99999 PR PBB SHADOW E&M-EST. PATIENT-LVL II: ICD-10-PCS | Mod: PBBFAC,,, | Performed by: OPTOMETRIST

## 2020-02-12 PROCEDURE — 92012 INTRM OPH EXAM EST PATIENT: CPT | Mod: S$PBB,,, | Performed by: OPTOMETRIST

## 2020-02-12 PROCEDURE — 99212 OFFICE O/P EST SF 10 MIN: CPT | Mod: PBBFAC,PO | Performed by: OPTOMETRIST

## 2020-02-12 NOTE — PROGRESS NOTES
HPI     Glaucoma      Additional comments: POAG - OU // 4 month IOP check --- good compliance   w/ Alphagan OU tid & Lumigan OU qhs          Last edited by Zaida Luna on 2/12/2020  9:23 AM. (History)            Assessment /Plan     For exam results, see Encounter Report.    Primary open angle glaucoma of both eyes, indeterminate stage      iop doing well with current drops. Continue alphagan: 1 gt tid OU  lumigan qPM OU. Return in 4 months for iop check, sooner prn.

## 2020-02-18 RX ORDER — IRBESARTAN AND HYDROCHLOROTHIAZIDE 150; 12.5 MG/1; MG/1
2 TABLET, FILM COATED ORAL DAILY
Qty: 60 TABLET | Refills: 5 | Status: SHIPPED | OUTPATIENT
Start: 2020-02-18 | End: 2020-03-10 | Stop reason: SINTOL

## 2020-02-18 NOTE — PROGRESS NOTES
Digital Medicine: Clinician Follow-Up    Called patient to discuss her elevated readings.    The history is provided by the patient.     Follow Up  Follow-up reason(s): reading review and medication change    Patient's readings are higher than normal due to arthritis pain. She is taking only creams for the arthritis and denies NSAID use.    Patient denies missing doses or changes in her diet or life to cause higher readings.      INTERVENTION(S)  reviewed appropriate dose schedule, recommended med change and encouragement/support    PLAN  patient verbalizes understanding, patient amenable to changes and continue monitoring    Patient will change lisinopril-HCTZ to irbesartan-HCTZ for better systolic blood pressure control. Patient will maintain her dose of amlodipine and metoprolol.          There are no preventive care reminders to display for this patient.    Last 5 Patient Entered Readings                                      Current 30 Day Average: 142/77     Recent Readings 2/17/2020 2/13/2020 2/11/2020 2/7/2020 2/7/2020    SBP (mmHg) 144 141 133 141 142    DBP (mmHg) 80 76 81 73 70    Pulse 73 72 85 75 74             Hypertension Medications             amLODIPine (NORVASC) 10 MG tablet Take 1 tablet (10 mg total) by mouth every evening.    irbesartan-hydrochlorothiazide (AVALIDE) 150-12.5 mg per tablet Take 2 tablets by mouth once daily.    metoprolol tartrate (LOPRESSOR) 25 MG tablet Take 1 tablet (25 mg total) by mouth 2 (two) times daily.                 Screenings

## 2020-02-19 ENCOUNTER — TELEPHONE (OUTPATIENT)
Dept: ENDOSCOPY | Facility: HOSPITAL | Age: 70
End: 2020-02-19

## 2020-03-03 ENCOUNTER — PATIENT OUTREACH (OUTPATIENT)
Dept: OTHER | Facility: OTHER | Age: 70
End: 2020-03-03

## 2020-03-03 NOTE — PROGRESS NOTES
MIGUEL ANGELB to discuss how she is tolerating the change lisinopril-HCTZ to irbesartan-HCTZ and her lack of readings

## 2020-03-04 ENCOUNTER — LAB VISIT (OUTPATIENT)
Dept: LAB | Facility: HOSPITAL | Age: 70
End: 2020-03-04
Attending: FAMILY MEDICINE
Payer: MEDICARE

## 2020-03-04 DIAGNOSIS — E11.65 TYPE 2 DIABETES MELLITUS WITH HYPERGLYCEMIA, WITHOUT LONG-TERM CURRENT USE OF INSULIN: ICD-10-CM

## 2020-03-04 DIAGNOSIS — N18.30 CKD (CHRONIC KIDNEY DISEASE) STAGE 3, GFR 30-59 ML/MIN: ICD-10-CM

## 2020-03-04 LAB
ALBUMIN SERPL BCP-MCNC: 3.7 G/DL (ref 3.5–5.2)
ALP SERPL-CCNC: 96 U/L (ref 55–135)
ALT SERPL W/O P-5'-P-CCNC: 13 U/L (ref 10–44)
ANION GAP SERPL CALC-SCNC: 11 MMOL/L (ref 8–16)
AST SERPL-CCNC: 17 U/L (ref 10–40)
BASOPHILS # BLD AUTO: 0.06 K/UL (ref 0–0.2)
BASOPHILS NFR BLD: 0.9 % (ref 0–1.9)
BILIRUB SERPL-MCNC: 0.9 MG/DL (ref 0.1–1)
BUN SERPL-MCNC: 14 MG/DL (ref 8–23)
CALCIUM SERPL-MCNC: 9.8 MG/DL (ref 8.7–10.5)
CHLORIDE SERPL-SCNC: 106 MMOL/L (ref 95–110)
CO2 SERPL-SCNC: 25 MMOL/L (ref 23–29)
CREAT SERPL-MCNC: 1.1 MG/DL (ref 0.5–1.4)
DIFFERENTIAL METHOD: ABNORMAL
EOSINOPHIL # BLD AUTO: 0.2 K/UL (ref 0–0.5)
EOSINOPHIL NFR BLD: 3.3 % (ref 0–8)
ERYTHROCYTE [DISTWIDTH] IN BLOOD BY AUTOMATED COUNT: 12.5 % (ref 11.5–14.5)
EST. GFR  (AFRICAN AMERICAN): 59.6 ML/MIN/1.73 M^2
EST. GFR  (NON AFRICAN AMERICAN): 51.7 ML/MIN/1.73 M^2
ESTIMATED AVG GLUCOSE: 137 MG/DL (ref 68–131)
GLUCOSE SERPL-MCNC: 88 MG/DL (ref 70–110)
HBA1C MFR BLD HPLC: 6.4 % (ref 4–5.6)
HCT VFR BLD AUTO: 45.4 % (ref 37–48.5)
HGB BLD-MCNC: 13.6 G/DL (ref 12–16)
IMM GRANULOCYTES # BLD AUTO: 0.03 K/UL (ref 0–0.04)
IMM GRANULOCYTES NFR BLD AUTO: 0.4 % (ref 0–0.5)
LYMPHOCYTES # BLD AUTO: 1.9 K/UL (ref 1–4.8)
LYMPHOCYTES NFR BLD: 27 % (ref 18–48)
MCH RBC QN AUTO: 27.3 PG (ref 27–31)
MCHC RBC AUTO-ENTMCNC: 30 G/DL (ref 32–36)
MCV RBC AUTO: 91 FL (ref 82–98)
MONOCYTES # BLD AUTO: 0.5 K/UL (ref 0.3–1)
MONOCYTES NFR BLD: 6.6 % (ref 4–15)
NEUTROPHILS # BLD AUTO: 4.3 K/UL (ref 1.8–7.7)
NEUTROPHILS NFR BLD: 61.8 % (ref 38–73)
NRBC BLD-RTO: 0 /100 WBC
PLATELET # BLD AUTO: 280 K/UL (ref 150–350)
PMV BLD AUTO: 11.2 FL (ref 9.2–12.9)
POTASSIUM SERPL-SCNC: 3.9 MMOL/L (ref 3.5–5.1)
PROT SERPL-MCNC: 7.4 G/DL (ref 6–8.4)
RBC # BLD AUTO: 4.98 M/UL (ref 4–5.4)
SODIUM SERPL-SCNC: 142 MMOL/L (ref 136–145)
WBC # BLD AUTO: 6.93 K/UL (ref 3.9–12.7)

## 2020-03-04 PROCEDURE — 85025 COMPLETE CBC W/AUTO DIFF WBC: CPT

## 2020-03-04 PROCEDURE — 80053 COMPREHEN METABOLIC PANEL: CPT

## 2020-03-04 PROCEDURE — 83036 HEMOGLOBIN GLYCOSYLATED A1C: CPT

## 2020-03-04 PROCEDURE — 36415 COLL VENOUS BLD VENIPUNCTURE: CPT | Mod: PO

## 2020-03-09 ENCOUNTER — HISTORICAL (OUTPATIENT)
Dept: ADMINISTRATIVE | Facility: HOSPITAL | Age: 70
End: 2020-03-09

## 2020-03-10 RX ORDER — MULTIVIT WITH MINERALS/HERBS
1 TABLET ORAL DAILY
COMMUNITY

## 2020-03-10 RX ORDER — LISINOPRIL AND HYDROCHLOROTHIAZIDE 12.5; 2 MG/1; MG/1
2 TABLET ORAL DAILY
COMMUNITY
End: 2020-03-12

## 2020-03-10 RX ORDER — CHOLECALCIFEROL (VITAMIN D3) 25 MCG
1000 TABLET ORAL DAILY
COMMUNITY
End: 2020-11-03

## 2020-03-10 NOTE — PROGRESS NOTES
Digital Medicine: Clinician Follow-Up    Called patient to discuss her lack of readings and how she is tolerating the change to irbesartan/HCTZ.    The history is provided by the patient.     Follow Up  Follow-up reason(s): reading review and medication change follow-up      Readings are missing.   patient reminder needed.  Medication Change: alternative therapy    Patient started new medication.    Is patient tolerating med change?:  No  Reasons:  Palpitations and chest pain with the first dose.Patient stopped taking irbesartan/HCTZ after her first dose due to side effects of palpitations and chest pain. She resumed lisinopril-HCTZ 20-12.5mg taking two tablets daily.    Patient has taken readings since 2/20 on her home device but not her iHealth device.      INTERVENTION(S)  reviewed appropriate dose schedule and encouragement/support    PLAN  patient verbalizes understanding and continue monitoring    She will resume her blood pressure readings with her iHealth cuff to confirm if she remains elevated. If she remains elevated, I will recommend changing amlodipine to nifedipine or adding hydralazine or spironolactone. Will maintain metoprolol due to SVT.    Patient will see her PCP 3/12 and wanted to discuss further hypertension medication changes.       There are no preventive care reminders to display for this patient.    Last 5 Patient Entered Readings                                      Current 30 Day Average: 146/80     Recent Readings 2/20/2020 2/18/2020 2/17/2020 2/13/2020 2/11/2020    SBP (mmHg) 143 168 144 141 133    DBP (mmHg) 77 86 80 76 81    Pulse 66 85 73 72 85             Hypertension Medications             amLODIPine (NORVASC) 10 MG tablet Take 1 tablet (10 mg total) by mouth every evening.    lisinopriL-hydrochlorothiazide (PRINZIDE,ZESTORETIC) 20-12.5 mg per tablet Take 2 tablets by mouth once daily.    metoprolol tartrate (LOPRESSOR) 25 MG tablet Take 1 tablet (25 mg total) by mouth 2 (two)  times daily.                             Medication Adherence Screening   She did not miss a dose this month.

## 2020-03-12 ENCOUNTER — OFFICE VISIT (OUTPATIENT)
Dept: FAMILY MEDICINE | Facility: CLINIC | Age: 70
End: 2020-03-12
Payer: MEDICARE

## 2020-03-12 VITALS
TEMPERATURE: 98 F | BODY MASS INDEX: 31.77 KG/M2 | WEIGHT: 173.75 LBS | OXYGEN SATURATION: 98 % | HEART RATE: 76 BPM | SYSTOLIC BLOOD PRESSURE: 132 MMHG | DIASTOLIC BLOOD PRESSURE: 70 MMHG

## 2020-03-12 DIAGNOSIS — E78.2 MIXED HYPERLIPIDEMIA: ICD-10-CM

## 2020-03-12 DIAGNOSIS — E66.01 SEVERE OBESITY (BMI 35.0-35.9 WITH COMORBIDITY): ICD-10-CM

## 2020-03-12 DIAGNOSIS — I87.2 VENOUS INSUFFICIENCY OF RIGHT LOWER EXTREMITY: ICD-10-CM

## 2020-03-12 DIAGNOSIS — I50.30 HEART FAILURE WITH PRESERVED EJECTION FRACTION, UNSPECIFIED HF CHRONICITY: ICD-10-CM

## 2020-03-12 DIAGNOSIS — E11.9 DIABETES MELLITUS TYPE 2 WITHOUT RETINOPATHY: Primary | ICD-10-CM

## 2020-03-12 DIAGNOSIS — E11.65 TYPE 2 DIABETES MELLITUS WITH HYPERGLYCEMIA, WITHOUT LONG-TERM CURRENT USE OF INSULIN: ICD-10-CM

## 2020-03-12 DIAGNOSIS — I47.10 PSVT (PAROXYSMAL SUPRAVENTRICULAR TACHYCARDIA): ICD-10-CM

## 2020-03-12 DIAGNOSIS — I82.4Y1 ACUTE DEEP VEIN THROMBOSIS (DVT) OF PROXIMAL VEIN OF RIGHT LOWER EXTREMITY: ICD-10-CM

## 2020-03-12 DIAGNOSIS — I10 ESSENTIAL HYPERTENSION: ICD-10-CM

## 2020-03-12 PROCEDURE — 99214 OFFICE O/P EST MOD 30 MIN: CPT | Mod: PBBFAC,PO | Performed by: FAMILY MEDICINE

## 2020-03-12 PROCEDURE — 99214 OFFICE O/P EST MOD 30 MIN: CPT | Mod: S$PBB,,, | Performed by: FAMILY MEDICINE

## 2020-03-12 PROCEDURE — 99999 PR PBB SHADOW E&M-EST. PATIENT-LVL IV: ICD-10-PCS | Mod: PBBFAC,,, | Performed by: FAMILY MEDICINE

## 2020-03-12 PROCEDURE — 99999 PR PBB SHADOW E&M-EST. PATIENT-LVL IV: CPT | Mod: PBBFAC,,, | Performed by: FAMILY MEDICINE

## 2020-03-12 PROCEDURE — 99214 PR OFFICE/OUTPT VISIT, EST, LEVL IV, 30-39 MIN: ICD-10-PCS | Mod: S$PBB,,, | Performed by: FAMILY MEDICINE

## 2020-03-12 RX ORDER — TRAMADOL HYDROCHLORIDE 50 MG/1
TABLET ORAL
COMMUNITY
Start: 2020-03-09 | End: 2020-03-12

## 2020-03-12 RX ORDER — METFORMIN HYDROCHLORIDE 500 MG/1
TABLET ORAL
Qty: 90 TABLET | Refills: 1 | Status: SHIPPED | OUTPATIENT
Start: 2020-03-12 | End: 2020-04-22

## 2020-03-12 RX ORDER — LISINOPRIL 40 MG/1
40 TABLET ORAL DAILY
Qty: 90 TABLET | Refills: 0 | Status: SHIPPED | OUTPATIENT
Start: 2020-03-12 | End: 2020-06-10

## 2020-03-12 RX ORDER — TIZANIDINE 4 MG/1
TABLET ORAL
COMMUNITY
Start: 2020-03-09 | End: 2020-03-12

## 2020-03-12 RX ORDER — NIFEDIPINE 90 MG/1
90 TABLET, FILM COATED, EXTENDED RELEASE ORAL DAILY
Qty: 90 TABLET | Refills: 0 | Status: SHIPPED | OUTPATIENT
Start: 2020-03-12 | End: 2020-04-09 | Stop reason: SDUPTHER

## 2020-03-12 NOTE — PROGRESS NOTES
Subjective:       Patient ID: Eugenie Coates is a 69 y.o. female.    Chief Complaint: Diabetes    Yazmin is a 69 y.o. female who presents today for follow up on her chronic medical issues.      She has recently lost weight by watching sugar, carbs, and she has increased her exercise! High was around 184. Now around 173. She is working on weight loss.       HTN: she is taking amlodipine 10 mg, lisinopril 40 mg BID per her own desire. Also on metoprolol 25 mg BID. Believes HCTZ is causing palpitations. Due to this she stopped ace/hctz combo and just increased her lisinopril to 40 mg BID.   DM: taking metformin once daily. A1c is stable. She has been watching what she eats.   DLD: on statin. Cards changed from atorvastatin to rosuvastatin.   Thyroid Abnormalities: followed by endocrine. TSH in 6 months (June 2020). Follow up in one year with ultrasound and TSH.  Pulmonary Issues: mild restriction. No obstruction on PFT. Breathing is improved. No acute symptoms today.   SVT: seeing EP. Had RFA of SVT  Hypercalcemia: resolved  GERD: taking 80 mg once daily. This is helping her GERD.   CKD: saw Dr. Chacon. F/u annually. Labs stable.   DVT: She was taken off Eliquis in 2018 by Dr. Carrizales  VI: had surgery on right leg outside of [facility].     Labs as below reviewed in detail.     Review of Systems   Constitutional: Negative for activity change and unexpected weight change.   HENT: Negative for hearing loss, rhinorrhea and trouble swallowing.    Eyes: Negative for discharge and visual disturbance.   Respiratory: Positive for chest tightness. Negative for wheezing.    Cardiovascular: Negative for chest pain and palpitations.   Gastrointestinal: Negative for blood in stool, constipation, diarrhea and vomiting.   Endocrine: Negative for polydipsia and polyuria.   Genitourinary: Negative for difficulty urinating, dysuria, hematuria and menstrual problem.   Musculoskeletal: Positive for arthralgias and joint swelling.  Negative for neck pain.   Neurological: Negative for weakness and headaches.   Psychiatric/Behavioral: Negative for confusion and dysphoric mood.           Results for orders placed or performed in visit on 03/04/20   Comprehensive metabolic panel   Result Value Ref Range    Sodium 142 136 - 145 mmol/L    Potassium 3.9 3.5 - 5.1 mmol/L    Chloride 106 95 - 110 mmol/L    CO2 25 23 - 29 mmol/L    Glucose 88 70 - 110 mg/dL    BUN, Bld 14 8 - 23 mg/dL    Creatinine 1.1 0.5 - 1.4 mg/dL    Calcium 9.8 8.7 - 10.5 mg/dL    Total Protein 7.4 6.0 - 8.4 g/dL    Albumin 3.7 3.5 - 5.2 g/dL    Total Bilirubin 0.9 0.1 - 1.0 mg/dL    Alkaline Phosphatase 96 55 - 135 U/L    AST 17 10 - 40 U/L    ALT 13 10 - 44 U/L    Anion Gap 11 8 - 16 mmol/L    eGFR if African American 59.6 (A) >60 mL/min/1.73 m^2    eGFR if non  51.7 (A) >60 mL/min/1.73 m^2   CBC auto differential   Result Value Ref Range    WBC 6.93 3.90 - 12.70 K/uL    RBC 4.98 4.00 - 5.40 M/uL    Hemoglobin 13.6 12.0 - 16.0 g/dL    Hematocrit 45.4 37.0 - 48.5 %    Mean Corpuscular Volume 91 82 - 98 fL    Mean Corpuscular Hemoglobin 27.3 27.0 - 31.0 pg    Mean Corpuscular Hemoglobin Conc 30.0 (L) 32.0 - 36.0 g/dL    RDW 12.5 11.5 - 14.5 %    Platelets 280 150 - 350 K/uL    MPV 11.2 9.2 - 12.9 fL    Immature Granulocytes 0.4 0.0 - 0.5 %    Gran # (ANC) 4.3 1.8 - 7.7 K/uL    Immature Grans (Abs) 0.03 0.00 - 0.04 K/uL    Lymph # 1.9 1.0 - 4.8 K/uL    Mono # 0.5 0.3 - 1.0 K/uL    Eos # 0.2 0.0 - 0.5 K/uL    Baso # 0.06 0.00 - 0.20 K/uL    nRBC 0 0 /100 WBC    Gran% 61.8 38.0 - 73.0 %    Lymph% 27.0 18.0 - 48.0 %    Mono% 6.6 4.0 - 15.0 %    Eosinophil% 3.3 0.0 - 8.0 %    Basophil% 0.9 0.0 - 1.9 %    Differential Method Automated    Hemoglobin A1c   Result Value Ref Range    Hemoglobin A1C 6.4 (H) 4.0 - 5.6 %    Estimated Avg Glucose 137 (H) 68 - 131 mg/dL       Objective:     Vitals:    03/12/20 0927   BP: 132/70   Pulse: 76   Temp: 98 °F (36.7 °C)        Physical  Exam   Constitutional: She is oriented to person, place, and time. She appears well-developed and well-nourished. No distress.   HENT:   Head: Normocephalic and atraumatic.   Eyes: Conjunctivae are normal.   Cardiovascular: Normal rate and regular rhythm.   Murmur heard.  Pulmonary/Chest: Effort normal and breath sounds normal. No stridor. No respiratory distress.   Abdominal: Soft. There is no tenderness.   Musculoskeletal: She exhibits edema.   Trace pitting edema BL   Neurological: She is alert and oriented to person, place, and time.   Skin: Skin is warm. She is not diaphoretic.   Psychiatric: She has a normal mood and affect. Her behavior is normal. Judgment and thought content normal.   Nursing note and vitals reviewed.      Assessment:       1. Diabetes mellitus type 2 without retinopathy    2. Type 2 diabetes mellitus with hyperglycemia, without long-term current use of insulin    3. Severe obesity (BMI 35.0-35.9 with comorbidity)    4. Heart failure with preserved ejection fraction, unspecified HF chronicity    5. PSVT (paroxysmal supraventricular tachycardia)    6. Acute deep vein thrombosis (DVT) of proximal vein of right lower extremity    7. Mixed hyperlipidemia    8. Essential hypertension    9. Venous insufficiency of right lower extremity        Plan:       You can NOT take lisinopril 40 mg Twice daily  Stop amlodipine  Start nifedipine 90 mg  Continue lisinopril 40 mg  Can't take HCTZ due to reported palpitations  Can't increase BB due to reported higher pressures after ablation when on higher dose.   Of note, has history of VI; CCB may increase leg swelling. If this affects quality of life, may switch to aldactone. If BP still high, will refer back to renal.   Continue metformin 500 mg once daily  Continue omeprazole for GERD.   Continue pravastatin    F/u with digital HTN  F/u in 1 month  F/u 4 months, labs prior.       Diabetes mellitus type 2 without retinopathy  -     Hemoglobin A1c; Future;  Expected date: 03/12/2020    Type 2 diabetes mellitus with hyperglycemia, without long-term current use of insulin  -     Hemoglobin A1c; Future; Expected date: 03/12/2020    Severe obesity (BMI 35.0-35.9 with comorbidity)  -     CBC auto differential; Future; Expected date: 03/12/2020  -     Comprehensive metabolic panel; Future; Expected date: 03/12/2020    Heart failure with preserved ejection fraction, unspecified HF chronicity    PSVT (paroxysmal supraventricular tachycardia)    Acute deep vein thrombosis (DVT) of proximal vein of right lower extremity    Mixed hyperlipidemia  -     Lipid panel; Future; Expected date: 03/12/2020    Essential hypertension  -     lisinopriL (PRINIVIL,ZESTRIL) 40 MG tablet; Take 1 tablet (40 mg total) by mouth once daily.  Dispense: 90 tablet; Refill: 0  -     NIFEdipine (ADALAT CC) 90 MG TbSR; Take 1 tablet (90 mg total) by mouth once daily.  Dispense: 90 tablet; Refill: 0    Venous insufficiency of right lower extremity      Warning signs discussed, patient to call with any further issues or worsening of symptoms.

## 2020-03-12 NOTE — PATIENT INSTRUCTIONS
You can NOT take lisinopril 40 mg Twice daily  Stop amlodipine  Start nifedipine 90 mg  Continue lisinopril 40 mg  Can't take HCTZ due to reported palpitations  Can't increase BB due to reported higher pressures after ablation when on higher dose.   Of note, has history of VI; CCB may increase leg swelling. If this affects quality of life, may switch to aldactone. If BP still high, will refer back to renal.   Continue metformin 500 mg once daily  Continue omeprazole for GERD.     F/u with digital HTN  F/u in 1 month  F/u 4 months, labs prior.     Diabetes health maintenance:  -- advise regular and consistent glucose monitoring and medication compliance.  -- advise daily foot checks  -- advise yearly ophthalmologic exams  -- advise adequate dietary and exercise modification  -- advise regular dental visits  -- Medication management    Diabetes Management Status    Statin: Taking  ACE/ARB: Taking    Screening or Prevention Patient's value Goal Complete/Controlled?   HgA1C Testing and Control   Lab Results   Component Value Date    HGBA1C 6.4 (H) 03/04/2020      Annually/Less than 8% Yes   Lipid profile : 12/11/2019 Annually Yes   LDL control Lab Results   Component Value Date    LDLCALC 159.4 (H) 12/11/2019    Annually/Less than 100 mg/dl  No   Nephropathy screening Lab Results   Component Value Date    LABMICR 12.0 01/23/2018     Lab Results   Component Value Date    PROTEINUA Negative 11/29/2016    Annually No   Blood pressure BP Readings from Last 1 Encounters:   03/12/20 132/70    Less than 140/90 Yes   Dilated retinal exam : 02/12/2020 Annually Yes   Foot exam   : 06/18/2019 Annually Yes

## 2020-03-14 ENCOUNTER — PATIENT MESSAGE (OUTPATIENT)
Dept: ADMINISTRATIVE | Facility: OTHER | Age: 70
End: 2020-03-14

## 2020-03-14 NOTE — PROGRESS NOTES
"Patient saw her PCP 3/12 who indicated the following:  "HTN: she is taking amlodipine 10 mg, lisinopril 40 mg BID per her own desire. Also on metoprolol 25 mg BID. Believes HCTZ is causing palpitations. Due to this she stopped ace/hctz combo and just increased her lisinopril to 40 mg BID."    Plan "You can NOT take lisinopril 40 mg Twice daily  Stop amlodipine  Start nifedipine 90 mg  Continue lisinopril 40 mg  Can't take HCTZ due to reported palpitations  Can't increase BB due to reported higher pressures after ablation when on higher dose.   Of note, has history of VI; CCB may increase leg swelling. If this affects quality of life, may switch to aldactone. If BP still high, will refer back to renal. "    Will follow-up two weeks after to assess how she is tolerating these changes and assess if spironolactone is needed.  "

## 2020-03-20 ENCOUNTER — PATIENT OUTREACH (OUTPATIENT)
Dept: OTHER | Facility: OTHER | Age: 70
End: 2020-03-20

## 2020-04-08 NOTE — PROGRESS NOTES
Call pushed 2 weeks further as patient has not checked blood pressure since last PCP visit. Health  has attempted to follow up on lack of readings.    Last 5 Patient Entered Readings                                      Current 30 Day Average: 171/93     Recent Readings 3/11/2020 2/20/2020 2/18/2020 2/17/2020 2/13/2020    SBP (mmHg) 171 143 168 144 141    DBP (mmHg) 93 77 86 80 76    Pulse 66 66 85 73 72        Hypertension Medications             lisinopriL (PRINIVIL,ZESTRIL) 40 MG tablet Take 1 tablet (40 mg total) by mouth once daily.    metoprolol tartrate (LOPRESSOR) 25 MG tablet Take 1 tablet (25 mg total) by mouth 2 (two) times daily.    NIFEdipine (ADALAT CC) 90 MG TbSR Take 1 tablet (90 mg total) by mouth once daily.

## 2020-04-09 ENCOUNTER — OFFICE VISIT (OUTPATIENT)
Dept: FAMILY MEDICINE | Facility: CLINIC | Age: 70
End: 2020-04-09
Payer: MEDICARE

## 2020-04-09 DIAGNOSIS — I10 ESSENTIAL HYPERTENSION: ICD-10-CM

## 2020-04-09 DIAGNOSIS — E78.2 MIXED HYPERLIPIDEMIA: ICD-10-CM

## 2020-04-09 DIAGNOSIS — K21.9 GASTROESOPHAGEAL REFLUX DISEASE, ESOPHAGITIS PRESENCE NOT SPECIFIED: ICD-10-CM

## 2020-04-09 PROCEDURE — 99214 PR OFFICE/OUTPT VISIT, EST, LEVL IV, 30-39 MIN: ICD-10-PCS | Mod: 95,,, | Performed by: FAMILY MEDICINE

## 2020-04-09 PROCEDURE — 99214 OFFICE O/P EST MOD 30 MIN: CPT | Mod: 95,,, | Performed by: FAMILY MEDICINE

## 2020-04-09 RX ORDER — OMEPRAZOLE 40 MG/1
40 CAPSULE, DELAYED RELEASE ORAL EVERY MORNING
Qty: 90 CAPSULE | Refills: 1
Start: 2020-04-09 | End: 2020-04-09 | Stop reason: SDUPTHER

## 2020-04-09 RX ORDER — NIFEDIPINE 90 MG/1
90 TABLET, FILM COATED, EXTENDED RELEASE ORAL DAILY
Qty: 90 TABLET | Refills: 0 | Status: SHIPPED | OUTPATIENT
Start: 2020-04-09 | End: 2020-07-16 | Stop reason: SDUPTHER

## 2020-04-09 RX ORDER — ROSUVASTATIN CALCIUM 40 MG/1
40 TABLET, COATED ORAL NIGHTLY
Qty: 90 TABLET | Refills: 3 | Status: SHIPPED | OUTPATIENT
Start: 2020-04-09 | End: 2020-07-16 | Stop reason: SDUPTHER

## 2020-04-09 RX ORDER — OMEPRAZOLE 40 MG/1
40 CAPSULE, DELAYED RELEASE ORAL EVERY MORNING
Qty: 90 CAPSULE | Refills: 1 | Status: SHIPPED | OUTPATIENT
Start: 2020-04-09 | End: 2020-07-16 | Stop reason: SDUPTHER

## 2020-04-09 NOTE — PATIENT INSTRUCTIONS
Continue lisinopril 40 mg  Continue nifedipine 90 mg  Can't take HCTZ due to reported palpitations  Can't increase BB due to reported higher pressures after ablation when on higher dose.   Continue twice daily metoprolol    Continue metformin 500 mg once daily. If a1c at f/u labs is <6.5, can consider stopping.     Continue once daily omeprazole for GERD.     Continue pravastatin    Keep f/u in July, labs prior.

## 2020-04-09 NOTE — PROGRESS NOTES
Subjective:       Patient ID: Eugenie Coates is a 69 y.o. female.    Chief Complaint: HTN    The patient location is: at her home  The chief complaint leading to consultation is: f/u on HTN  Visit type: Virtual visit with synchronous audio and video  Total time spent with patient: aprox 15 minutes  Each patient to whom he or she provides medical services by telemedicine is:  (1) informed of the relationship between the physician and patient and the respective role of any other health care provider with respect to management of the patient; and (2) notified that he or she may decline to receive medical services by telemedicine and may withdraw from such care at any time.    Yazmin is a 69 y.o. female who presents today for f/u on HTN.     HTN: at the last visit, she was taking lisinopril 40 mg BID without discussing this with me. I advised her not to do so. nifidipine 90 mg was started. Amlodipine was stopped. She is also taking metoprolol 25 mg BID. Can't take HCTZ due to reported palpitations. She is not having headache, double vision, blurry vision. She is using an arm cuff. Intermittent leg swelling due to the nifedipine.     Weight: she has not weighed herself, but she is sustaining her diet/exercise regimen.     GERD: weight loss has improved GERD. Down to one pill once daily.     BP record:   BP on 4/6/20: 130/66 Pulse 72  4/7/20: 116/70 Pulse 69  4/8/20: 139/65 pulse 66    Using same cuff as before, previously BP was much higher.     Hypertension   This is a new problem. The current episode started more than 1 month ago. The problem has been waxing and waning since onset. The problem is controlled. Associated symptoms include peripheral edema. Pertinent negatives include no anxiety, blurred vision, chest pain, headaches, malaise/fatigue, neck pain, orthopnea, palpitations, PND, shortness of breath or sweats. Risk factors for coronary artery disease include obesity, dyslipidemia and diabetes mellitus.  Past treatments include calcium channel blockers, beta blockers, angiotensin blockers and lifestyle changes. The current treatment provides significant improvement. There are no compliance problems.      Review of Systems   Constitutional: Negative for malaise/fatigue.   Eyes: Negative for blurred vision.   Respiratory: Negative for shortness of breath.    Cardiovascular: Positive for leg swelling. Negative for chest pain, palpitations, orthopnea and PND.   Musculoskeletal: Negative for neck pain.   Neurological: Negative for headaches.           Objective:   There were no vitals filed for this visit.   exam performed as able due to the inherent limitations of telemedicine  Physical Exam   Constitutional: She is oriented to person, place, and time. She appears well-developed and well-nourished. No distress.   HENT:   Head: Normocephalic.   Pulmonary/Chest: No respiratory distress.   No respiratory distress or accessory muscle use noted   Neurological: She is alert and oriented to person, place, and time.   Skin: She is not diaphoretic.   No visible rash noted   Psychiatric: She has a normal mood and affect. Her behavior is normal. Judgment and thought content normal.       Assessment:       1. Gastroesophageal reflux disease, esophagitis presence not specified    2. Mixed hyperlipidemia    3. Essential hypertension        Plan:       Continue lisinopril 40 mg  Continue nifedipine 90 mg  Can't take HCTZ due to reported palpitations  Can't increase BB due to reported higher pressures after ablation when on higher dose.   Continue twice daily metoprolol    Continue metformin 500 mg once daily. If a1c at f/u labs is <6.5, can consider stopping.     Continue once daily omeprazole for GERD.     Continue pravastatin    Keep f/u in July, labs prior.     Gastroesophageal reflux disease, esophagitis presence not specified  -     Discontinue: omeprazole (PRILOSEC) 40 MG capsule; Take 1 capsule (40 mg total) by mouth every  morning. TAKE 1 CAPSULE(20 MG) BY MOUTH EVERY DAY  Dispense: 90 capsule; Refill: 1  -     omeprazole (PRILOSEC) 40 MG capsule; Take 1 capsule (40 mg total) by mouth every morning. TAKE 1 CAPSULE(20 MG) BY MOUTH EVERY DAY  Dispense: 90 capsule; Refill: 1    Mixed hyperlipidemia  -     rosuvastatin (CRESTOR) 40 MG Tab; Take 1 tablet (40 mg total) by mouth every evening.  Dispense: 90 tablet; Refill: 3    Essential hypertension  -     NIFEdipine (ADALAT CC) 90 MG TbSR; Take 1 tablet (90 mg total) by mouth once daily.  Dispense: 90 tablet; Refill: 0        Warning signs discussed, patient to call with any further issues or worsening of symptoms.

## 2020-04-10 ENCOUNTER — PATIENT MESSAGE (OUTPATIENT)
Dept: FAMILY MEDICINE | Facility: CLINIC | Age: 70
End: 2020-04-10

## 2020-04-10 DIAGNOSIS — I10 ESSENTIAL HYPERTENSION: ICD-10-CM

## 2020-04-12 RX ORDER — METOPROLOL TARTRATE 25 MG/1
25 TABLET, FILM COATED ORAL 2 TIMES DAILY
Qty: 180 TABLET | Refills: 3 | Status: SHIPPED | OUTPATIENT
Start: 2020-04-12 | End: 2020-07-16 | Stop reason: SDUPTHER

## 2020-04-16 NOTE — PROGRESS NOTES
Digital Medicine: Health  Follow-Up    Patient denies any other concerns at this time.           Follow Up  Follow-up reason(s): reading review      Readings are missing.   patient reminder needed.  Patient reports she has been taking her readings within another meter.  Instructed patient to use her iHealth machine to submit readings so we are able to monitor her BP.  Will check for readings in two weeks.       INTERVENTION(S)  encouragement/support and denied further coaching    PLAN  additional monitoring needed and continue monitoring      There are no preventive care reminders to display for this patient.    Last 5 Patient Entered Readings                                      Current 30 Day Average:      Recent Readings 3/11/2020 2/20/2020 2/18/2020 2/17/2020 2/13/2020    SBP (mmHg) 171 143 168 144 141    DBP (mmHg) 93 77 86 80 76    Pulse 66 66 85 73 72

## 2020-04-22 DIAGNOSIS — E11.65 TYPE 2 DIABETES MELLITUS WITH HYPERGLYCEMIA, WITHOUT LONG-TERM CURRENT USE OF INSULIN: ICD-10-CM

## 2020-04-22 RX ORDER — METFORMIN HYDROCHLORIDE 500 MG/1
TABLET ORAL
Qty: 90 TABLET | Refills: 0 | Status: SHIPPED | OUTPATIENT
Start: 2020-04-22 | End: 2020-07-16 | Stop reason: SDUPTHER

## 2020-06-11 ENCOUNTER — PATIENT OUTREACH (OUTPATIENT)
Dept: OTHER | Facility: OTHER | Age: 70
End: 2020-06-11

## 2020-06-11 ENCOUNTER — OFFICE VISIT (OUTPATIENT)
Dept: OPTOMETRY | Facility: CLINIC | Age: 70
End: 2020-06-11
Payer: MEDICARE

## 2020-06-11 DIAGNOSIS — H04.123 DRY EYE SYNDROME, BILATERAL: ICD-10-CM

## 2020-06-11 DIAGNOSIS — H40.1134 PRIMARY OPEN ANGLE GLAUCOMA OF BOTH EYES, INDETERMINATE STAGE: Primary | ICD-10-CM

## 2020-06-11 PROCEDURE — 99999 PR PBB SHADOW E&M-EST. PATIENT-LVL II: ICD-10-PCS | Mod: PBBFAC,,, | Performed by: OPTOMETRIST

## 2020-06-11 PROCEDURE — 99212 OFFICE O/P EST SF 10 MIN: CPT | Mod: PBBFAC,PO | Performed by: OPTOMETRIST

## 2020-06-11 PROCEDURE — 92012 PR EYE EXAM, EST PATIENT,INTERMED: ICD-10-PCS | Mod: S$PBB,,, | Performed by: OPTOMETRIST

## 2020-06-11 PROCEDURE — 92012 INTRM OPH EXAM EST PATIENT: CPT | Mod: S$PBB,,, | Performed by: OPTOMETRIST

## 2020-06-11 PROCEDURE — 99999 PR PBB SHADOW E&M-EST. PATIENT-LVL II: CPT | Mod: PBBFAC,,, | Performed by: OPTOMETRIST

## 2020-06-11 RX ORDER — MUPIROCIN 20 MG/G
OINTMENT TOPICAL
COMMUNITY
Start: 2020-06-02 | End: 2020-11-03

## 2020-06-11 NOTE — PROGRESS NOTES
HPI     DL:S 2/12/2020     Pt here for IOP ck.   States using alphagan OU TID and lumigan OU QHS.     Denies FOL/ floaters    Pt states eyes are itchy often and a bit sore near brows when she touches   it. OS redness.     DM stable.   Hemoglobin A1C       Date                     Value               Ref Range             Status                03/04/2020               6.4 (H)             4.0 - 5.6 %           Final                  10/31/2019               6.2 (H)             4.0 - 5.6 %           Final                 08/05/2019               6.8 (H)             4.0 - 5.6 %           Final                  Last edited by Maurilio Doran, OD on 6/11/2020  9:26 AM. (History)            Assessment /Plan     For exam results, see Encounter Report.    Primary open angle glaucoma of both eyes, indeterminate stage  -     OCT - Optic Nerve; Future  -     [facility] Visual Field - OU - Extended - Both Eyes; Future    Dry eye syndrome, bilateral      iop doing well with current drops. Continue alphagan: 1 gt tid OU  lumigan qPM OU, denies refill at this time. Return in 5 months for glc eval: hvf 24-2 sf, oct rnfl, gonio, pachy, iop check.    ALONDRA OU. Discussed about ocular affects of dry eyes. Recommend increasing OTC gel artificial tears to twice daily, caution transient blurring of vision with gel use. Discussed chronicity of ALONDRA. RTC if symptoms not alleviated by continued use of artificial tears.      RTC in 5 months for glc eval  comprehensive eye exam, or sooner prn.

## 2020-06-17 DIAGNOSIS — E11.9 TYPE 2 DIABETES MELLITUS: ICD-10-CM

## 2020-06-30 ENCOUNTER — OFFICE VISIT (OUTPATIENT)
Dept: PODIATRY | Facility: CLINIC | Age: 70
End: 2020-06-30
Payer: MEDICARE

## 2020-06-30 ENCOUNTER — LAB VISIT (OUTPATIENT)
Dept: LAB | Facility: HOSPITAL | Age: 70
End: 2020-06-30
Attending: INTERNAL MEDICINE
Payer: MEDICARE

## 2020-06-30 VITALS
WEIGHT: 180.25 LBS | HEART RATE: 88 BPM | DIASTOLIC BLOOD PRESSURE: 78 MMHG | HEIGHT: 62 IN | SYSTOLIC BLOOD PRESSURE: 169 MMHG | BODY MASS INDEX: 33.17 KG/M2

## 2020-06-30 DIAGNOSIS — E04.2 MULTINODULAR THYROID: ICD-10-CM

## 2020-06-30 DIAGNOSIS — L84 CORN OR CALLUS: ICD-10-CM

## 2020-06-30 DIAGNOSIS — E05.90 SUBCLINICAL HYPERTHYROIDISM: ICD-10-CM

## 2020-06-30 DIAGNOSIS — M20.42 HAMMER TOES OF BOTH FEET: ICD-10-CM

## 2020-06-30 DIAGNOSIS — B35.1 ONYCHOMYCOSIS DUE TO DERMATOPHYTE: ICD-10-CM

## 2020-06-30 DIAGNOSIS — E11.65 TYPE 2 DIABETES MELLITUS WITH HYPERGLYCEMIA, WITHOUT LONG-TERM CURRENT USE OF INSULIN: Primary | ICD-10-CM

## 2020-06-30 DIAGNOSIS — M20.41 HAMMER TOES OF BOTH FEET: ICD-10-CM

## 2020-06-30 LAB
T4 FREE SERPL-MCNC: 1.11 NG/DL (ref 0.71–1.51)
TSH SERPL DL<=0.005 MIU/L-ACNC: 0.35 UIU/ML (ref 0.4–4)

## 2020-06-30 PROCEDURE — 84443 ASSAY THYROID STIM HORMONE: CPT

## 2020-06-30 PROCEDURE — 11055 PARING/CUTG B9 HYPRKER LES 1: CPT | Mod: PBBFAC,PN | Performed by: PODIATRIST

## 2020-06-30 PROCEDURE — 11721 DEBRIDE NAIL 6 OR MORE: CPT | Mod: Q8,TA,T1,T2,T3,T4,T5,T6,T7,T8,T9,PBBFAC,PN | Performed by: PODIATRIST

## 2020-06-30 PROCEDURE — 11721 DEBRIDE NAIL 6 OR MORE: CPT | Mod: 59,Q9,S$PBB, | Performed by: PODIATRIST

## 2020-06-30 PROCEDURE — 11055 PR TRIM HYPERKERATOTIC SKIN LESION, ONE: ICD-10-PCS | Mod: Q9,S$PBB,, | Performed by: PODIATRIST

## 2020-06-30 PROCEDURE — 99213 OFFICE O/P EST LOW 20 MIN: CPT | Mod: 25,S$PBB,, | Performed by: PODIATRIST

## 2020-06-30 PROCEDURE — 36415 COLL VENOUS BLD VENIPUNCTURE: CPT | Mod: PO

## 2020-06-30 PROCEDURE — 99999 PR PBB SHADOW E&M-EST. PATIENT-LVL IV: ICD-10-PCS | Mod: PBBFAC,,, | Performed by: PODIATRIST

## 2020-06-30 PROCEDURE — 11055 PARING/CUTG B9 HYPRKER LES 1: CPT | Mod: Q9,S$PBB,, | Performed by: PODIATRIST

## 2020-06-30 PROCEDURE — 99214 OFFICE O/P EST MOD 30 MIN: CPT | Mod: PBBFAC,PN | Performed by: PODIATRIST

## 2020-06-30 PROCEDURE — 99213 PR OFFICE/OUTPT VISIT, EST, LEVL III, 20-29 MIN: ICD-10-PCS | Mod: 25,S$PBB,, | Performed by: PODIATRIST

## 2020-06-30 PROCEDURE — 11721 PR DEBRIDEMENT OF NAILS, 6 OR MORE: ICD-10-PCS | Mod: 59,Q9,S$PBB, | Performed by: PODIATRIST

## 2020-06-30 PROCEDURE — 84439 ASSAY OF FREE THYROXINE: CPT

## 2020-06-30 PROCEDURE — 99999 PR PBB SHADOW E&M-EST. PATIENT-LVL IV: CPT | Mod: PBBFAC,,, | Performed by: PODIATRIST

## 2020-06-30 NOTE — PROGRESS NOTES
Subjective:      Patient ID: Eugenie Coates is a 70 y.o. female.    Chief Complaint: Routine Foot Care and Diabetes Mellitus (PCP:  Dr Paul  4/19/20; HgbA1c:  3/4/20  6.4)    Eugenie is a 70 y.o. female who presents to the clinic for routine evaluation and treatment of diabetic feet. Eugenie has a past medical history of Allergy, Anemia, Anxiety, Arthritis, Asthma, Bell palsy, Depression, Diabetes mellitus, type 2, DVT (deep venous thrombosis), Endometriosis, Eye injury (2014), GERD (gastroesophageal reflux disease), Glaucoma, History of uterine fibroid, Hyperlipidemia, Hypertension, Nuclear sclerosis of both eyes (9/27/2016), Sleep apnea, Supraventricular tachycardia, SVT (supraventricular tachycardia) (05/2019), and Thyroid disease. Patient relates no major problem with feet.  Only complaints today consist of toenails and a corn in need of trimming.  Denies experiencing pain from the site of callus build up with wearing shoes.  Has been applying moisturizer to the affected area of callus build up daily.  Continues to exercise control over her blood glucose.  Denies any additional pedal complaints.      PCP: Haile Paul, DO    Date Last Seen by PCP: 4/20     Hemoglobin A1C   Date Value Ref Range Status   03/04/2020 6.4 (H) 4.0 - 5.6 % Final     Comment:     ADA Screening Guidelines:  5.7-6.4%  Consistent with prediabetes  >or=6.5%  Consistent with diabetes  High levels of fetal hemoglobin interfere with the HbA1C  assay. Heterozygous hemoglobin variants (HbS, HgC, etc)do  not significantly interfere with this assay.   However, presence of multiple variants may affect accuracy.     10/31/2019 6.2 (H) 4.0 - 5.6 % Final     Comment:     ADA Screening Guidelines:  5.7-6.4%  Consistent with prediabetes  >or=6.5%  Consistent with diabetes  High levels of fetal hemoglobin interfere with the HbA1C  assay. Heterozygous hemoglobin variants (HbS, HgC, etc)do  not significantly interfere with this assay.    However, presence of multiple variants may affect accuracy.     2019 6.8 (H) 4.0 - 5.6 % Final     Comment:     ADA Screening Guidelines:  5.7-6.4%  Consistent with prediabetes  >or=6.5%  Consistent with diabetes  High levels of fetal hemoglobin interfere with the HbA1C  assay. Heterozygous hemoglobin variants (HbS, HgC, etc)do  not significantly interfere with this assay.   However, presence of multiple variants may affect accuracy.             Past Medical History:   Diagnosis Date    Allergy     Anemia     Anxiety     Arthritis     Asthma     Bell palsy     Depression     Diabetes mellitus, type 2     DVT (deep venous thrombosis)     Endometriosis     Eye injury     stuck with tree branch od ?     GERD (gastroesophageal reflux disease)     Glaucoma     History of uterine fibroid     Hyperlipidemia     Hypertension     Nuclear sclerosis of both eyes 2016    Sleep apnea     uses C pap    Supraventricular tachycardia     SVT (supraventricular tachycardia) 2019    Thyroid disease        Past Surgical History:   Procedure Laterality Date     SECTION      CHOLECYSTECTOMY      glaucoma laser Bilateral     HERNIA REPAIR      KNEE SURGERY Right     (R) knee scope; torn meniscus    VEIN SURGERY  ,     Rt leg x2       Family History   Problem Relation Age of Onset    Diabetes Mother     No Known Problems Father     No Known Problems Sister     No Known Problems Brother     Colon cancer Maternal Aunt     No Known Problems Maternal Uncle     No Known Problems Paternal Aunt     No Known Problems Paternal Uncle     No Known Problems Maternal Grandmother     No Known Problems Maternal Grandfather     No Known Problems Paternal Grandmother     No Known Problems Paternal Grandfather     Amblyopia Neg Hx     Blindness Neg Hx     Cancer Neg Hx     Cataracts Neg Hx     Glaucoma Neg Hx     Hypertension Neg Hx     Macular degeneration Neg Hx      Retinal detachment Neg Hx     Strabismus Neg Hx     Stroke Neg Hx     Thyroid disease Neg Hx     Celiac disease Neg Hx     Colon polyps Neg Hx     Esophageal cancer Neg Hx     Inflammatory bowel disease Neg Hx     Irritable bowel syndrome Neg Hx     Liver cancer Neg Hx     Liver disease Neg Hx     Rectal cancer Neg Hx     Stomach cancer Neg Hx     Ulcerative colitis Neg Hx     Cystic fibrosis Neg Hx     Crohn's disease Neg Hx     Hemochromatosis Neg Hx     Cirrhosis Neg Hx        Social History     Socioeconomic History    Marital status:      Spouse name: Not on file    Number of children: Not on file    Years of education: Not on file    Highest education level: Not on file   Occupational History    Not on file   Social Needs    Financial resource strain: Not on file    Food insecurity     Worry: Not on file     Inability: Not on file    Transportation needs     Medical: Not on file     Non-medical: Not on file   Tobacco Use    Smoking status: Never Smoker    Smokeless tobacco: Never Used   Substance and Sexual Activity    Alcohol use: No     Alcohol/week: 0.0 standard drinks     Frequency: Never     Drinks per session: Patient refused     Binge frequency: Never    Drug use: No    Sexual activity: Yes     Partners: Male   Lifestyle    Physical activity     Days per week: 3 days     Minutes per session: 20 min    Stress: Not on file   Relationships    Social connections     Talks on phone: More than three times a week     Gets together: Once a week     Attends [employer] service: Not on file     Active member of club or organization: No     Attends meetings of clubs or organizations: Never     Relationship status:    Other Topics Concern    Not on file   Social History Narrative    1/18/19: lives with her . They have multiple children, but the youngest is 38. No children at home right now. No pets at home. Splits time between here and [place]. Her son lives  in [place].        Current Outpatient Medications   Medication Sig Dispense Refill    b complex vitamins tablet Take 1 tablet by mouth once daily.      blood sugar diagnostic Strp Test twice daily.  Accucheck viva test strips and lancets. 300 each 3    brimonidine 0.2% (ALPHAGAN) 0.2 % Drop Place 1 drop into both eyes 3 (three) times daily. 15 mL 4    lancets (ACCU-CHEK SOFTCLIX LANCETS) Misc 1 Units by Misc.(Non-Drug; Combo Route) route 2 (two) times daily. 300 each 3    lisinopriL (PRINIVIL,ZESTRIL) 40 MG tablet TAKE 1 TABLET(40 MG) BY MOUTH EVERY DAY 90 tablet 0    metFORMIN (GLUCOPHAGE) 500 MG tablet TAKE 1 TABLET DAILY WITH BREAKFAST 90 tablet 0    metoprolol tartrate (LOPRESSOR) 25 MG tablet Take 1 tablet (25 mg total) by mouth 2 (two) times daily. 180 tablet 3    mupirocin (BACTROBAN) 2 % ointment APPLY AA Q 8 H FOR 10 DAYS      NIFEdipine (ADALAT CC) 90 MG TbSR Take 1 tablet (90 mg total) by mouth once daily. 90 tablet 0    omeprazole (PRILOSEC) 40 MG capsule Take 1 capsule (40 mg total) by mouth every morning. TAKE 1 CAPSULE(20 MG) BY MOUTH EVERY DAY 90 capsule 1    rosuvastatin (CRESTOR) 40 MG Tab Take 1 tablet (40 mg total) by mouth every evening. 90 tablet 3    vitamin D (VITAMIN D3) 1000 units Tab Take 1,000 Units by mouth once daily.      bimatoprost (LUMIGAN) 0.01 % Drop Place 1 drop into both eyes every evening. 3 Bottle 4    calcium carbonate (CORAL CALCIUM ORAL) Take 1 capsule by mouth once daily.      horse chestnut seed (HORSE CHESTNUT ORAL) Take 300 mg by mouth once daily.       No current facility-administered medications for this visit.        Review of patient's allergies indicates:   Allergen Reactions    Percodan [oxycodone hcl-oxycodone-asa] Hives and Itching         Review of Systems   Constitution: Negative for chills and fever.   Cardiovascular: Positive for leg swelling. Negative for claudication.   Skin: Positive for color change and nail changes.   Musculoskeletal:  Positive for joint swelling. Negative for joint pain, muscle cramps and muscle weakness.   Neurological: Positive for numbness. Negative for paresthesias.   Psychiatric/Behavioral: Negative for altered mental status.           Objective:      Physical Exam  Constitutional:       General: She is not in acute distress.     Appearance: She is well-developed. She is not diaphoretic.   Cardiovascular:      Pulses:           Dorsalis pedis pulses are 2+ on the right side and 2+ on the left side.        Posterior tibial pulses are 1+ on the right side and 1+ on the left side.      Comments: CFT < 3 seconds bilateral.  Pedal hair growth decreased bilateral.  Varicosities noted bilateral.  Mild non pitting edema noted to bilateral lower extremity.  Toes are cool to touch bilateral.    Musculoskeletal:         General: No tenderness.      Comments: Muscle strength 5/5 in all muscle groups bilateral.  No tenderness nor crepitation with ROM of foot/ankle joints bilateral.  No tenderness with palpation of bilateral foot and ankle.  Bilateral pes planus foot type.  Bilateral hallux abducto valgus.  Bilateral semi-reducible contracture of toes 2-5.     Skin:     General: Skin is warm and dry.      Coloration: Skin is not pale.      Findings: Lesion present. No abrasion, bruising, burn, ecchymosis, erythema, laceration, petechiae or rash.      Nails: There is no clubbing.        Comments: Pedal skin appears edematous bilateral.  Toenails x 10 appear thickened by 2 mm's, elongated by 5 mm's, and discolored with subungual debris.  Hyperkeratotic lesion noted to the Rt. Dorsal lateral 5th PIP joint.  Examination of the skin reveals no evidence of significant maceration, rashes, open lesions, suspicious appearing nevi or other concerning lesions.    Neurological:      Mental Status: She is alert and oriented to person, place, and time.      Sensory: Sensory deficit present.      Comments: Protective sensation per [employer]  monofilament intact bilateral.    Vibratory sensation decreased bilateral.    Light touch intact bilateral.               Assessment:       Encounter Diagnoses   Name Primary?    Type 2 diabetes mellitus with hyperglycemia, without long-term current use of insulin Yes    Hammer toes of both feet     Onychomycosis due to dermatophyte     Corn or callus          Plan:       Eugenie was seen today for routine foot care and diabetes mellitus.    Diagnoses and all orders for this visit:    Type 2 diabetes mellitus with hyperglycemia, without long-term current use of insulin    Hammer toes of both feet    Onychomycosis due to dermatophyte    Corn or callus      I counseled the patient on her conditions, their implications and medical management.    Shoe inspection. Diabetic Foot Education. Patient reminded of the importance of good nutrition and blood sugar control to help prevent podiatric complications of diabetes. Patient instructed on proper foot hygeine. We discussed wearing proper shoe gear, daily foot inspections, never walking without protective shoe gear, never putting sharp instruments to feet    Advised to continue wearing diabetic shoes/insoles that accommodate for digital deformities.     With patient's permission, nails were aggressively reduced and debrided x 10 to their soft tissue attachment mechanically and with electric , removing all offending nail and debris.  Also, a sterile #15 scalpel was used to trim lesion x 1 down to smooth appearing skin without incident.  Patient relates relief following the procedure.  She will continue to monitor the areas daily, inspect her feet, wear protective shoe gear when ambulatory, moisturizer to maintain skin integrity and follow in this office in approximately 4 months, sooner p.r.n.    Follow up in about 4 months (around 10/30/2020).    Giuliano Garnett DPM

## 2020-07-08 ENCOUNTER — LAB VISIT (OUTPATIENT)
Dept: LAB | Facility: HOSPITAL | Age: 70
End: 2020-07-08
Attending: FAMILY MEDICINE
Payer: MEDICARE

## 2020-07-08 DIAGNOSIS — E11.9 DIABETES MELLITUS TYPE 2 WITHOUT RETINOPATHY: ICD-10-CM

## 2020-07-08 DIAGNOSIS — E78.2 MIXED HYPERLIPIDEMIA: ICD-10-CM

## 2020-07-08 DIAGNOSIS — E11.65 TYPE 2 DIABETES MELLITUS WITH HYPERGLYCEMIA, WITHOUT LONG-TERM CURRENT USE OF INSULIN: ICD-10-CM

## 2020-07-08 DIAGNOSIS — E66.01 SEVERE OBESITY (BMI 35.0-35.9 WITH COMORBIDITY): ICD-10-CM

## 2020-07-08 LAB
ALBUMIN SERPL BCP-MCNC: 3.8 G/DL (ref 3.5–5.2)
ALP SERPL-CCNC: 98 U/L (ref 55–135)
ALT SERPL W/O P-5'-P-CCNC: 16 U/L (ref 10–44)
ANION GAP SERPL CALC-SCNC: 10 MMOL/L (ref 8–16)
AST SERPL-CCNC: 21 U/L (ref 10–40)
BASOPHILS # BLD AUTO: 0.06 K/UL (ref 0–0.2)
BASOPHILS NFR BLD: 0.7 % (ref 0–1.9)
BILIRUB SERPL-MCNC: 0.8 MG/DL (ref 0.1–1)
BUN SERPL-MCNC: 16 MG/DL (ref 8–23)
CALCIUM SERPL-MCNC: 9.6 MG/DL (ref 8.7–10.5)
CHLORIDE SERPL-SCNC: 108 MMOL/L (ref 95–110)
CHOLEST SERPL-MCNC: 226 MG/DL (ref 120–199)
CHOLEST/HDLC SERPL: 4.2 {RATIO} (ref 2–5)
CO2 SERPL-SCNC: 24 MMOL/L (ref 23–29)
CREAT SERPL-MCNC: 1.1 MG/DL (ref 0.5–1.4)
DIFFERENTIAL METHOD: ABNORMAL
EOSINOPHIL # BLD AUTO: 0.2 K/UL (ref 0–0.5)
EOSINOPHIL NFR BLD: 1.7 % (ref 0–8)
ERYTHROCYTE [DISTWIDTH] IN BLOOD BY AUTOMATED COUNT: 13.3 % (ref 11.5–14.5)
EST. GFR  (AFRICAN AMERICAN): 59.2 ML/MIN/1.73 M^2
EST. GFR  (NON AFRICAN AMERICAN): 51.3 ML/MIN/1.73 M^2
ESTIMATED AVG GLUCOSE: 140 MG/DL (ref 68–131)
GLUCOSE SERPL-MCNC: 94 MG/DL (ref 70–110)
HBA1C MFR BLD HPLC: 6.5 % (ref 4–5.6)
HCT VFR BLD AUTO: 44.7 % (ref 37–48.5)
HDLC SERPL-MCNC: 54 MG/DL (ref 40–75)
HDLC SERPL: 23.9 % (ref 20–50)
HGB BLD-MCNC: 13.5 G/DL (ref 12–16)
IMM GRANULOCYTES # BLD AUTO: 0.08 K/UL (ref 0–0.04)
IMM GRANULOCYTES NFR BLD AUTO: 0.9 % (ref 0–0.5)
LDLC SERPL CALC-MCNC: 142.2 MG/DL (ref 63–159)
LYMPHOCYTES # BLD AUTO: 2.2 K/UL (ref 1–4.8)
LYMPHOCYTES NFR BLD: 25.2 % (ref 18–48)
MCH RBC QN AUTO: 27.2 PG (ref 27–31)
MCHC RBC AUTO-ENTMCNC: 30.2 G/DL (ref 32–36)
MCV RBC AUTO: 90 FL (ref 82–98)
MONOCYTES # BLD AUTO: 0.7 K/UL (ref 0.3–1)
MONOCYTES NFR BLD: 7.6 % (ref 4–15)
NEUTROPHILS # BLD AUTO: 5.5 K/UL (ref 1.8–7.7)
NEUTROPHILS NFR BLD: 63.9 % (ref 38–73)
NONHDLC SERPL-MCNC: 172 MG/DL
NRBC BLD-RTO: 0 /100 WBC
PLATELET # BLD AUTO: 247 K/UL (ref 150–350)
PMV BLD AUTO: 11.4 FL (ref 9.2–12.9)
POTASSIUM SERPL-SCNC: 4.5 MMOL/L (ref 3.5–5.1)
PROT SERPL-MCNC: 7.6 G/DL (ref 6–8.4)
RBC # BLD AUTO: 4.97 M/UL (ref 4–5.4)
SODIUM SERPL-SCNC: 142 MMOL/L (ref 136–145)
TRIGL SERPL-MCNC: 149 MG/DL (ref 30–150)
WBC # BLD AUTO: 8.6 K/UL (ref 3.9–12.7)

## 2020-07-08 PROCEDURE — 85025 COMPLETE CBC W/AUTO DIFF WBC: CPT

## 2020-07-08 PROCEDURE — 80053 COMPREHEN METABOLIC PANEL: CPT

## 2020-07-08 PROCEDURE — 36415 COLL VENOUS BLD VENIPUNCTURE: CPT | Mod: PO

## 2020-07-08 PROCEDURE — 83036 HEMOGLOBIN GLYCOSYLATED A1C: CPT

## 2020-07-08 PROCEDURE — 80061 LIPID PANEL: CPT

## 2020-07-15 DIAGNOSIS — Z71.89 COMPLEX CARE COORDINATION: ICD-10-CM

## 2020-07-15 NOTE — PROGRESS NOTES
"Subjective:       Patient ID: Eugenie Coates is a 70 y.o. female.    Chief Complaint: No chief complaint on file.    Yazmin is a 70 y.o. female who presents today for follow up on her chronic medical issues.      Has gained weight.       HTN: Taking lisinopril 40 mg, nifidipine 90 mg was started. Amlodipine was stopped. She is also taking metoprolol 25 mg BID. Can't take HCTZ due to reported palpitations. She is not having headache, double vision, blurry vision. She is using an arm cuff. Intermittent leg swelling due to the nifedipine. Reports BP at home is much better; 130's/80's.     DM: taking metformin once daily. A1c is stable. She has been watching what she eats.   DLD: on statin. Cards changed from atorvastatin to rosuvastatin. LDL still >100.     Thyroid Abnormalities: last TSH stable. Per EMR: " recommended treatment with low dose methimazole or ANGULO ablation. She does not want to do either at this time, but agreed that if she has further issues with arrhythmia or the TSH declines further we would have to treat the HELEN."    Pulmonary Issues: mild restriction. No obstruction on PFT. Breathing is improved. No acute symptoms today.   SVT: seeing EP. Had RFA of SVT. No issues since then.     Hypercalcemia: resolved    GERD: taking PPI. This is helping her GERD.     CKD: saw Dr. Chacon. F/u annually. Labs stable.     DVT: She was taken off Eliquis in 2018 by Dr. Carrizales    VI: had surgery on right leg outside of [facility].     Weight is up a little bit, due to covid.     Labs as below reviewed in detail.     Hypertension  This is a recurrent problem. The current episode started more than 1 year ago. The problem has been gradually improving since onset. The problem is controlled. Associated symptoms include anxiety and sweats. Pertinent negatives include no blurred vision, chest pain, headaches, malaise/fatigue, neck pain, orthopnea, palpitations, peripheral edema, PND or shortness of breath. There are no " associated agents to hypertension. Risk factors for coronary artery disease include dyslipidemia, family history and obesity. Past treatments include nothing. The current treatment provides moderate improvement. There are no compliance problems.      Review of Systems   Constitutional: Negative for malaise/fatigue.   Eyes: Negative for blurred vision.   Respiratory: Negative for shortness of breath.    Cardiovascular: Negative for chest pain, palpitations, orthopnea and PND.   Musculoskeletal: Negative for neck pain.   Neurological: Negative for headaches.       Results for orders placed or performed in visit on 07/08/20   CBC auto differential   Result Value Ref Range    WBC 8.60 3.90 - 12.70 K/uL    RBC 4.97 4.00 - 5.40 M/uL    Hemoglobin 13.5 12.0 - 16.0 g/dL    Hematocrit 44.7 37.0 - 48.5 %    Mean Corpuscular Volume 90 82 - 98 fL    Mean Corpuscular Hemoglobin 27.2 27.0 - 31.0 pg    Mean Corpuscular Hemoglobin Conc 30.2 (L) 32.0 - 36.0 g/dL    RDW 13.3 11.5 - 14.5 %    Platelets 247 150 - 350 K/uL    MPV 11.4 9.2 - 12.9 fL    Immature Granulocytes 0.9 (H) 0.0 - 0.5 %    Gran # (ANC) 5.5 1.8 - 7.7 K/uL    Immature Grans (Abs) 0.08 (H) 0.00 - 0.04 K/uL    Lymph # 2.2 1.0 - 4.8 K/uL    Mono # 0.7 0.3 - 1.0 K/uL    Eos # 0.2 0.0 - 0.5 K/uL    Baso # 0.06 0.00 - 0.20 K/uL    nRBC 0 0 /100 WBC    Gran% 63.9 38.0 - 73.0 %    Lymph% 25.2 18.0 - 48.0 %    Mono% 7.6 4.0 - 15.0 %    Eosinophil% 1.7 0.0 - 8.0 %    Basophil% 0.7 0.0 - 1.9 %    Differential Method Automated    Comprehensive metabolic panel   Result Value Ref Range    Sodium 142 136 - 145 mmol/L    Potassium 4.5 3.5 - 5.1 mmol/L    Chloride 108 95 - 110 mmol/L    CO2 24 23 - 29 mmol/L    Glucose 94 70 - 110 mg/dL    BUN, Bld 16 8 - 23 mg/dL    Creatinine 1.1 0.5 - 1.4 mg/dL    Calcium 9.6 8.7 - 10.5 mg/dL    Total Protein 7.6 6.0 - 8.4 g/dL    Albumin 3.8 3.5 - 5.2 g/dL    Total Bilirubin 0.8 0.1 - 1.0 mg/dL    Alkaline Phosphatase 98 55 - 135 U/L    AST 21  10 - 40 U/L    ALT 16 10 - 44 U/L    Anion Gap 10 8 - 16 mmol/L    eGFR if African American 59.2 (A) >60 mL/min/1.73 m^2    eGFR if non  51.3 (A) >60 mL/min/1.73 m^2   Hemoglobin A1c   Result Value Ref Range    Hemoglobin A1C 6.5 (H) 4.0 - 5.6 %    Estimated Avg Glucose 140 (H) 68 - 131 mg/dL   Lipid panel   Result Value Ref Range    Cholesterol 226 (H) 120 - 199 mg/dL    Triglycerides 149 30 - 150 mg/dL    HDL 54 40 - 75 mg/dL    LDL Cholesterol 142.2 63.0 - 159.0 mg/dL    Hdl/Cholesterol Ratio 23.9 20.0 - 50.0 %    Total Cholesterol/HDL Ratio 4.2 2.0 - 5.0    Non-HDL Cholesterol 172 mg/dL       Objective:     Vitals:    07/16/20 0926   BP: (!) 150/80   Pulse: 67   Temp: 98.2 °F (36.8 °C)        Physical Exam  Vitals signs and nursing note reviewed.   Constitutional:       General: She is not in acute distress.     Appearance: She is well-developed. She is not diaphoretic.   HENT:      Head: Normocephalic and atraumatic.   Eyes:      Conjunctiva/sclera: Conjunctivae normal.   Cardiovascular:      Rate and Rhythm: Normal rate and regular rhythm.      Heart sounds: Murmur present.   Pulmonary:      Effort: Pulmonary effort is normal. No respiratory distress.      Breath sounds: Normal breath sounds. No stridor.   Abdominal:      Palpations: Abdomen is soft.      Tenderness: There is no abdominal tenderness.   Musculoskeletal:      Comments: Trace pitting edema BL   Feet:      Right foot:      Protective Sensation: 10 sites tested. 10 sites sensed.      Left foot:      Protective Sensation: 10 sites tested. 10 sites sensed.   Skin:     General: Skin is warm.   Neurological:      Mental Status: She is alert and oriented to person, place, and time.   Psychiatric:         Behavior: Behavior normal.         Thought Content: Thought content normal.         Judgment: Judgment normal.         Assessment:       1. Type 2 diabetes mellitus with hyperglycemia, without long-term current use of insulin    2.  Diabetes mellitus type 2 without retinopathy    3. BMI 33.0-33.9,adult    4. CKD (chronic kidney disease) stage 3, GFR 30-59 ml/min    5. Essential hypertension    6. Mixed hyperlipidemia    7. Gastroesophageal reflux disease, esophagitis presence not specified    8. Encounter for screening mammogram for malignant neoplasm of breast        Plan:         Continue lisinopril 40 mg  Continue nifedipine 90 mg  Reports BP at home are much better    Keep log of BP at home. Bring cuff to next visit. If BP at home are higher, change medications. White coat HTN?    Can't take HCTZ due to reported palpitations  Can't increase BB due to reported higher pressures after ablation when on higher dose.   Continue twice daily metoprolol     Continue metformin 500 mg once daily.     Continue BID omeprazole for GERD.      Continue pravastatin    Start zetia for cholesterol    Keep F/u in 4 months. Weight loss. Dash diet. Labs prior.     Does mammogram in [place]. Will bring me records.     Type 2 diabetes mellitus with hyperglycemia, without long-term current use of insulin  -     metFORMIN (GLUCOPHAGE) 500 MG tablet; TAKE 1 TABLET DAILY WITH BREAKFAST  Dispense: 90 tablet; Refill: 0  -     Hemoglobin A1C; Future; Expected date: 07/16/2020    Diabetes mellitus type 2 without retinopathy    BMI 33.0-33.9,adult    CKD (chronic kidney disease) stage 3, GFR 30-59 ml/min    Essential hypertension  -     metoprolol tartrate (LOPRESSOR) 25 MG tablet; Take 1 tablet (25 mg total) by mouth 2 (two) times daily.  Dispense: 180 tablet; Refill: 3  -     NIFEdipine (ADALAT CC) 90 MG TbSR; Take 1 tablet (90 mg total) by mouth once daily.  Dispense: 90 tablet; Refill: 0  -     Basic metabolic panel; Future; Expected date: 07/16/2020    Mixed hyperlipidemia  -     ezetimibe (ZETIA) 10 mg tablet; Take 1 tablet (10 mg total) by mouth once daily.  Dispense: 90 tablet; Refill: 3  -     rosuvastatin (CRESTOR) 40 MG Tab; Take 1 tablet (40 mg total) by  mouth every evening.  Dispense: 90 tablet; Refill: 3  -     Lipid Panel; Future; Expected date: 07/16/2020    Gastroesophageal reflux disease, esophagitis presence not specified  -     omeprazole (PRILOSEC) 40 MG capsule; Take 1 capsule (40 mg total) by mouth 2 (two) times daily before meals.  Dispense: 180 capsule; Refill: 1    Encounter for screening mammogram for malignant neoplasm of breast  -     Cancel: Mammo Digital Screening Bilat; Standing        Warning signs discussed, patient to call with any further issues or worsening of symptoms.

## 2020-07-16 ENCOUNTER — OFFICE VISIT (OUTPATIENT)
Dept: FAMILY MEDICINE | Facility: CLINIC | Age: 70
End: 2020-07-16
Payer: MEDICARE

## 2020-07-16 VITALS
HEIGHT: 62 IN | WEIGHT: 181 LBS | TEMPERATURE: 98 F | HEART RATE: 67 BPM | SYSTOLIC BLOOD PRESSURE: 150 MMHG | DIASTOLIC BLOOD PRESSURE: 80 MMHG | BODY MASS INDEX: 33.31 KG/M2 | OXYGEN SATURATION: 98 %

## 2020-07-16 DIAGNOSIS — E11.65 TYPE 2 DIABETES MELLITUS WITH HYPERGLYCEMIA, WITHOUT LONG-TERM CURRENT USE OF INSULIN: Primary | ICD-10-CM

## 2020-07-16 DIAGNOSIS — I10 ESSENTIAL HYPERTENSION: ICD-10-CM

## 2020-07-16 DIAGNOSIS — E78.2 MIXED HYPERLIPIDEMIA: ICD-10-CM

## 2020-07-16 DIAGNOSIS — Z12.31 ENCOUNTER FOR SCREENING MAMMOGRAM FOR MALIGNANT NEOPLASM OF BREAST: ICD-10-CM

## 2020-07-16 DIAGNOSIS — N18.30 CKD (CHRONIC KIDNEY DISEASE) STAGE 3, GFR 30-59 ML/MIN: ICD-10-CM

## 2020-07-16 DIAGNOSIS — E11.9 DIABETES MELLITUS TYPE 2 WITHOUT RETINOPATHY: ICD-10-CM

## 2020-07-16 DIAGNOSIS — K21.9 GASTROESOPHAGEAL REFLUX DISEASE, ESOPHAGITIS PRESENCE NOT SPECIFIED: ICD-10-CM

## 2020-07-16 PROCEDURE — 99999 PR PBB SHADOW E&M-EST. PATIENT-LVL V: CPT | Mod: PBBFAC,,, | Performed by: FAMILY MEDICINE

## 2020-07-16 PROCEDURE — 99999 PR PBB SHADOW E&M-EST. PATIENT-LVL V: ICD-10-PCS | Mod: PBBFAC,,, | Performed by: FAMILY MEDICINE

## 2020-07-16 PROCEDURE — 99215 OFFICE O/P EST HI 40 MIN: CPT | Mod: PBBFAC,PO | Performed by: FAMILY MEDICINE

## 2020-07-16 PROCEDURE — 99214 PR OFFICE/OUTPT VISIT, EST, LEVL IV, 30-39 MIN: ICD-10-PCS | Mod: S$PBB,,, | Performed by: FAMILY MEDICINE

## 2020-07-16 PROCEDURE — 99214 OFFICE O/P EST MOD 30 MIN: CPT | Mod: S$PBB,,, | Performed by: FAMILY MEDICINE

## 2020-07-16 RX ORDER — EZETIMIBE 10 MG/1
10 TABLET ORAL DAILY
Qty: 90 TABLET | Refills: 3 | Status: SHIPPED | OUTPATIENT
Start: 2020-07-16 | End: 2020-11-03

## 2020-07-16 RX ORDER — OMEPRAZOLE 40 MG/1
40 CAPSULE, DELAYED RELEASE ORAL
Qty: 180 CAPSULE | Refills: 1 | Status: SHIPPED | OUTPATIENT
Start: 2020-07-16 | End: 2021-04-01 | Stop reason: SDUPTHER

## 2020-07-16 RX ORDER — NIFEDIPINE 90 MG/1
90 TABLET, FILM COATED, EXTENDED RELEASE ORAL DAILY
Qty: 90 TABLET | Refills: 0 | Status: ON HOLD | OUTPATIENT
Start: 2020-07-16 | End: 2020-12-07 | Stop reason: SDUPTHER

## 2020-07-16 RX ORDER — METOPROLOL TARTRATE 25 MG/1
25 TABLET, FILM COATED ORAL 2 TIMES DAILY
Qty: 180 TABLET | Refills: 3 | Status: SHIPPED | OUTPATIENT
Start: 2020-07-16 | End: 2021-08-23

## 2020-07-16 RX ORDER — METFORMIN HYDROCHLORIDE 500 MG/1
TABLET ORAL
Qty: 90 TABLET | Refills: 0 | Status: SHIPPED | OUTPATIENT
Start: 2020-07-16 | End: 2020-11-05 | Stop reason: SDUPTHER

## 2020-07-16 RX ORDER — ROSUVASTATIN CALCIUM 40 MG/1
40 TABLET, COATED ORAL NIGHTLY
Qty: 90 TABLET | Refills: 3 | Status: SHIPPED | OUTPATIENT
Start: 2020-07-16 | End: 2021-08-23

## 2020-07-16 NOTE — PATIENT INSTRUCTIONS
Continue lisinopril 40 mg  Continue nifedipine 90 mg  Reports BP at home are much better    Keep log of BP at home. Bring cuff to next visit. If BP at home are higher, change medications. White coat HTN?    Can't take HCTZ due to reported palpitations  Can't increase BB due to reported higher pressures after ablation when on higher dose.   Continue twice daily metoprolol     Continue metformin 500 mg once daily.     Continue BID omeprazole for GERD.      Continue pravastatin    Start zetia for cholesterol    Keep F/u in 4 months. Weight loss. Dash diet.       Diabetes Management Status    Statin: Taking  ACE/ARB: Taking    Screening or Prevention Patient's value Goal Complete/Controlled?   HgA1C Testing and Control   Lab Results   Component Value Date    HGBA1C 6.5 (H) 07/08/2020      Annually/Less than 8% Yes   Lipid profile : 07/08/2020 Annually Yes   LDL control Lab Results   Component Value Date    LDLCALC 142.2 07/08/2020    Annually/Less than 100 mg/dl  No   Nephropathy screening Lab Results   Component Value Date    LABMICR 12.0 01/23/2018     Lab Results   Component Value Date    PROTEINUA Negative 11/29/2016    Annually No   Blood pressure BP Readings from Last 1 Encounters:   07/16/20 (!) 150/80    Less than 140/90 No   Dilated retinal exam : 06/11/2020 Annually Yes   Foot exam   : 06/18/2019 Annually No

## 2020-08-18 ENCOUNTER — HOSPITAL ENCOUNTER (EMERGENCY)
Facility: HOSPITAL | Age: 70
Discharge: HOME | End: 2020-08-18
Attending: EMERGENCY MEDICINE
Payer: MEDICARE

## 2020-08-18 VITALS
SYSTOLIC BLOOD PRESSURE: 228 MMHG | HEART RATE: 81 BPM | TEMPERATURE: 98 F | WEIGHT: 180 LBS | BODY MASS INDEX: 33.13 KG/M2 | HEIGHT: 62 IN | RESPIRATION RATE: 18 BRPM | DIASTOLIC BLOOD PRESSURE: 105 MMHG | OXYGEN SATURATION: 98 %

## 2020-08-18 DIAGNOSIS — M54.9 MUSCULOSKELETAL BACK PAIN: Primary | ICD-10-CM

## 2020-08-18 PROCEDURE — 96372 THER/PROPH/DIAG INJ SC/IM: CPT

## 2020-08-18 PROCEDURE — 99284 EMERGENCY DEPT VISIT MOD MDM: CPT | Mod: 25

## 2020-08-18 PROCEDURE — 63600175 PHARM REV CODE 636 W HCPCS: Performed by: EMERGENCY MEDICINE

## 2020-08-18 RX ORDER — METHOCARBAMOL 500 MG/1
1000 TABLET, FILM COATED ORAL 3 TIMES DAILY PRN
Qty: 20 TABLET | Refills: 0 | Status: SHIPPED | OUTPATIENT
Start: 2020-08-18 | End: 2020-08-23

## 2020-08-18 RX ORDER — ORPHENADRINE CITRATE 30 MG/ML
30 INJECTION INTRAMUSCULAR; INTRAVENOUS
Status: COMPLETED | OUTPATIENT
Start: 2020-08-18 | End: 2020-08-18

## 2020-08-18 RX ADMIN — ORPHENADRINE CITRATE 30 MG: 30 INJECTION INTRAMUSCULAR; INTRAVENOUS at 10:08

## 2020-08-18 NOTE — ED PROVIDER NOTES
Encounter Date: 2020    SCRIBE #1 NOTE: Joana MENDENHALL am scribing for, and in the presence of, Dr. Quiroz.       History     Chief Complaint   Patient presents with    Back Pain     lower back x 1 week     Extremity Weakness     states rt. arm weak x 1 week / neck pain chronic      2020  10:44 AM     The patient is a 70 y.o. female  who presents with back pain. Patient c/o acute constant pains to the bilateral lower back pain which worsened on the right for the past week. Pt also c/o sharp pains to the right arm radiating up to her neck. Pt has chronic right hand pain due to carpal tunnel since . No relief with ibuprofen. She denies any ha, nausea, vomiting, changes in vision or speech. No cp, fevers or trouble urinating. No recent falls or injuries. PMHx of DVT, SVT, HLD, HTN, type II DM, anemia and arthritis. SHx of cholecystectomy, c section, hernia repair, vein surgery and right knee surgery.    The history is provided by the patient.     Review of patient's allergies indicates:   Allergen Reactions    Percodan [oxycodone hcl-oxycodone-asa] Hives and Itching     Past Medical History:   Diagnosis Date    Allergy     Anemia     Anxiety     Arthritis     Asthma     Bell palsy     Depression     Diabetes mellitus, type 2     DVT (deep venous thrombosis)     Endometriosis     Eye injury     stuck with tree branch od ?     GERD (gastroesophageal reflux disease)     Glaucoma     History of uterine fibroid     Hyperlipidemia     Hypertension     Nuclear sclerosis of both eyes 2016    Sleep apnea     uses C pap    Supraventricular tachycardia     SVT (supraventricular tachycardia) 2019    Thyroid disease      Past Surgical History:   Procedure Laterality Date     SECTION      CHOLECYSTECTOMY      glaucoma laser Bilateral     HERNIA REPAIR      KNEE SURGERY Right     (R) knee scope; torn meniscus    VEIN SURGERY  ,     Rt leg x2     Family  History   Problem Relation Age of Onset    Diabetes Mother     No Known Problems Father     No Known Problems Sister     No Known Problems Brother     Colon cancer Maternal Aunt     No Known Problems Maternal Uncle     No Known Problems Paternal Aunt     No Known Problems Paternal Uncle     No Known Problems Maternal Grandmother     No Known Problems Maternal Grandfather     No Known Problems Paternal Grandmother     No Known Problems Paternal Grandfather     Amblyopia Neg Hx     Blindness Neg Hx     Cancer Neg Hx     Cataracts Neg Hx     Glaucoma Neg Hx     Hypertension Neg Hx     Macular degeneration Neg Hx     Retinal detachment Neg Hx     Strabismus Neg Hx     Stroke Neg Hx     Thyroid disease Neg Hx     Celiac disease Neg Hx     Colon polyps Neg Hx     Esophageal cancer Neg Hx     Inflammatory bowel disease Neg Hx     Irritable bowel syndrome Neg Hx     Liver cancer Neg Hx     Liver disease Neg Hx     Rectal cancer Neg Hx     Stomach cancer Neg Hx     Ulcerative colitis Neg Hx     Cystic fibrosis Neg Hx     Crohn's disease Neg Hx     Hemochromatosis Neg Hx     Cirrhosis Neg Hx      Social History     Tobacco Use    Smoking status: Never Smoker    Smokeless tobacco: Never Used   Substance Use Topics    Alcohol use: No     Alcohol/week: 0.0 standard drinks     Frequency: Never     Drinks per session: Patient refused     Binge frequency: Never    Drug use: No     Review of Systems   Constitutional: Negative for appetite change, chills and fever.   HENT: Negative for congestion, rhinorrhea and sore throat.    Respiratory: Negative for cough and shortness of breath.    Cardiovascular: Negative for chest pain.   Gastrointestinal: Negative for abdominal pain, diarrhea, nausea and vomiting.   Genitourinary: Negative for dysuria.   Musculoskeletal: Positive for arthralgias, back pain and neck pain. Negative for myalgias.   Skin: Negative for rash.   Neurological: Negative for  weakness and numbness.   Hematological: Does not bruise/bleed easily.       Physical Exam     Initial Vitals [08/18/20 1013]   BP Pulse Resp Temp SpO2   (!) 228/105 96 18 98.4 °F (36.9 °C) 97 %      MAP       --         Physical Exam    Nursing note and vitals reviewed.  Constitutional: She appears well-developed and well-nourished. She is not diaphoretic. No distress.   HENT:   Head: Normocephalic and atraumatic.   Neck: Normal range of motion. Neck supple.   Cardiovascular: Normal rate, regular rhythm, normal heart sounds and intact distal pulses. Exam reveals no gallop and no friction rub.    No murmur heard.  Pulmonary/Chest: Breath sounds normal. No respiratory distress. She has no wheezes. She has no rhonchi. She has no rales.   Abdominal: Soft. She exhibits no distension and no mass. There is no abdominal tenderness.   Musculoskeletal: Normal range of motion. Tenderness present. No edema.      Comments: Point TTP to the lower mid lumbar region. TTP across trapezius muscles.   Neurological: She is alert and oriented to person, place, and time. She has normal strength. No sensory deficit.   Skin: Skin is warm and dry. No rash and no abscess noted. No erythema.   Psychiatric: She has a normal mood and affect.         ED Course   Procedures  Labs Reviewed - No data to display       Imaging Results    None          Medical Decision Making:   History:   Old Medical Records: I decided to obtain old medical records.  Initial Assessment:   70-year-old female presented with back pain.  Differential Diagnosis:   Initial differential diagnosis included but not limited to osteoarthritis, musculoskeletal pain, and degenerative disc disease.  ED Management:  Patient was emergently evaluated in the emergency department, her evaluation was significant for an elderly female with tenderness to the lower back.  The patient likely has musculoskeletal back pain. The patient was treated with parental Norflex here in the emergency  department.  The patient is stable for discharge to home.  The patient will be discharged home with p.o. Robaxin and she is to otherwise follow with her PCP for further care.  The patient has no red flags noted and I do not believe that she needs any radiologic imaging at this time.            Scribe Attestation:   Scribe #1: I performed the above scribed service and the documentation accurately describes the services I performed. I attest to the accuracy of the note.               I, Dr. Polo Quiroz, personally performed the services described in this documentation. All medical record entries made by the scribe were at my direction and in my presence.  I have reviewed the chart and agree that the record reflects my personal performance and is accurate and complete. Polo Quiroz MD.  1:19 PM 08/18/2020             Clinical Impression:       ICD-10-CM ICD-9-CM   1. Musculoskeletal back pain  M54.9 724.5         Disposition:   Disposition: Discharged  Condition: Stable     ED Disposition Condition    Discharge Stable        ED Prescriptions     Medication Sig Dispense Start Date End Date Auth. Provider    methocarbamoL (ROBAXIN) 500 MG Tab Take 2 tablets (1,000 mg total) by mouth 3 (three) times daily as needed (pain). 20 tablet 8/18/2020 8/23/2020 Polo Quiroz MD        Follow-up Information     Follow up With Specialties Details Why Contact Info    Haile Paul,  Family Medicine Schedule an appointment as soon as possible for a visit   [address] [place] [address]  [phone]                                       Polo Quiroz MD  08/18/20 9057

## 2020-10-22 ENCOUNTER — PATIENT MESSAGE (OUTPATIENT)
Dept: FAMILY MEDICINE | Facility: CLINIC | Age: 70
End: 2020-10-22

## 2020-10-22 ENCOUNTER — TELEPHONE (OUTPATIENT)
Dept: FAMILY MEDICINE | Facility: CLINIC | Age: 70
End: 2020-10-22

## 2020-10-22 DIAGNOSIS — Z85.3 HISTORY OF RIGHT BREAST CANCER: Primary | ICD-10-CM

## 2020-10-22 NOTE — TELEPHONE ENCOUNTER
I spoke with patient and she states she would like a second opinion. Patent state that her first Mammogram was 10/06/2020 , US was 10/07/2020. Patient state that she was called back for a repeat mammogram on 10/20/2020 and did a biopsy on her right  breast on 10/21/2020 and was told she had cancer today this was done at [facility] in [place], [place]. Patient would like a second opinion.I advised patient that we would need records. Patient voiced understanding.

## 2020-10-22 NOTE — TELEPHONE ENCOUNTER
Reports right breast cancer. Wants to not be seen in [place].     Wants to be seen in [facility].    Records requested. She will get those to me tomorrow.     Will refer to hematology and breast surgery    Carlos Enrique, can you set up a New patient apt with you and who do you recommend for breast surgery?

## 2020-10-22 NOTE — TELEPHONE ENCOUNTER
----- Message from Otilia Garza sent at 10/22/2020  4:03 PM CDT -----  Type:  Patient Returning Call    Who Called: pt   Who Left Message for Patient: pt  Does the patient know what this is regarding? Pt want a call back she have a question   Would the patient rather a call back or a response via [facility]?  Call   Best Call Back Number:[phone]  Additional Information:  call back

## 2020-10-23 ENCOUNTER — TELEPHONE (OUTPATIENT)
Dept: FAMILY MEDICINE | Facility: CLINIC | Age: 70
End: 2020-10-23

## 2020-10-23 NOTE — TELEPHONE ENCOUNTER
----- Message from Sherri Mehta sent at 10/23/2020  9:58 AM CDT -----  Type:  Patient Call    Who Called: Eugenie Lane Call Back Number: [phone]  Additional Information:  will be faxing over medical records now

## 2020-10-23 NOTE — TELEPHONE ENCOUNTER
Paulie Monaco,   Thanks for the message  [facility] may be the best, how do I get her set up there?    Is there a good way to get them records? I just received them today.

## 2020-10-30 ENCOUNTER — TELEPHONE (OUTPATIENT)
Dept: SURGERY | Facility: CLINIC | Age: 70
End: 2020-10-30

## 2020-10-30 ENCOUNTER — DOCUMENTATION ONLY (OUTPATIENT)
Dept: HEMATOLOGY/ONCOLOGY | Facility: CLINIC | Age: 70
End: 2020-10-30

## 2020-10-30 NOTE — NURSING
CD of imaging received, still awaiting slides. Will call to schedule next week.  Oncology Navigation   Intake  Date of Diagnosis: 10/20/20  Cancer Type: Breast  Internal / External Referral: External  Referral Source: [facility]  Date of Referral: 10/23/20  Initial Nurse Navigator Contact: 10/29/20  Referral to Initial Contact Timeline (days): 6  Date Worked: 10/30/20  Reason if booked > 7 days after scheduling: Waiting on records     Treatment                    ER: Positive  PA: Negative  Her2: Negative           Acuity      Follow Up  No follow-ups on file.

## 2020-10-30 NOTE — NURSING
Oncology Navigation   Intake  Date of Diagnosis: 10/20/20  Cancer Type: Breast  Internal / External Referral: External  Referral Source: [facility]  Date of Referral: 10/23/20  Initial Nurse Navigator Contact: 10/29/20  Referral to Initial Contact Timeline (days): 6  Date Worked: 10/30/20  Reason if booked > 7 days after scheduling: Waiting on records     Treatment  Current Status: Staging work-up    Surgical Oncologist: Dr.Aimee Keating  Consult Date: 11/05/20                ER: Positive  WV: Negative  Her2: Negative           Acuity      Follow Up  No follow-ups on file.

## 2020-10-30 NOTE — TELEPHONE ENCOUNTER
CD of imaging received & brought to be uploaded. Called pt & scheduled her for next Thursday with . Confirmed location. MRI not scheduled as pt reports being claustrophobic.

## 2020-10-31 ENCOUNTER — PATIENT OUTREACH (OUTPATIENT)
Dept: ADMINISTRATIVE | Facility: OTHER | Age: 70
End: 2020-10-31

## 2020-10-31 DIAGNOSIS — Z12.31 ENCOUNTER FOR SCREENING MAMMOGRAM FOR MALIGNANT NEOPLASM OF BREAST: Primary | ICD-10-CM

## 2020-10-31 NOTE — PROGRESS NOTES
Health Maintenance Due   Topic Date Due    Foot Exam  06/18/2020    Influenza Vaccine (1) 08/01/2020    Mammogram  10/03/2020     Updates were requested from care everywhere.  Chart was reviewed for overdue Proactive [facility] Encounters ([facility]) topics (CRS, Breast Cancer Screening, Eye exam)  Health Maintenance has been updated.  LIN[facility] immunization registry triggered.  Immunizations were reconciled.  [employer] searched for mammogram. No record found.   Mammogram ordered and tasked to patient.

## 2020-11-02 ENCOUNTER — HOSPITAL ENCOUNTER (OUTPATIENT)
Dept: RADIOLOGY | Facility: HOSPITAL | Age: 70
Discharge: HOME | End: 2020-11-02
Attending: SURGERY
Payer: MEDICARE

## 2020-11-03 ENCOUNTER — OFFICE VISIT (OUTPATIENT)
Dept: NEPHROLOGY | Facility: CLINIC | Age: 70
End: 2020-11-03
Payer: MEDICARE

## 2020-11-03 ENCOUNTER — PATIENT MESSAGE (OUTPATIENT)
Dept: NEPHROLOGY | Facility: CLINIC | Age: 70
End: 2020-11-03

## 2020-11-03 ENCOUNTER — LAB VISIT (OUTPATIENT)
Dept: LAB | Facility: HOSPITAL | Age: 70
End: 2020-11-03
Attending: FAMILY MEDICINE
Payer: MEDICARE

## 2020-11-03 ENCOUNTER — OFFICE VISIT (OUTPATIENT)
Dept: PODIATRY | Facility: CLINIC | Age: 70
End: 2020-11-03
Payer: MEDICARE

## 2020-11-03 VITALS
SYSTOLIC BLOOD PRESSURE: 154 MMHG | OXYGEN SATURATION: 98 % | HEIGHT: 62 IN | WEIGHT: 195.13 LBS | BODY MASS INDEX: 35.91 KG/M2 | DIASTOLIC BLOOD PRESSURE: 94 MMHG | HEART RATE: 100 BPM

## 2020-11-03 VITALS — BODY MASS INDEX: 35.91 KG/M2 | WEIGHT: 195.13 LBS | HEIGHT: 62 IN

## 2020-11-03 DIAGNOSIS — E78.2 MIXED HYPERLIPIDEMIA: ICD-10-CM

## 2020-11-03 DIAGNOSIS — M20.41 HAMMER TOES OF BOTH FEET: ICD-10-CM

## 2020-11-03 DIAGNOSIS — E11.65 TYPE 2 DIABETES MELLITUS WITH HYPERGLYCEMIA, WITHOUT LONG-TERM CURRENT USE OF INSULIN: ICD-10-CM

## 2020-11-03 DIAGNOSIS — E11.65 TYPE 2 DIABETES MELLITUS WITH HYPERGLYCEMIA, WITHOUT LONG-TERM CURRENT USE OF INSULIN: Primary | ICD-10-CM

## 2020-11-03 DIAGNOSIS — I10 ESSENTIAL HYPERTENSION: ICD-10-CM

## 2020-11-03 DIAGNOSIS — M20.42 HAMMER TOES OF BOTH FEET: ICD-10-CM

## 2020-11-03 DIAGNOSIS — E66.01 SEVERE OBESITY (BMI 35.0-35.9 WITH COMORBIDITY): ICD-10-CM

## 2020-11-03 DIAGNOSIS — I10 ESSENTIAL HYPERTENSION: Primary | ICD-10-CM

## 2020-11-03 DIAGNOSIS — B35.1 ONYCHOMYCOSIS DUE TO DERMATOPHYTE: ICD-10-CM

## 2020-11-03 LAB
ANION GAP SERPL CALC-SCNC: 10 MMOL/L (ref 8–16)
BUN SERPL-MCNC: 15 MG/DL (ref 8–23)
CALCIUM SERPL-MCNC: 10.1 MG/DL (ref 8.7–10.5)
CHLORIDE SERPL-SCNC: 104 MMOL/L (ref 95–110)
CHOLEST SERPL-MCNC: 230 MG/DL (ref 120–199)
CHOLEST/HDLC SERPL: 4.4 {RATIO} (ref 2–5)
CO2 SERPL-SCNC: 27 MMOL/L (ref 23–29)
CREAT SERPL-MCNC: 1.1 MG/DL (ref 0.5–1.4)
EST. GFR  (AFRICAN AMERICAN): 59.2 ML/MIN/1.73 M^2
EST. GFR  (NON AFRICAN AMERICAN): 51.3 ML/MIN/1.73 M^2
ESTIMATED AVG GLUCOSE: 151 MG/DL (ref 68–131)
GLUCOSE SERPL-MCNC: 118 MG/DL (ref 70–110)
HBA1C MFR BLD HPLC: 6.9 % (ref 4–5.6)
HDLC SERPL-MCNC: 52 MG/DL (ref 40–75)
HDLC SERPL: 22.6 % (ref 20–50)
LDLC SERPL CALC-MCNC: 147 MG/DL (ref 63–159)
NONHDLC SERPL-MCNC: 178 MG/DL
POTASSIUM SERPL-SCNC: 3.9 MMOL/L (ref 3.5–5.1)
SODIUM SERPL-SCNC: 141 MMOL/L (ref 136–145)
TRIGL SERPL-MCNC: 155 MG/DL (ref 30–150)

## 2020-11-03 PROCEDURE — 11721 DEBRIDE NAIL 6 OR MORE: CPT | Mod: Q9,S$PBB,, | Performed by: PODIATRIST

## 2020-11-03 PROCEDURE — 99499 NO LOS: ICD-10-PCS | Mod: S$PBB,,, | Performed by: PODIATRIST

## 2020-11-03 PROCEDURE — 99213 OFFICE O/P EST LOW 20 MIN: CPT | Mod: PBBFAC,27,PN,25 | Performed by: PODIATRIST

## 2020-11-03 PROCEDURE — 11721 DEBRIDE NAIL 6 OR MORE: CPT | Mod: PBBFAC,PN | Performed by: PODIATRIST

## 2020-11-03 PROCEDURE — 99999 PR PBB SHADOW E&M-EST. PATIENT-LVL III: ICD-10-PCS | Mod: PBBFAC,,, | Performed by: INTERNAL MEDICINE

## 2020-11-03 PROCEDURE — 99999 PR PBB SHADOW E&M-EST. PATIENT-LVL III: ICD-10-PCS | Mod: PBBFAC,,, | Performed by: PODIATRIST

## 2020-11-03 PROCEDURE — 99999 PR PBB SHADOW E&M-EST. PATIENT-LVL III: CPT | Mod: PBBFAC,,, | Performed by: INTERNAL MEDICINE

## 2020-11-03 PROCEDURE — 99213 OFFICE O/P EST LOW 20 MIN: CPT | Mod: S$PBB,,, | Performed by: INTERNAL MEDICINE

## 2020-11-03 PROCEDURE — 99999 PR PBB SHADOW E&M-EST. PATIENT-LVL III: CPT | Mod: PBBFAC,,, | Performed by: PODIATRIST

## 2020-11-03 PROCEDURE — 83036 HEMOGLOBIN GLYCOSYLATED A1C: CPT

## 2020-11-03 PROCEDURE — 80048 BASIC METABOLIC PNL TOTAL CA: CPT

## 2020-11-03 PROCEDURE — 36415 COLL VENOUS BLD VENIPUNCTURE: CPT | Mod: PO

## 2020-11-03 PROCEDURE — 80061 LIPID PANEL: CPT

## 2020-11-03 PROCEDURE — 99213 OFFICE O/P EST LOW 20 MIN: CPT | Mod: PBBFAC,PO,25 | Performed by: INTERNAL MEDICINE

## 2020-11-03 PROCEDURE — 11721 PR DEBRIDEMENT OF NAILS, 6 OR MORE: ICD-10-PCS | Mod: Q9,S$PBB,, | Performed by: PODIATRIST

## 2020-11-03 PROCEDURE — 99499 UNLISTED E&M SERVICE: CPT | Mod: S$PBB,,, | Performed by: PODIATRIST

## 2020-11-03 PROCEDURE — 99213 PR OFFICE/OUTPT VISIT, EST, LEVL III, 20-29 MIN: ICD-10-PCS | Mod: S$PBB,,, | Performed by: INTERNAL MEDICINE

## 2020-11-03 NOTE — PROGRESS NOTES
"Subjective:       Patient ID: Eugenie Coates is a 70 y.o. Black or  female who presents for return patient evaluation for hypertension.    She has no uremic or urinary symptoms and is in her usual state of health.  She recently had a breast biopsy and is getting a second opinion.  She states her blood pressures are controlled at home.  114-135/55-71.  Pulse is 64-91.      Review of Systems   Constitutional: Negative for appetite change, chills and fever.   Eyes: Negative for visual disturbance.   Respiratory: Negative for cough and shortness of breath.    Cardiovascular: Positive for leg swelling (occasional). Negative for chest pain.   Gastrointestinal: Negative for diarrhea, nausea and vomiting.   Genitourinary: Negative for difficulty urinating, dysuria, flank pain and hematuria.   Musculoskeletal: Positive for arthralgias. Negative for myalgias.   Skin: Negative for rash.   Neurological: Negative for headaches.   Psychiatric/Behavioral: Negative for sleep disturbance.       The past medical, family and social histories were reviewed for this encounter.     BP (!) 154/94 (BP Location: Right arm, Patient Position: Sitting)   Pulse 100   Ht 5' 2" (1.575 m)   Wt 88.5 kg (195 lb 1.7 oz)   SpO2 98%   BMI 35.69 kg/m²     Objective:      Physical Exam  Vitals signs reviewed.   Constitutional:       General: She is not in acute distress.     Appearance: She is well-developed.   HENT:      Head: Normocephalic and atraumatic.   Eyes:      Conjunctiva/sclera: Conjunctivae normal.   Neck:      Musculoskeletal: Neck supple.      Vascular: No JVD.   Cardiovascular:      Rate and Rhythm: Normal rate and regular rhythm.      Heart sounds: Murmur present. No friction rub. No gallop.    Pulmonary:      Effort: Pulmonary effort is normal. No respiratory distress.      Breath sounds: Normal breath sounds. No wheezing or rales.   Abdominal:      General: Bowel sounds are normal. There is no distension.      " Palpations: Abdomen is soft.      Tenderness: There is no abdominal tenderness.   Musculoskeletal:      Right lower leg: Edema (trace) present.      Left lower leg: Edema (trace) present.   Skin:     General: Skin is warm and dry.      Findings: No rash.   Neurological:      Mental Status: She is alert and oriented to person, place, and time.         Assessment:       1. Essential hypertension        Plan:   Return to clinic in 12 months.  Baseline creatinine is 1.1.  Labs for next visit include rp.  Renal US shows R 9.5 cm, L 10.1 cm.  Her pressure is well controlled according to her home data.  Continue current medications as prescribed and reviewed.

## 2020-11-03 NOTE — PROGRESS NOTES
Subjective:      Patient ID: Eugenie Coates is a 70 y.o. female.    Chief Complaint: Diabetes Mellitus (4 mo f/u)    Eugenie is a 70 y.o. female who presents to the clinic for routine evaluation and treatment of diabetic feet. Eugenie has a past medical history of Allergy, Anemia, Anxiety, Arthritis, Asthma, Bell palsy, Depression, Diabetes mellitus, type 2, DVT (deep venous thrombosis), Endometriosis, Eye injury (2014), GERD (gastroesophageal reflux disease), Glaucoma, History of uterine fibroid, Hyperlipidemia, Hypertension, Nuclear sclerosis of both eyes (9/27/2016), Sleep apnea, Supraventricular tachycardia, SVT (supraventricular tachycardia) (05/2019), and Thyroid disease. Patient relates no major problem with feet.  Notes having toenails that are in need of trimming.  Denies being painful with wearing shoe gear.  Has not attempted to self treat.  Notes continued control over her blood glucose.  Denies any additional pedal complaints.      PCP: Haile Paul,     Date Last Seen by PCP: 7/20     Hemoglobin A1C   Date Value Ref Range Status   07/08/2020 6.5 (H) 4.0 - 5.6 % Final     Comment:     ADA Screening Guidelines:  5.7-6.4%  Consistent with prediabetes  >or=6.5%  Consistent with diabetes  High levels of fetal hemoglobin interfere with the HbA1C  assay. Heterozygous hemoglobin variants (HbS, HgC, etc)do  not significantly interfere with this assay.   However, presence of multiple variants may affect accuracy.     03/04/2020 6.4 (H) 4.0 - 5.6 % Final     Comment:     ADA Screening Guidelines:  5.7-6.4%  Consistent with prediabetes  >or=6.5%  Consistent with diabetes  High levels of fetal hemoglobin interfere with the HbA1C  assay. Heterozygous hemoglobin variants (HbS, HgC, etc)do  not significantly interfere with this assay.   However, presence of multiple variants may affect accuracy.     10/31/2019 6.2 (H) 4.0 - 5.6 % Final     Comment:     ADA Screening Guidelines:  5.7-6.4%  Consistent with  prediabetes  >or=6.5%  Consistent with diabetes  High levels of fetal hemoglobin interfere with the HbA1C  assay. Heterozygous hemoglobin variants (HbS, HgC, etc)do  not significantly interfere with this assay.   However, presence of multiple variants may affect accuracy.             Past Medical History:   Diagnosis Date    Allergy     Anemia     Anxiety     Arthritis     Asthma     Bell palsy     Depression     Diabetes mellitus, type 2     DVT (deep venous thrombosis)     Endometriosis     Eye injury     stuck with tree branch od ?     GERD (gastroesophageal reflux disease)     Glaucoma     History of uterine fibroid     Hyperlipidemia     Hypertension     Nuclear sclerosis of both eyes 2016    Sleep apnea     uses C pap    Supraventricular tachycardia     SVT (supraventricular tachycardia) 2019    Thyroid disease        Past Surgical History:   Procedure Laterality Date     SECTION      CHOLECYSTECTOMY      glaucoma laser Bilateral     HERNIA REPAIR      KNEE SURGERY Right     (R) knee scope; torn meniscus    VEIN SURGERY  ,     Rt leg x2       Family History   Problem Relation Age of Onset    Diabetes Mother     No Known Problems Father     No Known Problems Sister     No Known Problems Brother     Colon cancer Maternal Aunt     No Known Problems Maternal Uncle     No Known Problems Paternal Aunt     No Known Problems Paternal Uncle     No Known Problems Maternal Grandmother     No Known Problems Maternal Grandfather     No Known Problems Paternal Grandmother     No Known Problems Paternal Grandfather     Amblyopia Neg Hx     Blindness Neg Hx     Cancer Neg Hx     Cataracts Neg Hx     Glaucoma Neg Hx     Hypertension Neg Hx     Macular degeneration Neg Hx     Retinal detachment Neg Hx     Strabismus Neg Hx     Stroke Neg Hx     Thyroid disease Neg Hx     Celiac disease Neg Hx     Colon polyps Neg Hx     Esophageal cancer Neg Hx      Inflammatory bowel disease Neg Hx     Irritable bowel syndrome Neg Hx     Liver cancer Neg Hx     Liver disease Neg Hx     Rectal cancer Neg Hx     Stomach cancer Neg Hx     Ulcerative colitis Neg Hx     Cystic fibrosis Neg Hx     Crohn's disease Neg Hx     Hemochromatosis Neg Hx     Cirrhosis Neg Hx        Social History     Socioeconomic History    Marital status:      Spouse name: Not on file    Number of children: Not on file    Years of education: Not on file    Highest education level: Not on file   Occupational History    Not on file   Social Needs    Financial resource strain: Not hard at all    Food insecurity     Worry: Never true     Inability: Never true    Transportation needs     Medical: Patient refused     Non-medical: No   Tobacco Use    Smoking status: Never Smoker    Smokeless tobacco: Never Used   Substance and Sexual Activity    Alcohol use: No     Alcohol/week: 0.0 standard drinks     Frequency: Never     Drinks per session: Patient refused     Binge frequency: Never    Drug use: No    Sexual activity: Yes     Partners: Male     Birth control/protection: None   Lifestyle    Physical activity     Days per week: 3 days     Minutes per session: 20 min    Stress: To some extent   Relationships    Social connections     Talks on phone: More than three times a week     Gets together: Patient refused     Attends [employer] service: More than 4 times per year     Active member of club or organization: Yes     Attends meetings of clubs or organizations: More than 4 times per year     Relationship status:    Other Topics Concern    Not on file   Social History Narrative    1/18/19: lives with her . They have multiple children, but the youngest is 38. No children at home right now. No pets at home. Splits time between here and [place]. Her son lives in [place].        Current Outpatient Medications   Medication Sig Dispense Refill    b complex  vitamins tablet Take 1 tablet by mouth once daily.      bimatoprost (LUMIGAN) 0.01 % Drop Place 1 drop into both eyes every evening. 3 Bottle 4    blood sugar diagnostic Strp Test twice daily.  Accucheck viva test strips and lancets. 300 each 3    brimonidine 0.2% (ALPHAGAN) 0.2 % Drop Place 1 drop into both eyes 3 (three) times daily. 15 mL 4    lancets (ACCU-CHEK SOFTCLIX LANCETS) Misc 1 Units by Misc.(Non-Drug; Combo Route) route 2 (two) times daily. 300 each 3    lisinopriL (PRINIVIL,ZESTRIL) 40 MG tablet TAKE 1 TABLET(40 MG) BY MOUTH EVERY DAY 90 tablet 0    metFORMIN (GLUCOPHAGE) 500 MG tablet TAKE 1 TABLET DAILY WITH BREAKFAST 90 tablet 0    metoprolol tartrate (LOPRESSOR) 25 MG tablet Take 1 tablet (25 mg total) by mouth 2 (two) times daily. 180 tablet 3    NIFEdipine (ADALAT CC) 90 MG TbSR Take 1 tablet (90 mg total) by mouth once daily. 90 tablet 0    omeprazole (PRILOSEC) 40 MG capsule Take 1 capsule (40 mg total) by mouth 2 (two) times daily before meals. 180 capsule 1    rosuvastatin (CRESTOR) 40 MG Tab Take 1 tablet (40 mg total) by mouth every evening. 90 tablet 3    calcium carbonate/vitamin D3 (CALCIUM WITH VITAMIN D3 ORAL) Take 5,000 mcg by mouth Daily.       No current facility-administered medications for this visit.        Review of patient's allergies indicates:   Allergen Reactions    Percodan [oxycodone hcl-oxycodone-asa] Hives and Itching         Review of Systems   Constitution: Negative for chills and fever.   Cardiovascular: Positive for leg swelling. Negative for claudication.   Skin: Positive for color change and nail changes.   Musculoskeletal: Positive for joint swelling. Negative for joint pain, muscle cramps and muscle weakness.   Neurological: Positive for numbness. Negative for paresthesias.   Psychiatric/Behavioral: Negative for altered mental status.           Objective:      Physical Exam  Constitutional:       General: She is not in acute distress.     Appearance:  She is well-developed. She is not diaphoretic.   Cardiovascular:      Pulses:           Dorsalis pedis pulses are 2+ on the right side and 2+ on the left side.        Posterior tibial pulses are 1+ on the right side and 1+ on the left side.      Comments: CFT < 3 seconds bilateral.  Pedal hair growth decreased bilateral.  Varicosities noted bilateral.  Mild non pitting edema noted to bilateral lower extremity.  Toes are cool to touch bilateral.    Musculoskeletal:         General: No tenderness.      Comments: Muscle strength 5/5 in all muscle groups bilateral.  No tenderness nor crepitation with ROM of foot/ankle joints bilateral.  No tenderness with palpation of bilateral foot and ankle.  Bilateral pes planus foot type.  Bilateral hallux abducto valgus.  Bilateral semi-reducible contracture of toes 2-5.     Skin:     General: Skin is warm and dry.      Coloration: Skin is not pale.      Findings: No abrasion, bruising, burn, ecchymosis, erythema, laceration, lesion, petechiae or rash.      Nails: There is no clubbing.        Comments: Pedal skin appears edematous bilateral.  Toenails x 10 appear thickened by 2 mm, elongated by 4 mm, and discolored with subungual debris.  Examination of the skin reveals no evidence of significant maceration, rashes, open lesions, suspicious appearing nevi or other concerning lesions.    Neurological:      Mental Status: She is alert and oriented to person, place, and time.      Sensory: Sensory deficit present.      Motor: No atrophy.      Comments: Protective sensation per [place] monofilament intact bilateral.    Vibratory sensation decreased bilateral.    Light touch intact bilateral.               Assessment:       Encounter Diagnoses   Name Primary?    Type 2 diabetes mellitus with hyperglycemia, without long-term current use of insulin Yes    Hammer toes of both feet     Onychomycosis due to dermatophyte     Severe obesity (BMI 35.0-35.9 with comorbidity)           Plan:       Eugenie was seen today for diabetes mellitus.    Diagnoses and all orders for this visit:    Type 2 diabetes mellitus with hyperglycemia, without long-term current use of insulin    Hammer toes of both feet    Onychomycosis due to dermatophyte    Severe obesity (BMI 35.0-35.9 with comorbidity)      I counseled the patient on her conditions, their implications and medical management.    Discussed the importance of diet and exercise as they pertain to diabetes management and maintaining a healthy BMI.    Shoe inspection. Diabetic Foot Education. Patient reminded of the importance of good nutrition and blood sugar control to help prevent podiatric complications of diabetes. Patient instructed on proper foot hygeine. We discussed wearing proper shoe gear, daily foot inspections, never walking without protective shoe gear, never putting sharp instruments to feet    Advised to continue wearing diabetic shoes/insoles that accommodate for digital deformities.     With patient's permission, nails were aggressively reduced and debrided x 10 to their soft tissue attachment mechanically and with electric , removing all offending nail and debris.   Patient relates relief following the procedure.  She will continue to monitor the areas daily, inspect her feet, wear protective shoe gear when ambulatory, moisturizer to maintain skin integrity and follow in this office in approximately 4 months, sooner p.r.n.    Follow up in about 4 months (around 3/3/2021).    Giuliano Garnett DPM

## 2020-11-05 ENCOUNTER — LAB VISIT (OUTPATIENT)
Dept: LAB | Facility: HOSPITAL | Age: 70
End: 2020-11-05
Attending: SURGERY
Payer: MEDICARE

## 2020-11-05 ENCOUNTER — OFFICE VISIT (OUTPATIENT)
Dept: SURGERY | Facility: CLINIC | Age: 70
End: 2020-11-05
Payer: MEDICARE

## 2020-11-05 ENCOUNTER — OFFICE VISIT (OUTPATIENT)
Dept: FAMILY MEDICINE | Facility: CLINIC | Age: 70
End: 2020-11-05
Payer: MEDICARE

## 2020-11-05 VITALS
BODY MASS INDEX: 35.66 KG/M2 | TEMPERATURE: 98 F | WEIGHT: 193.81 LBS | HEART RATE: 74 BPM | OXYGEN SATURATION: 98 % | SYSTOLIC BLOOD PRESSURE: 124 MMHG | HEIGHT: 62 IN | DIASTOLIC BLOOD PRESSURE: 90 MMHG

## 2020-11-05 DIAGNOSIS — Z85.3 HISTORY OF RIGHT BREAST CANCER: ICD-10-CM

## 2020-11-05 DIAGNOSIS — C50.919 BREAST CANCER: Primary | ICD-10-CM

## 2020-11-05 DIAGNOSIS — E11.65 TYPE 2 DIABETES MELLITUS WITH HYPERGLYCEMIA, WITHOUT LONG-TERM CURRENT USE OF INSULIN: ICD-10-CM

## 2020-11-05 DIAGNOSIS — C50.911 INVASIVE LOBULAR CARCINOMA OF RIGHT BREAST IN FEMALE: Primary | ICD-10-CM

## 2020-11-05 DIAGNOSIS — I10 ESSENTIAL HYPERTENSION: ICD-10-CM

## 2020-11-05 DIAGNOSIS — E78.2 MIXED HYPERLIPIDEMIA: Primary | ICD-10-CM

## 2020-11-05 DIAGNOSIS — C50.919 BREAST CANCER: ICD-10-CM

## 2020-11-05 DIAGNOSIS — E11.9 DIABETES MELLITUS TYPE 2 WITHOUT RETINOPATHY: ICD-10-CM

## 2020-11-05 DIAGNOSIS — Z01.818 PREOP TESTING: ICD-10-CM

## 2020-11-05 DIAGNOSIS — C50.911 INVASIVE LOBULAR CARCINOMA OF RIGHT BREAST IN FEMALE: ICD-10-CM

## 2020-11-05 DIAGNOSIS — E66.01 SEVERE OBESITY (BMI 35.0-35.9 WITH COMORBIDITY): ICD-10-CM

## 2020-11-05 DIAGNOSIS — Z01.818 PRE-OP TESTING: ICD-10-CM

## 2020-11-05 PROCEDURE — 99214 PR OFFICE/OUTPT VISIT, EST, LEVL IV, 30-39 MIN: ICD-10-PCS | Mod: S$PBB,,, | Performed by: FAMILY MEDICINE

## 2020-11-05 PROCEDURE — 99214 OFFICE O/P EST MOD 30 MIN: CPT | Mod: S$PBB,,, | Performed by: FAMILY MEDICINE

## 2020-11-05 PROCEDURE — 99999 PR PBB SHADOW E&M-EST. PATIENT-LVL II: ICD-10-PCS | Mod: PBBFAC,,, | Performed by: SURGERY

## 2020-11-05 PROCEDURE — 99205 OFFICE O/P NEW HI 60 MIN: CPT | Mod: S$PBB,,, | Performed by: SURGERY

## 2020-11-05 PROCEDURE — 99999 PR PBB SHADOW E&M-EST. PATIENT-LVL IV: ICD-10-PCS | Mod: PBBFAC,,, | Performed by: FAMILY MEDICINE

## 2020-11-05 PROCEDURE — 99999 PR PBB SHADOW E&M-EST. PATIENT-LVL II: CPT | Mod: PBBFAC,,, | Performed by: SURGERY

## 2020-11-05 PROCEDURE — 36415 COLL VENOUS BLD VENIPUNCTURE: CPT

## 2020-11-05 PROCEDURE — 99214 OFFICE O/P EST MOD 30 MIN: CPT | Mod: PBBFAC,PO | Performed by: FAMILY MEDICINE

## 2020-11-05 PROCEDURE — 99205 PR OFFICE/OUTPT VISIT, NEW, LEVL V, 60-74 MIN: ICD-10-PCS | Mod: S$PBB,,, | Performed by: SURGERY

## 2020-11-05 PROCEDURE — 99212 OFFICE O/P EST SF 10 MIN: CPT | Mod: PBBFAC,27 | Performed by: SURGERY

## 2020-11-05 PROCEDURE — 99999 PR PBB SHADOW E&M-EST. PATIENT-LVL IV: CPT | Mod: PBBFAC,,, | Performed by: FAMILY MEDICINE

## 2020-11-05 RX ORDER — DIAZEPAM 5 MG/1
5 TABLET ORAL DAILY PRN
Qty: 2 TABLET | Refills: 0 | Status: SHIPPED | OUTPATIENT
Start: 2020-11-05 | End: 2020-11-25

## 2020-11-05 RX ORDER — EZETIMIBE 10 MG/1
10 TABLET ORAL DAILY
Qty: 90 TABLET | Refills: 3 | Status: SHIPPED | OUTPATIENT
Start: 2020-11-05 | End: 2021-12-23 | Stop reason: SDUPTHER

## 2020-11-05 RX ORDER — METFORMIN HYDROCHLORIDE 1000 MG/1
1000 TABLET ORAL
Qty: 90 TABLET | Refills: 0 | Status: SHIPPED | OUTPATIENT
Start: 2020-11-05 | End: 2021-02-03 | Stop reason: SDUPTHER

## 2020-11-05 NOTE — PATIENT INSTRUCTIONS
Continue lisinopril 40 mg  Continue nifedipine 90 mg  Can't take HCTZ due to reported palpitations  Can't increase BB due to reported higher pressures after ablation when on higher dose.   Continue twice daily metoprolol  Continue metformin but at 1000 mg once daily.    Continue rosuvastatin per cards  Restart zetia  Defer further lipid management to them    BP at home reported normal  Calibrated cuff, it appears to be accurate with my reading as well today    BP likely high today in clinic due to stress/white coat syndrome  Continue monitoring BP at home    Diabetes Management Status    Statin: Taking  ACE/ARB: Taking    Screening or Prevention Patient's value Goal Complete/Controlled?   HgA1C Testing and Control   Lab Results   Component Value Date    HGBA1C 6.9 (H) 11/03/2020      Annually/Less than 8% Yes   Lipid profile : 11/03/2020 Annually Yes   LDL control Lab Results   Component Value Date    LDLCALC 147.0 11/03/2020    Annually/Less than 100 mg/dl  No   Nephropathy screening Lab Results   Component Value Date    LABMICR 12.0 01/23/2018     Lab Results   Component Value Date    PROTEINUA Negative 11/29/2016    Annually No   Blood pressure BP Readings from Last 1 Encounters:   11/05/20 (!) 124/90    Less than 140/90 Yes   Dilated retinal exam : 06/11/2020 Annually Yes   Foot exam   : 11/03/2020 Annually Yes

## 2020-11-05 NOTE — LETTER
November 5, 2020      Haile Paul, DO  [address] [place] [address]           [facility]  [address]  [facility] [address]  Phone: [phone]  Fax: [phone]          Patient: Eugenie Coates   MR Number: [id number]   YOB: 1950   Date of Visit: 11/5/2020       Dear Dr. Haile Paul:    Thank you for referring Eugenie Coates to me for evaluation. Attached you will find relevant portions of my assessment and plan of care.    If you have questions, please do not hesitate to call me. I look forward to following Eugenie Coates along with you.    Sincerely,    Narda Keating MD    Enclosure  CC:  No Recipients    If you would like to receive this communication electronically, please contact [email or web address] or [phone] to request more information on [employer] Link access.    For providers and/or their staff who would like to refer a patient to [facility], please contact us through our one-stop-shop provider referral line, [facility], at [phone].    If you feel you have received this communication in error or would no longer like to receive these types of communications, please e-mail [email or web address]

## 2020-11-05 NOTE — PROGRESS NOTES
"Subjective:       Patient ID: Eugenie Coates is a 70 y.o. female.    Chief Complaint: Follow-up, Hypertension, and Diabetes    Yazmin is a 70 y.o. female who presents today for f/u on her chronic medical issues, and new diagnosis of breast cancer    Splits her time between [place] and [place]    Was diagnosed there with invasive right breast lobular carcinoma     Seeing breast center at [facility] today    HTN: Taking lisinopril 40 mg, nifidipine 90 mg was started. Amlodipine was stopped. She is also taking metoprolol 25 mg BID. Can't take HCTZ due to reported palpitations. She is not having headache, double vision, blurry vision. She is using an arm cuff. Intermittent leg swelling due to the nifedipine. Reports BP at home is much better; 130's/60's.      DM: taking metformin once daily. A1c is higher. Has gained weight.   DLD: on statin. Cards changed from atorvastatin to rosuvastatin. LDL still >100. Defer further changes to cards.      Thyroid Abnormalities: last TSH stable. Per EMR: " recommended treatment with low dose methimazole or ANGULO ablation. She does not want to do either at this time, but agreed that if she has further issues with arrhythmia or the TSH declines further we would have to treat the HELEN."     Pulmonary Issues: mild restriction. No obstruction on PFT. Breathing is improved. No acute symptoms today.   SVT: seeing EP. Had RFA of SVT. No issues since then.      Hypercalcemia: resolved     GERD: taking PPI. This is helping her GERD.      CKD: saw Dr. Chacon. F/u annually. Labs stable.      DVT: She was taken off Eliquis in 2018 by Dr. Carrizales     VI: had surgery on right leg outside of [facility].      Weight is up a little bit, due to covid.     Labs as below reviewed in detail.     Hypertension  This is a recurrent problem. The current episode started more than 1 year ago. The problem is unchanged. The problem is controlled. Associated symptoms include anxiety and peripheral edema. " Pertinent negatives include no blurred vision, chest pain, headaches, malaise/fatigue, neck pain, orthopnea, palpitations, PND, shortness of breath or sweats. There are no associated agents to hypertension. Risk factors for coronary artery disease include diabetes mellitus. Past treatments include nothing. The current treatment provides moderate improvement. There are no compliance problems.      Review of Systems   Constitutional: Negative for malaise/fatigue.   Eyes: Negative for blurred vision.   Respiratory: Negative for shortness of breath.    Cardiovascular: Negative for chest pain, palpitations, orthopnea and PND.   Musculoskeletal: Negative for neck pain.   Neurological: Negative for headaches.             Results for orders placed or performed in visit on 11/03/20   Basic metabolic panel   Result Value Ref Range    Sodium 141 136 - 145 mmol/L    Potassium 3.9 3.5 - 5.1 mmol/L    Chloride 104 95 - 110 mmol/L    CO2 27 23 - 29 mmol/L    Glucose 118 (H) 70 - 110 mg/dL    BUN 15 8 - 23 mg/dL    Creatinine 1.1 0.5 - 1.4 mg/dL    Calcium 10.1 8.7 - 10.5 mg/dL    Anion Gap 10 8 - 16 mmol/L    eGFR if African American 59.2 (A) >60 mL/min/1.73 m^2    eGFR if non  51.3 (A) >60 mL/min/1.73 m^2   Hemoglobin A1C   Result Value Ref Range    Hemoglobin A1C 6.9 (H) 4.0 - 5.6 %    Estimated Avg Glucose 151 (H) 68 - 131 mg/dL   Lipid Panel   Result Value Ref Range    Cholesterol 230 (H) 120 - 199 mg/dL    Triglycerides 155 (H) 30 - 150 mg/dL    HDL 52 40 - 75 mg/dL    LDL Cholesterol 147.0 63.0 - 159.0 mg/dL    HDL/Cholesterol Ratio 22.6 20.0 - 50.0 %    Total Cholesterol/HDL Ratio 4.4 2.0 - 5.0    Non-HDL Cholesterol 178 mg/dL       Objective:     Vitals:    11/05/20 0959   BP: (!) 124/90   Pulse: 74   Temp: 97.7 °F (36.5 °C)        Physical Exam  Vitals signs and nursing note reviewed.   Constitutional:       General: She is not in acute distress.     Appearance: She is well-developed. She is not  diaphoretic.   Cardiovascular:      Rate and Rhythm: Normal rate and regular rhythm.      Heart sounds: Murmur present.   Pulmonary:      Effort: Pulmonary effort is normal. No respiratory distress.      Breath sounds: Normal breath sounds. No stridor.   Abdominal:      Palpations: Abdomen is soft.      Tenderness: There is no abdominal tenderness.   Musculoskeletal:      Comments: Trace pitting edema BL   Neurological:      Mental Status: She is alert and oriented to person, place, and time.   Psychiatric:         Behavior: Behavior normal.         Thought Content: Thought content normal.         Judgment: Judgment normal.         Assessment:       1. Mixed hyperlipidemia    2. Type 2 diabetes mellitus with hyperglycemia, without long-term current use of insulin    3. Diabetes mellitus type 2 without retinopathy    4. Severe obesity (BMI 35.0-35.9 with comorbidity)    5. Essential hypertension    6. Invasive lobular carcinoma of right breast in female        Plan:       Continue lisinopril 40 mg  Continue nifedipine 90 mg  Can't take HCTZ due to reported palpitations  Can't increase BB due to reported higher pressures after ablation when on higher dose.   Continue twice daily metoprolol  Continue metformin but at 1000 mg once daily.    Continue rosuvastatin per cards  Restart zetia  Defer further lipid management to them    BP at home reported normal  Calibrated cuff, it appears to be accurate with my reading as well today    BP likely high today in clinic due to stress/white coat syndrome  Continue monitoring BP at home    Mixed hyperlipidemia  -     ezetimibe (ZETIA) 10 mg tablet; Take 1 tablet (10 mg total) by mouth once daily.  Dispense: 90 tablet; Refill: 3  -     Lipid Panel; Future; Expected date: 11/05/2020    Type 2 diabetes mellitus with hyperglycemia, without long-term current use of insulin  -     metFORMIN (GLUCOPHAGE) 1000 MG tablet; Take 1 tablet (1,000 mg total) by mouth daily with breakfast. TAKE 1  TABLET DAILY WITH BREAKFAST  Dispense: 90 tablet; Refill: 0  -     CBC Auto Differential; Future; Expected date: 11/05/2020  -     Comprehensive Metabolic Panel; Future; Expected date: 11/05/2020  -     Hemoglobin A1C; Future; Expected date: 11/05/2020    Diabetes mellitus type 2 without retinopathy    Severe obesity (BMI 35.0-35.9 with comorbidity)    Essential hypertension  -     CBC Auto Differential; Future; Expected date: 11/05/2020  -     Comprehensive Metabolic Panel; Future; Expected date: 11/05/2020    Invasive lobular carcinoma of right breast in female            Warning signs discussed, patient to call with any further issues or worsening of symptoms.

## 2020-11-05 NOTE — PROGRESS NOTES
[facility]  [facility]  2020      REFERRING PROVIDER: Haile aPul DO   [facility]  [place] [id number]    SUBJECTIVE:   Ms. Eugenie Coates is a 70 y.o. female with PMH of Diabetes, HTN, DVT, and thyroid disease who presents today for a second opinion from an outside facility to discuss a newly diagnosed Invasive Lobular Carcinoma of the RIGHT breast.     History of Present Illness: Patient was originally seen at [facility] in [place].   Patient states she never felt this breast mass. She presented for her annual screening mammogram in October and an abnormal density was found prompting a right breast biopsy.     Findings at that time were the following:   Invasive Lobular Carcinoma, 12 o'clock right breast, bilobed mass.  Tumor size: 1.9 cm on u/s (2.7 cm on mammogram)  Tumor grade: 2 (intermediate)  Estrogen Receptor: + (78%)  Progesterone Receptor: negative  Her-2 verenice: negative  Lymph node status: negative clinically     GYN History:  Age of menarche was 12. Age of menopause was 56.  Patient denies hormonal therapy. Took birth control pills for a few years. Patient is . Age of first live birth was 17. Patient did not breast feed.    Family History: No known family history of breast or ovarian cancer. Maternal great aunt had colon cancer.     Past Medical History:   Past Medical History:   Diagnosis Date    Allergy     Anemia     Anxiety     Arthritis     Asthma     Bell palsy     Depression     Diabetes mellitus, type 2     DVT (deep venous thrombosis)     Endometriosis     Eye injury     stuck with tree branch od ?     GERD (gastroesophageal reflux disease)     Glaucoma     History of uterine fibroid     Hyperlipidemia     Hypertension     Invasive lobular carcinoma of right breast in female 2020    Nuclear sclerosis of both eyes 2016    Sleep apnea     uses C pap    Supraventricular tachycardia     SVT  (supraventricular tachycardia) 2019    Thyroid disease      Past Surgical history:   Past Surgical History:   Procedure Laterality Date     SECTION      CHOLECYSTECTOMY      glaucoma laser Bilateral     HERNIA REPAIR      KNEE SURGERY Right 2008    (R) knee scope; torn meniscus    VEIN SURGERY  ,     Rt leg x2     Social history:   Social History     Socioeconomic History    Marital status:      Spouse name: Not on file    Number of children: Not on file    Years of education: Not on file    Highest education level: Not on file   Occupational History    Not on file   Social Needs    Financial resource strain: Not hard at all    Food insecurity     Worry: Never true     Inability: Never true    Transportation needs     Medical: Patient refused     Non-medical: No   Tobacco Use    Smoking status: Never Smoker    Smokeless tobacco: Never Used   Substance and Sexual Activity    Alcohol use: No     Alcohol/week: 0.0 standard drinks     Frequency: Never     Drinks per session: Patient refused     Binge frequency: Never    Drug use: No    Sexual activity: Yes     Partners: Male     Birth control/protection: None   Lifestyle    Physical activity     Days per week: 3 days     Minutes per session: 20 min    Stress: To some extent   Relationships    Social connections     Talks on phone: More than three times a week     Gets together: Patient refused     Attends Sabianist service: More than 4 times per year     Active member of club or organization: Yes     Attends meetings of clubs or organizations: More than 4 times per year     Relationship status:    Other Topics Concern    Not on file   Social History Narrative    20: lives with her . They have multiple children, but the youngest is 38. No children at home right now. No pets at home. Splits time between here and [place]. Her son lives in [place].      Allergies:   Review of patient's allergies  indicates:   Allergen Reactions    Percodan [oxycodone hcl-oxycodone-asa] Hives and Itching      Family History:   Family History   Problem Relation Age of Onset    Diabetes Mother     No Known Problems Father     No Known Problems Sister     No Known Problems Brother     Colon cancer Maternal Aunt     No Known Problems Maternal Uncle     No Known Problems Paternal Aunt     No Known Problems Paternal Uncle     No Known Problems Maternal Grandmother     No Known Problems Maternal Grandfather     No Known Problems Paternal Grandmother     No Known Problems Paternal Grandfather     Amblyopia Neg Hx     Blindness Neg Hx     Cancer Neg Hx     Cataracts Neg Hx     Glaucoma Neg Hx     Hypertension Neg Hx     Macular degeneration Neg Hx     Retinal detachment Neg Hx     Strabismus Neg Hx     Stroke Neg Hx     Thyroid disease Neg Hx     Celiac disease Neg Hx     Colon polyps Neg Hx     Esophageal cancer Neg Hx     Inflammatory bowel disease Neg Hx     Irritable bowel syndrome Neg Hx     Liver cancer Neg Hx     Liver disease Neg Hx     Rectal cancer Neg Hx     Stomach cancer Neg Hx     Ulcerative colitis Neg Hx     Cystic fibrosis Neg Hx     Crohn's disease Neg Hx     Hemochromatosis Neg Hx     Cirrhosis Neg Hx       Review of Systems:  Denies any unexplained weight loss. Complains of new headaches since receiving this diagnosis but does have a history of migraines. Changes in vision due to glaucoma. Complains of new lower back pain over the past few weeks. Complains of right flank pain and right shoulder pain since August.     OBJECTIVE:  There were no vitals filed for this visit.    Physical Exam:  HEENT: Normocephalic, atraumatic.    Gen: alert and oriented; no acute distress.  Breast: 1 cm subdominant, tender lump at 12 o'clock right breast, N+2 cm. No left breast masses. No nipple discharge or inversion. No skin changes.     ASSESSMENT:  Ms. Eugenie Coates is a 70 y.o. year old  female with PMH of Diabetes, HTN, DVT, and thyroid disease who presents today for a second opinion from an outside facility to discuss a newly diagnosed Invasive Lobular Carcinoma of the RIGHT breast.     PLAN:   We discussed the etiology of this tumor along with the recommendations for treatment based on the National Comprehensive Cancer Network guidelines.    The options of Breast Conservation therapy with a lumpectomy versus a mastectomy with or without reconstruction was presented. Both procedures would require a sentinel lymph node biopsy and a possible complete axillary lymph node dissection. In the setting of a lumpectomy, adjuvant radiation therapy is required to reduce the chance of a locoregional recurrence.  The duration and side effects of radiation were presented.    We also discussed the role of adjuvant endocrine therapy since she is ER/AZ+, and that it can reduce the risk of a recurrence and improve survival. The possibility of Chemotherapy and its duration, systemic benefit, and side effects was presented.  Patient was educated on breast cancer, receptors, wire localization lumpectomy, mastectomy, sentinel lymph node mapping and biopsy, axillary lymph node dissection, reconstruction, breast prosthesis with post-mastectomy bra and radiation therapy. Patient was given patient information binder including [facility] breast cancer treatment brochure.  All her questions were answered.    The patient is interested in proceeding with a lumpectomy and sentinel lymph node biospy. The operative risks of bleeding, infection, recurrence, scarring, and anesthetic complications and the possibility of requiring further surgery were all noted.    -Will get MRI to further evaluate the size of this tumor.  -Bone scan to rule out any metastatic disease causing her new pain.  -Genetic testing through [facility].      ~Jennifer Kruger PA-C      Surgical Oncology            11/5/2020       Patient seen and examined by me.  Agree  with above.    Plan for:  1. Genetics  2. MRI given lobular histology  3. Bone scan given focal back pain  4. RIGHT radar lump with SLNB.  Interested in oncoplastic reduction so will have her see plastics.

## 2020-11-06 ENCOUNTER — DOCUMENTATION ONLY (OUTPATIENT)
Dept: SURGERY | Facility: CLINIC | Age: 70
End: 2020-11-06

## 2020-11-06 NOTE — NURSING
Nurse Navigator Note:     Met with patient during her consult with Dr. Keating.  Patient and I reviewed the information she discussed with Dr. Keating, including treatment options, diagnosis, and future plans for workup. Patient and I went through the new patient binder, explained some of the information and why it is provided.     Also offered patient consults with our other specialty clinics: Dr. Sanchez for gynecological health during treatment, our breast physical therapy department for pre-op and post-operative assessments, Dr. Madrigal for psychological support, and Lilia Craig for nutritional counseling. Explained to patient that all of these support services are completely optional. Discussed that physical therapy may call patient to offer pre-op appt, and what that appt would entail.     Patient was given a copy of her appointments, Dr. Keating's card, and my card. Encouraged her to call me if she has any questions or concerns or would like to schedule any additional appointments. Verbalized understanding of all information.     Genetics done today, tracking number is [id number]. MRI and bone scan scheduled for 11/25/2020 per request by patient. Patient scheduled with Dr. Hood on 11/10/2020.    Oncology Navigation   Intake  Date of Diagnosis: 10/20/20  Cancer Type: Breast  Internal / External Referral: External  Referral Source: [facility]  Date of Referral: 10/23/20  Initial Nurse Navigator Contact: 10/29/20  Referral to Initial Contact Timeline (days): 6  Date Worked: 10/30/20  Reason if booked > 7 days after scheduling: Waiting on records     Treatment  Current Status: Staging work-up    Surgery: Planned  Surgical Oncologist: Dr.Aimee Keating  Plastic Surgeon: Dr. Hood (11/11/2020 appt)  Type of Surgery: right lumpectomy and SLNB  Consult Date: 11/05/20          Procedures: Genetic test;MRI;Bone scan  Bone Scan Schedule Date: 11/25/20  Genetic Testing Date Sent: 11/05/20  MRI Schedule  Date: 11/25/20       ER: Positive  OK: Negative  Her2: Negative           Acuity      Follow Up  Follow up in about 19 days (around 11/25/2020) for f/u after testing and Genetics.

## 2020-11-10 DIAGNOSIS — C50.911 INVASIVE LOBULAR CARCINOMA OF RIGHT BREAST IN FEMALE: Primary | ICD-10-CM

## 2020-11-10 PROCEDURE — 88321 CONSLTJ&REPRT SLD PREP ELSWR: CPT | Performed by: PATHOLOGY

## 2020-11-10 NOTE — NURSING
Slides received & brought to path.  Oncology Navigation   Intake  Date of Diagnosis: 10/20/20  Cancer Type: Breast  Internal / External Referral: External  Referral Source: [facility]  Date of Referral: 10/23/20  Initial Nurse Navigator Contact: 10/29/20  Referral to Initial Contact Timeline (days): 6  Date Worked: 10/30/20  Reason if booked > 7 days after scheduling: Waiting on records     Treatment  Current Status: Staging work-up    Surgery: Planned  Surgical Oncologist: Dr.Aimee Keating  Plastic Surgeon: Dr. Hood (11/11/2020 appt)  Type of Surgery: right lumpectomy and SLNB  Consult Date: 11/05/20          Procedures: Genetic test;MRI;Bone scan  Bone Scan Schedule Date: 11/25/20  Genetic Testing Date Sent: 11/05/20  MRI Schedule Date: 11/25/20       ER: Positive  AL: Negative  Her2: Negative           Acuity      Follow Up  No follow-ups on file.

## 2020-11-11 ENCOUNTER — OFFICE VISIT (OUTPATIENT)
Dept: PLASTIC SURGERY | Facility: CLINIC | Age: 70
End: 2020-11-11
Payer: MEDICARE

## 2020-11-11 VITALS
HEART RATE: 93 BPM | WEIGHT: 193 LBS | SYSTOLIC BLOOD PRESSURE: 205 MMHG | DIASTOLIC BLOOD PRESSURE: 98 MMHG | BODY MASS INDEX: 35.3 KG/M2

## 2020-11-11 DIAGNOSIS — C50.911 INVASIVE LOBULAR CARCINOMA OF RIGHT BREAST IN FEMALE: Primary | ICD-10-CM

## 2020-11-11 PROCEDURE — 99999 PR PBB SHADOW E&M-EST. PATIENT-LVL III: CPT | Mod: PBBFAC,,, | Performed by: SURGERY

## 2020-11-11 PROCEDURE — 99203 OFFICE O/P NEW LOW 30 MIN: CPT | Mod: S$PBB,,, | Performed by: SURGERY

## 2020-11-11 PROCEDURE — 99999 PR PBB SHADOW E&M-EST. PATIENT-LVL III: ICD-10-PCS | Mod: PBBFAC,,, | Performed by: SURGERY

## 2020-11-11 PROCEDURE — 99203 PR OFFICE/OUTPT VISIT, NEW, LEVL III, 30-44 MIN: ICD-10-PCS | Mod: S$PBB,,, | Performed by: SURGERY

## 2020-11-11 PROCEDURE — 99213 OFFICE O/P EST LOW 20 MIN: CPT | Mod: PBBFAC | Performed by: SURGERY

## 2020-11-11 NOTE — PROGRESS NOTES
History & Physical    SUBJECTIVE:   Chief complaint: large breasts, right breast lobular carcinoma    History of Present Illness:  Patient is a 70 y.o. female hx copd, dm who presents with symptomatic bilateral large pendulous  breasts. She also has a diagnosed right breast lobular carcinoma. She is interested in breast conservation therapy. States that she has back for 6 months. She also reports participating in physical therapy to relieve back pain in the past. Complaints of shoulder grooving from natalie straps and rashes under the breast for which she uses baby powder and cream. Also has macerating rashes during summer time. She is active. She reports being a Mu-ism and does not want any blood transfusion.    Past Medical History:   Diagnosis Date    Allergy     Anemia     Anxiety     Arthritis     Asthma     Bell palsy     Depression     Diabetes mellitus, type 2     DVT (deep venous thrombosis)     Endometriosis     Eye injury     stuck with tree branch od ?     GERD (gastroesophageal reflux disease)     Glaucoma     History of uterine fibroid     Hyperlipidemia     Hypertension     Invasive lobular carcinoma of right breast in female 2020    Nuclear sclerosis of both eyes 2016    Sleep apnea     uses C pap    Supraventricular tachycardia     SVT (supraventricular tachycardia) 2019    Thyroid disease        Past Surgical History:   Procedure Laterality Date     SECTION      CHOLECYSTECTOMY      glaucoma laser Bilateral     HERNIA REPAIR      KNEE SURGERY Right     (R) knee scope; torn meniscus    VEIN SURGERY  ,     Rt leg x2       Family History   Problem Relation Age of Onset    Diabetes Mother     No Known Problems Father     No Known Problems Sister     No Known Problems Brother     Colon cancer Maternal Aunt     No Known Problems Maternal Uncle     No Known Problems Paternal Aunt     No Known Problems Paternal Uncle      No Known Problems Maternal Grandmother     No Known Problems Maternal Grandfather     No Known Problems Paternal Grandmother     No Known Problems Paternal Grandfather     Amblyopia Neg Hx     Blindness Neg Hx     Cancer Neg Hx     Cataracts Neg Hx     Glaucoma Neg Hx     Hypertension Neg Hx     Macular degeneration Neg Hx     Retinal detachment Neg Hx     Strabismus Neg Hx     Stroke Neg Hx     Thyroid disease Neg Hx     Celiac disease Neg Hx     Colon polyps Neg Hx     Esophageal cancer Neg Hx     Inflammatory bowel disease Neg Hx     Irritable bowel syndrome Neg Hx     Liver cancer Neg Hx     Liver disease Neg Hx     Rectal cancer Neg Hx     Stomach cancer Neg Hx     Ulcerative colitis Neg Hx     Cystic fibrosis Neg Hx     Crohn's disease Neg Hx     Hemochromatosis Neg Hx     Cirrhosis Neg Hx        Social History     Socioeconomic History    Marital status:      Spouse name: Not on file    Number of children: Not on file    Years of education: Not on file    Highest education level: Not on file   Occupational History    Not on file   Social Needs    Financial resource strain: Not hard at all    Food insecurity     Worry: Never true     Inability: Never true    Transportation needs     Medical: Patient refused     Non-medical: No   Tobacco Use    Smoking status: Never Smoker    Smokeless tobacco: Never Used   Substance and Sexual Activity    Alcohol use: No     Alcohol/week: 0.0 standard drinks     Frequency: Never     Drinks per session: Patient refused     Binge frequency: Never    Drug use: No    Sexual activity: Yes     Partners: Male     Birth control/protection: None   Lifestyle    Physical activity     Days per week: 3 days     Minutes per session: 20 min    Stress: To some extent   Relationships    Social connections     Talks on phone: More than three times a week     Gets together: Patient refused     Attends Anabaptism service: More than 4 times per  year     Active member of club or organization: Yes     Attends meetings of clubs or organizations: More than 4 times per year     Relationship status:    Other Topics Concern    Not on file   Social History Narrative    1/18/19: lives with her . They have multiple children, but the youngest is 38. No children at home right now. No pets at home. Splits time between here and [place]. Her son lives in [place].        Current Outpatient Medications   Medication Sig Dispense Refill    b complex vitamins tablet Take 1 tablet by mouth once daily.      bimatoprost (LUMIGAN) 0.01 % Drop Place 1 drop into both eyes every evening. 3 Bottle 4    blood sugar diagnostic Strp Test twice daily.  Accucheck viva test strips and lancets. 300 each 3    brimonidine 0.2% (ALPHAGAN) 0.2 % Drop Place 1 drop into both eyes 3 (three) times daily. 15 mL 4    calcium carbonate/vitamin D3 (CALCIUM WITH VITAMIN D3 ORAL) Take 5,000 mcg by mouth Daily.      diazePAM (VALIUM) 5 MG tablet Take 1 tablet (5 mg total) by mouth daily as needed for Anxiety (Take 1 tab. an hour before MRI; 2nd tab. 30 min. later if no effect.). 2 tablet 0    ezetimibe (ZETIA) 10 mg tablet Take 1 tablet (10 mg total) by mouth once daily. 90 tablet 3    lancets (ACCU-CHEK SOFTCLIX LANCETS) Misc 1 Units by Misc.(Non-Drug; Combo Route) route 2 (two) times daily. 300 each 3    lisinopriL (PRINIVIL,ZESTRIL) 40 MG tablet TAKE 1 TABLET(40 MG) BY MOUTH EVERY DAY 90 tablet 0    metFORMIN (GLUCOPHAGE) 1000 MG tablet Take 1 tablet (1,000 mg total) by mouth daily with breakfast. TAKE 1 TABLET DAILY WITH BREAKFAST 90 tablet 0    metoprolol tartrate (LOPRESSOR) 25 MG tablet Take 1 tablet (25 mg total) by mouth 2 (two) times daily. 180 tablet 3    NIFEdipine (ADALAT CC) 90 MG TbSR Take 1 tablet (90 mg total) by mouth once daily. 90 tablet 0    omeprazole (PRILOSEC) 40 MG capsule Take 1 capsule (40 mg total) by mouth 2 (two) times daily before meals. 180  capsule 1    rosuvastatin (CRESTOR) 40 MG Tab Take 1 tablet (40 mg total) by mouth every evening. 90 tablet 3     No current facility-administered medications for this visit.        Review of patient's allergies indicates:   Allergen Reactions    Percodan [oxycodone hcl-oxycodone-asa] Hives and Itching         Review of Systems:    Review of Systems   Musculoskeletal: Positive for back pain.         OBJECTIVE:     BP (!) 205/98   Pulse 93   Wt 87.5 kg (193 lb)   BMI 35.30 kg/m²       Physical Exam:    Physical Exam   Constitutional: She is oriented to person, place, and time. She appears well-developed and well-nourished.   Neck: Normal range of motion. Neck supple. No tracheal deviation present.   Cardiovascular: Normal rate, regular rhythm and normal heart sounds.    Pulmonary/Chest: Effort normal and breath sounds normal. bilaterally enlarged breasts, evidence of previous rashes, shoulder grooving. Right breast lump   Abdominal: Soft. Bowel sounds are normal.   Musculoskeletal: Normal range of motion.   Neurological: She is alert and oriented to person, place, and time.   Skin: Skin is warm.       ASSESSMENT/PLAN:     1.Symptomatic Bilateral Macromastia  2. Right breast cancer  3. Chronic back pain  4. Chronic breast rashes    PLAN:Plan    -Will need 600 gm reduction per side  -Will arange with Dr. Keating's office  -Will submit paper work for insurance approval  -Photos obtained  -Risk, benefits, and alternatives explained for bilateral oncoplastic reduction    Elan Simental MD     [facility] Plastic Surgery Fellow

## 2020-11-11 NOTE — H&P (VIEW-ONLY)
History & Physical    SUBJECTIVE:   Chief complaint: large breasts, right breast lobular carcinoma    History of Present Illness:  Patient is a 70 y.o. female hx copd, dm who presents with symptomatic bilateral large pendulous  breasts. She also has a diagnosed right breast lobular carcinoma. She is interested in breast conservation therapy. States that she has back for 6 months. She also reports participating in physical therapy to relieve back pain in the past. Complaints of shoulder grooving from natalie straps and rashes under the breast for which she uses baby powder and cream. Also has macerating rashes during summer time. She is active. She reports being a [employer] and does not want any blood transfusion.    Past Medical History:   Diagnosis Date    Allergy     Anemia     Anxiety     Arthritis     Asthma     Bell palsy     Depression     Diabetes mellitus, type 2     DVT (deep venous thrombosis)     Endometriosis     Eye injury     stuck with tree branch od ?     GERD (gastroesophageal reflux disease)     Glaucoma     History of uterine fibroid     Hyperlipidemia     Hypertension     Invasive lobular carcinoma of right breast in female 2020    Nuclear sclerosis of both eyes 2016    Sleep apnea     uses C pap    Supraventricular tachycardia     SVT (supraventricular tachycardia) 2019    Thyroid disease        Past Surgical History:   Procedure Laterality Date     SECTION      CHOLECYSTECTOMY      glaucoma laser Bilateral     HERNIA REPAIR      KNEE SURGERY Right     (R) knee scope; torn meniscus    VEIN SURGERY  ,     Rt leg x2       Family History   Problem Relation Age of Onset    Diabetes Mother     No Known Problems Father     No Known Problems Sister     No Known Problems Brother     Colon cancer Maternal Aunt     No Known Problems Maternal Uncle     No Known Problems Paternal Aunt     No Known Problems Paternal Uncle      No Known Problems Maternal Grandmother     No Known Problems Maternal Grandfather     No Known Problems Paternal Grandmother     No Known Problems Paternal Grandfather     Amblyopia Neg Hx     Blindness Neg Hx     Cancer Neg Hx     Cataracts Neg Hx     Glaucoma Neg Hx     Hypertension Neg Hx     Macular degeneration Neg Hx     Retinal detachment Neg Hx     Strabismus Neg Hx     Stroke Neg Hx     Thyroid disease Neg Hx     Celiac disease Neg Hx     Colon polyps Neg Hx     Esophageal cancer Neg Hx     Inflammatory bowel disease Neg Hx     Irritable bowel syndrome Neg Hx     Liver cancer Neg Hx     Liver disease Neg Hx     Rectal cancer Neg Hx     Stomach cancer Neg Hx     Ulcerative colitis Neg Hx     Cystic fibrosis Neg Hx     Crohn's disease Neg Hx     Hemochromatosis Neg Hx     Cirrhosis Neg Hx        Social History     Socioeconomic History    Marital status:      Spouse name: Not on file    Number of children: Not on file    Years of education: Not on file    Highest education level: Not on file   Occupational History    Not on file   Social Needs    Financial resource strain: Not hard at all    Food insecurity     Worry: Never true     Inability: Never true    Transportation needs     Medical: Patient refused     Non-medical: No   Tobacco Use    Smoking status: Never Smoker    Smokeless tobacco: Never Used   Substance and Sexual Activity    Alcohol use: No     Alcohol/week: 0.0 standard drinks     Frequency: Never     Drinks per session: Patient refused     Binge frequency: Never    Drug use: No    Sexual activity: Yes     Partners: Male     Birth control/protection: None   Lifestyle    Physical activity     Days per week: 3 days     Minutes per session: 20 min    Stress: To some extent   Relationships    Social connections     Talks on phone: More than three times a week     Gets together: Patient refused     Attends Sabianism service: More than 4 times per  year     Active member of club or organization: Yes     Attends meetings of clubs or organizations: More than 4 times per year     Relationship status:    Other Topics Concern    Not on file   Social History Narrative    1/18/19: lives with her . They have multiple children, but the youngest is 38. No children at home right now. No pets at home. Splits time between here and [place]. Her son lives in [place].        Current Outpatient Medications   Medication Sig Dispense Refill    b complex vitamins tablet Take 1 tablet by mouth once daily.      bimatoprost (LUMIGAN) 0.01 % Drop Place 1 drop into both eyes every evening. 3 Bottle 4    blood sugar diagnostic Strp Test twice daily.  Accucheck viva test strips and lancets. 300 each 3    brimonidine 0.2% (ALPHAGAN) 0.2 % Drop Place 1 drop into both eyes 3 (three) times daily. 15 mL 4    calcium carbonate/vitamin D3 (CALCIUM WITH VITAMIN D3 ORAL) Take 5,000 mcg by mouth Daily.      diazePAM (VALIUM) 5 MG tablet Take 1 tablet (5 mg total) by mouth daily as needed for Anxiety (Take 1 tab. an hour before MRI; 2nd tab. 30 min. later if no effect.). 2 tablet 0    ezetimibe (ZETIA) 10 mg tablet Take 1 tablet (10 mg total) by mouth once daily. 90 tablet 3    lancets (ACCU-CHEK SOFTCLIX LANCETS) Misc 1 Units by Misc.(Non-Drug; Combo Route) route 2 (two) times daily. 300 each 3    lisinopriL (PRINIVIL,ZESTRIL) 40 MG tablet TAKE 1 TABLET(40 MG) BY MOUTH EVERY DAY 90 tablet 0    metFORMIN (GLUCOPHAGE) 1000 MG tablet Take 1 tablet (1,000 mg total) by mouth daily with breakfast. TAKE 1 TABLET DAILY WITH BREAKFAST 90 tablet 0    metoprolol tartrate (LOPRESSOR) 25 MG tablet Take 1 tablet (25 mg total) by mouth 2 (two) times daily. 180 tablet 3    NIFEdipine (ADALAT CC) 90 MG TbSR Take 1 tablet (90 mg total) by mouth once daily. 90 tablet 0    omeprazole (PRILOSEC) 40 MG capsule Take 1 capsule (40 mg total) by mouth 2 (two) times daily before meals. 180  capsule 1    rosuvastatin (CRESTOR) 40 MG Tab Take 1 tablet (40 mg total) by mouth every evening. 90 tablet 3     No current facility-administered medications for this visit.        Review of patient's allergies indicates:   Allergen Reactions    Percodan [oxycodone hcl-oxycodone-asa] Hives and Itching         Review of Systems:    Review of Systems   Musculoskeletal: Positive for back pain.         OBJECTIVE:     BP (!) 205/98   Pulse 93   Wt 87.5 kg (193 lb)   BMI 35.30 kg/m²       Physical Exam:    Physical Exam   Constitutional: She is oriented to person, place, and time. She appears well-developed and well-nourished.   Neck: Normal range of motion. Neck supple. No tracheal deviation present.   Cardiovascular: Normal rate, regular rhythm and normal heart sounds.    Pulmonary/Chest: Effort normal and breath sounds normal. bilaterally enlarged breasts, evidence of previous rashes, shoulder grooving. Right breast lump   Abdominal: Soft. Bowel sounds are normal.   Musculoskeletal: Normal range of motion.   Neurological: She is alert and oriented to person, place, and time.   Skin: Skin is warm.       ASSESSMENT/PLAN:     1.Symptomatic Bilateral Macromastia  2. Right breast cancer  3. Chronic back pain  4. Chronic breast rashes    PLAN:Plan    -Will need 600 gm reduction per side  -Will arange with Dr. Keating's office  -Will submit paper work for insurance approval  -Photos obtained  -Risk, benefits, and alternatives explained for bilateral oncoplastic reduction    Elan Simental MD     [facility] Plastic Surgery Fellow

## 2020-11-11 NOTE — LETTER
[facility]  [address]  [facility] [address]  Phone: [phone]  Fax: [phone] November 12, 2020      Narda Keating MD  [address] [facility] [place] [address]    Patient: Eugenie Coates   MR Number: [id number]   YOB: 1950   Date of Visit: 11/11/2020     Dear Dr. Narda Keating:    Thank you for referring Eugenie Coates to me for evaluation. Attached you will find relevant portions of my assessment and plan of care.    Ms. Coates is a 70-year-old female with a history of COPD and DM who presents with symptomatic bilateral large pendulous  breasts. She also has a diagnosed right breast lobular carcinoma. She is interested in breast conservation therapy. States that she has had back pain for 6 months. She also reports participating in physical therapy to relieve back pain in the past. Complains of shoulder grooving from brassiere straps and rashes under the breast for which she uses baby powder and cream. She also has macerating rashes during summer time. She is active. She reports being a [employer] and does not want any blood transfusion.    1. Symptomatic Bilateral Macromastia  2. Right breast cancer  3. Chronic back pain  4. Chronic breast rashes     PLAN:  - Will need 600 gm reduction per side  - Will arange with Dr. Keating's office  - Will submit paper work for insurance approval  - Photos obtained    Risk, benefits, and alternatives explained for bilateral oncoplastic reduction.    If you have questions, please do not hesitate to call me. I look forward to following Eugenie Coates along with you.    Sincerely,    Zack Hood MD  Section of Plastic Surgery  Department of Surgery  [facility]     CRB/hcr    CC:  Haile Paul DO

## 2020-11-14 ENCOUNTER — PATIENT MESSAGE (OUTPATIENT)
Dept: ADMINISTRATIVE | Facility: OTHER | Age: 70
End: 2020-11-14

## 2020-11-16 NOTE — PROGRESS NOTES
"Digital Medicine: Health  Follow-Up    The history is provided by the patient.                   Additional Follow-up details: Patient confirmed her last BP reading using the ihealth  Cuff was on 3/11/2020. She states she's been tracking her BP using another cuff and shows her PCP at her appointments. She states she is "satisfied" using her other blood pressure cuff.  Reviewed with patient that her recent in clinic readings have been elevated and reviewed benefits of HDMP and regularly speaking with the care team. Patient reiterated that she is satisfied using her other blood pressure cuff and stated she would like to be dropped from HDMP. Encouraged patient to save my number in the event she ever would like to re enroll    Per patient's chart, she has recently been diagnosed with breast cancer.               Diet-Not assessed          Physical Activity-Not assessed    Medication Adherence-Medication Adherence not addressed.      Substance, Sleep, Stress-Not assessed          There are no preventive care reminders to display for this patient.      Last 5 Patient Entered Readings                                      Current 30 Day Average:      Recent Readings 3/11/2020 2/20/2020 2/18/2020 2/17/2020 2/13/2020    SBP (mmHg) 171 143 168 144 141    DBP (mmHg) 93 77 86 80 76    Pulse 66 66 85 73 72               "

## 2020-11-18 RX ORDER — SODIUM CHLORIDE 9 MG/ML
INJECTION, SOLUTION INTRAVENOUS CONTINUOUS
Status: CANCELLED | OUTPATIENT
Start: 2020-11-18

## 2020-11-19 ENCOUNTER — TELEPHONE (OUTPATIENT)
Dept: RADIOLOGY | Facility: HOSPITAL | Age: 70
End: 2020-11-19

## 2020-11-19 NOTE — TELEPHONE ENCOUNTER
Spoke with patient. Reviewed procedure and instructions for breast radar reflector placement . Patient expressed understanding and all questions were answered. Patient is scheduled for breast radar reflector at the [facility] on 12/25/2020.

## 2020-11-19 NOTE — TELEPHONE ENCOUNTER
Called patient in reference to  scheduling a radar reflector for breast surgery . No answer. Left the patient a message with my direct phone number.

## 2020-11-23 NOTE — PROGRESS NOTES
OUTPATIENT PHYSICAL THERAPY  PRE-OP EVALUATION    Name: Eugenie Pacheco Jaxon  Clinic Number: [id number]    Therapy Diagnosis:   Encounter Diagnoses   Name Primary?    Invasive lobular carcinoma of right breast in female     Malignant neoplasm of central portion of right breast in female, estrogen receptor positive     At risk for lymphedema      Physician: Narda Keating MD    Physician Orders: PT Eval and Treat   Medical Diagnosis: right breast cancer  Evaluation Date: 11/25/2020  Authorization period Expiration: 12/31/2020  Plan of Care Certification Period: 11/25/2020  Insurance: Medicare Part A & B    Visit #: 1/ Visits authorized: 1  Time In:7:55 AM  Time Out: 8:40 AM  Total Billable Time: 45 minutes    Precautions: cancer    History   History of Present Illness: Eugenie is a 70 y.o. female that presents to  [facility] the [facility] secondary to dx of right breast cancer.    Dx: right breast ILC, grade 2, ER (+), NC (-), HER2 (-)  Surgery date: 12/7/2020 right breast lumpectomy and bilateral breast reduction      Pt presents today for baseline measurements to aid in the early detection of lymphedema, UE muscle testing, postural and ROM assessment along with education of risk of lymphedema and surgical precautions post surgery. Circumferential measurements will be taken today of BL UEs for early detection of lymphedema post surgery. Pt will also be instructed in exercises to perform pre and post-surgery to insure best outcomes.     Past Medical History:   Past Medical History:   Diagnosis Date    Allergy     Anemia     Anxiety     Arthritis     Asthma     Bell palsy     Depression     Diabetes mellitus, type 2     DVT (deep venous thrombosis)     Endometriosis     Eye injury 2014    stuck with tree branch od ?     GERD (gastroesophageal reflux disease)     Glaucoma     History of uterine fibroid     Hyperlipidemia     Hypertension     Invasive lobular  carcinoma of right breast in female 2020    Nuclear sclerosis of both eyes 2016    Sleep apnea     uses C pap    Supraventricular tachycardia     SVT (supraventricular tachycardia) 2019    Thyroid disease        Past Surgical History:   Eugenie Coates  has a past surgical history that includes Cholecystectomy;  section; Hernia repair; Vein Surgery (, 2004); Knee surgery (Right, 2008); and glaucoma laser (Bilateral).    Medications:  Eugenie has a current medication list which includes the following prescription(s): b complex vitamins, bimatoprost, blood sugar diagnostic, brimonidine 0.2%, calcium carbonate/vitamin d3, diazepam, ezetimibe, lancets, lisinopril, metformin, metoprolol tartrate, nifedipine, omeprazole, rosuvastatin, and omeprazole.    Allergies:  Review of patient's allergies indicates:   Allergen Reactions    Percodan [oxycodone hcl-oxycodone-asa] Hives and Itching          Hand dominance: right handed  Prior Therapy: knees and for venous insufficiency  Nutrition:  Overweight  Social History: Lives with   Place of Residence (Steps/Adaptations): one story home  Current functional status:  Independent with all ADL's  Exercise routine prior to onset : stationary bike and works in garden  DME owned: None  Work:  Retired                         Subjective   Pt states: not having any pain  Pain: 0/10 on VAS.     Objective   Mental status :oriented    Posture/Alignment   Postural examination/scapula alignment: forward head and rounded shoulder  Joint integrity: WFLs  Skin integrity: intact  Edema: none noted    Sensation: Light Touch: Intact           Proprioception: Intact  - appearance: well groomed     ROM:   UPPER EXTREMITY--AROM/PROM  (R) UE: WNLs  (L) UE: WNLs     Shoulder Range of Motion:   ACTIVE ROM LEFT RIGHT   Flexion 170 170   Abduction 170 170   Extension 70 80   IR/90deg 80 80   ER/90deg 90 90     Strength: manual muscle test grades below   Upper  "Extremity Strength   (L) UE (R) UE   Shoulder flexion: 5/5 5/5   Shoulder Abduction: 5/5 5/5   Shoulder IR 5/5 5/5   Shoulder ER 5/5 5/5   Elbow flexion: 5/5 5/5   Elbow extension: 5/5 5/5   Lower Trap: 5/5 5/5   Middle Trap: 5/5 5/5    5/5 5/5       Baseline Measurements of BL UE's for early detection of Lymphedema:     LANDMARK RIGHT UE LEFT UE DIFFERENCE   E + 8" 38 cm 37 cm 1 cm   E + 6" 36 cm 35 cm 1 cm   E + 4" 32 cm 32.5 cm 0.5 cm   E + 2" 29 cm 30 cm 1 cm   Elbow 26 cm 26 cm 0 cm   W+ 8" 26 cm 26 cm 0 cm   W +  6" 25 cm 25 cm 0 cm   W + 4" 23 cm 22 cm 1 cm   Wrist 15.5 cm 15.5 cm 0 cm   DPC 20 cm 20 cm 0 cm   IP Thumb 6.5 cm 6.5 cm 0 cm         Coordination:   - fine motor: WFL  - UE coordination: intact     - LE coordination:  Not tested     Functional Mobility (Bed mobility, transfers)  Bed mobility: I =  independent   Roll to left: I  Roll to right: I  Supine to prone: I  Scooting to edge of bed: I  Supine to sit: I  Sit to supine: I  Transfers to bed: I  Transfers to toilet: I  Sit to stand:  I  Stand pivot:  I  Car transfers: I      ADL's:  Feeding: I = independent   Grooming: I  Hygiene: I  UB Dressing: I  LB Dressing: I  Toileting: I  Bathing: I    Gait Assessment:   - AD used: none  - Assistance: independent  - Distance: community distances       Endurance Deficit: none      Patient Education   - role of PT in multi - disciplinary team, goals for PT  - Pt was educated in lymphedema etiology and management plans.    - Pt was provided with written risk reductions and precautions for managing lymphedema.   - Reviewed SELAM drain care instructions.     ROM/lifting Precautions post surgery discussed -  until drains have been removed:  - do not lift affected arm above 90 degrees of shoulder flexion  - do not lift over 5 lbs  - do not pull or push heavy objects  - do not sleep on your stomach or surgery side     Written Home Exercises Provided and Patient Education: Handouts given   Pt was instructed in " and performed therapeutic exercise for postural correction and alignment, stretching and soft tissue mobility, and strengthening.     Exercises included: handout given    - exaggerated deep breathing and relaxation  - scapular retractions  - wrist circles  - elbow flexion/extension      Pt was able to demonstrate and report understanding and performance    Pt has no cultural, educational or language barriers to learning provided.    Functional Limitations Reporting     Category: mobility  Score: 0% Limitation       Assessment   This is a 70 y.o. female referred to outpatient physical therapy and presents with a medical diagnosis of right breast cancer and was seen today pre-operatively to assess strength and ROM of BL UEs, to take baseline circumferential measurements of BL UEs to aid in the early detection of lymphedema and provide pt education on exercises/precations post breast surgery. Pt does not exhibit any ROM impairments  Pt educated in lymphedema risks/precautions as well as ROM/lifting precautions post surgery - pt demonstrated/verbalized understanding. No goals established this visit as goals for PT will be established post surgery at follow up.      Anticipated barriers to physical therapy: None     Pt's spiritual, cultural and educational needs considered and pt agreeable to plan of care and goals as stated below:     Medical necessity is demonstrated by the following IMPAIRMENTS/PROMBLEM LIST:  History  Co-morbidities and personal factors that may impact the plan of care Co-morbidities:   diabetes, history of cancer, HTN and thyroid disease    Personal Factors:   no deficits     moderate   Examination  Body Structures and Functions, activity limitations and participation restrictions that may impact the plan of care Body Regions:   No DEficits    Body Systems:    No Deficits    Participation Restrictions:   No Deficits    Activity limitations:   Learning and applying knowledge  no deficits    General  Tasks and Commands  no deficits    Communication  no deficits    Mobility  no deficits    Self care  no deficits    Domestic Life  no deficits    Interactions/Relationships  no deficits    Life Areas  no deficits    Community and Social Life  no deficits         low   Clinical Presentation stable and uncomplicated low   Decision Making/ Complexity Score: low           Plan   Schedule patient for follow up with Physical therapy post surgery. Goals for therapy post surgery will be established at that time.     Therapist: Marta Zurita, PT  11/25/2020

## 2020-11-25 ENCOUNTER — OFFICE VISIT (OUTPATIENT)
Dept: CARDIOLOGY | Facility: CLINIC | Age: 70
End: 2020-11-25
Payer: MEDICARE

## 2020-11-25 ENCOUNTER — HOSPITAL ENCOUNTER (OUTPATIENT)
Dept: RADIOLOGY | Facility: HOSPITAL | Age: 70
Discharge: HOME | End: 2020-11-25
Attending: SURGERY
Payer: MEDICARE

## 2020-11-25 ENCOUNTER — CLINICAL SUPPORT (OUTPATIENT)
Dept: REHABILITATION | Facility: HOSPITAL | Age: 70
End: 2020-11-25
Attending: SURGERY
Payer: MEDICARE

## 2020-11-25 VITALS
DIASTOLIC BLOOD PRESSURE: 78 MMHG | WEIGHT: 190.25 LBS | SYSTOLIC BLOOD PRESSURE: 167 MMHG | HEIGHT: 62 IN | HEART RATE: 83 BPM | BODY MASS INDEX: 35.01 KG/M2

## 2020-11-25 DIAGNOSIS — C50.919 BREAST CANCER: ICD-10-CM

## 2020-11-25 DIAGNOSIS — Z01.818 PRE-OP TESTING: ICD-10-CM

## 2020-11-25 DIAGNOSIS — E78.2 MIXED HYPERLIPIDEMIA: ICD-10-CM

## 2020-11-25 DIAGNOSIS — E66.01 SEVERE OBESITY (BMI 35.0-35.9 WITH COMORBIDITY): ICD-10-CM

## 2020-11-25 DIAGNOSIS — K21.9 GASTROESOPHAGEAL REFLUX DISEASE, UNSPECIFIED WHETHER ESOPHAGITIS PRESENT: ICD-10-CM

## 2020-11-25 DIAGNOSIS — I47.10 SVT (SUPRAVENTRICULAR TACHYCARDIA): ICD-10-CM

## 2020-11-25 DIAGNOSIS — G47.33 OSA (OBSTRUCTIVE SLEEP APNEA): ICD-10-CM

## 2020-11-25 DIAGNOSIS — E11.65 TYPE 2 DIABETES MELLITUS WITH HYPERGLYCEMIA, WITHOUT LONG-TERM CURRENT USE OF INSULIN: ICD-10-CM

## 2020-11-25 DIAGNOSIS — I51.89 DIASTOLIC DYSFUNCTION: ICD-10-CM

## 2020-11-25 DIAGNOSIS — J98.4 RESTRICTIVE LUNG DISEASE: ICD-10-CM

## 2020-11-25 DIAGNOSIS — C50.911 INVASIVE LOBULAR CARCINOMA OF RIGHT BREAST IN FEMALE: ICD-10-CM

## 2020-11-25 DIAGNOSIS — I10 ESSENTIAL HYPERTENSION: Primary | ICD-10-CM

## 2020-11-25 DIAGNOSIS — Z91.89 AT RISK FOR LYMPHEDEMA: ICD-10-CM

## 2020-11-25 DIAGNOSIS — Z17.0 MALIGNANT NEOPLASM OF CENTRAL PORTION OF RIGHT BREAST IN FEMALE, ESTROGEN RECEPTOR POSITIVE: ICD-10-CM

## 2020-11-25 DIAGNOSIS — N18.30 STAGE 3 CHRONIC KIDNEY DISEASE, UNSPECIFIED WHETHER STAGE 3A OR 3B CKD: ICD-10-CM

## 2020-11-25 DIAGNOSIS — C50.111 MALIGNANT NEOPLASM OF CENTRAL PORTION OF RIGHT BREAST IN FEMALE, ESTROGEN RECEPTOR POSITIVE: ICD-10-CM

## 2020-11-25 DIAGNOSIS — Z53.1 REFUSAL OF BLOOD TRANSFUSIONS AS PATIENT IS JEHOVAH'S WITNESS: ICD-10-CM

## 2020-11-25 PROBLEM — Z86.718 HISTORY OF DVT (DEEP VEIN THROMBOSIS): Status: ACTIVE | Noted: 2018-01-23

## 2020-11-25 PROCEDURE — 99214 OFFICE O/P EST MOD 30 MIN: CPT | Mod: S$PBB,,, | Performed by: PHYSICIAN ASSISTANT

## 2020-11-25 PROCEDURE — 78306 NM BONE SCAN WHOLE BODY: ICD-10-PCS | Mod: 26,,, | Performed by: RADIOLOGY

## 2020-11-25 PROCEDURE — 25000003 PHARM REV CODE 250: Performed by: SURGERY

## 2020-11-25 PROCEDURE — 78306 BONE IMAGING WHOLE BODY: CPT | Mod: 26,,, | Performed by: RADIOLOGY

## 2020-11-25 PROCEDURE — 25500020 PHARM REV CODE 255: Performed by: SURGERY

## 2020-11-25 PROCEDURE — A4648 IMPLANTABLE TISSUE MARKER: HCPCS

## 2020-11-25 PROCEDURE — 99999 PR PBB SHADOW E&M-EST. PATIENT-LVL III: ICD-10-PCS | Mod: PBBFAC,,, | Performed by: PHYSICIAN ASSISTANT

## 2020-11-25 PROCEDURE — 19281 MAMMO BREAST RADAR REFLECTOR LOC W/MAMMO GUIDANCE, 1ST LESION, RIGHT: ICD-10-PCS | Mod: RT,,, | Performed by: RADIOLOGY

## 2020-11-25 PROCEDURE — C8937 CAD BREAST MRI: HCPCS | Mod: TC

## 2020-11-25 PROCEDURE — 99999 PR PBB SHADOW E&M-EST. PATIENT-LVL III: CPT | Mod: PBBFAC,,, | Performed by: PHYSICIAN ASSISTANT

## 2020-11-25 PROCEDURE — 19281 PERQ DEVICE BREAST 1ST IMAG: CPT | Mod: RT,,, | Performed by: RADIOLOGY

## 2020-11-25 PROCEDURE — 77049 MRI BREAST W/WO CONTRAST, W/CAD, BILATERAL: ICD-10-PCS | Mod: 26,,, | Performed by: RADIOLOGY

## 2020-11-25 PROCEDURE — 77049 MRI BREAST C-+ W/CAD BI: CPT | Mod: 26,,, | Performed by: RADIOLOGY

## 2020-11-25 PROCEDURE — A9577 INJ MULTIHANCE: HCPCS | Performed by: SURGERY

## 2020-11-25 PROCEDURE — 97161 PT EVAL LOW COMPLEX 20 MIN: CPT | Performed by: PHYSICAL MEDICINE & REHABILITATION

## 2020-11-25 PROCEDURE — A9503 TC99M MEDRONATE: HCPCS

## 2020-11-25 PROCEDURE — 99214 PR OFFICE/OUTPT VISIT, EST, LEVL IV, 30-39 MIN: ICD-10-PCS | Mod: S$PBB,,, | Performed by: PHYSICIAN ASSISTANT

## 2020-11-25 PROCEDURE — 99213 OFFICE O/P EST LOW 20 MIN: CPT | Mod: PBBFAC,25 | Performed by: PHYSICIAN ASSISTANT

## 2020-11-25 RX ORDER — LIDOCAINE HYDROCHLORIDE 20 MG/ML
10 INJECTION, SOLUTION INFILTRATION; PERINEURAL ONCE
Status: COMPLETED | OUTPATIENT
Start: 2020-11-25 | End: 2020-11-25

## 2020-11-25 RX ADMIN — GADOBENATE DIMEGLUMINE 19 ML: 529 INJECTION, SOLUTION INTRAVENOUS at 02:11

## 2020-11-25 RX ADMIN — LIDOCAINE HYDROCHLORIDE 10 ML: 20 INJECTION, SOLUTION INFILTRATION; PERINEURAL at 11:11

## 2020-11-25 NOTE — Clinical Note
Hi Dr. Roberts. This patient is doing well but now I feel like I should have done something about her cholesterol. I feel like the only thing that would get her LDL<70 is Repatha or Praluent but insurance won't pay for that without proven CAD right?

## 2020-11-25 NOTE — PATIENT INSTRUCTIONS
PRE/POST OP PATIENT EDUCATION    Post Operative Instructions     Range of Motion/lifting Precautions post surgery  The following activities should be avoideduntil your drain(s) have been removed:  - do not lift affected arm above 90 degrees of shoulder flexion  - do not lift over 5 lbs  - do not pull or push heavy objects  - do not sleep on your stomach or surgery side       After surgery, you may begin self-care tasks including grooming, dressing, feeding and simple hygiene as soon as you feel up to it.    Schedule your post-op therapy follow-up after your drains have been removed     When to call your doctor   - if any part of your affected arm or axilla feels hot, is reddened or has increased swelling   - if you develop a temperature over 101 degrees Fahrenheit      Lymphedema - Identification and Prevention     Lymphedema - is the swelling of a body area or extremity caused by the accumulation of lymphatic fluid.  There is a risk for lymphedema with the removal of lymph nodes, trauma or radiation therapy.  Treatment of breast cancer often involves surgery: mastectomy or lumpectomy. Some of the lymph nodes in the underarm (called axillary lymph nodes) may be removed and checked to see if they contain cancer cells.     During breast surgery when axillary lymph nodes are removed (with sentinel node biopsy or axillary dissection) or are treated with radiation therapy, the lymphatic system may become impaired. This may prevent lymphatic fluid from leaving the area therefore, causing lymphedema.     Lymphatic fluid is a normal part of the circulatory system. Its function is to remove waste products and to produce cells vital to fighting infection. Swelling occurs when the vessels become restricted and the lymphatic fluid is unable to freely flow through them.  If lymphedema is left untreated, the affected limb could progressively become more swollen, which could lead to hardening  of the skin, bulkiness in the limb, infection and impaired wound healing.         There are things you can do to decrease the chance of developing lymphedema.                                          [email or web address]                                                                                                                                                  The information presented is intended for general information and educational purposes. It is not intended to replace the  advice of your health care provider. Contact your health care provider if you believe you have a health problem.                                                    POST OP EXERCISES - SAFE TO DO THE FIRST 2 WEEKS AFTER SURGERY UNTIL YOUR FOLLOW UP APPOINTMENT WITH PHYSICAL/OCCUPATIONAL THERAPY    Scapular Retraction (Standing)    With arms at sides, squeeze shoulder blades together. Do not shrug shoulders and do not hold your breath. Hold 5 seconds. Repeat 10 times 1 sessions 1-2 x day.       Exaggerated Breathing and Relaxation      Practice deep breathing frequently in the first few days following surgery even before you begin exercising. This exercise helps with tissue extensibility in the chest wall.  Inhale slowly and deeply through the nose and exhale through pursed lips. Concentrate on relaxing as you let the air out of your lungs. Repeat three (3) to four (4) times, remembering to breath in deeply and then relaxing. This exercise helps to ease the sensation of pulling and discomfort that may be experienced while exercising.      Ball Squeeze OR Hand pumps       Perform this exercise three (3) times a day for 10 time.    The ball squeeze or hand pumps helps to prevent or reduce temporary swelling that may occur in the affected arm. This exercise may be performed standing, sitting or while lying in bed. During this exercise the affected arm should be slightly bent and held upward. Support your arm with a pillow if you  are uncomfortable holding it up.              AROM: Elbow Flexion / Extension        With left hand palm up, gently bend elbow as far as possible. Then straighten arm as far as possible. Do this in standing.   Repeat 10 times per set. Do 1 sets per session. Do 1-3 sessions per day.    Contract / Relax: External Rotation    Place right arm, elbow bent, beside head on pillow. Use 1-3 pillows to be comfortable.  REST AND RELAX.   Do 1-2 times per day.       Flexibility: Upper Trapezius Stretch    Sit up tall and place one hand behind your back or under your thigh and the other on top of your head.  Gently draw your head towards the side of your top hand. Do not over-stretch.  Hold 5 seconds.   Repeat 5 times each side per set. Do 1 sets per session. Do 2-3 sessions per day.  Copyright © [employer]. All rights reserved.       AROM: Neck Rotation        Turn head slowly to look over one shoulder, then the other. .  Repeat _10___ times per set. Do __1__ sets per session. Do ___2_ sessions per day.     Copyright © [employer]. All rights reserved.

## 2020-11-25 NOTE — PATIENT INSTRUCTIONS
Transition to a low salt, mediterranean-style diet which emphasizes:     · Eating primarily plant-based foods, such as fruits and vegetables, whole grains, legumes and nuts   · Replacing butter with healthy fats, such as olive oil   · Using herbs and spices instead of salt to flavor foods   · Limiting red meat to no more than a few times a month   · Eating fish and poultry at least twice a week     Increase aerobic exercise with a goal of at least 30 minutes, 5 days a week.

## 2020-12-02 ENCOUNTER — PATIENT MESSAGE (OUTPATIENT)
Dept: FAMILY MEDICINE | Facility: CLINIC | Age: 70
End: 2020-12-02

## 2020-12-02 DIAGNOSIS — I10 ESSENTIAL HYPERTENSION: ICD-10-CM

## 2020-12-02 RX ORDER — LISINOPRIL 40 MG/1
40 TABLET ORAL DAILY
Qty: 90 TABLET | Refills: 0 | Status: SHIPPED | OUTPATIENT
Start: 2020-12-02 | End: 2021-02-03 | Stop reason: SDUPTHER

## 2020-12-03 NOTE — PRE-PROCEDURE INSTRUCTIONS
PREOP INSTRUCTIONS:No solid food ,milk or milk products for 8 hours prior to procedure.Clear liquids are allowed up to 2 hours before procedure.Clear liquids are:water,apple juice,gatorade & powerade.Instructed to follow the surgeon's instructions if they differ from these.Shower instructions as well as directions to the Surgery Center were given.Encouraged to wear loose fitting,comfortable clothing.Medication instructions for pm prior to and am of procedure reviewed.Instructed to avoid taking vitamins,supplements,aspirin and ibuprofen the morning of surgery. Patient's questions and concerns addressed .Patient informed of the current visitor policy and advised patient that one visitor may accompany each patient into the hospital and wait (socially distanced) until a member of the medical team provides an update at the conclusion of the procedure.When they enter the hospital both patient and visitor will have their temperature checked.All visitors are asked to arrive with a mask and to keep their mask on throughout the visit.      Patient denies any side effects or issues with anesthesia or sedation.      0500 ARRIVAL TO [facility]    - KELLY GRIMALDO JR WILL BE PROVIDING TRANSPORTATION HOME UPON DISCHARGE.

## 2020-12-04 ENCOUNTER — TELEPHONE (OUTPATIENT)
Dept: SURGERY | Facility: CLINIC | Age: 70
End: 2020-12-04

## 2020-12-04 NOTE — TELEPHONE ENCOUNTER
Spoke to patient to give surgery arrival time of 0500 tomorrow.  Patient had no other concerns at this time

## 2020-12-04 NOTE — PRE-PROCEDURE INSTRUCTIONS
Returned call to pt - Discussed Medications to take/hold prior to SX - all questions answered and pt R/B/C all instructions and V/C/U.

## 2020-12-06 ENCOUNTER — ANESTHESIA EVENT (OUTPATIENT)
Dept: SURGERY | Facility: HOSPITAL | Age: 70
End: 2020-12-06
Payer: MEDICARE

## 2020-12-07 ENCOUNTER — HOSPITAL ENCOUNTER (OUTPATIENT)
Facility: HOSPITAL | Age: 70
Discharge: HOME | End: 2020-12-07
Attending: SURGERY | Admitting: SURGERY
Payer: MEDICARE

## 2020-12-07 ENCOUNTER — HOSPITAL ENCOUNTER (OUTPATIENT)
Dept: RADIOLOGY | Facility: HOSPITAL | Age: 70
Discharge: HOME | End: 2020-12-07
Attending: SURGERY | Admitting: SURGERY
Payer: MEDICARE

## 2020-12-07 ENCOUNTER — ANESTHESIA (OUTPATIENT)
Dept: SURGERY | Facility: HOSPITAL | Age: 70
End: 2020-12-07
Payer: MEDICARE

## 2020-12-07 ENCOUNTER — HOSPITAL ENCOUNTER (OUTPATIENT)
Dept: RADIOLOGY | Facility: HOSPITAL | Age: 70
Discharge: HOME | End: 2020-12-07
Attending: SURGERY
Payer: MEDICARE

## 2020-12-07 VITALS
SYSTOLIC BLOOD PRESSURE: 120 MMHG | WEIGHT: 189 LBS | TEMPERATURE: 98 F | HEART RATE: 84 BPM | RESPIRATION RATE: 16 BRPM | BODY MASS INDEX: 33.49 KG/M2 | DIASTOLIC BLOOD PRESSURE: 79 MMHG | OXYGEN SATURATION: 99 % | HEIGHT: 63 IN

## 2020-12-07 DIAGNOSIS — I10 ESSENTIAL HYPERTENSION: ICD-10-CM

## 2020-12-07 DIAGNOSIS — C50.911 INVASIVE LOBULAR CARCINOMA OF RIGHT BREAST IN FEMALE: ICD-10-CM

## 2020-12-07 DIAGNOSIS — C50.911 INVASIVE LOBULAR CARCINOMA OF RIGHT BREAST IN FEMALE: Primary | ICD-10-CM

## 2020-12-07 LAB
POCT GLUCOSE: 150 MG/DL (ref 70–110)
POCT GLUCOSE: 167 MG/DL (ref 70–110)
SARS-COV-2 RDRP RESP QL NAA+PROBE: NEGATIVE

## 2020-12-07 PROCEDURE — 63600175 PHARM REV CODE 636 W HCPCS: Performed by: ANESTHESIOLOGY

## 2020-12-07 PROCEDURE — 88360 PR  TUMOR IMMUNOHISTOCHEM/MANUAL: ICD-10-PCS | Mod: 26,,, | Performed by: PATHOLOGY

## 2020-12-07 PROCEDURE — 88342 CHG IMMUNOCYTOCHEMISTRY: ICD-10-PCS | Mod: 26,59,, | Performed by: PATHOLOGY

## 2020-12-07 PROCEDURE — 76098 X-RAY EXAM SURGICAL SPECIMEN: CPT | Mod: TC

## 2020-12-07 PROCEDURE — 37000008 HC ANESTHESIA 1ST 15 MINUTES: Performed by: SURGERY

## 2020-12-07 PROCEDURE — 76098 MAMMO BREAST SPECIMEN: ICD-10-PCS | Mod: 26,,, | Performed by: RADIOLOGY

## 2020-12-07 PROCEDURE — 82962 GLUCOSE BLOOD TEST: CPT | Performed by: SURGERY

## 2020-12-07 PROCEDURE — 37000009 HC ANESTHESIA EA ADD 15 MINS: Performed by: SURGERY

## 2020-12-07 PROCEDURE — 38792 PR IDENTIFY SENTINEL 2DE: ICD-10-PCS | Mod: RT,,, | Performed by: SURGERY

## 2020-12-07 PROCEDURE — 88305 TISSUE EXAM BY PATHOLOGIST: CPT | Mod: 59 | Performed by: PATHOLOGY

## 2020-12-07 PROCEDURE — 19318 BREAST REDUCTION: CPT | Mod: 50,,, | Performed by: SURGERY

## 2020-12-07 PROCEDURE — 25000003 PHARM REV CODE 250: Performed by: ANESTHESIOLOGY

## 2020-12-07 PROCEDURE — 36000707: Performed by: SURGERY

## 2020-12-07 PROCEDURE — 63600175 PHARM REV CODE 636 W HCPCS: Performed by: SURGERY

## 2020-12-07 PROCEDURE — 88342 IMHCHEM/IMCYTCHM 1ST ANTB: CPT | Mod: 26,59,, | Performed by: PATHOLOGY

## 2020-12-07 PROCEDURE — 88305 TISSUE EXAM BY PATHOLOGIST: ICD-10-PCS | Mod: 26,,, | Performed by: PATHOLOGY

## 2020-12-07 PROCEDURE — 38792 RA TRACER ID OF SENTINL NODE: CPT | Mod: RT,,, | Performed by: SURGERY

## 2020-12-07 PROCEDURE — 63600175 PHARM REV CODE 636 W HCPCS

## 2020-12-07 PROCEDURE — 63600175 PHARM REV CODE 636 W HCPCS: Performed by: STUDENT IN AN ORGANIZED HEALTH CARE EDUCATION/TRAINING PROGRAM

## 2020-12-07 PROCEDURE — 88360 TUMOR IMMUNOHISTOCHEM/MANUAL: CPT | Mod: 26,,, | Performed by: PATHOLOGY

## 2020-12-07 PROCEDURE — D9220A PRA ANESTHESIA: ICD-10-PCS | Mod: ,,, | Performed by: ANESTHESIOLOGY

## 2020-12-07 PROCEDURE — 71000015 HC POSTOP RECOV 1ST HR: Performed by: SURGERY

## 2020-12-07 PROCEDURE — 88305 TISSUE EXAM BY PATHOLOGIST: CPT | Mod: 26,,, | Performed by: PATHOLOGY

## 2020-12-07 PROCEDURE — 88341 IMHCHEM/IMCYTCHM EA ADD ANTB: CPT | Mod: 26,59,, | Performed by: PATHOLOGY

## 2020-12-07 PROCEDURE — 19301 PARTIAL MASTECTOMY: CPT | Mod: RT,,, | Performed by: SURGERY

## 2020-12-07 PROCEDURE — C1751 CATH, INF, PER/CENT/MIDLINE: HCPCS | Performed by: ANESTHESIOLOGY

## 2020-12-07 PROCEDURE — 19301 PR MASTECTOMY, PARTIAL: ICD-10-PCS | Mod: RT,,, | Performed by: SURGERY

## 2020-12-07 PROCEDURE — 88360 TUMOR IMMUNOHISTOCHEM/MANUAL: CPT | Mod: 59 | Performed by: PATHOLOGY

## 2020-12-07 PROCEDURE — 36000706: Performed by: SURGERY

## 2020-12-07 PROCEDURE — 71000044 HC DOSC ROUTINE RECOVERY FIRST HOUR: Performed by: SURGERY

## 2020-12-07 PROCEDURE — D9220A PRA ANESTHESIA: Mod: ,,, | Performed by: ANESTHESIOLOGY

## 2020-12-07 PROCEDURE — 38525 PR BIOPSY/REM LYMPH NODES, AXILLARY: ICD-10-PCS | Mod: 51,RT,, | Performed by: SURGERY

## 2020-12-07 PROCEDURE — 19318 PR REDUCTION OF LARGE BREAST: ICD-10-PCS | Mod: 50,,, | Performed by: SURGERY

## 2020-12-07 PROCEDURE — 88341 IMHCHEM/IMCYTCHM EA ADD ANTB: CPT | Performed by: PATHOLOGY

## 2020-12-07 PROCEDURE — 88341 PR IHC OR ICC EACH ADD'L SINGLE ANTIBODY  STAINPR: ICD-10-PCS | Mod: 26,59,, | Performed by: PATHOLOGY

## 2020-12-07 PROCEDURE — 88307 PR  SURG PATH,LEVEL V: ICD-10-PCS | Mod: 26,,, | Performed by: PATHOLOGY

## 2020-12-07 PROCEDURE — U0002 COVID-19 LAB TEST NON-CDC: HCPCS

## 2020-12-07 PROCEDURE — 64461 PVB THORACIC SINGLE INJ SITE: CPT | Performed by: STUDENT IN AN ORGANIZED HEALTH CARE EDUCATION/TRAINING PROGRAM

## 2020-12-07 PROCEDURE — 88307 TISSUE EXAM BY PATHOLOGIST: CPT | Mod: 26,,, | Performed by: PATHOLOGY

## 2020-12-07 PROCEDURE — 64461: ICD-10-PCS | Mod: 50,59,GC, | Performed by: ANESTHESIOLOGY

## 2020-12-07 PROCEDURE — 88342 IMHCHEM/IMCYTCHM 1ST ANTB: CPT | Mod: 59 | Performed by: PATHOLOGY

## 2020-12-07 PROCEDURE — 88307 TISSUE EXAM BY PATHOLOGIST: CPT | Mod: 59 | Performed by: PATHOLOGY

## 2020-12-07 PROCEDURE — 64461 PVB THORACIC SINGLE INJ SITE: CPT | Mod: 50,59,GC, | Performed by: ANESTHESIOLOGY

## 2020-12-07 PROCEDURE — A9520 TC99 TILMANOCEPT DIAG 0.5MCI: HCPCS

## 2020-12-07 PROCEDURE — 76098 X-RAY EXAM SURGICAL SPECIMEN: CPT | Mod: 26,,, | Performed by: RADIOLOGY

## 2020-12-07 PROCEDURE — 25000003 PHARM REV CODE 250: Performed by: STUDENT IN AN ORGANIZED HEALTH CARE EDUCATION/TRAINING PROGRAM

## 2020-12-07 PROCEDURE — 38525 BIOPSY/REMOVAL LYMPH NODES: CPT | Mod: 51,RT,, | Performed by: SURGERY

## 2020-12-07 RX ORDER — SODIUM CHLORIDE 0.9 % (FLUSH) 0.9 %
10 SYRINGE (ML) INJECTION
Status: DISCONTINUED | OUTPATIENT
Start: 2020-12-07 | End: 2020-12-07 | Stop reason: HOSPADM

## 2020-12-07 RX ORDER — HEPARIN SODIUM 5000 [USP'U]/ML
5000 INJECTION, SOLUTION INTRAVENOUS; SUBCUTANEOUS ONCE
Status: COMPLETED | OUTPATIENT
Start: 2020-12-07 | End: 2020-12-07

## 2020-12-07 RX ORDER — DEXMEDETOMIDINE HYDROCHLORIDE 100 UG/ML
INJECTION, SOLUTION INTRAVENOUS
Status: DISCONTINUED | OUTPATIENT
Start: 2020-12-07 | End: 2020-12-07

## 2020-12-07 RX ORDER — PROPOFOL 10 MG/ML
VIAL (ML) INTRAVENOUS
Status: DISCONTINUED | OUTPATIENT
Start: 2020-12-07 | End: 2020-12-07

## 2020-12-07 RX ORDER — FENTANYL CITRATE 50 UG/ML
INJECTION, SOLUTION INTRAMUSCULAR; INTRAVENOUS
Status: COMPLETED
Start: 2020-12-07 | End: 2020-12-07

## 2020-12-07 RX ORDER — LIDOCAINE HYDROCHLORIDE 20 MG/ML
INJECTION, SOLUTION EPIDURAL; INFILTRATION; INTRACAUDAL; PERINEURAL
Status: DISCONTINUED | OUTPATIENT
Start: 2020-12-07 | End: 2020-12-07

## 2020-12-07 RX ORDER — ONDANSETRON 2 MG/ML
INJECTION INTRAMUSCULAR; INTRAVENOUS
Status: DISCONTINUED | OUTPATIENT
Start: 2020-12-07 | End: 2020-12-07

## 2020-12-07 RX ORDER — ACETAMINOPHEN 500 MG
1000 TABLET ORAL
Status: DISCONTINUED | OUTPATIENT
Start: 2020-12-07 | End: 2020-12-07 | Stop reason: HOSPADM

## 2020-12-07 RX ORDER — HYDROMORPHONE HYDROCHLORIDE 1 MG/ML
0.2 INJECTION, SOLUTION INTRAMUSCULAR; INTRAVENOUS; SUBCUTANEOUS EVERY 5 MIN PRN
Status: DISCONTINUED | OUTPATIENT
Start: 2020-12-07 | End: 2020-12-07 | Stop reason: HOSPADM

## 2020-12-07 RX ORDER — SUCCINYLCHOLINE CHLORIDE 20 MG/ML
INJECTION INTRAMUSCULAR; INTRAVENOUS
Status: DISCONTINUED | OUTPATIENT
Start: 2020-12-07 | End: 2020-12-07

## 2020-12-07 RX ORDER — CEFAZOLIN SODIUM 1 G/3ML
2 INJECTION, POWDER, FOR SOLUTION INTRAMUSCULAR; INTRAVENOUS
Status: COMPLETED | OUTPATIENT
Start: 2020-12-07 | End: 2020-12-07

## 2020-12-07 RX ORDER — CEPHALEXIN 500 MG/1
500 CAPSULE ORAL EVERY 8 HOURS
Qty: 27 CAPSULE | Refills: 0 | Status: SHIPPED | OUTPATIENT
Start: 2020-12-07 | End: 2020-12-07 | Stop reason: SDUPTHER

## 2020-12-07 RX ORDER — HYDROCODONE BITARTRATE AND ACETAMINOPHEN 5; 325 MG/1; MG/1
1 TABLET ORAL ONCE AS NEEDED
Status: COMPLETED | OUTPATIENT
Start: 2020-12-07 | End: 2020-12-07

## 2020-12-07 RX ORDER — NIFEDIPINE 90 MG/1
90 TABLET, FILM COATED, EXTENDED RELEASE ORAL DAILY
Qty: 90 TABLET | Refills: 0 | Status: SHIPPED | OUTPATIENT
Start: 2020-12-07 | End: 2021-02-03 | Stop reason: SDUPTHER

## 2020-12-07 RX ORDER — CEPHALEXIN 500 MG/1
500 CAPSULE ORAL EVERY 8 HOURS
Qty: 27 CAPSULE | Refills: 0 | Status: SHIPPED | OUTPATIENT
Start: 2020-12-07 | End: 2020-12-16

## 2020-12-07 RX ORDER — MIDAZOLAM HYDROCHLORIDE 1 MG/ML
0.5 INJECTION INTRAMUSCULAR; INTRAVENOUS
Status: DISCONTINUED | OUTPATIENT
Start: 2020-12-07 | End: 2020-12-07 | Stop reason: HOSPADM

## 2020-12-07 RX ORDER — KETAMINE HCL IN 0.9 % NACL 50 MG/5 ML
SYRINGE (ML) INTRAVENOUS
Status: DISCONTINUED | OUTPATIENT
Start: 2020-12-07 | End: 2020-12-07

## 2020-12-07 RX ORDER — ONDANSETRON 2 MG/ML
4 INJECTION INTRAMUSCULAR; INTRAVENOUS DAILY PRN
Status: DISCONTINUED | OUTPATIENT
Start: 2020-12-07 | End: 2020-12-07 | Stop reason: HOSPADM

## 2020-12-07 RX ORDER — PHENYLEPHRINE HCL IN 0.9% NACL 1 MG/10 ML
SYRINGE (ML) INTRAVENOUS
Status: DISCONTINUED | OUTPATIENT
Start: 2020-12-07 | End: 2020-12-07

## 2020-12-07 RX ORDER — FENTANYL CITRATE 50 UG/ML
25 INJECTION, SOLUTION INTRAMUSCULAR; INTRAVENOUS EVERY 5 MIN PRN
Status: DISCONTINUED | OUTPATIENT
Start: 2020-12-07 | End: 2020-12-07 | Stop reason: HOSPADM

## 2020-12-07 RX ORDER — HYDROCODONE BITARTRATE AND ACETAMINOPHEN 5; 325 MG/1; MG/1
1 TABLET ORAL EVERY 6 HOURS PRN
Qty: 31 TABLET | Refills: 0 | Status: SHIPPED | OUTPATIENT
Start: 2020-12-07 | End: 2021-03-04

## 2020-12-07 RX ORDER — DEXAMETHASONE SODIUM PHOSPHATE 4 MG/ML
INJECTION, SOLUTION INTRA-ARTICULAR; INTRALESIONAL; INTRAMUSCULAR; INTRAVENOUS; SOFT TISSUE
Status: DISCONTINUED | OUTPATIENT
Start: 2020-12-07 | End: 2020-12-07

## 2020-12-07 RX ORDER — SODIUM CHLORIDE 9 MG/ML
INJECTION, SOLUTION INTRAVENOUS CONTINUOUS
Status: DISCONTINUED | OUTPATIENT
Start: 2020-12-07 | End: 2020-12-07 | Stop reason: HOSPADM

## 2020-12-07 RX ORDER — SODIUM CHLORIDE 9 MG/ML
INJECTION, SOLUTION INTRAVENOUS CONTINUOUS PRN
Status: DISCONTINUED | OUTPATIENT
Start: 2020-12-07 | End: 2020-12-07

## 2020-12-07 RX ORDER — HYDROCODONE BITARTRATE AND ACETAMINOPHEN 5; 325 MG/1; MG/1
1 TABLET ORAL EVERY 6 HOURS PRN
Qty: 31 TABLET | Refills: 0 | Status: SHIPPED | OUTPATIENT
Start: 2020-12-07 | End: 2020-12-07 | Stop reason: SDUPTHER

## 2020-12-07 RX ORDER — ACETAMINOPHEN 500 MG
1000 TABLET ORAL ONCE
Status: DISCONTINUED | OUTPATIENT
Start: 2020-12-07 | End: 2020-12-07 | Stop reason: HOSPADM

## 2020-12-07 RX ORDER — FENTANYL CITRATE 50 UG/ML
INJECTION, SOLUTION INTRAMUSCULAR; INTRAVENOUS
Status: DISCONTINUED | OUTPATIENT
Start: 2020-12-07 | End: 2020-12-07

## 2020-12-07 RX ADMIN — FENTANYL CITRATE 25 MCG: 50 INJECTION, SOLUTION INTRAMUSCULAR; INTRAVENOUS at 08:12

## 2020-12-07 RX ADMIN — CEFAZOLIN 2 G: 330 INJECTION, POWDER, FOR SOLUTION INTRAMUSCULAR; INTRAVENOUS at 08:12

## 2020-12-07 RX ADMIN — FENTANYL CITRATE 25 MCG: 50 INJECTION, SOLUTION INTRAMUSCULAR; INTRAVENOUS at 11:12

## 2020-12-07 RX ADMIN — PROPOFOL 30 MG: 10 INJECTION, EMULSION INTRAVENOUS at 08:12

## 2020-12-07 RX ADMIN — GLYCOPYRROLATE 0.2 MG: 0.2 INJECTION INTRAMUSCULAR; INTRAVENOUS at 08:12

## 2020-12-07 RX ADMIN — DEXAMETHASONE SODIUM PHOSPHATE 4 MG: 4 INJECTION, SOLUTION INTRA-ARTICULAR; INTRALESIONAL; INTRAMUSCULAR; INTRAVENOUS; SOFT TISSUE at 07:12

## 2020-12-07 RX ADMIN — Medication 100 MCG: at 10:12

## 2020-12-07 RX ADMIN — GLYCOPYRROLATE 0.2 MG: 0.2 INJECTION INTRAMUSCULAR; INTRAVENOUS at 09:12

## 2020-12-07 RX ADMIN — HYDROMORPHONE HYDROCHLORIDE 0.2 MG: 1 INJECTION, SOLUTION INTRAMUSCULAR; INTRAVENOUS; SUBCUTANEOUS at 12:12

## 2020-12-07 RX ADMIN — PROPOFOL 40 MG: 10 INJECTION, EMULSION INTRAVENOUS at 08:12

## 2020-12-07 RX ADMIN — FENTANYL CITRATE 100 MCG: 50 INJECTION INTRAMUSCULAR; INTRAVENOUS at 07:12

## 2020-12-07 RX ADMIN — Medication 200 MCG: at 09:12

## 2020-12-07 RX ADMIN — SODIUM CHLORIDE, SODIUM GLUCONATE, SODIUM ACETATE, POTASSIUM CHLORIDE, MAGNESIUM CHLORIDE, SODIUM PHOSPHATE, DIBASIC, AND POTASSIUM PHOSPHATE: .53; .5; .37; .037; .03; .012; .00082 INJECTION, SOLUTION INTRAVENOUS at 08:12

## 2020-12-07 RX ADMIN — SODIUM CHLORIDE: 0.9 INJECTION, SOLUTION INTRAVENOUS at 07:12

## 2020-12-07 RX ADMIN — Medication 10 MG: at 10:12

## 2020-12-07 RX ADMIN — Medication 200 MCG: at 08:12

## 2020-12-07 RX ADMIN — HYDROCODONE BITARTRATE AND ACETAMINOPHEN 1 TABLET: 5; 325 TABLET ORAL at 01:12

## 2020-12-07 RX ADMIN — SUCCINYLCHOLINE CHLORIDE 140 MG: 20 INJECTION, SOLUTION INTRAMUSCULAR; INTRAVENOUS; PARENTERAL at 07:12

## 2020-12-07 RX ADMIN — Medication 10 MG: at 09:12

## 2020-12-07 RX ADMIN — Medication 10 MG: at 08:12

## 2020-12-07 RX ADMIN — DEXMEDETOMIDINE HYDROCHLORIDE 8 MCG: 100 INJECTION, SOLUTION INTRAVENOUS at 08:12

## 2020-12-07 RX ADMIN — FENTANYL CITRATE 100 MCG: 50 INJECTION, SOLUTION INTRAMUSCULAR; INTRAVENOUS at 07:12

## 2020-12-07 RX ADMIN — ONDANSETRON 4 MG: 2 INJECTION INTRAMUSCULAR; INTRAVENOUS at 10:12

## 2020-12-07 RX ADMIN — FENTANYL CITRATE 25 MCG: 50 INJECTION, SOLUTION INTRAMUSCULAR; INTRAVENOUS at 10:12

## 2020-12-07 RX ADMIN — HEPARIN SODIUM 5000 UNITS: 5000 INJECTION, SOLUTION INTRAVENOUS; SUBCUTANEOUS at 09:12

## 2020-12-07 RX ADMIN — Medication 100 MCG: at 11:12

## 2020-12-07 RX ADMIN — DEXMEDETOMIDINE HYDROCHLORIDE 12 MCG: 100 INJECTION, SOLUTION INTRAVENOUS at 07:12

## 2020-12-07 RX ADMIN — Medication 20 MG: at 07:12

## 2020-12-07 RX ADMIN — Medication 100 MCG: at 09:12

## 2020-12-07 RX ADMIN — PROPOFOL 100 MG: 10 INJECTION, EMULSION INTRAVENOUS at 07:12

## 2020-12-07 RX ADMIN — MIDAZOLAM 2 MG: 1 INJECTION INTRAMUSCULAR; INTRAVENOUS at 07:12

## 2020-12-07 RX ADMIN — LIDOCAINE HYDROCHLORIDE 100 MG: 20 INJECTION, SOLUTION EPIDURAL; INFILTRATION; INTRACAUDAL at 07:12

## 2020-12-07 NOTE — TRANSFER OF CARE
"Anesthesia Transfer of Care Note    Patient: Eugenie Coates    Procedure(s) Performed: Procedure(s) (LRB):  MASTECTOMY, PARTIAL-Right with radiological marker Consent day of surgery; Neoprobe, technitium, Frozen (Right)  BIOPSY, LYMPH NODE, SENTINEL (Right)  MAMMOPLASTY, REDUCTION-Bilateral (Bilateral)    Patient location: [facility]    Anesthesia Type: general    Transport from OR: Transported from OR on 6-10 L/min O2 by face mask with adequate spontaneous ventilation    Post pain: adequate analgesia    Post assessment: no apparent anesthetic complications    Post vital signs: stable    Level of consciousness: awake    Nausea/Vomiting: no nausea/vomiting    Complications: none    Transfer of care protocol was followed      Last vitals:   Visit Vitals  /61 (BP Location: Left arm, Patient Position: Lying)   Pulse 71   Temp 37 °C (98.6 °F) (Oral)   Resp 18   Ht 5' 2.5" (1.588 m)   Wt 85.7 kg (189 lb)   SpO2 100%   Breastfeeding No   BMI 34.02 kg/m²     "

## 2020-12-07 NOTE — ANESTHESIA PROCEDURE NOTES
Bilateral Paravertebral Single Injection Block(s)    Patient location during procedure: pre-op   Block not for primary anesthetic.  Reason for block: at surgeon's request and post-op pain management   Post-op Pain Location: Bilateral breast pain  Start time: 12/7/2020 7:30 AM  Timeout: 12/7/2020 7:30 AM   End time: 12/7/2020 7:42 AM    Staffing  Authorizing Provider: Trice Coronado MD  Performing Provider: Trice Martinez MD    Preanesthetic Checklist  Completed: patient identified, site marked, surgical consent, pre-op evaluation, timeout performed, IV checked, risks and benefits discussed and monitors and equipment checked  Peripheral Block  Patient position: sitting  Prep: ChloraPrep  Patient monitoring: heart rate, cardiac monitor, continuous pulse ox, continuous capnometry and frequent blood pressure checks  Block type: paravertebral - thoracic  Laterality: bilateral  Injection technique: single shot  Location: T3-4  Needle  Needle type: Tuohy   Needle gauge: 17 G  Needle length: 3.5 in  Needle localization: anatomical landmarks     Assessment  Injection assessment: negative aspiration and negative parasthesia  Paresthesia pain: none  Heart rate change: no  Slow fractionated injection: yes  Additional Notes  T4 os at 3.6 cm  VSS.  [facility] RN monitoring vitals throughout procedure.  Patient tolerated procedure well.  Bilateral paravertebral single shot injections performed. 30 cc's of 0.25 + ropivacaine injected bilaterally. (Total of 60 cc's)

## 2020-12-07 NOTE — OP NOTE
DATE OF PROCEDURE: 12/7/2020    SURGEON: Narda Keating M.D.    ASSISTANT:   ROBERTO CARLOS Travis    PREOPERATIVE DIAGNOSIS: Invasive breast carcinoma of the right breast 12OC    POSTOPERATIVE DIAGNOSIS: same    ANESTHESIA: general    PROCEDURES PERFORMED:   1. right breast reflector localization partial mastectomy (lumpectomy) with excision for clear margins at time of oncoplastic reduction (by Dr. Hood)  2. right axillary deep sentinel lymph node biopsy   3. injection of right breast with technetium-labeled radiocolloid for sentinel lymph node identification  4. Identification of sentinel lymph node       PROCEDURE IN DETAIL:   The patient underwent informed consent.  The reflector was placed in the 12OC position of the right breast. The localization films were reviewed.    She was then brought to the Operating Room and placed in the supine position. Anesthesia was administered.  The right breast was injected in the subareolar region with the technetium-labeled radiocolloid in the OR. The bilateral breast, anterior chest, arm and axilla were then prepped and draped in a sterile fashion.     Using the gamma probe, activity was noted and localized in the right axilla. Local anesthetic with 1% lidocaine was injected into the skin where the incision will be placed. We made an incision through through the planned reduction markings. We then dissected down through the clavipectoral fascia using electrocautery to a level 1 sentinel node, which was excised. It was dissected circumferentially with electrocautery. The lymph node was labeled as specimen #1 for permanent sectioning, hot with an ex vivo count of 128 (4 nodes, #1, hot 128, #2 by palpation, #3, hot 13, #4 hot 48). A total of 4 axillary sentinel lymph nodes were removed.  The probe was placed in the cavity and there was no significant residual background radioactivity or blue dye noted in the axilla indicating adequate and appropriate sentinel lymph node biopsy.  The cavity was then palpated and 0 further palpable nodes were noted. Any additional palpable nodes were sent to pathology for permanent section.       We then achieved hemostasis and irrigation was performed.  The incision was then closed with an interuppted 3-0 vicryl deep dermal followed by a running 4-0 monocryl subcuticular.    Next, we turned our attention to the right breast itself. An incision was made in the planned reduction incision line at 12OC. The specimen was dissected circumferentially around the cancer using the reflector and probe as a guide.  We did dissect all the way down to, but not including the underlying pectoralis fascia. The localization lumpectomy specimen was inked on the back table using green ink inferiorly, blue ink superiorly, orange ink laterally, yellow ink anteriorly, black ink posteriorly, and the red ink medially.  It was then fixed with acetic acid and submitted for specimen radiograph, which confirmed the reflector, clip and area of interest within the specimen. Given the appearance and location of the lesion, no additional margins were taken.       Within the lumpectomy cavity, hemostasis was achieved with cautery. The wound was irrigated until clear. There was no evidence of bleeding. It was closed in multiple layers with deep dermal and subcutaneous interrupted Vicryl sutures and a running 4-0 Monocryl subcuticular skin closure.    Dermabond was placed and a post-procedure bra was placed. She tolerated the procedure well without complication and was turned over to Anesthesia for transport to the recovery area in a satisfactory condition. All specimens were sent to Pathology for permanent sectioning.    ESTIMATED BLOOD LOSS: 5 ml    COMPLICATIONS: none    DISPOSITION:  PACU--hemodynamically stable    ATTESTATION:  I performed the procedure.

## 2020-12-07 NOTE — PLAN OF CARE
Discharge instructions given to and explained to patient and her spouse. All questions answered and pt and family member verbalized understanding. IV discontinued with cannula intact. Vital signs stable and pt AOx4. Discharge medications delivered to bedside prior to discharge. Pt's  demonstrated understanding of how to empty and record his wifes bilateral SELAM drains.    Chronic back pain worse with hospital bed.  Tylenol at home. Stable at present.  CW  Tramadol ( close monitoring as Tramadol is hepatically metabolized) .    Does not need pain meds on DC he attributes it to his lack of mobility in hospital.

## 2020-12-07 NOTE — ANESTHESIA PREPROCEDURE EVALUATION
12/07/2020  [facility]  Anesthesia Pre-Operative Evaluation         Patient Name: Eugenie Coates  YOB: 1950  MRN: [id number]    SUBJECTIVE:     Pre-operative evaluation for Procedure(s) (LRB):  MASTECTOMY, PARTIAL-Right with radiological marker Consent day of surgery; Neoprobe, technitium, Frozen (Right)  BIOPSY, LYMPH NODE, SENTINEL (Right)  MAMMOPLASTY, REDUCTION-Bilateral (Bilateral)     12/07/2020    Eugenie Coates is a 70 y.o. female w/ a significant PMHx of DM2, HTN. HLD, COPD, AFSHIN on CPAP, Hx of SVT s/p Ablation, Hx of DVT (not currently on AC), GERD, and Breast Cancer. She is Protestant and declines blood transfusions.    Patient now presents for the above procedure(s).      LDA:       Peripheral IV - Single Lumen 12/07/20 0600 20 G Left Hand (Active)   Site Assessment Clean;Dry;Intact;No redness;No swelling 12/07/20 0600   Line Status Blood return noted;Infusing 12/07/20 0600   Dressing Status Clean;Dry;Intact 12/07/20 0600   Dressing Intervention First dressing 12/07/20 0600   Number of days: 0       Prev airway: None documented.    Drips:   sodium chloride 0.9%         Patient Active Problem List   Diagnosis    Ventral hernia    Depression    Type 2 diabetes mellitus with hyperglycemia, without long-term current use of insulin    Primary open angle glaucoma of both eyes    Diabetes mellitus type 2 without retinopathy    Nuclear sclerosis of both eyes    Osteopenia    History of DVT (deep vein thrombosis)    Mixed hyperlipidemia    Refusal of blood transfusions as patient is Protestant    Gastroesophageal reflux disease    Subclinical hyperthyroidism    CKD (chronic kidney disease) stage 3, GFR 30-59 ml/min    AFSHIN (obstructive sleep apnea)    Multinodular thyroid    Restrictive lung disease    Diastolic dysfunction    Severe  obesity (BMI 35.0-35.9 with comorbidity)    Hammer toes of both feet    Essential hypertension    PSVT (paroxysmal supraventricular tachycardia)    SVT (supraventricular tachycardia)    Venous insufficiency of right lower extremity    Invasive lobular carcinoma of right breast in female    Malignant neoplasm of central portion of right breast in female, estrogen receptor positive    At risk for lymphedema       Review of patient's allergies indicates:   Allergen Reactions    Percodan [oxycodone hcl-oxycodone-asa] Hives and Itching       Current Inpatient Medications:   acetaminophen  1,000 mg Oral Once Pre-Op       No current facility-administered medications on file prior to encounter.      Current Outpatient Medications on File Prior to Encounter   Medication Sig Dispense Refill    b complex vitamins tablet Take 1 tablet by mouth once daily.      bimatoprost (LUMIGAN) 0.01 % Drop Place 1 drop into both eyes every evening. 3 Bottle 4    blood sugar diagnostic Strp Test twice daily.  Accucheck viva test strips and lancets. 300 each 3    brimonidine 0.2% (ALPHAGAN) 0.2 % Drop Place 1 drop into both eyes 3 (three) times daily. 15 mL 4    calcium carbonate/vitamin D3 (CALCIUM WITH VITAMIN D3 ORAL) Take 5,000 mcg by mouth Daily.      ezetimibe (ZETIA) 10 mg tablet Take 1 tablet (10 mg total) by mouth once daily. 90 tablet 3    lancets (ACCU-CHEK SOFTCLIX LANCETS) Misc 1 Units by Misc.(Non-Drug; Combo Route) route 2 (two) times daily. 300 each 3    metFORMIN (GLUCOPHAGE) 1000 MG tablet Take 1 tablet (1,000 mg total) by mouth daily with breakfast. TAKE 1 TABLET DAILY WITH BREAKFAST 90 tablet 0    metoprolol tartrate (LOPRESSOR) 25 MG tablet Take 1 tablet (25 mg total) by mouth 2 (two) times daily. 180 tablet 3    NIFEdipine (ADALAT CC) 90 MG TbSR Take 1 tablet (90 mg total) by mouth once daily. 90 tablet 0    omeprazole (PRILOSEC) 40 MG capsule Take 1 capsule (40 mg total) by mouth 2 (two) times daily  before meals. 180 capsule 1    rosuvastatin (CRESTOR) 40 MG Tab Take 1 tablet (40 mg total) by mouth every evening. 90 tablet 3    [DISCONTINUED] omeprazole (PRILOSEC OTC) 20 MG tablet Take 1 tablet (20 mg total) by mouth once daily. 90 tablet 3       Past Surgical History:   Procedure Laterality Date     SECTION      CHOLECYSTECTOMY      glaucoma laser Bilateral     HERNIA REPAIR      KNEE SURGERY Right 2008    (R) knee scope; torn meniscus    VEIN SURGERY  ,     Rt leg x2       Social History     Socioeconomic History    Marital status:      Spouse name: Not on file    Number of children: Not on file    Years of education: Not on file    Highest education level: Not on file   Occupational History    Not on file   Social Needs    Financial resource strain: Not hard at all    Food insecurity     Worry: Never true     Inability: Never true    Transportation needs     Medical: Patient refused     Non-medical: No   Tobacco Use    Smoking status: Never Smoker    Smokeless tobacco: Never Used   Substance and Sexual Activity    Alcohol use: No     Alcohol/week: 0.0 standard drinks     Frequency: Never     Drinks per session: Patient refused     Binge frequency: Never    Drug use: No    Sexual activity: Yes     Partners: Male     Birth control/protection: None   Lifestyle    Physical activity     Days per week: 3 days     Minutes per session: 20 min    Stress: To some extent   Relationships    Social connections     Talks on phone: More than three times a week     Gets together: Patient refused     Attends Shinto service: More than 4 times per year     Active member of club or organization: Yes     Attends meetings of clubs or organizations: More than 4 times per year     Relationship status:    Other Topics Concern    Not on file   Social History Narrative    20: lives with her . They have multiple children, but the youngest is 38. No children at home  right now. No pets at home. Splits time between here and [place]. Her son lives in [place].        OBJECTIVE:     Vital Signs Range (Last 24H):  Temp:  [37 °C (98.6 °F)]   Pulse:  [72]   Resp:  [16]   BP: (131)/(63)   SpO2:  [97 %]       Significant Labs:  Lab Results   Component Value Date    WBC 8.31 11/25/2020    HGB 13.6 11/25/2020    HCT 44.7 11/25/2020     11/25/2020    CHOL 230 (H) 11/03/2020    TRIG 155 (H) 11/03/2020    HDL 52 11/03/2020    ALT 14 11/25/2020    AST 19 11/25/2020     11/25/2020    K 4.2 11/25/2020     11/25/2020    CREATININE 1.1 11/25/2020    BUN 18 11/25/2020    CO2 24 11/25/2020    TSH 0.347 (L) 06/30/2020    INR 1.0 07/02/2019    HGBA1C 6.9 (H) 11/03/2020       Diagnostic Studies: No relevant studies.    EKG:   Results for orders placed or performed in visit on 11/25/20   SCHEDULED EKG 12-LEAD (to Muse)    Collection Time: 11/25/20 10:57 AM    Narrative    Test Reason : Z01.818,    Vent. Rate : 082 BPM     Atrial Rate : 082 BPM     P-R Int : 204 ms          QRS Dur : 086 ms      QT Int : 396 ms       P-R-T Axes : 044 -36 054 degrees     QTc Int : 462 ms    Normal sinus rhythm  Left axis deviation  Moderate voltage criteria for LVH, may be normal variant  Anteroseptal infarct (cited on or before 25-NOV-2020)  Abnormal ECG  When compared with ECG of 11-OCT-2019 09:37,  Vent. rate has increased BY  29 BPM  Nonspecific T wave abnormality no longer evident in Inferior leads  QT has lengthened  Confirmed by Rio Roberts MD ([id number]) on 11/25/2020 4:22:53 PM    Referred By: MILLIE VALDOVINOS           Confirmed By:Rio Roberts MD       2D ECHO:  TTE:  No results found for this or any previous visit.    GRANT:  No results found for this or any previous visit.    ASSESSMENT/PLAN:         Anesthesia Evaluation    I have reviewed the Patient Summary Reports.    I have reviewed the Nursing Notes. I have reviewed the NPO Status.      Review of Systems  Anesthesia Hx:  No problems  with previous Anesthesia  History of prior surgery of interest to airway management or planning: Denies Family Hx of Anesthesia complications.   Denies Personal Hx of Anesthesia complications.   Social:  No Alcohol Use    Hematology/Oncology:         -- Anemia: Current/Recent Cancer.   Cardiovascular:   Hypertension Dysrhythmias (hx of svt s/p ablation)    Pulmonary:   COPD Asthma Sleep Apnea, CPAP    Renal/:   Chronic Renal Disease    Hepatic/GI:   GERD    Endocrine:   Diabetes Hyperthyroidism    Psych:   Psychiatric History          Physical Exam  General:  Obesity    Airway/Jaw/Neck:  Airway Findings: Mouth Opening: Normal Tongue: Normal  General Airway Assessment: Adult  Mallampati: II  Improves to II with phonation.  TM Distance: Normal, at least 6 cm     Eyes/Ears/Nose:  EYES/EARS/NOSE FINDINGS: Normal   Dental:  Dental Findings: In tact   Chest/Lungs:  Chest/Lungs Findings: Clear to auscultation     Heart/Vascular:  Heart Findings: Rate: Normal  Rhythm: Regular Rhythm  Sounds: Normal        Mental Status:  Mental Status Findings:  Cooperative, Alert and Oriented         Anesthesia Plan  Type of Anesthesia, risks & benefits discussed:  Anesthesia Type:  regional, general  Patient's Preference:   Intra-op Monitoring Plan: standard ASA monitors  Intra-op Monitoring Plan Comments:   Post Op Pain Control Plan: per primary service following discharge from PACU, multimodal analgesia and IV/PO Opioids PRN  Post Op Pain Control Plan Comments:   Induction:   IV  Beta Blocker:  Patient is on a Beta-Blocker and has received one dose within the past 24 hours (No further documentation required).       Informed Consent: Patient understands risks and agrees with Anesthesia plan.  Questions answered. Anesthesia consent signed with patient.  ASA Score: 3     Day of Surgery Review of History & Physical:    H&P update referred to the provider.         Ready For Surgery From Anesthesia Perspective.

## 2020-12-07 NOTE — INTERVAL H&P NOTE
The patient has been examined and the H&P has been reviewed:    I concur with the findings and no changes have occurred since H&P was written.    Anesthesia/Surgery risks, benefits and alternative options discussed and understood by patient/family.          Active Hospital Problems    Diagnosis  POA    Invasive lobular carcinoma of right breast in female [C50.911]  Yes      Resolved Hospital Problems   No resolved problems to display.

## 2020-12-07 NOTE — ANESTHESIA PROCEDURE NOTES
Intubation  Performed by: Clayton Bustos MD  Authorized by: Clayton Bustos MD     Intubation:     Induction:  Intravenous    Intubated:  Postinduction    Mask Ventilation:  Easy mask    Attempts:  1    Attempted By:  Staff anesthesiologist    Method of Intubation:  Direct    Blade:  Jones 2    Laryngeal View Grade: Grade I - full view of chords      Difficult Airway Encountered?: No      Complications:  None    Airway Device:  Oral endotracheal tube    Airway Device Size:  7.5    Style/Cuff Inflation:  Cuffed (inflated to minimal occlusive pressure)    Inflation Amount (mL):  7    Tube secured:  22    Secured at:  The lips    Placement Verified By:  Capnometry    Complicating Factors:  None    Findings Post-Intubation:  BS equal bilateral and atraumatic/condition of teeth unchanged

## 2020-12-07 NOTE — PROGRESS NOTES
Page sent out to Dr. Keating's resident on call for surgery consent. Awaiting call back. Dr. Hood at the bedside marking patient for surgery.

## 2020-12-07 NOTE — BRIEF OP NOTE
[facility]  Brief Operative Note    Surgery Date: 12/7/2020     Surgeon(s) and Role:  Panel 1:     * Narda Keating MD - Primary  Panel 2:     * Zack Hood MD - Primary    Assisting Surgeon: None    Pre-op Diagnosis:  Invasive lobular carcinoma of right breast in female [C50.911]    Post-op Diagnosis:  Post-Op Diagnosis Codes:     * Invasive lobular carcinoma of right breast in female [C50.911]    Procedure(s) (LRB):  MASTECTOMY, PARTIAL-Right with radiological marker Consent day of surgery; Neoprobe, technitium, Frozen (Right)  BIOPSY, LYMPH NODE, SENTINEL (Right)  MAMMOPLASTY, REDUCTION-Bilateral (Bilateral)    Anesthesia: General    Description of the findings of the procedure(s): See op note    Estimated Blood Loss: 50 mL         Specimens:   Specimen (12h ago, onward)    None            Discharge Note    OUTCOME: Patient tolerated treatment/procedure well without complication and is now ready for discharge.    DISPOSITION: Home or Self Care    FINAL DIAGNOSIS:  Invasive lobular carcinoma of right breast in female    FOLLOWUP: In clinic    DISCHARGE INSTRUCTIONS:    Discharge Procedure Orders   Diet Adult Regular     Other restrictions (specify):   Order Comments: No lifting more than a gallon of milk.  Limit raising your arms above your head.     Notify your health care provider if you experience any of the following:  increased confusion or weakness     Notify your health care provider if you experience any of the following:  persistent dizziness, light-headedness, or visual disturbances     Notify your health care provider if you experience any of the following:  worsening rash     Notify your health care provider if you experience any of the following:  severe persistent headache     Notify your health care provider if you experience any of the following:  difficulty breathing or increased cough     Notify your health care provider if you experience any of the following:   redness, tenderness, or signs of infection (pain, swelling, redness, odor or green/yellow discharge around incision site)     Notify your health care provider if you experience any of the following:  severe uncontrolled pain     Notify your health care provider if you experience any of the following:  persistent nausea and vomiting or diarrhea     Notify your health care provider if you experience any of the following:  temperature >100.4     Change dressing (specify)   Order Comments: Shower in 48 hours  Drain care, record daily and bring logs to clinic  Bacitracin to nipples 3x/day  Wear bra at all times except for shower  Call clinic with concerns/questions

## 2020-12-07 NOTE — BRIEF OP NOTE
[facility]  Brief Operative Note     SUMMARY     Surgery Date: 12/7/2020     Surgeon(s) and Role:  Panel 1:     * Narda Keating MD - Primary  Panel 2:     * Zack Hood MD - Primary    Assistant: Zack Barreto,     Pre-op Diagnosis:  Invasive lobular carcinoma of right breast in female [C50.911]    Post-op Diagnosis:  Post-Op Diagnosis Codes:     * Invasive lobular carcinoma of right breast in female [C50.911]    Procedure(s) (LRB):  MASTECTOMY, PARTIAL-Right with radiological marker Consent day of surgery; Neoprobe, technitium, Frozen (Right)  BIOPSY, LYMPH NODE, SENTINEL (Right)  MAMMOPLASTY, REDUCTION-Bilateral (Bilateral)    Anesthesia: General    Description of the findings of the procedure: central breast breast at 12[place]lock above the nipple. Excised by Dr Keating through issa pattern BBR incision.  bilateral breast reduction with inferior pedicle, Wise pattern for skin    Findings/Key Components: See operative report    Estimated Blood Loss: 50 mL         Specimens:   Specimen (12h ago, onward)    None          Implants: * No implants in log *    Complications: None    Discharge Note    SUMMARY     Admit Date: 12/7/2020    Discharge Date and Time:  12/07/2020 12:31 PM    Hospital Course: Patient was placed in observation status for the above procedure.  See above.  Postoperatively was discharged home from the PACU once criteria was met.    Final Diagnosis: Post-Op Diagnosis Codes:     * Invasive lobular carcinoma of right breast in female [C50.911]    Disposition: Home or Self Care    Follow Up/Patient Instructions:     Medications:  Reconciled Home Medications:      Medication List      START taking these medications    cephALEXin 500 MG capsule  Commonly known as: KEFLEX  Take 1 capsule (500 mg total) by mouth every 8 (eight) hours. for 9 days     HYDROcodone-acetaminophen 5-325 mg per tablet  Commonly known as: NORCO  Take 1 tablet by mouth every 6 (six) hours as needed  for Pain.        CONTINUE taking these medications    b complex vitamins tablet  Take 1 tablet by mouth once daily.     bimatoprost 0.01 % Drop  Commonly known as: LUMIGAN  Place 1 drop into both eyes every evening.     blood sugar diagnostic Strp  Test twice daily.  Accucheck viva test strips and lancets.     brimonidine 0.2% 0.2 % Drop  Commonly known as: ALPHAGAN  Place 1 drop into both eyes 3 (three) times daily.     CALCIUM WITH VITAMIN D3 ORAL  Take 5,000 mcg by mouth Daily.     ezetimibe 10 mg tablet  Commonly known as: ZETIA  Take 1 tablet (10 mg total) by mouth once daily.     lancets Misc  Commonly known as: ACCU-CHEK SOFTCLIX LANCETS  1 Units by Misc.(Non-Drug; Combo Route) route 2 (two) times daily.     lisinopriL 40 MG tablet  Commonly known as: PRINIVIL,ZESTRIL  Take 1 tablet (40 mg total) by mouth once daily.     metFORMIN 1000 MG tablet  Commonly known as: GLUCOPHAGE  Take 1 tablet (1,000 mg total) by mouth daily with breakfast. TAKE 1 TABLET DAILY WITH BREAKFAST     metoprolol tartrate 25 MG tablet  Commonly known as: LOPRESSOR  Take 1 tablet (25 mg total) by mouth 2 (two) times daily.     NIFEdipine 90 MG Tbsr  Commonly known as: ADALAT CC  Take 1 tablet (90 mg total) by mouth once daily.     omeprazole 40 MG capsule  Commonly known as: PRILOSEC  Take 1 capsule (40 mg total) by mouth 2 (two) times daily before meals.     rosuvastatin 40 MG Tab  Commonly known as: CRESTOR  Take 1 tablet (40 mg total) by mouth every evening.          Discharge Procedure Orders   Diet Adult Regular     Other restrictions (specify):   Order Comments: No lifting more than a gallon of milk.  Limit raising your arms above your head.     Notify your health care provider if you experience any of the following:  increased confusion or weakness     Notify your health care provider if you experience any of the following:  persistent dizziness, light-headedness, or visual disturbances     Notify your health care provider if you  experience any of the following:  worsening rash     Notify your health care provider if you experience any of the following:  severe persistent headache     Notify your health care provider if you experience any of the following:  difficulty breathing or increased cough     Notify your health care provider if you experience any of the following:  redness, tenderness, or signs of infection (pain, swelling, redness, odor or green/yellow discharge around incision site)     Notify your health care provider if you experience any of the following:  severe uncontrolled pain     Notify your health care provider if you experience any of the following:  persistent nausea and vomiting or diarrhea     Notify your health care provider if you experience any of the following:  temperature >100.4     Change dressing (specify)   Order Comments: Shower in 48 hours  Drain care, record daily and bring logs to clinic  Bacitracin to nipples 3x/day  Wear bra at all times except for shower  Call clinic with concerns/questions     Follow-up Information     Zack Hood MD In 1 week.    Specialty: Plastic Surgery  Contact information:  [address]  [facility] [address]  [phone]

## 2020-12-08 ENCOUNTER — PATIENT MESSAGE (OUTPATIENT)
Dept: SURGERY | Facility: CLINIC | Age: 70
End: 2020-12-08

## 2020-12-08 NOTE — OP NOTE
Date of Surgery: 12/7  Pre op Diagnosis: Breast Cancer  1 macromastia  2 chronic neck pain  3 chronic back pain  4 skin rash  Post op Diagnosis:  same  Procedure performed:oncologic bilateral breast reduction  Surgeon: Dr Zack Hood  Anesthesia: general  Complications none  Blood loss 150cc      The patient was evaluated in the preoperative holding area. Markings for the inferior pedicle bilateral breast reduction were made. The risks and possible complications including but not limited to; infection, bleeding, scarring, loss of sensation to the nipple, partial or total loss of the nipple, breast asymmetry, breast deformity, open wounds and need for further surgery were all explained to the patient. The patient signed the informed consent.     The patient was taken to the operating room and placed in the supine position. After adequate general endotracheal anesthesia, the patient was prepped and draped in the normal sterile fashion. The pedicle was de-epithelialized prior to surgical oncology removing the tumor. Once the tumor and sentinel node were removed, we proceeded with the standard breast reduction. The new nipple areola was marked measuring 42mm. The inferior pedicle was marked measuring 11cm at its base. The inferior pedicle was de-epithelialized. Good punctate bleeding was noted. The supra- medial and supra-lateral flaps were elevated to the clavicle superiorly, the lateral border of the sternum medially and the mid-axillary line laterally. The medial and lateral skin wedges were excise. The inferior pedicle was debulked. A similar procedure was performed on the opposite side. The incision's were closed using interrupted 3.0 monocryl followed by a running 4.0 monocryl subcuticular suture for the infra-mammary incision. The vertical limb and nipple areolar complex were closed using interrupted 4.0 monocryl followed by a running 4.0 monocryl subcuticular suture. A similar closure was performed on  the opposite side

## 2020-12-08 NOTE — ANESTHESIA POSTPROCEDURE EVALUATION
Anesthesia Post Evaluation    Patient: Eugenie Coates    Procedure(s) Performed: Procedure(s) (LRB):  MASTECTOMY, PARTIAL-Right with radiological marker Consent day of surgery; Neoprobe, technitium, Frozen (Right)  BIOPSY, LYMPH NODE, SENTINEL (Right)  MAMMOPLASTY, REDUCTION-Bilateral (Bilateral)    Final Anesthesia Type: general    Patient location during evaluation: [facility]  Patient participation: Yes- Able to Participate  Level of consciousness: awake and alert  Post-procedure vital signs: reviewed and stable  Pain management: adequate  Airway patency: patent    PONV status at discharge: No PONV  Anesthetic complications: no      Cardiovascular status: hemodynamically stable  Respiratory status: unassisted  Hydration status: euvolemic  Follow-up not needed.          Vitals Value Taken Time   /79 12/07/20 1445   Temp 36.8 °C (98.3 °F) 12/07/20 1445   Pulse 84 12/07/20 1445   Resp 16 12/07/20 1445   SpO2 99 % 12/07/20 1445         No case tracking events are documented in the log.      Pain/Jodi Score: Pain Rating Prior to Med Admin: 8 (12/7/2020  1:15 PM)  Pain Rating Post Med Admin: 0 (12/7/2020  7:45 AM)  Jodi Score: 10 (12/7/2020  2:30 PM)

## 2020-12-10 ENCOUNTER — PATIENT MESSAGE (OUTPATIENT)
Dept: ADMINISTRATIVE | Facility: OTHER | Age: 70
End: 2020-12-10

## 2020-12-11 ENCOUNTER — PATIENT MESSAGE (OUTPATIENT)
Dept: OTHER | Facility: OTHER | Age: 70
End: 2020-12-11

## 2020-12-11 ENCOUNTER — OFFICE VISIT (OUTPATIENT)
Dept: PLASTIC SURGERY | Facility: CLINIC | Age: 70
End: 2020-12-11
Payer: MEDICARE

## 2020-12-11 VITALS — BODY MASS INDEX: 33.48 KG/M2 | WEIGHT: 188.94 LBS | HEIGHT: 63 IN

## 2020-12-11 DIAGNOSIS — Z09 SURGERY FOLLOW-UP EXAMINATION: Primary | ICD-10-CM

## 2020-12-11 PROCEDURE — 99999 PR PBB SHADOW E&M-EST. PATIENT-LVL III: ICD-10-PCS | Mod: PBBFAC,,, | Performed by: SURGERY

## 2020-12-11 PROCEDURE — 99213 OFFICE O/P EST LOW 20 MIN: CPT | Mod: PBBFAC,PO | Performed by: SURGERY

## 2020-12-11 PROCEDURE — 99024 PR POST-OP FOLLOW-UP VISIT: ICD-10-PCS | Mod: POP,,, | Performed by: SURGERY

## 2020-12-11 PROCEDURE — 99999 PR PBB SHADOW E&M-EST. PATIENT-LVL III: CPT | Mod: PBBFAC,,, | Performed by: SURGERY

## 2020-12-11 PROCEDURE — 99024 POSTOP FOLLOW-UP VISIT: CPT | Mod: POP,,, | Performed by: SURGERY

## 2020-12-11 NOTE — PROGRESS NOTES
Patient is status post bilateral oncologic breast reduction.  She has done very well everything looks good both nipples are viable.  Her drains were removed today.  She will return in 2 weeks.

## 2020-12-15 ENCOUNTER — PATIENT MESSAGE (OUTPATIENT)
Dept: ADMINISTRATIVE | Facility: OTHER | Age: 70
End: 2020-12-15

## 2020-12-15 ENCOUNTER — TELEPHONE (OUTPATIENT)
Dept: SURGERY | Facility: CLINIC | Age: 70
End: 2020-12-15

## 2020-12-17 LAB
COMMENT: NORMAL
FINAL PATHOLOGIC DIAGNOSIS: NORMAL
GROSS: NORMAL
Lab: NORMAL

## 2020-12-18 ENCOUNTER — CLINICAL SUPPORT (OUTPATIENT)
Dept: OPHTHALMOLOGY | Facility: CLINIC | Age: 70
End: 2020-12-18
Attending: OPTOMETRIST
Payer: MEDICARE

## 2020-12-18 ENCOUNTER — OFFICE VISIT (OUTPATIENT)
Dept: OPTOMETRY | Facility: CLINIC | Age: 70
End: 2020-12-18
Attending: OPTOMETRIST
Payer: MEDICARE

## 2020-12-18 DIAGNOSIS — H52.7 REFRACTIVE ERROR: ICD-10-CM

## 2020-12-18 DIAGNOSIS — H40.1134 PRIMARY OPEN ANGLE GLAUCOMA OF BOTH EYES, INDETERMINATE STAGE: ICD-10-CM

## 2020-12-18 DIAGNOSIS — H40.1131 PRIMARY OPEN ANGLE GLAUCOMA (POAG) OF BOTH EYES, MILD STAGE: Primary | ICD-10-CM

## 2020-12-18 DIAGNOSIS — E11.36 DIABETIC CATARACT: ICD-10-CM

## 2020-12-18 DIAGNOSIS — H40.039 ANATOMICAL NARROW ANGLE: ICD-10-CM

## 2020-12-18 DIAGNOSIS — E11.9 DIABETES MELLITUS TYPE 2 WITHOUT RETINOPATHY: ICD-10-CM

## 2020-12-18 DIAGNOSIS — H25.13 NUCLEAR SCLEROSIS, BILATERAL: ICD-10-CM

## 2020-12-18 DIAGNOSIS — H26.9 CORTICAL CATARACT: ICD-10-CM

## 2020-12-18 PROCEDURE — 99213 OFFICE O/P EST LOW 20 MIN: CPT | Mod: PBBFAC,PO,25 | Performed by: OPTOMETRIST

## 2020-12-18 PROCEDURE — 99999 PR PBB SHADOW E&M-EST. PATIENT-LVL III: CPT | Mod: PBBFAC,,, | Performed by: OPTOMETRIST

## 2020-12-18 PROCEDURE — 92014 COMPRE OPH EXAM EST PT 1/>: CPT | Mod: S$PBB,,, | Performed by: OPTOMETRIST

## 2020-12-18 PROCEDURE — 76514 ECHO EXAM OF EYE THICKNESS: CPT | Mod: 26,S$PBB,, | Performed by: OPTOMETRIST

## 2020-12-18 PROCEDURE — 76514 ECHO EXAM OF EYE THICKNESS: CPT | Mod: PBBFAC,PO | Performed by: OPTOMETRIST

## 2020-12-18 PROCEDURE — 92014 PR EYE EXAM, EST PATIENT,COMPREHESV: ICD-10-PCS | Mod: S$PBB,,, | Performed by: OPTOMETRIST

## 2020-12-18 PROCEDURE — 99999 PR PBB SHADOW E&M-EST. PATIENT-LVL III: ICD-10-PCS | Mod: PBBFAC,,, | Performed by: OPTOMETRIST

## 2020-12-18 PROCEDURE — 76514 PR  US, EYE, FOR CORNEAL THICKNESS: ICD-10-PCS | Mod: 26,S$PBB,, | Performed by: OPTOMETRIST

## 2020-12-18 RX ORDER — BIMATOPROST 0.1 MG/ML
1 SOLUTION/ DROPS OPHTHALMIC NIGHTLY
Qty: 3 BOTTLE | Refills: 4 | Status: SHIPPED | OUTPATIENT
Start: 2020-12-18 | End: 2021-04-15 | Stop reason: SDUPTHER

## 2020-12-18 RX ORDER — BRIMONIDINE TARTRATE 2 MG/ML
1 SOLUTION/ DROPS OPHTHALMIC 3 TIMES DAILY
Qty: 15 ML | Refills: 4 | Status: SHIPPED | OUTPATIENT
Start: 2020-12-18 | End: 2021-04-15

## 2020-12-18 NOTE — PROGRESS NOTES
24-2 HVF - Poor   Several correcting and redirecting. Difficult for pt to stay focused on target.   OCT RNFL    Coverlet used today w/ no latex allergies

## 2020-12-18 NOTE — PROGRESS NOTES
HPI     Annual Exam      Additional comments: DLE 6-20 (ruben)              Glaucoma      Additional comments: POAG - OU              Diabetic Eye Exam      Additional comments: BSL controlled              Comments     Pt here for comprehensive glaucoma & DM exam.  Good compliance w/   Brimonidine OU tid & Lumigan OU qhs.  Hemoglobin A1C       Date                     Value               Ref Range             Status                11/03/2020               6.9 (H)             4.0 - 5.6 %           Final        07/08/2020               6.5 (H)             4.0 - 5.6 %           Final                  03/04/2020               6.4 (H)             4.0 - 5.6 %           Final                               Pt states very slight decrease at both near & distance    (+) headaches --- thinks sinus related  (-) floaters or light flashes          Last edited by Maurilio Doran, OD on 12/18/2020 10:03 AM. (History)            Assessment /Plan     For exam results, see Encounter Report.    Primary open angle glaucoma (POAG) of both eyes, mild stage  -     brimonidine 0.2% (ALPHAGAN) 0.2 % Drop; Place 1 drop into both eyes 3 (three) times daily.  Dispense: 15 mL; Refill: 4  -     bimatoprost (LUMIGAN) 0.01 % Drop; Place 1 drop into both eyes every evening.  Dispense: 3 Bottle; Refill: 4    Anatomical narrow angle    Diabetes mellitus type 2 without retinopathy    Diabetic cataract    Nuclear sclerosis, bilateral    Cortical cataract    Refractive error      IOP well controlled with use of drops. Hx of high iop, transferred care to [facility] in 2016. Reviewed OCT  HVF results with patient today. Continue alphagan tid OU  latanoprost qPM OU. Refilled today.     DM type 2 w/o ocular retinopathy OU. Discussed possible ocular affects of uncontrolled blood sugar with patient. Recommended continued strong blood sugar control and continued care with PCP. Monitor yearly.     Moderate cataracts of both eyes, borderline visually significant but  increasing hyperopia with narrowing angles. Discussed options, referred to Dr. Mcintosh for eval. Reviewed signs/symptoms angle closure.     No spec Rx at this time.       RTC  for cataract  narrow angle eval with Dr. Mcintosh.

## 2020-12-21 ENCOUNTER — PATIENT MESSAGE (OUTPATIENT)
Dept: SURGERY | Facility: CLINIC | Age: 70
End: 2020-12-21

## 2020-12-21 DIAGNOSIS — C50.911 INVASIVE LOBULAR CARCINOMA OF RIGHT BREAST IN FEMALE: Primary | ICD-10-CM

## 2020-12-22 ENCOUNTER — OFFICE VISIT (OUTPATIENT)
Dept: SURGERY | Facility: CLINIC | Age: 70
End: 2020-12-22
Payer: MEDICARE

## 2020-12-22 ENCOUNTER — TELEPHONE (OUTPATIENT)
Dept: HEMATOLOGY/ONCOLOGY | Facility: CLINIC | Age: 70
End: 2020-12-22

## 2020-12-22 DIAGNOSIS — C50.911 INVASIVE LOBULAR CARCINOMA OF RIGHT BREAST IN FEMALE: Primary | ICD-10-CM

## 2020-12-22 PROCEDURE — 99024 POSTOP FOLLOW-UP VISIT: CPT | Mod: 95,POP,, | Performed by: SURGERY

## 2020-12-22 PROCEDURE — 99024 PR POST-OP FOLLOW-UP VISIT: ICD-10-PCS | Mod: 95,POP,, | Performed by: SURGERY

## 2020-12-22 NOTE — PROGRESS NOTES
VIRTUAL VISIT postop    S/p bilateral oncoplastic reduction with RIGHT partial mastecotmy and SLNB on 12/7/2020.    Path:  2.2 cm ILC, grade 2. ER+ID+Her2-. 0/4 LN.  Closest margin 6mm.    Stage 1A RIGHT breast cancer    Doing well.   No PE due to virtual, however she saw plastic surgery last week.  Refer to med onc--Dr. Matos in [place].  Refer to rad onc in [place].  F/u 6 months.   Will send oncotype

## 2020-12-22 NOTE — TELEPHONE ENCOUNTER
----- Message from Sybil Gunter sent at 12/22/2020  3:49 PM CST -----  Regarding: Hem-Onc Referral in Work Que  Good afternoon,     Dr. Narda Keating referring patient to Hem-Onc for Invasive lobular carcinoma of right breast in female. Per Dr. Keating's notes, patient is being referred to Dr. Matos. Referral placed in Epic, transferred from [facility] to [facility].      Thank you,     Sybil Pickering

## 2020-12-22 NOTE — ADDENDUM NOTE
----- Message from Fadumo Adair sent at 4/6/2020  3:01 PM PDT -----  Regarding: Prescription Question  Contact: [phone]  HI  Hope you all are doing well. I am staying home and being very careful.  I need to ref ill my RX and previously this has been difficult with numerous visits to [facility] and calls/texts to you.  Im hoping I can get a few refills on these medications to make it easier on all and avoid trips to the pharm.  Just a reminder that the RX needs to say how long the 30 pills should last too. Big thanks    Ambien  Tylenol 3  Tramadol    thank you and stay healthy,   thank you all for all that you do for the community and myself really appreciate your bravery and hard work   Addended by: JOHN SANTOS on: 12/22/2020 12:38 PM     Modules accepted: Orders

## 2020-12-29 ENCOUNTER — TELEPHONE (OUTPATIENT)
Dept: HEMATOLOGY/ONCOLOGY | Facility: CLINIC | Age: 70
End: 2020-12-29

## 2020-12-29 NOTE — TELEPHONE ENCOUNTER
Spoke with patient after multiple attempts.  Patient is scheduled, we will try to move appointment to a sooner date if possible.  Patient verbalized understanding.

## 2020-12-30 ENCOUNTER — HOSPITAL ENCOUNTER (OUTPATIENT)
Dept: ENDOCRINOLOGY | Facility: CLINIC | Age: 70
Discharge: HOME | End: 2020-12-30
Attending: INTERNAL MEDICINE
Payer: MEDICARE

## 2020-12-30 DIAGNOSIS — E05.90 SUBCLINICAL HYPERTHYROIDISM: ICD-10-CM

## 2020-12-30 DIAGNOSIS — E04.2 MULTINODULAR THYROID: ICD-10-CM

## 2020-12-30 PROCEDURE — 76536 US SOFT TISSUE HEAD NECK THYROID: ICD-10-PCS | Mod: 26,,, | Performed by: INTERNAL MEDICINE

## 2020-12-30 PROCEDURE — 76536 US EXAM OF HEAD AND NECK: CPT | Mod: 26,,, | Performed by: INTERNAL MEDICINE

## 2020-12-31 ENCOUNTER — TELEPHONE (OUTPATIENT)
Dept: RADIATION ONCOLOGY | Facility: CLINIC | Age: 70
End: 2020-12-31

## 2020-12-31 ENCOUNTER — OFFICE VISIT (OUTPATIENT)
Dept: RADIATION ONCOLOGY | Facility: CLINIC | Age: 70
End: 2020-12-31
Payer: MEDICARE

## 2020-12-31 VITALS
WEIGHT: 189 LBS | OXYGEN SATURATION: 97 % | DIASTOLIC BLOOD PRESSURE: 75 MMHG | RESPIRATION RATE: 18 BRPM | BODY MASS INDEX: 34.02 KG/M2 | HEART RATE: 90 BPM | SYSTOLIC BLOOD PRESSURE: 136 MMHG | TEMPERATURE: 98 F

## 2020-12-31 DIAGNOSIS — C50.911 INVASIVE LOBULAR CARCINOMA OF RIGHT BREAST IN FEMALE: Primary | ICD-10-CM

## 2020-12-31 PROCEDURE — 99205 PR OFFICE/OUTPT VISIT, NEW, LEVL V, 60-74 MIN: ICD-10-PCS | Mod: S$GLB,,, | Performed by: RADIOLOGY

## 2020-12-31 PROCEDURE — 99205 OFFICE O/P NEW HI 60 MIN: CPT | Mod: S$GLB,,, | Performed by: RADIOLOGY

## 2020-12-31 NOTE — PROGRESS NOTES
Eugenie Pacheco Jaxon  [id number]  2020  Narda Keating Md  [address] [facility] [place] [address]    REASON FOR CONSULTATION: IA, pT2N0(sn)M0 g2 ILC UOQ R breast, ER+/WV+/her2(-)    TREATMENT GOAL: adjuvant    HISTORY OF PRESENT ILLNESS:   70F presented to [place] [facility] in [facility] with abnormal screening mammogram detecting a bilobed oval mass, 1.9 cm in size of the right breast at the 12 o'clock position, 5 cm from the nipple.  Ultrasound confirmed hypoechoic oval mass, 1.9 x 0.4 cm in size; core needle biopsy returned grade 2 invasive lobular carcinoma ER+ @ 70%, WV+ @ 2%, her2(-); Ki-67 40%.    She was seen by Dr. Keating for 2nd opinion who ordered MRI of the breast confirming a 1.5 x 0.9 x 1.8 cm mass at the 12 o'clock position, 5.6 cm from the nipple without evidence of axillary disease.  Bone scan to evaluate back pain was also negative.    She underwent bilateral oncoplastic reduction with right lumpectomy and SLN biopsy with Dr. Keating, 2020:   - 2.2 cm grade 2 invasive lobular carcinoma (solid variant), [place] ; LVSI (-)   - (+) LCIS   - 0/4 SLNs    No clinically significant variants on myriad testing.    Oncotype DX pending.  Patient has an appointment with Dr. Matos .    She presents to discuss adjuvant radiotherapy.    Patient reports postoperative soreness that is improving.  She denies fever, chills, chest pain, shortness of breath, cough or hemoptysis.  She denies appetite, energy or weight changes.  She denies new lumps or bumps or bone pain.    Review of systems otherwise negative unless indicated in HPI.    BHx  ; 1st at 17  HRT+, OCP+  Menarche 12  Menopause 54 (no hys)    Past Medical History:   Diagnosis Date    Allergy     Anemia     Anxiety     Arthritis     Asthma     Bell palsy     Depression     Diabetes mellitus, type 2     DVT (deep venous thrombosis)     Endometriosis      Eye injury     stuck with tree branch od ?     GERD (gastroesophageal reflux disease)     Glaucoma     History of uterine fibroid     Hyperlipidemia     Hypertension     Invasive lobular carcinoma of right breast in female 2020    Nuclear sclerosis of both eyes 2016    Sleep apnea     uses C pap    Supraventricular tachycardia     SVT (supraventricular tachycardia) 2019    Thyroid disease      Past Surgical History:   Procedure Laterality Date     SECTION      CHOLECYSTECTOMY      glaucoma laser Bilateral     HERNIA REPAIR      KNEE SURGERY Right     (R) knee scope; torn meniscus    MASTECTOMY, PARTIAL Right 2020    Procedure: MASTECTOMY, PARTIAL-Right with radiological marker Consent day of surgery; Neoprobe, technitium, Frozen;  Surgeon: Narda Keating MD;  [place] [facility] [place] [address] [place] [address];  Service: General;  Laterality: Right;    SENTINEL LYMPH NODE BIOPSY Right 2020    Procedure: BIOPSY, LYMPH NODE, SENTINEL;  Surgeon: Narda Keating MD;  [place] [facility] [place] [address] [place] [address];  Service: General;  Laterality: Right;    TOTAL REDUCTION MAMMOPLASTY Bilateral 2020    Procedure: MAMMOPLASTY, REDUCTION-Bilateral;  Surgeon: Zack Hood MD;  [place] [facility] [address] [place] [address];  Service: Plastics;  Laterality: Bilateral;    VEIN SURGERY  ,     Rt leg x2     Social History     Socioeconomic History    Marital status:      Spouse name: Not on file    Number of children: Not on file    Years of education: Not on file    Highest education level: Not on file   Occupational History    Not on file   Social Needs    Financial resource strain: Not hard at all    Food insecurity     Worry: Never true     Inability: Never true    Transportation needs     Medical: Patient refused     Non-medical: No   Tobacco Use    Smoking status: Never Smoker    Smokeless tobacco: Never Used   Substance and Sexual Activity    Alcohol use: No     Alcohol/week: 0.0 standard  drinks     Frequency: Never     Drinks per session: Patient refused     Binge frequency: Never    Drug use: No    Sexual activity: Yes     Partners: Male     Birth control/protection: None   Lifestyle    Physical activity     Days per week: 3 days     Minutes per session: 20 min    Stress: To some extent   Relationships    Social connections     Talks on phone: More than three times a week     Gets together: Patient refused     Attends [employer] service: More than 4 times per year     Active member of club or organization: Yes     Attends meetings of clubs or organizations: More than 4 times per year     Relationship status:    Other Topics Concern    Not on file   Social History Narrative    1/18/19: lives with her . They have multiple children, but the youngest is 38. No children at home right now. No pets at home. Splits time between here and [place]. Her son lives in [place].      Family History   Problem Relation Age of Onset    Diabetes Mother     No Known Problems Father     No Known Problems Sister     No Known Problems Brother     Colon cancer Maternal Aunt     No Known Problems Maternal Uncle     No Known Problems Paternal Aunt     No Known Problems Paternal Uncle     No Known Problems Maternal Grandmother     No Known Problems Maternal Grandfather     No Known Problems Paternal Grandmother     No Known Problems Paternal Grandfather     Amblyopia Neg Hx     Blindness Neg Hx     Cancer Neg Hx     Cataracts Neg Hx     Glaucoma Neg Hx     Hypertension Neg Hx     Macular degeneration Neg Hx     Retinal detachment Neg Hx     Strabismus Neg Hx     Stroke Neg Hx     Thyroid disease Neg Hx     Celiac disease Neg Hx     Colon polyps Neg Hx     Esophageal cancer Neg Hx     Inflammatory bowel disease Neg Hx     Irritable bowel syndrome Neg Hx     Liver cancer Neg Hx     Liver disease Neg Hx     Rectal cancer Neg Hx     Stomach cancer Neg Hx     Ulcerative  colitis Neg Hx     Cystic fibrosis Neg Hx     Crohn's disease Neg Hx     Hemochromatosis Neg Hx     Cirrhosis Neg Hx        PRIOR HISTORY OF CHEMOTHERAPY OR RADIOTHERAPY: Please see HPI for patients prior oncologic history.    Medication List with Changes/Refills   Current Medications    B COMPLEX VITAMINS TABLET    Take 1 tablet by mouth once daily.    BIMATOPROST (LUMIGAN) 0.01 % DROP    Place 1 drop into both eyes every evening.    BLOOD SUGAR DIAGNOSTIC STRP    Test twice daily.  Accucheck viva test strips and lancets.    BRIMONIDINE 0.2% (ALPHAGAN) 0.2 % DROP    Place 1 drop into both eyes 3 (three) times daily.    CALCIUM CARBONATE/VITAMIN D3 (CALCIUM WITH VITAMIN D3 ORAL)    Take 5,000 mcg by mouth Daily.    EZETIMIBE (ZETIA) 10 MG TABLET    Take 1 tablet (10 mg total) by mouth once daily.    HYDROCODONE-ACETAMINOPHEN (NORCO) 5-325 MG PER TABLET    Take 1 tablet by mouth every 6 (six) hours as needed for Pain.    LANCETS (ACCU-CHEK SOFTCLIX LANCETS) MISC    1 Units by Misc.(Non-Drug; Combo Route) route 2 (two) times daily.    LISINOPRIL (PRINIVIL,ZESTRIL) 40 MG TABLET    Take 1 tablet (40 mg total) by mouth once daily.    METFORMIN (GLUCOPHAGE) 1000 MG TABLET    Take 1 tablet (1,000 mg total) by mouth daily with breakfast. TAKE 1 TABLET DAILY WITH BREAKFAST    METOPROLOL TARTRATE (LOPRESSOR) 25 MG TABLET    Take 1 tablet (25 mg total) by mouth 2 (two) times daily.    NIFEDIPINE (ADALAT CC) 90 MG TBSR    Take 1 tablet (90 mg total) by mouth once daily.    OMEPRAZOLE (PRILOSEC) 40 MG CAPSULE    Take 1 capsule (40 mg total) by mouth 2 (two) times daily before meals.    ROSUVASTATIN (CRESTOR) 40 MG TAB    Take 1 tablet (40 mg total) by mouth every evening.     Review of patient's allergies indicates:   Allergen Reactions    Percodan [oxycodone hcl-oxycodone-asa] Hives and Itching       QUALITY OF LIFE: 90%- Able to Carry on Normal Activity: Minor Symptoms of Disease    Vitals:    12/31/20 1011   BP: 136/75    Pulse: 90   Resp: 18   Temp: 98 °F (36.7 °C)   SpO2: 97%   Weight: 85.7 kg (189 lb)   PainSc: 0-No pain     Body mass index is 34.02 kg/m².    PHYSICAL EXAM:   GENERAL: alert; in no apparent distress.   HEAD: normocephalic, atraumatic.  EYES: pupils are equal, round, reactive to light and accommodation. Sclera anicteric. Conjunctiva not injected. .   NECK: no cervical motion rigidity  CHEST: Patient is speaking comfortably on room air with normal work of breathing without using accessory muscles of respiration.  ABDOMEN: soft, nontender, nondistended.   MUSCULOSKELETAL: no tenderness to palpation along the spine or scapulae. Normal range of motion.  NEUROLOGIC: cranial nerves II-XII intact bilaterally. Strength 5/5 in bilateral upper and lower extremities. No sensory deficits appreciated.  Wide, unsteady gait with limp (chronic)   EXTREMITIES: no clubbing, cyanosis, edema.  SKIN: no erythema, rashes, ulcerations noted.   BREAST: deferred    REVIEW OF IMAGING/PATHOLOGY/LABS: Please see HPI. All images reviewed personally by dictating physician.     ASSESSMENT: 70 y.o. female with stage IA, pT2N0(sn)M0 g2 ILC UOQ R breast, ER+/FL+/her2(-) s/p margin negative lumpectomy and B oncoplastic reduction; Oncotype pending.  PLAN:  Eugenie Coates presents with screen detected malignancy now status post lumpectomy and bilateral oncoplastic reduction revealing a 2.2 cm grade 2 invasive lobular carcinoma without LVSI and 0/4 sentinel lymph nodes containing disease.  The tumor is strongly estrogen receptor positive, her2(-) and Ki-67 at biopsy was 40%.  I explained the importance of the results of Oncotype testing to direct systemic therapy decision.  She voiced understanding and does have an appointment with Dr. Matos to discuss this further.    I then explained the indication for adjuvant radiotherapy following lumpectomy to eradicate residual disease within the breast thereby providing equivalent oncologic results to  mastectomy based on several randomized trials.  I further detailed studies demonstrating a preserved local control benefit in patients over 65 years of age with T1-2N0 ER+ tumors without demonstrable survival benefit likely due to competing comorbidities and availability of salvage options.  Patient expressed understanding and seemed inclined towards proceeding with radiotherapy.  I do think she would be a candidate for a hypofractionated treatment course with or without tumor bed boost pending visualization on CT based planning.    During our discussion, I take note that she is from [place], [place] with a 2.5 hour drive.  I recommend treatment closer to home and reassured the patient that the radiation oncology department at [facility] would be more than capable of providing this treatment and I will place referral for her.  I also recommended she contact Dr. Matos's office to inquire about availability of virtual visit and long-term care plans in light of her driving distance (ie, should she be recommended to get chemotherapy).  The patient and her  were appreciative of our consultation and this recommendation.    The patient has our contact information and understands that they are free to contact us at any time with questions or concerns regarding radiation therapy.    DISPOSITION: RTC PRN    I have personally seen and evaluated this patient. Greater than 50% of this time was spent discussing coordination of care and/or counseling.    COVID-19 precautions discussed. Cancer Center policy for COVID-19 testing described.  Patient will be required to wear a mask when in the Cancer Center.    PHYSICIAN: Omar Patterson Jr, MD    Thank you for the opportunity to meet and consult with Eugenie Coates.   Please feel free to contact me to discuss the above recommendation further.

## 2021-01-03 ENCOUNTER — TELEPHONE (OUTPATIENT)
Dept: ENDOCRINOLOGY | Facility: HOSPITAL | Age: 71
End: 2021-01-03

## 2021-01-03 DIAGNOSIS — E04.2 MULTINODULAR THYROID: Primary | ICD-10-CM

## 2021-01-05 ENCOUNTER — TELEPHONE (OUTPATIENT)
Dept: HEMATOLOGY/ONCOLOGY | Facility: CLINIC | Age: 71
End: 2021-01-05

## 2021-01-06 ENCOUNTER — PATIENT MESSAGE (OUTPATIENT)
Dept: ADMINISTRATIVE | Facility: OTHER | Age: 71
End: 2021-01-06

## 2021-01-08 ENCOUNTER — OFFICE VISIT (OUTPATIENT)
Dept: PLASTIC SURGERY | Facility: CLINIC | Age: 71
End: 2021-01-08
Payer: MEDICARE

## 2021-01-08 VITALS — HEART RATE: 85 BPM | SYSTOLIC BLOOD PRESSURE: 170 MMHG | DIASTOLIC BLOOD PRESSURE: 80 MMHG

## 2021-01-08 DIAGNOSIS — Z09 SURGERY FOLLOW-UP EXAMINATION: Primary | ICD-10-CM

## 2021-01-08 PROCEDURE — 99999 PR PBB SHADOW E&M-EST. PATIENT-LVL II: ICD-10-PCS | Mod: PBBFAC,,, | Performed by: SURGERY

## 2021-01-08 PROCEDURE — 99024 PR POST-OP FOLLOW-UP VISIT: ICD-10-PCS | Mod: POP,,, | Performed by: SURGERY

## 2021-01-08 PROCEDURE — 99212 OFFICE O/P EST SF 10 MIN: CPT | Mod: PBBFAC,PO | Performed by: SURGERY

## 2021-01-08 PROCEDURE — 99024 POSTOP FOLLOW-UP VISIT: CPT | Mod: POP,,, | Performed by: SURGERY

## 2021-01-08 PROCEDURE — 99999 PR PBB SHADOW E&M-EST. PATIENT-LVL II: CPT | Mod: PBBFAC,,, | Performed by: SURGERY

## 2021-01-14 ENCOUNTER — OFFICE VISIT (OUTPATIENT)
Dept: HEMATOLOGY/ONCOLOGY | Facility: CLINIC | Age: 71
End: 2021-01-14
Payer: MEDICARE

## 2021-01-14 VITALS
WEIGHT: 189.81 LBS | OXYGEN SATURATION: 98 % | BODY MASS INDEX: 33.63 KG/M2 | HEIGHT: 63 IN | TEMPERATURE: 98 F | SYSTOLIC BLOOD PRESSURE: 130 MMHG | HEART RATE: 91 BPM | RESPIRATION RATE: 18 BRPM | DIASTOLIC BLOOD PRESSURE: 74 MMHG

## 2021-01-14 DIAGNOSIS — M19.90 OSTEOARTHRITIS, UNSPECIFIED OSTEOARTHRITIS TYPE, UNSPECIFIED SITE: ICD-10-CM

## 2021-01-14 DIAGNOSIS — Z86.718 HISTORY OF DVT (DEEP VEIN THROMBOSIS): ICD-10-CM

## 2021-01-14 DIAGNOSIS — I10 ESSENTIAL HYPERTENSION: ICD-10-CM

## 2021-01-14 DIAGNOSIS — C50.911 INVASIVE LOBULAR CARCINOMA OF RIGHT BREAST IN FEMALE: ICD-10-CM

## 2021-01-14 DIAGNOSIS — I87.2 VENOUS INSUFFICIENCY OF RIGHT LOWER EXTREMITY: ICD-10-CM

## 2021-01-14 DIAGNOSIS — E66.9 CLASS 1 OBESITY WITH BODY MASS INDEX (BMI) OF 34.0 TO 34.9 IN ADULT, UNSPECIFIED OBESITY TYPE, UNSPECIFIED WHETHER SERIOUS COMORBIDITY PRESENT: Primary | ICD-10-CM

## 2021-01-14 DIAGNOSIS — E11.9 DIABETES MELLITUS TYPE 2 WITHOUT RETINOPATHY: ICD-10-CM

## 2021-01-14 PROCEDURE — 99214 OFFICE O/P EST MOD 30 MIN: CPT | Mod: PBBFAC,PN | Performed by: INTERNAL MEDICINE

## 2021-01-14 PROCEDURE — 99205 PR OFFICE/OUTPT VISIT, NEW, LEVL V, 60-74 MIN: ICD-10-PCS | Mod: S$PBB,,, | Performed by: INTERNAL MEDICINE

## 2021-01-14 PROCEDURE — 99205 OFFICE O/P NEW HI 60 MIN: CPT | Mod: S$PBB,,, | Performed by: INTERNAL MEDICINE

## 2021-01-14 PROCEDURE — 99999 PR PBB SHADOW E&M-EST. PATIENT-LVL IV: ICD-10-PCS | Mod: PBBFAC,,, | Performed by: INTERNAL MEDICINE

## 2021-01-14 PROCEDURE — 99999 PR PBB SHADOW E&M-EST. PATIENT-LVL IV: CPT | Mod: PBBFAC,,, | Performed by: INTERNAL MEDICINE

## 2021-01-15 ENCOUNTER — PATIENT MESSAGE (OUTPATIENT)
Dept: HEMATOLOGY/ONCOLOGY | Facility: CLINIC | Age: 71
End: 2021-01-15

## 2021-01-18 ENCOUNTER — PATIENT MESSAGE (OUTPATIENT)
Dept: HEMATOLOGY/ONCOLOGY | Facility: CLINIC | Age: 71
End: 2021-01-18

## 2021-01-19 ENCOUNTER — OFFICE VISIT (OUTPATIENT)
Dept: OPHTHALMOLOGY | Facility: CLINIC | Age: 71
End: 2021-01-19
Payer: MEDICARE

## 2021-01-19 DIAGNOSIS — H25.813 COMBINED FORMS OF AGE-RELATED CATARACT, BILATERAL: ICD-10-CM

## 2021-01-19 DIAGNOSIS — H10.013 ACUTE FOLLICULAR CONJUNCTIVITIS, BILATERAL: ICD-10-CM

## 2021-01-19 DIAGNOSIS — H40.1131 PRIMARY OPEN-ANGLE GLAUCOMA, BILATERAL, MILD STAGE: Primary | ICD-10-CM

## 2021-01-19 PROCEDURE — 99999 PR PBB SHADOW E&M-EST. PATIENT-LVL III: CPT | Mod: PBBFAC,,, | Performed by: OPHTHALMOLOGY

## 2021-01-19 PROCEDURE — 92020 GONIOSCOPY: CPT | Mod: S$PBB,,, | Performed by: OPHTHALMOLOGY

## 2021-01-19 PROCEDURE — 92020 GONIOSCOPY: CPT | Mod: PBBFAC,PO | Performed by: OPHTHALMOLOGY

## 2021-01-19 PROCEDURE — 92002 PR EYE EXAM, NEW PATIENT,INTERMED: ICD-10-PCS | Mod: S$PBB,,, | Performed by: OPHTHALMOLOGY

## 2021-01-19 PROCEDURE — 92020 PR SPECIAL EYE EVAL,GONIOSCOPY: ICD-10-PCS | Mod: S$PBB,,, | Performed by: OPHTHALMOLOGY

## 2021-01-19 PROCEDURE — 99213 OFFICE O/P EST LOW 20 MIN: CPT | Mod: PBBFAC,PO | Performed by: OPHTHALMOLOGY

## 2021-01-19 PROCEDURE — 99999 PR PBB SHADOW E&M-EST. PATIENT-LVL III: ICD-10-PCS | Mod: PBBFAC,,, | Performed by: OPHTHALMOLOGY

## 2021-01-19 PROCEDURE — 92002 INTRM OPH EXAM NEW PATIENT: CPT | Mod: S$PBB,,, | Performed by: OPHTHALMOLOGY

## 2021-01-21 ENCOUNTER — PATIENT MESSAGE (OUTPATIENT)
Dept: HEMATOLOGY/ONCOLOGY | Facility: CLINIC | Age: 71
End: 2021-01-21

## 2021-01-25 ENCOUNTER — TELEPHONE (OUTPATIENT)
Dept: HEMATOLOGY/ONCOLOGY | Facility: CLINIC | Age: 71
End: 2021-01-25

## 2021-01-26 ENCOUNTER — TELEPHONE (OUTPATIENT)
Dept: HEMATOLOGY/ONCOLOGY | Facility: CLINIC | Age: 71
End: 2021-01-26

## 2021-01-26 ENCOUNTER — OFFICE VISIT (OUTPATIENT)
Dept: HEMATOLOGY/ONCOLOGY | Facility: CLINIC | Age: 71
End: 2021-01-26
Payer: MEDICARE

## 2021-01-26 DIAGNOSIS — E66.9 CLASS 1 OBESITY WITH BODY MASS INDEX (BMI) OF 34.0 TO 34.9 IN ADULT, UNSPECIFIED OBESITY TYPE, UNSPECIFIED WHETHER SERIOUS COMORBIDITY PRESENT: ICD-10-CM

## 2021-01-26 DIAGNOSIS — Z01.20 DENTAL EXAMINATION: ICD-10-CM

## 2021-01-26 DIAGNOSIS — E11.9 DIABETES MELLITUS TYPE 2 WITHOUT RETINOPATHY: ICD-10-CM

## 2021-01-26 DIAGNOSIS — Z86.718 HISTORY OF DVT (DEEP VEIN THROMBOSIS): ICD-10-CM

## 2021-01-26 DIAGNOSIS — I10 ESSENTIAL HYPERTENSION: ICD-10-CM

## 2021-01-26 DIAGNOSIS — M19.90 OSTEOARTHRITIS, UNSPECIFIED OSTEOARTHRITIS TYPE, UNSPECIFIED SITE: ICD-10-CM

## 2021-01-26 DIAGNOSIS — C50.911 INVASIVE LOBULAR CARCINOMA OF RIGHT BREAST IN FEMALE: Primary | ICD-10-CM

## 2021-01-26 DIAGNOSIS — Z79.899 ENCOUNTER FOR BONE DENSITY MEASUREMENT FOR THERAPEUTIC DRUG MONITORING: ICD-10-CM

## 2021-01-26 DIAGNOSIS — Z01.89 ENCOUNTER FOR BONE DENSITY MEASUREMENT FOR THERAPEUTIC DRUG MONITORING: ICD-10-CM

## 2021-01-26 DIAGNOSIS — G89.29 OTHER CHRONIC PAIN: ICD-10-CM

## 2021-01-26 PROCEDURE — 99215 OFFICE O/P EST HI 40 MIN: CPT | Mod: 95,,, | Performed by: INTERNAL MEDICINE

## 2021-01-26 PROCEDURE — 99215 PR OFFICE/OUTPT VISIT, EST, LEVL V, 40-54 MIN: ICD-10-PCS | Mod: 95,,, | Performed by: INTERNAL MEDICINE

## 2021-01-29 ENCOUNTER — PATIENT MESSAGE (OUTPATIENT)
Dept: HEMATOLOGY/ONCOLOGY | Facility: CLINIC | Age: 71
End: 2021-01-29

## 2021-01-29 ENCOUNTER — TELEPHONE (OUTPATIENT)
Dept: HEMATOLOGY/ONCOLOGY | Facility: CLINIC | Age: 71
End: 2021-01-29

## 2021-02-02 ENCOUNTER — PATIENT MESSAGE (OUTPATIENT)
Dept: HEMATOLOGY/ONCOLOGY | Facility: CLINIC | Age: 71
End: 2021-02-02

## 2021-02-02 ENCOUNTER — TELEPHONE (OUTPATIENT)
Dept: HEMATOLOGY/ONCOLOGY | Facility: CLINIC | Age: 71
End: 2021-02-02

## 2021-02-03 ENCOUNTER — PATIENT MESSAGE (OUTPATIENT)
Dept: FAMILY MEDICINE | Facility: CLINIC | Age: 71
End: 2021-02-03

## 2021-02-03 DIAGNOSIS — E11.65 TYPE 2 DIABETES MELLITUS WITH HYPERGLYCEMIA, WITHOUT LONG-TERM CURRENT USE OF INSULIN: ICD-10-CM

## 2021-02-03 DIAGNOSIS — I10 ESSENTIAL HYPERTENSION: ICD-10-CM

## 2021-02-03 RX ORDER — NIFEDIPINE 90 MG/1
90 TABLET, FILM COATED, EXTENDED RELEASE ORAL DAILY
Qty: 90 TABLET | Refills: 0 | Status: SHIPPED | OUTPATIENT
Start: 2021-02-03 | End: 2021-03-04 | Stop reason: SDUPTHER

## 2021-02-03 RX ORDER — LISINOPRIL 40 MG/1
40 TABLET ORAL DAILY
Qty: 90 TABLET | Refills: 0 | Status: SHIPPED | OUTPATIENT
Start: 2021-02-03 | End: 2021-05-24 | Stop reason: SDUPTHER

## 2021-02-03 RX ORDER — METFORMIN HYDROCHLORIDE 1000 MG/1
1000 TABLET ORAL
Qty: 90 TABLET | Refills: 0 | Status: SHIPPED | OUTPATIENT
Start: 2021-02-03 | End: 2021-03-04

## 2021-02-05 ENCOUNTER — PATIENT MESSAGE (OUTPATIENT)
Dept: ADMINISTRATIVE | Facility: OTHER | Age: 71
End: 2021-02-05

## 2021-03-02 ENCOUNTER — OFFICE VISIT (OUTPATIENT)
Dept: OPHTHALMOLOGY | Facility: CLINIC | Age: 71
End: 2021-03-02
Payer: MEDICARE

## 2021-03-02 DIAGNOSIS — H40.1131 PRIMARY OPEN-ANGLE GLAUCOMA, BILATERAL, MILD STAGE: Primary | ICD-10-CM

## 2021-03-02 DIAGNOSIS — E11.9 DIABETES MELLITUS TYPE 2 WITHOUT RETINOPATHY: ICD-10-CM

## 2021-03-02 DIAGNOSIS — H25.813 COMBINED FORMS OF AGE-RELATED CATARACT, BILATERAL: ICD-10-CM

## 2021-03-02 PROCEDURE — 99214 OFFICE O/P EST MOD 30 MIN: CPT | Mod: S$PBB,,, | Performed by: OPHTHALMOLOGY

## 2021-03-02 PROCEDURE — 99999 PR PBB SHADOW E&M-EST. PATIENT-LVL III: ICD-10-PCS | Mod: PBBFAC,,, | Performed by: OPHTHALMOLOGY

## 2021-03-02 PROCEDURE — 99214 PR OFFICE/OUTPT VISIT, EST, LEVL IV, 30-39 MIN: ICD-10-PCS | Mod: S$PBB,,, | Performed by: OPHTHALMOLOGY

## 2021-03-02 PROCEDURE — 99999 PR PBB SHADOW E&M-EST. PATIENT-LVL III: CPT | Mod: PBBFAC,,, | Performed by: OPHTHALMOLOGY

## 2021-03-02 PROCEDURE — 99213 OFFICE O/P EST LOW 20 MIN: CPT | Mod: PBBFAC,PO | Performed by: OPHTHALMOLOGY

## 2021-03-02 RX ORDER — DORZOLAMIDE HYDROCHLORIDE AND TIMOLOL MALEATE 20; 5 MG/ML; MG/ML
1 SOLUTION/ DROPS OPHTHALMIC EVERY 12 HOURS
Qty: 10 ML | Refills: 11 | Status: SHIPPED | OUTPATIENT
Start: 2021-03-02 | End: 2022-03-27

## 2021-03-02 RX ORDER — IBUPROFEN 800 MG/1
800 TABLET ORAL EVERY 6 HOURS PRN
COMMUNITY
Start: 2021-02-25 | End: 2021-03-04

## 2021-03-03 ENCOUNTER — OFFICE VISIT (OUTPATIENT)
Dept: PODIATRY | Facility: CLINIC | Age: 71
End: 2021-03-03
Payer: MEDICARE

## 2021-03-03 ENCOUNTER — LAB VISIT (OUTPATIENT)
Dept: LAB | Facility: HOSPITAL | Age: 71
End: 2021-03-03
Attending: FAMILY MEDICINE
Payer: MEDICARE

## 2021-03-03 VITALS — HEIGHT: 63 IN | WEIGHT: 189.81 LBS | BODY MASS INDEX: 33.63 KG/M2

## 2021-03-03 DIAGNOSIS — E11.65 TYPE 2 DIABETES MELLITUS WITH HYPERGLYCEMIA, WITHOUT LONG-TERM CURRENT USE OF INSULIN: ICD-10-CM

## 2021-03-03 DIAGNOSIS — M20.42 HAMMER TOES OF BOTH FEET: ICD-10-CM

## 2021-03-03 DIAGNOSIS — M20.41 HAMMER TOES OF BOTH FEET: ICD-10-CM

## 2021-03-03 DIAGNOSIS — E78.2 MIXED HYPERLIPIDEMIA: ICD-10-CM

## 2021-03-03 DIAGNOSIS — B35.1 ONYCHOMYCOSIS DUE TO DERMATOPHYTE: ICD-10-CM

## 2021-03-03 DIAGNOSIS — E11.65 TYPE 2 DIABETES MELLITUS WITH HYPERGLYCEMIA, WITHOUT LONG-TERM CURRENT USE OF INSULIN: Primary | ICD-10-CM

## 2021-03-03 DIAGNOSIS — I10 ESSENTIAL HYPERTENSION: ICD-10-CM

## 2021-03-03 LAB
ALBUMIN SERPL BCP-MCNC: 3.7 G/DL (ref 3.5–5.2)
ALP SERPL-CCNC: 115 U/L (ref 55–135)
ALT SERPL W/O P-5'-P-CCNC: 15 U/L (ref 10–44)
ANION GAP SERPL CALC-SCNC: 9 MMOL/L (ref 8–16)
AST SERPL-CCNC: 19 U/L (ref 10–40)
BASOPHILS # BLD AUTO: 0.06 K/UL (ref 0–0.2)
BASOPHILS NFR BLD: 0.9 % (ref 0–1.9)
BILIRUB SERPL-MCNC: 0.7 MG/DL (ref 0.1–1)
BUN SERPL-MCNC: 12 MG/DL (ref 8–23)
CALCIUM SERPL-MCNC: 9.9 MG/DL (ref 8.7–10.5)
CHLORIDE SERPL-SCNC: 104 MMOL/L (ref 95–110)
CHOLEST SERPL-MCNC: 231 MG/DL (ref 120–199)
CHOLEST/HDLC SERPL: 5.4 {RATIO} (ref 2–5)
CO2 SERPL-SCNC: 29 MMOL/L (ref 23–29)
CREAT SERPL-MCNC: 1.1 MG/DL (ref 0.5–1.4)
DIFFERENTIAL METHOD: ABNORMAL
EOSINOPHIL # BLD AUTO: 0.3 K/UL (ref 0–0.5)
EOSINOPHIL NFR BLD: 3.6 % (ref 0–8)
ERYTHROCYTE [DISTWIDTH] IN BLOOD BY AUTOMATED COUNT: 12.9 % (ref 11.5–14.5)
EST. GFR  (AFRICAN AMERICAN): 59.2 ML/MIN/1.73 M^2
EST. GFR  (NON AFRICAN AMERICAN): 51.3 ML/MIN/1.73 M^2
ESTIMATED AVG GLUCOSE: 137 MG/DL (ref 68–131)
GLUCOSE SERPL-MCNC: 137 MG/DL (ref 70–110)
HBA1C MFR BLD: 6.4 % (ref 4–5.6)
HCT VFR BLD AUTO: 43 % (ref 37–48.5)
HDLC SERPL-MCNC: 43 MG/DL (ref 40–75)
HDLC SERPL: 18.6 % (ref 20–50)
HGB BLD-MCNC: 13.6 G/DL (ref 12–16)
IMM GRANULOCYTES # BLD AUTO: 0.03 K/UL (ref 0–0.04)
IMM GRANULOCYTES NFR BLD AUTO: 0.4 % (ref 0–0.5)
LDLC SERPL CALC-MCNC: 153.6 MG/DL (ref 63–159)
LYMPHOCYTES # BLD AUTO: 1.5 K/UL (ref 1–4.8)
LYMPHOCYTES NFR BLD: 21.5 % (ref 18–48)
MCH RBC QN AUTO: 27 PG (ref 27–31)
MCHC RBC AUTO-ENTMCNC: 31.6 G/DL (ref 32–36)
MCV RBC AUTO: 86 FL (ref 82–98)
MONOCYTES # BLD AUTO: 0.5 K/UL (ref 0.3–1)
MONOCYTES NFR BLD: 7.7 % (ref 4–15)
NEUTROPHILS # BLD AUTO: 4.6 K/UL (ref 1.8–7.7)
NEUTROPHILS NFR BLD: 65.9 % (ref 38–73)
NONHDLC SERPL-MCNC: 188 MG/DL
NRBC BLD-RTO: 0 /100 WBC
PLATELET # BLD AUTO: 266 K/UL (ref 150–350)
PMV BLD AUTO: 10.5 FL (ref 9.2–12.9)
POTASSIUM SERPL-SCNC: 3.3 MMOL/L (ref 3.5–5.1)
PROT SERPL-MCNC: 7.7 G/DL (ref 6–8.4)
RBC # BLD AUTO: 5.03 M/UL (ref 4–5.4)
SODIUM SERPL-SCNC: 142 MMOL/L (ref 136–145)
TRIGL SERPL-MCNC: 172 MG/DL (ref 30–150)
WBC # BLD AUTO: 7.01 K/UL (ref 3.9–12.7)

## 2021-03-03 PROCEDURE — 99999 PR PBB SHADOW E&M-EST. PATIENT-LVL II: ICD-10-PCS | Mod: PBBFAC,,, | Performed by: PODIATRIST

## 2021-03-03 PROCEDURE — 85025 COMPLETE CBC W/AUTO DIFF WBC: CPT | Performed by: FAMILY MEDICINE

## 2021-03-03 PROCEDURE — 80053 COMPREHEN METABOLIC PANEL: CPT | Performed by: FAMILY MEDICINE

## 2021-03-03 PROCEDURE — 11721 DEBRIDE NAIL 6 OR MORE: CPT | Mod: PBBFAC,PN | Performed by: PODIATRIST

## 2021-03-03 PROCEDURE — 11721 PR DEBRIDEMENT OF NAILS, 6 OR MORE: ICD-10-PCS | Mod: Q9,S$PBB,, | Performed by: PODIATRIST

## 2021-03-03 PROCEDURE — 11721 DEBRIDE NAIL 6 OR MORE: CPT | Mod: Q9,S$PBB,, | Performed by: PODIATRIST

## 2021-03-03 PROCEDURE — 99213 OFFICE O/P EST LOW 20 MIN: CPT | Mod: 25,S$PBB,, | Performed by: PODIATRIST

## 2021-03-03 PROCEDURE — 99999 PR PBB SHADOW E&M-EST. PATIENT-LVL II: CPT | Mod: PBBFAC,,, | Performed by: PODIATRIST

## 2021-03-03 PROCEDURE — 36415 COLL VENOUS BLD VENIPUNCTURE: CPT | Mod: PO

## 2021-03-03 PROCEDURE — 99212 OFFICE O/P EST SF 10 MIN: CPT | Mod: PBBFAC,PN | Performed by: PODIATRIST

## 2021-03-03 PROCEDURE — 80061 LIPID PANEL: CPT | Performed by: FAMILY MEDICINE

## 2021-03-03 PROCEDURE — 83036 HEMOGLOBIN GLYCOSYLATED A1C: CPT | Performed by: FAMILY MEDICINE

## 2021-03-03 PROCEDURE — 99213 PR OFFICE/OUTPT VISIT, EST, LEVL III, 20-29 MIN: ICD-10-PCS | Mod: 25,S$PBB,, | Performed by: PODIATRIST

## 2021-03-04 ENCOUNTER — OFFICE VISIT (OUTPATIENT)
Dept: FAMILY MEDICINE | Facility: CLINIC | Age: 71
End: 2021-03-04
Payer: MEDICARE

## 2021-03-04 ENCOUNTER — LAB VISIT (OUTPATIENT)
Dept: LAB | Facility: HOSPITAL | Age: 71
End: 2021-03-04
Attending: FAMILY MEDICINE
Payer: MEDICARE

## 2021-03-04 ENCOUNTER — PATIENT MESSAGE (OUTPATIENT)
Dept: CARDIOLOGY | Facility: CLINIC | Age: 71
End: 2021-03-04

## 2021-03-04 VITALS
TEMPERATURE: 97 F | OXYGEN SATURATION: 98 % | HEIGHT: 62 IN | WEIGHT: 184.75 LBS | SYSTOLIC BLOOD PRESSURE: 138 MMHG | DIASTOLIC BLOOD PRESSURE: 70 MMHG | HEART RATE: 83 BPM | BODY MASS INDEX: 34 KG/M2

## 2021-03-04 DIAGNOSIS — I50.30 HEART FAILURE WITH PRESERVED EJECTION FRACTION, UNSPECIFIED HF CHRONICITY: ICD-10-CM

## 2021-03-04 DIAGNOSIS — Z86.718 HISTORY OF DVT (DEEP VEIN THROMBOSIS): ICD-10-CM

## 2021-03-04 DIAGNOSIS — E78.2 MIXED HYPERLIPIDEMIA: ICD-10-CM

## 2021-03-04 DIAGNOSIS — I10 ESSENTIAL HYPERTENSION: ICD-10-CM

## 2021-03-04 DIAGNOSIS — E87.6 HYPOKALEMIA: Primary | ICD-10-CM

## 2021-03-04 DIAGNOSIS — N18.30 STAGE 3 CHRONIC KIDNEY DISEASE, UNSPECIFIED WHETHER STAGE 3A OR 3B CKD: ICD-10-CM

## 2021-03-04 DIAGNOSIS — E66.01 SEVERE OBESITY (BMI 35.0-35.9 WITH COMORBIDITY): ICD-10-CM

## 2021-03-04 DIAGNOSIS — I87.2 VENOUS INSUFFICIENCY OF RIGHT LOWER EXTREMITY: ICD-10-CM

## 2021-03-04 DIAGNOSIS — I47.10 PSVT (PAROXYSMAL SUPRAVENTRICULAR TACHYCARDIA): ICD-10-CM

## 2021-03-04 DIAGNOSIS — E11.65 TYPE 2 DIABETES MELLITUS WITH HYPERGLYCEMIA, WITHOUT LONG-TERM CURRENT USE OF INSULIN: ICD-10-CM

## 2021-03-04 DIAGNOSIS — E87.6 HYPOKALEMIA: ICD-10-CM

## 2021-03-04 DIAGNOSIS — K21.9 GERD WITHOUT ESOPHAGITIS: ICD-10-CM

## 2021-03-04 DIAGNOSIS — C50.111 MALIGNANT NEOPLASM OF CENTRAL PORTION OF RIGHT BREAST IN FEMALE, ESTROGEN RECEPTOR POSITIVE: ICD-10-CM

## 2021-03-04 DIAGNOSIS — Z17.0 MALIGNANT NEOPLASM OF CENTRAL PORTION OF RIGHT BREAST IN FEMALE, ESTROGEN RECEPTOR POSITIVE: ICD-10-CM

## 2021-03-04 PROBLEM — I82.4Y1 ACUTE DEEP VEIN THROMBOSIS (DVT) OF PROXIMAL VEIN OF RIGHT LOWER EXTREMITY: Status: ACTIVE | Noted: 2021-03-04

## 2021-03-04 PROBLEM — I82.4Y1 ACUTE DEEP VEIN THROMBOSIS (DVT) OF PROXIMAL VEIN OF RIGHT LOWER EXTREMITY: Status: RESOLVED | Noted: 2021-03-04 | Resolved: 2021-03-04

## 2021-03-04 LAB
ANION GAP SERPL CALC-SCNC: 10 MMOL/L (ref 8–16)
BUN SERPL-MCNC: 13 MG/DL (ref 8–23)
CALCIUM SERPL-MCNC: 10 MG/DL (ref 8.7–10.5)
CHLORIDE SERPL-SCNC: 105 MMOL/L (ref 95–110)
CO2 SERPL-SCNC: 28 MMOL/L (ref 23–29)
CREAT SERPL-MCNC: 1.1 MG/DL (ref 0.5–1.4)
EST. GFR  (AFRICAN AMERICAN): 59.2 ML/MIN/1.73 M^2
EST. GFR  (NON AFRICAN AMERICAN): 51.3 ML/MIN/1.73 M^2
GLUCOSE SERPL-MCNC: 132 MG/DL (ref 70–110)
MAGNESIUM SERPL-MCNC: 1.8 MG/DL (ref 1.6–2.6)
POTASSIUM SERPL-SCNC: 3.7 MMOL/L (ref 3.5–5.1)
SODIUM SERPL-SCNC: 143 MMOL/L (ref 136–145)

## 2021-03-04 PROCEDURE — 99999 PR PBB SHADOW E&M-EST. PATIENT-LVL IV: ICD-10-PCS | Mod: PBBFAC,,, | Performed by: FAMILY MEDICINE

## 2021-03-04 PROCEDURE — 80048 BASIC METABOLIC PNL TOTAL CA: CPT | Performed by: FAMILY MEDICINE

## 2021-03-04 PROCEDURE — 99214 PR OFFICE/OUTPT VISIT, EST, LEVL IV, 30-39 MIN: ICD-10-PCS | Mod: S$PBB,,, | Performed by: FAMILY MEDICINE

## 2021-03-04 PROCEDURE — 99214 OFFICE O/P EST MOD 30 MIN: CPT | Mod: PBBFAC,PO | Performed by: FAMILY MEDICINE

## 2021-03-04 PROCEDURE — 99214 OFFICE O/P EST MOD 30 MIN: CPT | Mod: S$PBB,,, | Performed by: FAMILY MEDICINE

## 2021-03-04 PROCEDURE — 36415 COLL VENOUS BLD VENIPUNCTURE: CPT | Mod: PO | Performed by: FAMILY MEDICINE

## 2021-03-04 PROCEDURE — 99999 PR PBB SHADOW E&M-EST. PATIENT-LVL IV: CPT | Mod: PBBFAC,,, | Performed by: FAMILY MEDICINE

## 2021-03-04 PROCEDURE — 83735 ASSAY OF MAGNESIUM: CPT | Performed by: FAMILY MEDICINE

## 2021-03-04 RX ORDER — METFORMIN HYDROCHLORIDE 1000 MG/1
500 TABLET ORAL
Qty: 90 TABLET | Refills: 0
Start: 2021-03-04 | End: 2021-05-25 | Stop reason: SDUPTHER

## 2021-03-04 RX ORDER — NIFEDIPINE 90 MG/1
90 TABLET, FILM COATED, EXTENDED RELEASE ORAL DAILY
Qty: 90 TABLET | Refills: 0 | Status: SHIPPED | OUTPATIENT
Start: 2021-03-04 | End: 2021-12-15

## 2021-03-05 ENCOUNTER — TELEPHONE (OUTPATIENT)
Dept: CARDIOLOGY | Facility: CLINIC | Age: 71
End: 2021-03-05

## 2021-03-26 ENCOUNTER — HOSPITAL ENCOUNTER (OUTPATIENT)
Dept: CARDIOLOGY | Facility: HOSPITAL | Age: 71
Discharge: HOME | End: 2021-03-26
Attending: INTERNAL MEDICINE
Payer: MEDICARE

## 2021-03-26 ENCOUNTER — OFFICE VISIT (OUTPATIENT)
Dept: CARDIOLOGY | Facility: CLINIC | Age: 71
End: 2021-03-26
Payer: MEDICARE

## 2021-03-26 ENCOUNTER — HOSPITAL ENCOUNTER (OUTPATIENT)
Dept: RADIOLOGY | Facility: HOSPITAL | Age: 71
Discharge: HOME | End: 2021-03-26
Attending: INTERNAL MEDICINE
Payer: MEDICARE

## 2021-03-26 VITALS
WEIGHT: 183.44 LBS | HEART RATE: 86 BPM | SYSTOLIC BLOOD PRESSURE: 179 MMHG | BODY MASS INDEX: 32.5 KG/M2 | HEIGHT: 63 IN | OXYGEN SATURATION: 97 % | DIASTOLIC BLOOD PRESSURE: 84 MMHG

## 2021-03-26 DIAGNOSIS — I10 ESSENTIAL HYPERTENSION: ICD-10-CM

## 2021-03-26 DIAGNOSIS — E78.2 MIXED HYPERLIPIDEMIA: ICD-10-CM

## 2021-03-26 DIAGNOSIS — M25.562 CHRONIC PAIN OF BOTH KNEES: ICD-10-CM

## 2021-03-26 DIAGNOSIS — M79.662 PAIN IN BOTH LOWER LEGS: ICD-10-CM

## 2021-03-26 DIAGNOSIS — M25.561 CHRONIC PAIN OF BOTH KNEES: ICD-10-CM

## 2021-03-26 DIAGNOSIS — I87.2 VENOUS INSUFFICIENCY OF RIGHT LOWER EXTREMITY: ICD-10-CM

## 2021-03-26 DIAGNOSIS — M79.661 PAIN IN BOTH LOWER LEGS: ICD-10-CM

## 2021-03-26 DIAGNOSIS — R60.0 EDEMA OF BOTH LOWER EXTREMITIES: ICD-10-CM

## 2021-03-26 DIAGNOSIS — R60.0 EDEMA OF BOTH LOWER EXTREMITIES: Primary | ICD-10-CM

## 2021-03-26 DIAGNOSIS — G47.33 OSA (OBSTRUCTIVE SLEEP APNEA): ICD-10-CM

## 2021-03-26 DIAGNOSIS — I82.501 LEG DVT (DEEP VENOUS THROMBOEMBOLISM), CHRONIC, RIGHT: ICD-10-CM

## 2021-03-26 DIAGNOSIS — E66.9 OBESITY, CLASS I, BMI 30-34.9: ICD-10-CM

## 2021-03-26 DIAGNOSIS — N18.30 STAGE 3 CHRONIC KIDNEY DISEASE, UNSPECIFIED WHETHER STAGE 3A OR 3B CKD: ICD-10-CM

## 2021-03-26 DIAGNOSIS — G89.29 CHRONIC PAIN OF BOTH KNEES: ICD-10-CM

## 2021-03-26 DIAGNOSIS — Z86.718 HISTORY OF DVT (DEEP VEIN THROMBOSIS): ICD-10-CM

## 2021-03-26 DIAGNOSIS — E11.9 DIABETES MELLITUS TYPE 2 WITHOUT RETINOPATHY: ICD-10-CM

## 2021-03-26 PROCEDURE — 99205 PR OFFICE/OUTPT VISIT, NEW, LEVL V, 60-74 MIN: ICD-10-PCS | Mod: S$PBB,,, | Performed by: INTERNAL MEDICINE

## 2021-03-26 PROCEDURE — 75635 CT ANGIO ABDOMINAL ARTERIES: CPT | Mod: TC

## 2021-03-26 PROCEDURE — 99999 PR PBB SHADOW E&M-EST. PATIENT-LVL V: CPT | Mod: PBBFAC,,, | Performed by: INTERNAL MEDICINE

## 2021-03-26 PROCEDURE — 99215 OFFICE O/P EST HI 40 MIN: CPT | Mod: PBBFAC | Performed by: INTERNAL MEDICINE

## 2021-03-26 PROCEDURE — 99999 PR PBB SHADOW E&M-EST. PATIENT-LVL V: ICD-10-PCS | Mod: PBBFAC,,, | Performed by: INTERNAL MEDICINE

## 2021-03-26 PROCEDURE — 25500020 PHARM REV CODE 255: Performed by: INTERNAL MEDICINE

## 2021-03-26 PROCEDURE — 75635 CT ANGIO ABDOMINAL ARTERIES: CPT | Mod: 26,,, | Performed by: RADIOLOGY

## 2021-03-26 PROCEDURE — 93970 EXTREMITY STUDY: CPT | Mod: 26,,, | Performed by: INTERNAL MEDICINE

## 2021-03-26 PROCEDURE — 93970 EXTREMITY STUDY: CPT | Mod: TC

## 2021-03-26 PROCEDURE — 75635 CTA RUNOFF ABD PEL BILAT LOWER EXT: ICD-10-PCS | Mod: 26,,, | Performed by: RADIOLOGY

## 2021-03-26 PROCEDURE — 93970 CV US LOWER VENOUS INSUFFICIENCY BILATERAL (CUPID ONLY): ICD-10-PCS | Mod: 26,,, | Performed by: INTERNAL MEDICINE

## 2021-03-26 PROCEDURE — 99205 OFFICE O/P NEW HI 60 MIN: CPT | Mod: S$PBB,,, | Performed by: INTERNAL MEDICINE

## 2021-03-26 RX ADMIN — IOHEXOL 125 ML: 350 INJECTION, SOLUTION INTRAVENOUS at 07:03

## 2021-03-28 ENCOUNTER — PATIENT MESSAGE (OUTPATIENT)
Dept: ADMINISTRATIVE | Facility: OTHER | Age: 71
End: 2021-03-28

## 2021-03-28 LAB
LEFT GREAT SAPHENOUS DISTAL THIGH DIA: 0.5 CM
LEFT GREAT SAPHENOUS JUNCTION DIA: 0.68 CM
LEFT GREAT SAPHENOUS KNEE DIA: 0.36 CM
LEFT GREAT SAPHENOUS MIDDLE THIGH DIA: 0.41 CM
LEFT GREAT SAPHENOUS PROXIMAL CALF DIA: 0.23 CM
LEFT SMALL SAPHENOUS KNEE DIA: 0.11 CM
LEFT SMALL SAPHENOUS SPJ DIA: 0.16 CM
RIGHT GREAT SAPHENOUS DISTAL THIGH DIA: 0.44 CM
RIGHT GREAT SAPHENOUS DISTAL THIGH REFLUX: 719 MS
RIGHT GREAT SAPHENOUS JUNCTION DIA: 0.57 CM
RIGHT GREAT SAPHENOUS KNEE DIA: 0.48 CM
RIGHT GREAT SAPHENOUS MIDDLE THIGH DIA: 0.53 CM
RIGHT GREAT SAPHENOUS PROXIMAL CALF DIA: 0.35 CM
RIGHT SMALL SAPHENOUS KNEE DIA: 0.28 CM
RIGHT SMALL SAPHENOUS SPJ DIA: 0.32 CM

## 2021-03-29 ENCOUNTER — TELEPHONE (OUTPATIENT)
Dept: INTERNAL MEDICINE | Facility: CLINIC | Age: 71
End: 2021-03-29

## 2021-03-29 DIAGNOSIS — K86.2 PANCREATIC CYST: Primary | ICD-10-CM

## 2021-03-31 ENCOUNTER — HOSPITAL ENCOUNTER (OUTPATIENT)
Dept: RADIOLOGY | Facility: HOSPITAL | Age: 71
Discharge: HOME | End: 2021-03-31
Attending: PHYSICIAN ASSISTANT
Payer: MEDICARE

## 2021-03-31 ENCOUNTER — HOSPITAL ENCOUNTER (OUTPATIENT)
Dept: CARDIOLOGY | Facility: HOSPITAL | Age: 71
Discharge: HOME | End: 2021-03-31
Attending: INTERNAL MEDICINE
Payer: MEDICARE

## 2021-03-31 ENCOUNTER — OFFICE VISIT (OUTPATIENT)
Dept: ORTHOPEDICS | Facility: CLINIC | Age: 71
End: 2021-03-31
Payer: MEDICARE

## 2021-03-31 VITALS — TEMPERATURE: 97 F | HEIGHT: 63 IN | BODY MASS INDEX: 32.5 KG/M2 | WEIGHT: 183.44 LBS

## 2021-03-31 DIAGNOSIS — M79.662 PAIN IN BOTH LOWER LEGS: ICD-10-CM

## 2021-03-31 DIAGNOSIS — M25.562 CHRONIC PAIN OF BOTH KNEES: ICD-10-CM

## 2021-03-31 DIAGNOSIS — G89.29 CHRONIC PAIN OF BOTH KNEES: ICD-10-CM

## 2021-03-31 DIAGNOSIS — R60.0 EDEMA OF BOTH LOWER EXTREMITIES: ICD-10-CM

## 2021-03-31 DIAGNOSIS — M79.661 PAIN IN BOTH LOWER LEGS: ICD-10-CM

## 2021-03-31 DIAGNOSIS — M25.561 CHRONIC PAIN OF BOTH KNEES: ICD-10-CM

## 2021-03-31 DIAGNOSIS — M17.0 OSTEOARTHRITIS OF BOTH KNEES, UNSPECIFIED OSTEOARTHRITIS TYPE: Primary | ICD-10-CM

## 2021-03-31 LAB
LEFT ABI: 1.05
LEFT ARM BP: 142 MMHG
LEFT DORSALIS PEDIS: 144 MMHG
LEFT POSTERIOR TIBIAL: 152 MMHG
RIGHT ABI: 1.02
RIGHT ARM BP: 145 MMHG
RIGHT DORSALIS PEDIS: 148 MMHG
RIGHT POSTERIOR TIBIAL: 132 MMHG

## 2021-03-31 PROCEDURE — 99214 OFFICE O/P EST MOD 30 MIN: CPT | Mod: PBBFAC,25 | Performed by: PHYSICIAN ASSISTANT

## 2021-03-31 PROCEDURE — 99999 PR PBB SHADOW E&M-EST. PATIENT-LVL IV: CPT | Mod: PBBFAC,,, | Performed by: PHYSICIAN ASSISTANT

## 2021-03-31 PROCEDURE — 73564 X-RAY EXAM KNEE 4 OR MORE: CPT | Mod: TC,50

## 2021-03-31 PROCEDURE — 99213 PR OFFICE/OUTPT VISIT, EST, LEVL III, 20-29 MIN: ICD-10-PCS | Mod: S$PBB,,, | Performed by: PHYSICIAN ASSISTANT

## 2021-03-31 PROCEDURE — 99999 PR PBB SHADOW E&M-EST. PATIENT-LVL IV: ICD-10-PCS | Mod: PBBFAC,,, | Performed by: PHYSICIAN ASSISTANT

## 2021-03-31 PROCEDURE — 93922 UPR/L XTREMITY ART 2 LEVELS: CPT

## 2021-03-31 PROCEDURE — 73564 X-RAY EXAM KNEE 4 OR MORE: CPT | Mod: 26,LT,, | Performed by: RADIOLOGY

## 2021-03-31 PROCEDURE — 73564 XR KNEE ORTHO BILAT WITH FLEXION: ICD-10-PCS | Mod: 26,RT,, | Performed by: RADIOLOGY

## 2021-03-31 PROCEDURE — 73564 X-RAY EXAM KNEE 4 OR MORE: CPT | Mod: 26,RT,, | Performed by: RADIOLOGY

## 2021-03-31 PROCEDURE — 99213 OFFICE O/P EST LOW 20 MIN: CPT | Mod: S$PBB,,, | Performed by: PHYSICIAN ASSISTANT

## 2021-03-31 PROCEDURE — 93922 UPR/L XTREMITY ART 2 LEVELS: CPT | Mod: 26,,, | Performed by: INTERNAL MEDICINE

## 2021-03-31 PROCEDURE — 93922 ANKLE BRACHIAL INDICES (ABI): ICD-10-PCS | Mod: 26,,, | Performed by: INTERNAL MEDICINE

## 2021-04-01 ENCOUNTER — OFFICE VISIT (OUTPATIENT)
Dept: GASTROENTEROLOGY | Facility: CLINIC | Age: 71
End: 2021-04-01
Payer: MEDICARE

## 2021-04-01 VITALS
HEART RATE: 77 BPM | WEIGHT: 179.69 LBS | HEIGHT: 63 IN | SYSTOLIC BLOOD PRESSURE: 118 MMHG | BODY MASS INDEX: 31.84 KG/M2 | DIASTOLIC BLOOD PRESSURE: 72 MMHG

## 2021-04-01 DIAGNOSIS — K86.2 PANCREATIC CYST: ICD-10-CM

## 2021-04-01 DIAGNOSIS — K21.9 GASTROESOPHAGEAL REFLUX DISEASE: ICD-10-CM

## 2021-04-01 DIAGNOSIS — M79.89 LEG SWELLING: Primary | ICD-10-CM

## 2021-04-01 PROCEDURE — 99213 PR OFFICE/OUTPT VISIT, EST, LEVL III, 20-29 MIN: ICD-10-PCS | Mod: S$PBB,,, | Performed by: INTERNAL MEDICINE

## 2021-04-01 PROCEDURE — 99999 PR PBB SHADOW E&M-EST. PATIENT-LVL IV: ICD-10-PCS | Mod: PBBFAC,,, | Performed by: INTERNAL MEDICINE

## 2021-04-01 PROCEDURE — 99213 OFFICE O/P EST LOW 20 MIN: CPT | Mod: S$PBB,,, | Performed by: INTERNAL MEDICINE

## 2021-04-01 PROCEDURE — 99214 OFFICE O/P EST MOD 30 MIN: CPT | Mod: PBBFAC,PO | Performed by: INTERNAL MEDICINE

## 2021-04-01 PROCEDURE — 99999 PR PBB SHADOW E&M-EST. PATIENT-LVL IV: CPT | Mod: PBBFAC,,, | Performed by: INTERNAL MEDICINE

## 2021-04-01 RX ORDER — OMEPRAZOLE 40 MG/1
40 CAPSULE, DELAYED RELEASE ORAL
Qty: 180 CAPSULE | Refills: 1 | Status: SHIPPED | OUTPATIENT
Start: 2021-04-01 | End: 2021-09-22

## 2021-04-09 ENCOUNTER — TELEPHONE (OUTPATIENT)
Dept: ENDOSCOPY | Facility: HOSPITAL | Age: 71
End: 2021-04-09

## 2021-04-10 ENCOUNTER — PATIENT MESSAGE (OUTPATIENT)
Dept: HEMATOLOGY/ONCOLOGY | Facility: CLINIC | Age: 71
End: 2021-04-10

## 2021-04-12 ENCOUNTER — DOCUMENTATION ONLY (OUTPATIENT)
Dept: CARDIOLOGY | Facility: CLINIC | Age: 71
End: 2021-04-12

## 2021-04-13 ENCOUNTER — OFFICE VISIT (OUTPATIENT)
Dept: HEMATOLOGY/ONCOLOGY | Facility: CLINIC | Age: 71
End: 2021-04-13
Payer: MEDICARE

## 2021-04-13 DIAGNOSIS — E66.9 CLASS 1 OBESITY WITH BODY MASS INDEX (BMI) OF 34.0 TO 34.9 IN ADULT, UNSPECIFIED OBESITY TYPE, UNSPECIFIED WHETHER SERIOUS COMORBIDITY PRESENT: ICD-10-CM

## 2021-04-13 DIAGNOSIS — Z86.718 HISTORY OF DVT (DEEP VEIN THROMBOSIS): ICD-10-CM

## 2021-04-13 DIAGNOSIS — C50.911 INVASIVE LOBULAR CARCINOMA OF RIGHT BREAST IN FEMALE: Primary | ICD-10-CM

## 2021-04-13 DIAGNOSIS — E11.9 DIABETES MELLITUS TYPE 2 WITHOUT RETINOPATHY: ICD-10-CM

## 2021-04-13 DIAGNOSIS — M19.90 OSTEOARTHRITIS, UNSPECIFIED OSTEOARTHRITIS TYPE, UNSPECIFIED SITE: ICD-10-CM

## 2021-04-13 DIAGNOSIS — I10 ESSENTIAL HYPERTENSION: ICD-10-CM

## 2021-04-13 PROCEDURE — 99215 OFFICE O/P EST HI 40 MIN: CPT | Mod: 95,,, | Performed by: INTERNAL MEDICINE

## 2021-04-13 PROCEDURE — 99215 PR OFFICE/OUTPT VISIT, EST, LEVL V, 40-54 MIN: ICD-10-PCS | Mod: 95,,, | Performed by: INTERNAL MEDICINE

## 2021-04-13 RX ORDER — TAMOXIFEN CITRATE 20 MG/1
20 TABLET ORAL DAILY
Qty: 30 TABLET | Refills: 11 | Status: ON HOLD | OUTPATIENT
Start: 2021-04-13 | End: 2021-05-07 | Stop reason: HOSPADM

## 2021-04-14 ENCOUNTER — PATIENT MESSAGE (OUTPATIENT)
Dept: HEMATOLOGY/ONCOLOGY | Facility: CLINIC | Age: 71
End: 2021-04-14

## 2021-04-15 ENCOUNTER — PATIENT MESSAGE (OUTPATIENT)
Dept: HEMATOLOGY/ONCOLOGY | Facility: CLINIC | Age: 71
End: 2021-04-15

## 2021-04-15 ENCOUNTER — OFFICE VISIT (OUTPATIENT)
Dept: OPHTHALMOLOGY | Facility: CLINIC | Age: 71
End: 2021-04-15
Payer: MEDICARE

## 2021-04-15 DIAGNOSIS — C50.911 INVASIVE LOBULAR CARCINOMA OF RIGHT BREAST IN FEMALE: Primary | ICD-10-CM

## 2021-04-15 DIAGNOSIS — H25.813 COMBINED FORMS OF AGE-RELATED CATARACT, BILATERAL: Primary | ICD-10-CM

## 2021-04-15 DIAGNOSIS — H40.1131 PRIMARY OPEN ANGLE GLAUCOMA (POAG) OF BOTH EYES, MILD STAGE: ICD-10-CM

## 2021-04-15 DIAGNOSIS — H40.1131 PRIMARY OPEN-ANGLE GLAUCOMA, BILATERAL, MILD STAGE: ICD-10-CM

## 2021-04-15 PROCEDURE — 92136 OPHTHALMIC BIOMETRY: CPT | Mod: PBBFAC,PO,LT | Performed by: OPHTHALMOLOGY

## 2021-04-15 PROCEDURE — 99213 OFFICE O/P EST LOW 20 MIN: CPT | Mod: S$PBB,,, | Performed by: OPHTHALMOLOGY

## 2021-04-15 PROCEDURE — 99213 PR OFFICE/OUTPT VISIT, EST, LEVL III, 20-29 MIN: ICD-10-PCS | Mod: S$PBB,,, | Performed by: OPHTHALMOLOGY

## 2021-04-15 PROCEDURE — 99213 OFFICE O/P EST LOW 20 MIN: CPT | Mod: PBBFAC,PO,25 | Performed by: OPHTHALMOLOGY

## 2021-04-15 PROCEDURE — 92136 IOL MASTER - OS - LEFT EYE: ICD-10-PCS | Mod: 26,S$PBB,LT, | Performed by: OPHTHALMOLOGY

## 2021-04-15 PROCEDURE — 99999 PR PBB SHADOW E&M-EST. PATIENT-LVL III: ICD-10-PCS | Mod: PBBFAC,,, | Performed by: OPHTHALMOLOGY

## 2021-04-15 PROCEDURE — 99999 PR PBB SHADOW E&M-EST. PATIENT-LVL III: CPT | Mod: PBBFAC,,, | Performed by: OPHTHALMOLOGY

## 2021-04-15 RX ORDER — BIMATOPROST 0.1 MG/ML
1 SOLUTION/ DROPS OPHTHALMIC NIGHTLY
Qty: 3 BOTTLE | Refills: 4 | Status: SHIPPED | OUTPATIENT
Start: 2021-04-15 | End: 2021-11-30 | Stop reason: ALTCHOICE

## 2021-04-16 ENCOUNTER — PATIENT MESSAGE (OUTPATIENT)
Dept: OPHTHALMOLOGY | Facility: CLINIC | Age: 71
End: 2021-04-16

## 2021-04-19 ENCOUNTER — PATIENT MESSAGE (OUTPATIENT)
Dept: GASTROENTEROLOGY | Facility: CLINIC | Age: 71
End: 2021-04-19

## 2021-04-26 ENCOUNTER — PATIENT MESSAGE (OUTPATIENT)
Dept: HEMATOLOGY/ONCOLOGY | Facility: CLINIC | Age: 71
End: 2021-04-26

## 2021-04-30 ENCOUNTER — OFFICE VISIT (OUTPATIENT)
Dept: CARDIOLOGY | Facility: CLINIC | Age: 71
End: 2021-04-30
Payer: MEDICARE

## 2021-04-30 VITALS
HEART RATE: 92 BPM | DIASTOLIC BLOOD PRESSURE: 65 MMHG | SYSTOLIC BLOOD PRESSURE: 137 MMHG | HEIGHT: 63 IN | BODY MASS INDEX: 31.61 KG/M2 | WEIGHT: 178.38 LBS

## 2021-04-30 DIAGNOSIS — L03.115 CELLULITIS OF RIGHT LEG: ICD-10-CM

## 2021-04-30 DIAGNOSIS — E78.2 MIXED HYPERLIPIDEMIA: ICD-10-CM

## 2021-04-30 DIAGNOSIS — R60.0 EDEMA OF BOTH LOWER EXTREMITIES: ICD-10-CM

## 2021-04-30 DIAGNOSIS — I82.501 LEG DVT (DEEP VENOUS THROMBOEMBOLISM), CHRONIC, RIGHT: ICD-10-CM

## 2021-04-30 DIAGNOSIS — R60.0 LOCALIZED EDEMA: ICD-10-CM

## 2021-04-30 DIAGNOSIS — E66.01 SEVERE OBESITY (BMI 35.0-35.9 WITH COMORBIDITY): ICD-10-CM

## 2021-04-30 DIAGNOSIS — I10 ESSENTIAL HYPERTENSION: ICD-10-CM

## 2021-04-30 DIAGNOSIS — I82.4Y1 ACUTE DEEP VEIN THROMBOSIS (DVT) OF PROXIMAL VEIN OF RIGHT LOWER EXTREMITY: Primary | ICD-10-CM

## 2021-04-30 DIAGNOSIS — I50.30 HEART FAILURE WITH PRESERVED EJECTION FRACTION, UNSPECIFIED HF CHRONICITY: ICD-10-CM

## 2021-04-30 DIAGNOSIS — Z86.718 HISTORY OF DVT (DEEP VEIN THROMBOSIS): ICD-10-CM

## 2021-04-30 PROCEDURE — 99214 OFFICE O/P EST MOD 30 MIN: CPT | Mod: PBBFAC | Performed by: INTERNAL MEDICINE

## 2021-04-30 PROCEDURE — 99999 PR PBB SHADOW E&M-EST. PATIENT-LVL IV: ICD-10-PCS | Mod: PBBFAC,,, | Performed by: INTERNAL MEDICINE

## 2021-04-30 PROCEDURE — 99215 PR OFFICE/OUTPT VISIT, EST, LEVL V, 40-54 MIN: ICD-10-PCS | Mod: S$PBB,,, | Performed by: INTERNAL MEDICINE

## 2021-04-30 PROCEDURE — 99999 PR PBB SHADOW E&M-EST. PATIENT-LVL IV: CPT | Mod: PBBFAC,,, | Performed by: INTERNAL MEDICINE

## 2021-04-30 PROCEDURE — 99215 OFFICE O/P EST HI 40 MIN: CPT | Mod: S$PBB,,, | Performed by: INTERNAL MEDICINE

## 2021-04-30 RX ORDER — DOXYCYCLINE HYCLATE 100 MG
100 TABLET ORAL 2 TIMES DAILY
Qty: 28 TABLET | Refills: 1 | Status: ON HOLD | OUTPATIENT
Start: 2021-04-30 | End: 2021-05-07 | Stop reason: HOSPADM

## 2021-04-30 RX ORDER — BUMETANIDE 0.5 MG/1
0.5 TABLET ORAL 2 TIMES DAILY
Qty: 60 TABLET | Refills: 11 | Status: SHIPPED | OUTPATIENT
Start: 2021-04-30 | End: 2021-11-15

## 2021-05-03 ENCOUNTER — PATIENT MESSAGE (OUTPATIENT)
Dept: CARDIOLOGY | Facility: CLINIC | Age: 71
End: 2021-05-03

## 2021-05-03 ENCOUNTER — PATIENT MESSAGE (OUTPATIENT)
Dept: HEMATOLOGY/ONCOLOGY | Facility: CLINIC | Age: 71
End: 2021-05-03

## 2021-05-03 PROBLEM — Z71.89 ADVANCE CARE PLANNING: Status: ACTIVE | Noted: 2021-05-03

## 2021-05-03 PROBLEM — G93.89 BRAIN MASS: Status: ACTIVE | Noted: 2021-05-03

## 2021-05-03 PROBLEM — R60.0 BILATERAL LOWER EXTREMITY EDEMA: Status: ACTIVE | Noted: 2021-05-03

## 2021-05-03 PROBLEM — C50.919 BREAST CANCER: Status: ACTIVE | Noted: 2021-05-03

## 2021-05-03 PROBLEM — R56.9 FOCAL SEIZURE: Status: ACTIVE | Noted: 2021-05-03

## 2021-05-03 PROBLEM — I82.401 DEEP VEIN THROMBOSIS (DVT) OF RIGHT LOWER EXTREMITY: Status: ACTIVE | Noted: 2021-05-03

## 2021-05-05 PROBLEM — R53.1 RIGHT SIDED WEAKNESS: Status: ACTIVE | Noted: 2021-05-05

## 2021-05-05 PROBLEM — R51.9 HEADACHE: Status: ACTIVE | Noted: 2021-05-05

## 2021-05-06 DIAGNOSIS — D32.9 MENINGIOMA: Primary | ICD-10-CM

## 2021-05-07 ENCOUNTER — TELEPHONE (OUTPATIENT)
Dept: NEUROLOGY | Facility: CLINIC | Age: 71
End: 2021-05-07

## 2021-05-10 ENCOUNTER — PATIENT MESSAGE (OUTPATIENT)
Dept: HEMATOLOGY/ONCOLOGY | Facility: CLINIC | Age: 71
End: 2021-05-10

## 2021-05-11 ENCOUNTER — PATIENT MESSAGE (OUTPATIENT)
Dept: CARDIOLOGY | Facility: CLINIC | Age: 71
End: 2021-05-11

## 2021-05-11 ENCOUNTER — TELEPHONE (OUTPATIENT)
Dept: NEUROSURGERY | Facility: CLINIC | Age: 71
End: 2021-05-11

## 2021-05-11 ENCOUNTER — PATIENT MESSAGE (OUTPATIENT)
Dept: HEMATOLOGY/ONCOLOGY | Facility: CLINIC | Age: 71
End: 2021-05-11

## 2021-05-11 ENCOUNTER — TELEPHONE (OUTPATIENT)
Dept: NEUROLOGY | Facility: CLINIC | Age: 71
End: 2021-05-11

## 2021-05-12 ENCOUNTER — PATIENT MESSAGE (OUTPATIENT)
Dept: OPHTHALMOLOGY | Facility: CLINIC | Age: 71
End: 2021-05-12

## 2021-05-12 ENCOUNTER — PATIENT MESSAGE (OUTPATIENT)
Dept: CARDIOLOGY | Facility: CLINIC | Age: 71
End: 2021-05-12

## 2021-05-14 ENCOUNTER — CLINICAL SUPPORT (OUTPATIENT)
Dept: NEUROSURGERY | Facility: CLINIC | Age: 71
End: 2021-05-14
Payer: MEDICARE

## 2021-05-14 PROCEDURE — 99999 PR PBB SHADOW E&M-EST. PATIENT-LVL I: ICD-10-PCS | Mod: PBBFAC,,,

## 2021-05-14 PROCEDURE — 99999 PR PBB SHADOW E&M-EST. PATIENT-LVL I: CPT | Mod: PBBFAC,,,

## 2021-05-14 PROCEDURE — 99211 OFF/OP EST MAY X REQ PHY/QHP: CPT | Mod: PBBFAC,PN

## 2021-05-17 ENCOUNTER — PATIENT MESSAGE (OUTPATIENT)
Dept: HEMATOLOGY/ONCOLOGY | Facility: CLINIC | Age: 71
End: 2021-05-17

## 2021-05-18 ENCOUNTER — PATIENT MESSAGE (OUTPATIENT)
Dept: HEMATOLOGY/ONCOLOGY | Facility: CLINIC | Age: 71
End: 2021-05-18

## 2021-05-18 ENCOUNTER — OFFICE VISIT (OUTPATIENT)
Dept: HEMATOLOGY/ONCOLOGY | Facility: CLINIC | Age: 71
End: 2021-05-18
Payer: MEDICARE

## 2021-05-18 DIAGNOSIS — M25.50 ARTHRALGIA, UNSPECIFIED JOINT: ICD-10-CM

## 2021-05-18 DIAGNOSIS — T38.6X5A ADVERSE EFFECT OF TAMOXIFEN, INITIAL ENCOUNTER: ICD-10-CM

## 2021-05-18 DIAGNOSIS — Z86.718 HISTORY OF DVT (DEEP VEIN THROMBOSIS): ICD-10-CM

## 2021-05-18 DIAGNOSIS — I10 ESSENTIAL HYPERTENSION: ICD-10-CM

## 2021-05-18 DIAGNOSIS — R26.9 ABNORMAL GAIT: ICD-10-CM

## 2021-05-18 DIAGNOSIS — C50.911 INVASIVE LOBULAR CARCINOMA OF RIGHT BREAST IN FEMALE: Primary | ICD-10-CM

## 2021-05-18 DIAGNOSIS — D32.9 MENINGIOMA: ICD-10-CM

## 2021-05-18 DIAGNOSIS — Z98.890 STATUS POST CRANIOTOMY: ICD-10-CM

## 2021-05-18 DIAGNOSIS — R25.1 TREMOR: ICD-10-CM

## 2021-05-18 DIAGNOSIS — Z79.01 ANTICOAGULATED: ICD-10-CM

## 2021-05-18 PROCEDURE — 99214 OFFICE O/P EST MOD 30 MIN: CPT | Mod: 95,,, | Performed by: INTERNAL MEDICINE

## 2021-05-18 PROCEDURE — 99214 PR OFFICE/OUTPT VISIT, EST, LEVL IV, 30-39 MIN: ICD-10-PCS | Mod: 95,,, | Performed by: INTERNAL MEDICINE

## 2021-05-20 ENCOUNTER — PATIENT MESSAGE (OUTPATIENT)
Dept: HEMATOLOGY/ONCOLOGY | Facility: CLINIC | Age: 71
End: 2021-05-20

## 2021-05-21 ENCOUNTER — PATIENT MESSAGE (OUTPATIENT)
Dept: ENDOCRINOLOGY | Facility: CLINIC | Age: 71
End: 2021-05-21

## 2021-05-22 ENCOUNTER — PATIENT MESSAGE (OUTPATIENT)
Dept: ADMINISTRATIVE | Facility: OTHER | Age: 71
End: 2021-05-22

## 2021-05-24 ENCOUNTER — PATIENT MESSAGE (OUTPATIENT)
Dept: FAMILY MEDICINE | Facility: CLINIC | Age: 71
End: 2021-05-24

## 2021-05-24 ENCOUNTER — PATIENT MESSAGE (OUTPATIENT)
Dept: ENDOCRINOLOGY | Facility: CLINIC | Age: 71
End: 2021-05-24

## 2021-05-24 ENCOUNTER — PATIENT MESSAGE (OUTPATIENT)
Dept: NEUROSURGERY | Facility: CLINIC | Age: 71
End: 2021-05-24

## 2021-05-24 DIAGNOSIS — I10 ESSENTIAL HYPERTENSION: ICD-10-CM

## 2021-05-24 RX ORDER — LISINOPRIL 40 MG/1
40 TABLET ORAL DAILY
Qty: 90 TABLET | Refills: 0 | Status: SHIPPED | OUTPATIENT
Start: 2021-05-24 | End: 2021-05-25 | Stop reason: SDUPTHER

## 2021-05-25 DIAGNOSIS — E11.65 TYPE 2 DIABETES MELLITUS WITH HYPERGLYCEMIA, WITHOUT LONG-TERM CURRENT USE OF INSULIN: ICD-10-CM

## 2021-05-25 DIAGNOSIS — I10 ESSENTIAL HYPERTENSION: ICD-10-CM

## 2021-05-25 RX ORDER — METFORMIN HYDROCHLORIDE 500 MG/1
500 TABLET ORAL
Qty: 90 TABLET | Refills: 0 | Status: SHIPPED | OUTPATIENT
Start: 2021-05-25 | End: 2021-09-20 | Stop reason: SDUPTHER

## 2021-05-25 RX ORDER — LISINOPRIL 40 MG/1
40 TABLET ORAL DAILY
Qty: 90 TABLET | Refills: 0 | Status: SHIPPED | OUTPATIENT
Start: 2021-05-25 | End: 2021-10-27

## 2021-05-25 RX ORDER — METFORMIN HYDROCHLORIDE 500 MG/1
500 TABLET ORAL
Qty: 90 TABLET | Refills: 0
Start: 2021-05-25 | End: 2021-05-25 | Stop reason: SDUPTHER

## 2021-05-26 ENCOUNTER — PATIENT MESSAGE (OUTPATIENT)
Dept: NEUROSURGERY | Facility: CLINIC | Age: 71
End: 2021-05-26

## 2021-05-27 RX ORDER — LEVETIRACETAM 500 MG/1
1000 TABLET ORAL 2 TIMES DAILY
Qty: 120 TABLET | Refills: 1 | Status: SHIPPED | OUTPATIENT
Start: 2021-05-27 | End: 2022-04-21

## 2021-06-02 ENCOUNTER — HOSPITAL ENCOUNTER (OUTPATIENT)
Dept: RADIOLOGY | Facility: HOSPITAL | Age: 71
Discharge: HOME | End: 2021-06-02
Attending: NEUROLOGICAL SURGERY
Payer: MEDICARE

## 2021-06-02 ENCOUNTER — OFFICE VISIT (OUTPATIENT)
Dept: NEUROSURGERY | Facility: CLINIC | Age: 71
End: 2021-06-02
Payer: MEDICARE

## 2021-06-02 ENCOUNTER — TELEPHONE (OUTPATIENT)
Dept: NEUROSURGERY | Facility: CLINIC | Age: 71
End: 2021-06-02

## 2021-06-02 VITALS
DIASTOLIC BLOOD PRESSURE: 78 MMHG | HEIGHT: 62 IN | SYSTOLIC BLOOD PRESSURE: 124 MMHG | WEIGHT: 171.5 LBS | BODY MASS INDEX: 31.56 KG/M2 | HEART RATE: 97 BPM

## 2021-06-02 DIAGNOSIS — D32.9 MENINGIOMA: Primary | ICD-10-CM

## 2021-06-02 DIAGNOSIS — D32.9 MENINGIOMA: ICD-10-CM

## 2021-06-02 DIAGNOSIS — Z98.890 S/P CRANIOTOMY: ICD-10-CM

## 2021-06-02 PROCEDURE — 99215 OFFICE O/P EST HI 40 MIN: CPT | Mod: PBBFAC,25,PN | Performed by: NEUROLOGICAL SURGERY

## 2021-06-02 PROCEDURE — 70450 CT HEAD WITHOUT CONTRAST: ICD-10-PCS | Mod: 26,,, | Performed by: RADIOLOGY

## 2021-06-02 PROCEDURE — 99024 POSTOP FOLLOW-UP VISIT: CPT | Mod: POP,,, | Performed by: NEUROLOGICAL SURGERY

## 2021-06-02 PROCEDURE — 99024 PR POST-OP FOLLOW-UP VISIT: ICD-10-PCS | Mod: POP,,, | Performed by: NEUROLOGICAL SURGERY

## 2021-06-02 PROCEDURE — 70450 CT HEAD/BRAIN W/O DYE: CPT | Mod: TC,PO

## 2021-06-02 PROCEDURE — 99999 PR PBB SHADOW E&M-EST. PATIENT-LVL V: CPT | Mod: PBBFAC,,, | Performed by: NEUROLOGICAL SURGERY

## 2021-06-02 PROCEDURE — 99999 PR PBB SHADOW E&M-EST. PATIENT-LVL V: ICD-10-PCS | Mod: PBBFAC,,, | Performed by: NEUROLOGICAL SURGERY

## 2021-06-02 PROCEDURE — 70450 CT HEAD/BRAIN W/O DYE: CPT | Mod: 26,,, | Performed by: RADIOLOGY

## 2021-06-03 ENCOUNTER — TELEPHONE (OUTPATIENT)
Dept: NEUROSURGERY | Facility: CLINIC | Age: 71
End: 2021-06-03

## 2021-06-14 ENCOUNTER — OFFICE VISIT (OUTPATIENT)
Dept: RADIATION ONCOLOGY | Facility: CLINIC | Age: 71
End: 2021-06-14
Payer: MEDICARE

## 2021-06-14 VITALS
HEART RATE: 60 BPM | DIASTOLIC BLOOD PRESSURE: 60 MMHG | WEIGHT: 80.38 LBS | TEMPERATURE: 98 F | BODY MASS INDEX: 14.71 KG/M2 | RESPIRATION RATE: 18 BRPM | SYSTOLIC BLOOD PRESSURE: 140 MMHG

## 2021-06-14 DIAGNOSIS — D42.0 ATYPICAL INTRACRANIAL MENINGIOMA: ICD-10-CM

## 2021-06-14 PROCEDURE — 99204 PR OFFICE/OUTPT VISIT, NEW, LEVL IV, 45-59 MIN: ICD-10-PCS | Mod: ,,, | Performed by: RADIOLOGY

## 2021-06-14 PROCEDURE — 99214 OFFICE O/P EST MOD 30 MIN: CPT | Mod: PN | Performed by: RADIOLOGY

## 2021-06-14 PROCEDURE — 99204 OFFICE O/P NEW MOD 45 MIN: CPT | Mod: ,,, | Performed by: RADIOLOGY

## 2021-06-17 ENCOUNTER — TELEPHONE (OUTPATIENT)
Dept: ENDOCRINOLOGY | Facility: CLINIC | Age: 71
End: 2021-06-17

## 2021-06-21 ENCOUNTER — PATIENT MESSAGE (OUTPATIENT)
Dept: PLASTIC SURGERY | Facility: CLINIC | Age: 71
End: 2021-06-21

## 2021-06-22 ENCOUNTER — PATIENT MESSAGE (OUTPATIENT)
Dept: PLASTIC SURGERY | Facility: CLINIC | Age: 71
End: 2021-06-22

## 2021-06-22 ENCOUNTER — PATIENT MESSAGE (OUTPATIENT)
Dept: HEMATOLOGY/ONCOLOGY | Facility: CLINIC | Age: 71
End: 2021-06-22

## 2021-06-22 ENCOUNTER — TELEPHONE (OUTPATIENT)
Dept: HEMATOLOGY/ONCOLOGY | Facility: CLINIC | Age: 71
End: 2021-06-22

## 2021-06-22 ENCOUNTER — OFFICE VISIT (OUTPATIENT)
Dept: HEMATOLOGY/ONCOLOGY | Facility: CLINIC | Age: 71
End: 2021-06-22
Payer: MEDICARE

## 2021-06-22 DIAGNOSIS — T38.6X5D ADVERSE EFFECT OF TAMOXIFEN, SUBSEQUENT ENCOUNTER: ICD-10-CM

## 2021-06-22 DIAGNOSIS — Z86.718 HISTORY OF DVT (DEEP VEIN THROMBOSIS): ICD-10-CM

## 2021-06-22 DIAGNOSIS — Z79.01 ANTICOAGULATED: ICD-10-CM

## 2021-06-22 DIAGNOSIS — C50.911 INVASIVE LOBULAR CARCINOMA OF RIGHT BREAST IN FEMALE: Primary | ICD-10-CM

## 2021-06-22 DIAGNOSIS — D32.9 MENINGIOMA: ICD-10-CM

## 2021-06-22 DIAGNOSIS — I10 ESSENTIAL HYPERTENSION: ICD-10-CM

## 2021-06-22 DIAGNOSIS — M25.50 ARTHRALGIA, UNSPECIFIED JOINT: ICD-10-CM

## 2021-06-22 DIAGNOSIS — R26.9 ABNORMAL GAIT: ICD-10-CM

## 2021-06-22 PROCEDURE — 99214 PR OFFICE/OUTPT VISIT, EST, LEVL IV, 30-39 MIN: ICD-10-PCS | Mod: 95,,, | Performed by: INTERNAL MEDICINE

## 2021-06-22 PROCEDURE — 99214 OFFICE O/P EST MOD 30 MIN: CPT | Mod: 95,,, | Performed by: INTERNAL MEDICINE

## 2021-06-23 ENCOUNTER — DOCUMENTATION ONLY (OUTPATIENT)
Dept: INFUSION THERAPY | Facility: HOSPITAL | Age: 71
End: 2021-06-23

## 2021-06-23 DIAGNOSIS — C50.911 INVASIVE LOBULAR CARCINOMA OF RIGHT BREAST IN FEMALE: Primary | ICD-10-CM

## 2021-06-25 ENCOUNTER — HOSPITAL ENCOUNTER (OUTPATIENT)
Dept: CARDIOLOGY | Facility: HOSPITAL | Age: 71
Discharge: HOME | End: 2021-06-25
Attending: INTERNAL MEDICINE
Payer: MEDICARE

## 2021-06-25 DIAGNOSIS — R60.0 LOCALIZED EDEMA: ICD-10-CM

## 2021-06-25 DIAGNOSIS — I82.4Y1 ACUTE DEEP VEIN THROMBOSIS (DVT) OF PROXIMAL VEIN OF RIGHT LOWER EXTREMITY: ICD-10-CM

## 2021-06-25 PROCEDURE — 93970 EXTREMITY STUDY: CPT | Mod: 50

## 2021-06-25 PROCEDURE — 93970 EXTREMITY STUDY: CPT | Mod: 26,,, | Performed by: INTERNAL MEDICINE

## 2021-06-25 PROCEDURE — 93970 CV US DOPPLER VENOUS LEGS BILATERAL (CUPID ONLY): ICD-10-PCS | Mod: 26,,, | Performed by: INTERNAL MEDICINE

## 2021-07-01 ENCOUNTER — OFFICE VISIT (OUTPATIENT)
Dept: SURGERY | Facility: CLINIC | Age: 71
End: 2021-07-01
Payer: MEDICARE

## 2021-07-01 VITALS
WEIGHT: 182 LBS | DIASTOLIC BLOOD PRESSURE: 65 MMHG | SYSTOLIC BLOOD PRESSURE: 126 MMHG | HEIGHT: 62 IN | BODY MASS INDEX: 33.49 KG/M2 | HEART RATE: 80 BPM

## 2021-07-01 DIAGNOSIS — Z85.3 PERSONAL HISTORY OF BREAST CANCER: Primary | ICD-10-CM

## 2021-07-01 PROCEDURE — 99213 OFFICE O/P EST LOW 20 MIN: CPT | Mod: S$PBB,,, | Performed by: SURGERY

## 2021-07-01 PROCEDURE — 99999 PR PBB SHADOW E&M-EST. PATIENT-LVL IV: ICD-10-PCS | Mod: PBBFAC,,, | Performed by: SURGERY

## 2021-07-01 PROCEDURE — 99213 PR OFFICE/OUTPT VISIT, EST, LEVL III, 20-29 MIN: ICD-10-PCS | Mod: S$PBB,,, | Performed by: SURGERY

## 2021-07-01 PROCEDURE — 99999 PR PBB SHADOW E&M-EST. PATIENT-LVL IV: CPT | Mod: PBBFAC,,, | Performed by: SURGERY

## 2021-07-01 PROCEDURE — 99214 OFFICE O/P EST MOD 30 MIN: CPT | Mod: PBBFAC | Performed by: SURGERY

## 2021-07-09 ENCOUNTER — OFFICE VISIT (OUTPATIENT)
Dept: CARDIOLOGY | Facility: CLINIC | Age: 71
End: 2021-07-09
Payer: MEDICARE

## 2021-07-09 VITALS
BODY MASS INDEX: 32.19 KG/M2 | WEIGHT: 181.69 LBS | HEIGHT: 63 IN | DIASTOLIC BLOOD PRESSURE: 71 MMHG | HEART RATE: 86 BPM | SYSTOLIC BLOOD PRESSURE: 139 MMHG

## 2021-07-09 DIAGNOSIS — M79.662 PAIN IN BOTH LOWER LEGS: ICD-10-CM

## 2021-07-09 DIAGNOSIS — R60.0 BILATERAL LOWER EXTREMITY EDEMA: ICD-10-CM

## 2021-07-09 DIAGNOSIS — M79.661 PAIN IN BOTH LOWER LEGS: ICD-10-CM

## 2021-07-09 DIAGNOSIS — N18.30 STAGE 3 CHRONIC KIDNEY DISEASE, UNSPECIFIED WHETHER STAGE 3A OR 3B CKD: ICD-10-CM

## 2021-07-09 DIAGNOSIS — I87.2 VENOUS INSUFFICIENCY OF RIGHT LOWER EXTREMITY: ICD-10-CM

## 2021-07-09 DIAGNOSIS — C50.911 INVASIVE LOBULAR CARCINOMA OF RIGHT BREAST IN FEMALE: ICD-10-CM

## 2021-07-09 DIAGNOSIS — I10 ESSENTIAL HYPERTENSION: ICD-10-CM

## 2021-07-09 DIAGNOSIS — E66.9 OBESITY, CLASS I, BMI 30-34.9: ICD-10-CM

## 2021-07-09 DIAGNOSIS — M25.561 CHRONIC PAIN OF BOTH KNEES: ICD-10-CM

## 2021-07-09 DIAGNOSIS — C50.011 MALIGNANT NEOPLASM OF NIPPLE OF RIGHT BREAST IN FEMALE, UNSPECIFIED ESTROGEN RECEPTOR STATUS: ICD-10-CM

## 2021-07-09 DIAGNOSIS — I82.4Y1 ACUTE DEEP VEIN THROMBOSIS (DVT) OF PROXIMAL VEIN OF RIGHT LOWER EXTREMITY: ICD-10-CM

## 2021-07-09 DIAGNOSIS — G89.29 CHRONIC PAIN OF BOTH KNEES: ICD-10-CM

## 2021-07-09 DIAGNOSIS — Z86.718 HISTORY OF DVT (DEEP VEIN THROMBOSIS): ICD-10-CM

## 2021-07-09 DIAGNOSIS — I82.401 ACUTE DEEP VEIN THROMBOSIS (DVT) OF RIGHT LOWER EXTREMITY, UNSPECIFIED VEIN: ICD-10-CM

## 2021-07-09 DIAGNOSIS — M25.562 CHRONIC PAIN OF BOTH KNEES: ICD-10-CM

## 2021-07-09 DIAGNOSIS — Z17.0 MALIGNANT NEOPLASM OF CENTRAL PORTION OF RIGHT BREAST IN FEMALE, ESTROGEN RECEPTOR POSITIVE: ICD-10-CM

## 2021-07-09 DIAGNOSIS — C50.111 MALIGNANT NEOPLASM OF CENTRAL PORTION OF RIGHT BREAST IN FEMALE, ESTROGEN RECEPTOR POSITIVE: ICD-10-CM

## 2021-07-09 DIAGNOSIS — I82.501 LEG DVT (DEEP VENOUS THROMBOEMBOLISM), CHRONIC, RIGHT: ICD-10-CM

## 2021-07-09 DIAGNOSIS — G47.33 OSA ON CPAP: ICD-10-CM

## 2021-07-09 DIAGNOSIS — E78.2 MIXED HYPERLIPIDEMIA: Primary | ICD-10-CM

## 2021-07-09 DIAGNOSIS — E11.00 TYPE 2 DIABETES MELLITUS WITH HYPEROSMOLARITY WITHOUT COMA, WITHOUT LONG-TERM CURRENT USE OF INSULIN: ICD-10-CM

## 2021-07-09 PROCEDURE — 99999 PR PBB SHADOW E&M-EST. PATIENT-LVL IV: CPT | Mod: PBBFAC,,, | Performed by: INTERNAL MEDICINE

## 2021-07-09 PROCEDURE — 99215 OFFICE O/P EST HI 40 MIN: CPT | Mod: S$PBB,,, | Performed by: INTERNAL MEDICINE

## 2021-07-09 PROCEDURE — 99999 PR PBB SHADOW E&M-EST. PATIENT-LVL IV: ICD-10-PCS | Mod: PBBFAC,,, | Performed by: INTERNAL MEDICINE

## 2021-07-09 PROCEDURE — 99215 PR OFFICE/OUTPT VISIT, EST, LEVL V, 40-54 MIN: ICD-10-PCS | Mod: S$PBB,,, | Performed by: INTERNAL MEDICINE

## 2021-07-09 PROCEDURE — 99214 OFFICE O/P EST MOD 30 MIN: CPT | Mod: PBBFAC | Performed by: INTERNAL MEDICINE

## 2021-07-09 RX ORDER — DOXYCYCLINE HYCLATE 100 MG
100 TABLET ORAL 2 TIMES DAILY
Qty: 28 TABLET | Refills: 1 | Status: SHIPPED | OUTPATIENT
Start: 2021-07-09 | End: 2021-07-23

## 2021-07-14 ENCOUNTER — TELEPHONE (OUTPATIENT)
Dept: ENDOCRINOLOGY | Facility: CLINIC | Age: 71
End: 2021-07-14

## 2021-07-14 ENCOUNTER — PATIENT MESSAGE (OUTPATIENT)
Dept: CARDIOLOGY | Facility: CLINIC | Age: 71
End: 2021-07-14

## 2021-07-15 ENCOUNTER — TELEPHONE (OUTPATIENT)
Dept: NEUROSURGERY | Facility: CLINIC | Age: 71
End: 2021-07-15

## 2021-07-15 ENCOUNTER — PATIENT MESSAGE (OUTPATIENT)
Dept: CARDIOLOGY | Facility: CLINIC | Age: 71
End: 2021-07-15

## 2021-07-15 DIAGNOSIS — E11.51 TYPE 2 DIABETES MELLITUS WITH DIABETIC PERIPHERAL ANGIOPATHY WITHOUT GANGRENE, WITHOUT LONG-TERM CURRENT USE OF INSULIN: Primary | ICD-10-CM

## 2021-07-15 RX ORDER — DIAZEPAM 5 MG/1
5 TABLET ORAL ONCE
Qty: 1 TABLET | Refills: 0 | Status: SHIPPED | OUTPATIENT
Start: 2021-07-15 | End: 2021-09-09 | Stop reason: ALTCHOICE

## 2021-07-16 ENCOUNTER — TELEPHONE (OUTPATIENT)
Dept: INTERNAL MEDICINE | Facility: CLINIC | Age: 71
End: 2021-07-16

## 2021-07-23 ENCOUNTER — OFFICE VISIT (OUTPATIENT)
Dept: PLASTIC SURGERY | Facility: CLINIC | Age: 71
End: 2021-07-23
Payer: MEDICARE

## 2021-07-23 VITALS — HEART RATE: 108 BPM | DIASTOLIC BLOOD PRESSURE: 78 MMHG | SYSTOLIC BLOOD PRESSURE: 124 MMHG

## 2021-07-23 DIAGNOSIS — Z09 SURGERY FOLLOW-UP EXAMINATION: Primary | ICD-10-CM

## 2021-07-23 PROCEDURE — 99024 PR POST-OP FOLLOW-UP VISIT: ICD-10-PCS | Mod: POP,,, | Performed by: SURGERY

## 2021-07-23 PROCEDURE — 99024 POSTOP FOLLOW-UP VISIT: CPT | Mod: POP,,, | Performed by: SURGERY

## 2021-07-23 PROCEDURE — 99999 PR PBB SHADOW E&M-EST. PATIENT-LVL III: ICD-10-PCS | Mod: PBBFAC,,, | Performed by: SURGERY

## 2021-07-23 PROCEDURE — 99999 PR PBB SHADOW E&M-EST. PATIENT-LVL III: CPT | Mod: PBBFAC,,, | Performed by: SURGERY

## 2021-07-23 PROCEDURE — 99213 OFFICE O/P EST LOW 20 MIN: CPT | Mod: PBBFAC,PO | Performed by: SURGERY

## 2021-08-02 ENCOUNTER — PATIENT OUTREACH (OUTPATIENT)
Dept: ADMINISTRATIVE | Facility: OTHER | Age: 71
End: 2021-08-02

## 2021-08-02 ENCOUNTER — CLINICAL SUPPORT (OUTPATIENT)
Dept: OPHTHALMOLOGY | Facility: CLINIC | Age: 71
End: 2021-08-02
Payer: MEDICARE

## 2021-08-02 ENCOUNTER — OFFICE VISIT (OUTPATIENT)
Dept: OPHTHALMOLOGY | Facility: CLINIC | Age: 71
End: 2021-08-02
Payer: MEDICARE

## 2021-08-02 DIAGNOSIS — H40.1131 PRIMARY OPEN-ANGLE GLAUCOMA, BILATERAL, MILD STAGE: Primary | ICD-10-CM

## 2021-08-02 DIAGNOSIS — H25.813 COMBINED FORMS OF AGE-RELATED CATARACT, BILATERAL: ICD-10-CM

## 2021-08-02 PROCEDURE — 92015 DETERMINE REFRACTIVE STATE: CPT | Mod: ,,, | Performed by: OPHTHALMOLOGY

## 2021-08-02 PROCEDURE — 92083 EXTENDED VISUAL FIELD XM: CPT | Mod: PBBFAC,PO | Performed by: OPHTHALMOLOGY

## 2021-08-02 PROCEDURE — 99213 OFFICE O/P EST LOW 20 MIN: CPT | Mod: S$PBB,,, | Performed by: OPHTHALMOLOGY

## 2021-08-02 PROCEDURE — 99213 OFFICE O/P EST LOW 20 MIN: CPT | Mod: PBBFAC,PO | Performed by: OPHTHALMOLOGY

## 2021-08-02 PROCEDURE — 99213 PR OFFICE/OUTPT VISIT, EST, LEVL III, 20-29 MIN: ICD-10-PCS | Mod: S$PBB,,, | Performed by: OPHTHALMOLOGY

## 2021-08-02 PROCEDURE — 92083 HUMPHREY VISUAL FIELD - OU - BOTH EYES: ICD-10-PCS | Mod: 26,S$PBB,, | Performed by: OPHTHALMOLOGY

## 2021-08-02 PROCEDURE — 99999 PR PBB SHADOW E&M-EST. PATIENT-LVL III: CPT | Mod: PBBFAC,,, | Performed by: OPHTHALMOLOGY

## 2021-08-02 PROCEDURE — 99999 PR PBB SHADOW E&M-EST. PATIENT-LVL III: ICD-10-PCS | Mod: PBBFAC,,, | Performed by: OPHTHALMOLOGY

## 2021-08-02 PROCEDURE — 92015 PR REFRACTION: ICD-10-PCS | Mod: ,,, | Performed by: OPHTHALMOLOGY

## 2021-08-11 ENCOUNTER — OFFICE VISIT (OUTPATIENT)
Dept: PODIATRY | Facility: CLINIC | Age: 71
End: 2021-08-11
Payer: MEDICARE

## 2021-08-11 VITALS — HEIGHT: 63 IN | WEIGHT: 181.69 LBS | BODY MASS INDEX: 32.19 KG/M2

## 2021-08-11 DIAGNOSIS — M20.41 HAMMER TOES OF BOTH FEET: ICD-10-CM

## 2021-08-11 DIAGNOSIS — M20.42 HAMMER TOES OF BOTH FEET: ICD-10-CM

## 2021-08-11 DIAGNOSIS — B35.1 ONYCHOMYCOSIS DUE TO DERMATOPHYTE: ICD-10-CM

## 2021-08-11 DIAGNOSIS — E11.65 TYPE 2 DIABETES MELLITUS WITH HYPERGLYCEMIA, WITHOUT LONG-TERM CURRENT USE OF INSULIN: Primary | ICD-10-CM

## 2021-08-11 PROCEDURE — 11721 DEBRIDE NAIL 6 OR MORE: CPT | Mod: Q9,PBBFAC,PN | Performed by: PODIATRIST

## 2021-08-11 PROCEDURE — 99499 UNLISTED E&M SERVICE: CPT | Mod: S$PBB,,, | Performed by: PODIATRIST

## 2021-08-11 PROCEDURE — 99213 OFFICE O/P EST LOW 20 MIN: CPT | Mod: PBBFAC,PN | Performed by: PODIATRIST

## 2021-08-11 PROCEDURE — 11721 DEBRIDE NAIL 6 OR MORE: CPT | Mod: Q9,S$PBB,, | Performed by: PODIATRIST

## 2021-08-11 PROCEDURE — 99499 NO LOS: ICD-10-PCS | Mod: S$PBB,,, | Performed by: PODIATRIST

## 2021-08-11 PROCEDURE — 99999 PR PBB SHADOW E&M-EST. PATIENT-LVL III: ICD-10-PCS | Mod: PBBFAC,,, | Performed by: PODIATRIST

## 2021-08-11 PROCEDURE — 11721 PR DEBRIDEMENT OF NAILS, 6 OR MORE: ICD-10-PCS | Mod: Q9,S$PBB,, | Performed by: PODIATRIST

## 2021-08-11 PROCEDURE — 99999 PR PBB SHADOW E&M-EST. PATIENT-LVL III: CPT | Mod: PBBFAC,,, | Performed by: PODIATRIST

## 2021-08-24 DIAGNOSIS — G93.89 BRAIN MASS: Primary | ICD-10-CM

## 2021-08-25 ENCOUNTER — HOSPITAL ENCOUNTER (OUTPATIENT)
Dept: RADIOLOGY | Facility: HOSPITAL | Age: 71
Discharge: HOME | End: 2021-08-25
Attending: NURSE PRACTITIONER
Payer: MEDICARE

## 2021-08-25 ENCOUNTER — OFFICE VISIT (OUTPATIENT)
Dept: NEUROSURGERY | Facility: CLINIC | Age: 71
End: 2021-08-25
Payer: MEDICARE

## 2021-08-25 VITALS
WEIGHT: 181 LBS | RESPIRATION RATE: 18 BRPM | SYSTOLIC BLOOD PRESSURE: 145 MMHG | HEART RATE: 87 BPM | DIASTOLIC BLOOD PRESSURE: 81 MMHG | BODY MASS INDEX: 32.07 KG/M2 | HEIGHT: 63 IN

## 2021-08-25 DIAGNOSIS — D42.0 ATYPICAL INTRACRANIAL MENINGIOMA: Primary | ICD-10-CM

## 2021-08-25 DIAGNOSIS — Z98.890 S/P CRANIOTOMY: ICD-10-CM

## 2021-08-25 DIAGNOSIS — D32.9 MENINGIOMA: ICD-10-CM

## 2021-08-25 PROCEDURE — 99214 PR OFFICE/OUTPT VISIT, EST, LEVL IV, 30-39 MIN: ICD-10-PCS | Mod: S$PBB,,, | Performed by: NEUROLOGICAL SURGERY

## 2021-08-25 PROCEDURE — A9585 GADOBUTROL INJECTION: HCPCS | Mod: PO | Performed by: NURSE PRACTITIONER

## 2021-08-25 PROCEDURE — 99999 PR PBB SHADOW E&M-EST. PATIENT-LVL IV: CPT | Mod: PBBFAC,,, | Performed by: NEUROLOGICAL SURGERY

## 2021-08-25 PROCEDURE — 99214 OFFICE O/P EST MOD 30 MIN: CPT | Mod: S$PBB,,, | Performed by: NEUROLOGICAL SURGERY

## 2021-08-25 PROCEDURE — 70553 MRI BRAIN STEM W/O & W/DYE: CPT | Mod: 26,,, | Performed by: RADIOLOGY

## 2021-08-25 PROCEDURE — 70553 MRI BRAIN STEM W/O & W/DYE: CPT | Mod: TC,PO

## 2021-08-25 PROCEDURE — 70553 MRI BRAIN W WO CONTRAST: ICD-10-PCS | Mod: 26,,, | Performed by: RADIOLOGY

## 2021-08-25 PROCEDURE — 99214 OFFICE O/P EST MOD 30 MIN: CPT | Mod: PBBFAC,PN | Performed by: NEUROLOGICAL SURGERY

## 2021-08-25 PROCEDURE — 99999 PR PBB SHADOW E&M-EST. PATIENT-LVL IV: ICD-10-PCS | Mod: PBBFAC,,, | Performed by: NEUROLOGICAL SURGERY

## 2021-08-25 PROCEDURE — 25500020 PHARM REV CODE 255: Mod: PO | Performed by: NURSE PRACTITIONER

## 2021-08-25 RX ORDER — GADOBUTROL 604.72 MG/ML
8 INJECTION INTRAVENOUS
Status: COMPLETED | OUTPATIENT
Start: 2021-08-25 | End: 2021-08-25

## 2021-08-25 RX ADMIN — GADOBUTROL 8 ML: 604.72 INJECTION INTRAVENOUS at 02:08

## 2021-08-30 ENCOUNTER — PATIENT MESSAGE (OUTPATIENT)
Dept: INFUSION THERAPY | Facility: HOSPITAL | Age: 71
End: 2021-08-30

## 2021-09-09 ENCOUNTER — OFFICE VISIT (OUTPATIENT)
Dept: PRIMARY CARE CLINIC | Facility: CLINIC | Age: 71
End: 2021-09-09
Payer: MEDICARE

## 2021-09-09 ENCOUNTER — LAB VISIT (OUTPATIENT)
Dept: LAB | Facility: HOSPITAL | Age: 71
End: 2021-09-09
Attending: INTERNAL MEDICINE
Payer: MEDICARE

## 2021-09-09 VITALS
SYSTOLIC BLOOD PRESSURE: 120 MMHG | WEIGHT: 172.38 LBS | BODY MASS INDEX: 30.54 KG/M2 | DIASTOLIC BLOOD PRESSURE: 60 MMHG | TEMPERATURE: 98 F | RESPIRATION RATE: 16 BRPM | HEART RATE: 88 BPM | HEIGHT: 63 IN

## 2021-09-09 DIAGNOSIS — C50.911 INVASIVE LOBULAR CARCINOMA OF RIGHT BREAST IN FEMALE: ICD-10-CM

## 2021-09-09 DIAGNOSIS — G47.33 OSA ON CPAP: ICD-10-CM

## 2021-09-09 DIAGNOSIS — K21.9 GERD WITHOUT ESOPHAGITIS: ICD-10-CM

## 2021-09-09 DIAGNOSIS — I82.501 LEG DVT (DEEP VENOUS THROMBOEMBOLISM), CHRONIC, RIGHT: ICD-10-CM

## 2021-09-09 DIAGNOSIS — E05.90 SUBCLINICAL HYPERTHYROIDISM: ICD-10-CM

## 2021-09-09 DIAGNOSIS — M25.511 RIGHT SHOULDER PAIN, UNSPECIFIED CHRONICITY: ICD-10-CM

## 2021-09-09 DIAGNOSIS — K86.2 PANCREATIC CYST: ICD-10-CM

## 2021-09-09 DIAGNOSIS — I10 ESSENTIAL HYPERTENSION: ICD-10-CM

## 2021-09-09 DIAGNOSIS — D42.0 ATYPICAL INTRACRANIAL MENINGIOMA: ICD-10-CM

## 2021-09-09 DIAGNOSIS — E04.2 MULTINODULAR THYROID: ICD-10-CM

## 2021-09-09 DIAGNOSIS — E11.51 TYPE 2 DIABETES MELLITUS WITH DIABETIC PERIPHERAL ANGIOPATHY WITHOUT GANGRENE, WITHOUT LONG-TERM CURRENT USE OF INSULIN: Primary | ICD-10-CM

## 2021-09-09 DIAGNOSIS — E11.51 TYPE 2 DIABETES MELLITUS WITH DIABETIC PERIPHERAL ANGIOPATHY WITHOUT GANGRENE, WITHOUT LONG-TERM CURRENT USE OF INSULIN: ICD-10-CM

## 2021-09-09 DIAGNOSIS — E78.2 MIXED HYPERLIPIDEMIA: ICD-10-CM

## 2021-09-09 DIAGNOSIS — I51.89 DIASTOLIC DYSFUNCTION: ICD-10-CM

## 2021-09-09 DIAGNOSIS — I87.2 VENOUS INSUFFICIENCY OF RIGHT LOWER EXTREMITY: ICD-10-CM

## 2021-09-09 DIAGNOSIS — R60.9 EDEMA, UNSPECIFIED TYPE: ICD-10-CM

## 2021-09-09 LAB
ALBUMIN SERPL BCP-MCNC: 3.9 G/DL (ref 3.5–5.2)
ALP SERPL-CCNC: 127 U/L (ref 55–135)
ALT SERPL W/O P-5'-P-CCNC: 11 U/L (ref 10–44)
ANION GAP SERPL CALC-SCNC: 11 MMOL/L (ref 8–16)
AST SERPL-CCNC: 16 U/L (ref 10–40)
BASOPHILS # BLD AUTO: 0.05 K/UL (ref 0–0.2)
BASOPHILS NFR BLD: 0.5 % (ref 0–1.9)
BILIRUB SERPL-MCNC: 0.6 MG/DL (ref 0.1–1)
BUN SERPL-MCNC: 13 MG/DL (ref 8–23)
CALCIUM SERPL-MCNC: 9.7 MG/DL (ref 8.7–10.5)
CHLORIDE SERPL-SCNC: 106 MMOL/L (ref 95–110)
CHOLEST SERPL-MCNC: 208 MG/DL (ref 120–199)
CHOLEST/HDLC SERPL: 5 {RATIO} (ref 2–5)
CO2 SERPL-SCNC: 26 MMOL/L (ref 23–29)
CREAT SERPL-MCNC: 1 MG/DL (ref 0.5–1.4)
DIFFERENTIAL METHOD: ABNORMAL
EOSINOPHIL # BLD AUTO: 0.2 K/UL (ref 0–0.5)
EOSINOPHIL NFR BLD: 2.4 % (ref 0–8)
ERYTHROCYTE [DISTWIDTH] IN BLOOD BY AUTOMATED COUNT: 13.2 % (ref 11.5–14.5)
EST. GFR  (AFRICAN AMERICAN): >60 ML/MIN/1.73 M^2
EST. GFR  (NON AFRICAN AMERICAN): 57.2 ML/MIN/1.73 M^2
ESTIMATED AVG GLUCOSE: 134 MG/DL (ref 68–131)
GLUCOSE SERPL-MCNC: 145 MG/DL (ref 70–110)
HBA1C MFR BLD: 6.3 % (ref 4–5.6)
HCT VFR BLD AUTO: 40.7 % (ref 37–48.5)
HDLC SERPL-MCNC: 42 MG/DL (ref 40–75)
HDLC SERPL: 20.2 % (ref 20–50)
HGB BLD-MCNC: 13.1 G/DL (ref 12–16)
IMM GRANULOCYTES # BLD AUTO: 0.03 K/UL (ref 0–0.04)
IMM GRANULOCYTES NFR BLD AUTO: 0.3 % (ref 0–0.5)
LDLC SERPL CALC-MCNC: 136.2 MG/DL (ref 63–159)
LYMPHOCYTES # BLD AUTO: 3.1 K/UL (ref 1–4.8)
LYMPHOCYTES NFR BLD: 32.2 % (ref 18–48)
MCH RBC QN AUTO: 26.9 PG (ref 27–31)
MCHC RBC AUTO-ENTMCNC: 32.2 G/DL (ref 32–36)
MCV RBC AUTO: 84 FL (ref 82–98)
MONOCYTES # BLD AUTO: 0.7 K/UL (ref 0.3–1)
MONOCYTES NFR BLD: 7 % (ref 4–15)
NEUTROPHILS # BLD AUTO: 5.6 K/UL (ref 1.8–7.7)
NEUTROPHILS NFR BLD: 57.6 % (ref 38–73)
NONHDLC SERPL-MCNC: 166 MG/DL
NRBC BLD-RTO: 0 /100 WBC
PLATELET # BLD AUTO: 330 K/UL (ref 150–450)
PMV BLD AUTO: 9.9 FL (ref 9.2–12.9)
POTASSIUM SERPL-SCNC: 3.5 MMOL/L (ref 3.5–5.1)
PROT SERPL-MCNC: 7.9 G/DL (ref 6–8.4)
RBC # BLD AUTO: 4.87 M/UL (ref 4–5.4)
SODIUM SERPL-SCNC: 143 MMOL/L (ref 136–145)
T4 FREE SERPL-MCNC: 1.18 NG/DL (ref 0.71–1.51)
TRIGL SERPL-MCNC: 149 MG/DL (ref 30–150)
TSH SERPL DL<=0.005 MIU/L-ACNC: 0.34 UIU/ML (ref 0.4–4)
WBC # BLD AUTO: 9.64 K/UL (ref 3.9–12.7)

## 2021-09-09 PROCEDURE — 80061 LIPID PANEL: CPT | Performed by: INTERNAL MEDICINE

## 2021-09-09 PROCEDURE — 99214 PR OFFICE/OUTPT VISIT, EST, LEVL IV, 30-39 MIN: ICD-10-PCS | Mod: S$PBB,,, | Performed by: INTERNAL MEDICINE

## 2021-09-09 PROCEDURE — 84439 ASSAY OF FREE THYROXINE: CPT | Performed by: INTERNAL MEDICINE

## 2021-09-09 PROCEDURE — 99214 OFFICE O/P EST MOD 30 MIN: CPT | Mod: S$PBB,,, | Performed by: INTERNAL MEDICINE

## 2021-09-09 PROCEDURE — 99999 PR PBB SHADOW E&M-EST. PATIENT-LVL V: CPT | Mod: PBBFAC,,, | Performed by: INTERNAL MEDICINE

## 2021-09-09 PROCEDURE — 99999 PR PBB SHADOW E&M-EST. PATIENT-LVL V: ICD-10-PCS | Mod: PBBFAC,,, | Performed by: INTERNAL MEDICINE

## 2021-09-09 PROCEDURE — 83036 HEMOGLOBIN GLYCOSYLATED A1C: CPT | Performed by: INTERNAL MEDICINE

## 2021-09-09 PROCEDURE — 80053 COMPREHEN METABOLIC PANEL: CPT | Performed by: INTERNAL MEDICINE

## 2021-09-09 PROCEDURE — 99215 OFFICE O/P EST HI 40 MIN: CPT | Mod: PBBFAC,PN | Performed by: INTERNAL MEDICINE

## 2021-09-09 PROCEDURE — 84443 ASSAY THYROID STIM HORMONE: CPT | Performed by: INTERNAL MEDICINE

## 2021-09-09 PROCEDURE — 36415 COLL VENOUS BLD VENIPUNCTURE: CPT | Mod: PN | Performed by: INTERNAL MEDICINE

## 2021-09-09 PROCEDURE — 85025 COMPLETE CBC W/AUTO DIFF WBC: CPT | Performed by: INTERNAL MEDICINE

## 2021-09-10 DIAGNOSIS — E04.1 THYROID NODULE: Primary | ICD-10-CM

## 2021-09-15 ENCOUNTER — TELEPHONE (OUTPATIENT)
Dept: HEMATOLOGY/ONCOLOGY | Facility: CLINIC | Age: 71
End: 2021-09-15

## 2021-09-20 DIAGNOSIS — E11.65 TYPE 2 DIABETES MELLITUS WITH HYPERGLYCEMIA, WITHOUT LONG-TERM CURRENT USE OF INSULIN: ICD-10-CM

## 2021-09-21 RX ORDER — METFORMIN HYDROCHLORIDE 500 MG/1
500 TABLET ORAL
Qty: 90 TABLET | Refills: 3 | Status: SHIPPED | OUTPATIENT
Start: 2021-09-21 | End: 2022-09-11

## 2021-09-24 ENCOUNTER — TELEPHONE (OUTPATIENT)
Dept: INFUSION THERAPY | Facility: HOSPITAL | Age: 71
End: 2021-09-24
Payer: MEDICARE

## 2021-09-24 ENCOUNTER — TELEPHONE (OUTPATIENT)
Dept: HEMATOLOGY/ONCOLOGY | Facility: CLINIC | Age: 71
End: 2021-09-24

## 2021-10-20 ENCOUNTER — TELEPHONE (OUTPATIENT)
Dept: NEPHROLOGY | Facility: CLINIC | Age: 71
End: 2021-10-20

## 2021-10-20 NOTE — TELEPHONE ENCOUNTER
----- Message from Yahaira Restrepo sent at 10/20/2021  9:25 AM CDT -----  Pt wants to know if she needs lab work before her appt on 11/03/2021. Requesting call back    Call back number: [phone]

## 2021-10-25 ENCOUNTER — PATIENT OUTREACH (OUTPATIENT)
Dept: ADMINISTRATIVE | Facility: OTHER | Age: 71
End: 2021-10-25
Payer: MEDICARE

## 2021-10-27 ENCOUNTER — TELEPHONE (OUTPATIENT)
Dept: CARDIOLOGY | Facility: CLINIC | Age: 71
End: 2021-10-27
Payer: MEDICARE

## 2021-10-27 ENCOUNTER — OFFICE VISIT (OUTPATIENT)
Dept: CARDIOLOGY | Facility: CLINIC | Age: 71
End: 2021-10-27
Payer: MEDICARE

## 2021-10-27 ENCOUNTER — HOSPITAL ENCOUNTER (OUTPATIENT)
Dept: RADIOLOGY | Facility: HOSPITAL | Age: 71
Discharge: HOME | End: 2021-10-27
Attending: NURSE PRACTITIONER
Payer: MEDICARE

## 2021-10-27 VITALS
BODY MASS INDEX: 30.62 KG/M2 | HEIGHT: 63 IN | SYSTOLIC BLOOD PRESSURE: 132 MMHG | OXYGEN SATURATION: 96 % | DIASTOLIC BLOOD PRESSURE: 72 MMHG | WEIGHT: 172.81 LBS | HEART RATE: 81 BPM

## 2021-10-27 DIAGNOSIS — M25.562 CHRONIC PAIN OF BOTH KNEES: ICD-10-CM

## 2021-10-27 DIAGNOSIS — R01.1 UNDIAGNOSED CARDIAC MURMURS: ICD-10-CM

## 2021-10-27 DIAGNOSIS — C50.911 INVASIVE LOBULAR CARCINOMA OF RIGHT BREAST IN FEMALE: ICD-10-CM

## 2021-10-27 DIAGNOSIS — E66.9 OBESITY (BMI 30-39.9): ICD-10-CM

## 2021-10-27 DIAGNOSIS — G47.33 OSA ON CPAP: ICD-10-CM

## 2021-10-27 DIAGNOSIS — I82.501 LEG DVT (DEEP VENOUS THROMBOEMBOLISM), CHRONIC, RIGHT: ICD-10-CM

## 2021-10-27 DIAGNOSIS — N18.30 STAGE 3 CHRONIC KIDNEY DISEASE, UNSPECIFIED WHETHER STAGE 3A OR 3B CKD: ICD-10-CM

## 2021-10-27 DIAGNOSIS — I50.30 HEART FAILURE WITH PRESERVED EJECTION FRACTION, UNSPECIFIED HF CHRONICITY: ICD-10-CM

## 2021-10-27 DIAGNOSIS — E04.1 THYROID NODULE: ICD-10-CM

## 2021-10-27 DIAGNOSIS — I87.2 VENOUS INSUFFICIENCY OF RIGHT LOWER EXTREMITY: ICD-10-CM

## 2021-10-27 DIAGNOSIS — M25.561 CHRONIC PAIN OF BOTH KNEES: ICD-10-CM

## 2021-10-27 DIAGNOSIS — G89.29 CHRONIC PAIN OF BOTH KNEES: ICD-10-CM

## 2021-10-27 DIAGNOSIS — E78.2 MIXED HYPERLIPIDEMIA: ICD-10-CM

## 2021-10-27 DIAGNOSIS — M79.662 PAIN IN BOTH LOWER LEGS: ICD-10-CM

## 2021-10-27 DIAGNOSIS — I51.89 DIASTOLIC DYSFUNCTION: ICD-10-CM

## 2021-10-27 DIAGNOSIS — I82.401 ACUTE DEEP VEIN THROMBOSIS (DVT) OF RIGHT LOWER EXTREMITY, UNSPECIFIED VEIN: ICD-10-CM

## 2021-10-27 DIAGNOSIS — E11.51 TYPE 2 DIABETES MELLITUS WITH DIABETIC PERIPHERAL ANGIOPATHY WITHOUT GANGRENE, WITHOUT LONG-TERM CURRENT USE OF INSULIN: ICD-10-CM

## 2021-10-27 DIAGNOSIS — I10 ESSENTIAL HYPERTENSION: ICD-10-CM

## 2021-10-27 DIAGNOSIS — M79.661 PAIN IN BOTH LOWER LEGS: ICD-10-CM

## 2021-10-27 DIAGNOSIS — Z86.718 HISTORY OF DVT (DEEP VEIN THROMBOSIS): ICD-10-CM

## 2021-10-27 PROCEDURE — 76536 US EXAM OF HEAD AND NECK: CPT | Mod: TC

## 2021-10-27 PROCEDURE — 99999 PR PBB SHADOW E&M-EST. PATIENT-LVL V: ICD-10-PCS | Mod: PBBFAC,,, | Performed by: INTERNAL MEDICINE

## 2021-10-27 PROCEDURE — 76536 US SOFT TISSUE HEAD NECK THYROID: ICD-10-PCS | Mod: 26,,, | Performed by: INTERNAL MEDICINE

## 2021-10-27 PROCEDURE — 99999 PR PBB SHADOW E&M-EST. PATIENT-LVL V: CPT | Mod: PBBFAC,,, | Performed by: INTERNAL MEDICINE

## 2021-10-27 PROCEDURE — 99215 PR OFFICE/OUTPT VISIT, EST, LEVL V, 40-54 MIN: ICD-10-PCS | Mod: S$PBB,,, | Performed by: INTERNAL MEDICINE

## 2021-10-27 PROCEDURE — 99215 OFFICE O/P EST HI 40 MIN: CPT | Mod: S$PBB,,, | Performed by: INTERNAL MEDICINE

## 2021-10-27 PROCEDURE — 76536 US EXAM OF HEAD AND NECK: CPT | Mod: 26,,, | Performed by: INTERNAL MEDICINE

## 2021-10-27 PROCEDURE — 99215 OFFICE O/P EST HI 40 MIN: CPT | Mod: PBBFAC,PO | Performed by: INTERNAL MEDICINE

## 2021-10-27 RX ORDER — DOXYCYCLINE HYCLATE 100 MG
100 TABLET ORAL 2 TIMES DAILY
Qty: 42 TABLET | Refills: 0 | Status: SHIPPED | OUTPATIENT
Start: 2021-10-27 | End: 2021-11-17

## 2021-11-01 ENCOUNTER — PATIENT OUTREACH (OUTPATIENT)
Dept: ADMINISTRATIVE | Facility: OTHER | Age: 71
End: 2021-11-01
Payer: MEDICARE

## 2021-11-03 ENCOUNTER — OFFICE VISIT (OUTPATIENT)
Dept: NEPHROLOGY | Facility: CLINIC | Age: 71
End: 2021-11-03
Payer: MEDICARE

## 2021-11-03 VITALS
HEIGHT: 62 IN | OXYGEN SATURATION: 99 % | DIASTOLIC BLOOD PRESSURE: 78 MMHG | WEIGHT: 172.63 LBS | BODY MASS INDEX: 31.77 KG/M2 | SYSTOLIC BLOOD PRESSURE: 136 MMHG | HEART RATE: 93 BPM

## 2021-11-03 DIAGNOSIS — I10 ESSENTIAL HYPERTENSION: Primary | ICD-10-CM

## 2021-11-03 PROCEDURE — 99999 PR PBB SHADOW E&M-EST. PATIENT-LVL IV: ICD-10-PCS | Mod: PBBFAC,,, | Performed by: INTERNAL MEDICINE

## 2021-11-03 PROCEDURE — 99213 OFFICE O/P EST LOW 20 MIN: CPT | Mod: S$PBB,,, | Performed by: INTERNAL MEDICINE

## 2021-11-03 PROCEDURE — 99213 PR OFFICE/OUTPT VISIT, EST, LEVL III, 20-29 MIN: ICD-10-PCS | Mod: S$PBB,,, | Performed by: INTERNAL MEDICINE

## 2021-11-03 PROCEDURE — 99999 PR PBB SHADOW E&M-EST. PATIENT-LVL IV: CPT | Mod: PBBFAC,,, | Performed by: INTERNAL MEDICINE

## 2021-11-03 PROCEDURE — 99214 OFFICE O/P EST MOD 30 MIN: CPT | Mod: PBBFAC,PO | Performed by: INTERNAL MEDICINE

## 2021-11-10 ENCOUNTER — OFFICE VISIT (OUTPATIENT)
Dept: ENDOCRINOLOGY | Facility: CLINIC | Age: 71
End: 2021-11-10
Payer: MEDICARE

## 2021-11-10 DIAGNOSIS — E04.2 MULTINODULAR THYROID: Primary | ICD-10-CM

## 2021-11-10 DIAGNOSIS — E66.01 SEVERE OBESITY (BMI 35.0-35.9 WITH COMORBIDITY): ICD-10-CM

## 2021-11-10 DIAGNOSIS — I10 ESSENTIAL HYPERTENSION: ICD-10-CM

## 2021-11-10 DIAGNOSIS — E11.51 TYPE 2 DIABETES MELLITUS WITH DIABETIC PERIPHERAL ANGIOPATHY WITHOUT GANGRENE, WITHOUT LONG-TERM CURRENT USE OF INSULIN: ICD-10-CM

## 2021-11-10 DIAGNOSIS — Z78.0 ASYMPTOMATIC MENOPAUSAL STATE: ICD-10-CM

## 2021-11-10 DIAGNOSIS — N18.30 STAGE 3 CHRONIC KIDNEY DISEASE, UNSPECIFIED WHETHER STAGE 3A OR 3B CKD: ICD-10-CM

## 2021-11-10 DIAGNOSIS — E78.2 MIXED HYPERLIPIDEMIA: ICD-10-CM

## 2021-11-10 DIAGNOSIS — M85.80 OSTEOPENIA, UNSPECIFIED LOCATION: ICD-10-CM

## 2021-11-10 DIAGNOSIS — E05.90 SUBCLINICAL HYPERTHYROIDISM: ICD-10-CM

## 2021-11-10 PROCEDURE — 99214 OFFICE O/P EST MOD 30 MIN: CPT | Mod: 95,,, | Performed by: INTERNAL MEDICINE

## 2021-11-10 PROCEDURE — 99214 PR OFFICE/OUTPT VISIT, EST, LEVL IV, 30-39 MIN: ICD-10-PCS | Mod: 95,,, | Performed by: INTERNAL MEDICINE

## 2021-11-12 ENCOUNTER — PATIENT MESSAGE (OUTPATIENT)
Dept: CARDIOLOGY | Facility: CLINIC | Age: 71
End: 2021-11-12
Payer: MEDICARE

## 2021-11-30 ENCOUNTER — OFFICE VISIT (OUTPATIENT)
Dept: OPHTHALMOLOGY | Facility: CLINIC | Age: 71
End: 2021-11-30
Payer: MEDICARE

## 2021-11-30 DIAGNOSIS — H40.1131 PRIMARY OPEN-ANGLE GLAUCOMA, BILATERAL, MILD STAGE: Primary | ICD-10-CM

## 2021-11-30 DIAGNOSIS — H25.813 COMBINED FORMS OF AGE-RELATED CATARACT, BILATERAL: ICD-10-CM

## 2021-11-30 PROCEDURE — 99213 OFFICE O/P EST LOW 20 MIN: CPT | Mod: PBBFAC,PO | Performed by: OPHTHALMOLOGY

## 2021-11-30 PROCEDURE — 99213 OFFICE O/P EST LOW 20 MIN: CPT | Mod: S$PBB,,, | Performed by: OPHTHALMOLOGY

## 2021-11-30 PROCEDURE — 99999 PR PBB SHADOW E&M-EST. PATIENT-LVL III: ICD-10-PCS | Mod: PBBFAC,,, | Performed by: OPHTHALMOLOGY

## 2021-11-30 PROCEDURE — 92020 PR SPECIAL EYE EVAL,GONIOSCOPY: ICD-10-PCS | Mod: S$PBB,,, | Performed by: OPHTHALMOLOGY

## 2021-11-30 PROCEDURE — 92020 GONIOSCOPY: CPT | Mod: S$PBB,,, | Performed by: OPHTHALMOLOGY

## 2021-11-30 PROCEDURE — 99213 PR OFFICE/OUTPT VISIT, EST, LEVL III, 20-29 MIN: ICD-10-PCS | Mod: S$PBB,,, | Performed by: OPHTHALMOLOGY

## 2021-11-30 PROCEDURE — 92020 GONIOSCOPY: CPT | Mod: PBBFAC,PO | Performed by: OPHTHALMOLOGY

## 2021-11-30 PROCEDURE — 99999 PR PBB SHADOW E&M-EST. PATIENT-LVL III: CPT | Mod: PBBFAC,,, | Performed by: OPHTHALMOLOGY

## 2021-11-30 RX ORDER — LATANOPROST 50 UG/ML
1 SOLUTION/ DROPS OPHTHALMIC NIGHTLY
Qty: 2.5 ML | Refills: 12 | Status: SHIPPED | OUTPATIENT
Start: 2021-11-30 | End: 2022-07-11 | Stop reason: SDUPTHER

## 2021-12-12 DIAGNOSIS — I10 ESSENTIAL HYPERTENSION: ICD-10-CM

## 2021-12-15 ENCOUNTER — OFFICE VISIT (OUTPATIENT)
Dept: PODIATRY | Facility: CLINIC | Age: 71
End: 2021-12-15
Payer: MEDICARE

## 2021-12-15 ENCOUNTER — HOSPITAL ENCOUNTER (OUTPATIENT)
Dept: RADIOLOGY | Facility: HOSPITAL | Age: 71
Discharge: HOME | End: 2021-12-15
Attending: INTERNAL MEDICINE
Payer: MEDICARE

## 2021-12-15 VITALS — BODY MASS INDEX: 31.77 KG/M2 | WEIGHT: 172.63 LBS | HEIGHT: 62 IN

## 2021-12-15 DIAGNOSIS — B35.1 ONYCHOMYCOSIS DUE TO DERMATOPHYTE: ICD-10-CM

## 2021-12-15 DIAGNOSIS — M20.42 HAMMER TOES OF BOTH FEET: ICD-10-CM

## 2021-12-15 DIAGNOSIS — Z78.0 ASYMPTOMATIC MENOPAUSAL STATE: ICD-10-CM

## 2021-12-15 DIAGNOSIS — E11.65 TYPE 2 DIABETES MELLITUS WITH HYPERGLYCEMIA, WITHOUT LONG-TERM CURRENT USE OF INSULIN: Primary | ICD-10-CM

## 2021-12-15 DIAGNOSIS — M20.41 HAMMER TOES OF BOTH FEET: ICD-10-CM

## 2021-12-15 PROCEDURE — 99499 UNLISTED E&M SERVICE: CPT | Mod: S$PBB,,, | Performed by: PODIATRIST

## 2021-12-15 PROCEDURE — 77080 DEXA BONE DENSITY SPINE HIP: ICD-10-PCS | Mod: 26,,, | Performed by: RADIOLOGY

## 2021-12-15 PROCEDURE — 11721 DEBRIDE NAIL 6 OR MORE: CPT | Mod: Q9,S$PBB,, | Performed by: PODIATRIST

## 2021-12-15 PROCEDURE — 99499 NO LOS: ICD-10-PCS | Mod: S$PBB,,, | Performed by: PODIATRIST

## 2021-12-15 PROCEDURE — 99212 OFFICE O/P EST SF 10 MIN: CPT | Mod: PBBFAC,25,PN | Performed by: PODIATRIST

## 2021-12-15 PROCEDURE — 77080 DXA BONE DENSITY AXIAL: CPT | Mod: TC,PO

## 2021-12-15 PROCEDURE — 11721 DEBRIDE NAIL 6 OR MORE: CPT | Mod: PBBFAC,PN | Performed by: PODIATRIST

## 2021-12-15 PROCEDURE — 99999 PR PBB SHADOW E&M-EST. PATIENT-LVL II: ICD-10-PCS | Mod: PBBFAC,,, | Performed by: PODIATRIST

## 2021-12-15 PROCEDURE — 99999 PR PBB SHADOW E&M-EST. PATIENT-LVL II: CPT | Mod: PBBFAC,,, | Performed by: PODIATRIST

## 2021-12-15 PROCEDURE — 77080 DXA BONE DENSITY AXIAL: CPT | Mod: 26,,, | Performed by: RADIOLOGY

## 2021-12-15 PROCEDURE — 11721 PR DEBRIDEMENT OF NAILS, 6 OR MORE: ICD-10-PCS | Mod: Q9,S$PBB,, | Performed by: PODIATRIST

## 2021-12-15 RX ORDER — PANTOPRAZOLE SODIUM 40 MG/1
40 TABLET, DELAYED RELEASE ORAL 2 TIMES DAILY
COMMUNITY
End: 2023-02-14 | Stop reason: SDUPTHER

## 2021-12-15 RX ORDER — LEVETIRACETAM 1000 MG/1
1000 TABLET ORAL 2 TIMES DAILY
COMMUNITY
End: 2022-04-21

## 2021-12-15 RX ORDER — NIFEDIPINE 90 MG/1
TABLET, FILM COATED, EXTENDED RELEASE ORAL
Qty: 90 TABLET | Refills: 2 | Status: SHIPPED | OUTPATIENT
Start: 2021-12-15 | End: 2021-12-23

## 2021-12-15 RX ORDER — LISINOPRIL 40 MG/1
40 TABLET ORAL DAILY
COMMUNITY
End: 2022-04-28 | Stop reason: SDUPTHER

## 2021-12-23 ENCOUNTER — OFFICE VISIT (OUTPATIENT)
Dept: INTERNAL MEDICINE | Facility: CLINIC | Age: 71
End: 2021-12-23
Payer: MEDICARE

## 2021-12-23 VITALS
BODY MASS INDEX: 31.1 KG/M2 | RESPIRATION RATE: 16 BRPM | DIASTOLIC BLOOD PRESSURE: 64 MMHG | OXYGEN SATURATION: 98 % | SYSTOLIC BLOOD PRESSURE: 104 MMHG | HEART RATE: 80 BPM | WEIGHT: 175.5 LBS | TEMPERATURE: 97 F | HEIGHT: 63 IN

## 2021-12-23 DIAGNOSIS — E78.2 MIXED HYPERLIPIDEMIA: ICD-10-CM

## 2021-12-23 DIAGNOSIS — G40.109 FOCAL MOTOR SEIZURE: Primary | ICD-10-CM

## 2021-12-23 DIAGNOSIS — R79.9 ABNORMAL FINDING OF BLOOD CHEMISTRY, UNSPECIFIED: ICD-10-CM

## 2021-12-23 DIAGNOSIS — Z98.890 S/P CRANIOTOMY: ICD-10-CM

## 2021-12-23 DIAGNOSIS — I10 ESSENTIAL HYPERTENSION: ICD-10-CM

## 2021-12-23 PROCEDURE — 99215 OFFICE O/P EST HI 40 MIN: CPT | Mod: PBBFAC,PO | Performed by: INTERNAL MEDICINE

## 2021-12-23 PROCEDURE — 99214 OFFICE O/P EST MOD 30 MIN: CPT | Mod: S$PBB,,, | Performed by: INTERNAL MEDICINE

## 2021-12-23 PROCEDURE — 99999 PR PBB SHADOW E&M-EST. PATIENT-LVL V: ICD-10-PCS | Mod: PBBFAC,,, | Performed by: INTERNAL MEDICINE

## 2021-12-23 PROCEDURE — 99214 PR OFFICE/OUTPT VISIT, EST, LEVL IV, 30-39 MIN: ICD-10-PCS | Mod: S$PBB,,, | Performed by: INTERNAL MEDICINE

## 2021-12-23 PROCEDURE — 99999 PR PBB SHADOW E&M-EST. PATIENT-LVL V: CPT | Mod: PBBFAC,,, | Performed by: INTERNAL MEDICINE

## 2021-12-23 RX ORDER — NIFEDIPINE 60 MG/1
60 TABLET, EXTENDED RELEASE ORAL DAILY
Qty: 90 TABLET | Refills: 3 | Status: SHIPPED | OUTPATIENT
Start: 2021-12-23 | End: 2022-09-26 | Stop reason: SINTOL

## 2021-12-23 RX ORDER — EZETIMIBE 10 MG/1
10 TABLET ORAL DAILY
Qty: 90 TABLET | Refills: 3 | Status: SHIPPED | OUTPATIENT
Start: 2021-12-23 | End: 2022-12-21

## 2021-12-28 ENCOUNTER — PATIENT MESSAGE (OUTPATIENT)
Dept: INTERNAL MEDICINE | Facility: CLINIC | Age: 71
End: 2021-12-28
Payer: MEDICARE

## 2021-12-30 ENCOUNTER — LAB VISIT (OUTPATIENT)
Dept: LAB | Facility: HOSPITAL | Age: 71
End: 2021-12-30
Attending: INTERNAL MEDICINE
Payer: MEDICARE

## 2021-12-30 DIAGNOSIS — G40.109 FOCAL MOTOR SEIZURE: ICD-10-CM

## 2021-12-30 DIAGNOSIS — E78.2 MIXED HYPERLIPIDEMIA: ICD-10-CM

## 2021-12-30 DIAGNOSIS — Z98.890 S/P CRANIOTOMY: ICD-10-CM

## 2021-12-30 DIAGNOSIS — I10 ESSENTIAL HYPERTENSION: ICD-10-CM

## 2021-12-30 DIAGNOSIS — R79.9 ABNORMAL FINDING OF BLOOD CHEMISTRY, UNSPECIFIED: ICD-10-CM

## 2021-12-30 LAB
ALBUMIN SERPL BCP-MCNC: 3.8 G/DL (ref 3.5–5.2)
ALP SERPL-CCNC: 134 U/L (ref 55–135)
ALT SERPL W/O P-5'-P-CCNC: 16 U/L (ref 10–44)
ANION GAP SERPL CALC-SCNC: 9 MMOL/L (ref 8–16)
AST SERPL-CCNC: 18 U/L (ref 10–40)
BASOPHILS # BLD AUTO: 0.04 K/UL (ref 0–0.2)
BASOPHILS NFR BLD: 0.5 % (ref 0–1.9)
BILIRUB SERPL-MCNC: 0.7 MG/DL (ref 0.1–1)
BUN SERPL-MCNC: 13 MG/DL (ref 8–23)
CALCIUM SERPL-MCNC: 9.9 MG/DL (ref 8.7–10.5)
CHLORIDE SERPL-SCNC: 107 MMOL/L (ref 95–110)
CHOLEST SERPL-MCNC: 211 MG/DL (ref 120–199)
CHOLEST/HDLC SERPL: 4.6 {RATIO} (ref 2–5)
CO2 SERPL-SCNC: 26 MMOL/L (ref 23–29)
CREAT SERPL-MCNC: 0.8 MG/DL (ref 0.5–1.4)
DIFFERENTIAL METHOD: ABNORMAL
EOSINOPHIL # BLD AUTO: 0.2 K/UL (ref 0–0.5)
EOSINOPHIL NFR BLD: 2.1 % (ref 0–8)
ERYTHROCYTE [DISTWIDTH] IN BLOOD BY AUTOMATED COUNT: 12.9 % (ref 11.5–14.5)
EST. GFR  (AFRICAN AMERICAN): >60 ML/MIN/1.73 M^2
EST. GFR  (NON AFRICAN AMERICAN): >60 ML/MIN/1.73 M^2
ESTIMATED AVG GLUCOSE: 134 MG/DL (ref 68–131)
GLUCOSE SERPL-MCNC: 95 MG/DL (ref 70–110)
HBA1C MFR BLD: 6.3 % (ref 4–5.6)
HCT VFR BLD AUTO: 43.1 % (ref 37–48.5)
HDLC SERPL-MCNC: 46 MG/DL (ref 40–75)
HDLC SERPL: 21.8 % (ref 20–50)
HGB BLD-MCNC: 13.6 G/DL (ref 12–16)
IMM GRANULOCYTES # BLD AUTO: 0.03 K/UL (ref 0–0.04)
IMM GRANULOCYTES NFR BLD AUTO: 0.4 % (ref 0–0.5)
LDLC SERPL CALC-MCNC: 129.6 MG/DL (ref 63–159)
LYMPHOCYTES # BLD AUTO: 2.1 K/UL (ref 1–4.8)
LYMPHOCYTES NFR BLD: 27.6 % (ref 18–48)
MCH RBC QN AUTO: 27.1 PG (ref 27–31)
MCHC RBC AUTO-ENTMCNC: 31.6 G/DL (ref 32–36)
MCV RBC AUTO: 86 FL (ref 82–98)
MONOCYTES # BLD AUTO: 0.6 K/UL (ref 0.3–1)
MONOCYTES NFR BLD: 7.2 % (ref 4–15)
NEUTROPHILS # BLD AUTO: 4.8 K/UL (ref 1.8–7.7)
NEUTROPHILS NFR BLD: 62.2 % (ref 38–73)
NONHDLC SERPL-MCNC: 165 MG/DL
NRBC BLD-RTO: 0 /100 WBC
PLATELET # BLD AUTO: 298 K/UL (ref 150–450)
PMV BLD AUTO: 10.5 FL (ref 9.2–12.9)
POTASSIUM SERPL-SCNC: 4.1 MMOL/L (ref 3.5–5.1)
PROT SERPL-MCNC: 7.6 G/DL (ref 6–8.4)
RBC # BLD AUTO: 5.01 M/UL (ref 4–5.4)
SODIUM SERPL-SCNC: 142 MMOL/L (ref 136–145)
TRIGL SERPL-MCNC: 177 MG/DL (ref 30–150)
WBC # BLD AUTO: 7.68 K/UL (ref 3.9–12.7)

## 2021-12-30 PROCEDURE — 83036 HEMOGLOBIN GLYCOSYLATED A1C: CPT | Performed by: INTERNAL MEDICINE

## 2021-12-30 PROCEDURE — 80053 COMPREHEN METABOLIC PANEL: CPT | Performed by: INTERNAL MEDICINE

## 2021-12-30 PROCEDURE — 80177 DRUG SCRN QUAN LEVETIRACETAM: CPT | Performed by: INTERNAL MEDICINE

## 2021-12-30 PROCEDURE — 80061 LIPID PANEL: CPT | Performed by: INTERNAL MEDICINE

## 2021-12-30 PROCEDURE — 36415 COLL VENOUS BLD VENIPUNCTURE: CPT | Mod: PO | Performed by: INTERNAL MEDICINE

## 2021-12-30 PROCEDURE — 85025 COMPLETE CBC W/AUTO DIFF WBC: CPT | Performed by: INTERNAL MEDICINE

## 2022-01-05 LAB — LEVETIRACETAM SERPL-MCNC: <1 UG/ML (ref 3–60)

## 2022-01-25 ENCOUNTER — PATIENT MESSAGE (OUTPATIENT)
Dept: ADMINISTRATIVE | Facility: HOSPITAL | Age: 72
End: 2022-01-25
Payer: MEDICARE

## 2022-02-01 ENCOUNTER — PATIENT OUTREACH (OUTPATIENT)
Dept: ADMINISTRATIVE | Facility: OTHER | Age: 72
End: 2022-02-01
Payer: MEDICARE

## 2022-02-01 NOTE — PROGRESS NOTES
Care Everywhere: updated  Immunization:   Health Maintenance: updated  Media Review:review for outside mammogram report   Legacy Review:   DIS:  Order placed:   Upcoming appts:ophthalmology 2.22.2022  EFAX:  Task Tickets:  Referrals:

## 2022-02-02 ENCOUNTER — OFFICE VISIT (OUTPATIENT)
Dept: CARDIOLOGY | Facility: CLINIC | Age: 72
End: 2022-02-02
Payer: MEDICARE

## 2022-02-02 ENCOUNTER — HOSPITAL ENCOUNTER (OUTPATIENT)
Dept: CARDIOLOGY | Facility: HOSPITAL | Age: 72
Discharge: HOME | End: 2022-02-02
Attending: INTERNAL MEDICINE
Payer: MEDICARE

## 2022-02-02 VITALS
HEART RATE: 96 BPM | SYSTOLIC BLOOD PRESSURE: 152 MMHG | HEIGHT: 62 IN | DIASTOLIC BLOOD PRESSURE: 80 MMHG | BODY MASS INDEX: 32.2 KG/M2 | WEIGHT: 175 LBS

## 2022-02-02 VITALS
HEIGHT: 63 IN | WEIGHT: 179.44 LBS | DIASTOLIC BLOOD PRESSURE: 64 MMHG | HEART RATE: 86 BPM | SYSTOLIC BLOOD PRESSURE: 110 MMHG | OXYGEN SATURATION: 98 % | BODY MASS INDEX: 31.79 KG/M2

## 2022-02-02 DIAGNOSIS — I82.501 LEG DVT (DEEP VENOUS THROMBOEMBOLISM), CHRONIC, RIGHT: Primary | ICD-10-CM

## 2022-02-02 DIAGNOSIS — I51.89 DIASTOLIC DYSFUNCTION: ICD-10-CM

## 2022-02-02 LAB
ASCENDING AORTA: 3.54 CM
AV INDEX (PROSTH): 0.46
AV MEAN GRADIENT: 34 MMHG
AV PEAK GRADIENT: 53 MMHG
AV VALVE AREA: 1.56 CM2
AV VELOCITY RATIO: 0.43
BSA FOR ECHO PROCEDURE: 1.86 M2
CV ECHO LV RWT: 0.44 CM
DOP CALC AO PEAK VEL: 3.65 M/S
DOP CALC AO VTI: 69.75 CM
DOP CALC LVOT AREA: 3.4 CM2
DOP CALC LVOT DIAMETER: 2.08 CM
DOP CALC LVOT PEAK VEL: 1.57 M/S
DOP CALC LVOT STROKE VOLUME: 109.09 CM3
DOP CALCLVOT PEAK VEL VTI: 32.12 CM
E WAVE DECELERATION TIME: 304.94 MSEC
E/A RATIO: 0.56
E/E' RATIO: 16 M/S
ECHO LV POSTERIOR WALL: 0.87 CM (ref 0.6–1.1)
EJECTION FRACTION: 75 %
FRACTIONAL SHORTENING: 42 % (ref 28–44)
INTERVENTRICULAR SEPTUM: 1.26 CM (ref 0.6–1.1)
IVRT: 131.3 MSEC
LA MAJOR: 5.1 CM
LA MINOR: 5.3 CM
LA WIDTH: 4.08 CM
LEFT ATRIUM SIZE: 3.33 CM
LEFT ATRIUM VOLUME INDEX MOD: 29.2 ML/M2
LEFT ATRIUM VOLUME INDEX: 33.2 ML/M2
LEFT ATRIUM VOLUME MOD: 52.92 CM3
LEFT ATRIUM VOLUME: 60.03 CM3
LEFT INTERNAL DIMENSION IN SYSTOLE: 2.31 CM (ref 2.1–4)
LEFT VENTRICLE DIASTOLIC VOLUME INDEX: 37.61 ML/M2
LEFT VENTRICLE DIASTOLIC VOLUME: 68.08 ML
LEFT VENTRICLE MASS INDEX: 75 G/M2
LEFT VENTRICLE SYSTOLIC VOLUME INDEX: 10.1 ML/M2
LEFT VENTRICLE SYSTOLIC VOLUME: 18.33 ML
LEFT VENTRICULAR INTERNAL DIMENSION IN DIASTOLE: 3.95 CM (ref 3.5–6)
LEFT VENTRICULAR MASS: 136.32 G
LV LATERAL E/E' RATIO: 13.33 M/S
LV SEPTAL E/E' RATIO: 20 M/S
MV PEAK A VEL: 1.42 M/S
MV PEAK E VEL: 0.8 M/S
MV STENOSIS PRESSURE HALF TIME: 88.43 MS
MV VALVE AREA P 1/2 METHOD: 2.49 CM2
PISA TR MAX VEL: 2.72 M/S
PULM VEIN S/D RATIO: 1.61
PV PEAK D VEL: 0.33 M/S
PV PEAK S VEL: 0.53 M/S
RA MAJOR: 4.68 CM
RA PRESSURE: 3 MMHG
RA WIDTH: 3.28 CM
RIGHT VENTRICULAR END-DIASTOLIC DIMENSION: 2.81 CM
SINUS: 3.07 CM
STJ: 2.82 CM
TDI LATERAL: 0.06 M/S
TDI SEPTAL: 0.04 M/S
TDI: 0.05 M/S
TR MAX PG: 30 MMHG
TRICUSPID ANNULAR PLANE SYSTOLIC EXCURSION: 2.58 CM
TV REST PULMONARY ARTERY PRESSURE: 33 MMHG

## 2022-02-02 PROCEDURE — 99215 PR OFFICE/OUTPT VISIT, EST, LEVL V, 40-54 MIN: ICD-10-PCS | Mod: S$PBB,,, | Performed by: INTERNAL MEDICINE

## 2022-02-02 PROCEDURE — 93306 ECHO (CUPID ONLY): ICD-10-PCS | Mod: 26,,, | Performed by: INTERNAL MEDICINE

## 2022-02-02 PROCEDURE — 99214 OFFICE O/P EST MOD 30 MIN: CPT | Mod: PBBFAC,25,PO | Performed by: INTERNAL MEDICINE

## 2022-02-02 PROCEDURE — 99999 PR PBB SHADOW E&M-EST. PATIENT-LVL IV: ICD-10-PCS | Mod: PBBFAC,,, | Performed by: INTERNAL MEDICINE

## 2022-02-02 PROCEDURE — 99999 PR PBB SHADOW E&M-EST. PATIENT-LVL IV: CPT | Mod: PBBFAC,,, | Performed by: INTERNAL MEDICINE

## 2022-02-02 PROCEDURE — 99215 OFFICE O/P EST HI 40 MIN: CPT | Mod: S$PBB,,, | Performed by: INTERNAL MEDICINE

## 2022-02-02 PROCEDURE — 93306 TTE W/DOPPLER COMPLETE: CPT | Mod: 26,,, | Performed by: INTERNAL MEDICINE

## 2022-02-02 PROCEDURE — 93306 TTE W/DOPPLER COMPLETE: CPT

## 2022-02-02 NOTE — PATIENT INSTRUCTIONS
Assessment/Plan:  Eugenie Coates is a 71 y.o. 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     1. BLE Edema and Pain- Likely due to RLE DVT, chronic venous insufficiency, and bilateral knee osteoarthritis. Continue bumex 1 mg bid on Mon, Wed, Fri. and 0.5 mg bid on other days. Start graduated compression hose.  Continue Eliquis 5 mg bid.  Pt to elevate legs when resting.  Limit sodium intake to 2,000 mg daily.  Limit volume intake to 1.5 liters daily.     2. RLE DVT with possible cellulitis- Appears unprovoked. Cellulitis resolved with doxycyline.  BLE Venous Ultrasound on 6/25/2021 revealed chronic appearing right superficial femoral vein DVT.  There is no evidence of a left lower extremity DVT.  Continue Eliquis 5 mg bid indefinitely.       HTN- Continue current medicatons.    3. HLD- Continue current regimen.    4. AFSHIN- Continue CPAP.    Follow up in 6 months

## 2022-02-02 NOTE — PROGRESS NOTES
"[facility]      Chief Complaint   Patient presents with    Undiagnosed cardiac murmurs       Patient ID: Eugenie Coates is a 71 y.o. female with HTN, HLD, history of SVT s/p ablation, venous insufficiency, history of RLE DVT in 11/2017, right breast cancer, SM2, GERD, AFSHIN, bilateral knee osteoarthritis, who presents for a follow up  appointment.  Pertinent history/events are as follows:     -Pt presents to establish care.    -Pt followed by Dr. Roberts and Yamilka Wilson PA-C for general cardiology issues.     -Pt followed by Dr. oJe for EP issues.     -At our initial clinic visit on 3/26/2021, Mrs. Coates reported leg swelling and pain in both knees for several years.  States her leg pain is constant.  Reports experiencing "a different pain in both upper shins when walking".  Reports having a "vascular surgery" performed twice on the right leg in 2003 and 2004 (pt unclear what was done).  She has no tissue loss.  Exam shows BLE's with trace to 1 pitting edema.  RLE with venous stasis dermatitis and diffuse scarring from healed ulcers.   Plan:   BLE Edema and Pain- Etiology multifactorial with contribution from chronic venous insufficiency, PAD, and bilateral knee osteoarthritis.  Check CTA abd/pelvis with iliofemoral runoff and BLE venous reflux study and segmental pressure study.  Pt to elevate legs when resting.  Limit sodium intake to 2,000 mg daily.  Limit volume intake to 1.5 liters daily.  Refer back to Ortho for bilateral knee osteoarthritis.    HTN- Continue current medicatons.  HLD- Continue current regimen.  History of RLE DVT- Check BLE venous reflux study.    AFSHIN- Continue CPAP.     Results Addendum 3/26/2021:  BLE venous reflux study done today revealed nonocclusive thrombus in the distal femoral and popliteal veins of the right leg.  Plan:  -Ideally would start Lovenox 1 mg/kg subcu q.12 hours given that the patient has breast cancer.  However, Mrs. Coates states she is " unable/unwilling to give herself the Lovenox shots.  Therefore, will start Eliquis 10 mg b.i.d. for 7 days then 5 mg b.i.d., thereafter.  -Results and plan discussed in detail with Mrs. Coates, who voiced understanding.    -At follow up clinic visit on 4/30/2021, Mrs. oCates reported continued leg swelling.  Exam shows BLE's with 1 pitting edema and mild warmth.  BLE Venous Ultrasound on 3/26/2021 revealed right SFV/popliteal DVT.  No evidence of left lower extremity DVT.  There is significant BLE venous reflux.  EDOUARD Study on 3/31/2021 revealed normal resting EDOUARD's.  Plan:   BLE Edema and Pain- Likely due to RLE DVT, chronic venous insufficiency, and bilateral knee osteoarthritis.  BLE Venous Ultrasound on 3/26/2021 revealed right SFV/popliteal DVT.  No evidence of left lower extremity DVT.  There is significant BLE venous reflux.  EDOUARD Study on 3/31/2021 revealed normal resting EDOUARD's.  Continue Eliquis 5 mg bid.  Start bumex 5 mg bid.  Pt to elevate legs when resting.  Limit sodium intake to 2,000 mg daily.  Limit volume intake to 1.5 liters daily.  Refer back to Ortho for bilateral knee osteoarthritis.    RLE DVT with possible cellulitis- Appears unprovoked.  Treat with doxycycline 100 mg bid for 14 days.  Continue Eliquis 5 mg bid indefinitely.     HTN- Continue current medicatons.  HLD- Continue current regimen.  AFSHIN- Continue CPAP.    Incidentally Found Pancreatic Cyst- Talked with Dr. Randy Rankin of GI in detail.  Mrs. Coates needs to have EUS-FNA performed to further evaluate incidentally found pancreatic cyst.  This is complicated by the presence of active RLE DVT, for which she is taking Eliquis.  Will repeat BLE venous ultrasound on 6/26/2021 to evaluate for resolution of right lower extremity DVT.  If DVT has resolved, will hold Eliquis for 48 hr and proceed with EUS-FNA.     -At clinic visit on 7/9/2021, Mrs. Coates reported continued leg swelling.  She has not yet started taking bumex 0.5 mg bid.  BLE Venous  Ultrasound on 6/25/2021 revealed chronic appearing right superficial femoral vein DVT.  There is no evidence of a left lower extremity DVT.  Plan:   BLE Edema and Pain- Likely due to RLE DVT, chronic venous insufficiency, and bilateral knee osteoarthritis.  BLE Venous Ultrasound on 3/26/2021 revealed right SFV/popliteal DVT.  No evidence of left lower extremity DVT.  There is significant BLE venous reflux.  DEOUARD Study on 3/31/2021 revealed normal resting EDOUARD's.  Continue Eliquis 5 mg bid.  Start bumex 0.5 mg bid.  Pt to elevate legs when resting.  Limit sodium intake to 2,000 mg daily.  Limit volume intake to 1.5 liters daily.  Pt now followed by  Ortho for bilateral knee osteoarthritis.    RLE DVT with possible cellulitis- Appears unprovoked. BLE Venous Ultrasound on 6/25/2021 revealed chronic appearing right superficial femoral vein DVT.  There is no evidence of a left lower extremity DVT.  Treat with doxycycline 100 mg bid for 14 days.  Continue Eliquis 5 mg bid indefinitely.     HTN- Continue current medicatons.  HLD- Continue current regimen.  History of RLE DVT- Continue Eliquis 5 mg bid.      AFSHIN- Continue CPAP.    Incidentally Found Pancreatic Cyst- Talked with Dr. Randy Rankin of GI in detail.  Mrs. Coates needs to have EUS-FNA performed to further evaluate incidentally found pancreatic cyst.  This is complicated by the presence of active RLE DVT, for which she is taking Eliquis.  BLE Venous Ultrasound on 6/25/2021 revealed chronic appearing right superficial femoral vein DVT.  There is no evidence of a left lower extremity DVT.  OK to hold eliquis for 48 hours and proceed with EUS-FNA. Restart ASAP after the procedure.      10/27/2021 clinic visit: Mrs. Coates reports improvement in BLE edema since clinic visit on 7/9/2021.  She has no chest pain or SOB.    Plan:  BLE Edema and Pain- Likely due to RLE DVT, chronic venous insufficiency, and bilateral knee osteoarthritis.  Edema improving.  Treat with doxycycline  100 mg bid for 21 days for early cellulitis.  Increase bumex to 1 mg bid on Mon, Wed, Fri. and 0.5 mg bid on other days.  Will start graduated compression hose after cellulitis has fully resolved.  Continue Eliquis 5 mg bid.  Pt to elevate legs when resting.  Limit sodium intake to 2,000 mg daily.  Limit volume intake to 1.5 liters daily.  Pt now followed by  Ortho for bilateral knee osteoarthritis.    RLE DVT with possible cellulitis- Appears unprovoked. BLE Venous Ultrasound on 2021 revealed chronic appearing right superficial femoral vein DVT.  There is no evidence of a left lower extremity DVT.  Continue Eliquis 5 mg bid indefinitely.     HTN- Continue current medicatons.  HLD- Continue current regimen.  History of RLE DVT- Check BLE venous reflux study.    AFSHIN- Continue CPAP.      HPI:  Mrs. Coates reports feeling well. Her leg swelling and redness has improved. Otherwise she has no new complaints. She is requesting compression stockings.    Past Medical History:   Diagnosis Date    Allergy     Anemia     Anxiety     Arthritis     Asthma     Bell palsy     Bilateral lower extremity edema     Chronic diastolic CHF (congestive heart failure)     Depression     Diabetes mellitus, type 2     DVT (deep venous thrombosis)     right LE    Dyslipidemia     Endometriosis     Eye injury     stuck with tree branch od ?     Focal seizure 5/3/2021    GERD (gastroesophageal reflux disease)     Glaucoma     History of uterine fibroid     Hyperlipidemia     Hypertension     Invasive lobular carcinoma of right breast in female     s/p surgery, radiation, tamoxifen    Nuclear sclerosis of both eyes 2016    Obesity     Pancreas cyst     Sleep apnea     uses C pap    Supraventricular tachycardia     SVT (supraventricular tachycardia) 2019    Thyroid disease     Venous insufficiency      Past Surgical History:   Procedure Laterality Date     SECTION      CHOLECYSTECTOMY       CRANIOTOMY FOR EXCISION OF INTRACRANIAL TUMOR Left 5/4/2021    Procedure: CRANIOTOMY, FOR INTRACRANIAL NEOPLASM EXCISION Left craniotomy for tumor resection;  Surgeon: Hernandez Bravo MD;  Location: [facility];  Service: Neurosurgery;  Laterality: Left;    glaucoma laser Bilateral     HERNIA REPAIR      KNEE SURGERY Right 2008    (R) knee scope; torn meniscus    MASTECTOMY, PARTIAL Right 12/7/2020    Procedure: MASTECTOMY, PARTIAL-Right with radiological marker Consent day of surgery; Neoprobe, technitium, Frozen;  Surgeon: Narda Keating MD;  Location: [facility] OR [address] [place] [address];  Service: General;  Laterality: Right;    SENTINEL LYMPH NODE BIOPSY Right 12/7/2020    Procedure: BIOPSY, LYMPH NODE, SENTINEL;  Surgeon: Narda Keating MD;  Location: [facility] OR [address] [place] [address];  Service: General;  Laterality: Right;    TOTAL REDUCTION MAMMOPLASTY Bilateral 12/7/2020    Procedure: MAMMOPLASTY, REDUCTION-Bilateral;  Surgeon: Zack Hood MD;  Location: [address];  Service: Plastics;  Laterality: Bilateral;    VEIN SURGERY  2003, 2004    Rt leg x2     Social History     Socioeconomic History    Marital status:    Tobacco Use    Smoking status: Never Smoker    Smokeless tobacco: Never Used   Substance and Sexual Activity    Alcohol use: Never     Alcohol/week: 0.0 standard drinks    Drug use: Never    Sexual activity: Yes     Partners: Male     Birth control/protection: None   Social History Narrative    1/18/19: lives with her . They have multiple children, but the youngest is 38. No children at home right now. No pets at home. Splits time between here and [place]. Her son lives in [place].      Family History   Problem Relation Age of Onset    Diabetes Mother     No Known Problems Father     No Known Problems Sister     No Known Problems Brother     Colon cancer Maternal Aunt     No Known Problems Maternal Uncle     No Known Problems Paternal Aunt     No Known Problems Paternal Uncle     No  Known Problems Maternal Grandmother     No Known Problems Maternal Grandfather     No Known Problems Paternal Grandmother     No Known Problems Paternal Grandfather     Amblyopia Neg Hx     Blindness Neg Hx     Cancer Neg Hx     Cataracts Neg Hx     Glaucoma Neg Hx     Hypertension Neg Hx     Macular degeneration Neg Hx     Retinal detachment Neg Hx     Strabismus Neg Hx     Stroke Neg Hx     Thyroid disease Neg Hx     Celiac disease Neg Hx     Colon polyps Neg Hx     Esophageal cancer Neg Hx     Inflammatory bowel disease Neg Hx     Irritable bowel syndrome Neg Hx     Liver cancer Neg Hx     Liver disease Neg Hx     Rectal cancer Neg Hx     Stomach cancer Neg Hx     Ulcerative colitis Neg Hx     Cystic fibrosis Neg Hx     Crohn's disease Neg Hx     Hemochromatosis Neg Hx     Cirrhosis Neg Hx        Review of patient's allergies indicates:   Allergen Reactions    Oxycodone hcl-oxycodone-asa Hives, Itching and Other (See Comments)       Medication List with Changes/Refills   Current Medications    APIXABAN (ELIQUIS) 5 MG TAB    Take 1 tablet (5 mg total) by mouth 2 (two) times daily. DO NOT START UNTIL 5/11    B COMPLEX VITAMINS TABLET    Take 1 tablet by mouth once daily.    BLOOD SUGAR DIAGNOSTIC STRP    Test twice daily.  AccuchTactilize viva test strips and lancets.    BUMETANIDE (BUMEX) 1 MG TABLET    Take 1 tablet (1 mg total) by mouth 2 (two) times daily.    CALCIUM CARBONATE/VITAMIN D3 (CALCIUM WITH VITAMIN D3 ORAL)    Take 1 tablet by mouth Daily.     DORZOLAMIDE-TIMOLOL 2-0.5% (COSOPT) 22.3-6.8 MG/ML OPHTHALMIC SOLUTION    Place 1 drop into both eyes every 12 (twelve) hours.    EZETIMIBE (ZETIA) 10 MG TABLET    Take 1 tablet (10 mg total) by mouth once daily.    GABAPENTIN (NEURONTIN) 300 MG CAPSULE    Take 1 capsule (300 mg total) by mouth 2 (two) times daily.    LANCETS (ACCU-CHEK SOFTCLIX LANCETS) MISC    1 Units by Misc.(Non-Drug; Combo Route) route 2 (two) times daily.     "LATANOPROST 0.005 % OPHTHALMIC SOLUTION    Place 1 drop into both eyes every evening.    LEVETIRACETAM (KEPPRA) 1000 MG TABLET    Take 1,000 mg by mouth 2 (two) times daily.    LEVETIRACETAM (KEPPRA) 500 MG TAB    Take 2 tablets (1,000 mg total) by mouth 2 (two) times daily.    LISINOPRIL (PRINIVIL,ZESTRIL) 40 MG TABLET    Take 40 mg by mouth once daily.    METFORMIN (GLUCOPHAGE) 500 MG TABLET    Take 1 tablet (500 mg total) by mouth daily with breakfast.    METOPROLOL TARTRATE (LOPRESSOR) 25 MG TABLET    TAKE 1 TABLET(25 MG) BY MOUTH TWICE DAILY    NIFEDIPINE (ADALAT CC) 60 MG TBSR    Take 1 tablet (60 mg total) by mouth once daily.    PANTOPRAZOLE (PROTONIX) 40 MG TABLET    Take 40 mg by mouth 2 (two) times daily.    ROSUVASTATIN (CRESTOR) 40 MG TAB    TAKE 1 TABLET(40 MG) BY MOUTH EVERY EVENING       Review of Systems  Constitution: Denies chills, fever, and sweats.  HENT: Denies headaches or blurry vision.  Cardiovascular: Denies chest pain or irregular heart beat.  Respiratory: Denies cough or shortness of breath.  Gastrointestinal: Denies abdominal pain, nausea, or vomiting.  Musculoskeletal: Denies muscle cramps.  Neurological: Denies dizziness or focal weakness.  Psychiatric/Behavioral: Normal mental status.  Hematologic/Lymphatic: Denies bleeding problem or easy bruising/bleeding.  Skin: Denies rash or suspicious lesions    Physical Examination  /64   Pulse 86   Ht 5' 2.5" (1.588 m)   Wt 81.4 kg (179 lb 7.3 oz)   SpO2 98%   BMI 32.30 kg/m²     Constitutional: No acute distress, conversant  HEENT: Sclera anicteric, Pupils equal, round and reactive to light, extraocular motions intact, Oropharynx clear  Neck: No JVD, no carotid bruits  Cardiovascular: regular rate and rhythm, no murmur, rubs or gallops, normal S1/S2  Pulmonary: Clear to auscultation bilaterally  Abdominal: Abdomen soft, nontender, nondistended, positive bowel sounds  Extremities: Exam shows BLE's with trace to 1 pitting edema.  RLE " with venous stasis dermatitis and diffuse scarring from healed ulcers.    Pulses:  Carotid pulses are 2+ on the right side, and 2+ on the left side.  Radial pulses are 2+ on the right side, and 2+ on the left side.   Femoral pulses are 2+ on the right side, and 2+ on the left side.  Popliteal pulses are 2+ on the right side, and 2+ on the left side.   Dorsalis pedis pulses are 2+ on the right side, and 2+ on the left side.   Posterior tibial pulses are 2+ on the right side, and 2+ on the left side.    Skin: No ecchymosis, erythema, or ulcers  Psych: Alert and oriented x 3, appropriate affect  Neuro: CNII-XII intact, no focal deficits    Labs:  Most Recent Data  CBC:   Lab Results   Component Value Date    WBC 7.68 12/30/2021    HGB 13.6 12/30/2021    HCT 43.1 12/30/2021     12/30/2021    MCV 86 12/30/2021    RDW 12.9 12/30/2021     BMP:   Lab Results   Component Value Date     12/30/2021    K 4.1 12/30/2021     12/30/2021    CO2 26 12/30/2021    BUN 13 12/30/2021    CREATININE 0.8 12/30/2021    GLU 95 12/30/2021    CALCIUM 9.9 12/30/2021    MG 1.9 05/03/2021    PHOS 3.2 02/22/2019     LFTS;   Lab Results   Component Value Date    PROT 7.6 12/30/2021    ALBUMIN 3.8 12/30/2021    BILITOT 0.7 12/30/2021    AST 18 12/30/2021    ALKPHOS 134 12/30/2021    ALT 16 12/30/2021     COAGS:   Lab Results   Component Value Date    INR 1.1 05/03/2021     FLP:   Lab Results   Component Value Date    CHOL 211 (H) 12/30/2021    HDL 46 12/30/2021    LDLCALC 129.6 12/30/2021    TRIG 177 (H) 12/30/2021    CHOLHDL 21.8 12/30/2021     CARDIAC:   Lab Results   Component Value Date    TROPONINI 0.012 05/14/2019       BLE Venous Ultrasound 6/25/2021:  · There is chronic appearing right superficial femoral vein DVT.  · There is no evidence of a left lower extremity DVT.    BLE Venous Ultrasound 3/26/2021:  Right SFV/popliteal DVT.  No evidence of left lower extremity DVT.  Bilateral EIV.CFV reflux (deep venous  insufficiency)  R GSV reflux (above knee segment).    EDOUARD Study 3/31/2021:  · Did not obtain segmental pressures due to recently imaged right femoral and popliteal vein thrombus.  · Pt unable to perform exercise, ambulates with assistance of cane.  · Normal resting EDOUARD's.    Echo 8/21/2018:    1 - Normal left ventricular systolic function (EF 60-65%).     2 - Normal right ventricular systolic function .     3 - The estimated PA systolic pressure is 34 mmHg.     4 - Impaired LV relaxation, increased LVEDP.     Assessment/Plan:  Eugenie Coates is a 71 y.o. 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     1. BLE Edema and Pain- Likely due to RLE DVT, chronic venous insufficiency, and bilateral knee osteoarthritis. Continue bumex 1 mg bid on Mon, Wed, Fri. and 0.5 mg bid on other days. Start graduated compression hose.  Continue Eliquis 5 mg bid.  Pt to elevate legs when resting.  Limit sodium intake to 2,000 mg daily.  Limit volume intake to 1.5 liters daily.     2. RLE DVT with possible cellulitis- Appears unprovoked. Cellulitis resolved with doxycyline.  BLE Venous Ultrasound on 6/25/2021 revealed chronic appearing right superficial femoral vein DVT.  There is no evidence of a left lower extremity DVT.  Continue Eliquis 5 mg bid indefinitely.       HTN- Continue current medicatons.    3. HLD- Continue current regimen.    4. AFSHIN- Continue CPAP.      Follow up in 6 months    Total duration of face to face visit time 45 minutes.  Total time spent counseling greater than fifty percent of total visit time.  Counseling included discussion regarding imaging findings, diagnosis, possibilities, treatment options, risks and benefits.  The patient had many questions regarding the options and long-term effects.    Jl Emmanuel MD  Vascular Medicine  Fellow    Patient seen and discussed with Dr. Arizmendi

## 2022-02-09 ENCOUNTER — TELEPHONE (OUTPATIENT)
Dept: NEUROSURGERY | Facility: CLINIC | Age: 72
End: 2022-02-09
Payer: MEDICARE

## 2022-02-09 DIAGNOSIS — D42.0 ATYPICAL INTRACRANIAL MENINGIOMA: Primary | ICD-10-CM

## 2022-02-09 RX ORDER — DIAZEPAM 5 MG/1
5 TABLET ORAL ONCE
Qty: 1 TABLET | Refills: 0 | Status: SHIPPED | OUTPATIENT
Start: 2022-02-09 | End: 2022-04-21

## 2022-02-09 NOTE — TELEPHONE ENCOUNTER
----- Message from Bárbara Perales sent at 2/9/2022  1:22 PM CST -----  Regarding: Return Call  Contact: patient  Type:  Patient Returning Call    Who Called:  Patient  Who Left Message for Patient:  Ann  Does the patient know what this is regarding?:  Upcoming Appointments  Best Call Back Number:  [phone] (home) [phone] (work)    [id number] [address] AT [facility] [address]  [address]  [facility] [place] [address]  Phone: [phone] Fax: [phone]    Additional Information:  Patient called back in I relayed her appointment date time and address to her. Patient is requesting a return call from Ann to advise and discuss Dr. Bravo prescribing her a medication for her MRI. Please contact the patient to advise. Thanks!

## 2022-02-22 ENCOUNTER — OFFICE VISIT (OUTPATIENT)
Dept: OPHTHALMOLOGY | Facility: CLINIC | Age: 72
End: 2022-02-22
Payer: MEDICARE

## 2022-02-22 DIAGNOSIS — H25.813 COMBINED FORMS OF AGE-RELATED CATARACT, BILATERAL: ICD-10-CM

## 2022-02-22 DIAGNOSIS — E11.9 DIABETES MELLITUS TYPE 2 WITHOUT RETINOPATHY: ICD-10-CM

## 2022-02-22 DIAGNOSIS — H40.1131 PRIMARY OPEN-ANGLE GLAUCOMA, BILATERAL, MILD STAGE: Primary | ICD-10-CM

## 2022-02-22 PROCEDURE — 92014 PR EYE EXAM, EST PATIENT,COMPREHESV: ICD-10-PCS | Mod: S$PBB,,, | Performed by: OPHTHALMOLOGY

## 2022-02-22 PROCEDURE — 92133 POSTERIOR SEGMENT OCT OPTIC NERVE(OCULAR COHERENCE TOMOGRAPHY) - OU - BOTH EYES: ICD-10-PCS | Mod: 26,S$PBB,, | Performed by: OPHTHALMOLOGY

## 2022-02-22 PROCEDURE — 92014 COMPRE OPH EXAM EST PT 1/>: CPT | Mod: S$PBB,,, | Performed by: OPHTHALMOLOGY

## 2022-02-22 PROCEDURE — 99213 OFFICE O/P EST LOW 20 MIN: CPT | Mod: PBBFAC,PO | Performed by: OPHTHALMOLOGY

## 2022-02-22 PROCEDURE — 99999 PR PBB SHADOW E&M-EST. PATIENT-LVL III: CPT | Mod: PBBFAC,,, | Performed by: OPHTHALMOLOGY

## 2022-02-22 PROCEDURE — 92133 CPTRZD OPH DX IMG PST SGM ON: CPT | Mod: PBBFAC,PO | Performed by: OPHTHALMOLOGY

## 2022-02-22 PROCEDURE — 99999 PR PBB SHADOW E&M-EST. PATIENT-LVL III: ICD-10-PCS | Mod: PBBFAC,,, | Performed by: OPHTHALMOLOGY

## 2022-02-22 NOTE — PROGRESS NOTES
HPI     Pt here today for 3 month IOP, DFE & OCT nerve.  States no visual changes   or complaints since last exam.  Denies any eye pain or pressure.   No light flashes or floaters.    Good compliance with Dorzolamide OU bid & Latanoprost OU qhs.    Last edited by Zaida Luna on 2/22/2022  9:28 AM. (History)            Assessment /Plan     For exam results, see Encounter Report.    Primary open-angle glaucoma, bilateral, mild stage    Combined forms of age-related cataract, bilateral    Diabetes mellitus type 2 without retinopathy      1. 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 Combined forms of age-related cataract, bilateral  OS>OD  Consider CE MIGs in time    3. Diabetes mellitus type 2 without retinopathy  Diabetes without retinopathy, discussed with patient importance of glucose control and follow up.  Patient voices understanding.

## 2022-02-26 DIAGNOSIS — D42.0 ATYPICAL INTRACRANIAL MENINGIOMA: Primary | ICD-10-CM

## 2022-03-16 ENCOUNTER — OFFICE VISIT (OUTPATIENT)
Dept: NEUROSURGERY | Facility: CLINIC | Age: 72
End: 2022-03-16
Payer: MEDICARE

## 2022-03-16 VITALS
DIASTOLIC BLOOD PRESSURE: 79 MMHG | HEART RATE: 82 BPM | BODY MASS INDEX: 31.79 KG/M2 | SYSTOLIC BLOOD PRESSURE: 145 MMHG | WEIGHT: 179.44 LBS | RESPIRATION RATE: 18 BRPM | HEIGHT: 63 IN

## 2022-03-16 DIAGNOSIS — D42.0 ATYPICAL INTRACRANIAL MENINGIOMA: Primary | ICD-10-CM

## 2022-03-16 PROCEDURE — 99215 PR OFFICE/OUTPT VISIT, EST, LEVL V, 40-54 MIN: ICD-10-PCS | Mod: S$PBB,,, | Performed by: NEUROLOGICAL SURGERY

## 2022-03-16 PROCEDURE — 99215 OFFICE O/P EST HI 40 MIN: CPT | Mod: S$PBB,,, | Performed by: NEUROLOGICAL SURGERY

## 2022-03-16 NOTE — PROGRESS NOTES
Neurosurgery History & Physical    Patient ID: Eugenie Coates is a 71 y.o. female.    Chief Complaint   Patient presents with    Follow-up     6 month follow up with imaging, atypical intracranial meningioma. Patient has no complaints.       History of Present Illness:   Ms. Coates is a 71 year old female who is 10 months s/p left frontal craniotomy for meningioma resection (5/2021). Pathology demonstrates meningioma, WHO grade II.  Preoperatively, she reported progressive right hemiparesis, focal seizures in her right hand.     She is doing well. She has regained most of her strength on the right side. She is now ambulatory without assistive device. Denies falls. She denies new headache, weakness, numbness other other neurologic symptom. Denies further seizure activity. She is still taking Keppra.     She was evaluated by Dr. Barreto in Rad/Onc. Options for radiation were discussed. She opted against radiation at that time.     Review of Systems   Constitutional: Negative for activity change, chills and fever.   HENT: Negative for trouble swallowing.    Eyes: Negative for visual disturbance.   Respiratory: Negative for chest tightness and shortness of breath.    Cardiovascular: Negative for chest pain.   Gastrointestinal: Negative for nausea.   Genitourinary: Negative for difficulty urinating and frequency.   Musculoskeletal: Positive for gait problem.   Skin: Positive for wound (well healed craniotomy incision).   Neurological: Negative for weakness, numbness and headaches.   Psychiatric/Behavioral: Negative for confusion.       Past Medical History:   Diagnosis Date    Allergy     Anemia     Anxiety     Arthritis     Asthma     Bell palsy     Bilateral lower extremity edema     Chronic diastolic CHF (congestive heart failure)     Depression     Diabetes mellitus, type 2     DVT (deep venous thrombosis)     right LE    Dyslipidemia     Endometriosis     Eye injury 2014    stuck with tree branch  od ?     Focal seizure 5/3/2021    GERD (gastroesophageal reflux disease)     Glaucoma     History of uterine fibroid     Hyperlipidemia     Hypertension     Invasive lobular carcinoma of right breast in female     s/p surgery, radiation, tamoxifen    Nuclear sclerosis of both eyes 9/27/2016    Obesity     Pancreas cyst     Sleep apnea     uses C pap    Supraventricular tachycardia     SVT (supraventricular tachycardia) 05/2019    Thyroid disease     Venous insufficiency      Social History     Socioeconomic History    Marital status:    Tobacco Use    Smoking status: Never Smoker    Smokeless tobacco: Never Used   Substance and Sexual Activity    Alcohol use: Never     Alcohol/week: 0.0 standard drinks    Drug use: Never    Sexual activity: Yes     Partners: Male     Birth control/protection: None   Social History Narrative    1/18/19: lives with her . They have multiple children, but the youngest is 38. No children at home right now. No pets at home. Splits time between here and [place]. Her son lives in [place].      Family History   Problem Relation Age of Onset    Diabetes Mother     No Known Problems Father     No Known Problems Sister     No Known Problems Brother     Colon cancer Maternal Aunt     No Known Problems Maternal Uncle     No Known Problems Paternal Aunt     No Known Problems Paternal Uncle     No Known Problems Maternal Grandmother     No Known Problems Maternal Grandfather     No Known Problems Paternal Grandmother     No Known Problems Paternal Grandfather     Amblyopia Neg Hx     Blindness Neg Hx     Cancer Neg Hx     Cataracts Neg Hx     Glaucoma Neg Hx     Hypertension Neg Hx     Macular degeneration Neg Hx     Retinal detachment Neg Hx     Strabismus Neg Hx     Stroke Neg Hx     Thyroid disease Neg Hx     Celiac disease Neg Hx     Colon polyps Neg Hx     Esophageal cancer Neg Hx     Inflammatory bowel disease Neg Hx      Irritable bowel syndrome Neg Hx     Liver cancer Neg Hx     Liver disease Neg Hx     Rectal cancer Neg Hx     Stomach cancer Neg Hx     Ulcerative colitis Neg Hx     Cystic fibrosis Neg Hx     Crohn's disease Neg Hx     Hemochromatosis Neg Hx     Cirrhosis Neg Hx      Review of patient's allergies indicates:   Allergen Reactions    Oxycodone hcl-oxycodone-asa Hives, Itching and Other (See Comments)       Current Outpatient Medications:     apixaban (ELIQUIS) 5 mg Tab, Take 1 tablet (5 mg total) by mouth 2 (two) times daily. DO NOT START UNTIL 5/11, Disp: , Rfl:     b complex vitamins tablet, Take 1 tablet by mouth once daily., Disp: , Rfl:     blood sugar diagnostic Strp, Test twice daily.  Accucheck viva test strips and lancets., Disp: 300 each, Rfl: 3    bumetanide (BUMEX) 1 MG tablet, Take 1 tablet (1 mg total) by mouth 2 (two) times daily., Disp: 180 tablet, Rfl: 3    calcium carbonate/vitamin D3 (CALCIUM WITH VITAMIN D3 ORAL), Take 1 tablet by mouth Daily. , Disp: , Rfl:     ezetimibe (ZETIA) 10 mg tablet, Take 1 tablet (10 mg total) by mouth once daily., Disp: 90 tablet, Rfl: 3    gabapentin (NEURONTIN) 300 MG capsule, Take 1 capsule (300 mg total) by mouth 2 (two) times daily., Disp: 60 capsule, Rfl: 0    lancets (ACCU-CHEK SOFTCLIX LANCETS) Misc, 1 Units by Misc.(Non-Drug; Combo Route) route 2 (two) times daily., Disp: 300 each, Rfl: 3    latanoprost 0.005 % ophthalmic solution, Place 1 drop into both eyes every evening., Disp: 2.5 mL, Rfl: 12    levETIRAcetam (KEPPRA) 1000 MG tablet, Take 1,000 mg by mouth 2 (two) times daily., Disp: , Rfl:     levETIRAcetam (KEPPRA) 500 MG Tab, Take 2 tablets (1,000 mg total) by mouth 2 (two) times daily., Disp: 120 tablet, Rfl: 1    lisinopriL (PRINIVIL,ZESTRIL) 40 MG tablet, Take 40 mg by mouth once daily., Disp: , Rfl:     metFORMIN (GLUCOPHAGE) 500 MG tablet, Take 1 tablet (500 mg total) by mouth daily with breakfast., Disp: 90 tablet, Rfl: 3     "metoprolol tartrate (LOPRESSOR) 25 MG tablet, TAKE 1 TABLET(25 MG) BY MOUTH TWICE DAILY, Disp: 180 tablet, Rfl: 3    NIFEdipine (ADALAT CC) 60 MG TbSR, Take 1 tablet (60 mg total) by mouth once daily., Disp: 90 tablet, Rfl: 3    pantoprazole (PROTONIX) 40 MG tablet, Take 40 mg by mouth 2 (two) times daily., Disp: , Rfl:     rosuvastatin (CRESTOR) 40 MG Tab, TAKE 1 TABLET(40 MG) BY MOUTH EVERY EVENING, Disp: 90 tablet, Rfl: 3    diazePAM (VALIUM) 5 MG tablet, Take 1 tablet (5 mg total) by mouth once. Take one tablet PO one hour prior to imaging. for 1 dose, Disp: 1 tablet, Rfl: 0    dorzolamide-timolol 2-0.5% (COSOPT) 22.3-6.8 mg/mL ophthalmic solution, Place 1 drop into both eyes every 12 (twelve) hours., Disp: 10 mL, Rfl: 11  No current facility-administered medications for this visit.  Blood pressure (!) 145/79, pulse 82, resp. rate 18, height 5' 2.5" (1.588 m), weight 81.4 kg (179 lb 7.3 oz).      Neurologic Exam     Mental Status   Oriented to person, place, and time.   Level of consciousness: alert    Cranial Nerves     CN III, IV, VI   Pupils are equal, round, and reactive to light.  Extraocular motions are normal.     Motor Exam   Muscle bulk: normal  Overall muscle tone: normal  Strength grossly intact throughout.     Sensory Exam   Light touch normal.       Physical Exam  Vitals and nursing note reviewed.   Constitutional:       Appearance: She is well-developed.   HENT:      Head: Normocephalic and atraumatic.   Eyes:      Extraocular Movements: EOM normal.      Pupils: Pupils are equal, round, and reactive to light.   Pulmonary:      Effort: Pulmonary effort is normal.   Skin:     General: Skin is warm and dry.   Neurological:      Mental Status: She is alert and oriented to person, place, and time.      Deep Tendon Reflexes: Reflexes are normal and symmetric.       Imaging:   MRI brain dated 3/16/22 personally reviewed and discussed with the patient.   Evolving postoperative changes of left parietal " craniotomy for resection of underlying left frontoparietal convexity meningioma.  Minimal decrease in size of nodular enhancing tissue along the medial margin of the resection cavity abutting the superior sagittal sinus compared to study from 08/25/2021.  Other areas of linear enhancement along the margins of the resection cavity have decreased compared to prior study.  Small amount of encephalomalacia and FLAIR signal hyperintensity along the margins of the resection cavity are unchanged.  Thin extra-axial collection underlying the craniotomy site is minimally decreased in size.  No new nodular enhancement is demonstrated in the region of the resection cavity.  No associated significant intracranial mass effect.    Assessment & Plan:   71 year old female who is 10 months s/p left frontal craniotomy for resection of meningioma, WHO grade II. Will continue to monitor with repeat imaging once more in 6 months with repeat MRI brain w/wo contrast. She is agreeable to plan.

## 2022-03-22 ENCOUNTER — PATIENT MESSAGE (OUTPATIENT)
Dept: CARDIOLOGY | Facility: CLINIC | Age: 72
End: 2022-03-22
Payer: MEDICARE

## 2022-04-19 ENCOUNTER — OFFICE VISIT (OUTPATIENT)
Dept: PODIATRY | Facility: CLINIC | Age: 72
End: 2022-04-19
Payer: MEDICARE

## 2022-04-19 VITALS — BODY MASS INDEX: 31.79 KG/M2 | HEIGHT: 63 IN | WEIGHT: 179.44 LBS

## 2022-04-19 DIAGNOSIS — M20.42 HAMMER TOES OF BOTH FEET: ICD-10-CM

## 2022-04-19 DIAGNOSIS — M20.41 HAMMER TOES OF BOTH FEET: ICD-10-CM

## 2022-04-19 DIAGNOSIS — E11.65 TYPE 2 DIABETES MELLITUS WITH HYPERGLYCEMIA, WITHOUT LONG-TERM CURRENT USE OF INSULIN: Primary | ICD-10-CM

## 2022-04-19 DIAGNOSIS — B35.1 ONYCHOMYCOSIS DUE TO DERMATOPHYTE: ICD-10-CM

## 2022-04-19 PROCEDURE — 11721 DEBRIDE NAIL 6 OR MORE: CPT | Mod: PBBFAC,PN | Performed by: PODIATRIST

## 2022-04-19 PROCEDURE — 99213 OFFICE O/P EST LOW 20 MIN: CPT | Mod: 25,S$PBB,, | Performed by: PODIATRIST

## 2022-04-19 PROCEDURE — 99999 PR PBB SHADOW E&M-EST. PATIENT-LVL III: CPT | Mod: PBBFAC,,, | Performed by: PODIATRIST

## 2022-04-19 PROCEDURE — 99213 OFFICE O/P EST LOW 20 MIN: CPT | Mod: PBBFAC,PN | Performed by: PODIATRIST

## 2022-04-19 PROCEDURE — 11721 DEBRIDE NAIL 6 OR MORE: CPT | Mod: Q9,S$PBB,, | Performed by: PODIATRIST

## 2022-04-19 PROCEDURE — 11721 PR DEBRIDEMENT OF NAILS, 6 OR MORE: ICD-10-PCS | Mod: Q9,S$PBB,, | Performed by: PODIATRIST

## 2022-04-19 PROCEDURE — 99999 PR PBB SHADOW E&M-EST. PATIENT-LVL III: ICD-10-PCS | Mod: PBBFAC,,, | Performed by: PODIATRIST

## 2022-04-19 PROCEDURE — 99213 PR OFFICE/OUTPT VISIT, EST, LEVL III, 20-29 MIN: ICD-10-PCS | Mod: 25,S$PBB,, | Performed by: PODIATRIST

## 2022-04-19 NOTE — PROGRESS NOTES
Subjective:      Patient ID: Eugenie Coates is a 72 y.o. female.    Chief Complaint: No chief complaint on file.    Eugenie is a 72 y.o. female who presents to the clinic for routine evaluation and treatment of diabetic feet. Eugenie has a past medical history of Allergy, Anemia, Anxiety, Arthritis, Asthma, Bell palsy, Bilateral lower extremity edema, Chronic diastolic CHF (congestive heart failure), Depression, Diabetes mellitus, type 2, DVT (deep venous thrombosis), Dyslipidemia, Endometriosis, Eye injury (2014), Focal seizure (5/3/2021), GERD (gastroesophageal reflux disease), Glaucoma, History of uterine fibroid, Hyperlipidemia, Hypertension, Invasive lobular carcinoma of right breast in female, Nuclear sclerosis of both eyes (9/27/2016), Obesity, Pancreas cyst, Sleep apnea, Supraventricular tachycardia, SVT (supraventricular tachycardia) (05/2019), Thyroid disease, and Venous insufficiency. Patient relates that her toenails are in need of trimming.  Denies being painful with wearing shoe gear.  Has not attempted to self treat.  Relates continued control over her blood glucose.  Denies experiencing overt neuropathy pain with today's exam.  Denies any additional pedal complaints.      PCP: Shashi Brown MD    Date Last Seen by PCP: 12/21     Hemoglobin A1C   Date Value Ref Range Status   12/30/2021 6.3 (H) 4.0 - 5.6 % Final     Comment:     ADA Screening Guidelines:  5.7-6.4%  Consistent with prediabetes  >or=6.5%  Consistent with diabetes    High levels of fetal hemoglobin interfere with the HbA1C  assay. Heterozygous hemoglobin variants (HbS, HgC, etc)do  not significantly interfere with this assay.   However, presence of multiple variants may affect accuracy.     09/09/2021 6.3 (H) 4.0 - 5.6 % Final     Comment:     ADA Screening Guidelines:  5.7-6.4%  Consistent with prediabetes  >or=6.5%  Consistent with diabetes    High levels of fetal hemoglobin interfere with the HbA1C  assay. Heterozygous  hemoglobin variants (HbS, HgC, etc)do  not significantly interfere with this assay.   However, presence of multiple variants may affect accuracy.     2021 6.4 (H) 4.0 - 5.6 % Final     Comment:     ADA Screening Guidelines:  5.7-6.4%  Consistent with prediabetes  >or=6.5%  Consistent with diabetes    High levels of fetal hemoglobin interfere with the HbA1C  assay. Heterozygous hemoglobin variants (HbS, HgC, etc)do  not significantly interfere with this assay.   However, presence of multiple variants may affect accuracy.             Past Medical History:   Diagnosis Date    Allergy     Anemia     Anxiety     Arthritis     Asthma     Bell palsy     Bilateral lower extremity edema     Chronic diastolic CHF (congestive heart failure)     Depression     Diabetes mellitus, type 2     DVT (deep venous thrombosis)     right LE    Dyslipidemia     Endometriosis     Eye injury     stuck with tree branch od ?     Focal seizure 5/3/2021    GERD (gastroesophageal reflux disease)     Glaucoma     History of uterine fibroid     Hyperlipidemia     Hypertension     Invasive lobular carcinoma of right breast in female     s/p surgery, radiation, tamoxifen    Nuclear sclerosis of both eyes 2016    Obesity     Pancreas cyst     Sleep apnea     uses C pap    Supraventricular tachycardia     SVT (supraventricular tachycardia) 2019    Thyroid disease     Venous insufficiency        Past Surgical History:   Procedure Laterality Date     SECTION      CHOLECYSTECTOMY      CRANIOTOMY FOR EXCISION OF INTRACRANIAL TUMOR Left 2021    Procedure: CRANIOTOMY, FOR INTRACRANIAL NEOPLASM EXCISION Left craniotomy for tumor resection;  Surgeon: Hernandez Bravo MD;  Location: [facility];  Service: Neurosurgery;  Laterality: Left;    glaucoma laser Bilateral     HERNIA REPAIR      KNEE SURGERY Right     (R) knee scope; torn meniscus    MASTECTOMY, PARTIAL Right 2020    Procedure:  MASTECTOMY, PARTIAL-Right with radiological marker Consent day of surgery; Neoprobe, technitium, Frozen;  Surgeon: Narda Keating MD;  Location: [facility] OR [address] [place] [address];  Service: General;  Laterality: Right;    SENTINEL LYMPH NODE BIOPSY Right 12/7/2020    Procedure: BIOPSY, LYMPH NODE, SENTINEL;  Surgeon: Narda Keating MD;  Location: [facility] OR [address] [place] [address];  Service: General;  Laterality: Right;    TOTAL REDUCTION MAMMOPLASTY Bilateral 12/7/2020    Procedure: MAMMOPLASTY, REDUCTION-Bilateral;  Surgeon: Zack Hood MD;  Location: [facility] OR [address] [place] [address];  Service: Plastics;  Laterality: Bilateral;    VEIN SURGERY  2003, 2004    Rt leg x2       Family History   Problem Relation Age of Onset    Diabetes Mother     No Known Problems Father     No Known Problems Sister     No Known Problems Brother     Colon cancer Maternal Aunt     No Known Problems Maternal Uncle     No Known Problems Paternal Aunt     No Known Problems Paternal Uncle     No Known Problems Maternal Grandmother     No Known Problems Maternal Grandfather     No Known Problems Paternal Grandmother     No Known Problems Paternal Grandfather     Amblyopia Neg Hx     Blindness Neg Hx     Cancer Neg Hx     Cataracts Neg Hx     Glaucoma Neg Hx     Hypertension Neg Hx     Macular degeneration Neg Hx     Retinal detachment Neg Hx     Strabismus Neg Hx     Stroke Neg Hx     Thyroid disease Neg Hx     Celiac disease Neg Hx     Colon polyps Neg Hx     Esophageal cancer Neg Hx     Inflammatory bowel disease Neg Hx     Irritable bowel syndrome Neg Hx     Liver cancer Neg Hx     Liver disease Neg Hx     Rectal cancer Neg Hx     Stomach cancer Neg Hx     Ulcerative colitis Neg Hx     Cystic fibrosis Neg Hx     Crohn's disease Neg Hx     Hemochromatosis Neg Hx     Cirrhosis Neg Hx        Social History     Socioeconomic History    Marital status:    Tobacco Use    Smoking status: Never Smoker    Smokeless tobacco: Never Used    Substance and Sexual Activity    Alcohol use: Never     Alcohol/week: 0.0 standard drinks    Drug use: Never    Sexual activity: Yes     Partners: Male     Birth control/protection: None   Social History Narrative    1/18/19: lives with her . They have multiple children, but the youngest is 38. No children at home right now. No pets at home. Splits time between here and [place]. Her son lives in [place].        Current Outpatient Medications   Medication Sig Dispense Refill    b complex vitamins tablet Take 1 tablet by mouth once daily.      blood sugar diagnostic Strp Test twice daily.  Accucheck viva test strips and lancets. 300 each 3    bumetanide (BUMEX) 1 MG tablet Take 1 tablet (1 mg total) by mouth 2 (two) times daily. 180 tablet 3    calcium carbonate/vitamin D3 (CALCIUM WITH VITAMIN D3 ORAL) Take 1 tablet by mouth Daily.       dorzolamide-timolol 2-0.5% (COSOPT) 22.3-6.8 mg/mL ophthalmic solution INSTILL 1 DROP IN BOTH EYES EVERY 12 HOURS 10 mL 11    ELIQUIS 5 mg Tab TAKE 1 TABLET BY MOUTH TWICE A DAY 60 tablet 11    ezetimibe (ZETIA) 10 mg tablet Take 1 tablet (10 mg total) by mouth once daily. 90 tablet 3    gabapentin (NEURONTIN) 300 MG capsule Take 1 capsule (300 mg total) by mouth 2 (two) times daily. 60 capsule 0    lancets (ACCU-CHEK SOFTCLIX LANCETS) Misc 1 Units by Misc.(Non-Drug; Combo Route) route 2 (two) times daily. 300 each 3    latanoprost 0.005 % ophthalmic solution Place 1 drop into both eyes every evening. 2.5 mL 12    levETIRAcetam (KEPPRA) 1000 MG tablet Take 1,000 mg by mouth 2 (two) times daily.      levETIRAcetam (KEPPRA) 500 MG Tab Take 2 tablets (1,000 mg total) by mouth 2 (two) times daily. 120 tablet 1    lisinopriL (PRINIVIL,ZESTRIL) 40 MG tablet Take 40 mg by mouth once daily.      metFORMIN (GLUCOPHAGE) 500 MG tablet Take 1 tablet (500 mg total) by mouth daily with breakfast. 90 tablet 3    metoprolol tartrate (LOPRESSOR) 25 MG tablet TAKE 1  TABLET(25 MG) BY MOUTH TWICE DAILY 180 tablet 3    NIFEdipine (ADALAT CC) 60 MG TbSR Take 1 tablet (60 mg total) by mouth once daily. 90 tablet 3    pantoprazole (PROTONIX) 40 MG tablet Take 40 mg by mouth 2 (two) times daily.      rosuvastatin (CRESTOR) 40 MG Tab TAKE 1 TABLET(40 MG) BY MOUTH EVERY EVENING 90 tablet 3    diazePAM (VALIUM) 5 MG tablet Take 1 tablet (5 mg total) by mouth once. Take one tablet PO one hour prior to imaging. for 1 dose 1 tablet 0     No current facility-administered medications for this visit.       Review of patient's allergies indicates:   Allergen Reactions    Percodan [oxycodone hcl-oxycodone-asa] Hives and Itching         Review of Systems   Constitutional: Negative for chills and fever.   Cardiovascular: Positive for leg swelling. Negative for claudication.   Skin: Positive for color change and nail changes.   Musculoskeletal: Positive for joint swelling. Negative for joint pain, muscle cramps and muscle weakness.   Gastrointestinal: Negative for nausea and vomiting.   Neurological: Positive for numbness. Negative for paresthesias.   Psychiatric/Behavioral: Negative for altered mental status.           Objective:      Physical Exam  Constitutional:       General: She is not in acute distress.     Appearance: She is well-developed. She is not diaphoretic.   Cardiovascular:      Pulses:           Dorsalis pedis pulses are 2+ on the right side and 2+ on the left side.        Posterior tibial pulses are 1+ on the right side and 1+ on the left side.      Comments: CFT < 3 seconds bilateral.  Pedal hair growth decreased bilateral.  Varicosities noted bilateral.  Mild non pitting edema noted to bilateral lower extremity.  Toes are cool to touch bilateral.    Musculoskeletal:         General: No tenderness.      Comments: Muscle strength 5/5 in all muscle groups bilateral.  No tenderness nor crepitation with ROM of foot/ankle joints bilateral.  No tenderness with palpation of  bilateral foot and ankle.  Bilateral pes planus foot type.  Bilateral hallux abducto valgus.  Bilateral semi-reducible contracture of toes 2-5.     Skin:     General: Skin is warm and dry.      Coloration: Skin is not pale.      Findings: No abrasion, bruising, burn, ecchymosis, erythema, laceration, lesion, petechiae or rash.      Nails: There is no clubbing.      Comments: Pedal skin appears edematous bilateral.  Toenails x 10 appear thickened by 2 mm, elongated by 5 mm, and discolored with subungual debris.  Hemosiderin staining noted to the Rt. Lower leg.  Examination of the skin reveals no evidence of significant maceration, rashes, open lesions, suspicious appearing nevi or other concerning lesions.    Neurological:      Mental Status: She is alert and oriented to person, place, and time.      Sensory: Sensory deficit present.      Motor: No weakness or atrophy.      Comments: Protective sensation per [place] monofilament intact bilateral.    Vibratory sensation decreased bilateral.    Light touch intact bilateral.               Assessment:       Encounter Diagnoses   Name Primary?    Type 2 diabetes mellitus with hyperglycemia, without long-term current use of insulin Yes    Hammer toes of both feet     Onychomycosis due to dermatophyte          Plan:       Diagnoses and all orders for this visit:    Type 2 diabetes mellitus with hyperglycemia, without long-term current use of insulin    Hammer toes of both feet    Onychomycosis due to dermatophyte      I counseled the patient on her conditions, their implications and medical management.    Shoe inspection. Diabetic Foot Education. Patient reminded of the importance of good nutrition and blood sugar control to help prevent podiatric complications of diabetes. Patient instructed on proper foot hygeine. We discussed wearing proper shoe gear, daily foot inspections, never walking without protective shoe gear, never putting sharp instruments to  feet    Advised to continue wearing diabetic shoes/insoles that accommodate for digital deformities.     With patient's permission, nails were aggressively reduced and debrided x 10 to their soft tissue attachment mechanically and with electric , removing all offending nail and debris.   Patient relates relief following the procedure.  She will continue to monitor the areas daily, inspect her feet, wear protective shoe gear when ambulatory, moisturizer to maintain skin integrity and follow in this office in approximately 4 months, sooner p.r.n.    Follow up in about 4 months (around 8/19/2022).    Giuliano Garnett DPM

## 2022-04-21 ENCOUNTER — PATIENT OUTREACH (OUTPATIENT)
Dept: ADMINISTRATIVE | Facility: OTHER | Age: 72
End: 2022-04-21
Payer: MEDICARE

## 2022-04-21 ENCOUNTER — PATIENT MESSAGE (OUTPATIENT)
Dept: ADMINISTRATIVE | Facility: OTHER | Age: 72
End: 2022-04-21
Payer: MEDICARE

## 2022-04-21 ENCOUNTER — OFFICE VISIT (OUTPATIENT)
Dept: NEUROLOGY | Facility: CLINIC | Age: 72
End: 2022-04-21
Payer: MEDICARE

## 2022-04-21 VITALS
WEIGHT: 177.56 LBS | DIASTOLIC BLOOD PRESSURE: 82 MMHG | HEART RATE: 61 BPM | SYSTOLIC BLOOD PRESSURE: 155 MMHG | HEIGHT: 63 IN | BODY MASS INDEX: 31.46 KG/M2

## 2022-04-21 DIAGNOSIS — Z98.890 S/P CRANIOTOMY: ICD-10-CM

## 2022-04-21 DIAGNOSIS — G40.109 FOCAL MOTOR SEIZURE: Primary | ICD-10-CM

## 2022-04-21 DIAGNOSIS — Z12.31 ENCOUNTER FOR SCREENING MAMMOGRAM FOR MALIGNANT NEOPLASM OF BREAST: Primary | ICD-10-CM

## 2022-04-21 DIAGNOSIS — G93.89 ENCEPHALOMALACIA: ICD-10-CM

## 2022-04-21 PROCEDURE — 99214 OFFICE O/P EST MOD 30 MIN: CPT | Mod: PBBFAC,PO | Performed by: PSYCHIATRY & NEUROLOGY

## 2022-04-21 PROCEDURE — 99999 PR PBB SHADOW E&M-EST. PATIENT-LVL IV: ICD-10-PCS | Mod: PBBFAC,,, | Performed by: PSYCHIATRY & NEUROLOGY

## 2022-04-21 PROCEDURE — 99214 OFFICE O/P EST MOD 30 MIN: CPT | Mod: S$PBB,,, | Performed by: PSYCHIATRY & NEUROLOGY

## 2022-04-21 PROCEDURE — 99214 PR OFFICE/OUTPT VISIT, EST, LEVL IV, 30-39 MIN: ICD-10-PCS | Mod: S$PBB,,, | Performed by: PSYCHIATRY & NEUROLOGY

## 2022-04-21 PROCEDURE — 99999 PR PBB SHADOW E&M-EST. PATIENT-LVL IV: CPT | Mod: PBBFAC,,, | Performed by: PSYCHIATRY & NEUROLOGY

## 2022-04-21 NOTE — PROGRESS NOTES
Health Maintenance Due   Topic Date Due    COVID-19 Vaccine (1) Never done    Mammogram  10/20/2021     Updates were requested from care everywhere.  Chart was reviewed for overdue Proactive [facility] Encounters ([facility]) topics (CRS, Breast Cancer Screening, Eye exam)  Health Maintenance has been updated.  LINKS immunization registry triggered.  Immunizations were reconciled.       Telephone Encounter by Kemi Sena at 04/11/18 08:54 AM     Author:  Kemi Sena Service:  (none) Author Type:       Filed:  04/11/18 09:00 AM Encounter Date:  4/10/2018 Status:  Signed     :  Kemi Sena ()            Received call from patient calling to schedule appointment, appointment booked with Dr. Hernandez for 4/26 and new patient forms sent via mail.  Patient was advised if forms are not received in mail prior to appointment will need to arrive 30 minutes prior to complete forms in office or may stop in at location to  prior.  Verbal understanding.  Close encounter.[CC1.1M]       Revision History        User Key Date/Time User Provider Type Action    > CC1.1 04/11/18 09:00 AM Kemi Sena  Sign    M - Manual

## 2022-04-21 NOTE — PROGRESS NOTES
Date: 4/21/2022    Patient ID: Eugenie Coates is a 72 y.o. female.    Chief Complaint: Seizures      History of Present Illness:  Ms. Coates is a 72 y.o. 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     She stayed on keppra 1000 mg BID until last month when she weaned off of it. This was stopped by neurocare provider. No EEG has been repeated recently.     She had side effects on keppra of feeling angry and not feeling herself. She feels much better now that she is off the medication. She was and has continued to be seizure-free since the hospital stay in May 2021.     Repeat MRI brain in March 2022 shows some residual encephalomalacia in the left parietal region.     She has not been driving.     Allergies:  Review of patient's allergies indicates:   Allergen Reactions    Oxycodone hcl-oxycodone-asa Hives, Itching and Other (See Comments)       Current Medications:  Current Outpatient Medications   Medication Sig Dispense Refill    b complex vitamins tablet Take 1 tablet by mouth once daily.      blood sugar diagnostic Strp Test twice daily.  Accucheck viva test strips and lancets. 300 each 3    bumetanide (BUMEX) 1 MG tablet Take 1 tablet (1 mg total) by mouth 2 (two) times daily. 180 tablet 3    calcium carbonate/vitamin D3 (CALCIUM WITH VITAMIN D3 ORAL) Take 1 tablet by mouth Daily.       dorzolamide-timolol 2-0.5% (COSOPT) 22.3-6.8 mg/mL ophthalmic solution INSTILL 1 DROP IN BOTH EYES EVERY 12 HOURS 10 mL 11    ELIQUIS 5 mg Tab TAKE 1 TABLET BY MOUTH TWICE A DAY 60 tablet 11    ezetimibe (ZETIA) 10 mg tablet Take 1 tablet (10 mg total) by mouth once daily. 90 tablet 3    gabapentin (NEURONTIN) 300 MG capsule Take 1 capsule (300 mg total) by mouth 2 (two) times daily. (Patient taking differently: Take 300 mg by mouth 2 (two) times daily. PRN) 60 capsule 0    lancets (ACCU-CHEK SOFTCLIX LANCETS) Misc 1 Units by Misc.(Non-Drug; Combo Route) route 2 (two)  times daily. 300 each 3    latanoprost 0.005 % ophthalmic solution Place 1 drop into both eyes every evening. 2.5 mL 12    lisinopriL (PRINIVIL,ZESTRIL) 40 MG tablet Take 40 mg by mouth once daily.      metFORMIN (GLUCOPHAGE) 500 MG tablet Take 1 tablet (500 mg total) by mouth daily with breakfast. 90 tablet 3    metoprolol tartrate (LOPRESSOR) 25 MG tablet TAKE 1 TABLET(25 MG) BY MOUTH TWICE DAILY 180 tablet 3    NIFEdipine (ADALAT CC) 60 MG TbSR Take 1 tablet (60 mg total) by mouth once daily. 90 tablet 3    pantoprazole (PROTONIX) 40 MG tablet Take 40 mg by mouth 2 (two) times daily.      rosuvastatin (CRESTOR) 40 MG Tab TAKE 1 TABLET(40 MG) BY MOUTH EVERY EVENING 90 tablet 3     No current facility-administered medications for this visit.       Past Medical History:  Past Medical History:   Diagnosis Date    Allergy     Anemia     Anxiety     Arthritis     Asthma     Bell palsy     Bilateral lower extremity edema     Chronic diastolic CHF (congestive heart failure)     Depression     Diabetes mellitus, type 2     DVT (deep venous thrombosis)     right LE    Dyslipidemia     Endometriosis     Eye injury     stuck with tree branch od ?     Focal seizure 5/3/2021    GERD (gastroesophageal reflux disease)     Glaucoma     History of uterine fibroid     Hyperlipidemia     Hypertension     Invasive lobular carcinoma of right breast in female     s/p surgery, radiation, tamoxifen    Nuclear sclerosis of both eyes 2016    Obesity     Pancreas cyst     Sleep apnea     uses C pap    Supraventricular tachycardia     SVT (supraventricular tachycardia) 2019    Thyroid disease     Venous insufficiency        Past Surgical History:  Past Surgical History:   Procedure Laterality Date     SECTION      CHOLECYSTECTOMY      CRANIOTOMY FOR EXCISION OF INTRACRANIAL TUMOR Left 2021    Procedure: CRANIOTOMY, FOR INTRACRANIAL NEOPLASM EXCISION Left craniotomy for tumor  "resection;  Surgeon: Hernandez Bravo MD;  Location[facility];  Service: Neurosurgery;  Laterality: Left;    glaucoma laser Bilateral     HERNIA REPAIR      KNEE SURGERY Right 2008    (R) knee scope; torn meniscus    MASTECTOMY, PARTIAL Right 12/7/2020    Procedure: MASTECTOMY, PARTIAL-Right with radiological marker Consent day of surgery; Neoprobe, technitium, Frozen;  Surgeon: Narda Keating MD;  Location: [address];  Service: General;  Laterality: Right;    SENTINEL LYMPH NODE BIOPSY Right 12/7/2020    Procedure: BIOPSY, LYMPH NODE, SENTINEL;  Surgeon: Narda Keating MD;  Location: [facility] OR [address] [place] [address];  Service: General;  Laterality: Right;    TOTAL REDUCTION MAMMOPLASTY Bilateral 12/7/2020    Procedure: MAMMOPLASTY, REDUCTION-Bilateral;  Surgeon: Zack Hood MD;  Location: [address];  Service: Plastics;  Laterality: Bilateral;    VEIN SURGERY  2003, 2004    Rt leg x2       Family History:  family history includes Colon cancer in her maternal aunt; Diabetes in her mother; No Known Problems in her brother, father, maternal grandfather, maternal grandmother, maternal uncle, paternal aunt, paternal grandfather, paternal grandmother, paternal uncle, and sister.    Social History:   reports that she has never smoked. She has never used smokeless tobacco. She reports that she does not drink alcohol and does not use drugs.    Physical Exam:  Vitals:    04/21/22 0855   BP: (!) 155/82   Pulse: 61   Weight: 80.6 kg (177 lb 9.3 oz)   Height: 5' 2.5" (1.588 m)   PainSc: 0-No pain     Body mass index is 31.96 kg/m².    Neurological Exam:  Mental status: Awake and alert  Speech language: No dysarthria or aphasia on conversation  Cranial nerves: Face symmetric  Motor: Moves all extremities well  Coordination: No ataxia. No tremor.      Data:  I have personally reviewed other provider's notes, labs, & imaging made available to me today.     Labs:  CBC:   Lab Results   Component Value Date    WBC 7.68 " 12/30/2021    HGB 13.6 12/30/2021    HCT 43.1 12/30/2021     12/30/2021    MCV 86 12/30/2021    RDW 12.9 12/30/2021     BMP:   Lab Results   Component Value Date     12/30/2021    K 4.1 12/30/2021     12/30/2021    CO2 26 12/30/2021    BUN 13 12/30/2021    CREATININE 0.94 03/16/2022    GLU 95 12/30/2021    CALCIUM 9.9 12/30/2021    MG 1.9 05/03/2021    PHOS 3.2 02/22/2019     LFTS;   Lab Results   Component Value Date    PROT 7.6 12/30/2021    ALBUMIN 3.8 12/30/2021    BILITOT 0.7 12/30/2021    AST 18 12/30/2021    ALKPHOS 134 12/30/2021    ALT 16 12/30/2021     COAGS:   Lab Results   Component Value Date    INR 1.1 05/03/2021     FLP:   Lab Results   Component Value Date    CHOL 211 (H) 12/30/2021    HDL 46 12/30/2021    LDLCALC 129.6 12/30/2021    TRIG 177 (H) 12/30/2021    CHOLHDL 21.8 12/30/2021       Imaging:  I have personally reviewed the imaging, MRI brain with left parietal encephalomalacia.     Assessment and Plan:  Ms. Coates is a 72 y.o. 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 She did well and has been seizure-free for 11 months. I discussed with her that the encephalomalacia places her at risk for recurrent seizures and we discussed what to watch for. She has done well over the past month off seizure medication but I discussed that prior to weaning, I would have recommended an EEG repeat. Will check an EEG now to ensure no epileptiform activity that would indicate she needs AED protection.     Focal motor seizure  -     Ambulatory referral/consult to Neurology  -     EEG,w/awake & asleep record; Future    S/P craniotomy  -     Ambulatory referral/consult to Neurology  -     EEG,w/awake & asleep record; Future    Encephalomalacia      I spent a total of 30 minutes on the day of the visit.This includes face to face time and non-face to face time preparing to see the patient (eg, review of tests), Obtaining and/or reviewing separately obtained  history, Documenting clinical information in the electronic or other health record, Independently interpreting results and communicating results to the patient/family/caregiver, or Care coordination.

## 2022-04-22 ENCOUNTER — TELEPHONE (OUTPATIENT)
Dept: NEUROLOGY | Facility: CLINIC | Age: 72
End: 2022-04-22
Payer: MEDICARE

## 2022-04-28 ENCOUNTER — OFFICE VISIT (OUTPATIENT)
Dept: INTERNAL MEDICINE | Facility: CLINIC | Age: 72
End: 2022-04-28
Payer: MEDICARE

## 2022-04-28 ENCOUNTER — LAB VISIT (OUTPATIENT)
Dept: LAB | Facility: HOSPITAL | Age: 72
End: 2022-04-28
Attending: INTERNAL MEDICINE
Payer: MEDICARE

## 2022-04-28 VITALS
DIASTOLIC BLOOD PRESSURE: 94 MMHG | HEART RATE: 60 BPM | BODY MASS INDEX: 32.41 KG/M2 | SYSTOLIC BLOOD PRESSURE: 208 MMHG | WEIGHT: 176.13 LBS | OXYGEN SATURATION: 98 % | RESPIRATION RATE: 14 BRPM | HEIGHT: 62 IN | TEMPERATURE: 97 F

## 2022-04-28 DIAGNOSIS — E78.2 MIXED HYPERLIPIDEMIA: ICD-10-CM

## 2022-04-28 DIAGNOSIS — R79.9 ABNORMAL FINDING OF BLOOD CHEMISTRY, UNSPECIFIED: ICD-10-CM

## 2022-04-28 DIAGNOSIS — M17.0 PRIMARY OSTEOARTHRITIS OF BOTH KNEES: ICD-10-CM

## 2022-04-28 DIAGNOSIS — I10 ESSENTIAL HYPERTENSION: ICD-10-CM

## 2022-04-28 DIAGNOSIS — H40.1130 PRIMARY OPEN ANGLE GLAUCOMA OF BOTH EYES, UNSPECIFIED GLAUCOMA STAGE: ICD-10-CM

## 2022-04-28 DIAGNOSIS — Z86.718 HISTORY OF DVT (DEEP VEIN THROMBOSIS): ICD-10-CM

## 2022-04-28 DIAGNOSIS — I87.2 VENOUS INSUFFICIENCY OF RIGHT LOWER EXTREMITY: ICD-10-CM

## 2022-04-28 DIAGNOSIS — G47.33 OSA ON CPAP: ICD-10-CM

## 2022-04-28 DIAGNOSIS — I50.32 CHRONIC HEART FAILURE WITH PRESERVED EJECTION FRACTION: ICD-10-CM

## 2022-04-28 DIAGNOSIS — E05.90 SUBCLINICAL HYPERTHYROIDISM: ICD-10-CM

## 2022-04-28 DIAGNOSIS — E11.51 TYPE 2 DIABETES MELLITUS WITH DIABETIC PERIPHERAL ANGIOPATHY WITHOUT GANGRENE, WITHOUT LONG-TERM CURRENT USE OF INSULIN: Primary | ICD-10-CM

## 2022-04-28 LAB
BASOPHILS # BLD AUTO: 0.04 K/UL (ref 0–0.2)
BASOPHILS NFR BLD: 0.5 % (ref 0–1.9)
DIFFERENTIAL METHOD: ABNORMAL
EOSINOPHIL # BLD AUTO: 0.2 K/UL (ref 0–0.5)
EOSINOPHIL NFR BLD: 2.6 % (ref 0–8)
ERYTHROCYTE [DISTWIDTH] IN BLOOD BY AUTOMATED COUNT: 13 % (ref 11.5–14.5)
HCT VFR BLD AUTO: 45.3 % (ref 37–48.5)
HGB BLD-MCNC: 14.1 G/DL (ref 12–16)
IMM GRANULOCYTES # BLD AUTO: 0.02 K/UL (ref 0–0.04)
IMM GRANULOCYTES NFR BLD AUTO: 0.2 % (ref 0–0.5)
LYMPHOCYTES # BLD AUTO: 2.4 K/UL (ref 1–4.8)
LYMPHOCYTES NFR BLD: 28.9 % (ref 18–48)
MCH RBC QN AUTO: 27 PG (ref 27–31)
MCHC RBC AUTO-ENTMCNC: 31.1 G/DL (ref 32–36)
MCV RBC AUTO: 87 FL (ref 82–98)
MONOCYTES # BLD AUTO: 0.5 K/UL (ref 0.3–1)
MONOCYTES NFR BLD: 5.9 % (ref 4–15)
NEUTROPHILS # BLD AUTO: 5.2 K/UL (ref 1.8–7.7)
NEUTROPHILS NFR BLD: 61.9 % (ref 38–73)
NRBC BLD-RTO: 0 /100 WBC
PLATELET # BLD AUTO: 298 K/UL (ref 150–450)
PMV BLD AUTO: 11 FL (ref 9.2–12.9)
RBC # BLD AUTO: 5.23 M/UL (ref 4–5.4)
WBC # BLD AUTO: 8.42 K/UL (ref 3.9–12.7)

## 2022-04-28 PROCEDURE — 85025 COMPLETE CBC W/AUTO DIFF WBC: CPT | Performed by: INTERNAL MEDICINE

## 2022-04-28 PROCEDURE — 83880 ASSAY OF NATRIURETIC PEPTIDE: CPT | Performed by: INTERNAL MEDICINE

## 2022-04-28 PROCEDURE — 99215 OFFICE O/P EST HI 40 MIN: CPT | Mod: PBBFAC,PO | Performed by: INTERNAL MEDICINE

## 2022-04-28 PROCEDURE — 99214 OFFICE O/P EST MOD 30 MIN: CPT | Mod: S$PBB,,, | Performed by: INTERNAL MEDICINE

## 2022-04-28 PROCEDURE — 99999 PR PBB SHADOW E&M-EST. PATIENT-LVL V: CPT | Mod: PBBFAC,,, | Performed by: INTERNAL MEDICINE

## 2022-04-28 PROCEDURE — 99214 PR OFFICE/OUTPT VISIT, EST, LEVL IV, 30-39 MIN: ICD-10-PCS | Mod: S$PBB,,, | Performed by: INTERNAL MEDICINE

## 2022-04-28 PROCEDURE — 36415 COLL VENOUS BLD VENIPUNCTURE: CPT | Mod: PO | Performed by: INTERNAL MEDICINE

## 2022-04-28 PROCEDURE — 80061 LIPID PANEL: CPT | Performed by: INTERNAL MEDICINE

## 2022-04-28 PROCEDURE — 83036 HEMOGLOBIN GLYCOSYLATED A1C: CPT | Performed by: INTERNAL MEDICINE

## 2022-04-28 PROCEDURE — 80053 COMPREHEN METABOLIC PANEL: CPT | Performed by: INTERNAL MEDICINE

## 2022-04-28 PROCEDURE — 99999 PR PBB SHADOW E&M-EST. PATIENT-LVL V: ICD-10-PCS | Mod: PBBFAC,,, | Performed by: INTERNAL MEDICINE

## 2022-04-28 RX ORDER — LISINOPRIL 40 MG/1
40 TABLET ORAL DAILY
Qty: 90 TABLET | Refills: 3 | Status: SHIPPED | OUTPATIENT
Start: 2022-04-28 | End: 2023-01-25

## 2022-04-28 RX ORDER — METOPROLOL TARTRATE 25 MG/1
25 TABLET, FILM COATED ORAL 2 TIMES DAILY
Qty: 180 TABLET | Refills: 3 | Status: SHIPPED | OUTPATIENT
Start: 2022-04-28 | End: 2023-03-10

## 2022-04-28 NOTE — PROGRESS NOTES
Subjective:       Patient ID: Eugenie Coates is a 72 y.o. female.    Chief Complaint: Follow-up (4 month f/u) and Medication Refill (Pt says she needs a refill on lisinopril (if doctor still wants her to take it))    HPI   The patient presents for follow-up of medical conditions which include type 2 diabetes mellitus, hypertension, osteoarthritis of the knees, venous insufficiency of the lower extremities, and CHF.  The patient also has mixed hyperlipidemia obstructive sleep apnea-on CPAP therapy.  The patient relates that she had an EEG this morning at the [facility] facility.  She states that Keppra was also discontinued this morning.  The patient traveled from [place], [place] this morning.    The patient reports she is exercising more.  She does experience bilateral knee joint pain due to arthritis.  She has been out of lisinopril and metoprolol for treatment of her blood pressure.  Home blood sugar readings have been less than 120 on recent testing.  She denies experiencing any hypoglycemia.    Review of Systems   Constitutional: Negative for activity change, appetite change and unexpected weight change.   Eyes: Negative for visual disturbance.   Respiratory: Negative for shortness of breath.    Cardiovascular: Negative for chest pain, palpitations and leg swelling.   Gastrointestinal: Negative for abdominal pain, blood in stool and diarrhea.   Genitourinary: Negative for dysuria, frequency, hematuria and urgency.   Musculoskeletal: Positive for arthralgias.   Neurological: Positive for light-headedness. Negative for weakness, numbness and headaches.   Psychiatric/Behavioral: Negative for sleep disturbance.            Physical Exam  Vitals and nursing note reviewed.   Constitutional:       General: She is not in acute distress.     Appearance: She is well-developed.      Comments: The patient's weight has remained stable since 12/23/2021.   HENT:      Head: Normocephalic and atraumatic.   Eyes:       General: No scleral icterus.     Conjunctiva/sclera: Conjunctivae normal.      Pupils: Pupils are equal, round, and reactive to light.   Neck:      Thyroid: No thyromegaly.      Vascular: No JVD.   Cardiovascular:      Rate and Rhythm: Normal rate and regular rhythm.      Heart sounds: Normal heart sounds. No murmur heard.    No friction rub. No gallop.   Pulmonary:      Effort: Pulmonary effort is normal. No respiratory distress.      Breath sounds: Normal breath sounds. No wheezing or rales.   Abdominal:      General: Bowel sounds are normal.      Palpations: Abdomen is soft. There is no mass.      Tenderness: There is no abdominal tenderness.   Musculoskeletal:         General: Tenderness present.      Cervical back: Normal range of motion and neck supple.      Comments: Knees:  Hypertrophic joint changes are present bilaterally.  Decreased flexion is present.  Tenderness is present on range-of-motion testing.  The patient is using bilateral knee supports.   Lymphadenopathy:      Cervical: No cervical adenopathy.   Skin:     General: Skin is warm and dry.      Findings: No rash.      Comments: No foot lesions are present.   Neurological:      Mental Status: She is alert and oriented to person, place, and time.      Cranial Nerves: No cranial nerve deficit.      Comments: Sensory exam is intact in both feet on monofilament  testing.   Psychiatric:         Behavior: Behavior normal.         Protective Sensation (w/ 10 gram monofilament):  Right: Intact  Left: Intact    Visual Inspection:  Normal -  Bilateral    Pedal Pulses:   Right: Present  Left: Present    Posterior tibialis:   Right:Present  Left: Present         Lab Visit on 04/28/2022   Component Date Value Ref Range Status    Sodium 04/28/2022 125 (A) 136 - 145 mmol/L Final    Potassium 04/28/2022 3.5  3.5 - 5.1 mmol/L Final    Chloride 04/28/2022 92 (A) 95 - 110 mmol/L Final    CO2 04/28/2022 26  23 - 29 mmol/L Final    Glucose 04/28/2022 106  70  - 110 mg/dL Final    BUN 04/28/2022 14  8 - 23 mg/dL Final    Creatinine 04/28/2022 1.1  0.5 - 1.4 mg/dL Final    Calcium 04/28/2022 10.2  8.7 - 10.5 mg/dL Final    Total Protein 04/28/2022 7.5  6.0 - 8.4 g/dL Final    Albumin 04/28/2022 4.0  3.5 - 5.2 g/dL Final    Total Bilirubin 04/28/2022 0.4  0.1 - 1.0 mg/dL Final    Comment: For infants and newborns, interpretation of results should be based  on gestational age, weight and in agreement with clinical  observations.    Premature Infant recommended reference ranges:  Up to 24 hours.............<8.0 mg/dL  Up to 48 hours............<12.0 mg/dL  3-5 days..................<15.0 mg/dL  6-29 days.................<15.0 mg/dL      Alkaline Phosphatase 04/28/2022 116  55 - 135 U/L Final    AST 04/28/2022 18  10 - 40 U/L Final    ALT 04/28/2022 15  10 - 44 U/L Final    Anion Gap 04/28/2022 7 (A) 8 - 16 mmol/L Final    eGFR if  04/28/2022 58.4 (A) >60 mL/min/1.73 m^2 Final    eGFR if non African American 04/28/2022 50.6 (A) >60 mL/min/1.73 m^2 Final    Comment: Calculation used to obtain the estimated glomerular filtration  rate (eGFR) is the CKD-EPI equation.       Cholesterol 04/28/2022 236 (A) 120 - 199 mg/dL Final    Comment: The National Cholesterol Education Program (NCEP) has set the  following guidelines (reference ranges) for Cholesterol:  Optimal.....................<200 mg/dL  Borderline High.............200-239 mg/dL  High........................> or = 240 mg/dL      Triglycerides 04/28/2022 174 (A) 30 - 150 mg/dL Final    Comment: The National Cholesterol Education Program (NCEP) has set the  following guidelines (reference values) for triglycerides:  Normal......................<150 mg/dL  Borderline High.............150-199 mg/dL  High........................200-499 mg/dL      HDL 04/28/2022 52  40 - 75 mg/dL Final    Comment: The National Cholesterol Education Program (NCEP) has set the  following guidelines (reference values)  for HDL Cholesterol:  Low...............<40 mg/dL  Optimal...........>60 mg/dL      LDL Cholesterol 04/28/2022 149.2  63.0 - 159.0 mg/dL Final    Comment: The National Cholesterol Education Program (NCEP) has set the  following guidelines (reference values) for LDL Cholesterol:  Optimal.......................<130 mg/dL  Borderline High...............130-159 mg/dL  High..........................160-189 mg/dL  Very High.....................>190 mg/dL      HDL/Cholesterol Ratio 04/28/2022 22.0  20.0 - 50.0 % Final    Total Cholesterol/HDL Ratio 04/28/2022 4.5  2.0 - 5.0 Final    Non-HDL Cholesterol 04/28/2022 184  mg/dL Final    Comment: Risk category and Non-HDL cholesterol goals:  Coronary heart disease (CHD)or equivalent (10-year risk of CHD >20%):  Non-HDL cholesterol goal     <130 mg/dL  Two or more CHD risk factors and 10-year risk of CHD <= 20%:  Non-HDL cholesterol goal     <160 mg/dL  0 to 1 CHD risk factor:  Non-HDL cholesterol goal     <190 mg/dL      WBC 04/28/2022 8.42  3.90 - 12.70 K/uL Final    RBC 04/28/2022 5.23  4.00 - 5.40 M/uL Final    Hemoglobin 04/28/2022 14.1  12.0 - 16.0 g/dL Final    Hematocrit 04/28/2022 45.3  37.0 - 48.5 % Final    MCV 04/28/2022 87  82 - 98 fL Final    MCH 04/28/2022 27.0  27.0 - 31.0 pg Final    MCHC 04/28/2022 31.1 (A) 32.0 - 36.0 g/dL Final    RDW 04/28/2022 13.0  11.5 - 14.5 % Final    Platelets 04/28/2022 298  150 - 450 K/uL Final    MPV 04/28/2022 11.0  9.2 - 12.9 fL Final    Immature Granulocytes 04/28/2022 0.2  0.0 - 0.5 % Final    Gran # (ANC) 04/28/2022 5.2  1.8 - 7.7 K/uL Final    Immature Grans (Abs) 04/28/2022 0.02  0.00 - 0.04 K/uL Final    Comment: Mild elevation in immature granulocytes is non specific and   can be seen in a variety of conditions including stress response,   acute inflammation, trauma and pregnancy. Correlation with other   laboratory and clinical findings is essential.      Lymph # 04/28/2022 2.4  1.0 - 4.8 K/uL Final     Mono # 04/28/2022 0.5  0.3 - 1.0 K/uL Final    Eos # 04/28/2022 0.2  0.0 - 0.5 K/uL Final    Baso # 04/28/2022 0.04  0.00 - 0.20 K/uL Final    nRBC 04/28/2022 0  0 /100 WBC Final    Gran % 04/28/2022 61.9  38.0 - 73.0 % Final    Lymph % 04/28/2022 28.9  18.0 - 48.0 % Final    Mono % 04/28/2022 5.9  4.0 - 15.0 % Final    Eosinophil % 04/28/2022 2.6  0.0 - 8.0 % Final    Basophil % 04/28/2022 0.5  0.0 - 1.9 % Final    Differential Method 04/28/2022 Automated   Final    Hemoglobin A1C 04/28/2022 6.5 (A) 4.0 - 5.6 % Final    Comment: ADA Screening Guidelines:  5.7-6.4%  Consistent with prediabetes  >or=6.5%  Consistent with diabetes    High levels of fetal hemoglobin interfere with the HbA1C  assay. Heterozygous hemoglobin variants (HbS, HgC, etc)do  not significantly interfere with this assay.   However, presence of multiple variants may affect accuracy.      Estimated Avg Glucose 04/28/2022 140 (A) 68 - 131 mg/dL Final    BNP 04/28/2022 133 (A) 0 - 99 pg/mL Final    Values of less than 100 pg/ml are consistent with non-CHF populations.       Assessment & Plan:      Eugenie was seen today for follow-up and medication refill.  Nonfasting blood tests will be obtained today.    He lisinopril will be reordered along with metoprolol.  The patient was advised to perform home blood pressure checks and call us with blood pressure readings next week.    Diagnoses and all orders for this visit:    Type 2 diabetes mellitus with diabetic peripheral angiopathy without gangrene, without long-term current use of insulin    Essential hypertension  -     metoprolol tartrate (LOPRESSOR) 25 MG tablet; Take 1 tablet (25 mg total) by mouth 2 (two) times a day.  -     Comprehensive Metabolic Panel; Future  -     Lipid Panel; Future  -     CBC Auto Differential; Future  -     Hemoglobin A1C; Future  -     BNP; Future    Mixed hyperlipidemia  -     Comprehensive Metabolic Panel; Future  -     Lipid Panel; Future  -     CBC Auto  Differential; Future  -     Hemoglobin A1C; Future  -     BNP; Future    Primary open angle glaucoma of both eyes, unspecified glaucoma stage    History of DVT (deep vein thrombosis)    Venous insufficiency of right lower extremity    Abnormal finding of blood chemistry, unspecified   -     Hemoglobin A1C; Future    Chronic heart failure with preserved ejection fraction    Subclinical hyperthyroidism    AFSHIN on CPAP    Primary osteoarthritis of both knees    Other orders  -     lisinopriL (PRINIVIL,ZESTRIL) 40 MG tablet; Take 1 tablet (40 mg total) by mouth once daily.         Follow up in about 3 months (around 7/28/2022).     Shashi Brown MD

## 2022-04-29 ENCOUNTER — TELEPHONE (OUTPATIENT)
Dept: NEUROLOGY | Facility: CLINIC | Age: 72
End: 2022-04-29
Payer: MEDICARE

## 2022-04-29 LAB
ALBUMIN SERPL BCP-MCNC: 4 G/DL (ref 3.5–5.2)
ALP SERPL-CCNC: 116 U/L (ref 55–135)
ALT SERPL W/O P-5'-P-CCNC: 15 U/L (ref 10–44)
ANION GAP SERPL CALC-SCNC: 7 MMOL/L (ref 8–16)
AST SERPL-CCNC: 18 U/L (ref 10–40)
BILIRUB SERPL-MCNC: 0.4 MG/DL (ref 0.1–1)
BNP SERPL-MCNC: 133 PG/ML (ref 0–99)
BUN SERPL-MCNC: 14 MG/DL (ref 8–23)
CALCIUM SERPL-MCNC: 10.2 MG/DL (ref 8.7–10.5)
CHLORIDE SERPL-SCNC: 92 MMOL/L (ref 95–110)
CHOLEST SERPL-MCNC: 236 MG/DL (ref 120–199)
CHOLEST/HDLC SERPL: 4.5 {RATIO} (ref 2–5)
CO2 SERPL-SCNC: 26 MMOL/L (ref 23–29)
CREAT SERPL-MCNC: 1.1 MG/DL (ref 0.5–1.4)
EST. GFR  (AFRICAN AMERICAN): 58.4 ML/MIN/1.73 M^2
EST. GFR  (NON AFRICAN AMERICAN): 50.6 ML/MIN/1.73 M^2
ESTIMATED AVG GLUCOSE: 140 MG/DL (ref 68–131)
GLUCOSE SERPL-MCNC: 106 MG/DL (ref 70–110)
HBA1C MFR BLD: 6.5 % (ref 4–5.6)
HDLC SERPL-MCNC: 52 MG/DL (ref 40–75)
HDLC SERPL: 22 % (ref 20–50)
LDLC SERPL CALC-MCNC: 149.2 MG/DL (ref 63–159)
NONHDLC SERPL-MCNC: 184 MG/DL
POTASSIUM SERPL-SCNC: 3.5 MMOL/L (ref 3.5–5.1)
PROT SERPL-MCNC: 7.5 G/DL (ref 6–8.4)
SODIUM SERPL-SCNC: 125 MMOL/L (ref 136–145)
TRIGL SERPL-MCNC: 174 MG/DL (ref 30–150)

## 2022-04-29 NOTE — TELEPHONE ENCOUNTER
----- Message from Rosalinda Duong MD sent at 4/28/2022  4:54 PM CDT -----  Not sure why it is not letting me route the encounter to you     
Spoke with patient and informed per Dr. Duong the EEG does show the potential for seizures. Dr. Duong has sent a medication to [facility] in case any authorizations or patient assistance is needed. Patient states she does not want to go back on any seizure medications and would prefer to take her chances having a seizure than deal with potential side effects of medications. Attempted to inform patient that Dr. Duong is prescribing a medication with less potential for side effects. Patient again stated she does not want any seizure medications.   
[FreeTextEntry1] : CAD: Patient status post coronary stenting. The patient has excellent exercise ability without exertional symptoms. The risks and benefits of long-term dual antiplatelet therapy have been reviewed.  Pt. wishes to cont asa/brlinta.\par \par HTN:  well controlled\par \par Chol:  higher dose lipitor cause transaminitis.  Cont lipitor 20 q d.

## 2022-06-01 ENCOUNTER — TELEPHONE (OUTPATIENT)
Dept: INTERNAL MEDICINE | Facility: CLINIC | Age: 72
End: 2022-06-01
Payer: MEDICARE

## 2022-06-01 DIAGNOSIS — E87.1 HYPONATREMIA: Primary | ICD-10-CM

## 2022-06-01 PROBLEM — N18.30 CKD (CHRONIC KIDNEY DISEASE) STAGE 3, GFR 30-59 ML/MIN: Status: RESOLVED | Noted: 2018-01-24 | Resolved: 2022-06-01

## 2022-06-01 PROBLEM — E66.01 SEVERE OBESITY (BMI 35.0-35.9 WITH COMORBIDITY): Status: RESOLVED | Noted: 2019-02-26 | Resolved: 2022-06-01

## 2022-06-01 PROBLEM — M17.0 PRIMARY OSTEOARTHRITIS OF BOTH KNEES: Status: ACTIVE | Noted: 2022-06-01

## 2022-06-06 ENCOUNTER — PATIENT MESSAGE (OUTPATIENT)
Dept: INTERNAL MEDICINE | Facility: CLINIC | Age: 72
End: 2022-06-06
Payer: MEDICARE

## 2022-06-06 ENCOUNTER — LAB VISIT (OUTPATIENT)
Dept: LAB | Facility: HOSPITAL | Age: 72
End: 2022-06-06
Attending: INTERNAL MEDICINE
Payer: MEDICARE

## 2022-06-06 DIAGNOSIS — E87.1 HYPONATREMIA: ICD-10-CM

## 2022-06-06 LAB
ANION GAP SERPL CALC-SCNC: 11 MMOL/L (ref 8–16)
BUN SERPL-MCNC: 15 MG/DL (ref 8–23)
CALCIUM SERPL-MCNC: 9.8 MG/DL (ref 8.7–10.5)
CHLORIDE SERPL-SCNC: 107 MMOL/L (ref 95–110)
CO2 SERPL-SCNC: 25 MMOL/L (ref 23–29)
CREAT SERPL-MCNC: 1 MG/DL (ref 0.5–1.4)
EST. GFR  (AFRICAN AMERICAN): >60 ML/MIN/1.73 M^2
EST. GFR  (NON AFRICAN AMERICAN): 56.8 ML/MIN/1.73 M^2
GLUCOSE SERPL-MCNC: 101 MG/DL (ref 70–110)
POTASSIUM SERPL-SCNC: 4.5 MMOL/L (ref 3.5–5.1)
SODIUM SERPL-SCNC: 143 MMOL/L (ref 136–145)

## 2022-06-06 PROCEDURE — 36415 COLL VENOUS BLD VENIPUNCTURE: CPT | Mod: PO | Performed by: INTERNAL MEDICINE

## 2022-06-06 PROCEDURE — 80048 BASIC METABOLIC PNL TOTAL CA: CPT | Performed by: INTERNAL MEDICINE

## 2022-06-08 ENCOUNTER — PATIENT MESSAGE (OUTPATIENT)
Dept: INTERNAL MEDICINE | Facility: CLINIC | Age: 72
End: 2022-06-08
Payer: MEDICARE

## 2022-07-11 ENCOUNTER — OFFICE VISIT (OUTPATIENT)
Dept: OPHTHALMOLOGY | Facility: CLINIC | Age: 72
End: 2022-07-11
Payer: MEDICARE

## 2022-07-11 ENCOUNTER — CLINICAL SUPPORT (OUTPATIENT)
Dept: OPHTHALMOLOGY | Facility: CLINIC | Age: 72
End: 2022-07-11
Payer: MEDICARE

## 2022-07-11 DIAGNOSIS — H40.1131 PRIMARY OPEN-ANGLE GLAUCOMA, BILATERAL, MILD STAGE: Primary | ICD-10-CM

## 2022-07-11 DIAGNOSIS — H25.813 COMBINED FORMS OF AGE-RELATED CATARACT, BILATERAL: ICD-10-CM

## 2022-07-11 DIAGNOSIS — H04.123 DRY EYE SYNDROME, BILATERAL: ICD-10-CM

## 2022-07-11 PROCEDURE — 99213 OFFICE O/P EST LOW 20 MIN: CPT | Mod: S$PBB,,, | Performed by: OPHTHALMOLOGY

## 2022-07-11 PROCEDURE — 99213 PR OFFICE/OUTPT VISIT, EST, LEVL III, 20-29 MIN: ICD-10-PCS | Mod: S$PBB,,, | Performed by: OPHTHALMOLOGY

## 2022-07-11 PROCEDURE — 99999 PR PBB SHADOW E&M-EST. PATIENT-LVL III: ICD-10-PCS | Mod: PBBFAC,,, | Performed by: OPHTHALMOLOGY

## 2022-07-11 PROCEDURE — 99999 PR PBB SHADOW E&M-EST. PATIENT-LVL III: CPT | Mod: PBBFAC,,, | Performed by: OPHTHALMOLOGY

## 2022-07-11 PROCEDURE — 99213 OFFICE O/P EST LOW 20 MIN: CPT | Mod: PBBFAC,PO | Performed by: OPHTHALMOLOGY

## 2022-07-11 PROCEDURE — 92083 EXTENDED VISUAL FIELD XM: CPT | Mod: PBBFAC,PO | Performed by: OPHTHALMOLOGY

## 2022-07-11 PROCEDURE — 92083 HUMPHREY VISUAL FIELD - OU - BOTH EYES: ICD-10-PCS | Mod: 26,S$PBB,, | Performed by: OPHTHALMOLOGY

## 2022-07-11 RX ORDER — LATANOPROST 50 UG/ML
1 SOLUTION/ DROPS OPHTHALMIC NIGHTLY
Qty: 2.5 ML | Refills: 12 | Status: SHIPPED | OUTPATIENT
Start: 2022-07-11

## 2022-07-11 NOTE — PROGRESS NOTES
HPI     Follow-up      Additional comments: 4 month IOP, HVF today. Denies eye pain today   states occasional feeling of trash ou. Using Latanoprost OU HS & Cosopt OU   BID states compliance.           Last edited by Kely Fields on 7/11/2022  9:42 AM. (History)            Assessment /Plan     For exam results, see Encounter Report.    Primary open-angle glaucoma, bilateral, mild stage    Combined forms of age-related cataract, bilateral    Dry eye syndrome, bilateral      1. 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 Combined forms of age-related cataract, bilateral  Consider CE MIGS in time    3. Dry eye syndrome, bilateral  More frequent ATs

## 2022-07-11 NOTE — PROGRESS NOTES
24-2 hvf done. Eye movement OD>OS, with blind spot errors. Restarted OD x1 no change.     FIX  COOP  RELI - POOR    FT    Assessment /Plan     For exam results, see Encounter Report.    There are no diagnoses linked to this encounter.

## 2022-08-28 NOTE — PROGRESS NOTES
General Cardiology Clinic Note  Reason for Visit: Follow up  Last Clinic Visit: 9/11/2019 with Dr. Roberts     HPI:   Eugenie Coates is a 70 y.o. female who presents for No chief complaint on file.    History of Present Illness: Eugenie Coates is a 69 y.o. lady with hypertension, hyperlipidemia, GERD, diabetes, right lower extremity DVT 2017, restrictive lung disease, AVNRT, who presents for cardiology follow-up.      Interval History: She has been diagnosed with breast cancer since her last visit. She is planning on undergoing lumpectomy and sentinel lymph node biopsy along with bilateral oncoplastic reduction . She will likely need adjuvant radiation. Otherwise, she has been doing well. She has been riding her stationary bike for 20 minutes every day and is increasing the duration. She gained weight at the beginning of the pandemic but is now starting to lose it. She also does a lot of yard work. She does not have shortness of breath or chest pain with this. She has TRAN with walking but feels this may be due to her knee pain. She has chronic pedal edema and wears compression stockings daily. She denies palpitations, lightheadedness, syncope, orthopnea, PND. Her BP has been 120-130s/70s at home on average.       9/11/2019 Since we saw her last she saw Dr. Joe who performed a slow pathway modification for AVNRT, and has had no further episodes of SVT or palpitations.  She denies any pleuritic symptoms, no orthopnea, no lower extremity edema, no access site issues.  She also continued to complain of chest pain, and was referred to GI for evaluation.  She has remote history of hiatal hernia status post repair possibly, and EGD several years ago at [facility] [place], but symptoms resolved.  She reports dysphagia with food, but if she chews it well is able to swallow.  Denies any weight loss, no emesis, no abdominal pain.  He she saw GI, and they would like to perform repeat EGD.  From a cardiovascular perspective  she is able to walk 2 flights on level ground without stopping, walk up 2 flights of stairs without stopping, and denies any dyspnea on exertion, no cardiovascular symptoms at all.  Blood pressure per recent review of home health running in the 120s.     PMHx:  Hypertension  Hyperlipidemia  GERD  Diabetes  Restrictive lung disease.  SVT s/p ablation   ASFHIN  [facility]  Right breast cancer, ER/[place]+    ROS:    Constitution: Negative for decreased appetite, diaphoresis, fever, malaise/fatigue, weight gain and weight loss.   HENT: Negative for congestion, nosebleeds and sore throat.    Eyes: Negative for blurred vision, vision loss in left eye, vision loss in right eye and visual disturbance.  Cardiovascular: Negative for chest pain, claudication, near-syncope, orthopnea, palpitations, paroxysmal nocturnal dyspnea and syncope. Positive for dyspnea on exertion and leg swelling  Respiratory: Negative for cough, hemoptysis, snoring, shortness of breath and wheezing..    Hematologic/Lymphatic: Negative for bleeding problem. Does not bruise/bleed easily.   Endocrine: Negative for polyuria  Musculoskeletal: Negative for muscle cramps and myalgias. Positive for joint pain  Gastrointestinal: Negative for abdominal pain, change in bowel habit, diarrhea, heartburn, hematemesis, hematochezia, melena, nausea and vomiting.   Neurological: Negative for extremity weakness or numbness, dizziness, headaches and light-headedness.   Psychiatric/Behavioral: Negative for depression.   Allergic/Immunologic: Negative for hives.   PMH:     Past Medical History:   Diagnosis Date    Allergy     Anemia     Anxiety     Arthritis     Asthma     Bell palsy     Depression     Diabetes mellitus, type 2     DVT (deep venous thrombosis)     Endometriosis     Eye injury 2014    stuck with tree branch od ?     GERD (gastroesophageal reflux disease)     Glaucoma     History of uterine fibroid     Hyperlipidemia     Hypertension      Invasive lobular carcinoma of right breast in female 2020    Nuclear sclerosis of both eyes 2016    Sleep apnea     uses C pap    Supraventricular tachycardia     SVT (supraventricular tachycardia) 2019    Thyroid disease      Past Surgical History:   Procedure Laterality Date     SECTION      CHOLECYSTECTOMY      glaucoma laser Bilateral     HERNIA REPAIR      KNEE SURGERY Right 2008    (R) knee scope; torn meniscus    VEIN SURGERY  ,     Rt leg x2     Allergies:     Review of patient's allergies indicates:   Allergen Reactions    Percodan [oxycodone hcl-oxycodone-asa] Hives and Itching     Medications:     Current Outpatient Medications on File Prior to Visit   Medication Sig Dispense Refill    b complex vitamins tablet Take 1 tablet by mouth once daily.      bimatoprost (LUMIGAN) 0.01 % Drop Place 1 drop into both eyes every evening. 3 Bottle 4    blood sugar diagnostic Strp Test twice daily.  Accucheck viva test strips and lancets. 300 each 3    brimonidine 0.2% (ALPHAGAN) 0.2 % Drop Place 1 drop into both eyes 3 (three) times daily. 15 mL 4    calcium carbonate/vitamin D3 (CALCIUM WITH VITAMIN D3 ORAL) Take 5,000 mcg by mouth Daily.      diazePAM (VALIUM) 5 MG tablet Take 1 tablet (5 mg total) by mouth daily as needed for Anxiety (Take 1 tab. an hour before MRI; 2nd tab. 30 min. later if no effect.). 2 tablet 0    ezetimibe (ZETIA) 10 mg tablet Take 1 tablet (10 mg total) by mouth once daily. 90 tablet 3    lancets (ACCU-CHEK SOFTCLIX LANCETS) Misc 1 Units by Misc.(Non-Drug; Combo Route) route 2 (two) times daily. 300 each 3    lisinopriL (PRINIVIL,ZESTRIL) 40 MG tablet TAKE 1 TABLET(40 MG) BY MOUTH EVERY DAY 90 tablet 0    metFORMIN (GLUCOPHAGE) 1000 MG tablet Take 1 tablet (1,000 mg total) by mouth daily with breakfast. TAKE 1 TABLET DAILY WITH BREAKFAST 90 tablet 0    metoprolol tartrate (LOPRESSOR) 25 MG tablet Take 1 tablet (25 mg total) by mouth 2 (two)  times daily. 180 tablet 3    NIFEdipine (ADALAT CC) 90 MG TbSR Take 1 tablet (90 mg total) by mouth once daily. 90 tablet 0    omeprazole (PRILOSEC) 40 MG capsule Take 1 capsule (40 mg total) by mouth 2 (two) times daily before meals. 180 capsule 1    rosuvastatin (CRESTOR) 40 MG Tab Take 1 tablet (40 mg total) by mouth every evening. 90 tablet 3    [DISCONTINUED] omeprazole (PRILOSEC OTC) 20 MG tablet Take 1 tablet (20 mg total) by mouth once daily. 90 tablet 3     No current facility-administered medications on file prior to visit.      Social History:     Social History     Tobacco Use    Smoking status: Never Smoker    Smokeless tobacco: Never Used   Substance Use Topics    Alcohol use: No     Alcohol/week: 0.0 standard drinks     Frequency: Never     Drinks per session: Patient refused     Binge frequency: Never     Family History:     Family History   Problem Relation Age of Onset    Diabetes Mother     No Known Problems Father     No Known Problems Sister     No Known Problems Brother     Colon cancer Maternal Aunt     No Known Problems Maternal Uncle     No Known Problems Paternal Aunt     No Known Problems Paternal Uncle     No Known Problems Maternal Grandmother     No Known Problems Maternal Grandfather     No Known Problems Paternal Grandmother     No Known Problems Paternal Grandfather     Amblyopia Neg Hx     Blindness Neg Hx     Cancer Neg Hx     Cataracts Neg Hx     Glaucoma Neg Hx     Hypertension Neg Hx     Macular degeneration Neg Hx     Retinal detachment Neg Hx     Strabismus Neg Hx     Stroke Neg Hx     Thyroid disease Neg Hx     Celiac disease Neg Hx     Colon polyps Neg Hx     Esophageal cancer Neg Hx     Inflammatory bowel disease Neg Hx     Irritable bowel syndrome Neg Hx     Liver cancer Neg Hx     Liver disease Neg Hx     Rectal cancer Neg Hx     Stomach cancer Neg Hx     Ulcerative colitis Neg Hx     Cystic fibrosis Neg Hx     Crohn's disease Neg  "Hx     Hemochromatosis Neg Hx     Cirrhosis Neg Hx      Physical Exam:   BP (!) 167/78 (BP Location: Left arm, Patient Position: Sitting, BP Method: Large (Automatic))   Pulse 83   Ht 5' 2" (1.575 m)   Wt 86.3 kg (190 lb 4.1 oz)   BMI 34.80 kg/m²      Constitutional: She is oriented to person, place, and time and well-developed, well-nourished, and in no distress. No distress.   HENT:   Head: Normocephalic and atraumatic.   Mouth/Throat: No oropharyngeal exudate.   Eyes: EOM are normal. No scleral icterus.   Neck: No JVD present. No tracheal deviation present. No thyromegaly present.   Cardiovascular: Normal rate and regular rhythm. Exam reveals no gallop and no friction rub.   Murmur (Systolic crescendo decrescendo murmur) heard.  Pulmonary/Chest: Effort normal and breath sounds normal. No respiratory distress. She has no wheezes. She has no rales. She exhibits no tenderness.   Abdominal: Soft. She exhibits no distension. There is no tenderness. There is no rebound and no guarding.   Musculoskeletal: Normal range of motion. She exhibits no edema.   Neurological: She is alert and oriented to person, place, and time.   Skin: Skin is warm and dry. She is not diaphoretic. No erythema.   Psychiatric: Affect normal.     Labs:     Lab Results   Component Value Date     11/03/2020    K 3.9 11/03/2020     11/03/2020    CO2 27 11/03/2020    BUN 15 11/03/2020    CREATININE 1.1 11/03/2020    ANIONGAP 10 11/03/2020     Lab Results   Component Value Date    HGBA1C 6.9 (H) 11/03/2020     No results found for: BNP, BNPTRIAGEBLO Lab Results   Component Value Date    WBC 8.60 07/08/2020    HGB 13.5 07/08/2020    HCT 44.7 07/08/2020     07/08/2020    GRAN 5.5 07/08/2020    GRAN 63.9 07/08/2020     Lab Results   Component Value Date    CHOL 230 (H) 11/03/2020    HDL 52 11/03/2020    LDLCALC 147.0 11/03/2020    TRIG 155 (H) 11/03/2020          Imaging:   EKG today  EKG is normal sinus rhythm, left axis " deviation, no acute ischemic changes     TTE:  08/21/2018  CONCLUSIONS     1 - Normal left ventricular systolic function (EF 60-65%).     2 - Normal right ventricular systolic function .     3 - The estimated PA systolic pressure is 34 mmHg.     4 - Impaired LV relaxation, increased LVEDP.      5 - Mild-to-moderate aortic stenosis    Assessment:     1. Essential hypertension    2. Mixed hyperlipidemia    3. Diastolic dysfunction    4. SVT (supraventricular tachycardia)    5. Restrictive lung disease    6. Stage 3 chronic kidney disease, unspecified whether stage 3a or 3b CKD    7. Refusal of blood transfusions as patient is [employer]    8. Severe obesity (BMI 35.0-35.9 with comorbidity)    9. Type 2 diabetes mellitus with hyperglycemia, without long-term current use of insulin    10. Gastroesophageal reflux disease, unspecified whether esophagitis present    11. AFSHIN (obstructive sleep apnea)      Plan:     1. Preoperative cardiovascular examination  She has no active cardiac complaints, surgery is low risk, and she has revised cardiac risk index factors of none, thus she has a 3% risk of major adverse cardiac event, proceed on risk benefit basis.     2. Mixed hyperlipidemia  Most recent LDL above goal on Crestor 40 mg and Zetia 10 mg daily.   Increase aerobic exercise, weight loss, and continue Mediterranean diet     3. Essential hypertension  Blood pressure elevated today, but well controlled at home. Has been improving since PCP started her on Nifedipine and increased Lisinopril   Cont current meds    4. SVT (supraventricular tachycardia) s/p ablation  Resolved     5. Acute deep vein thrombosis (DVT) of proximal vein of right lower extremity  She was taken off Eliquis in 2018 by Dr. Carrizales    Follow up in one year.     Signed:  Yamilka Wilson PA-C  Cardiology   [phone] - Interventional  [phone] - General  11/25/2020 7:42 AM       17

## 2022-08-29 DIAGNOSIS — D42.0 ATYPICAL INTRACRANIAL MENINGIOMA: Primary | ICD-10-CM

## 2022-09-07 ENCOUNTER — OFFICE VISIT (OUTPATIENT)
Dept: NEUROSURGERY | Facility: CLINIC | Age: 72
End: 2022-09-07
Payer: MEDICARE

## 2022-09-07 VITALS
HEART RATE: 89 BPM | SYSTOLIC BLOOD PRESSURE: 171 MMHG | HEIGHT: 62 IN | DIASTOLIC BLOOD PRESSURE: 94 MMHG | RESPIRATION RATE: 18 BRPM | WEIGHT: 176.13 LBS | BODY MASS INDEX: 32.41 KG/M2

## 2022-09-07 DIAGNOSIS — D42.0 ATYPICAL INTRACRANIAL MENINGIOMA: Primary | ICD-10-CM

## 2022-09-07 PROCEDURE — 99215 PR OFFICE/OUTPT VISIT, EST, LEVL V, 40-54 MIN: ICD-10-PCS | Mod: S$PBB,,, | Performed by: NEUROLOGICAL SURGERY

## 2022-09-07 PROCEDURE — 99215 OFFICE O/P EST HI 40 MIN: CPT | Mod: S$PBB,,, | Performed by: NEUROLOGICAL SURGERY

## 2022-09-11 DIAGNOSIS — E11.65 TYPE 2 DIABETES MELLITUS WITH HYPERGLYCEMIA, WITHOUT LONG-TERM CURRENT USE OF INSULIN: ICD-10-CM

## 2022-09-11 RX ORDER — METFORMIN HYDROCHLORIDE 500 MG/1
TABLET ORAL
Qty: 90 TABLET | Refills: 0 | Status: SHIPPED | OUTPATIENT
Start: 2022-09-11 | End: 2022-12-14

## 2022-09-11 NOTE — TELEPHONE ENCOUNTER
Refill Authorization Note   Eugenie Coates  is requesting a refill authorization.  Brief Assessment and Rationale for Refill:  Approve    -Medication-Related Problems Identified: Requires labs  Medication Therapy Plan:       Medication Reconciliation Completed: No   Comments:     Provider Staff:     Action is required for this patient.   Please see care gap opportunities below in Care Due Message.     Thanks!  [facility]     Appointments      Date Provider   Last Visit   4/28/2022 Shashi Brown MD   Next Visit   9/26/2022 Shashi Brown MD     Note composed:5:59 AM 09/11/2022           Note composed:5:59 AM 09/11/2022

## 2022-09-11 NOTE — TELEPHONE ENCOUNTER
Care Due:                  Date            Visit Type   Department     Provider  --------------------------------------------------------------------------------                                EP -                              PRIMARY      [facility] INTERNAL  Last Visit: 04-      CARE ([facility])   MEDICINE       Shashiscooter Brown                              EP -                              PRIMARY      [facility] INTERNAL  Next Visit: 09-      [facility] ([facility])   MEDICINE       Shashimiguel Brown                                                            Last  Test          Frequency    Reason                     Performed    Due Date  --------------------------------------------------------------------------------    HBA1C.......  6 months...  metFORMIN................  04-   10-    [employer] Embedded Care Gaps. Reference number: [id number]. 9/11/2022   3:26:53 AM CDT

## 2022-09-22 ENCOUNTER — OFFICE VISIT (OUTPATIENT)
Dept: PODIATRY | Facility: CLINIC | Age: 72
End: 2022-09-22
Payer: MEDICARE

## 2022-09-22 VITALS — HEIGHT: 63 IN | WEIGHT: 187.25 LBS | BODY MASS INDEX: 33.18 KG/M2

## 2022-09-22 DIAGNOSIS — M20.42 HAMMER TOES OF BOTH FEET: ICD-10-CM

## 2022-09-22 DIAGNOSIS — E11.65 TYPE 2 DIABETES MELLITUS WITH HYPERGLYCEMIA, WITHOUT LONG-TERM CURRENT USE OF INSULIN: Primary | ICD-10-CM

## 2022-09-22 DIAGNOSIS — B35.1 ONYCHOMYCOSIS DUE TO DERMATOPHYTE: ICD-10-CM

## 2022-09-22 DIAGNOSIS — M20.41 HAMMER TOES OF BOTH FEET: ICD-10-CM

## 2022-09-22 PROCEDURE — 99999 PR PBB SHADOW E&M-EST. PATIENT-LVL III: CPT | Mod: PBBFAC,,, | Performed by: PODIATRIST

## 2022-09-22 PROCEDURE — 11721 DEBRIDE NAIL 6 OR MORE: CPT | Mod: PBBFAC,PN | Performed by: PODIATRIST

## 2022-09-22 PROCEDURE — 99213 OFFICE O/P EST LOW 20 MIN: CPT | Mod: PBBFAC,PN | Performed by: PODIATRIST

## 2022-09-22 PROCEDURE — 99499 UNLISTED E&M SERVICE: CPT | Mod: S$PBB,,, | Performed by: PODIATRIST

## 2022-09-22 PROCEDURE — 99499 NO LOS: ICD-10-PCS | Mod: S$PBB,,, | Performed by: PODIATRIST

## 2022-09-22 PROCEDURE — 11721 DEBRIDE NAIL 6 OR MORE: CPT | Mod: Q9,S$PBB,, | Performed by: PODIATRIST

## 2022-09-22 PROCEDURE — 99999 PR PBB SHADOW E&M-EST. PATIENT-LVL III: ICD-10-PCS | Mod: PBBFAC,,, | Performed by: PODIATRIST

## 2022-09-22 PROCEDURE — 11721 PR DEBRIDEMENT OF NAILS, 6 OR MORE: ICD-10-PCS | Mod: Q9,S$PBB,, | Performed by: PODIATRIST

## 2022-09-22 NOTE — PROGRESS NOTES
Subjective:      Patient ID: Eugenie Coates is a 72 y.o. female.    Chief Complaint: No chief complaint on file.    Eugenie is a 72 y.o. female who presents to the clinic for routine evaluation and treatment of diabetic feet. Eugenie has a past medical history of Allergy, Anemia, Anxiety, Arthritis, Asthma, Bell palsy, Bilateral lower extremity edema, Chronic diastolic CHF (congestive heart failure), Depression, Diabetes mellitus, type 2, DVT (deep venous thrombosis), Dyslipidemia, Endometriosis, Eye injury (2014), Focal seizure (5/3/2021), GERD (gastroesophageal reflux disease), Glaucoma, History of uterine fibroid, Hyperlipidemia, Hypertension, Invasive lobular carcinoma of right breast in female, Nuclear sclerosis of both eyes (9/27/2016), Obesity, Pancreas cyst, Sleep apnea, Supraventricular tachycardia, SVT (supraventricular tachycardia) (05/2019), Thyroid disease, and Venous insufficiency. Patient relates that her toenails are in need of trimming.  Denies being painful with wearing shoe gear.  Has not attempted to self treat.  She continues with excellent control over her blood glucose.  Denies neuropathy pain with today's exam.  Denies any additional pedal complaints.      PCP: Shashi Brown MD    Date Last Seen by PCP: 4/22     Hemoglobin A1C   Date Value Ref Range Status   04/28/2022 6.5 (H) 4.0 - 5.6 % Final     Comment:     ADA Screening Guidelines:  5.7-6.4%  Consistent with prediabetes  >or=6.5%  Consistent with diabetes    High levels of fetal hemoglobin interfere with the HbA1C  assay. Heterozygous hemoglobin variants (HbS, HgC, etc)do  not significantly interfere with this assay.   However, presence of multiple variants may affect accuracy.     12/30/2021 6.3 (H) 4.0 - 5.6 % Final     Comment:     ADA Screening Guidelines:  5.7-6.4%  Consistent with prediabetes  >or=6.5%  Consistent with diabetes    High levels of fetal hemoglobin interfere with the HbA1C  assay. Heterozygous hemoglobin  variants (HbS, HgC, etc)do  not significantly interfere with this assay.   However, presence of multiple variants may affect accuracy.     2021 6.3 (H) 4.0 - 5.6 % Final     Comment:     ADA Screening Guidelines:  5.7-6.4%  Consistent with prediabetes  >or=6.5%  Consistent with diabetes    High levels of fetal hemoglobin interfere with the HbA1C  assay. Heterozygous hemoglobin variants (HbS, HgC, etc)do  not significantly interfere with this assay.   However, presence of multiple variants may affect accuracy.             Past Medical History:   Diagnosis Date    Allergy     Anemia     Anxiety     Arthritis     Asthma     Bell palsy     Bilateral lower extremity edema     Chronic diastolic CHF (congestive heart failure)     Depression     Diabetes mellitus, type 2     DVT (deep venous thrombosis)     right LE    Dyslipidemia     Endometriosis     Eye injury     stuck with tree branch od ?     Focal seizure 5/3/2021    GERD (gastroesophageal reflux disease)     Glaucoma     History of uterine fibroid     Hyperlipidemia     Hypertension     Invasive lobular carcinoma of right breast in female     s/p surgery, radiation, tamoxifen    Nuclear sclerosis of both eyes 2016    Obesity     Pancreas cyst     Sleep apnea     uses C pap    Supraventricular tachycardia     SVT (supraventricular tachycardia) 2019    Thyroid disease     Venous insufficiency        Past Surgical History:   Procedure Laterality Date     SECTION      CHOLECYSTECTOMY      CRANIOTOMY FOR EXCISION OF INTRACRANIAL TUMOR Left 2021    Procedure: CRANIOTOMY, FOR INTRACRANIAL NEOPLASM EXCISION Left craniotomy for tumor resection;  Surgeon: Hernandez Bravo MD;  Location: [facility];  Service: Neurosurgery;  Laterality: Left;    glaucoma laser Bilateral     HERNIA REPAIR      KNEE SURGERY Right     (R) knee scope; torn meniscus    MASTECTOMY, PARTIAL Right 2020    Procedure: MASTECTOMY, PARTIAL-Right with radiological  marker Consent day of surgery; [place]robe, technitium, Frozen;  Surgeon: Narda Keating MD;  Location: [address];  Service: General;  Laterality: Right;    SENTINEL LYMPH NODE BIOPSY Right 12/7/2020    Procedure: BIOPSY, LYMPH NODE, SENTINEL;  Surgeon: Narda Keating MD;  Location: [address];  Service: General;  Laterality: Right;    TOTAL REDUCTION MAMMOPLASTY Bilateral 12/7/2020    Procedure: MAMMOPLASTY, REDUCTION-Bilateral;  Surgeon: aZck Hood MD;  Location: [address];  Service: Plastics;  Laterality: Bilateral;    VEIN SURGERY  2003, 2004    Rt leg x2       Family History   Problem Relation Age of Onset    Diabetes Mother     No Known Problems Father     No Known Problems Sister     No Known Problems Brother     Colon cancer Maternal Aunt     No Known Problems Maternal Uncle     No Known Problems Paternal Aunt     No Known Problems Paternal Uncle     No Known Problems Maternal Grandmother     No Known Problems Maternal Grandfather     No Known Problems Paternal Grandmother     No Known Problems Paternal Grandfather     Amblyopia Neg Hx     Blindness Neg Hx     Cancer Neg Hx     Cataracts Neg Hx     Glaucoma Neg Hx     Hypertension Neg Hx     Macular degeneration Neg Hx     Retinal detachment Neg Hx     Strabismus Neg Hx     Stroke Neg Hx     Thyroid disease Neg Hx     Celiac disease Neg Hx     Colon polyps Neg Hx     Esophageal cancer Neg Hx     Inflammatory bowel disease Neg Hx     Irritable bowel syndrome Neg Hx     Liver cancer Neg Hx     Liver disease Neg Hx     Rectal cancer Neg Hx     Stomach cancer Neg Hx     Ulcerative colitis Neg Hx     Cystic fibrosis Neg Hx     Crohn's disease Neg Hx     Hemochromatosis Neg Hx     Cirrhosis Neg Hx        Social History     Socioeconomic History    Marital status:    Tobacco Use    Smoking status: Never    Smokeless tobacco: Never   Substance and Sexual Activity    Alcohol use: Never     Alcohol/week: 0.0 standard drinks    Drug use:  Never    Sexual activity: Yes     Partners: Male     Birth control/protection: None   Social History Narrative    1/18/19: lives with her . They have multiple children, but the youngest is 38. No children at home right now. No pets at home. Splits time between here and [place]. Her son lives in [place].      Social Determinants of Health     Food Insecurity: No Food Insecurity    Worried About Running Out of Food in the Last Year: Never true    Ran Out of Food in the Last Year: Never true   Transportation Needs: No Transportation Needs    Lack of Transportation (Medical): No    Lack of Transportation (Non-Medical): No   Physical Activity: Insufficiently Active    Days of Exercise per Week: 3 days    Minutes of Exercise per Session: 10 min   Stress: Stress Concern Present    Feeling of Stress : To some extent   Social Connections: Unknown    Frequency of Communication with Friends and Family: More than three times a week    Frequency of Social Gatherings with Friends and Family: Patient refused    Attends Club or Organization Meetings: Patient refused    Marital Status:    Housing Stability: Low Risk     Unable to Pay for Housing in the Last Year: No    Number of Places Lived in the Last Year: 1    Unstable Housing in the Last Year: No       Current Outpatient Medications   Medication Sig Dispense Refill    b complex vitamins tablet Take 1 tablet by mouth once daily.      blood sugar diagnostic Strp Test twice daily.  Accucheck viva test strips and lancets. 300 each 3    brivaracetam (BRIVIACT) 50 mg Tab Take 1 tablet (50 mg total) by mouth 2 (two) times daily. 60 tablet 5    bumetanide (BUMEX) 1 MG tablet Take 1 tablet (1 mg total) by mouth 2 (two) times daily. 180 tablet 3    calcium carbonate/vitamin D3 (CALCIUM WITH VITAMIN D3 ORAL) Take 1 tablet by mouth Daily.       dorzolamide-timolol 2-0.5% (COSOPT) 22.3-6.8 mg/mL ophthalmic solution INSTILL 1 DROP IN BOTH EYES EVERY 12 HOURS 10 mL 11     ELIQUIS 5 mg Tab TAKE 1 TABLET BY MOUTH TWICE A DAY 60 tablet 11    ezetimibe (ZETIA) 10 mg tablet Take 1 tablet (10 mg total) by mouth once daily. 90 tablet 3    gabapentin (NEURONTIN) 300 MG capsule Take 1 capsule (300 mg total) by mouth 2 (two) times daily. (Patient taking differently: Take 300 mg by mouth 2 (two) times daily. PRN) 60 capsule 0    lancets (ACCU-CHEK SOFTCLIX LANCETS) Misc 1 Units by Misc.(Non-Drug; Combo Route) route 2 (two) times daily. 300 each 3    latanoprost 0.005 % ophthalmic solution Place 1 drop into both eyes every evening. 2.5 mL 12    lisinopriL (PRINIVIL,ZESTRIL) 40 MG tablet Take 1 tablet (40 mg total) by mouth once daily. 90 tablet 3    metFORMIN (GLUCOPHAGE) 500 MG tablet TAKE 1 TABLET(500 MG) BY MOUTH DAILY WITH BREAKFAST 90 tablet 0    metoprolol tartrate (LOPRESSOR) 25 MG tablet Take 1 tablet (25 mg total) by mouth 2 (two) times a day. 180 tablet 3    NIFEdipine (ADALAT CC) 60 MG TbSR Take 1 tablet (60 mg total) by mouth once daily. 90 tablet 3    pantoprazole (PROTONIX) 40 MG tablet Take 40 mg by mouth 2 (two) times daily.      rosuvastatin (CRESTOR) 40 MG Tab TAKE 1 TABLET(40 MG) BY MOUTH EVERY EVENING 90 tablet 3    diazePAM (VALIUM) 5 MG tablet Take 1 tablet (5 mg total) by mouth once. Take 1 hour prior to imaging. for 1 dose 1 tablet 0     No current facility-administered medications for this visit.       Review of patient's allergies indicates:   Allergen Reactions    Percodan [oxycodone hcl-oxycodone-asa] Hives and Itching         Review of Systems   Constitutional: Negative for chills and fever.   Cardiovascular:  Positive for leg swelling. Negative for claudication.   Skin:  Positive for color change and nail changes.   Musculoskeletal:  Positive for joint swelling. Negative for joint pain, muscle cramps and muscle weakness.   Gastrointestinal:  Negative for nausea and vomiting.   Neurological:  Positive for numbness. Negative for paresthesias.   Psychiatric/Behavioral:   Negative for altered mental status.          Objective:      Physical Exam  Constitutional:       General: She is not in acute distress.     Appearance: She is well-developed. She is not diaphoretic.   Cardiovascular:      Pulses:           Dorsalis pedis pulses are 2+ on the right side and 2+ on the left side.        Posterior tibial pulses are 2+ on the right side and 2+ on the left side.      Comments: CFT < 3 seconds bilateral.  Pedal hair growth decreased bilateral.  Varicosities noted bilateral.  Mild non pitting edema noted to bilateral lower extremity.  Toes are cool to touch bilateral.    Musculoskeletal:         General: No tenderness.      Comments: Muscle strength 5/5 in all muscle groups bilateral.  No tenderness nor crepitation with ROM of foot/ankle joints bilateral.  No tenderness with palpation of bilateral foot and ankle.  Bilateral pes planus foot type.  Bilateral hallux abducto valgus.  Bilateral semi-reducible contracture of toes 2-5.     Skin:     General: Skin is warm and dry.      Coloration: Skin is not pale.      Findings: No abrasion, bruising, burn, ecchymosis, erythema, laceration, lesion, petechiae or rash.      Nails: There is no clubbing.      Comments: Pedal skin appears edematous bilateral.  Toenails x 10 appear thickened by 2 mm, elongated by 4 mm, and discolored with subungual debris.  Hemosiderin staining noted to the Rt. Lower leg.  Examination of the skin reveals no evidence of significant maceration, rashes, open lesions, suspicious appearing nevi or other concerning lesions.    Neurological:      Mental Status: She is alert and oriented to person, place, and time.      Sensory: Sensory deficit present.      Motor: No weakness or atrophy.      Comments: Protective sensation per Doucette-Heron monofilament intact bilateral.    Vibratory sensation decreased bilateral.    Light touch intact bilateral.             Assessment:       Encounter Diagnoses   Name Primary?    Type 2  diabetes mellitus with hyperglycemia, without long-term current use of insulin Yes    Hammer toes of both feet     Onychomycosis due to dermatophyte          Plan:       Diagnoses and all orders for this visit:    Type 2 diabetes mellitus with hyperglycemia, without long-term current use of insulin    Hammer toes of both feet    Onychomycosis due to dermatophyte    I counseled the patient on her conditions, their implications and medical management.    Shoe inspection. Diabetic Foot Education. Patient reminded of the importance of good nutrition and blood sugar control to help prevent podiatric complications of diabetes. Patient instructed on proper foot hygeine. We discussed wearing proper shoe gear, daily foot inspections, never walking without protective shoe gear, never putting sharp instruments to feet    Advised to continue wearing diabetic shoes/insoles that accommodate for digital deformities.     With patient's permission, nails were aggressively reduced and debrided x 10 to their soft tissue attachment mechanically and with electric , removing all offending nail and debris.   Patient relates relief following the procedure.  She will continue to monitor the areas daily, inspect her feet, wear protective shoe gear when ambulatory, moisturizer to maintain skin integrity and follow in this office in approximately 4 months, sooner p.r.n.    Follow up in about 4 months (around 1/22/2023).    Giuliano Garnett DPM

## 2022-09-23 ENCOUNTER — TELEPHONE (OUTPATIENT)
Dept: NEUROSURGERY | Facility: CLINIC | Age: 72
End: 2022-09-23
Payer: MEDICARE

## 2022-09-23 NOTE — TELEPHONE ENCOUNTER
Spoke with patient and her . Let the patient know I have been out and was unable to get them scheduled with Dr. Knapp for 9/22. Asked them if there was another day in mind that they could come back here to see her. They weren't sure and said they would let us know when they would be able to come back.

## 2022-09-26 ENCOUNTER — OFFICE VISIT (OUTPATIENT)
Dept: INTERNAL MEDICINE | Facility: CLINIC | Age: 72
End: 2022-09-26
Payer: MEDICARE

## 2022-09-26 ENCOUNTER — LAB VISIT (OUTPATIENT)
Dept: LAB | Facility: HOSPITAL | Age: 72
End: 2022-09-26
Attending: INTERNAL MEDICINE
Payer: MEDICARE

## 2022-09-26 VITALS
TEMPERATURE: 98 F | BODY MASS INDEX: 32.86 KG/M2 | WEIGHT: 185.44 LBS | OXYGEN SATURATION: 98 % | HEIGHT: 63 IN | RESPIRATION RATE: 17 BRPM | DIASTOLIC BLOOD PRESSURE: 104 MMHG | SYSTOLIC BLOOD PRESSURE: 206 MMHG | HEART RATE: 77 BPM

## 2022-09-26 DIAGNOSIS — E11.51 TYPE 2 DIABETES MELLITUS WITH DIABETIC PERIPHERAL ANGIOPATHY WITHOUT GANGRENE, WITHOUT LONG-TERM CURRENT USE OF INSULIN: ICD-10-CM

## 2022-09-26 DIAGNOSIS — G47.33 OSA ON CPAP: ICD-10-CM

## 2022-09-26 DIAGNOSIS — Z17.0 MALIGNANT NEOPLASM OF CENTRAL PORTION OF RIGHT BREAST IN FEMALE, ESTROGEN RECEPTOR POSITIVE: ICD-10-CM

## 2022-09-26 DIAGNOSIS — E04.2 MULTINODULAR THYROID: ICD-10-CM

## 2022-09-26 DIAGNOSIS — E78.2 MIXED HYPERLIPIDEMIA: ICD-10-CM

## 2022-09-26 DIAGNOSIS — M17.0 PRIMARY OSTEOARTHRITIS OF BOTH KNEES: ICD-10-CM

## 2022-09-26 DIAGNOSIS — F33.9 DEPRESSION, RECURRENT: ICD-10-CM

## 2022-09-26 DIAGNOSIS — E11.51 TYPE 2 DIABETES MELLITUS WITH DIABETIC PERIPHERAL ANGIOPATHY WITHOUT GANGRENE, WITHOUT LONG-TERM CURRENT USE OF INSULIN: Primary | ICD-10-CM

## 2022-09-26 DIAGNOSIS — E05.90 SUBCLINICAL HYPERTHYROIDISM: ICD-10-CM

## 2022-09-26 DIAGNOSIS — I10 ESSENTIAL HYPERTENSION: ICD-10-CM

## 2022-09-26 DIAGNOSIS — C50.111 MALIGNANT NEOPLASM OF CENTRAL PORTION OF RIGHT BREAST IN FEMALE, ESTROGEN RECEPTOR POSITIVE: ICD-10-CM

## 2022-09-26 DIAGNOSIS — Z79.01 CHRONIC ANTICOAGULATION: ICD-10-CM

## 2022-09-26 DIAGNOSIS — I82.501 LEG DVT (DEEP VENOUS THROMBOEMBOLISM), CHRONIC, RIGHT: ICD-10-CM

## 2022-09-26 DIAGNOSIS — K21.9 GERD WITHOUT ESOPHAGITIS: ICD-10-CM

## 2022-09-26 DIAGNOSIS — I50.32 CHRONIC HEART FAILURE WITH PRESERVED EJECTION FRACTION: ICD-10-CM

## 2022-09-26 LAB
ALBUMIN/CREAT UR: 628.6 UG/MG (ref 0–30)
BACTERIA #/AREA URNS AUTO: NORMAL /HPF
BILIRUB UR QL STRIP: NEGATIVE
CLARITY UR REFRACT.AUTO: CLEAR
COLOR UR AUTO: COLORLESS
CREAT UR-MCNC: 21 MG/DL (ref 15–325)
GLUCOSE UR QL STRIP: NEGATIVE
HGB UR QL STRIP: NEGATIVE
KETONES UR QL STRIP: NEGATIVE
LEUKOCYTE ESTERASE UR QL STRIP: ABNORMAL
MICROALBUMIN UR DL<=1MG/L-MCNC: 132 UG/ML
MICROSCOPIC COMMENT: NORMAL
NITRITE UR QL STRIP: NEGATIVE
PH UR STRIP: 7 [PH] (ref 5–8)
PROT UR QL STRIP: ABNORMAL
RBC #/AREA URNS AUTO: 1 /HPF (ref 0–4)
SP GR UR STRIP: 1.01 (ref 1–1.03)
SQUAMOUS #/AREA URNS AUTO: 5 /HPF
URN SPEC COLLECT METH UR: ABNORMAL
WBC #/AREA URNS AUTO: 2 /HPF (ref 0–5)

## 2022-09-26 PROCEDURE — 99999 PR PBB SHADOW E&M-EST. PATIENT-LVL V: ICD-10-PCS | Mod: PBBFAC,,, | Performed by: INTERNAL MEDICINE

## 2022-09-26 PROCEDURE — 82043 UR ALBUMIN QUANTITATIVE: CPT | Performed by: INTERNAL MEDICINE

## 2022-09-26 PROCEDURE — 99999 PR PBB SHADOW E&M-EST. PATIENT-LVL V: CPT | Mod: PBBFAC,,, | Performed by: INTERNAL MEDICINE

## 2022-09-26 PROCEDURE — 99215 OFFICE O/P EST HI 40 MIN: CPT | Mod: PBBFAC,PO | Performed by: INTERNAL MEDICINE

## 2022-09-26 PROCEDURE — 81003 URINALYSIS AUTO W/O SCOPE: CPT | Mod: 59 | Performed by: INTERNAL MEDICINE

## 2022-09-26 PROCEDURE — 99214 PR OFFICE/OUTPT VISIT, EST, LEVL IV, 30-39 MIN: ICD-10-PCS | Mod: S$PBB,,, | Performed by: INTERNAL MEDICINE

## 2022-09-26 PROCEDURE — 81001 URINALYSIS AUTO W/SCOPE: CPT | Performed by: INTERNAL MEDICINE

## 2022-09-26 PROCEDURE — 82570 ASSAY OF URINE CREATININE: CPT | Performed by: INTERNAL MEDICINE

## 2022-09-26 PROCEDURE — 99214 OFFICE O/P EST MOD 30 MIN: CPT | Mod: S$PBB,,, | Performed by: INTERNAL MEDICINE

## 2022-09-26 RX ORDER — CLOBETASOL PROPIONATE 0.5 MG/G
CREAM TOPICAL 2 TIMES DAILY
COMMUNITY
Start: 2022-08-16 | End: 2023-08-10 | Stop reason: CLARIF

## 2022-09-26 RX ORDER — HYDRALAZINE HYDROCHLORIDE 25 MG/1
25 TABLET, FILM COATED ORAL EVERY 8 HOURS
Qty: 90 TABLET | Refills: 6 | Status: SHIPPED | OUTPATIENT
Start: 2022-09-26 | End: 2023-03-10

## 2022-10-03 DIAGNOSIS — Z71.89 COMPLEX CARE COORDINATION: ICD-10-CM

## 2022-10-03 NOTE — PROGRESS NOTES
Neurosurgery History & Physical    Patient ID: Eugenie Coates is a 72 y.o. female.    Chief Complaint   Patient presents with    Brain Tumor     No new issues.     Pt complains of acute and severe knee pain which is the source of her only pain.       Interval HPI:  Ms. Coates has been doing well.  She denies any new complaints.  She ambulates well and has symmetric strength.    Initial HPI:  Ms. Coates is a 72 year old female who is 16 months s/p left frontal craniotomy for meningioma resection (5/2021). Pathology demonstrates meningioma, WHO grade II.  Preoperatively, she reported progressive right hemiparesis, focal seizures in her right hand.      She is doing well. She has regained most of her strength on the right side. She is now ambulatory without assistive device. Denies falls. She denies new headache, weakness, numbness other other neurologic symptom. Denies further seizure activity. She is still taking Keppra.      She was evaluated by Dr. Barreto in Rad/Onc. Options for radiation were discussed. She opted against radiation at that time.     Review of Systems   Constitutional: Negative for activity change, chills and fever.   HENT: Negative for trouble swallowing.    Eyes: Negative for visual disturbance.   Respiratory: Negative for chest tightness and shortness of breath.    Cardiovascular: Negative for chest pain.   Gastrointestinal: Negative for nausea.   Genitourinary: Negative for difficulty urinating and frequency.   Musculoskeletal: Positive for gait problem.   Skin: Positive for wound (well healed craniotomy incision).   Neurological: Negative for weakness, numbness and headaches.   Psychiatric/Behavioral: Negative for confusion.    Past Medical History:   Diagnosis Date    Allergy     Anemia     Anxiety     Arthritis     Asthma     Bell palsy     Bilateral lower extremity edema     Chronic diastolic CHF (congestive heart failure)     Depression     Diabetes mellitus, type 2     DVT (deep venous  thrombosis)     right LE    Dyslipidemia     Endometriosis     Eye injury 2014    stuck with tree branch od ?     Focal seizure 5/3/2021    GERD (gastroesophageal reflux disease)     Glaucoma     History of uterine fibroid     Hyperlipidemia     Hypertension     Invasive lobular carcinoma of right breast in female     s/p surgery, radiation, tamoxifen    Nuclear sclerosis of both eyes 9/27/2016    Obesity     Pancreas cyst     Sleep apnea     uses C pap    Supraventricular tachycardia     SVT (supraventricular tachycardia) 05/2019    Thyroid disease     Venous insufficiency      Social History     Socioeconomic History    Marital status:    Tobacco Use    Smoking status: Never    Smokeless tobacco: Never   Substance and Sexual Activity    Alcohol use: Never     Alcohol/week: 0.0 standard drinks    Drug use: Never    Sexual activity: Yes     Partners: Male     Birth control/protection: None   Social History Narrative    1/18/19: lives with her . They have multiple children, but the youngest is 38. No children at home right now. No pets at home. Splits time between here and [place]. Her son lives in [place].      Social Determinants of Health     Food Insecurity: No Food Insecurity    Worried About Running Out of Food in the Last Year: Never true    Ran Out of Food in the Last Year: Never true   Transportation Needs: No Transportation Needs    Lack of Transportation (Medical): No    Lack of Transportation (Non-Medical): No   Physical Activity: Insufficiently Active    Days of Exercise per Week: 3 days    Minutes of Exercise per Session: 10 min   Stress: Stress Concern Present    Feeling of Stress : To some extent   Social Connections: Unknown    Frequency of Communication with Friends and Family: More than three times a week    Frequency of Social Gatherings with Friends and Family: Patient refused    Attends Club or Organization Meetings: Patient refused    Marital Status:    Housing  Stability: Low Risk     Unable to Pay for Housing in the Last Year: No    Number of Places Lived in the Last Year: 1    Unstable Housing in the Last Year: No     Family History   Problem Relation Age of Onset    Diabetes Mother     No Known Problems Father     No Known Problems Sister     No Known Problems Brother     Colon cancer Maternal Aunt     No Known Problems Maternal Uncle     No Known Problems Paternal Aunt     No Known Problems Paternal Uncle     No Known Problems Maternal Grandmother     No Known Problems Maternal Grandfather     No Known Problems Paternal Grandmother     No Known Problems Paternal Grandfather     Amblyopia Neg Hx     Blindness Neg Hx     Cancer Neg Hx     Cataracts Neg Hx     Glaucoma Neg Hx     Hypertension Neg Hx     Macular degeneration Neg Hx     Retinal detachment Neg Hx     Strabismus Neg Hx     Stroke Neg Hx     Thyroid disease Neg Hx     Celiac disease Neg Hx     Colon polyps Neg Hx     Esophageal cancer Neg Hx     Inflammatory bowel disease Neg Hx     Irritable bowel syndrome Neg Hx     Liver cancer Neg Hx     Liver disease Neg Hx     Rectal cancer Neg Hx     Stomach cancer Neg Hx     Ulcerative colitis Neg Hx     Cystic fibrosis Neg Hx     Crohn's disease Neg Hx     Hemochromatosis Neg Hx     Cirrhosis Neg Hx      Review of patient's allergies indicates:   Allergen Reactions    Oxycodone hcl-oxycodone-asa Hives, Itching and Other (See Comments)       Current Outpatient Medications:     b complex vitamins tablet, Take 1 tablet by mouth once daily., Disp: , Rfl:     blood sugar diagnostic Strp, Test twice daily.  Accucheck viva test strips and lancets., Disp: 300 each, Rfl: 3    calcium carbonate/vitamin D3 (CALCIUM WITH VITAMIN D3 ORAL), Take 1 tablet by mouth Daily. , Disp: , Rfl:     dorzolamide-timolol 2-0.5% (COSOPT) 22.3-6.8 mg/mL ophthalmic solution, INSTILL 1 DROP IN BOTH EYES EVERY 12 HOURS, Disp: 10 mL, Rfl: 11    ELIQUIS 5 mg Tab, TAKE 1 TABLET BY MOUTH TWICE A DAY,  "Disp: 60 tablet, Rfl: 11    ezetimibe (ZETIA) 10 mg tablet, Take 1 tablet (10 mg total) by mouth once daily., Disp: 90 tablet, Rfl: 3    gabapentin (NEURONTIN) 300 MG capsule, Take 1 capsule (300 mg total) by mouth 2 (two) times daily. (Patient taking differently: Take 300 mg by mouth 2 (two) times daily. PRN), Disp: 60 capsule, Rfl: 0    lancets (ACCU-CHEK SOFTCLIX LANCETS) Misc, 1 Units by Misc.(Non-Drug; Combo Route) route 2 (two) times daily., Disp: 300 each, Rfl: 3    latanoprost 0.005 % ophthalmic solution, Place 1 drop into both eyes every evening., Disp: 2.5 mL, Rfl: 12    lisinopriL (PRINIVIL,ZESTRIL) 40 MG tablet, Take 1 tablet (40 mg total) by mouth once daily., Disp: 90 tablet, Rfl: 3    metoprolol tartrate (LOPRESSOR) 25 MG tablet, Take 1 tablet (25 mg total) by mouth 2 (two) times a day., Disp: 180 tablet, Rfl: 3    pantoprazole (PROTONIX) 40 MG tablet, Take 40 mg by mouth 2 (two) times daily., Disp: , Rfl:     rosuvastatin (CRESTOR) 40 MG Tab, TAKE 1 TABLET(40 MG) BY MOUTH EVERY EVENING, Disp: 90 tablet, Rfl: 3    brivaracetam (BRIVIACT) 50 mg Tab, Take 1 tablet (50 mg total) by mouth 2 (two) times daily. (Patient not taking: Reported on 9/26/2022), Disp: 60 tablet, Rfl: 5    bumetanide (BUMEX) 1 MG tablet, Take 1 tablet (1 mg total) by mouth 2 (two) times daily. (Patient not taking: Reported on 9/26/2022), Disp: 180 tablet, Rfl: 3    clobetasoL (TEMOVATE) 0.05 % cream, Apply topically 2 (two) times daily., Disp: , Rfl:     diazePAM (VALIUM) 5 MG tablet, Take 1 tablet (5 mg total) by mouth once. Take 1 hour prior to imaging. for 1 dose, Disp: 1 tablet, Rfl: 0    hydrALAZINE (APRESOLINE) 25 MG tablet, Take 1 tablet (25 mg total) by mouth every 8 (eight) hours. For blood pressure, Disp: 90 tablet, Rfl: 6    metFORMIN (GLUCOPHAGE) 500 MG tablet, TAKE 1 TABLET(500 MG) BY MOUTH DAILY WITH BREAKFAST, Disp: 90 tablet, Rfl: 0  Blood pressure (!) 171/94, pulse 89, resp. rate 18, height 5' 2" (1.575 m), weight " 79.9 kg (176 lb 2.4 oz).      Neurologic Exam     Mental Status   Oriented to person, place, and time.   Attention: normal.   Speech: speech is normal   Level of consciousness: alert  Knowledge: good.     Cranial Nerves     CN III, IV, VI   Extraocular motions are normal.     CN VII   Facial expression full, symmetric.     Motor Exam   Muscle bulk: normal  Overall muscle tone: normal    Strength   Strength 5/5 throughout.     Sensory Exam   Light touch normal.     Gait, Coordination, and Reflexes     Gait  Gait: normal    Physical Exam  Eyes:      Extraocular Movements: EOM normal.   Neurological:      Mental Status: She is oriented to person, place, and time.      Motor: Motor strength is normal.      Gait: Gait is intact.   Psychiatric:         Speech: Speech normal.           Imaging:  MRI of the brain with and without contrast dated 9/72022 is personally reviewed and discussed with the patient.   There is some notdular thickening in the site of her resected meningioma which likely represents recurrence.      Assessment/Plan:   72 year old female signs of recurrence of her Grade II Meningioma on msot recent MRI  I think it would be prudent for her to see Dr. Knapp to discuss pros and cons of radiation therapy at this point  Follow-up in 6 months with MRI brain with and without contrast to monitor in the event she elects not to have any radiation at this time.

## 2022-10-04 ENCOUNTER — TELEPHONE (OUTPATIENT)
Dept: INTERNAL MEDICINE | Facility: CLINIC | Age: 72
End: 2022-10-04
Payer: MEDICARE

## 2022-10-04 NOTE — TELEPHONE ENCOUNTER
----- Message from Suzie Duong sent at 10/4/2022  9:39 AM CDT -----  Contact: pt [phone] or [phone]  Caller is requesting an earlier appointment then we can schedule.  Caller is requesting a message be sent to the provider.  If this is for urgent care symptoms, did you offer other providers at this location, providers at other locations, or [facility]? (yes, no, n/a):  n/a  If this is for the patients physical, did you offer to schedule next available and put on wait list, or to see NP or PA for their physical?  (yes, no, n/a):  n/a  When is the next available appointment with their provider:  03/03/23  Reason for the appointment:  1 month f/u visit   Patient preference of timeframe to be scheduled:  anytime this month   Would the patient like a call back, or a response through their [facility] portal?: callback

## 2022-10-05 ENCOUNTER — DOCUMENTATION ONLY (OUTPATIENT)
Dept: HEMATOLOGY/ONCOLOGY | Facility: CLINIC | Age: 72
End: 2022-10-05
Payer: MEDICARE

## 2022-10-05 NOTE — PROGRESS NOTES
Spoke to the pt to explain the process of gathering records prior to scheduling with med onc.  Pt informed me of locations where she was treated.  I've faxed record requests [facility] and [facility] in [facility].  I explained to the pt that once I receive these records and they are interpreted, I will call to schedule her.   I provided my direct contact information and encouraged her to call for any assistance.  Pt verbalized understanding.

## 2022-10-10 ENCOUNTER — HOSPITAL ENCOUNTER (OUTPATIENT)
Dept: RADIOLOGY | Facility: HOSPITAL | Age: 72
Discharge: HOME | End: 2022-10-10
Attending: INTERNAL MEDICINE
Payer: MEDICARE

## 2022-10-10 PROCEDURE — 77066 DX MAMMO INCL CAD BI: CPT | Mod: 26,,, | Performed by: RADIOLOGY

## 2022-10-10 PROCEDURE — 77066 PR MAMMO, CAD, DIAGNOSTIC, BILAT: ICD-10-PCS | Mod: 26,,, | Performed by: RADIOLOGY

## 2022-10-12 ENCOUNTER — TELEPHONE (OUTPATIENT)
Dept: INTERNAL MEDICINE | Facility: CLINIC | Age: 72
End: 2022-10-12
Payer: MEDICARE

## 2022-10-12 NOTE — TELEPHONE ENCOUNTER
----- Message from Sisi Su MA sent at 9/26/2022  2:04 PM CDT -----  Regarding: follow up on blood pressure  Saw dr beard 9/26 and never made 1 month bp fol up visit with .    Checked at home?  Was elevated in office today per dr beard.

## 2022-10-24 ENCOUNTER — OFFICE VISIT (OUTPATIENT)
Dept: INTERNAL MEDICINE | Facility: CLINIC | Age: 72
End: 2022-10-24
Payer: MEDICARE

## 2022-10-24 VITALS
HEIGHT: 62 IN | HEART RATE: 80 BPM | RESPIRATION RATE: 16 BRPM | DIASTOLIC BLOOD PRESSURE: 66 MMHG | SYSTOLIC BLOOD PRESSURE: 124 MMHG | TEMPERATURE: 98 F | BODY MASS INDEX: 32.86 KG/M2 | WEIGHT: 178.56 LBS

## 2022-10-24 DIAGNOSIS — E11.51 TYPE 2 DIABETES MELLITUS WITH DIABETIC PERIPHERAL ANGIOPATHY WITHOUT GANGRENE, WITHOUT LONG-TERM CURRENT USE OF INSULIN: ICD-10-CM

## 2022-10-24 DIAGNOSIS — I10 ESSENTIAL HYPERTENSION: Primary | ICD-10-CM

## 2022-10-24 DIAGNOSIS — Z79.01 CHRONIC ANTICOAGULATION: ICD-10-CM

## 2022-10-24 DIAGNOSIS — K21.9 GERD WITHOUT ESOPHAGITIS: ICD-10-CM

## 2022-10-24 DIAGNOSIS — I82.501 LEG DVT (DEEP VENOUS THROMBOEMBOLISM), CHRONIC, RIGHT: ICD-10-CM

## 2022-10-24 PROCEDURE — 99215 OFFICE O/P EST HI 40 MIN: CPT | Mod: PBBFAC,PO | Performed by: INTERNAL MEDICINE

## 2022-10-24 PROCEDURE — 99999 PR PBB SHADOW E&M-EST. PATIENT-LVL V: CPT | Mod: PBBFAC,,, | Performed by: INTERNAL MEDICINE

## 2022-10-24 PROCEDURE — 99999 PR PBB SHADOW E&M-EST. PATIENT-LVL V: ICD-10-PCS | Mod: PBBFAC,,, | Performed by: INTERNAL MEDICINE

## 2022-10-24 PROCEDURE — 99214 PR OFFICE/OUTPT VISIT, EST, LEVL IV, 30-39 MIN: ICD-10-PCS | Mod: S$PBB,,, | Performed by: INTERNAL MEDICINE

## 2022-10-24 PROCEDURE — 99214 OFFICE O/P EST MOD 30 MIN: CPT | Mod: S$PBB,,, | Performed by: INTERNAL MEDICINE

## 2022-10-24 RX ORDER — NIFEDIPINE 60 MG/1
60 TABLET, EXTENDED RELEASE ORAL DAILY
Qty: 90 TABLET | Refills: 3
Start: 2022-10-24 | End: 2022-10-24 | Stop reason: SDUPTHER

## 2022-10-24 RX ORDER — NIFEDIPINE 60 MG/1
60 TABLET, EXTENDED RELEASE ORAL DAILY
Qty: 90 TABLET | Refills: 3 | Status: SHIPPED | OUTPATIENT
Start: 2022-10-24 | End: 2023-08-09 | Stop reason: SDUPTHER

## 2022-10-26 PROBLEM — I47.10 PSVT (PAROXYSMAL SUPRAVENTRICULAR TACHYCARDIA): Status: RESOLVED | Noted: 2019-06-05 | Resolved: 2022-10-26

## 2022-10-26 PROBLEM — Z79.01 CHRONIC ANTICOAGULATION: Status: ACTIVE | Noted: 2022-10-26

## 2022-10-26 NOTE — PROGRESS NOTES
Subjective:       Patient ID: Eugenie Coates is a 72 y.o. female.    Chief Complaint: Follow-up (3 month f/u )    HPI  The patient presents for follow-up of medical conditions which include type 2 diabetes mellitus, hypertension multinodular thyroid subclinical hyperthyroidism obstructive sleep apnea osteoarthritis of the knees osteopenia.  The patient has history of seizure disorder which apparently is now in remission.  Keppra was discontinued on 04/28/2022.  The patient has a history of right-sided breast cancer.  She has had a partial right mastectomy, bilateral breast truck and radiation therapy to the right breast.  Her last mammogram was performed on 06/21/2022 in [place], [place].    The patient reports that she felt bad on the nifedipine 60 mg daily for her blood pressure even when taking at bedtime.  She discontinued the medication.  She denies experiencing any morning headaches or dizziness.  She has chronic dyspnea on exertion but no PND or orthopnea is described.    She uses her CPAP machine every night for treatment of obstructive sleep apnea.    Blood pressure readings average 130/70 although she did have a maximum reading of 190 systolic at home.  She denies having any high readings recently.    She does have reflux symptoms.  She is currently using Protonix.    Home blood sugar readings have been less than 100 fasting.    She sees a psychiatrist for follow-up of depression.  She states she has periods of moodiness, anger, and sadness.  She does not have any suicidal thoughts.    Review of Systems   Constitutional:  Positive for fatigue. Negative for activity change, appetite change and unexpected weight change.   Eyes:  Negative for visual disturbance.   Respiratory:  Positive for shortness of breath.    Cardiovascular:  Negative for chest pain, palpitations and leg swelling.   Gastrointestinal:  Positive for reflux. Negative for abdominal pain, blood in stool and diarrhea.   Genitourinary:   Negative for dysuria, frequency, hematuria and urgency.   Musculoskeletal:  Positive for arthralgias.   Neurological:  Negative for seizures, syncope, weakness, numbness and headaches.   Psychiatric/Behavioral:  Positive for dysphoric mood. Negative for confusion, hallucinations, sleep disturbance and suicidal ideas. The patient is not nervous/anxious.           Physical Exam  Vitals and nursing note reviewed.   Constitutional:       General: She is not in acute distress.     Appearance: Normal appearance. She is well-developed.      Comments: The patient has gained 9 lb since 04/28/2022.   HENT:      Head: Normocephalic and atraumatic.   Eyes:      General: No scleral icterus.     Extraocular Movements: Extraocular movements intact.      Conjunctiva/sclera: Conjunctivae normal.   Neck:      Thyroid: No thyromegaly.      Vascular: No JVD.   Cardiovascular:      Rate and Rhythm: Normal rate and regular rhythm.      Heart sounds: Normal heart sounds. No murmur heard.    No friction rub. No gallop.   Pulmonary:      Effort: Pulmonary effort is normal. No respiratory distress.      Breath sounds: Normal breath sounds. No wheezing or rales.   Abdominal:      General: Bowel sounds are normal.      Palpations: Abdomen is soft. There is no mass.      Tenderness: There is no abdominal tenderness.   Musculoskeletal:         General: Tenderness present.      Cervical back: Normal range of motion and neck supple.      Comments: Knees:  Decreased flexion is noted bilaterally.  The patient is using pull-on knee supports.   Lymphadenopathy:      Cervical: No cervical adenopathy.   Skin:     General: Skin is warm and dry.      Findings: No rash.      Comments: No foot lesions are present.   Neurological:      Mental Status: She is alert and oriented to person, place, and time.      Cranial Nerves: No cranial nerve deficit.      Comments: Gait is antalgic.   Psychiatric:         Mood and Affect: Mood normal.         Behavior:  Behavior normal.         Thought Content: Thought content normal.       Protective Sensation (w/ 10 gram monofilament):  Right: Intact  Left: Intact    Visual Inspection:  Normal -  Bilateral    Pedal Pulses:   Right: Present  Left: Present    Posterior tibialis:   Right:Present  Left: Present    Lab Visit on 09/26/2022   Component Date Value Ref Range Status    Sodium 09/26/2022 139  136 - 145 mmol/L Final    Potassium 09/26/2022 3.7  3.5 - 5.1 mmol/L Final    Chloride 09/26/2022 105  95 - 110 mmol/L Final    CO2 09/26/2022 26  23 - 29 mmol/L Final    Glucose 09/26/2022 110  70 - 110 mg/dL Final    BUN 09/26/2022 13  8 - 23 mg/dL Final    Creatinine 09/26/2022 0.9  0.5 - 1.4 mg/dL Final    Calcium 09/26/2022 9.6  8.7 - 10.5 mg/dL Final    Total Protein 09/26/2022 7.7  6.0 - 8.4 g/dL Final    Albumin 09/26/2022 3.8  3.5 - 5.2 g/dL Final    Total Bilirubin 09/26/2022 0.8  0.1 - 1.0 mg/dL Final    Comment: For infants and newborns, interpretation of results should be based  on gestational age, weight and in agreement with clinical  observations.    Premature Infant recommended reference ranges:  Up to 24 hours.............<8.0 mg/dL  Up to 48 hours............<12.0 mg/dL  3-5 days..................<15.0 mg/dL  6-29 days.................<15.0 mg/dL      Alkaline Phosphatase 09/26/2022 118  55 - 135 U/L Final    AST 09/26/2022 18  10 - 40 U/L Final    ALT 09/26/2022 14  10 - 44 U/L Final    Anion Gap 09/26/2022 8  8 - 16 mmol/L Final    eGFR 09/26/2022 >60.0  >60 mL/min/1.73 m^2 Final    Cholesterol 09/26/2022 225 (H)  120 - 199 mg/dL Final    Comment: The National Cholesterol Education Program (NCEP) has set the  following guidelines (reference ranges) for Cholesterol:  Optimal.....................<200 mg/dL  Borderline High.............200-239 mg/dL  High........................> or = 240 mg/dL      Triglycerides 09/26/2022 144  30 - 150 mg/dL Final    Comment: The National Cholesterol Education Program (NCEP) has set  the  following guidelines (reference values) for triglycerides:  Normal......................<150 mg/dL  Borderline High.............150-199 mg/dL  High........................200-499 mg/dL      HDL 09/26/2022 49  40 - 75 mg/dL Final    Comment: The National Cholesterol Education Program (NCEP) has set the  following guidelines (reference values) for HDL Cholesterol:  Low...............<40 mg/dL  Optimal...........>60 mg/dL      LDL Cholesterol 09/26/2022 147.2  63.0 - 159.0 mg/dL Final    Comment: The National Cholesterol Education Program (NCEP) has set the  following guidelines (reference values) for LDL Cholesterol:  Optimal.......................<130 mg/dL  Borderline High...............130-159 mg/dL  High..........................160-189 mg/dL  Very High.....................>190 mg/dL      HDL/Cholesterol Ratio 09/26/2022 21.8  20.0 - 50.0 % Final    Total Cholesterol/HDL Ratio 09/26/2022 4.6  2.0 - 5.0 Final    Non-HDL Cholesterol 09/26/2022 176  mg/dL Final    Comment: Risk category and Non-HDL cholesterol goals:  Coronary heart disease (CHD)or equivalent (10-year risk of CHD >20%):  Non-HDL cholesterol goal     <130 mg/dL  Two or more CHD risk factors and 10-year risk of CHD <= 20%:  Non-HDL cholesterol goal     <160 mg/dL  0 to 1 CHD risk factor:  Non-HDL cholesterol goal     <190 mg/dL      WBC 09/26/2022 7.96  3.90 - 12.70 K/uL Final    RBC 09/26/2022 5.20  4.00 - 5.40 M/uL Final    Hemoglobin 09/26/2022 14.4  12.0 - 16.0 g/dL Final    Hematocrit 09/26/2022 46.4  37.0 - 48.5 % Final    MCV 09/26/2022 89  82 - 98 fL Final    MCH 09/26/2022 27.7  27.0 - 31.0 pg Final    MCHC 09/26/2022 31.0 (L)  32.0 - 36.0 g/dL Final    RDW 09/26/2022 13.6  11.5 - 14.5 % Final    Platelets 09/26/2022 273  150 - 450 K/uL Final    MPV 09/26/2022 11.0  9.2 - 12.9 fL Final    Immature Granulocytes 09/26/2022 0.3  0.0 - 0.5 % Final    Gran # (ANC) 09/26/2022 4.9  1.8 - 7.7 K/uL Final    Immature Grans (Abs) 09/26/2022 0.02   0.00 - 0.04 K/uL Final    Comment: Mild elevation in immature granulocytes is non specific and   can be seen in a variety of conditions including stress response,   acute inflammation, trauma and pregnancy. Correlation with other   laboratory and clinical findings is essential.      Lymph # 09/26/2022 2.2  1.0 - 4.8 K/uL Final    Mono # 09/26/2022 0.6  0.3 - 1.0 K/uL Final    Eos # 09/26/2022 0.2  0.0 - 0.5 K/uL Final    Baso # 09/26/2022 0.05  0.00 - 0.20 K/uL Final    nRBC 09/26/2022 0  0 /100 WBC Final    Gran % 09/26/2022 61.3  38.0 - 73.0 % Final    Lymph % 09/26/2022 27.6  18.0 - 48.0 % Final    Mono % 09/26/2022 7.2  4.0 - 15.0 % Final    Eosinophil % 09/26/2022 3.0  0.0 - 8.0 % Final    Basophil % 09/26/2022 0.6  0.0 - 1.9 % Final    Differential Method 09/26/2022 Automated   Final    Free T4 09/26/2022 1.10  0.71 - 1.51 ng/dL Final    TSH 09/26/2022 0.346 (L)  0.400 - 4.000 uIU/mL Final    Hemoglobin A1C 09/26/2022 6.4 (H)  4.0 - 5.6 % Final    Comment: ADA Screening Guidelines:  5.7-6.4%  Consistent with prediabetes  >or=6.5%  Consistent with diabetes    High levels of fetal hemoglobin interfere with the HbA1C  assay. Heterozygous hemoglobin variants (HbS, HgC, etc)do  not significantly interfere with this assay.   However, presence of multiple variants may affect accuracy.      Estimated Avg Glucose 09/26/2022 137 (H)  68 - 131 mg/dL Final   Lab Visit on 09/26/2022   Component Date Value Ref Range Status    Specimen UA 09/26/2022 Urine, Unspecified   Final    Color, UA 09/26/2022 Colorless (A)  Yellow, Straw, [place] Final    Appearance, UA 09/26/2022 Clear  Clear Final    pH, UA 09/26/2022 7.0  5.0 - 8.0 Final    Specific Gravity, UA 09/26/2022 1.010  1.005 - 1.030 Final    Protein, UA 09/26/2022 Trace (A)  Negative Final    Comment: Recommend a 24 hour urine protein or a urine   protein/creatinine ratio if globulin induced proteinuria is  clinically suspected.      Glucose, UA 09/26/2022 Negative   Negative Final    Ketones, UA 09/26/2022 Negative  Negative Final    Bilirubin (UA) 09/26/2022 Negative  Negative Final    Occult Blood UA 09/26/2022 Negative  Negative Final    Nitrite, UA 09/26/2022 Negative  Negative Final    Leukocytes, UA 09/26/2022 1+ (A)  Negative Final    Microalbumin, Urine 09/26/2022 132.0  ug/mL Final    Creatinine, Urine 09/26/2022 21.0  15.0 - 325.0 mg/dL Final    Microalb/Creat Ratio 09/26/2022 628.6 (H)  0.0 - 30.0 ug/mg Final    RBC, UA 09/26/2022 1  0 - 4 /hpf Final    WBC, UA 09/26/2022 2  0 - 5 /hpf Final    Bacteria 09/26/2022 Rare  None-Occ /hpf Final    Squam Epithel, UA 09/26/2022 5  /hpf Final    Microscopic Comment 09/26/2022 SEE COMMENT   Final    Comment: Other formed elements not mentioned in the report are not   present in the microscopic examination.          Assessment & Plan:      Eugenie was seen today for follow-up.  The patient should continue home blood pressure monitoring.  Nifedipine will be discontinued since the patient reports she cannot tolerate the medication.  Hydralazine will be ordered.  A blood pressure recheck in 1 month is recommended.    Fasting blood tests will be obtained today.  Urine microalbumin will also be obtained.    The patient will follow-up with Hematology-Oncology regarding breast cancer.    The patient will follow-up with Endocrinology regarding her nodular thyroid.    Diagnoses and all orders for this visit:    Type 2 diabetes mellitus with diabetic peripheral angiopathy without gangrene, without long-term current use of insulin  -     Comprehensive Metabolic Panel; Future  -     Lipid Panel; Future  -     CBC Auto Differential; Future  -     T4, Free; Future  -     TSH; Future  -     Hemoglobin A1C; Future  -     Urinalysis; Future  -     Microalbumin/Creatinine Ratio, Urine; Future    Essential hypertension  -     Comprehensive Metabolic Panel; Future  -     Lipid Panel; Future  -     CBC Auto Differential; Future  -     T4, Free;  Future  -     TSH; Future  -     Hemoglobin A1C; Future  -     Urinalysis; Future  -     Microalbumin/Creatinine Ratio, Urine; Future    Mixed hyperlipidemia  -     Comprehensive Metabolic Panel; Future  -     Lipid Panel; Future  -     CBC Auto Differential; Future  -     T4, Free; Future  -     TSH; Future  -     Hemoglobin A1C; Future  -     Urinalysis; Future  -     Microalbumin/Creatinine Ratio, Urine; Future    Primary osteoarthritis of both knees    Malignant neoplasm of central portion of right breast in female, estrogen receptor positive  -     Ambulatory referral/consult to Hematology / Oncology; Future  -     Comprehensive Metabolic Panel; Future  -     Lipid Panel; Future  -     CBC Auto Differential; Future  -     T4, Free; Future  -     TSH; Future  -     Hemoglobin A1C; Future  -     Urinalysis; Future  -     Microalbumin/Creatinine Ratio, Urine; Future    Subclinical hyperthyroidism  -     Comprehensive Metabolic Panel; Future  -     Lipid Panel; Future  -     CBC Auto Differential; Future  -     T4, Free; Future  -     TSH; Future  -     Hemoglobin A1C; Future  -     Urinalysis; Future  -     Microalbumin/Creatinine Ratio, Urine; Future    Depression, recurrent    AFSHIN on CPAP    Multinodular thyroid    GERD without esophagitis    Chronic heart failure with preserved ejection fraction    Leg DVT (deep venous thromboembolism), chronic, right    Chronic anticoagulation    Other orders  -     hydrALAZINE (APRESOLINE) 25 MG tablet; Take 1 tablet (25 mg total) by mouth every 8 (eight) hours. For blood pressure       Follow up in about 4 weeks (around 10/24/2022).     Shashi Brown MD

## 2022-10-26 NOTE — PROGRESS NOTES
Subjective:       Patient ID: Eugenie Coates is a 72 y.o. female.    Chief Complaint: Hypertension (1 month fol up.  Hm readings  136/77, 134/75 103/74, 117/61,108/66, 105/61, 112/65, 111/64, 102/56, 120/72, 110/69 128/75 124/63 122/66 124/74 130/73 123/75 120/77, 116/69 120/78, 128/67 129/78, 130/73 120/78, 127/72 128/74 128/67 124/68 126/76/132/72 128/69 117/76 136/73 138/69 136/74 136/74 140/76 130/74 132/74 111/65 128/68 138/70 140/77 139/84 137/74 137/76 140/74 128/72 129/78 125/77 128/76 128/75 116/75  pulse 70-86)    HPI  The patient presents for follow-up of hypertension therapy.  The patient has been monitoring her blood pressures at home.  Blood pressure readings are entered above in the medical record.  The patient reports that she restarted nifedipine 60 mg daily.  She states that she started taking it with food as recommended by her .  It is better tolerated now.  She is also using hydralazine as prescribed.  She is not using Bumex due to excessive urination reported.    The patient states that she did receive her COVID-19 booster shot on 09/22/2022 at her [facility].    Reflux symptoms are controlled.  No breakthrough symptoms are noted.    She is tolerating her diabetes medication well.  No hypoglycemia is noted since her last visit.    Review of Systems   Constitutional:  Negative for activity change, appetite change, fatigue and unexpected weight change.   Eyes:  Negative for visual disturbance.   Respiratory:  Negative for cough and shortness of breath.    Cardiovascular:  Negative for chest pain, palpitations and leg swelling (leg swelling has resolved since her last visit.).   Gastrointestinal:  Negative for abdominal pain, blood in stool, constipation and diarrhea.   Genitourinary:  Negative for dysuria and hematuria.   Musculoskeletal:  Negative for arthralgias, neck pain and neck stiffness.   Integumentary:  Negative for rash.   Neurological:  Negative for dizziness,  syncope and headaches.   Psychiatric/Behavioral:  Negative for sleep disturbance.           Physical Exam  Vitals and nursing note reviewed.   Constitutional:       General: She is not in acute distress.     Appearance: Normal appearance. She is well-developed.   HENT:      Head: Normocephalic and atraumatic.   Eyes:      General: No scleral icterus.     Extraocular Movements: Extraocular movements intact.      Conjunctiva/sclera: Conjunctivae normal.   Neck:      Thyroid: No thyromegaly.      Vascular: No JVD.   Cardiovascular:      Rate and Rhythm: Normal rate and regular rhythm.      Heart sounds: Normal heart sounds. No murmur heard.    No friction rub. No gallop.   Pulmonary:      Effort: Pulmonary effort is normal. No respiratory distress.      Breath sounds: Normal breath sounds. No wheezing or rales.   Abdominal:      General: Bowel sounds are normal.      Palpations: Abdomen is soft. There is no mass.      Tenderness: There is no abdominal tenderness.   Musculoskeletal:         General: Tenderness (mild tenderness of both knee joints is present on palpation.  Decreased flexion is noted bilaterally.) present.      Cervical back: Normal range of motion and neck supple.      Right lower leg: No edema.      Left lower leg: No edema.   Lymphadenopathy:      Cervical: No cervical adenopathy.   Skin:     General: Skin is warm and dry.      Findings: No rash.   Neurological:      Mental Status: She is alert and oriented to person, place, and time.   Psychiatric:         Mood and Affect: Mood normal.         Behavior: Behavior normal.         Lab Visit on 09/26/2022   Component Date Value Ref Range Status    Sodium 09/26/2022 139  136 - 145 mmol/L Final    Potassium 09/26/2022 3.7  3.5 - 5.1 mmol/L Final    Chloride 09/26/2022 105  95 - 110 mmol/L Final    CO2 09/26/2022 26  23 - 29 mmol/L Final    Glucose 09/26/2022 110  70 - 110 mg/dL Final    BUN 09/26/2022 13  8 - 23 mg/dL Final    Creatinine 09/26/2022 0.9  0.5  - 1.4 mg/dL Final    Calcium 09/26/2022 9.6  8.7 - 10.5 mg/dL Final    Total Protein 09/26/2022 7.7  6.0 - 8.4 g/dL Final    Albumin 09/26/2022 3.8  3.5 - 5.2 g/dL Final    Total Bilirubin 09/26/2022 0.8  0.1 - 1.0 mg/dL Final    Comment: For infants and newborns, interpretation of results should be based  on gestational age, weight and in agreement with clinical  observations.    Premature Infant recommended reference ranges:  Up to 24 hours.............<8.0 mg/dL  Up to 48 hours............<12.0 mg/dL  3-5 days..................<15.0 mg/dL  6-29 days.................<15.0 mg/dL      Alkaline Phosphatase 09/26/2022 118  55 - 135 U/L Final    AST 09/26/2022 18  10 - 40 U/L Final    ALT 09/26/2022 14  10 - 44 U/L Final    Anion Gap 09/26/2022 8  8 - 16 mmol/L Final    eGFR 09/26/2022 >60.0  >60 mL/min/1.73 m^2 Final    Cholesterol 09/26/2022 225 (H)  120 - 199 mg/dL Final    Comment: The National Cholesterol Education Program (NCEP) has set the  following guidelines (reference ranges) for Cholesterol:  Optimal.....................<200 mg/dL  Borderline High.............200-239 mg/dL  High........................> or = 240 mg/dL      Triglycerides 09/26/2022 144  30 - 150 mg/dL Final    Comment: The National Cholesterol Education Program (NCEP) has set the  following guidelines (reference values) for triglycerides:  Normal......................<150 mg/dL  Borderline High.............150-199 mg/dL  High........................200-499 mg/dL      HDL 09/26/2022 49  40 - 75 mg/dL Final    Comment: The National Cholesterol Education Program (NCEP) has set the  following guidelines (reference values) for HDL Cholesterol:  Low...............<40 mg/dL  Optimal...........>60 mg/dL      LDL Cholesterol 09/26/2022 147.2  63.0 - 159.0 mg/dL Final    Comment: The National Cholesterol Education Program (NCEP) has set the  following guidelines (reference values) for LDL Cholesterol:  Optimal.......................<130  mg/dL  Borderline High...............130-159 mg/dL  High..........................160-189 mg/dL  Very High.....................>190 mg/dL      HDL/Cholesterol Ratio 09/26/2022 21.8  20.0 - 50.0 % Final    Total Cholesterol/HDL Ratio 09/26/2022 4.6  2.0 - 5.0 Final    Non-HDL Cholesterol 09/26/2022 176  mg/dL Final    Comment: Risk category and Non-HDL cholesterol goals:  Coronary heart disease (CHD)or equivalent (10-year risk of CHD >20%):  Non-HDL cholesterol goal     <130 mg/dL  Two or more CHD risk factors and 10-year risk of CHD <= 20%:  Non-HDL cholesterol goal     <160 mg/dL  0 to 1 CHD risk factor:  Non-HDL cholesterol goal     <190 mg/dL      WBC 09/26/2022 7.96  3.90 - 12.70 K/uL Final    RBC 09/26/2022 5.20  4.00 - 5.40 M/uL Final    Hemoglobin 09/26/2022 14.4  12.0 - 16.0 g/dL Final    Hematocrit 09/26/2022 46.4  37.0 - 48.5 % Final    MCV 09/26/2022 89  82 - 98 fL Final    MCH 09/26/2022 27.7  27.0 - 31.0 pg Final    MCHC 09/26/2022 31.0 (L)  32.0 - 36.0 g/dL Final    RDW 09/26/2022 13.6  11.5 - 14.5 % Final    Platelets 09/26/2022 273  150 - 450 K/uL Final    MPV 09/26/2022 11.0  9.2 - 12.9 fL Final    Immature Granulocytes 09/26/2022 0.3  0.0 - 0.5 % Final    Gran # (ANC) 09/26/2022 4.9  1.8 - 7.7 K/uL Final    Immature Grans (Abs) 09/26/2022 0.02  0.00 - 0.04 K/uL Final    Comment: Mild elevation in immature granulocytes is non specific and   can be seen in a variety of conditions including stress response,   acute inflammation, trauma and pregnancy. Correlation with other   laboratory and clinical findings is essential.      Lymph # 09/26/2022 2.2  1.0 - 4.8 K/uL Final    Mono # 09/26/2022 0.6  0.3 - 1.0 K/uL Final    Eos # 09/26/2022 0.2  0.0 - 0.5 K/uL Final    Baso # 09/26/2022 0.05  0.00 - 0.20 K/uL Final    nRBC 09/26/2022 0  0 /100 WBC Final    Gran % 09/26/2022 61.3  38.0 - 73.0 % Final    Lymph % 09/26/2022 27.6  18.0 - 48.0 % Final    Mono % 09/26/2022 7.2  4.0 - 15.0 % Final    Eosinophil %  09/26/2022 3.0  0.0 - 8.0 % Final    Basophil % 09/26/2022 0.6  0.0 - 1.9 % Final    Differential Method 09/26/2022 Automated   Final    Free T4 09/26/2022 1.10  0.71 - 1.51 ng/dL Final    TSH 09/26/2022 0.346 (L)  0.400 - 4.000 uIU/mL Final    Hemoglobin A1C 09/26/2022 6.4 (H)  4.0 - 5.6 % Final    Comment: ADA Screening Guidelines:  5.7-6.4%  Consistent with prediabetes  >or=6.5%  Consistent with diabetes    High levels of fetal hemoglobin interfere with the HbA1C  assay. Heterozygous hemoglobin variants (HbS, HgC, etc)do  not significantly interfere with this assay.   However, presence of multiple variants may affect accuracy.      Estimated Avg Glucose 09/26/2022 137 (H)  68 - 131 mg/dL Final   Lab Visit on 09/26/2022   Component Date Value Ref Range Status    Specimen UA 09/26/2022 Urine, Unspecified   Final    Color, UA 09/26/2022 Colorless (A)  Yellow, Straw, [place] Final    Appearance, UA 09/26/2022 Clear  Clear Final    pH, UA 09/26/2022 7.0  5.0 - 8.0 Final    Specific Gravity, UA 09/26/2022 1.010  1.005 - 1.030 Final    Protein, UA 09/26/2022 Trace (A)  Negative Final    Comment: Recommend a 24 hour urine protein or a urine   protein/creatinine ratio if globulin induced proteinuria is  clinically suspected.      Glucose, UA 09/26/2022 Negative  Negative Final    Ketones, UA 09/26/2022 Negative  Negative Final    Bilirubin (UA) 09/26/2022 Negative  Negative Final    Occult Blood UA 09/26/2022 Negative  Negative Final    Nitrite, UA 09/26/2022 Negative  Negative Final    Leukocytes, UA 09/26/2022 1+ (A)  Negative Final    Microalbumin, Urine 09/26/2022 132.0  ug/mL Final    Creatinine, Urine 09/26/2022 21.0  15.0 - 325.0 mg/dL Final    Microalb/Creat Ratio 09/26/2022 628.6 (H)  0.0 - 30.0 ug/mg Final    RBC, UA 09/26/2022 1  0 - 4 /hpf Final    WBC, UA 09/26/2022 2  0 - 5 /hpf Final    Bacteria 09/26/2022 Rare  None-Occ /hpf Final    Squam Epithel, UA 09/26/2022 5  /hpf Final    Microscopic Comment  09/26/2022 SEE COMMENT   Final    Comment: Other formed elements not mentioned in the report are not   present in the microscopic examination.          Assessment & Plan:      Eugenie was seen today for hypertension.  Current blood pressure medication will be continued including the nifedipine which was reordered.    The patient was advised to follow-up with Endocrinology   regarding her multinodular thyroid.    Laboratory studies will be repeated in 4 months.    Diagnoses and all orders for this visit:    Essential hypertension  -     Discontinue: NIFEdipine (ADALAT CC) 60 MG TbSR; Take 1 tablet (60 mg total) by mouth once daily.  -     NIFEdipine (ADALAT CC) 60 MG TbSR; Take 1 tablet (60 mg total) by mouth once daily.  -     Comprehensive Metabolic Panel; Future  -     Lipid Panel; Future  -     Hemoglobin A1C; Future    Type 2 diabetes mellitus with diabetic peripheral angiopathy without gangrene, without long-term current use of insulin  -     Comprehensive Metabolic Panel; Future  -     Lipid Panel; Future  -     Hemoglobin A1C; Future    GERD without esophagitis    Leg DVT (deep venous thromboembolism), chronic, right    Chronic anticoagulation       Follow up in about 4 months (around 2/24/2023).     Shashi Brown MD

## 2022-11-02 ENCOUNTER — LAB VISIT (OUTPATIENT)
Dept: LAB | Facility: HOSPITAL | Age: 72
End: 2022-11-02
Attending: INTERNAL MEDICINE
Payer: MEDICARE

## 2022-11-02 DIAGNOSIS — I10 ESSENTIAL HYPERTENSION: ICD-10-CM

## 2022-11-02 LAB
ALBUMIN SERPL BCP-MCNC: 3.6 G/DL (ref 3.5–5.2)
ANION GAP SERPL CALC-SCNC: 12 MMOL/L (ref 8–16)
BUN SERPL-MCNC: 15 MG/DL (ref 8–23)
CALCIUM SERPL-MCNC: 9.4 MG/DL (ref 8.7–10.5)
CHLORIDE SERPL-SCNC: 101 MMOL/L (ref 95–110)
CO2 SERPL-SCNC: 26 MMOL/L (ref 23–29)
CREAT SERPL-MCNC: 1.1 MG/DL (ref 0.5–1.4)
EST. GFR  (NO RACE VARIABLE): 53.7 ML/MIN/1.73 M^2
GLUCOSE SERPL-MCNC: 96 MG/DL (ref 70–110)
PHOSPHATE SERPL-MCNC: 4 MG/DL (ref 2.7–4.5)
POTASSIUM SERPL-SCNC: 4.2 MMOL/L (ref 3.5–5.1)
SODIUM SERPL-SCNC: 139 MMOL/L (ref 136–145)

## 2022-11-02 PROCEDURE — 36415 COLL VENOUS BLD VENIPUNCTURE: CPT | Mod: PO | Performed by: INTERNAL MEDICINE

## 2022-11-02 PROCEDURE — 80069 RENAL FUNCTION PANEL: CPT | Performed by: INTERNAL MEDICINE

## 2022-11-09 ENCOUNTER — OFFICE VISIT (OUTPATIENT)
Dept: NEPHROLOGY | Facility: CLINIC | Age: 72
End: 2022-11-09
Payer: MEDICARE

## 2022-11-09 VITALS
BODY MASS INDEX: 32.76 KG/M2 | SYSTOLIC BLOOD PRESSURE: 144 MMHG | DIASTOLIC BLOOD PRESSURE: 80 MMHG | HEIGHT: 62 IN | WEIGHT: 178 LBS

## 2022-11-09 DIAGNOSIS — I10 PRIMARY HYPERTENSION: Primary | ICD-10-CM

## 2022-11-09 PROCEDURE — 99999 PR PBB SHADOW E&M-EST. PATIENT-LVL IV: ICD-10-PCS | Mod: PBBFAC,,, | Performed by: INTERNAL MEDICINE

## 2022-11-09 PROCEDURE — 99214 OFFICE O/P EST MOD 30 MIN: CPT | Mod: PBBFAC,PN | Performed by: INTERNAL MEDICINE

## 2022-11-09 PROCEDURE — 99214 PR OFFICE/OUTPT VISIT, EST, LEVL IV, 30-39 MIN: ICD-10-PCS | Mod: S$PBB,,, | Performed by: INTERNAL MEDICINE

## 2022-11-09 PROCEDURE — 99214 OFFICE O/P EST MOD 30 MIN: CPT | Mod: S$PBB,,, | Performed by: INTERNAL MEDICINE

## 2022-11-09 PROCEDURE — 99999 PR PBB SHADOW E&M-EST. PATIENT-LVL IV: CPT | Mod: PBBFAC,,, | Performed by: INTERNAL MEDICINE

## 2022-11-09 NOTE — PROGRESS NOTES
"Subjective:       Patient ID: Eugenie Coates is a 72 y.o. Black or  female who presents for return patient evaluation for hypertension.      She has no uremic or urinary symptoms and is in her usual state of health. She states her blood pressures are controlled at home.  She was recently placed on hydralazine but does not like it.      Review of Systems   Constitutional:  Negative for appetite change, chills and fever.   Eyes:  Negative for visual disturbance.   Respiratory:  Negative for cough and shortness of breath.    Cardiovascular:  Positive for leg swelling (occasional). Negative for chest pain.   Gastrointestinal:  Positive for abdominal pain (from GERD). Negative for diarrhea, nausea and vomiting.   Genitourinary:  Negative for difficulty urinating, dysuria, flank pain and hematuria.   Musculoskeletal:  Positive for arthralgias (knees) and gait problem. Negative for myalgias.   Skin:  Negative for rash.   Neurological:  Negative for headaches.   Psychiatric/Behavioral:  Negative for sleep disturbance.      The past medical, family and social histories were reviewed for this encounter.     BP (!) 144/80 (BP Location: Left arm, Patient Position: Sitting, BP Method: Large (Manual))   Ht 5' 2" (1.575 m)   Wt 80.7 kg (178 lb)   BMI 32.56 kg/m²     Objective:      Physical Exam  Vitals reviewed.   Constitutional:       General: She is not in acute distress.     Appearance: She is well-developed.   HENT:      Head: Normocephalic and atraumatic.   Eyes:      Conjunctiva/sclera: Conjunctivae normal.   Neck:      Vascular: No JVD.   Cardiovascular:      Rate and Rhythm: Normal rate and regular rhythm.      Heart sounds: Murmur heard.     No friction rub. No gallop.   Pulmonary:      Effort: Pulmonary effort is normal. No respiratory distress.      Breath sounds: Normal breath sounds. No wheezing or rales.   Abdominal:      General: Bowel sounds are normal. There is no distension.      " Palpations: Abdomen is soft.      Tenderness: There is no abdominal tenderness.   Musculoskeletal:      Cervical back: Neck supple.      Right lower leg: Edema (trace) present.      Left lower leg: Edema (trace) present.   Skin:     General: Skin is warm and dry.      Findings: No rash.   Neurological:      Mental Status: She is alert and oriented to person, place, and time.       Assessment:       1. Primary hypertension          Plan:   Return to clinic in 12 months.  Baseline creatinine is 1.1.  Labs for next visit include rp.  Renal US shows R 9.5 cm, L 10.1 cm.  I would increase her metoprolol to help with BP and rate control.  Continue nifedipine but stop hydralazine which she is not tolerating.

## 2022-12-08 ENCOUNTER — TELEPHONE (OUTPATIENT)
Dept: SURGERY | Facility: CLINIC | Age: 72
End: 2022-12-08
Payer: MEDICARE

## 2022-12-08 NOTE — TELEPHONE ENCOUNTER
Returned call to Eugenie Coates regarding message left previous patient of  personal history of Breast Cancer. Patient needs to get reestablished with Adrián . Please call Linda mendoza @[phone] ext: [phone] as I would be happy to schedule this apt .

## 2022-12-09 ENCOUNTER — HOSPITAL ENCOUNTER (OUTPATIENT)
Dept: RADIOLOGY | Facility: CLINIC | Age: 72
Discharge: HOME | End: 2022-12-09
Attending: INTERNAL MEDICINE
Payer: MEDICARE

## 2022-12-09 DIAGNOSIS — Z12.31 ENCOUNTER FOR SCREENING MAMMOGRAM FOR MALIGNANT NEOPLASM OF BREAST: ICD-10-CM

## 2022-12-09 PROCEDURE — 77067 MAMMO DIGITAL SCREENING BILAT WITH TOMO: ICD-10-PCS | Mod: 26,,, | Performed by: RADIOLOGY

## 2022-12-09 PROCEDURE — 77067 SCR MAMMO BI INCL CAD: CPT | Mod: TC,PO

## 2022-12-09 PROCEDURE — 77063 BREAST TOMOSYNTHESIS BI: CPT | Mod: TC,PO

## 2022-12-09 PROCEDURE — 77063 MAMMO DIGITAL SCREENING BILAT WITH TOMO: ICD-10-PCS | Mod: 26,,, | Performed by: RADIOLOGY

## 2022-12-09 PROCEDURE — 77067 SCR MAMMO BI INCL CAD: CPT | Mod: 26,,, | Performed by: RADIOLOGY

## 2022-12-09 PROCEDURE — 77063 BREAST TOMOSYNTHESIS BI: CPT | Mod: 26,,, | Performed by: RADIOLOGY

## 2022-12-14 DIAGNOSIS — E11.65 TYPE 2 DIABETES MELLITUS WITH HYPERGLYCEMIA, WITHOUT LONG-TERM CURRENT USE OF INSULIN: ICD-10-CM

## 2022-12-14 RX ORDER — METFORMIN HYDROCHLORIDE 500 MG/1
TABLET ORAL
Qty: 90 TABLET | Refills: 1 | Status: SHIPPED | OUTPATIENT
Start: 2022-12-14 | End: 2023-06-09

## 2022-12-14 NOTE — TELEPHONE ENCOUNTER
No new care gaps identified.  [facility] Embedded Care Gaps. Reference number: [id number]. 12/14/2022   10:10:47 AM CST

## 2022-12-15 NOTE — TELEPHONE ENCOUNTER
Refill Decision Note   Eugenie Jaxon  is requesting a refill authorization.  Brief Assessment and Rationale for Refill:  Approve     Medication Therapy Plan:       Medication Reconciliation Completed: No   Comments:     No Care Gaps recommended.     Note composed:6:04 PM 12/14/2022

## 2023-01-10 ENCOUNTER — OFFICE VISIT (OUTPATIENT)
Dept: OPHTHALMOLOGY | Facility: CLINIC | Age: 73
End: 2023-01-10
Payer: MEDICARE

## 2023-01-10 DIAGNOSIS — E11.9 DIABETES MELLITUS TYPE 2 WITHOUT RETINOPATHY: ICD-10-CM

## 2023-01-10 DIAGNOSIS — H04.123 DRY EYE SYNDROME, BILATERAL: ICD-10-CM

## 2023-01-10 DIAGNOSIS — H40.1131 PRIMARY OPEN-ANGLE GLAUCOMA, BILATERAL, MILD STAGE: Primary | ICD-10-CM

## 2023-01-10 DIAGNOSIS — H25.813 COMBINED FORMS OF AGE-RELATED CATARACT, BILATERAL: ICD-10-CM

## 2023-01-10 LAB
LEFT EYE DM RETINOPATHY: NEGATIVE
RIGHT EYE DM RETINOPATHY: NEGATIVE

## 2023-01-10 PROCEDURE — 92136 OPHTHALMIC BIOMETRY: CPT | Mod: PBBFAC,PO,LT | Performed by: OPHTHALMOLOGY

## 2023-01-10 PROCEDURE — 99999 PR PBB SHADOW E&M-EST. PATIENT-LVL IV: CPT | Mod: PBBFAC,,, | Performed by: OPHTHALMOLOGY

## 2023-01-10 PROCEDURE — 92133 POSTERIOR SEGMENT OCT OPTIC NERVE(OCULAR COHERENCE TOMOGRAPHY) - OU - BOTH EYES: ICD-10-PCS | Mod: 26,S$PBB,, | Performed by: OPHTHALMOLOGY

## 2023-01-10 PROCEDURE — 99214 OFFICE O/P EST MOD 30 MIN: CPT | Mod: PBBFAC,PO | Performed by: OPHTHALMOLOGY

## 2023-01-10 PROCEDURE — 99999 PR PBB SHADOW E&M-EST. PATIENT-LVL IV: ICD-10-PCS | Mod: PBBFAC,,, | Performed by: OPHTHALMOLOGY

## 2023-01-10 PROCEDURE — 99214 OFFICE O/P EST MOD 30 MIN: CPT | Mod: S$PBB,,, | Performed by: OPHTHALMOLOGY

## 2023-01-10 PROCEDURE — 99214 PR OFFICE/OUTPT VISIT, EST, LEVL IV, 30-39 MIN: ICD-10-PCS | Mod: S$PBB,,, | Performed by: OPHTHALMOLOGY

## 2023-01-10 PROCEDURE — 92136 IOL MASTER - OS - LEFT EYE: ICD-10-PCS | Mod: 26,S$PBB,LT, | Performed by: OPHTHALMOLOGY

## 2023-01-10 PROCEDURE — 92133 CPTRZD OPH DX IMG PST SGM ON: CPT | Mod: PBBFAC,PO | Performed by: OPHTHALMOLOGY

## 2023-01-10 RX ORDER — SODIUM CHLORIDE 9 MG/ML
INJECTION, SOLUTION INTRAVENOUS CONTINUOUS
Status: CANCELLED | OUTPATIENT
Start: 2023-01-10

## 2023-01-10 RX ORDER — TROPICAMIDE 10 MG/ML
1 SOLUTION/ DROPS OPHTHALMIC
Status: CANCELLED | OUTPATIENT
Start: 2023-01-10

## 2023-01-10 RX ORDER — PROPARACAINE HYDROCHLORIDE 5 MG/ML
1 SOLUTION/ DROPS OPHTHALMIC
Status: CANCELLED | OUTPATIENT
Start: 2023-01-10

## 2023-01-10 RX ORDER — PHENYLEPHRINE HYDROCHLORIDE 25 MG/ML
1 SOLUTION/ DROPS OPHTHALMIC
Status: CANCELLED | OUTPATIENT
Start: 2023-01-10

## 2023-01-10 RX ORDER — MOXIFLOXACIN 5 MG/ML
1 SOLUTION/ DROPS OPHTHALMIC 4 TIMES DAILY
Qty: 3 ML | Refills: 1 | Status: SHIPPED | OUTPATIENT
Start: 2023-01-10 | End: 2023-01-17

## 2023-01-10 RX ORDER — DORZOLAMIDE HYDROCHLORIDE AND TIMOLOL MALEATE 20; 5 MG/ML; MG/ML
SOLUTION/ DROPS OPHTHALMIC
Qty: 10 ML | Refills: 11 | Status: SHIPPED | OUTPATIENT
Start: 2023-01-10 | End: 2023-11-27

## 2023-01-10 RX ORDER — KETOROLAC TROMETHAMINE 5 MG/ML
1 SOLUTION OPHTHALMIC 4 TIMES DAILY
Qty: 5 ML | Refills: 2 | Status: SHIPPED | OUTPATIENT
Start: 2023-01-10 | End: 2023-04-13 | Stop reason: ALTCHOICE

## 2023-01-10 RX ORDER — PREDNISOLONE ACETATE 10 MG/ML
1 SUSPENSION/ DROPS OPHTHALMIC 4 TIMES DAILY
Qty: 5 ML | Refills: 2 | Status: SHIPPED | OUTPATIENT
Start: 2023-01-10 | End: 2023-04-13 | Stop reason: ALTCHOICE

## 2023-01-10 RX ORDER — ACETAZOLAMIDE 250 MG/1
250 TABLET ORAL 3 TIMES DAILY
Qty: 90 TABLET | Refills: 5 | Status: ON HOLD | OUTPATIENT
Start: 2023-01-10 | End: 2023-02-08 | Stop reason: HOSPADM

## 2023-01-10 NOTE — PROGRESS NOTES
HPI     Follow-up     Additional comments: 6 month IOP, DFE, OCT Nerve today. Denies eye pain   today states she does occasionally have some discomfort above the eyes.   Uisng Latanoprost OU HS and Cosopt OU BID states compliance.           Last edited by Kely Fields on 1/10/2023  9:18 AM.            Assessment /Plan     For exam results, see Encounter Report.    Combined forms of age-related cataract, bilateral    Primary open-angle glaucoma, bilateral, mild stage  -     dorzolamide-timolol 2-0.5% (COSOPT) 22.3-6.8 mg/mL ophthalmic solution; INSTILL 1 DROP IN BOTH EYES EVERY 12 HOURS  Dispense: 10 mL; Refill: 11    Diabetes mellitus type 2 without retinopathy    Dry eye syndrome, bilateral      1. 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 Combined forms of age-related cataract, bilateral  OS>OD  See above  Patient with visually significant cataract impacting abiliity ADLs (reading, driving, PM driving, glare).  Discussed options, R & B, expectations, patient voices good understanding and wishes to proceed with procedure. Patient will likely benefit from sx and signed consent.    Proceed with CEIOL OMNI OS  Monofocal distance IOL    3. Diabetes mellitus type 2 without retinopathy  Diabetes without retinopathy, discussed with patient importance of glucose control  and follow up.  Patient voices understanding.    4. Dry eye syndrome, bilateral  Ats prn

## 2023-01-11 ENCOUNTER — PATIENT MESSAGE (OUTPATIENT)
Dept: INTERNAL MEDICINE | Facility: CLINIC | Age: 73
End: 2023-01-11
Payer: MEDICARE

## 2023-01-12 ENCOUNTER — TELEPHONE (OUTPATIENT)
Dept: OPHTHALMOLOGY | Facility: CLINIC | Age: 73
End: 2023-01-12
Payer: MEDICARE

## 2023-01-12 ENCOUNTER — OFFICE VISIT (OUTPATIENT)
Dept: PODIATRY | Facility: CLINIC | Age: 73
End: 2023-01-12
Payer: MEDICARE

## 2023-01-12 DIAGNOSIS — M20.41 HAMMER TOES OF BOTH FEET: ICD-10-CM

## 2023-01-12 DIAGNOSIS — B35.1 ONYCHOMYCOSIS DUE TO DERMATOPHYTE: ICD-10-CM

## 2023-01-12 DIAGNOSIS — M20.42 HAMMER TOES OF BOTH FEET: ICD-10-CM

## 2023-01-12 DIAGNOSIS — L84 CORN OR CALLUS: ICD-10-CM

## 2023-01-12 DIAGNOSIS — E11.65 TYPE 2 DIABETES MELLITUS WITH HYPERGLYCEMIA, WITHOUT LONG-TERM CURRENT USE OF INSULIN: Primary | ICD-10-CM

## 2023-01-12 PROCEDURE — 11721 PR DEBRIDEMENT OF NAILS, 6 OR MORE: ICD-10-PCS | Mod: Q9,59,S$PBB, | Performed by: PODIATRIST

## 2023-01-12 PROCEDURE — 11055 PARING/CUTG B9 HYPRKER LES 1: CPT | Mod: Q9,S$PBB,, | Performed by: PODIATRIST

## 2023-01-12 PROCEDURE — 99213 OFFICE O/P EST LOW 20 MIN: CPT | Mod: PBBFAC,PN | Performed by: PODIATRIST

## 2023-01-12 PROCEDURE — 11721 DEBRIDE NAIL 6 OR MORE: CPT | Performed by: PODIATRIST

## 2023-01-12 PROCEDURE — 99499 NO LOS: ICD-10-PCS | Mod: S$PBB,,, | Performed by: PODIATRIST

## 2023-01-12 PROCEDURE — 11055 PARING/CUTG B9 HYPRKER LES 1: CPT | Mod: Q9,PBBFAC,PN | Performed by: PODIATRIST

## 2023-01-12 PROCEDURE — 99999 PR PBB SHADOW E&M-EST. PATIENT-LVL III: ICD-10-PCS | Mod: PBBFAC,,, | Performed by: PODIATRIST

## 2023-01-12 PROCEDURE — 11721 DEBRIDE NAIL 6 OR MORE: CPT | Mod: Q9,59,S$PBB, | Performed by: PODIATRIST

## 2023-01-12 PROCEDURE — 99499 UNLISTED E&M SERVICE: CPT | Mod: S$PBB,,, | Performed by: PODIATRIST

## 2023-01-12 PROCEDURE — 11055 PR TRIM HYPERKERATOTIC SKIN LESION, ONE: ICD-10-PCS | Mod: Q9,S$PBB,, | Performed by: PODIATRIST

## 2023-01-12 PROCEDURE — 99999 PR PBB SHADOW E&M-EST. PATIENT-LVL III: CPT | Mod: PBBFAC,,, | Performed by: PODIATRIST

## 2023-01-12 NOTE — TELEPHONE ENCOUNTER
Spoke to pt and gave date of surgery as well as went over when to start eye drops prior to surgery in detail     -TD     ----- Message from Santo Coy sent at 1/12/2023  3:15 PM CST -----  Contact: Self  Type: Needs Medical Advice  Who Called:  Patient    Best Call Back Number: [phone]   Additional Information: Called to speak with staff regarding upcoming procedure. Please call.

## 2023-01-12 NOTE — PROGRESS NOTES
Subjective:      Patient ID: Eugenie Coates is a 72 y.o. female.    Chief Complaint: Diabetes Mellitus and Nail Care      Eugenie is a 72 y.o. female who presents to the clinic for routine evaluation and treatment of diabetic feet. Eugenie has a past medical history of Allergy, Anemia, Anxiety, Arthritis, Asthma, Bell palsy, Bilateral lower extremity edema, Chronic diastolic CHF (congestive heart failure), Depression, Diabetes mellitus, type 2, DVT (deep venous thrombosis), Dyslipidemia, Endometriosis, Eye injury (2014), Focal seizure (5/3/2021), GERD (gastroesophageal reflux disease), Glaucoma, History of uterine fibroid, Hyperlipidemia, Hypertension, Invasive lobular carcinoma of right breast in female, Nuclear sclerosis of both eyes (9/27/2016), Obesity, Pancreas cyst, Sleep apnea, Supraventricular tachycardia, SVT (supraventricular tachycardia) (05/2019), Thyroid disease, and Venous insufficiency. Patient relates that her toenails are in need of trimming.  Denies being painful with wearing shoe gear.  Has not attempted to self treat.  Continues to exercise control over her blood glucose.  Denies neuropathy pain with today's exam.  Denies any additional pedal complaints.      PCP: Shashi Brown MD    Date Last Seen by PCP: 10/22     Hemoglobin A1C   Date Value Ref Range Status   09/26/2022 6.4 (H) 4.0 - 5.6 % Final     Comment:     ADA Screening Guidelines:  5.7-6.4%  Consistent with prediabetes  >or=6.5%  Consistent with diabetes    High levels of fetal hemoglobin interfere with the HbA1C  assay. Heterozygous hemoglobin variants (HbS, HgC, etc)do  not significantly interfere with this assay.   However, presence of multiple variants may affect accuracy.     04/28/2022 6.5 (H) 4.0 - 5.6 % Final     Comment:     ADA Screening Guidelines:  5.7-6.4%  Consistent with prediabetes  >or=6.5%  Consistent with diabetes    High levels of fetal hemoglobin interfere with the HbA1C  assay. Heterozygous hemoglobin  variants (HbS, HgC, etc)do  not significantly interfere with this assay.   However, presence of multiple variants may affect accuracy.     2021 6.3 (H) 4.0 - 5.6 % Final     Comment:     ADA Screening Guidelines:  5.7-6.4%  Consistent with prediabetes  >or=6.5%  Consistent with diabetes    High levels of fetal hemoglobin interfere with the HbA1C  assay. Heterozygous hemoglobin variants (HbS, HgC, etc)do  not significantly interfere with this assay.   However, presence of multiple variants may affect accuracy.             Past Medical History:   Diagnosis Date    Allergy     Anemia     Anxiety     Arthritis     Asthma     Bell palsy     Bilateral lower extremity edema     Chronic diastolic CHF (congestive heart failure)     Depression     Diabetes mellitus, type 2     DVT (deep venous thrombosis)     right LE    Dyslipidemia     Endometriosis     Eye injury     stuck with tree branch od ?     Focal seizure 5/3/2021    GERD (gastroesophageal reflux disease)     Glaucoma     History of uterine fibroid     Hyperlipidemia     Hypertension     Invasive lobular carcinoma of right breast in female     s/p surgery, radiation, tamoxifen    Nuclear sclerosis of both eyes 2016    Obesity     Pancreas cyst     Sleep apnea     uses C pap    Supraventricular tachycardia     SVT (supraventricular tachycardia) 2019    Thyroid disease     Venous insufficiency        Past Surgical History:   Procedure Laterality Date     SECTION      CHOLECYSTECTOMY      CRANIOTOMY FOR EXCISION OF INTRACRANIAL TUMOR Left 2021    Procedure: CRANIOTOMY, FOR INTRACRANIAL NEOPLASM EXCISION Left craniotomy for tumor resection;  Surgeon: Hernandez Bravo MD;  Location: [facility];  Service: Neurosurgery;  Laterality: Left;    glaucoma laser Bilateral     HERNIA REPAIR      KNEE SURGERY Right     (R) knee scope; torn meniscus    MASTECTOMY, PARTIAL Right 2020    Procedure: MASTECTOMY, PARTIAL-Right with radiological  marker Consent day of surgery; Neoprobe, technitium, Frozen;  Surgeon: Narda Keating MD;  Location: [address];  Service: General;  Laterality: Right;    SENTINEL LYMPH NODE BIOPSY Right 12/7/2020    Procedure: BIOPSY, LYMPH NODE, SENTINEL;  Surgeon: Narda Keating MD;  Location: [address];  Service: General;  Laterality: Right;    TOTAL REDUCTION MAMMOPLASTY Bilateral 12/7/2020    Procedure: MAMMOPLASTY, REDUCTION-Bilateral;  Surgeon: Zack Hood MD;  Location: [address];  Service: Plastics;  Laterality: Bilateral;    VEIN SURGERY  2003, 2004    Rt leg x2       Family History   Problem Relation Age of Onset    Diabetes Mother     No Known Problems Father     No Known Problems Sister     No Known Problems Brother     Colon cancer Maternal Aunt     No Known Problems Maternal Uncle     No Known Problems Paternal Aunt     No Known Problems Paternal Uncle     No Known Problems Maternal Grandmother     No Known Problems Maternal Grandfather     No Known Problems Paternal Grandmother     No Known Problems Paternal Grandfather     Amblyopia Neg Hx     Blindness Neg Hx     Cancer Neg Hx     Cataracts Neg Hx     Glaucoma Neg Hx     Hypertension Neg Hx     Macular degeneration Neg Hx     Retinal detachment Neg Hx     Strabismus Neg Hx     Stroke Neg Hx     Thyroid disease Neg Hx     Celiac disease Neg Hx     Colon polyps Neg Hx     Esophageal cancer Neg Hx     Inflammatory bowel disease Neg Hx     Irritable bowel syndrome Neg Hx     Liver cancer Neg Hx     Liver disease Neg Hx     Rectal cancer Neg Hx     Stomach cancer Neg Hx     Ulcerative colitis Neg Hx     Cystic fibrosis Neg Hx     Crohn's disease Neg Hx     Hemochromatosis Neg Hx     Cirrhosis Neg Hx        Social History     Socioeconomic History    Marital status:    Tobacco Use    Smoking status: Never    Smokeless tobacco: Never   Substance and Sexual Activity    Alcohol use: Never     Alcohol/week: 0.0 standard drinks    Drug use:  Never    Sexual activity: Yes     Partners: Male     Birth control/protection: None   Social History Narrative    1/18/19: lives with her . They have multiple children, but the youngest is 38. No children at home right now. No pets at home. Splits time between here and [place]. Her son lives in [place].      Social Determinants of Health     Financial Resource Strain: Low Risk     Difficulty of Paying Living Expenses: Not hard at all   Food Insecurity: Unknown    Worried About Running Out of Food in the Last Year: Patient refused    Ran Out of Food in the Last Year: Patient refused   Transportation Needs: Unknown    Lack of Transportation (Medical): Patient refused    Lack of Transportation (Non-Medical): No   Physical Activity: Insufficiently Active    Days of Exercise per Week: 4 days    Minutes of Exercise per Session: 20 min   Stress: No Stress Concern Present    Feeling of Stress : Only a little   Social Connections: Unknown    Frequency of Communication with Friends and Family: Twice a week    Frequency of Social Gatherings with Friends and Family: Twice a week    Active Member of Clubs or Organizations: Yes    Attends Club or Organization Meetings: Patient refused    Marital Status:    Housing Stability: Unknown    Unable to Pay for Housing in the Last Year: Patient refused    Number of Places Lived in the Last Year: 1    Unstable Housing in the Last Year: Patient refused       Current Outpatient Medications   Medication Sig Dispense Refill    acetaZOLAMIDE (DIAMOX) 250 MG tablet Take 1 tablet (250 mg total) by mouth 3 (three) times daily. 90 tablet 5    b complex vitamins tablet Take 1 tablet by mouth once daily.      blood sugar diagnostic Strp Test twice daily.  Accucheck viva test strips and lancets. 300 each 3    brivaracetam (BRIVIACT) 50 mg Tab Take 1 tablet (50 mg total) by mouth 2 (two) times daily. 60 tablet 5    calcium carbonate/vitamin D3 (CALCIUM WITH VITAMIN D3 ORAL) Take 1  tablet by mouth Daily.       clobetasoL (TEMOVATE) 0.05 % cream Apply topically 2 (two) times daily.      dorzolamide-timolol 2-0.5% (COSOPT) 22.3-6.8 mg/mL ophthalmic solution INSTILL 1 DROP IN BOTH EYES EVERY 12 HOURS 10 mL 11    ELIQUIS 5 mg Tab TAKE 1 TABLET BY MOUTH TWICE A DAY 60 tablet 11    ezetimibe (ZETIA) 10 mg tablet TAKE 1 TABLET(10 MG) BY MOUTH EVERY DAY 90 tablet 3    hydrALAZINE (APRESOLINE) 25 MG tablet Take 1 tablet (25 mg total) by mouth every 8 (eight) hours. For blood pressure 90 tablet 6    ketorolac 0.5% (ACULAR) 0.5 % Drop Place 1 drop into the left eye 4 (four) times daily. Start 3 days before surgery. 5 mL 2    lancets (ACCU-CHEK SOFTCLIX LANCETS) Misc 1 Units by Misc.(Non-Drug; Combo Route) route 2 (two) times daily. 300 each 3    latanoprost 0.005 % ophthalmic solution Place 1 drop into both eyes every evening. 2.5 mL 12    lisinopriL (PRINIVIL,ZESTRIL) 40 MG tablet Take 1 tablet (40 mg total) by mouth once daily. 90 tablet 3    metFORMIN (GLUCOPHAGE) 500 MG tablet TAKE 1 TABLET(500 MG) BY MOUTH DAILY WITH BREAKFAST 90 tablet 1    metoprolol tartrate (LOPRESSOR) 25 MG tablet Take 1 tablet (25 mg total) by mouth 2 (two) times a day. 180 tablet 3    moxifloxacin (VIGAMOX) 0.5 % ophthalmic solution Place 1 drop into the left eye 4 (four) times daily. Start 1 day before cataract surgery for 7 days 3 mL 1    NIFEdipine (ADALAT CC) 60 MG TbSR Take 1 tablet (60 mg total) by mouth once daily. 90 tablet 3    pantoprazole (PROTONIX) 40 MG tablet Take 40 mg by mouth 2 (two) times daily.      prednisoLONE acetate (PRED FORTE) 1 % DrpS Place 1 drop into the left eye 4 (four) times daily. Start after cataract surgery. 5 mL 2    rosuvastatin (CRESTOR) 40 MG Tab TAKE 1 TABLET(40 MG) BY MOUTH EVERY EVENING 90 tablet 3    bumetanide (BUMEX) 1 MG tablet Take 1 tablet (1 mg total) by mouth 2 (two) times daily. (Patient not taking: Reported on 11/9/2022) 180 tablet 3    diazePAM (VALIUM) 5 MG tablet Take 1  tablet (5 mg total) by mouth once. Take 1 hour prior to imaging. for 1 dose (Patient not taking: Reported on 11/9/2022) 1 tablet 0    gabapentin (NEURONTIN) 300 MG capsule Take 1 capsule (300 mg total) by mouth 2 (two) times daily. (Patient not taking: Reported on 11/9/2022) 60 capsule 0     No current facility-administered medications for this visit.       Review of patient's allergies indicates:   Allergen Reactions    Percodan [oxycodone hcl-oxycodone-asa] Hives and Itching         Review of Systems   Constitutional: Negative for chills and fever.   Cardiovascular:  Positive for leg swelling. Negative for claudication.   Skin:  Positive for color change and nail changes.   Musculoskeletal:  Positive for joint swelling. Negative for joint pain, muscle cramps and muscle weakness.   Gastrointestinal:  Negative for nausea and vomiting.   Neurological:  Positive for numbness. Negative for paresthesias.   Psychiatric/Behavioral:  Negative for altered mental status.          Objective:      Physical Exam  Constitutional:       General: She is not in acute distress.     Appearance: She is well-developed. She is not diaphoretic.   Cardiovascular:      Pulses:           Dorsalis pedis pulses are 2+ on the right side and 2+ on the left side.        Posterior tibial pulses are 2+ on the right side and 2+ on the left side.      Comments: CFT < 3 seconds bilateral.  Pedal hair growth decreased bilateral.  Varicosities noted bilateral.  Mild non pitting edema noted to bilateral lower extremity.  Toes are cool to touch bilateral.    Musculoskeletal:         General: No tenderness.      Comments: Muscle strength 5/5 in all muscle groups bilateral.  No tenderness nor crepitation with ROM of foot/ankle joints bilateral.  No tenderness with palpation of bilateral foot and ankle.  Bilateral pes planus foot type.  Bilateral hallux abducto valgus.  Bilateral semi-reducible contracture of toes 2-5.     Skin:     General: Skin is warm  and dry.      Coloration: Skin is not pale.      Findings: Lesion present. No abrasion, bruising, burn, ecchymosis, erythema, laceration, petechiae or rash.      Nails: There is no clubbing.      Comments: Pedal skin appears edematous bilateral.  Toenails x 10 appear thickened by 2 mm, elongated by 4 mm, and discolored with subungual debris.  Hemosiderin staining noted to the Rt. Lower leg.  Hyperkeratotic lesion noted to the Lt. Sub 5th met head.   Neurological:      Mental Status: She is alert and oriented to person, place, and time.      Sensory: Sensory deficit present.      Motor: No weakness or atrophy.      Comments: Protective sensation per [place] monofilament intact bilateral.    Vibratory sensation decreased bilateral.    Light touch intact bilateral.             Assessment:       Encounter Diagnoses   Name Primary?    Type 2 diabetes mellitus with hyperglycemia, without long-term current use of insulin Yes    Hammer toes of both feet     Onychomycosis due to dermatophyte     Corn or callus            Plan:       Eugenie was seen today for diabetes mellitus and nail care.    Diagnoses and all orders for this visit:    Type 2 diabetes mellitus with hyperglycemia, without long-term current use of insulin    Hammer toes of both feet    Onychomycosis due to dermatophyte    Corn or callus        I counseled the patient on her conditions, their implications and medical management.    Shoe inspection. Diabetic Foot Education. Patient reminded of the importance of good nutrition and blood sugar control to help prevent podiatric complications of diabetes. Patient instructed on proper foot hygeine. We discussed wearing proper shoe gear, daily foot inspections, never walking without protective shoe gear, never putting sharp instruments to feet    Advised to continue wearing diabetic shoes/insoles that accommodate for digital deformities.     With patient's permission, nails were aggressively reduced and  debrided x 10 to their soft tissue attachment mechanically and with electric , removing all offending nail and debris.  Also, a sterile #15 scalpel was used to trim a lesion x 1 down to smooth appearing skin without incident.   Patient relates relief following the procedure.  She will continue to monitor the areas daily, inspect her feet, wear protective shoe gear when ambulatory, moisturizer to maintain skin integrity and follow in this office in approximately 4 months, sooner p.r.n.    Giuliano Garnett DPM

## 2023-01-25 ENCOUNTER — OFFICE VISIT (OUTPATIENT)
Dept: INTERNAL MEDICINE | Facility: CLINIC | Age: 73
End: 2023-01-25
Payer: MEDICARE

## 2023-01-25 VITALS
SYSTOLIC BLOOD PRESSURE: 126 MMHG | BODY MASS INDEX: 35 KG/M2 | WEIGHT: 191.38 LBS | RESPIRATION RATE: 15 BRPM | TEMPERATURE: 97 F | OXYGEN SATURATION: 98 % | HEART RATE: 90 BPM | DIASTOLIC BLOOD PRESSURE: 68 MMHG

## 2023-01-25 DIAGNOSIS — C50.911 INVASIVE LOBULAR CARCINOMA OF RIGHT BREAST IN FEMALE: ICD-10-CM

## 2023-01-25 DIAGNOSIS — D32.9 MENINGIOMA: ICD-10-CM

## 2023-01-25 DIAGNOSIS — R01.1 SYSTOLIC MURMUR: ICD-10-CM

## 2023-01-25 DIAGNOSIS — Z01.818 PRE-OP EVALUATION: Primary | ICD-10-CM

## 2023-01-25 DIAGNOSIS — R22.1 NECK SWELLING: ICD-10-CM

## 2023-01-25 DIAGNOSIS — R13.10 DYSPHAGIA, UNSPECIFIED TYPE: ICD-10-CM

## 2023-01-25 PROCEDURE — 99215 OFFICE O/P EST HI 40 MIN: CPT | Mod: S$PBB,GC,, | Performed by: STUDENT IN AN ORGANIZED HEALTH CARE EDUCATION/TRAINING PROGRAM

## 2023-01-25 PROCEDURE — 99215 PR OFFICE/OUTPT VISIT, EST, LEVL V, 40-54 MIN: ICD-10-PCS | Mod: S$PBB,GC,, | Performed by: STUDENT IN AN ORGANIZED HEALTH CARE EDUCATION/TRAINING PROGRAM

## 2023-01-25 PROCEDURE — 99999 PR PBB SHADOW E&M-EST. PATIENT-LVL V: ICD-10-PCS | Mod: PBBFAC,GC,, | Performed by: STUDENT IN AN ORGANIZED HEALTH CARE EDUCATION/TRAINING PROGRAM

## 2023-01-25 PROCEDURE — 99215 OFFICE O/P EST HI 40 MIN: CPT | Mod: PBBFAC,PO | Performed by: STUDENT IN AN ORGANIZED HEALTH CARE EDUCATION/TRAINING PROGRAM

## 2023-01-25 PROCEDURE — 99999 PR PBB SHADOW E&M-EST. PATIENT-LVL V: CPT | Mod: PBBFAC,GC,, | Performed by: STUDENT IN AN ORGANIZED HEALTH CARE EDUCATION/TRAINING PROGRAM

## 2023-01-25 NOTE — PROGRESS NOTES
Clinic Note  1/25/2023      Subjective:      Chief Complaint: Pre-op evaluation  HPI: Ms. Eugenie Coates is a 72 y.o. woman with HFpEF, HTN, HLD, DVT (on chronic anticoagulation), DM2 (not insulin dependent), GERD, AFSHIN on CPAP, and hx of right breast cancer (s/p resection, adjuvant radiation c/b restrictive lung disease), as well as intracranial meningioma (s/p craniotomy/resection), cataracts, and glaucoma who presents for pre-op evaluation.    Surgery Left Cataract repair/lens replacement  Surgeon Juan Antonio Mcintosh MD  Date 2/8/23    Her only complaint today is with her reflux that has been worse recently. She states she has a sore throat and has been having trouble swallowing. Spicy foods are giving her problems. She states she can't pass large pills because they get stuck midway down her throat and come back up. That's been bothering her for about 2 weeks now. It is mostly with solids (large pills/capsules), she states spicy foods because her throat burns and is irritated, and sometimes if she swallows a liquid wrong. She is able to eat vegetables, fruit, and thin liquids without issues. She states she has had GERD problems for years, but has never had issues with swallowing. It is worst at night and sometimes affects her in the morning. It has woken her up from sleep as well. She has a hx of subclinical hyperthyroid with nodules s/p FNA (benign). She does mention she has had some superficial anterior neck pain and had swelling under her chin/in her neck. She has some pain in the area of her thyroid and under her jawline today in clinic and is worse when she presses on it or turns her head.    Due for screening colonoscopy.    Of note she has been off her Eliquis since 1/11/23. She thought she needed to be off of it until her surgery.     Review of Systems   Constitutional:  Negative for diaphoresis and fever.   HENT:  Negative for congestion and sore throat.         Anterior neck pain   Eyes:  Negative for  blurred vision and discharge.   Respiratory:  Negative for cough and shortness of breath.    Cardiovascular:  Negative for chest pain and palpitations.   Gastrointestinal:  Positive for heartburn. Negative for nausea and vomiting.        Trouble swallowing   Genitourinary:  Negative for dysuria and hematuria.   Musculoskeletal:  Negative for joint pain and myalgias.   Skin:  Negative for itching and rash.   Neurological:  Negative for weakness and headaches.     Medication List with Changes/Refills   Current Medications    ACETAZOLAMIDE (DIAMOX) 250 MG TABLET    Take 1 tablet (250 mg total) by mouth 3 (three) times daily.    B COMPLEX VITAMINS TABLET    Take 1 tablet by mouth once daily.    BLOOD SUGAR DIAGNOSTIC STRP    Test twice daily.  Accucheck viva test strips and lancets.    BRIVARACETAM (BRIVIACT) 50 MG TAB    Take 1 tablet (50 mg total) by mouth 2 (two) times daily.    BUMETANIDE (BUMEX) 1 MG TABLET    Take 1 tablet (1 mg total) by mouth 2 (two) times daily.    CALCIUM CARBONATE/VITAMIN D3 (CALCIUM WITH VITAMIN D3 ORAL)    Take 1 tablet by mouth Daily.     CLOBETASOL (TEMOVATE) 0.05 % CREAM    Apply topically 2 (two) times daily.    DIAZEPAM (VALIUM) 5 MG TABLET    Take 1 tablet (5 mg total) by mouth once. Take 1 hour prior to imaging. for 1 dose    DORZOLAMIDE-TIMOLOL 2-0.5% (COSOPT) 22.3-6.8 MG/ML OPHTHALMIC SOLUTION    INSTILL 1 DROP IN BOTH EYES EVERY 12 HOURS    ELIQUIS 5 MG TAB    TAKE 1 TABLET BY MOUTH TWICE A DAY    EZETIMIBE (ZETIA) 10 MG TABLET    TAKE 1 TABLET(10 MG) BY MOUTH EVERY DAY    GABAPENTIN (NEURONTIN) 300 MG CAPSULE    Take 1 capsule (300 mg total) by mouth 2 (two) times daily.    HYDRALAZINE (APRESOLINE) 25 MG TABLET    Take 1 tablet (25 mg total) by mouth every 8 (eight) hours. For blood pressure    KETOROLAC 0.5% (ACULAR) 0.5 % DROP    Place 1 drop into the left eye 4 (four) times daily. Start 3 days before surgery.    LANCETS (ACCU-CHEK SOFTCLIX LANCETS) MISC    1 Units by  Misc.(Non-Drug; Combo Route) route 2 (two) times daily.    LATANOPROST 0.005 % OPHTHALMIC SOLUTION    Place 1 drop into both eyes every evening.    LISINOPRIL (PRINIVIL,ZESTRIL) 40 MG TABLET    Take 1 tablet (40 mg total) by mouth once daily.    METFORMIN (GLUCOPHAGE) 500 MG TABLET    TAKE 1 TABLET(500 MG) BY MOUTH DAILY WITH BREAKFAST    METOPROLOL TARTRATE (LOPRESSOR) 25 MG TABLET    Take 1 tablet (25 mg total) by mouth 2 (two) times a day.    NIFEDIPINE (ADALAT CC) 60 MG TBSR    Take 1 tablet (60 mg total) by mouth once daily.    PANTOPRAZOLE (PROTONIX) 40 MG TABLET    Take 40 mg by mouth 2 (two) times daily.    PREDNISOLONE ACETATE (PRED FORTE) 1 % DRPS    Place 1 drop into the left eye 4 (four) times daily. Start after cataract surgery.    ROSUVASTATIN (CRESTOR) 40 MG TAB    TAKE 1 TABLET(40 MG) BY MOUTH EVERY EVENING       Patient Active Problem List   Diagnosis    Ventral hernia    Depression, recurrent    Type 2 diabetes mellitus with circulatory disorder, without long-term current use of insulin    Primary open angle glaucoma of both eyes    Diabetes mellitus type 2 without retinopathy    Nuclear sclerosis of both eyes    Osteopenia    History of DVT (deep vein thrombosis)    Mixed hyperlipidemia    Refusal of blood transfusions as patient is [employer]    GERD without esophagitis    Subclinical hyperthyroidism    AFSHIN on CPAP    Multinodular thyroid    Restrictive lung disease    Diastolic dysfunction    Hammer toes of both feet    Essential hypertension    Venous insufficiency of right lower extremity    Invasive lobular carcinoma of right breast in female    Malignant neoplasm of central portion of right breast in female, estrogen receptor positive    At risk for lymphedema    Heart failure with preserved ejection fraction    Pain in both knees    Pain in both lower legs    Leg DVT (deep venous thromboembolism), chronic, right    Pancreatic cyst    Brain mass    Breast cancer    Deep vein  "thrombosis (DVT) of right lower extremity    Advance care planning    Bilateral lower extremity edema    Focal seizure    Headache    Right sided weakness    Atypical intracranial meningioma    Undiagnosed cardiac murmurs    Obesity (BMI 30-39.9)    Primary osteoarthritis of both knees    Chronic anticoagulation           Objective:      /68 (BP Location: Right arm, Patient Position: Sitting, BP Method: Large (Manual))   Pulse 90   Temp 96.8 °F (36 °C) (Temporal)   Resp 15   Wt 86.8 kg (191 lb 5.8 oz)   SpO2 98%   BMI 35.00 kg/m²   Estimated body mass index is 32.56 kg/m² as calculated from the following:    Height as of 11/9/22: 5' 2" (1.575 m).    Weight as of 11/9/22: 80.7 kg (178 lb).  Physical Exam  Vitals reviewed.   HENT:      Head: Normocephalic and atraumatic.      Mouth/Throat:      Mouth: Mucous membranes are moist.      Pharynx: Oropharynx is clear.   Eyes:      Pupils: Pupils are equal, round, and reactive to light.   Neck:      Comments: Anterior neck fullness around level of thyroid and up into submandibular/submental region. No discrete masses palpated. Tenderness to palpation of areas. She also mentions some tenderness of posterior neck into suboccipital region but again no masses or obvious lymphadenopathy.  Cardiovascular:      Rate and Rhythm: Normal rate and regular rhythm.      Heart sounds: Normal heart sounds.   Pulmonary:      Effort: Pulmonary effort is normal. No respiratory distress.      Breath sounds: Normal breath sounds. No rales.   Abdominal:      General: Bowel sounds are normal.      Palpations: Abdomen is soft.      Tenderness: There is no abdominal tenderness. There is no guarding.   Musculoskeletal:         General: No swelling or tenderness. Normal range of motion.      Cervical back: Normal range of motion. No rigidity.   Skin:     General: Skin is warm and dry.   Neurological:      General: No focal deficit present.      Mental Status: She is alert and oriented to " person, place, and time.   Psychiatric:         Mood and Affect: Mood normal.         Thought Content: Thought content normal.           Labs:     Lab Results   Component Value Date     11/02/2022    K 4.2 11/02/2022     11/02/2022    CO2 26 11/02/2022    BUN 15 11/02/2022    CREATININE 1.1 11/02/2022    ANIONGAP 12 11/02/2022     Lab Results   Component Value Date    HGBA1C 6.4 (H) 09/26/2022    Lab Results   Component Value Date    WBC 7.96 09/26/2022    HGB 14.4 09/26/2022    HCT 46.4 09/26/2022     09/26/2022    GRAN 4.9 09/26/2022    GRAN 61.3 09/26/2022     Lab Results   Component Value Date    CHOL 225 (H) 09/26/2022    HDL 49 09/26/2022    LDLCALC 147.2 09/26/2022    TRIG 144 09/26/2022          Assessment and Plan:     Pre-op evaluation  Cardiovascular Risk Assessment:  Active cardiac issues:  Active decompensated heart failure? No   Unstable angina?  No   Significant uncontrolled arrhythmias? No   Severe valvular heart disease-Aortic or Mitral Stenosis? No   Recent MI or coronary revascularization < 30 days? No     Other Issues:       Anticoagulation:  Eliquis. Last dose 1/11/2023      Coronary Stenting: none    Revised Cardiac Risk Index   High risk surgery: No  History of ischemic heart disease: No  History of congestive heart failure: Yes  History of stroke: No  Insulin dependent diabetes: No  Cr > 2: No  1 point, class II risk, 6.0% risk of cardiac event      RCRI Calculator Class and Risk percentage:           1 point, 6.0% risk of 30 day perioperative cardiac event               Patient is moderate risk of cardiac events suly-operatively if receiving general anesthesia. However, given the minimal invasiveness of cataract repair as well as lack of  need for anesthesia, she is low risk for this procedure. No contraindications to moving forward with the procedure.    She can hold her anticoagulation for 5 days prior to procedure and re-start at 24 hours post op or as soon as  ophthalmology is okay with it to minimize post-op bleeding.      Dysphagia, unspecified type  -     Ambulatory referral/consult to Gastroenterology; Future; Expected date: 02/01/2023  -     CT Soft Tissue Neck WO Contrast; Future; Expected date: 01/25/2023    Neck swelling  -     CT Soft Tissue Neck WO Contrast; Future; Expected date: 01/25/2023    Invasive lobular carcinoma of right breast in female  Meningioma  Discussed the importance of her regular follow up with oncology. Meningioma with recurrence as well without scheduled follow up.  -     Ambulatory referral/consult to Oncology; Future; Expected date: 02/01/2023    Systolic murmur  -     Echo; Future      I personally spent >40 minutes between direct facetime, completing the H&P, and MDM with this patient.        Follow Up:   Follow up if symptoms worsen or fail to improve.     Connor Gillies, DO  PGY-3, Internal Medicine  [facility]  [address] [place] [address]

## 2023-01-26 ENCOUNTER — DOCUMENTATION ONLY (OUTPATIENT)
Dept: HEMATOLOGY/ONCOLOGY | Facility: CLINIC | Age: 73
End: 2023-01-26
Payer: MEDICARE

## 2023-01-26 NOTE — NURSING
Per Dr Gillies referral, I spoke with the pt concerning his request for her to re-establish care with a medical oncologist.  She is a prior pt of Dr Keating.  The pt reports to me that she saw someone at [facility] and would like to re-establish care there.  I encourage her to call that physician to make an appt.  I also provided my direct contact information and to call if she needs additional assistance.  Pt verbalized understanding

## 2023-01-26 NOTE — PROGRESS NOTES
LVM asking pt to return my call concerning the recent med onc referral.  Provided my direct contact information, encouraged her to call for assistance.

## 2023-02-06 ENCOUNTER — HOSPITAL ENCOUNTER (OUTPATIENT)
Dept: RADIOLOGY | Facility: HOSPITAL | Age: 73
Discharge: HOME | End: 2023-02-06
Attending: STUDENT IN AN ORGANIZED HEALTH CARE EDUCATION/TRAINING PROGRAM
Payer: MEDICARE

## 2023-02-06 DIAGNOSIS — R13.10 DYSPHAGIA, UNSPECIFIED TYPE: ICD-10-CM

## 2023-02-06 DIAGNOSIS — R22.1 NECK SWELLING: ICD-10-CM

## 2023-02-06 PROCEDURE — 70490 CT SOFT TISSUE NECK W/O DYE: CPT | Mod: TC,PO

## 2023-02-06 PROCEDURE — 70490 CT SOFT TISSUE NECK WITHOUT CONTRAST: ICD-10-PCS | Mod: 26,,, | Performed by: RADIOLOGY

## 2023-02-06 PROCEDURE — 70490 CT SOFT TISSUE NECK W/O DYE: CPT | Mod: 26,,, | Performed by: RADIOLOGY

## 2023-02-06 NOTE — DISCHARGE INSTRUCTIONS
Discharge Instructions for Cataract Surgery    USE DROPS AS INSTRUCTED:     PREDNISOLONE  ONE DROP 4 TIMES A DAY  Moxifloxacin ONE DROP 4 TIMES A DAY  KETOROLAC ONE DROP 4 TIMES A DAY   WAIT 2 MINUTES BETWEEN DROPS     BRING ALL EYE DROPS TO YOUR CLINIC VISITS    Wear sunglasses during the day  Do not sleep on the affected side and wear eye shield while sleeping for 2 weeks.  No exercise or lifting greater than 10 lbs for 2 weeks.  Try not to cough. If coughing a lot, take a cough suppressent  Do not bend over for 2 weeks.  Keep water it of your eye for 2 weeks.             Wear sunglasses for comfort as needed.  It is normal to feel a slight irritation, like there is an eyelash in the eye that had surgery.   You may take Tylenol for discomfort but if pain intensifies , call Dr     Caryl glaucoma drops  Stop diamox  Hold eliquis x1 more day      After Surgery:  Always be aware that any surgery can cause these symptoms:    Pain- Medication can be prescribed for pain to decrease your pain but may not completely take your pain away.  Over the Counter pain medicine my be enough and you can always use Ice and rest to help ease pain.    Bleeding- a little bleeding after a surgery is usually within normal.  If there is a lot of blood you need to notify your MD.  Emergency treatments of bleeding are cold application, elevation of the bleeding site and compression.    Infection- Infection after surgery is NOT a normal occurrence.  Signs of infection are fever, swelling, hot to touch the incision.  If this occurs notify your MD immediately.    Nausea- this can be common after a surgery especially if you have had anesthesia medicine or are taking pain medicine.  Staying on clear liquids, bland foods, gingerale, or over the counter anti nausea medicines can help.  If you vomit more than once, notify your MD.  Anti Nausea medicines can be prescribed.

## 2023-02-07 ENCOUNTER — ANESTHESIA EVENT (OUTPATIENT)
Dept: SURGERY | Facility: AMBULARY SURGERY CENTER | Age: 73
End: 2023-02-07
Payer: MEDICARE

## 2023-02-08 ENCOUNTER — ANESTHESIA (OUTPATIENT)
Dept: SURGERY | Facility: AMBULARY SURGERY CENTER | Age: 73
End: 2023-02-08
Payer: MEDICARE

## 2023-02-08 ENCOUNTER — HOSPITAL ENCOUNTER (OUTPATIENT)
Facility: AMBULARY SURGERY CENTER | Age: 73
Discharge: HOME | End: 2023-02-08
Attending: OPHTHALMOLOGY | Admitting: OPHTHALMOLOGY
Payer: MEDICARE

## 2023-02-08 DIAGNOSIS — H25.813 COMBINED FORMS OF AGE-RELATED CATARACT, BILATERAL: ICD-10-CM

## 2023-02-08 DIAGNOSIS — H40.1131 PRIMARY OPEN-ANGLE GLAUCOMA, BILATERAL, MILD STAGE: ICD-10-CM

## 2023-02-08 LAB — POCT GLUCOSE: 156 MG/DL (ref 70–110)

## 2023-02-08 PROCEDURE — 66984 XCAPSL CTRC RMVL W/O ECP: CPT | Performed by: OPHTHALMOLOGY

## 2023-02-08 PROCEDURE — 66984 PR REMOVAL, CATARACT, W/INSRT INTRAOC LENS, W/O ENDO CYCLO: ICD-10-PCS | Mod: 51,LT,, | Performed by: OPHTHALMOLOGY

## 2023-02-08 PROCEDURE — D9220A PRA ANESTHESIA: ICD-10-PCS | Mod: ANES,,, | Performed by: ANESTHESIOLOGY

## 2023-02-08 PROCEDURE — 66984 XCAPSL CTRC RMVL W/O ECP: CPT | Mod: 51,LT,, | Performed by: OPHTHALMOLOGY

## 2023-02-08 PROCEDURE — D9220A PRA ANESTHESIA: Mod: CRNA,,, | Performed by: STUDENT IN AN ORGANIZED HEALTH CARE EDUCATION/TRAINING PROGRAM

## 2023-02-08 PROCEDURE — D9220A PRA ANESTHESIA: Mod: ANES,,, | Performed by: ANESTHESIOLOGY

## 2023-02-08 PROCEDURE — 66174 TRLUML DIL AQ O/F CAN W/O ST: CPT | Mod: LT | Performed by: OPHTHALMOLOGY

## 2023-02-08 PROCEDURE — D9220A PRA ANESTHESIA: ICD-10-PCS | Mod: CRNA,,, | Performed by: STUDENT IN AN ORGANIZED HEALTH CARE EDUCATION/TRAINING PROGRAM

## 2023-02-08 PROCEDURE — 66174 TRLUML DIL AQ O/F CAN W/O ST: CPT | Mod: LT,,, | Performed by: OPHTHALMOLOGY

## 2023-02-08 PROCEDURE — 66982 XCAPSL CTRC RMVL CPLX WO ECP: CPT | Mod: LT | Performed by: OPHTHALMOLOGY

## 2023-02-08 PROCEDURE — 66174 PR TRANSLUMINAL DILATION AQUEOUS CANAL, W/O RETENTION DEVICE/STENT: ICD-10-PCS | Mod: LT,,, | Performed by: OPHTHALMOLOGY

## 2023-02-08 DEVICE — TECNIS SMPLCTY TECNIS 1PC CLR MONO 23.5D
Type: IMPLANTABLE DEVICE | Site: EYE | Status: FUNCTIONAL
Brand: TECNIS SIMPLICITY

## 2023-02-08 RX ORDER — ONDANSETRON 2 MG/ML
INJECTION INTRAMUSCULAR; INTRAVENOUS
Status: DISCONTINUED | OUTPATIENT
Start: 2023-02-08 | End: 2023-02-08

## 2023-02-08 RX ORDER — PROPARACAINE HYDROCHLORIDE 5 MG/ML
1 SOLUTION/ DROPS OPHTHALMIC
Status: DISCONTINUED | OUTPATIENT
Start: 2023-02-08 | End: 2023-02-08 | Stop reason: HOSPADM

## 2023-02-08 RX ORDER — EPINEPHRINE 1 MG/ML
INJECTION, SOLUTION, CONCENTRATE INTRAVENOUS
Status: DISCONTINUED | OUTPATIENT
Start: 2023-02-08 | End: 2023-02-08 | Stop reason: HOSPADM

## 2023-02-08 RX ORDER — LIDOCAINE HYDROCHLORIDE 40 MG/ML
INJECTION, SOLUTION RETROBULBAR
Status: DISCONTINUED | OUTPATIENT
Start: 2023-02-08 | End: 2023-02-08 | Stop reason: HOSPADM

## 2023-02-08 RX ORDER — MIDAZOLAM HYDROCHLORIDE 1 MG/ML
INJECTION, SOLUTION INTRAMUSCULAR; INTRAVENOUS
Status: DISCONTINUED | OUTPATIENT
Start: 2023-02-08 | End: 2023-02-08

## 2023-02-08 RX ORDER — SODIUM CHLORIDE 9 MG/ML
INJECTION, SOLUTION INTRAVENOUS CONTINUOUS
Status: DISCONTINUED | OUTPATIENT
Start: 2023-02-08 | End: 2023-02-08 | Stop reason: HOSPADM

## 2023-02-08 RX ORDER — PHENYLEPHRINE HYDROCHLORIDE 25 MG/ML
1 SOLUTION/ DROPS OPHTHALMIC
Status: DISCONTINUED | OUTPATIENT
Start: 2023-02-08 | End: 2023-02-08 | Stop reason: HOSPADM

## 2023-02-08 RX ORDER — TROPICAMIDE 10 MG/ML
1 SOLUTION/ DROPS OPHTHALMIC
Status: DISCONTINUED | OUTPATIENT
Start: 2023-02-08 | End: 2023-02-08 | Stop reason: HOSPADM

## 2023-02-08 RX ORDER — MOXIFLOXACIN 5 MG/ML
SOLUTION/ DROPS OPHTHALMIC
Status: DISCONTINUED | OUTPATIENT
Start: 2023-02-08 | End: 2023-02-08 | Stop reason: HOSPADM

## 2023-02-08 RX ORDER — TETRACAINE HYDROCHLORIDE 5 MG/ML
SOLUTION OPHTHALMIC
Status: DISCONTINUED | OUTPATIENT
Start: 2023-02-08 | End: 2023-02-08 | Stop reason: HOSPADM

## 2023-02-08 RX ORDER — SODIUM CHLORIDE, SODIUM LACTATE, POTASSIUM CHLORIDE, CALCIUM CHLORIDE 600; 310; 30; 20 MG/100ML; MG/100ML; MG/100ML; MG/100ML
INJECTION, SOLUTION INTRAVENOUS CONTINUOUS
Status: DISCONTINUED | OUTPATIENT
Start: 2023-02-08 | End: 2023-02-08 | Stop reason: HOSPADM

## 2023-02-08 RX ORDER — LIDOCAINE HYDROCHLORIDE 10 MG/ML
1 INJECTION, SOLUTION EPIDURAL; INFILTRATION; INTRACAUDAL; PERINEURAL ONCE
Status: DISCONTINUED | OUTPATIENT
Start: 2023-02-08 | End: 2023-02-08 | Stop reason: HOSPADM

## 2023-02-08 RX ADMIN — PHENYLEPHRINE HYDROCHLORIDE 1 DROP: 25 SOLUTION/ DROPS OPHTHALMIC at 07:02

## 2023-02-08 RX ADMIN — MIDAZOLAM HYDROCHLORIDE 1 MG: 1 INJECTION, SOLUTION INTRAMUSCULAR; INTRAVENOUS at 08:02

## 2023-02-08 RX ADMIN — PROPARACAINE HYDROCHLORIDE 1 DROP: 5 SOLUTION/ DROPS OPHTHALMIC at 07:02

## 2023-02-08 RX ADMIN — PHENYLEPHRINE HYDROCHLORIDE 1 DROP: 25 SOLUTION/ DROPS OPHTHALMIC at 06:02

## 2023-02-08 RX ADMIN — TROPICAMIDE 1 DROP: 10 SOLUTION/ DROPS OPHTHALMIC at 06:02

## 2023-02-08 RX ADMIN — SODIUM CHLORIDE, SODIUM LACTATE, POTASSIUM CHLORIDE, CALCIUM CHLORIDE: 600; 310; 30; 20 INJECTION, SOLUTION INTRAVENOUS at 07:02

## 2023-02-08 RX ADMIN — PROPARACAINE HYDROCHLORIDE 1 DROP: 5 SOLUTION/ DROPS OPHTHALMIC at 06:02

## 2023-02-08 RX ADMIN — TROPICAMIDE 1 DROP: 10 SOLUTION/ DROPS OPHTHALMIC at 07:02

## 2023-02-08 RX ADMIN — ONDANSETRON 4 MG: 2 INJECTION INTRAMUSCULAR; INTRAVENOUS at 08:02

## 2023-02-08 NOTE — ANESTHESIA PREPROCEDURE EVALUATION
02/08/2023  Eugenie Coates is a 72 y.o., female.      Pre-op Assessment    I have reviewed the Patient Summary Reports.     I have reviewed the Nursing Notes. I have reviewed the NPO Status.   I have reviewed the Medications.     Review of Systems  Anesthesia Hx:  No problems with previous Anesthesia    Social:  Non-Smoker    Hematology/Oncology:         -- Anemia:   Cardiovascular:   Hypertension CHF    Pulmonary:   Asthma Sleep Apnea    Hepatic/GI:   GERD    Musculoskeletal:   Arthritis     Neurological:   Neuromuscular Disease, Headaches Seizures    Endocrine:   Diabetes Hyperthyroidism    Psych:   Psychiatric History             Anesthesia Plan  Type of Anesthesia, risks & benefits discussed:    Anesthesia Type: MAC  Intra-op Monitoring Plan: Standard ASA Monitors  Post Op Pain Control Plan: multimodal analgesia  Informed Consent: Informed consent signed with the Patient and all parties understand the risks and agree with anesthesia plan.  All questions answered.   ASA Score: 3  Day of Surgery Review of History & Physical: H&P Update referred to the surgeon/provider.    Ready For Surgery From Anesthesia Perspective.     .

## 2023-02-08 NOTE — OP NOTE
Operative Date:  02/08/2023    Discharge Date:  02/08/2023    Report Title: Operative Note    SURGEON: Juan Antonio Mcintosh MD    ASSISTANT: None    PREOPERATIVE DIAGNOSIS: Combined form of senile cataract, Left Eye  Primary open angle glaucoma mild stage, left eye    POSTOPERATIVE DIAGNOSIS: same    PROCEDURE PERFORMED: Phacoemulsification of the cataract with posterior chamber intraocular lens, Left Eye  OMNI canaloplasty and goniotomy, Left eye    IMPLANTS: DCBOO 23.5    ANESTHESIA:  Topical with MAC    COMPLICATIONS: None    ESTIMATED BLOOD LOSS: Minimal    PROCEDURE: The patient was brought to the operating room, time out was performed and implant checked.  The patient was given light sedation, and topical anesthesia was instilled in the operative eye.    The operative eye was prepped and draped in the usual fashion for eye surgery and lid speculum used to retract the eyelid. The eyelashes were secluded within the drape.  A paracentesis was made inferiorly with paracentesis blade. Epishugarcaine was injected in the anterior chamber and dispersive viscoelastic was injected into the anterior chamber. A temporal corneal incision was made with a steel keratome. A cystitome was used to initiate a continuous curvilinear capsulorrhexis and completed using the capsulorrhexis forceps. Hydrodissection of the lens nucleus was performed using balanced salt solution (BSS) on hydrodissection cannula. The lens nucleus was removed using phacoemulsification in the modified stop and chop technique. The lens cortex was removed using the irrigation/aspiration handpiece. The capsular bag was filled with viscoelastic, and the intraocular lens was injected into the capsular bag under direct visualization.    Attention was turned to the OMNI portion of the procedure. The patient's head and the operating microscope were rotated into position. Surgical gonioprism was used to visualize the angle of the eye. OMNI device was used to create a  small goniotomy and the device catheter was used to cannulate the superior Schlemm's canal.  Upon retraction of the catheter a viscocanaloplasty was performed.  This same viscocanaloplasty was performed in the inferior Schlemm's canal.  The catheter was advanced again inferiorly and an inferior goniotomy was performed of about 90 degrees.  The patient's head and the microscope were rotated back into original position.  Viscoelastic was removed using the irrigation/aspiration handpiece. The wounds were hydrated until watertight.    The wounds were rechecked and no leakage was noted.    The speculum was removed. Topical antibiotic was applied to the eye and shield was placed over the eye. The patient tolerated the procedure well and left the operating room in good condition.

## 2023-02-08 NOTE — ANESTHESIA POSTPROCEDURE EVALUATION
Anesthesia Post Evaluation    Patient: Eugenie Coates    Procedure(s) Performed: Procedure(s) (LRB):  CEIOL OMNI OS (Left)    Final Anesthesia Type: MAC      Patient location during evaluation: PACU  Patient participation: Yes- Able to Participate  Level of consciousness: awake and alert  Post-procedure vital signs: reviewed and stable  Pain management: adequate  Airway patency: patent    PONV status at discharge: No PONV  Anesthetic complications: no      Cardiovascular status: hemodynamically stable  Respiratory status: unassisted and room air  Hydration status: euvolemic  Follow-up not needed.          Vitals Value Taken Time   /59 02/08/23 0923   Temp 36.5 °C (97.7 °F) 02/08/23 0900   Pulse 70 02/08/23 0926   Resp 17 02/08/23 0905   SpO2 91 % 02/08/23 0926   Vitals shown include unvalidated device data.      Event Time   Out of Recovery 09:33:00         Pain/Jodi Score: Jodi Score: 10 (2/8/2023  9:25 AM)

## 2023-02-08 NOTE — TRANSFER OF CARE
"Anesthesia Transfer of Care Note    Patient: Eugenie Coates    Procedure(s) Performed: Procedure(s) (LRB):  CEIOL OMNI OS (Left)    Patient location: PACU    Anesthesia Type: MAC    Transport from OR: Transported from OR on room air with adequate spontaneous ventilation    Post pain: adequate analgesia    Post assessment: no apparent anesthetic complications and tolerated procedure well    Post vital signs: stable    Level of consciousness: awake, alert and oriented    Nausea/Vomiting: no nausea/vomiting    Complications: none    Transfer of care protocol was followed      Last vitals:   Visit Vitals  BP (!) 180/84 (BP Location: Left arm, Patient Position: Lying)   Pulse 76   Temp 36.4 °C (97.5 °F) (Skin)   Resp 18   Ht 5' 2" (1.575 m)   Wt 86.6 kg (191 lb)   SpO2 98%   Breastfeeding No   BMI 34.93 kg/m²     "

## 2023-02-08 NOTE — DISCHARGE SUMMARY
[facility]  Discharge Note  Short Stay    Procedure(s) (LRB):  CEIOL OMNI OS (Left)      OUTCOME: Patient tolerated treatment/procedure well without complication and is now ready for discharge.    DISPOSITION: Home or Self Care    FINAL DIAGNOSIS:  cataract    FOLLOWUP: In clinic    DISCHARGE INSTRUCTIONS:  No discharge procedures on file.     TIME SPENT ON DISCHARGE: 5 minutes

## 2023-02-08 NOTE — H&P
History    Chief complaint:  Painless progressive vision loss left Eye    Present Ilness/Diagnosis: Visually significant cataract, left Eye    ROS: +Eyes, otherwise no significant changes    Past Medical History: refer to chart    Family History/Social History: refer to chart    Allergies:   Review of patient's allergies indicates:   Allergen Reactions    Oxycodone hcl-oxycodone-asa Hives, Itching and Other (See Comments)       Current Medications: see medcard      Physical Exam    BP: Vital signs stable  General: No apparent distress  HEENT: cataract  Lungs: adequate respirations  Heart: + pulses  Abdomen: soft  Rectal/pelvic: deferred    Labs: Labs Reviewed    Lab Results   Component Value Date    WBC 7.96 09/26/2022    HGB 14.4 09/26/2022    HCT 46.4 09/26/2022    MCV 89 09/26/2022     09/26/2022           CMP  Sodium   Date Value Ref Range Status   11/02/2022 139 136 - 145 mmol/L Final     Potassium   Date Value Ref Range Status   11/02/2022 4.2 3.5 - 5.1 mmol/L Final     Chloride   Date Value Ref Range Status   11/02/2022 101 95 - 110 mmol/L Final     CO2   Date Value Ref Range Status   11/02/2022 26 23 - 29 mmol/L Final     Glucose   Date Value Ref Range Status   11/02/2022 96 70 - 110 mg/dL Final     BUN   Date Value Ref Range Status   11/02/2022 15 8 - 23 mg/dL Final     Creatinine   Date Value Ref Range Status   11/02/2022 1.1 0.5 - 1.4 mg/dL Final     Calcium   Date Value Ref Range Status   11/02/2022 9.4 8.7 - 10.5 mg/dL Final     Total Protein   Date Value Ref Range Status   09/26/2022 7.7 6.0 - 8.4 g/dL Final     Albumin   Date Value Ref Range Status   11/02/2022 3.6 3.5 - 5.2 g/dL Final     Total Bilirubin   Date Value Ref Range Status   09/26/2022 0.8 0.1 - 1.0 mg/dL Final     Comment:     For infants and newborns, interpretation of results should be based  on gestational age, weight and in agreement with clinical  observations.    Premature Infant recommended reference ranges:  Up to 24  hours.............<8.0 mg/dL  Up to 48 hours............<12.0 mg/dL  3-5 days..................<15.0 mg/dL  6-29 days.................<15.0 mg/dL       Alkaline Phosphatase   Date Value Ref Range Status   09/26/2022 118 55 - 135 U/L Final     AST   Date Value Ref Range Status   09/26/2022 18 10 - 40 U/L Final     ALT   Date Value Ref Range Status   09/26/2022 14 10 - 44 U/L Final     Anion Gap   Date Value Ref Range Status   11/02/2022 12 8 - 16 mmol/L Final     eGFR   Date Value Ref Range Status   11/02/2022 53.7 (A) >60 mL/min/1.73 m^2 Final       The patient has been cleared for surgery in an ambulatory surgery facility.     Impression: Visually significant Cataract left Eye    Plan: Phacoemulsification with implantation of Intraocular lens left Eye

## 2023-02-08 NOTE — PLAN OF CARE
Discharge instructions given to pt/, verbalized understanding.  Refused fluids.  IV removed.  Pain 7/10.  Sunglasses on. Leaving with drops, eye shield and tape.  Ambulating out to   per Rn in no distress.

## 2023-02-09 ENCOUNTER — OFFICE VISIT (OUTPATIENT)
Dept: OPHTHALMOLOGY | Facility: CLINIC | Age: 73
End: 2023-02-09
Payer: MEDICARE

## 2023-02-09 VITALS
OXYGEN SATURATION: 90 % | BODY MASS INDEX: 35.15 KG/M2 | WEIGHT: 191 LBS | TEMPERATURE: 98 F | HEIGHT: 62 IN | RESPIRATION RATE: 17 BRPM | HEART RATE: 79 BPM | SYSTOLIC BLOOD PRESSURE: 136 MMHG | DIASTOLIC BLOOD PRESSURE: 69 MMHG

## 2023-02-09 DIAGNOSIS — Z98.42 STATUS POST CATARACT SURGERY, LEFT: Primary | ICD-10-CM

## 2023-02-09 PROCEDURE — 99024 POSTOP FOLLOW-UP VISIT: CPT | Mod: POP,,, | Performed by: OPHTHALMOLOGY

## 2023-02-09 PROCEDURE — 99999 PR PBB SHADOW E&M-EST. PATIENT-LVL III: ICD-10-PCS | Mod: PBBFAC,,, | Performed by: OPHTHALMOLOGY

## 2023-02-09 PROCEDURE — 99024 PR POST-OP FOLLOW-UP VISIT: ICD-10-PCS | Mod: POP,,, | Performed by: OPHTHALMOLOGY

## 2023-02-09 PROCEDURE — 99213 OFFICE O/P EST LOW 20 MIN: CPT | Mod: PBBFAC,PO | Performed by: OPHTHALMOLOGY

## 2023-02-09 PROCEDURE — 99999 PR PBB SHADOW E&M-EST. PATIENT-LVL III: CPT | Mod: PBBFAC,,, | Performed by: OPHTHALMOLOGY

## 2023-02-09 NOTE — PROGRESS NOTES
HPI    Pt presents for one day post op PCIOL OS with Omni     States no current ocular complaints     Moxi QID   PF QID   Keto QID   Last edited by Marta Gloria on 2/9/2023  8:45 AM.            Assessment /Plan     For exam results, see Encounter Report.    Status post cataract surgery, left      POD1 CEIOL OMNI OS  IOP spike, pt did not continue glaucoma drops  IOP burped to 25 at slit lamp, moxi instilled after    Post op precautions and instructions reviewed, sheet given  moxi QID  PF QID  Keto QID    Continue glaucoma drops unchanged OU    F/u 1 week

## 2023-02-14 ENCOUNTER — OFFICE VISIT (OUTPATIENT)
Dept: GASTROENTEROLOGY | Facility: CLINIC | Age: 73
End: 2023-02-14
Payer: MEDICARE

## 2023-02-14 ENCOUNTER — OFFICE VISIT (OUTPATIENT)
Dept: OPHTHALMOLOGY | Facility: CLINIC | Age: 73
End: 2023-02-14
Payer: MEDICARE

## 2023-02-14 VITALS — HEIGHT: 62 IN | BODY MASS INDEX: 35.54 KG/M2 | WEIGHT: 193.13 LBS

## 2023-02-14 DIAGNOSIS — Z12.11 SCREENING FOR COLON CANCER: ICD-10-CM

## 2023-02-14 DIAGNOSIS — R49.0 HOARSE VOICE QUALITY: ICD-10-CM

## 2023-02-14 DIAGNOSIS — R05.8 DRY COUGH: ICD-10-CM

## 2023-02-14 DIAGNOSIS — J02.9 SORE THROAT: ICD-10-CM

## 2023-02-14 DIAGNOSIS — R11.0 NAUSEA: ICD-10-CM

## 2023-02-14 DIAGNOSIS — Z79.01 CURRENT USE OF LONG TERM ANTICOAGULATION: ICD-10-CM

## 2023-02-14 DIAGNOSIS — Z86.718 HISTORY OF DVT (DEEP VEIN THROMBOSIS): ICD-10-CM

## 2023-02-14 DIAGNOSIS — K21.9 GASTROESOPHAGEAL REFLUX DISEASE, UNSPECIFIED WHETHER ESOPHAGITIS PRESENT: ICD-10-CM

## 2023-02-14 DIAGNOSIS — K86.2 PANCREATIC CYST: ICD-10-CM

## 2023-02-14 DIAGNOSIS — R12 HEARTBURN: ICD-10-CM

## 2023-02-14 DIAGNOSIS — R09.89 CHOKING SENSATION: ICD-10-CM

## 2023-02-14 DIAGNOSIS — Z98.42 STATUS POST CATARACT SURGERY, LEFT: Primary | ICD-10-CM

## 2023-02-14 DIAGNOSIS — Z85.3 HISTORY OF BREAST CANCER: ICD-10-CM

## 2023-02-14 DIAGNOSIS — R13.10 DYSPHAGIA, UNSPECIFIED TYPE: Primary | ICD-10-CM

## 2023-02-14 PROCEDURE — 99999 PR PBB SHADOW E&M-EST. PATIENT-LVL II: CPT | Mod: PBBFAC,,, | Performed by: OPHTHALMOLOGY

## 2023-02-14 PROCEDURE — 99999 PR PBB SHADOW E&M-EST. PATIENT-LVL V: CPT | Mod: PBBFAC,,,

## 2023-02-14 PROCEDURE — 99024 PR POST-OP FOLLOW-UP VISIT: ICD-10-PCS | Mod: POP,,, | Performed by: OPHTHALMOLOGY

## 2023-02-14 PROCEDURE — 99214 OFFICE O/P EST MOD 30 MIN: CPT | Mod: S$PBB,,,

## 2023-02-14 PROCEDURE — 99214 PR OFFICE/OUTPT VISIT, EST, LEVL IV, 30-39 MIN: ICD-10-PCS | Mod: S$PBB,,,

## 2023-02-14 PROCEDURE — 99212 OFFICE O/P EST SF 10 MIN: CPT | Mod: PBBFAC,27,PO | Performed by: OPHTHALMOLOGY

## 2023-02-14 PROCEDURE — 99999 PR PBB SHADOW E&M-EST. PATIENT-LVL V: ICD-10-PCS | Mod: PBBFAC,,,

## 2023-02-14 PROCEDURE — 99999 PR PBB SHADOW E&M-EST. PATIENT-LVL II: ICD-10-PCS | Mod: PBBFAC,,, | Performed by: OPHTHALMOLOGY

## 2023-02-14 PROCEDURE — 99215 OFFICE O/P EST HI 40 MIN: CPT | Mod: 25,PBBFAC,PN

## 2023-02-14 PROCEDURE — 99024 POSTOP FOLLOW-UP VISIT: CPT | Mod: POP,,, | Performed by: OPHTHALMOLOGY

## 2023-02-14 RX ORDER — PANTOPRAZOLE SODIUM 40 MG/1
40 TABLET, DELAYED RELEASE ORAL 2 TIMES DAILY
Qty: 60 TABLET | Refills: 0 | Status: SHIPPED | OUTPATIENT
Start: 2023-02-14 | End: 2024-02-07

## 2023-02-14 RX ORDER — PANTOPRAZOLE SODIUM 40 MG/1
TABLET, DELAYED RELEASE ORAL
Qty: 180 TABLET | OUTPATIENT
Start: 2023-02-14

## 2023-02-14 NOTE — TELEPHONE ENCOUNTER
Call placed to Ms. Coates in regards to message received. I informed her that we did not deny her prescription. It was a duplicate order. She verbalized understanding. No further issues noted.

## 2023-02-14 NOTE — PROGRESS NOTES
HPI    1 week s/p phaco IOL / OMNI OS done on 2/8/2023  Pt states OS feels well. Denies pain/ FOL/ floaters.   Compliant with post op gtts.     Using pred, moxi, and keto as directed.       Latanoprost OU QHS  Dorz/ esther OU BID       Last edited by Talya Zapata on 2/14/2023 10:16 AM.            Assessment /Plan     For exam results, see Encounter Report.    Status post cataract surgery, left      POD6 CEIOL OMNI OS  AC is clearing but IOP still high  IOP burped to 23 at slit lamp, moxi instilled after  Discussed that more substantial surgery may be necessary in the future    Continue moxi QID x 2 more days  Continue PF QID with taper  Continue keto QID    Add brim bid OS  Continue  Dorz/esther bid OU  Latan qhs OU    Post op instructions reviewed    F/u 2 weeks, IOP check

## 2023-02-14 NOTE — PROGRESS NOTES
Subjective:       Patient ID: Eugenie Coates is a 72 y.o. female Body mass index is 35.32 kg/m².    Chief Complaint: Gastroesophageal Reflux (dysphagia)    This patient is new to me.  Referring Provider: Dr. Connor M Gillies for dysphagia.  Established patient of Dr. Erazo & Dr. Randy Rankin.     Gastroesophageal Reflux  She complains of abdominal pain (denies pain currently; reports intermittent episodes burning after eating certain foods such as red gravy and acidic foods), belching, choking (Reports sensation of choking and difficulty breathing after swallowing liquids; denies Heimlich maneuver), coughing (dry cough), dysphagia (Chief complaint: started 11/20/2022, but has worsened; occurs after swallowing large pills, meats, and liquids; at times has to carefully drink liquids to get items down), globus sensation (lump in throat), heartburn, a hoarse voice, nausea and a sore throat. She reports no chest pain, no early satiety or no water brash. This is a chronic problem. The current episode started more than 1 year ago. The problem occurs frequently. The problem has been gradually worsening. The heartburn duration is more than one hour. The heartburn is located in the substernum. The heartburn is of severe intensity. The heartburn wakes her from sleep. The heartburn does not limit her activity. The heartburn changes with position. The symptoms are aggravated by lying down and certain foods (acidic foods). Pertinent negatives include no anemia, fatigue, melena, muscle weakness or weight loss. Risk factors include lack of exercise, obesity and NSAIDs (advil). She has tried a PPI and a diet change (Currently taking Protonix 40 mg b.i.d.; past treatments: prilosec & pepto bismol) for the symptoms. The treatment provided mild relief. Past procedures do not include an abdominal ultrasound, an EGD, esophageal manometry, esophageal pH monitoring, H. pylori antibody titer or a UGI. Past history of breast cancer and  DVT. Past invasive treatments do not include gastroplasty, gastroplication or reflux surgery.     Review of Systems   Constitutional:  Negative for activity change, appetite change, chills, diaphoresis, fatigue, fever, unexpected weight change and weight loss.   HENT:  Positive for hoarse voice, sore throat and trouble swallowing.    Respiratory:  Positive for cough (dry cough) and choking (Reports sensation of choking and difficulty breathing after swallowing liquids; denies Heimlich maneuver). Negative for shortness of breath.    Cardiovascular:  Negative for chest pain.   Gastrointestinal:  Positive for abdominal pain (denies pain currently; reports intermittent episodes burning after eating certain foods such as red gravy and acidic foods), dysphagia (Chief complaint: started 11/20/2022, but has worsened; occurs after swallowing large pills, meats, and liquids; at times has to carefully drink liquids to get items down), heartburn and nausea. Negative for abdominal distention, anal bleeding, blood in stool, constipation (Typically has 1 BM daily rated stool 4 on [place] scale), diarrhea, melena, rectal pain and vomiting.   Musculoskeletal:  Negative for muscle weakness.       No LMP recorded. Patient is postmenopausal.  Past Medical History:   Diagnosis Date    Allergy     Anemia     Anxiety     Arthritis     Asthma     Bell palsy     Bilateral lower extremity edema     Chronic diastolic CHF (congestive heart failure)     Depression     Diabetes mellitus, type 2     DVT (deep venous thrombosis)     right LE    Dyslipidemia     Endometriosis     Eye injury 2014    stuck with tree branch od ?     Focal seizure 5/3/2021    GERD (gastroesophageal reflux disease)     Glaucoma     History of uterine fibroid     Hyperlipidemia     Hypertension     Invasive lobular carcinoma of right breast in female     s/p surgery, radiation, tamoxifen    Nuclear sclerosis of both eyes 9/27/2016    Obesity     Pancreas cyst     Sleep  apnea     uses C pap    Supraventricular tachycardia     SVT (supraventricular tachycardia) 2019    Thyroid disease     Venous insufficiency      Past Surgical History:   Procedure Laterality Date    CATARACT EXTRACTION W/  INTRAOCULAR LENS IMPLANT Left 2023    Procedure: CEIOL OMNI OS;  Surgeon: Juan Antonio Mcintosh MD;  Location: [facility];  Service: Ophthalmology;  Laterality: Left;  consider block     SECTION      CHOLECYSTECTOMY      COLONOSCOPY      CRANIOTOMY FOR EXCISION OF INTRACRANIAL TUMOR Left 2021    Procedure: CRANIOTOMY, FOR INTRACRANIAL NEOPLASM EXCISION Left craniotomy for tumor resection;  Surgeon: Hernandez Bravo MD;  Location: [facility];  Service: Neurosurgery;  Laterality: Left;    glaucoma laser Bilateral     HERNIA REPAIR      KNEE SURGERY Right     (R) knee scope; torn meniscus    MASTECTOMY, PARTIAL Right 2020    Procedure: MASTECTOMY, PARTIAL-Right with radiological marker Consent day of surgery; Neoprobe, technitium, Frozen;  Surgeon: Narda Keating MD;  Location: [address];  Service: General;  Laterality: Right;    SENTINEL LYMPH NODE BIOPSY Right 2020    Procedure: BIOPSY, LYMPH NODE, SENTINEL;  Surgeon: Narda Keating MD;  Location: [address];  Service: General;  Laterality: Right;    TOTAL REDUCTION MAMMOPLASTY Bilateral 2020    Procedure: MAMMOPLASTY, REDUCTION-Bilateral;  Surgeon: Zack Hood MD;  Location: [address];  Service: Plastics;  Laterality: Bilateral;    VEIN SURGERY  ,     Rt leg x2     Family History   Problem Relation Age of Onset    Diabetes Mother     No Known Problems Father     No Known Problems Sister     No Known Problems Brother     Colon cancer Maternal Aunt     No Known Problems Maternal Uncle     No Known Problems Paternal Aunt     No Known Problems Paternal Uncle     No Known Problems Maternal Grandmother     No Known Problems Maternal Grandfather     No Known Problems Paternal  Grandmother     No Known Problems Paternal Grandfather     Amblyopia Neg Hx     Blindness Neg Hx     Cancer Neg Hx     Cataracts Neg Hx     Glaucoma Neg Hx     Hypertension Neg Hx     Macular degeneration Neg Hx     Retinal detachment Neg Hx     Strabismus Neg Hx     Stroke Neg Hx     Thyroid disease Neg Hx     Celiac disease Neg Hx     Colon polyps Neg Hx     Esophageal cancer Neg Hx     Inflammatory bowel disease Neg Hx     Irritable bowel syndrome Neg Hx     Liver cancer Neg Hx     Liver disease Neg Hx     Rectal cancer Neg Hx     Stomach cancer Neg Hx     Ulcerative colitis Neg Hx     Cystic fibrosis Neg Hx     Crohn's disease Neg Hx     Hemochromatosis Neg Hx     Cirrhosis Neg Hx      Social History     Tobacco Use    Smoking status: Never    Smokeless tobacco: Never   Substance Use Topics    Alcohol use: Never     Alcohol/week: 0.0 standard drinks    Drug use: Never     Wt Readings from Last 10 Encounters:   02/14/23 87.6 kg (193 lb 2 oz)   02/02/23 86.6 kg (191 lb)   01/25/23 86.8 kg (191 lb 5.8 oz)   11/09/22 80.7 kg (178 lb)   10/24/22 81 kg (178 lb 9.2 oz)   09/26/22 84.1 kg (185 lb 6.5 oz)   09/22/22 84.9 kg (187 lb 4.5 oz)   09/07/22 79.9 kg (176 lb 2.4 oz)   04/28/22 79.9 kg (176 lb 2.4 oz)   04/21/22 80.6 kg (177 lb 9.3 oz)     Lab Results   Component Value Date    WBC 7.96 09/26/2022    HGB 14.4 09/26/2022    HCT 46.4 09/26/2022    MCV 89 09/26/2022     09/26/2022     CMP  Sodium   Date Value Ref Range Status   11/02/2022 139 136 - 145 mmol/L Final     Potassium   Date Value Ref Range Status   11/02/2022 4.2 3.5 - 5.1 mmol/L Final     Chloride   Date Value Ref Range Status   11/02/2022 101 95 - 110 mmol/L Final     CO2   Date Value Ref Range Status   11/02/2022 26 23 - 29 mmol/L Final     Glucose   Date Value Ref Range Status   11/02/2022 96 70 - 110 mg/dL Final     BUN   Date Value Ref Range Status   11/02/2022 15 8 - 23 mg/dL Final     Creatinine   Date Value Ref Range Status   11/02/2022 1.1  0.5 - 1.4 mg/dL Final     Calcium   Date Value Ref Range Status   11/02/2022 9.4 8.7 - 10.5 mg/dL Final     Total Protein   Date Value Ref Range Status   09/26/2022 7.7 6.0 - 8.4 g/dL Final     Albumin   Date Value Ref Range Status   11/02/2022 3.6 3.5 - 5.2 g/dL Final     Total Bilirubin   Date Value Ref Range Status   09/26/2022 0.8 0.1 - 1.0 mg/dL Final     Comment:     For infants and newborns, interpretation of results should be based  on gestational age, weight and in agreement with clinical  observations.    Premature Infant recommended reference ranges:  Up to 24 hours.............<8.0 mg/dL  Up to 48 hours............<12.0 mg/dL  3-5 days..................<15.0 mg/dL  6-29 days.................<15.0 mg/dL       Alkaline Phosphatase   Date Value Ref Range Status   09/26/2022 118 55 - 135 U/L Final     AST   Date Value Ref Range Status   09/26/2022 18 10 - 40 U/L Final     ALT   Date Value Ref Range Status   09/26/2022 14 10 - 44 U/L Final     Anion Gap   Date Value Ref Range Status   11/02/2022 12 8 - 16 mmol/L Final     eGFR if    Date Value Ref Range Status   06/06/2022 >60.0 >60 mL/min/1.73 m^2 Final     eGFR if non    Date Value Ref Range Status   06/06/2022 56.8 (A) >60 mL/min/1.73 m^2 Final     Comment:     Calculation used to obtain the estimated glomerular filtration  rate (eGFR) is the CKD-EPI equation.        Lab Results   Component Value Date    TSH 0.346 (L) 09/26/2022       Reviewed prior medical records including radiology report of chest x-ray 05/04/2021 & endoscopy history (see surgical history).    Objective:      Physical Exam  Vitals and nursing note reviewed.   Constitutional:       General: She is not in acute distress.     Appearance: Normal appearance. She is obese. She is not ill-appearing.   HENT:      Mouth/Throat:      Lips: Pink. No lesions.   Cardiovascular:      Rate and Rhythm: Normal rate and regular rhythm.      Pulses: Normal pulses.    Pulmonary:      Effort: Pulmonary effort is normal. No respiratory distress.   Abdominal:      General: Abdomen is protuberant. Bowel sounds are normal. There is no distension or abdominal bruit. There are no signs of injury.      Palpations: Abdomen is soft. There is no shifting dullness, fluid wave, hepatomegaly, splenomegaly or mass.      Tenderness: There is no abdominal tenderness. There is no guarding or rebound. Negative signs include Foster's sign, Rovsing's sign and McBurney's sign.      Hernia: No hernia is present.   Skin:     General: Skin is warm and dry.      Coloration: Skin is not jaundiced or pale.   Neurological:      Mental Status: She is alert and oriented to person, place, and time.   Psychiatric:         Attention and Perception: Attention normal.         Mood and Affect: Mood normal.         Speech: Speech normal.         Behavior: Behavior normal.       Assessment:       1. Dysphagia, unspecified type    2. Choking sensation    3. Gastroesophageal reflux disease, unspecified whether esophagitis present    4. Heartburn    5. Sore throat    6. Hoarse voice quality    7. Nausea    8. Dry cough    9. Pancreatic cyst    10. Screening for colon cancer    11. Current use of long term anticoagulation    12. History of DVT (deep vein thrombosis)    13. History of breast cancer        Plan:       Dysphagia, unspecified type & Choking sensation  - schedule EGD, discussed procedure with patient and possible esophageal dilation may be performed during procedure if indicated, patient verbalized understanding  - educated patient to eat smaller more frequent meals and to eat slowly and advised to eat a soft diet.  - possible UGI/esophagram/esophageal manometry if symptoms persist  -     Fl Modified Barium Swallow Speech; Future; Expected date: 02/14/2023  -     SLP video swallow; Future; Expected date: 02/14/2023  -     Case Request Endoscopy: EGD (ESOPHAGOGASTRODUODENOSCOPY)    Gastroesophageal reflux  disease, unspecified whether esophagitis present & Heartburn  - schedule EGD, discussed procedure with patient, including risks and benefits, patient verbalized understanding  -discussed about the different types of medications used to treat reflux and how to use them, antacids can be used PRN for breakthrough heartburn symptoms by reducing stomach acid that is already produced, H2 blockers work by limiting the amount acid production, & PPI's work to block acid production and are taken daily, patient verbalized understanding.  -Educated patient on lifestyle modifications to help control/reduce reflux/abdominal pain including: avoid large meals, avoid eating within 2-3 hours of bedtime (avoid late night eating & lying down soon after eating), elevate head of bed if nocturnal symptoms are present, smoking cessation (if current smoker), & weight loss (if overweight).   -Educated to avoid known foods which trigger reflux symptoms & to minimize/avoid high-fat foods, chocolate, caffeine, citrus, alcohol, & tomato products.  -Advised to avoid/limit use of NSAID's, since they can cause GI upset, bleeding, and/or ulcers. If needed, take with food.   -     Fl Modified Barium Swallow Speech; Future; Expected date: 02/14/2023  -     SLP video swallow; Future; Expected date: 02/14/2023  -REFILL:  pantoprazole (PROTONIX) 40 MG tablet; Take 1 tablet (40 mg total) by mouth 2 (two) times daily.  Dispense: 60 tablet; Refill: 0  -     Case Request Endoscopy: EGD (ESOPHAGOGASTRODUODENOSCOPY)    Sore throat & Hoarse voice quality  - schedule EGD, discussed procedure with patient, including risks and benefits, patient verbalized understanding  -consider ENT referral    Nausea  - schedule EGD, discussed procedure with patient, including risks and benefits, patient verbalized understanding  -consider GES    Dry cough  - schedule EGD, discussed procedure with patient, including risks and benefits, patient verbalized  understanding    Pancreatic cyst  -patient would like to establish with provider in [place] for EUS  -     Ambulatory referral/consult to Gastroenterology; Future; Expected date: 02/21/2023    Screening for colon cancer  - schedule Colonoscopy, discussed procedure with the patient, including risks and benefits, patient verbalized understanding  -     Case Request Endoscopy: COLONOSCOPY    Current use of long term anticoagulation & History of DVT (deep vein thrombosis)   - informed patient that the anticoagulant(s) will likely need to be held for endoscopy, nurse will confirm with endoscopist, cardiologist, and/or PCP.    History of breast cancer   -Follow-up with PCP and/or oncologist for continued evaluation and management    Follow up in about 4 weeks (around 3/14/2023), or if symptoms worsen or fail to improve.      If no improvement in symptoms or symptoms worsen, call/follow-up at clinic or go to ER.        45 minutes of total time spent on the encounter, which includes face to face time and non-face to face time preparing to see the patient (eg, review of tests), Obtaining and/or reviewing separately obtained history, Documenting clinical information in the electronic or other health record, Independently interpreting results (not separately reported) and communicating results to the patient/family/caregiver, or Care coordination (not separately reported).     A dictation software program was used for this note. Please expect some simple typographical  errors in this note.

## 2023-02-14 NOTE — H&P (VIEW-ONLY)
Subjective:       Patient ID: Eugenie Coates is a 72 y.o. female Body mass index is 35.32 kg/m².    Chief Complaint: Gastroesophageal Reflux (dysphagia)    This patient is new to me.  Referring Provider: Dr. Connor M Gillies for dysphagia.  Established patient of Dr. Erazo & Dr. Randy Rankin.     Gastroesophageal Reflux  She complains of abdominal pain (denies pain currently; reports intermittent episodes burning after eating certain foods such as red gravy and acidic foods), belching, choking (Reports sensation of choking and difficulty breathing after swallowing liquids; denies Heimlich maneuver), coughing (dry cough), dysphagia (Chief complaint: started 11/20/2022, but has worsened; occurs after swallowing large pills, meats, and liquids; at times has to carefully drink liquids to get items down), globus sensation (lump in throat), heartburn, a hoarse voice, nausea and a sore throat. She reports no chest pain, no early satiety or no water brash. This is a chronic problem. The current episode started more than 1 year ago. The problem occurs frequently. The problem has been gradually worsening. The heartburn duration is more than one hour. The heartburn is located in the substernum. The heartburn is of severe intensity. The heartburn wakes her from sleep. The heartburn does not limit her activity. The heartburn changes with position. The symptoms are aggravated by lying down and certain foods (acidic foods). Pertinent negatives include no anemia, fatigue, melena, muscle weakness or weight loss. Risk factors include lack of exercise, obesity and NSAIDs (advil). She has tried a PPI and a diet change (Currently taking Protonix 40 mg b.i.d.; past treatments: prilosec & pepto bismol) for the symptoms. The treatment provided mild relief. Past procedures do not include an abdominal ultrasound, an EGD, esophageal manometry, esophageal pH monitoring, H. pylori antibody titer or a UGI. Past history of breast cancer and  DVT. Past invasive treatments do not include gastroplasty, gastroplication or reflux surgery.     Review of Systems   Constitutional:  Negative for activity change, appetite change, chills, diaphoresis, fatigue, fever, unexpected weight change and weight loss.   HENT:  Positive for hoarse voice, sore throat and trouble swallowing.    Respiratory:  Positive for cough (dry cough) and choking (Reports sensation of choking and difficulty breathing after swallowing liquids; denies Heimlich maneuver). Negative for shortness of breath.    Cardiovascular:  Negative for chest pain.   Gastrointestinal:  Positive for abdominal pain (denies pain currently; reports intermittent episodes burning after eating certain foods such as red gravy and acidic foods), dysphagia (Chief complaint: started 11/20/2022, but has worsened; occurs after swallowing large pills, meats, and liquids; at times has to carefully drink liquids to get items down), heartburn and nausea. Negative for abdominal distention, anal bleeding, blood in stool, constipation (Typically has 1 BM daily rated stool 4 on [place] scale), diarrhea, melena, rectal pain and vomiting.   Musculoskeletal:  Negative for muscle weakness.       No LMP recorded. Patient is postmenopausal.  Past Medical History:   Diagnosis Date    Allergy     Anemia     Anxiety     Arthritis     Asthma     Bell palsy     Bilateral lower extremity edema     Chronic diastolic CHF (congestive heart failure)     Depression     Diabetes mellitus, type 2     DVT (deep venous thrombosis)     right LE    Dyslipidemia     Endometriosis     Eye injury 2014    stuck with tree branch od ?     Focal seizure 5/3/2021    GERD (gastroesophageal reflux disease)     Glaucoma     History of uterine fibroid     Hyperlipidemia     Hypertension     Invasive lobular carcinoma of right breast in female     s/p surgery, radiation, tamoxifen    Nuclear sclerosis of both eyes 9/27/2016    Obesity     Pancreas cyst     Sleep  apnea     uses C pap    Supraventricular tachycardia     SVT (supraventricular tachycardia) 2019    Thyroid disease     Venous insufficiency      Past Surgical History:   Procedure Laterality Date    CATARACT EXTRACTION W/  INTRAOCULAR LENS IMPLANT Left 2023    Procedure: CEIOL OMNI OS;  Surgeon: Juan Antonio Mcintosh MD;  Location: [facility];  Service: Ophthalmology;  Laterality: Left;  consider block     SECTION      CHOLECYSTECTOMY      COLONOSCOPY      CRANIOTOMY FOR EXCISION OF INTRACRANIAL TUMOR Left 2021    Procedure: CRANIOTOMY, FOR INTRACRANIAL NEOPLASM EXCISION Left craniotomy for tumor resection;  Surgeon: Hernandez Bravo MD;  Location: [facility];  Service: Neurosurgery;  Laterality: Left;    glaucoma laser Bilateral     HERNIA REPAIR      KNEE SURGERY Right     (R) knee scope; torn meniscus    MASTECTOMY, PARTIAL Right 2020    Procedure: MASTECTOMY, PARTIAL-Right with radiological marker Consent day of surgery; Neoprobe, technitium, Frozen;  Surgeon: Narda Keating MD;  Location: [address];  Service: General;  Laterality: Right;    SENTINEL LYMPH NODE BIOPSY Right 2020    Procedure: BIOPSY, LYMPH NODE, SENTINEL;  Surgeon: Narda Keating MD;  Location: [address];  Service: General;  Laterality: Right;    TOTAL REDUCTION MAMMOPLASTY Bilateral 2020    Procedure: MAMMOPLASTY, REDUCTION-Bilateral;  Surgeon: Zack Hood MD;  Location: [address];  Service: Plastics;  Laterality: Bilateral;    VEIN SURGERY  ,     Rt leg x2     Family History   Problem Relation Age of Onset    Diabetes Mother     No Known Problems Father     No Known Problems Sister     No Known Problems Brother     Colon cancer Maternal Aunt     No Known Problems Maternal Uncle     No Known Problems Paternal Aunt     No Known Problems Paternal Uncle     No Known Problems Maternal Grandmother     No Known Problems Maternal Grandfather     No Known Problems Paternal  Grandmother     No Known Problems Paternal Grandfather     Amblyopia Neg Hx     Blindness Neg Hx     Cancer Neg Hx     Cataracts Neg Hx     Glaucoma Neg Hx     Hypertension Neg Hx     Macular degeneration Neg Hx     Retinal detachment Neg Hx     Strabismus Neg Hx     Stroke Neg Hx     Thyroid disease Neg Hx     Celiac disease Neg Hx     Colon polyps Neg Hx     Esophageal cancer Neg Hx     Inflammatory bowel disease Neg Hx     Irritable bowel syndrome Neg Hx     Liver cancer Neg Hx     Liver disease Neg Hx     Rectal cancer Neg Hx     Stomach cancer Neg Hx     Ulcerative colitis Neg Hx     Cystic fibrosis Neg Hx     Crohn's disease Neg Hx     Hemochromatosis Neg Hx     Cirrhosis Neg Hx      Social History     Tobacco Use    Smoking status: Never    Smokeless tobacco: Never   Substance Use Topics    Alcohol use: Never     Alcohol/week: 0.0 standard drinks    Drug use: Never     Wt Readings from Last 10 Encounters:   02/14/23 87.6 kg (193 lb 2 oz)   02/02/23 86.6 kg (191 lb)   01/25/23 86.8 kg (191 lb 5.8 oz)   11/09/22 80.7 kg (178 lb)   10/24/22 81 kg (178 lb 9.2 oz)   09/26/22 84.1 kg (185 lb 6.5 oz)   09/22/22 84.9 kg (187 lb 4.5 oz)   09/07/22 79.9 kg (176 lb 2.4 oz)   04/28/22 79.9 kg (176 lb 2.4 oz)   04/21/22 80.6 kg (177 lb 9.3 oz)     Lab Results   Component Value Date    WBC 7.96 09/26/2022    HGB 14.4 09/26/2022    HCT 46.4 09/26/2022    MCV 89 09/26/2022     09/26/2022     CMP  Sodium   Date Value Ref Range Status   11/02/2022 139 136 - 145 mmol/L Final     Potassium   Date Value Ref Range Status   11/02/2022 4.2 3.5 - 5.1 mmol/L Final     Chloride   Date Value Ref Range Status   11/02/2022 101 95 - 110 mmol/L Final     CO2   Date Value Ref Range Status   11/02/2022 26 23 - 29 mmol/L Final     Glucose   Date Value Ref Range Status   11/02/2022 96 70 - 110 mg/dL Final     BUN   Date Value Ref Range Status   11/02/2022 15 8 - 23 mg/dL Final     Creatinine   Date Value Ref Range Status   11/02/2022 1.1  0.5 - 1.4 mg/dL Final     Calcium   Date Value Ref Range Status   11/02/2022 9.4 8.7 - 10.5 mg/dL Final     Total Protein   Date Value Ref Range Status   09/26/2022 7.7 6.0 - 8.4 g/dL Final     Albumin   Date Value Ref Range Status   11/02/2022 3.6 3.5 - 5.2 g/dL Final     Total Bilirubin   Date Value Ref Range Status   09/26/2022 0.8 0.1 - 1.0 mg/dL Final     Comment:     For infants and newborns, interpretation of results should be based  on gestational age, weight and in agreement with clinical  observations.    Premature Infant recommended reference ranges:  Up to 24 hours.............<8.0 mg/dL  Up to 48 hours............<12.0 mg/dL  3-5 days..................<15.0 mg/dL  6-29 days.................<15.0 mg/dL       Alkaline Phosphatase   Date Value Ref Range Status   09/26/2022 118 55 - 135 U/L Final     AST   Date Value Ref Range Status   09/26/2022 18 10 - 40 U/L Final     ALT   Date Value Ref Range Status   09/26/2022 14 10 - 44 U/L Final     Anion Gap   Date Value Ref Range Status   11/02/2022 12 8 - 16 mmol/L Final     eGFR if    Date Value Ref Range Status   06/06/2022 >60.0 >60 mL/min/1.73 m^2 Final     eGFR if non    Date Value Ref Range Status   06/06/2022 56.8 (A) >60 mL/min/1.73 m^2 Final     Comment:     Calculation used to obtain the estimated glomerular filtration  rate (eGFR) is the CKD-EPI equation.        Lab Results   Component Value Date    TSH 0.346 (L) 09/26/2022       Reviewed prior medical records including radiology report of chest x-ray 05/04/2021 & endoscopy history (see surgical history).    Objective:      Physical Exam  Vitals and nursing note reviewed.   Constitutional:       General: She is not in acute distress.     Appearance: Normal appearance. She is obese. She is not ill-appearing.   HENT:      Mouth/Throat:      Lips: Pink. No lesions.   Cardiovascular:      Rate and Rhythm: Normal rate and regular rhythm.      Pulses: Normal pulses.    Pulmonary:      Effort: Pulmonary effort is normal. No respiratory distress.   Abdominal:      General: Abdomen is protuberant. Bowel sounds are normal. There is no distension or abdominal bruit. There are no signs of injury.      Palpations: Abdomen is soft. There is no shifting dullness, fluid wave, hepatomegaly, splenomegaly or mass.      Tenderness: There is no abdominal tenderness. There is no guarding or rebound. Negative signs include Foster's sign, Rovsing's sign and McBurney's sign.      Hernia: No hernia is present.   Skin:     General: Skin is warm and dry.      Coloration: Skin is not jaundiced or pale.   Neurological:      Mental Status: She is alert and oriented to person, place, and time.   Psychiatric:         Attention and Perception: Attention normal.         Mood and Affect: Mood normal.         Speech: Speech normal.         Behavior: Behavior normal.       Assessment:       1. Dysphagia, unspecified type    2. Choking sensation    3. Gastroesophageal reflux disease, unspecified whether esophagitis present    4. Heartburn    5. Sore throat    6. Hoarse voice quality    7. Nausea    8. Dry cough    9. Pancreatic cyst    10. Screening for colon cancer    11. Current use of long term anticoagulation    12. History of DVT (deep vein thrombosis)    13. History of breast cancer        Plan:       Dysphagia, unspecified type & Choking sensation  - schedule EGD, discussed procedure with patient and possible esophageal dilation may be performed during procedure if indicated, patient verbalized understanding  - educated patient to eat smaller more frequent meals and to eat slowly and advised to eat a soft diet.  - possible UGI/esophagram/esophageal manometry if symptoms persist  -     Fl Modified Barium Swallow Speech; Future; Expected date: 02/14/2023  -     SLP video swallow; Future; Expected date: 02/14/2023  -     Case Request Endoscopy: EGD (ESOPHAGOGASTRODUODENOSCOPY)    Gastroesophageal reflux  disease, unspecified whether esophagitis present & Heartburn  - schedule EGD, discussed procedure with patient, including risks and benefits, patient verbalized understanding  -discussed about the different types of medications used to treat reflux and how to use them, antacids can be used PRN for breakthrough heartburn symptoms by reducing stomach acid that is already produced, H2 blockers work by limiting the amount acid production, & PPI's work to block acid production and are taken daily, patient verbalized understanding.  -Educated patient on lifestyle modifications to help control/reduce reflux/abdominal pain including: avoid large meals, avoid eating within 2-3 hours of bedtime (avoid late night eating & lying down soon after eating), elevate head of bed if nocturnal symptoms are present, smoking cessation (if current smoker), & weight loss (if overweight).   -Educated to avoid known foods which trigger reflux symptoms & to minimize/avoid high-fat foods, chocolate, caffeine, citrus, alcohol, & tomato products.  -Advised to avoid/limit use of NSAID's, since they can cause GI upset, bleeding, and/or ulcers. If needed, take with food.   -     Fl Modified Barium Swallow Speech; Future; Expected date: 02/14/2023  -     SLP video swallow; Future; Expected date: 02/14/2023  -REFILL:  pantoprazole (PROTONIX) 40 MG tablet; Take 1 tablet (40 mg total) by mouth 2 (two) times daily.  Dispense: 60 tablet; Refill: 0  -     Case Request Endoscopy: EGD (ESOPHAGOGASTRODUODENOSCOPY)    Sore throat & Hoarse voice quality  - schedule EGD, discussed procedure with patient, including risks and benefits, patient verbalized understanding  -consider ENT referral    Nausea  - schedule EGD, discussed procedure with patient, including risks and benefits, patient verbalized understanding  -consider GES    Dry cough  - schedule EGD, discussed procedure with patient, including risks and benefits, patient verbalized  understanding    Pancreatic cyst  -patient would like to establish with provider in [place] for EUS  -     Ambulatory referral/consult to Gastroenterology; Future; Expected date: 02/21/2023    Screening for colon cancer  - schedule Colonoscopy, discussed procedure with the patient, including risks and benefits, patient verbalized understanding  -     Case Request Endoscopy: COLONOSCOPY    Current use of long term anticoagulation & History of DVT (deep vein thrombosis)   - informed patient that the anticoagulant(s) will likely need to be held for endoscopy, nurse will confirm with endoscopist, cardiologist, and/or PCP.    History of breast cancer   -Follow-up with PCP and/or oncologist for continued evaluation and management    Follow up in about 4 weeks (around 3/14/2023), or if symptoms worsen or fail to improve.      If no improvement in symptoms or symptoms worsen, call/follow-up at clinic or go to ER.        45 minutes of total time spent on the encounter, which includes face to face time and non-face to face time preparing to see the patient (eg, review of tests), Obtaining and/or reviewing separately obtained history, Documenting clinical information in the electronic or other health record, Independently interpreting results (not separately reported) and communicating results to the patient/family/caregiver, or Care coordination (not separately reported).     A dictation software program was used for this note. Please expect some simple typographical  errors in this note.

## 2023-02-16 DIAGNOSIS — R13.10 DYSPHAGIA, UNSPECIFIED TYPE: Primary | ICD-10-CM

## 2023-02-17 ENCOUNTER — HOSPITAL ENCOUNTER (OUTPATIENT)
Facility: HOSPITAL | Age: 73
Discharge: HOME | End: 2023-02-17
Attending: INTERNAL MEDICINE | Admitting: INTERNAL MEDICINE
Payer: MEDICARE

## 2023-02-17 ENCOUNTER — ANESTHESIA (OUTPATIENT)
Dept: ENDOSCOPY | Facility: HOSPITAL | Age: 73
End: 2023-02-17
Payer: MEDICARE

## 2023-02-17 ENCOUNTER — ANESTHESIA EVENT (OUTPATIENT)
Dept: ENDOSCOPY | Facility: HOSPITAL | Age: 73
End: 2023-02-17
Payer: MEDICARE

## 2023-02-17 VITALS
OXYGEN SATURATION: 95 % | RESPIRATION RATE: 14 BRPM | HEIGHT: 63 IN | BODY MASS INDEX: 33.84 KG/M2 | DIASTOLIC BLOOD PRESSURE: 62 MMHG | HEART RATE: 78 BPM | WEIGHT: 191 LBS | SYSTOLIC BLOOD PRESSURE: 128 MMHG | TEMPERATURE: 98 F

## 2023-02-17 DIAGNOSIS — K22.2 SCHATZKI'S RING: ICD-10-CM

## 2023-02-17 DIAGNOSIS — K21.9 GERD (GASTROESOPHAGEAL REFLUX DISEASE): ICD-10-CM

## 2023-02-17 DIAGNOSIS — K29.70 GASTRITIS, PRESENCE OF BLEEDING UNSPECIFIED, UNSPECIFIED CHRONICITY, UNSPECIFIED GASTRITIS TYPE: Primary | ICD-10-CM

## 2023-02-17 DIAGNOSIS — K44.9 HIATAL HERNIA: ICD-10-CM

## 2023-02-17 PROCEDURE — 43239 EGD BIOPSY SINGLE/MULTIPLE: CPT | Mod: 59,,, | Performed by: INTERNAL MEDICINE

## 2023-02-17 PROCEDURE — 37000008 HC ANESTHESIA 1ST 15 MINUTES: Performed by: INTERNAL MEDICINE

## 2023-02-17 PROCEDURE — 25000003 PHARM REV CODE 250: Performed by: INTERNAL MEDICINE

## 2023-02-17 PROCEDURE — 43239 EGD BIOPSY SINGLE/MULTIPLE: CPT | Mod: 59 | Performed by: INTERNAL MEDICINE

## 2023-02-17 PROCEDURE — 88342 IMHCHEM/IMCYTCHM 1ST ANTB: CPT | Performed by: PATHOLOGY

## 2023-02-17 PROCEDURE — 88305 TISSUE EXAM BY PATHOLOGIST: ICD-10-PCS | Mod: 26,,, | Performed by: PATHOLOGY

## 2023-02-17 PROCEDURE — 25000003 PHARM REV CODE 250: Performed by: NURSE ANESTHETIST, CERTIFIED REGISTERED

## 2023-02-17 PROCEDURE — D9220A PRA ANESTHESIA: ICD-10-PCS | Mod: ANES,,, | Performed by: ANESTHESIOLOGY

## 2023-02-17 PROCEDURE — 43248 PR EGD, FLEX, W/DILATION OVER GUIDEWIRE: ICD-10-PCS | Mod: ,,, | Performed by: INTERNAL MEDICINE

## 2023-02-17 PROCEDURE — D9220A PRA ANESTHESIA: ICD-10-PCS | Mod: CRNA,,, | Performed by: NURSE ANESTHETIST, CERTIFIED REGISTERED

## 2023-02-17 PROCEDURE — 43248 EGD GUIDE WIRE INSERTION: CPT | Performed by: INTERNAL MEDICINE

## 2023-02-17 PROCEDURE — 27201012 HC FORCEPS, HOT/COLD, DISP: Performed by: INTERNAL MEDICINE

## 2023-02-17 PROCEDURE — 88305 TISSUE EXAM BY PATHOLOGIST: CPT | Performed by: PATHOLOGY

## 2023-02-17 PROCEDURE — C1769 GUIDE WIRE: HCPCS | Performed by: INTERNAL MEDICINE

## 2023-02-17 PROCEDURE — D9220A PRA ANESTHESIA: Mod: CRNA,,, | Performed by: NURSE ANESTHETIST, CERTIFIED REGISTERED

## 2023-02-17 PROCEDURE — 43248 EGD GUIDE WIRE INSERTION: CPT | Mod: ,,, | Performed by: INTERNAL MEDICINE

## 2023-02-17 PROCEDURE — 63600175 PHARM REV CODE 636 W HCPCS: Performed by: NURSE ANESTHETIST, CERTIFIED REGISTERED

## 2023-02-17 PROCEDURE — 88342 CHG IMMUNOCYTOCHEMISTRY: ICD-10-PCS | Mod: 26,,, | Performed by: PATHOLOGY

## 2023-02-17 PROCEDURE — 88342 IMHCHEM/IMCYTCHM 1ST ANTB: CPT | Mod: 26,,, | Performed by: PATHOLOGY

## 2023-02-17 PROCEDURE — 88305 TISSUE EXAM BY PATHOLOGIST: CPT | Mod: 26,,, | Performed by: PATHOLOGY

## 2023-02-17 PROCEDURE — D9220A PRA ANESTHESIA: Mod: ANES,,, | Performed by: ANESTHESIOLOGY

## 2023-02-17 PROCEDURE — 43239 PR EGD, FLEX, W/BIOPSY, SGL/MULTI: ICD-10-PCS | Mod: 59,,, | Performed by: INTERNAL MEDICINE

## 2023-02-17 RX ORDER — LIDOCAINE HYDROCHLORIDE 20 MG/ML
INJECTION INTRAVENOUS
Status: DISCONTINUED | OUTPATIENT
Start: 2023-02-17 | End: 2023-02-17

## 2023-02-17 RX ORDER — PROPOFOL 10 MG/ML
VIAL (ML) INTRAVENOUS
Status: DISCONTINUED | OUTPATIENT
Start: 2023-02-17 | End: 2023-02-17

## 2023-02-17 RX ORDER — SODIUM CHLORIDE 9 MG/ML
INJECTION, SOLUTION INTRAVENOUS CONTINUOUS
Status: DISCONTINUED | OUTPATIENT
Start: 2023-02-17 | End: 2023-02-17 | Stop reason: HOSPADM

## 2023-02-17 RX ADMIN — PROPOFOL 20 MG: 10 INJECTION, EMULSION INTRAVENOUS at 01:02

## 2023-02-17 RX ADMIN — SODIUM CHLORIDE: 9 INJECTION, SOLUTION INTRAVENOUS at 11:02

## 2023-02-17 RX ADMIN — PROPOFOL 50 MG: 10 INJECTION, EMULSION INTRAVENOUS at 01:02

## 2023-02-17 RX ADMIN — LIDOCAINE HYDROCHLORIDE 100 MG: 20 INJECTION, SOLUTION INTRAVENOUS at 01:02

## 2023-02-17 RX ADMIN — PROPOFOL 100 MG: 10 INJECTION, EMULSION INTRAVENOUS at 01:02

## 2023-02-17 NOTE — TRANSFER OF CARE
"Anesthesia Transfer of Care Note    Patient: Eugenie Coates    Procedure(s) Performed: Procedure(s) (LRB):  EGD (ESOPHAGOGASTRODUODENOSCOPY) (N/A)    Patient location: GI    Anesthesia Type: general    Transport from OR: Transported from OR on room air with adequate spontaneous ventilation    Post pain: adequate analgesia    Post assessment: no apparent anesthetic complications    Post vital signs: stable    Level of consciousness: sedated    Nausea/Vomiting: no nausea/vomiting    Complications: none    Transfer of care protocol was followed      Last vitals:   Visit Vitals  BP (!) 158/74   Pulse 85   Temp 36.8 °C (98.2 °F) (Skin)   Resp 16   Ht 5' 2.5" (1.588 m)   Wt 86.6 kg (191 lb)   SpO2 97%   Breastfeeding No   BMI 34.38 kg/m²     "

## 2023-02-17 NOTE — PROVATION PATIENT INSTRUCTIONS
Discharge Summary/Instructions after an Endoscopic Procedure  Patient Name: Eugenie Coates  Patient MRN: [id number]  Patient YOB: 1950 Friday, February 17, 2023  Issac Engel MD  Dear patient,  As a result of recent federal legislation (The Federal Cures Act), you may   receive lab or pathology results from your procedure in your [facility]   account before your physician is able to contact you. Your physician or   their representative will relay the results to you with their   recommendations at their soonest availability.  Thank you,  RESTRICTIONS:  During your procedure today, you received medications for sedation.  These   medications may affect your judgment, balance and coordination.  Therefore,   for 24 hours, you have the following restrictions:   - DO NOT drive a car, operate machinery, make legal/financial decisions,   sign important papers or drink alcohol.    ACTIVITY:  Today: no heavy lifting, straining or running due to procedural   sedation/anesthesia.  The following day: return to full activity including work.  DIET:  Eat and drink normally unless instructed otherwise.     TREATMENT FOR COMMON SIDE EFFECTS:  - Mild abdominal pain, nausea, belching, bloating or excessive gas:  rest,   eat lightly and use a heating pad.  - Sore Throat: treat with throat lozenges and/or gargle with warm salt   water.  - Because air was used during the procedure, expelling large amounts of air   from your rectum or belching is normal.  - If a bowel prep was taken, you may not have a bowel movement for 1-3 days.    This is normal.  SYMPTOMS TO WATCH FOR AND REPORT TO YOUR PHYSICIAN:  1. Abdominal pain or bloating, other than gas cramps.  2. Chest pain.  3. Back pain.  4. Signs of infection such as: chills or fever occurring within 24 hours   after the procedure.  5. Rectal bleeding, which would show as bright red, maroon, or black stools.   (A tablespoon of blood from the rectum is not serious, especially if    hemorrhoids are present.)  6. Vomiting.  7. Weakness or dizziness.  GO DIRECTLY TO THE NEAREST EMERGENCY ROOM IF YOU HAVE ANY OF THE FOLLOWING:      Difficulty breathing              Chills and/or fever over 101 F   Persistent vomiting and/or vomiting blood   Severe abdominal pain   Severe chest pain   Black, tarry stools   Bleeding- more than one tablespoon   Any other symptom or condition that you feel may need urgent attention  Your doctor recommends these additional instructions:  If any biopsies were taken, your doctors clinic will contact you in 1 to 2   weeks with any results.  - Patient has a contact number available for emergencies.  The signs and   symptoms of potential delayed complications were discussed with the   patient.  Return to normal activities tomorrow.  Written discharge   instructions were provided to the patient.   - Resume previous diet.   - Continue present medications.   - No aspirin, ibuprofen, naproxen, or other non-steroidal anti-inflammatory   drugs.   - Await pathology results.   - Discharge patient to home (ambulatory).   - Follow an antireflux regimen.   - Return to GI clinic after studies are complete.  For questions, problems or results please call your physician - Issac Engel MD at Work:  [phone].  [facility], EMERGENCY ROOM PHONE NUMBER: [phone]  IF A COMPLICATION OR EMERGENCY SITUATION ARISES AND YOU ARE UNABLE TO REACH   YOUR PHYSICIAN - GO DIRECTLY TO THE EMERGENCY ROOM.  Issac Engel MD  2/17/2023 1:30:31 PM  This report has been verified and signed electronically.  Dear patient,  As a result of recent federal legislation (The Federal Cures Act), you may   receive lab or pathology results from your procedure in your [facility]   account before your physician is able to contact you. Your physician or   their representative will relay the results to you with their   recommendations at their soonest availability.  Thank you,  PROVATION

## 2023-02-17 NOTE — PLAN OF CARE
Vss, nakul po fluids, denies pain, ambulates easily. IV removed, catheter intact. Discharge instructions provided and states understanding. States ready to go home.  Discharged from facility with family per wheelchair.

## 2023-02-17 NOTE — ANESTHESIA PREPROCEDURE EVALUATION
02/17/2023  Eugenie Coates is a 72 y.o., female.      Pre-op Assessment    I have reviewed the Patient Summary Reports.     I have reviewed the Nursing Notes. I have reviewed the NPO Status.   I have reviewed the Medications.     Review of Systems  Anesthesia Hx:  No problems with previous Anesthesia    Social:  Non-Smoker    Hematology/Oncology:         -- Anemia:   Cardiovascular:   Hypertension CHF    Pulmonary:   Asthma Sleep Apnea    Hepatic/GI:   GERD    Musculoskeletal:   Arthritis     Neurological:   Neuromuscular Disease, Headaches Seizures    Endocrine:   Diabetes Hyperthyroidism    Psych:   Psychiatric History               Anesthesia Plan  Type of Anesthesia, risks & benefits discussed:    Anesthesia Type: Gen Natural Airway  Intra-op Monitoring Plan: Standard ASA Monitors  Informed Consent: Informed consent signed with the Patient and all parties understand the risks and agree with anesthesia plan.  All questions answered.   ASA Score: 3  Day of Surgery Review of History & Physical: H&P Update referred to the surgeon/provider.    Ready For Surgery From Anesthesia Perspective.     .

## 2023-02-17 NOTE — ANESTHESIA POSTPROCEDURE EVALUATION
Anesthesia Post Evaluation    Patient: Eugenie Coates    Procedure(s) Performed: Procedure(s) (LRB):  EGD (ESOPHAGOGASTRODUODENOSCOPY) (N/A)    Final Anesthesia Type: general      Patient location during evaluation: PACU  Patient participation: Yes- Able to Participate  Level of consciousness: awake and alert  Post-procedure vital signs: reviewed and stable  Pain management: adequate  Airway patency: patent    PONV status at discharge: No PONV  Anesthetic complications: no      Cardiovascular status: blood pressure returned to baseline  Respiratory status: unassisted and room air  Hydration status: euvolemic  Follow-up not needed.          Vitals Value Taken Time   /60 02/17/23 1335   Temp 36.8 °C (98.2 °F) 02/17/23 1122   Pulse 74 02/17/23 1335   Resp 12 02/17/23 1335   SpO2 91 % 02/17/23 1335         No case tracking events are documented in the log.      Pain/Jodi Score: Jodi Score: 9 (2/17/2023  1:35 PM)

## 2023-02-20 ENCOUNTER — CLINICAL SUPPORT (OUTPATIENT)
Dept: CARDIOLOGY | Facility: HOSPITAL | Age: 73
End: 2023-02-20
Attending: STUDENT IN AN ORGANIZED HEALTH CARE EDUCATION/TRAINING PROGRAM
Payer: MEDICARE

## 2023-02-20 VITALS
SYSTOLIC BLOOD PRESSURE: 140 MMHG | WEIGHT: 191 LBS | HEIGHT: 63 IN | DIASTOLIC BLOOD PRESSURE: 84 MMHG | BODY MASS INDEX: 33.84 KG/M2

## 2023-02-20 DIAGNOSIS — R01.1 SYSTOLIC MURMUR: ICD-10-CM

## 2023-02-20 LAB
ASCENDING AORTA: 3.25 CM
AV INDEX (PROSTH): 0.32
AV MEAN GRADIENT: 26 MMHG
AV PEAK GRADIENT: 43 MMHG
AV VALVE AREA: 0.98 CM2
AV VELOCITY RATIO: 0.33
BSA FOR ECHO PROCEDURE: 1.95 M2
CV ECHO LV RWT: 0.46 CM
DOP CALC AO PEAK VEL: 3.29 M/S
DOP CALC AO VTI: 81.5 CM
DOP CALC LVOT AREA: 3.1 CM2
DOP CALC LVOT DIAMETER: 1.98 CM
DOP CALC LVOT PEAK VEL: 1.08 M/S
DOP CALC LVOT STROKE VOLUME: 80.02 CM3
DOP CALCLVOT PEAK VEL VTI: 26 CM
E WAVE DECELERATION TIME: 232.18 MSEC
E/A RATIO: 0.85
E/E' RATIO: 14.86 M/S
ECHO LV POSTERIOR WALL: 0.99 CM (ref 0.6–1.1)
EJECTION FRACTION: 70 %
FRACTIONAL SHORTENING: 43 % (ref 28–44)
INTERVENTRICULAR SEPTUM: 1.09 CM (ref 0.6–1.1)
IVRT: 100.86 MSEC
LA MAJOR: 5.47 CM
LA MINOR: 4.96 CM
LA WIDTH: 3.9 CM
LEFT ATRIUM SIZE: 3.92 CM
LEFT ATRIUM VOLUME INDEX: 35.8 ML/M2
LEFT ATRIUM VOLUME: 67.61 CM3
LEFT INTERNAL DIMENSION IN SYSTOLE: 2.46 CM (ref 2.1–4)
LEFT VENTRICLE DIASTOLIC VOLUME INDEX: 43.63 ML/M2
LEFT VENTRICLE DIASTOLIC VOLUME: 82.46 ML
LEFT VENTRICLE MASS INDEX: 79 G/M2
LEFT VENTRICLE SYSTOLIC VOLUME INDEX: 11.4 ML/M2
LEFT VENTRICLE SYSTOLIC VOLUME: 21.51 ML
LEFT VENTRICULAR INTERNAL DIMENSION IN DIASTOLE: 4.29 CM (ref 3.5–6)
LEFT VENTRICULAR MASS: 149.96 G
LV LATERAL E/E' RATIO: 17.33 M/S
LV SEPTAL E/E' RATIO: 13 M/S
LVOT MG: 2.57 MMHG
LVOT MV: 0.77 CM/S
MV PEAK A VEL: 1.23 M/S
MV PEAK E VEL: 1.04 M/S
PISA TR MAX VEL: 2.83 M/S
PULM VEIN S/D RATIO: 1.83
PV PEAK D VEL: 0.23 M/S
PV PEAK S VEL: 0.42 M/S
RA MAJOR: 4.59 CM
RA PRESSURE: 3 MMHG
RA WIDTH: 3.1 CM
RIGHT VENTRICULAR END-DIASTOLIC DIMENSION: 3.7 CM
RIGHT VENTRICULAR LENGTH IN DIASTOLE (APICAL 4-CHAMBER VIEW): 6.53 CM
RV MID DIAMA: 23.68 CM
RV TISSUE DOPPLER FREE WALL SYSTOLIC VELOCITY 1 (APICAL 4 CHAMBER VIEW): 0.01 CM/S
SINUS: 2.82 CM
STJ: 2.48 CM
TDI LATERAL: 0.06 M/S
TDI SEPTAL: 0.08 M/S
TDI: 0.07 M/S
TR MAX PG: 32 MMHG
TRICUSPID ANNULAR PLANE SYSTOLIC EXCURSION: 1.9 CM
TV REST PULMONARY ARTERY PRESSURE: 35 MMHG

## 2023-02-20 PROCEDURE — 93306 TTE W/DOPPLER COMPLETE: CPT | Mod: PO

## 2023-02-20 PROCEDURE — 93306 TTE W/DOPPLER COMPLETE: CPT | Mod: 26,,, | Performed by: INTERNAL MEDICINE

## 2023-02-20 PROCEDURE — 93306 ECHO (CUPID ONLY): ICD-10-PCS | Mod: 26,,, | Performed by: INTERNAL MEDICINE

## 2023-02-24 ENCOUNTER — OFFICE VISIT (OUTPATIENT)
Dept: OPHTHALMOLOGY | Facility: CLINIC | Age: 73
End: 2023-02-24
Payer: MEDICARE

## 2023-02-24 DIAGNOSIS — Z98.42 STATUS POST CATARACT SURGERY, LEFT: Primary | ICD-10-CM

## 2023-02-24 PROCEDURE — 99999 PR PBB SHADOW E&M-EST. PATIENT-LVL III: ICD-10-PCS | Mod: PBBFAC,,, | Performed by: OPHTHALMOLOGY

## 2023-02-24 PROCEDURE — 99999 PR PBB SHADOW E&M-EST. PATIENT-LVL III: CPT | Mod: PBBFAC,,, | Performed by: OPHTHALMOLOGY

## 2023-02-24 PROCEDURE — 99213 OFFICE O/P EST LOW 20 MIN: CPT | Mod: PBBFAC,PO | Performed by: OPHTHALMOLOGY

## 2023-02-24 PROCEDURE — 99024 POSTOP FOLLOW-UP VISIT: CPT | Mod: POP,,, | Performed by: OPHTHALMOLOGY

## 2023-02-24 PROCEDURE — 99024 PR POST-OP FOLLOW-UP VISIT: ICD-10-PCS | Mod: POP,,, | Performed by: OPHTHALMOLOGY

## 2023-02-24 NOTE — PROGRESS NOTES
HPI    Pt presents for 2 week IOP check    States eye is doing much better     PF BID OS   Keto QID OS   Brim BID OU   Dorz/esther BID OU  Last edited by Marta Gloria on 2/24/2023  9:42 AM.            Assessment /Plan     For exam results, see Encounter Report.    Status post cataract surgery, left      POW2 CEIOL OMNI OS  IOP improving, still above target  AC still inflamed    Continue  PF BID OS  Keto QID OS  Brim bid OS  Dorz/esther bid OS  Latan qhs OS    F/u IOP check 2-3 weeks

## 2023-02-27 LAB
FINAL PATHOLOGIC DIAGNOSIS: NORMAL
GROSS: NORMAL
Lab: NORMAL
SUPPLEMENTAL DIAGNOSIS: NORMAL

## 2023-03-01 ENCOUNTER — LAB VISIT (OUTPATIENT)
Dept: LAB | Facility: HOSPITAL | Age: 73
End: 2023-03-01
Attending: INTERNAL MEDICINE
Payer: MEDICARE

## 2023-03-01 ENCOUNTER — TELEPHONE (OUTPATIENT)
Dept: INTERNAL MEDICINE | Facility: CLINIC | Age: 73
End: 2023-03-01
Payer: MEDICARE

## 2023-03-01 ENCOUNTER — CLINICAL SUPPORT (OUTPATIENT)
Dept: REHABILITATION | Facility: HOSPITAL | Age: 73
End: 2023-03-01
Payer: MEDICARE

## 2023-03-01 DIAGNOSIS — C50.911 INVASIVE LOBULAR CARCINOMA OF RIGHT BREAST IN FEMALE: ICD-10-CM

## 2023-03-01 DIAGNOSIS — I35.0 MODERATE AORTIC VALVE STENOSIS: Primary | ICD-10-CM

## 2023-03-01 DIAGNOSIS — E11.51 TYPE 2 DIABETES MELLITUS WITH DIABETIC PERIPHERAL ANGIOPATHY WITHOUT GANGRENE, WITHOUT LONG-TERM CURRENT USE OF INSULIN: ICD-10-CM

## 2023-03-01 DIAGNOSIS — I10 ESSENTIAL HYPERTENSION: ICD-10-CM

## 2023-03-01 DIAGNOSIS — R13.10 DYSPHAGIA, UNSPECIFIED TYPE: ICD-10-CM

## 2023-03-01 LAB
ALBUMIN SERPL BCP-MCNC: 3.6 G/DL (ref 3.5–5.2)
ALP SERPL-CCNC: 130 U/L (ref 55–135)
ALT SERPL W/O P-5'-P-CCNC: 17 U/L (ref 10–44)
ANION GAP SERPL CALC-SCNC: 7 MMOL/L (ref 8–16)
AST SERPL-CCNC: 22 U/L (ref 10–40)
BILIRUB SERPL-MCNC: 0.7 MG/DL (ref 0.1–1)
BUN SERPL-MCNC: 17 MG/DL (ref 8–23)
CALCIUM SERPL-MCNC: 9.7 MG/DL (ref 8.7–10.5)
CHLORIDE SERPL-SCNC: 107 MMOL/L (ref 95–110)
CHOLEST SERPL-MCNC: 205 MG/DL (ref 120–199)
CHOLEST/HDLC SERPL: 5.1 {RATIO} (ref 2–5)
CO2 SERPL-SCNC: 27 MMOL/L (ref 23–29)
CREAT SERPL-MCNC: 1.1 MG/DL (ref 0.5–1.4)
EST. GFR  (NO RACE VARIABLE): 53.7 ML/MIN/1.73 M^2
ESTIMATED AVG GLUCOSE: 157 MG/DL (ref 68–131)
GLUCOSE SERPL-MCNC: 163 MG/DL (ref 70–110)
HBA1C MFR BLD: 7.1 % (ref 4–5.6)
HDLC SERPL-MCNC: 40 MG/DL (ref 40–75)
HDLC SERPL: 19.5 % (ref 20–50)
LDLC SERPL CALC-MCNC: 132.4 MG/DL (ref 63–159)
NONHDLC SERPL-MCNC: 165 MG/DL
POTASSIUM SERPL-SCNC: 3.9 MMOL/L (ref 3.5–5.1)
PROT SERPL-MCNC: 7.3 G/DL (ref 6–8.4)
SODIUM SERPL-SCNC: 141 MMOL/L (ref 136–145)
TRIGL SERPL-MCNC: 163 MG/DL (ref 30–150)

## 2023-03-01 PROCEDURE — 80053 COMPREHEN METABOLIC PANEL: CPT | Performed by: INTERNAL MEDICINE

## 2023-03-01 PROCEDURE — 83036 HEMOGLOBIN GLYCOSYLATED A1C: CPT | Performed by: INTERNAL MEDICINE

## 2023-03-01 PROCEDURE — 80061 LIPID PANEL: CPT | Performed by: INTERNAL MEDICINE

## 2023-03-01 PROCEDURE — 36415 COLL VENOUS BLD VENIPUNCTURE: CPT | Mod: PO | Performed by: INTERNAL MEDICINE

## 2023-03-01 PROCEDURE — 92610 EVALUATE SWALLOWING FUNCTION: CPT | Mod: 59,PN

## 2023-03-01 PROCEDURE — 92612 ENDOSCOPY SWALLOW (FEES) VID: CPT | Mod: PN

## 2023-03-01 NOTE — TELEPHONE ENCOUNTER
Spoke with her about her echo results- moderate AS with recommendations for cardiology f/u q6mo.     Entered 6mo referral for cardiology.     She is having her dysphagia worked up by GI. S/p endoscopy, swallow study, and pending colonoscopy. She is feeling much better overall.    Will send another referral to heme/onc breast. She needs follow up since she is post lumpectomy with invasive ductal carcinoma. Emphasized the importance of this follow up visit.    Connor M. Gillies, DO  PGY-3  Internal Medicine

## 2023-03-02 NOTE — TELEPHONE ENCOUNTER
Referral orders sent to our appointment coordinators for scheduling.    breast oncology in [place] soon as well as 5 month cardiology follow up with echo.

## 2023-03-02 NOTE — PLAN OF CARE
[facility]  Fiberoptic Endoscopic Evaluation of Swallowing (FEES)    Date: 3/1/2023     Name: Eugenie Coates   MRN: [id number]    Therapy Diagnosis:   Encounter Diagnosis   Name Primary?    Dysphagia, unspecified type       Physician: Sera Gutierrez NP  Physician Orders: [id number] - AMB REFERRAL/CONSULT TO SPEECH THERAPY  Order Date: 2/16/2023   Medical Diagnosis from Referral: R13.10 (ICD-10-CM) - Dysphagia, unspecified type    Visit #/Visits authorized: 1/ 1  Date of Evaluation:  3/1/2023   Insurance Authorization Period: 2/16/2023 - 2/16/2024   Plan of Care Expiration: Evaluation Only    Time In: 10:00 am   Time Out: 10:40 am     Procedure Min.   Flexible fiberoptic endoscopic evaluation of  swallowing by cine or video recording (CPT 95391)  32   Swallow and oral function evaluation (CPT 64175) 8      Precautions:Standard and history of breast cancer, GERD, sleep apnea, history of deep vein thrombosis.   Subjective   Date of Onset: about a year and a half ago. (2021)  History of Current Condition:  Eugenie Coates was referred by Dr. Gutierrez for a FEES (CPT 00945) to objectively assess anatomy and physiology of the pharyngeal swallow, rule out silent aspiration, determine the safest possible diet, and examine the effectiveness of compensatory strategies. Patient's current nutritional avenue is oral. Patient is currently on a THIN liquid diet consistency; REGULAR food diet consistency. She presents with coughing, choking, pain with swallowing, and food getting stuck during the swallow. Patient reports particular difficulty swallowing liquids and pills. Patient also reports the pain with her swallow has decreased since her EGD in February 2022. Patient endorses multifactorial gastroenterology involvement, including GERD, gastritis, hiatal hernia, and Schatzki's ring (see Upper gastroenterology endoscopy admission note from 2/17/2022).    In consideration of the interrelationships  between body systems and swallowing, History was provided by patient, and/or taken from chart review. The following are medical conditions present in the patient's history which can result in or be attributed to dysphagia:  Respiratory Obstructive Sleep apnea- uses C-PAP machine at night   Cardiovascular None noted in this category   Digestive GERD  Treats with several medications including Protonix (40 mg/x1) and Pepcid.    Infections  None noted in this category   Urinary None noted in this category   Endocrine Diabetes   Nervous None noted in this category   Skeletal None noted in this category   Immune None noted in this category   Cancer Breast- treated with radiation for 3 weeks in 2021  Brain tumor- benign, surgically removed in    Psychiatric  None noted in this category   Congenital  None noted in this category   Other None noted in this category     The following observations were made:   -Mental status: Alert and Cooperative  -Factors affecting performance: no difficulties participating in the study  -Feeding Method: independent in self-feeding    Respiratory Status: room air    Past Medical History: Eugenie Coates  has a past medical history of Allergy, Anemia, Anxiety, Arthritis, Asthma, Bell palsy, Bilateral lower extremity edema, Chronic diastolic CHF (congestive heart failure), Depression, Diabetes mellitus, type 2, DVT (deep venous thrombosis), Dyslipidemia, Endometriosis, Eye injury (), Focal seizure (5/3/2021), GERD (gastroesophageal reflux disease), Glaucoma, History of uterine fibroid, Hyperlipidemia, Hypertension, Invasive lobular carcinoma of right breast in female, Nuclear sclerosis of both eyes (2016), Obesity, Pancreas cyst, Sleep apnea, Supraventricular tachycardia, SVT (supraventricular tachycardia) (2019), Thyroid disease, and Venous insufficiency.  Eugenie Coates  has a past surgical history that includes Cholecystectomy;  section; Hernia repair; Vein  Surgery (2003, 2004); Knee surgery (Right, 2008); glaucoma laser (Bilateral); Mastectomy, partial (Right, 12/07/2020); [place] lymph node biopsy (Right, 12/07/2020); Total Reduction Mammoplasty (Bilateral, 12/07/2020); Craniotomy for excision of intracranial tumor (Left, 05/04/2021); Cataract extraction w/  intraocular lens implant (Left, 02/08/2023); Colonoscopy; and Esophagogastroduodenoscopy (N/A, 2/17/2023).  Medical Hx and Allergies:  Eugenie has a current medication list which includes the following prescription(s): apixaban, b complex vitamins, blood sugar diagnostic, calcium carbonate/vitamin d3, clobetasol, dorzolamide-timolol 2-0.5%, ezetimibe, hydralazine, ketorolac 0.5%, lancets, latanoprost, metformin, metoprolol tartrate, nifedipine, pantoprazole, prednisolone acetate, rosuvastatin, and [DISCONTINUED] omeprazole.   Review of patient's allergies indicates:   Allergen Reactions    Oxycodone hcl-oxycodone-asa Hives, Itching and Other (See Comments)     Pneumonia History: No  Previous MBSS:  No  Previous FEES:   No  Prior Therapy:  No  Social History:   lives with their family  Pain:   0/10  Pain Location / Description: n/a  Patient's Therapy Goals:  eat and drink safely and comfortably.   Objective     Procedure: A flexible fiberoptic nasoendoscope was passed transnasally to view the nasopharynx, oropharynx, hypopharynx, and larynx. 20 swallow trials using 1/2 teaspoon to 3 ounce amounts of real foods of thin liquid, nectar-thick liquid, puree, soft-mechanical, and solid consistencies were video recorded and analyzed using transnasal endoscopy. A clinical swallow evaluation (CPT 11441) was completed in conjunction with the FEES in order to evaluate the oral structures required for functional swallowing.   Scope Time: 11:54     Results revealed:   Cranial Nerve Examination  Cranial Nerve 5: Trigeminal Nerve  Motor Jaw Posture at rest: Closed  Mandible Elevation/Depression: WFL  Mandible lateralization:  WFL  Abnormal movement: absent Interpretation: Intact bilaterally    Sensory Forehead: WFL  Cheek: WFL  Jaw: WFL  Facial Pain: None noted Interpretation: Intact bilaterally      Cranial Nerve 7: Facial Nerve  Motor Facial Symmetry: WNL  Wrinkle Forehead: WFL  Close eyes tightly: WFL  Labial Protrusion: WFL  Labial Retraction: WFL  Buccal Strength with Labial Seal: WFL  Abnormal movement: absent Interpretation: Intact bilaterally    Sensory Formal testing not completed.       Cranial Nerves IX and X: Glossopharyngeal and Vagus Nerves  Motor Palatal Symmetry (Rest): WNL  Palatal Symmetry (Movement): WNL  Cough: Perceptually strong  Voice Prior to PO intake: Clear  Resonance: Normal  Abnormal movement: absent Interpretation: Intact bilaterally      Cranial Nerve XII: Hypoglossal Nerve  Motor Tongue at rest: WNL  Lingual Protrusion: WNL  Lingual Protrusion against Resistance: WNL  Lingual Lateralization: WNL  Abnormal movement: absent Interpretation: Intact bilaterally      Other information:  Volitional Swallow: Able to palpate laryngeal rise  Mucosal Quality: No abnormal findings  Secretion Management: no overt deficits noted/observed  Dentition: Good condition for speech and mastication        Nasopharyngoscopic, pharyngoscopic, and laryngeal findings:  Nasopharyngoscopic Findings Velopharyngeal function WFL    Anatomic findings WNL   Pharyngoscopic & laryngoscopic findings Secretions Within normal limits    Calderon Secretion Scale 0: Most normal rating. No visible secretions anywhere in the hypopharynx or some transient bubble visible in the valleculae and pyriform sinuses.    Vocal fold motion WFL- complete bilateral vocal fold abduction/adduction      Sensory integrity Appears functional- swallow initiated to penetration material, cough or throat clear to aspiration, and/or spontaneous swallow to significant residue    Anatomic findings Possible erythema on left arytenoid upon entry       Consistency  Presentation   Findings Rosenbeck's Penetration/Aspiration Scale (PAS) Strategies   Thin (IDDSI 0) 1/2 teaspoon x1  Full teaspoon x2  Self-regulated cup sip x1   Consecutive cup sip x1   Self-regulated straw sip x1   Consecutive straw sip x2   Vallecular residue: none present    Pyriform sinus residue: none present    Other residue: none present Best: (1) Material does not enter the airway    Worst: (1) Material does not enter the airway    None completed nor needed    Nectar thick (mildly thick/IDDSI 2) 1/2 teaspoon x1  Full teaspoon x2   Vallecular residue: none present    Pyriform sinus residue: none present    Other residue: none present Best: (1) Material does not enter the airway    Worst: (1) Material does not enter the airway   None completed nor needed    Puree (extremely thick/IDDSI 4) 1/2 teaspoon x1  Full teaspoon x2  Heaping Teaspoon x1 Vallecular residue:  none to trace residue in the right vallecula     Pyriform sinus residue: none present    Other residue:  none to trace residue on the posterior pharyngeal wall and the epiglottis  Best: (1) Material does not enter the airway    Worst: (1) Material does not enter the airway   Cued dry swallow- cleared     Mixed consistency (thin/ IDDSI 0 + soft and bite sized/ IDDSI 6) - 1 peach in juice x1  - 2 peaches in juice x2     Vallecular residue:  none to trace residue in the left vallecula    Pyriform sinus residue: none present    Other residue: none present Best: (1) Material does not enter the airway    Worst: (1) Material does not enter the airway   None completed nor needed    Solid (regular/ IDDSI 7) - bite of cookie x2 Vallecular residue: none present    Pyriform sinus residue: none present    Other residue: none present Best: (1) Material does not enter the airway    Worst: (1) Material does not enter the airway   None completed nor needed      Images:      Recommend MBSS: no    Treatment   Treatment Time In: n/a  Treatment Time Out: n/a  Total Treatment Time:  n/a  No treatment performed 2/2 time to administer evaluation    Education: Plan of Care, role of SLP in care, aspiration precautions , scheduling/ cancellation policy, and insurance limitations / visit limit  were discussed with the patient. Patient and family members expressed understanding of all discussed.     Home Program: Follow safe swallowing strategies and aspiration precautions. Continue to manage significant reflux/GERD with medication and behavioral strategies.   Assessment   Eugenie is a 72 y.o. female referred to outpatient Speech Therapy with a medical diagnosis of unspecified dysphagia. Results of this FEES revealted that the patient presents with a safe and efficient swallow across consistencies as defined by the dysphagia outcome and severity scale (adapted for FEES by JESSIE Gottlieb,  Swallowing Services, [facility] from O'Darin et al 1999).     Oral phase findings Posterior containment Within normal limits    Mastication Within normal limits    Clearance Within normal limits   Pharyngeal phase findings Initiation of the swallow Timely    Base of tongue retraction Within normal limits    Epiglottic movement Within normal limits    Pharyngeal contraction Within normal limits    Laryngeal vestibule closure Within normal limits    PES opening Within normal limits    Other findings Not applicable       WFL oral and pharyngeal phases of the swallow across consistencies. Both swallow safety and swallow efficiency are preserved,. Patient appears to be at low risk for aspiration related pneumonia from a primary oropharyngeal dysphagia in consideration of three pillars of aspiration pneumonia ([place], 2005) including oral health status, overall health/immune status, and laryngeal vestibule closure/severity of dysphagia. Patient reports of significant heartburn, reflux/GERD, nausea, sore throat, hoarse voice, and history of gallbladder removal may suggest multifactorial gastroenterology involvement including  gastritis, hiatal hernia, and Schatzki's ring. However, unable to assess risk related to aspiration pneumonia cause by the aspiration of gastric content. Patient at low risk for malnutrition/dehydration. Patient does not require behavioral swallow rehabilitation speech services at this time.     Consistency Recommendations:  THIN liquids (IDDSI 0) and REGULAR consistencies (IDDSI 7).     Functional Oral Intake Scale (FOIS)  The Functional Oral Intake Scale (FOIS) is an ordinal scale that is used to assess the current status and meaningful change in the oral intake. FOIS levels include:    TUBE DEPENDENT (levels 1-3) 1. No oral intake  2. Tube dependent with minimal/inconsistent oral intake  3. Tube supplements with consistent oral intake      TOTAL ORAL INTAKE (levels 4-7) 4. Total oral intake of a single consistency  5. Total oral intake of multiple consistencies requiring special preparation  6. Total oral intake with no special preparation, but must avoid specific foods or liquid items  7. Total oral intake with no restrictions     Patient is currently judged to be at FOIS level 7.      Demonstrates impairments including limitations as described in the problem list. Positive prognostic factors include safe and efficient swallow function across consistencies, management of GERD with medication, behavioral strategies, and the guidance of gastroenterology, ambulatory status, and oral health. Negative prognostic factors include age and multifactorial gastroenterology components including gastritis, hiatal hernia, and Schatzki's ring. No barriers to therapy identified.Patient does not currently require outpatient dysphagia therapy.    Plan   Plan of Care Certification: 3/1/2023  to 3/1/2023    Recommended Treatment Plan: Skilled speech therapy intervention services are not warranted at this time.     Other Recommendations:   - Aggressive oral care at least twice daily (morning and bedtime) is strongly recommended to  reduce bacteria on oropharyngeal surfaces which may increase the risk of nosocomial infections, including pneumonia.   - Monitor for any signs/symptoms of aspiration (such as wet/gurgly voice that does not clear with coughing, inability to make any voice sounds, any persistent coughing with oral intake, otherwise unexplained fever, unexplained increased or new difficulty or discomfort breathing, unexplained increase in sleepiness/lethargy/significant fatigue, unexplained increase or new onset confusion or change in cognitive functioning, or any other unexplained change in health or well-being that could be related to swallowing).  - Risk Management: use good oral hygiene , sit upright for all PO intake, increase physical mobility as tolerated, behavioral reflux precautions, small bites and sips, and remain upright for at least 2-3 hours following any PO intake  -Specialist Referrals: n/a  -Ancillary Tests: Consider N/a .  -Follow-up exam: Follow up swallow study is not indicated at this time.    Therapist's Name:   ENRRIQUE Michaels, CF-SLP  Speech-Language Pathologist   3/2/2023    Direct supervision provided in this session provided by clinical fellow mentor, Cheyenne Casey.      Cheyenne Casey M.A. CCC-SLP, CBIS  Speech Language Pathologist  Certified Brain Injury Specialist  3/2/2023

## 2023-03-13 ENCOUNTER — OFFICE VISIT (OUTPATIENT)
Dept: OPHTHALMOLOGY | Facility: CLINIC | Age: 73
End: 2023-03-13
Payer: MEDICARE

## 2023-03-13 DIAGNOSIS — Z98.42 STATUS POST CATARACT SURGERY, LEFT: Primary | ICD-10-CM

## 2023-03-13 PROCEDURE — 99024 PR POST-OP FOLLOW-UP VISIT: ICD-10-PCS | Mod: POP,,, | Performed by: OPHTHALMOLOGY

## 2023-03-13 PROCEDURE — 99999 PR PBB SHADOW E&M-EST. PATIENT-LVL III: ICD-10-PCS | Mod: PBBFAC,,, | Performed by: OPHTHALMOLOGY

## 2023-03-13 PROCEDURE — 99213 OFFICE O/P EST LOW 20 MIN: CPT | Mod: PBBFAC,PO | Performed by: OPHTHALMOLOGY

## 2023-03-13 PROCEDURE — 99999 PR PBB SHADOW E&M-EST. PATIENT-LVL III: CPT | Mod: PBBFAC,,, | Performed by: OPHTHALMOLOGY

## 2023-03-13 PROCEDURE — 99024 POSTOP FOLLOW-UP VISIT: CPT | Mod: POP,,, | Performed by: OPHTHALMOLOGY

## 2023-03-13 NOTE — PROGRESS NOTES
HPI    Pt presents for 2 week IOP check     States OS is doing well     PF BD OS   Keto QID OS   Brim BID OS   Dorz/esther BID OS   Latan QHS OS     Last edited by Marta Gloria on 3/13/2023  9:33 AM.            Assessment /Plan     For exam results, see Encounter Report.    Status post cataract surgery, left      POW5 CEIOL OMNI OS  IOP okay  AC inflammation improving    Reduce PF and keto to BID  Stop brim    Continue  Dorz/esther bid OS  Latan qhs OS    F/u 1 month, IOP check OU

## 2023-04-13 ENCOUNTER — OFFICE VISIT (OUTPATIENT)
Dept: OPHTHALMOLOGY | Facility: CLINIC | Age: 73
End: 2023-04-13
Payer: MEDICARE

## 2023-04-13 DIAGNOSIS — Z98.42 STATUS POST CATARACT SURGERY, LEFT: Primary | ICD-10-CM

## 2023-04-13 PROCEDURE — 99211 OFF/OP EST MAY X REQ PHY/QHP: CPT | Mod: PBBFAC,PO | Performed by: OPHTHALMOLOGY

## 2023-04-13 PROCEDURE — 99999 PR PBB SHADOW E&M-EST. PATIENT-LVL I: ICD-10-PCS | Mod: PBBFAC,,, | Performed by: OPHTHALMOLOGY

## 2023-04-13 PROCEDURE — 99024 POSTOP FOLLOW-UP VISIT: CPT | Mod: POP,,, | Performed by: OPHTHALMOLOGY

## 2023-04-13 PROCEDURE — 99999 PR PBB SHADOW E&M-EST. PATIENT-LVL I: CPT | Mod: PBBFAC,,, | Performed by: OPHTHALMOLOGY

## 2023-04-13 PROCEDURE — 99024 PR POST-OP FOLLOW-UP VISIT: ICD-10-PCS | Mod: POP,,, | Performed by: OPHTHALMOLOGY

## 2023-04-13 NOTE — PROGRESS NOTES
HPI    1 m PO OS     Pt states eyes feel much better. Denies pain/ Fol/ floaters.     Latan OU QHS   Dorz/ esther OU BID       PF and Keto OS BID       Last edited by Talya Zapata on 4/13/2023 10:33 AM.            Assessment /Plan     For exam results, see Encounter Report.    Status post cataract surgery, left      POM2 CEIOL OMNI OS  IOP improved, not yet ideal  AC quiet    Stop keto, stop PF    Continue dorz/esther bid OU, latan qhs OU    F/u 2 months, IOP check

## 2023-04-14 ENCOUNTER — OFFICE VISIT (OUTPATIENT)
Dept: FAMILY MEDICINE | Facility: CLINIC | Age: 73
End: 2023-04-14
Payer: MEDICARE

## 2023-04-14 VITALS
OXYGEN SATURATION: 97 % | SYSTOLIC BLOOD PRESSURE: 150 MMHG | BODY MASS INDEX: 35.51 KG/M2 | HEIGHT: 62 IN | TEMPERATURE: 99 F | DIASTOLIC BLOOD PRESSURE: 74 MMHG | WEIGHT: 193 LBS | HEART RATE: 94 BPM

## 2023-04-14 DIAGNOSIS — J02.9 SORE THROAT: ICD-10-CM

## 2023-04-14 DIAGNOSIS — J00 NASOPHARYNGITIS: Primary | ICD-10-CM

## 2023-04-14 DIAGNOSIS — Z20.822 CONTACT WITH AND (SUSPECTED) EXPOSURE TO COVID-19: ICD-10-CM

## 2023-04-14 LAB
CTP QC/QA: YES
CTP QC/QA: YES
FLUAV AG NPH QL: NEGATIVE
FLUBV AG NPH QL: NEGATIVE
S PYO RRNA THROAT QL PROBE: NEGATIVE
SARS-COV-2 AG RESP QL IA.RAPID: NEGATIVE

## 2023-04-14 PROCEDURE — 87428 SARSCOV & INF VIR A&B AG IA: CPT | Mod: RHCUB | Performed by: NURSE PRACTITIONER

## 2023-04-14 PROCEDURE — 87880 STREP A ASSAY W/OPTIC: CPT | Mod: RHCUB | Performed by: NURSE PRACTITIONER

## 2023-04-14 PROCEDURE — 99202 PR OFFICE/OUTPT VISIT, NEW, LEVL II, 15-29 MIN: ICD-10-PCS | Mod: ,,, | Performed by: NURSE PRACTITIONER

## 2023-04-14 PROCEDURE — 99202 OFFICE O/P NEW SF 15 MIN: CPT | Mod: ,,, | Performed by: NURSE PRACTITIONER

## 2023-04-14 RX ORDER — CETIRIZINE HYDROCHLORIDE, PSEUDOEPHEDRINE HYDROCHLORIDE 5; 120 MG/1; MG/1
1 TABLET, FILM COATED, EXTENDED RELEASE ORAL 2 TIMES DAILY
Qty: 20 TABLET | Refills: 0 | Status: SHIPPED | OUTPATIENT
Start: 2023-04-14 | End: 2023-04-18 | Stop reason: SDUPTHER

## 2023-04-14 NOTE — PROGRESS NOTES
[facility]    Chief Complaint      Chief Complaint   Patient presents with    Sore Throat    Cough    Nasal Congestion    Headache    Fever    Chills    Generalized Body Aches    Fatigue    Documentation Only     Started yesterday       History of Present Illness      Eugenie Coates is a 72 y.o. female. She  has a past medical history of Allergy, Anemia, Anxiety, Arthritis, Asthma, Bell palsy, Bilateral lower extremity edema, Chronic diastolic CHF (congestive heart failure), Depression, Diabetes mellitus, type 2, DVT (deep venous thrombosis), Dyslipidemia, Endometriosis, Eye injury (2014), Focal seizure (5/3/2021), GERD (gastroesophageal reflux disease), Glaucoma, History of uterine fibroid, Hyperlipidemia, Hypertension, Invasive lobular carcinoma of right breast in female, Nuclear sclerosis of both eyes (9/27/2016), Obesity, Pancreas cyst, Sleep apnea, Supraventricular tachycardia, SVT (supraventricular tachycardia) (05/2019), Thyroid disease, and Venous insufficiency., who presents today for URI type symptoms since yesterday.    Past Medical History:  Past Medical History:   Diagnosis Date    Allergy     Anemia     Anxiety     Arthritis     Asthma     Bell palsy     Bilateral lower extremity edema     Chronic diastolic CHF (congestive heart failure)     Depression     Diabetes mellitus, type 2     DVT (deep venous thrombosis)     right LE    Dyslipidemia     Endometriosis     Eye injury 2014    stuck with tree branch od ?     Focal seizure 5/3/2021    GERD (gastroesophageal reflux disease)     Glaucoma     History of uterine fibroid     Hyperlipidemia     Hypertension     Invasive lobular carcinoma of right breast in female     s/p surgery, radiation, tamoxifen    Nuclear sclerosis of both eyes 9/27/2016    Obesity     Pancreas cyst     Sleep apnea     uses C pap    Supraventricular tachycardia     SVT (supraventricular tachycardia) 05/2019    Thyroid disease     Venous insufficiency        Past  Surgical History:   has a past surgical history that includes Cholecystectomy;  section; Hernia repair; Vein Surgery (, ); Knee surgery (Right, ); glaucoma laser (Bilateral); Mastectomy, partial (Right, 2020); [place] lymph node biopsy (Right, 2020); Total Reduction Mammoplasty (Bilateral, 2020); Craniotomy for excision of intracranial tumor (Left, 2021); Cataract extraction w/  intraocular lens implant (Left, 2023); Colonoscopy; and Esophagogastroduodenoscopy (N/A, 2023).    Social History:  Social History     Tobacco Use    Smoking status: Never    Smokeless tobacco: Never   Substance Use Topics    Alcohol use: Never     Alcohol/week: 0.0 standard drinks    Drug use: Never       I personally reviewed all past medical, surgical, and social.     Review of Systems   Constitutional:  Positive for chills, fatigue and fever.   HENT:  Positive for congestion and sore throat. Negative for rhinorrhea and sneezing.    Respiratory:  Positive for cough. Negative for shortness of breath.    Gastrointestinal:  Negative for diarrhea, nausea and vomiting.   Musculoskeletal:  Positive for myalgias.   Skin:  Negative for rash.   Neurological:  Positive for headaches.      Medications:  Outpatient Encounter Medications as of 2023   Medication Sig Dispense Refill    apixaban (ELIQUIS) 5 mg Tab Take 5 mg by mouth 2 (two) times daily.      b complex vitamins tablet Take 1 tablet by mouth once daily.      blood sugar diagnostic Strp Test twice daily.  Accucheck viva test strips and lancets. 300 each 3    calcium carbonate/vitamin D3 (CALCIUM WITH VITAMIN D3 ORAL) Take 1 tablet by mouth Daily.       carvediloL (COREG) 6.25 MG tablet Take 1 tablet (6.25 mg total) by mouth 2 (two) times daily with meals. 60 tablet 11    clobetasoL (TEMOVATE) 0.05 % cream Apply topically 2 (two) times daily.      dorzolamide-timolol 2-0.5% (COSOPT) 22.3-6.8 mg/mL ophthalmic solution INSTILL 1 DROP  IN BOTH EYES EVERY 12 HOURS 10 mL 11    ezetimibe (ZETIA) 10 mg tablet TAKE 1 TABLET(10 MG) BY MOUTH EVERY DAY 90 tablet 3    furosemide (LASIX) 20 MG tablet Take 1 tablet (20 mg total) by mouth every other day. 60 tablet 11    lancets (ACCU-CHEK SOFTCLIX LANCETS) Misc 1 Units by Misc.(Non-Drug; Combo Route) route 2 (two) times daily. 300 each 3    latanoprost 0.005 % ophthalmic solution Place 1 drop into both eyes every evening. 2.5 mL 12    metFORMIN (GLUCOPHAGE) 500 MG tablet TAKE 1 TABLET(500 MG) BY MOUTH DAILY WITH BREAKFAST 90 tablet 1    NIFEdipine (ADALAT CC) 60 MG TbSR Take 1 tablet (60 mg total) by mouth once daily. 90 tablet 3    rosuvastatin (CRESTOR) 40 MG Tab TAKE 1 TABLET(40 MG) BY MOUTH EVERY EVENING 90 tablet 3    cetirizine-pseudoephedrine 5-120 mg Tb12 Take 1 tablet by mouth 2 (two) times a day. for 10 days 20 tablet 0    pantoprazole (PROTONIX) 40 MG tablet Take 1 tablet (40 mg total) by mouth 2 (two) times daily. 60 tablet 0    [DISCONTINUED] omeprazole (PRILOSEC OTC) 20 MG tablet Take 1 tablet (20 mg total) by mouth once daily. 90 tablet 3     No facility-administered encounter medications on file as of 4/14/2023.       Allergies:  Review of patient's allergies indicates:   Allergen Reactions    Oxycodone hcl-oxycodone-asa Hives, Itching and Other (See Comments)       Health Maintenance:  Immunization History   Administered Date(s) Administered    COVID-19 Vaccine 08/24/2021, 02/07/2022    COVID-19, MRNA, LN-S, PF (MODERNA FULL 0.5 ML DOSE) 07/27/2021, 07/05/2022    COVID-19, mRNA, LNP-S, bivalent booster, PF (Moderna Omicron) 09/27/2022    Influenza 11/25/2016    Influenza (FLUAD) - Trivalent - Adjuvanted - PF (65+) 08/22/2019    Influenza - High Dose - PF (65 years and older) 10/02/2017, 09/19/2018    Influenza - Quadrivalent 11/03/2014, 11/10/2015    Influenza - Quadrivalent - High Dose - PF (65 years and older) 08/18/2020, 08/13/2021    Pneumococcal Conjugate - 13 Valent 09/06/2016     "Pneumococcal Polysaccharide - 23 Valent 09/18/2017    Tdap 11/30/2016    Zoster 10/06/2016, 11/25/2016    Zoster Recombinant 05/24/2019, 08/24/2019      Health Maintenance   Topic Date Due    Hemoglobin A1c  09/01/2023    Mammogram  12/09/2023    DEXA Scan  12/15/2023    Eye Exam  01/10/2024    Foot Exam  01/12/2024    Lipid Panel  03/01/2024    Low Dose Statin  04/14/2024    TETANUS VACCINE  11/30/2026    Hepatitis C Screening  Completed        Physical Exam      Vital Signs  Temp: 98.5 °F (36.9 °C)  Temp Source: Temporal  Pulse: 94  SpO2: 97 %  BP: (!) 154/78  Height and Weight  Height: 5' 2" (157.5 cm)  Weight: 87.5 kg (193 lb)  BSA (Calculated - sq m): 1.96 sq meters  BMI (Calculated): 35.3  Weight in (lb) to have BMI = 25: 136.4]    Physical Exam  Vitals and nursing note reviewed.   Constitutional:       Appearance: Normal appearance. She is well-developed. She is obese.   HENT:      Head: Normocephalic.      Right Ear: Hearing normal.      Left Ear: Hearing normal.      Nose: Congestion present.   Eyes:      General: Lids are normal.      Conjunctiva/sclera: Conjunctivae normal.   Cardiovascular:      Rate and Rhythm: Normal rate and regular rhythm.      Heart sounds: Normal heart sounds.   Pulmonary:      Effort: Pulmonary effort is normal.      Breath sounds: Normal breath sounds.   Musculoskeletal:         General: Normal range of motion.      Cervical back: Normal range of motion and neck supple.   Skin:     General: Skin is warm and dry.   Neurological:      Mental Status: She is alert and oriented to person, place, and time.      Gait: Gait is intact.   Psychiatric:         Behavior: Behavior is cooperative.        Laboratory:  CBC:  Recent Labs   Lab 12/30/21  0844 04/28/22  1452 09/26/22  1239   WBC 7.68 8.42 7.96   RBC 5.01 5.23 5.20   Hemoglobin 13.6 14.1 14.4   Hematocrit 43.1 45.3 46.4   Platelets 298 298 273   MCV 86 87 89   MCH 27.1 27.0 27.7   MCHC 31.6 L 31.1 L 31.0 L     CMP:  Recent Labs "   Lab 04/28/22  1452 06/06/22  1050 09/26/22  1239 11/02/22  1034 03/01/23  0840   Glucose 106   < > 110   < > 163 H   Calcium 10.2   < > 9.6   < > 9.7   Albumin 4.0  --  3.8   < > 3.6   Total Protein 7.5  --  7.7  --  7.3   Sodium 125 L   < > 139   < > 141   Potassium 3.5   < > 3.7   < > 3.9   CO2 26   < > 26   < > 27   Chloride 92 L   < > 105   < > 107   BUN 14   < > 13   < > 17   Alkaline Phosphatase 116  --  118  --  130   ALT 15  --  14  --  17   AST 18  --  18  --  22   Total Bilirubin 0.4  --  0.8  --  0.7    < > = values in this interval not displayed.     LIPIDS:  Recent Labs   Lab 06/30/20  1123 07/08/20  1117 09/09/21  1038 12/30/21  0844 04/28/22  1452 09/26/22  1239 03/01/23  0840   TSH 0.347 L  --  0.339 L  --   --  0.346 L  --    HDL  --    < > 42   < > 52 49 40   Cholesterol  --    < > 208 H   < > 236 H 225 H 205 H   Triglycerides  --    < > 149   < > 174 H 144 163 H   LDL Cholesterol  --    < > 136.2   < > 149.2 147.2 132.4   HDL/Cholesterol Ratio  --    < > 20.2   < > 22.0 21.8 19.5 L   Non-HDL Cholesterol  --    < > 166   < > 184 176 165   Total Cholesterol/HDL Ratio  --    < > 5.0   < > 4.5 4.6 5.1 H    < > = values in this interval not displayed.     TSH:  Recent Labs   Lab 06/30/20  1123 09/09/21  1038 09/26/22  1239   TSH 0.347 L 0.339 L 0.346 L     A1C:  Recent Labs   Lab 07/08/20  1117 11/03/20  0901 03/03/21  0849 09/09/21  1038 12/30/21  0844 04/28/22  1452 09/26/22  1239 03/01/23  0840   Hemoglobin A1C 6.5 H 6.9 H 6.4 H 6.3 H 6.3 H 6.5 H 6.4 H 7.1 H       Assessment/Plan     Eugenie Coates is a 72 y.o.female with:     1. Contact with and (suspected) exposure to covid-19  - POCT SARS-COV2 (COVID) with Flu Antigen    2. Sore throat  - POCT rapid strep A    3. Nasopharyngitis  - cetirizine-pseudoephedrine 5-120 mg Tb12; Take 1 tablet by mouth 2 (two) times a day. for 10 days  Dispense: 20 tablet; Refill: 0       Total time spent face-to-face and non-face-to-face coordinating care for  this encounter was: 20 minutes     Chronic conditions status updated as per HPI.  Other than changes above, cont current medications and maintain follow up with specialists.  Return to clinic prn if symptoms worsen or fail to improve.    FLACA Yanez  [facility]

## 2023-04-18 ENCOUNTER — APPOINTMENT (OUTPATIENT)
Dept: RADIOLOGY | Facility: CLINIC | Age: 73
End: 2023-04-18
Attending: INTERNAL MEDICINE
Payer: MEDICARE

## 2023-04-18 ENCOUNTER — TELEPHONE (OUTPATIENT)
Dept: FAMILY MEDICINE | Facility: CLINIC | Age: 73
End: 2023-04-18
Payer: MEDICARE

## 2023-04-18 ENCOUNTER — OFFICE VISIT (OUTPATIENT)
Dept: FAMILY MEDICINE | Facility: CLINIC | Age: 73
End: 2023-04-18
Payer: MEDICARE

## 2023-04-18 VITALS
RESPIRATION RATE: 18 BRPM | HEIGHT: 62 IN | OXYGEN SATURATION: 99 % | SYSTOLIC BLOOD PRESSURE: 138 MMHG | TEMPERATURE: 97 F | WEIGHT: 191.63 LBS | BODY MASS INDEX: 35.26 KG/M2 | DIASTOLIC BLOOD PRESSURE: 88 MMHG | HEART RATE: 82 BPM

## 2023-04-18 DIAGNOSIS — R07.81 RIB PAIN ON LEFT SIDE: ICD-10-CM

## 2023-04-18 DIAGNOSIS — R22.2 MASS, CHEST: ICD-10-CM

## 2023-04-18 DIAGNOSIS — J06.9 UPPER RESPIRATORY TRACT INFECTION, UNSPECIFIED TYPE: ICD-10-CM

## 2023-04-18 DIAGNOSIS — R07.81 RIB PAIN ON LEFT SIDE: Primary | ICD-10-CM

## 2023-04-18 DIAGNOSIS — J00 NASOPHARYNGITIS: ICD-10-CM

## 2023-04-18 PROCEDURE — 99214 PR OFFICE/OUTPT VISIT, EST, LEVL IV, 30-39 MIN: ICD-10-PCS | Mod: ,,, | Performed by: INTERNAL MEDICINE

## 2023-04-18 PROCEDURE — 99214 OFFICE O/P EST MOD 30 MIN: CPT | Mod: ,,, | Performed by: INTERNAL MEDICINE

## 2023-04-18 PROCEDURE — 71046 X-RAY EXAM CHEST 2 VIEWS: CPT | Mod: TC,RHCUB | Performed by: INTERNAL MEDICINE

## 2023-04-18 RX ORDER — NAPROXEN 500 MG/1
500 TABLET ORAL 2 TIMES DAILY PRN
Qty: 20 TABLET | Refills: 0 | Status: CANCELLED | OUTPATIENT
Start: 2023-04-18

## 2023-04-18 NOTE — PROGRESS NOTES
Care gaps discussed with patient. Patient is due for colonoscopy. Patient states she has colonoscopy scheduled for April 26, 2023.

## 2023-04-18 NOTE — TELEPHONE ENCOUNTER
1620 Call made to pt to inform her of upcoming appt for an ultrasound of left breast/chest on 5/2/23; pt voiced understanding; pt has no further questions at this time.

## 2023-04-19 ENCOUNTER — TELEPHONE (OUTPATIENT)
Dept: GASTROENTEROLOGY | Facility: CLINIC | Age: 73
End: 2023-04-19
Payer: MEDICARE

## 2023-04-19 ENCOUNTER — TELEPHONE (OUTPATIENT)
Dept: FAMILY MEDICINE | Facility: CLINIC | Age: 73
End: 2023-04-19
Payer: MEDICARE

## 2023-04-19 NOTE — TELEPHONE ENCOUNTER
----- Message from Jarrell Silver MD sent at 4/18/2023  5:01 PM CDT -----  Need to see Monday   Diagnosis is pneumonia    0831 Call made to pt; no answer/busy will attempt to call pt later today.     0837 Pt returned call; informed pt regarding above message; scheduled pt to come in for appt on 04/25/23; pt was informed to go to ER if symptoms worsen; pt voiced understanding; pt informed to bring all medications to upcoming appt.

## 2023-04-19 NOTE — TELEPHONE ENCOUNTER
----- Message from Shannon Payne sent at 4/19/2023  8:42 AM CDT -----  Regarding: COLONOSCOPY  Contact: ALCIDES GRIMALDO [phone]  Type: Needs Medical Advice      Who Called:  ALCIDES GRIMALDO    Best Call Back Number: [phone] (home) [phone] (work)    Additional Information: Patient is calling to speak with nurse/MA regarding colonoscopy procedure that scheduled on 04-26-23. Patient states she currently has pneumonia and would like to reschedule.   Please call back and advise. Thanks

## 2023-04-19 NOTE — TELEPHONE ENCOUNTER
----- Message from Jarrell iSlver MD sent at 4/18/2023  5:01 PM CDT -----  Need to see Monday   Diagnosis is pneumonia

## 2023-04-20 PROBLEM — J06.9 UPPER RESPIRATORY TRACT INFECTION: Status: ACTIVE | Noted: 2023-04-20

## 2023-04-20 PROBLEM — J00 NASOPHARYNGITIS: Status: ACTIVE | Noted: 2023-04-20

## 2023-04-20 PROBLEM — R22.2 MASS, CHEST: Status: ACTIVE | Noted: 2023-04-20

## 2023-04-20 PROBLEM — R07.81 RIB PAIN ON LEFT SIDE: Status: ACTIVE | Noted: 2023-04-20

## 2023-04-20 RX ORDER — BENZONATATE 200 MG/1
200 CAPSULE ORAL 2 TIMES DAILY
Qty: 60 CAPSULE | Refills: 0 | Status: SHIPPED | OUTPATIENT
Start: 2023-04-20 | End: 2023-08-10 | Stop reason: CLARIF

## 2023-04-20 RX ORDER — AZITHROMYCIN 250 MG/1
TABLET, FILM COATED ORAL
Qty: 6 TABLET | Refills: 0 | Status: SHIPPED | OUTPATIENT
Start: 2023-04-20 | End: 2023-08-10 | Stop reason: CLARIF

## 2023-04-20 RX ORDER — ALBUTEROL SULFATE 90 UG/1
2 AEROSOL, METERED RESPIRATORY (INHALATION) 3 TIMES DAILY
Qty: 18 G | Refills: 1 | Status: SHIPPED | OUTPATIENT
Start: 2023-04-20 | End: 2023-06-28

## 2023-04-20 RX ORDER — CETIRIZINE HYDROCHLORIDE, PSEUDOEPHEDRINE HYDROCHLORIDE 5; 120 MG/1; MG/1
1 TABLET, FILM COATED, EXTENDED RELEASE ORAL DAILY
Qty: 30 TABLET | Refills: 0 | Status: SHIPPED | OUTPATIENT
Start: 2023-04-20 | End: 2023-08-10 | Stop reason: CLARIF

## 2023-04-20 RX ORDER — AMOXICILLIN AND CLAVULANATE POTASSIUM 500; 125 MG/1; MG/1
1 TABLET, FILM COATED ORAL 2 TIMES DAILY
Qty: 20 TABLET | Refills: 0 | Status: SHIPPED | OUTPATIENT
Start: 2023-04-20 | End: 2023-08-10 | Stop reason: CLARIF

## 2023-04-20 NOTE — PROGRESS NOTES
Subjective:       Patient ID: Eugenie Coates is a 73 y.o. female.    Chief Complaint: Pain (Behind breast / around to her back ) and Cough    Pain  Associated symptoms include coughing. Pertinent negatives include no abdominal pain, chest pain, fatigue or fever.   Cough  Associated symptoms include postnasal drip. Pertinent negatives include no chest pain, fever or shortness of breath. There is no history of environmental allergies.   .  Patient seen and evaluated she is a new patient she complains of left-sided pain on the left side of her breast she complains of a cough she stated that she is been having chest discomfort she does have postnasal drip she also complains of arthralgias occasionally.    The plan Z-Bob ultrasound left breast ultrasound the left side of her breast PA and lateral chest x-ray x-ray of the left rib series start Zyrtec D 1 p.o. q.day Augmentin 500 mg 1 p.o. b.i.d. Naprosyn 500 mg 1 p.o. b.i.d. and Tessalon Perles.  Also start albuterol MDI 2 puffs p.o. t.i.d. p.r.n. wheezing.    Current Medications:    Current Outpatient Medications:     apixaban (ELIQUIS) 5 mg Tab, Take 5 mg by mouth 2 (two) times daily., Disp: , Rfl:     b complex vitamins tablet, Take 1 tablet by mouth once daily., Disp: , Rfl:     blood sugar diagnostic Strp, Test twice daily.  Accucheck viva test strips and lancets., Disp: 300 each, Rfl: 3    calcium carbonate/vitamin D3 (CALCIUM WITH VITAMIN D3 ORAL), Take 1 tablet by mouth Daily. , Disp: , Rfl:     carvediloL (COREG) 6.25 MG tablet, Take 1 tablet (6.25 mg total) by mouth 2 (two) times daily with meals., Disp: 60 tablet, Rfl: 11    cetirizine-pseudoephedrine 5-120 mg Tb12, Take 1 tablet by mouth once daily., Disp: 30 tablet, Rfl: 0    clobetasoL (TEMOVATE) 0.05 % cream, Apply topically 2 (two) times daily., Disp: , Rfl:     dorzolamide-timolol 2-0.5% (COSOPT) 22.3-6.8 mg/mL ophthalmic solution, INSTILL 1 DROP IN BOTH EYES EVERY 12 HOURS, Disp: 10 mL, Rfl: 11     ezetimibe (ZETIA) 10 mg tablet, TAKE 1 TABLET(10 MG) BY MOUTH EVERY DAY, Disp: 90 tablet, Rfl: 3    furosemide (LASIX) 20 MG tablet, Take 1 tablet (20 mg total) by mouth every other day., Disp: 60 tablet, Rfl: 11    lancets (ACCU-CHEK SOFTCLIX LANCETS) Misc, 1 Units by Misc.(Non-Drug; Combo Route) route 2 (two) times daily., Disp: 300 each, Rfl: 3    latanoprost 0.005 % ophthalmic solution, Place 1 drop into both eyes every evening., Disp: 2.5 mL, Rfl: 12    metFORMIN (GLUCOPHAGE) 500 MG tablet, TAKE 1 TABLET(500 MG) BY MOUTH DAILY WITH BREAKFAST, Disp: 90 tablet, Rfl: 1    NIFEdipine (ADALAT CC) 60 MG TbSR, Take 1 tablet (60 mg total) by mouth once daily., Disp: 90 tablet, Rfl: 3    pantoprazole (PROTONIX) 40 MG tablet, Take 1 tablet (40 mg total) by mouth 2 (two) times daily., Disp: 60 tablet, Rfl: 0    rosuvastatin (CRESTOR) 40 MG Tab, TAKE 1 TABLET(40 MG) BY MOUTH EVERY EVENING, Disp: 90 tablet, Rfl: 3    albuterol (VENTOLIN HFA) 90 mcg/actuation inhaler, Inhale 2 puffs into the lungs 3 (three) times daily. Rescue, Disp: 18 g, Rfl: 1    amoxicillin-clavulanate 500-125mg (AUGMENTIN) 500-125 mg Tab, Take 1 tablet (500 mg total) by mouth 2 (two) times a day., Disp: 20 tablet, Rfl: 0    azithromycin (Z-RONA) 250 MG tablet, Take 2 tablets by mouth on day 1; Take 1 tablet by mouth on days 2-5, Disp: 6 tablet, Rfl: 0    benzonatate (TESSALON) 200 MG capsule, Take 1 capsule (200 mg total) by mouth 2 (two) times a day., Disp: 60 capsule, Rfl: 0           Review of Systems   Constitutional:  Negative for appetite change, fatigue and fever.   HENT:  Positive for postnasal drip.    Respiratory:  Positive for cough. Negative for shortness of breath.    Cardiovascular:  Negative for chest pain.   Gastrointestinal:  Negative for abdominal pain and constipation.   Endocrine: Negative for polydipsia, polyphagia and polyuria.   Genitourinary:  Negative for difficulty urinating, frequency and hot flashes.   Allergic/Immunologic:  "Negative for environmental allergies.   Neurological:  Negative for dizziness and light-headedness.   Psychiatric/Behavioral:  Negative for agitation.               Vitals:    04/18/23 1401 04/18/23 1403   BP: (!) 143/79 138/88   BP Location: Right arm Left arm   Patient Position: Sitting    BP Method: Large (Automatic)    Pulse: 82    Resp: 18    Temp: 97.3 °F (36.3 °C)    TempSrc: Temporal    SpO2: 99%    Weight: 86.9 kg (191 lb 9.6 oz)    Height: 5' 2" (1.575 m)         Physical Exam  Vitals and nursing note reviewed.   Constitutional:       Appearance: Normal appearance.   Cardiovascular:      Rate and Rhythm: Normal rate and regular rhythm.      Pulses: Normal pulses.      Heart sounds: Normal heart sounds.   Pulmonary:      Effort: Pulmonary effort is normal.      Breath sounds: Normal breath sounds.   Abdominal:      General: Abdomen is flat. Bowel sounds are normal.      Palpations: Abdomen is soft.   Musculoskeletal:         General: Normal range of motion.      Comments: Swelling/mass left side of her breast.   Skin:     General: Skin is warm and dry.   Neurological:      General: No focal deficit present.      Mental Status: She is alert and oriented to person, place, and time. Mental status is at baseline.         Last Labs:     Office Visit on 04/14/2023   Component Date Value    SARS Coronavirus 2 Antig* 04/14/2023 Negative     Rapid Influenza A Ag 04/14/2023 Negative     Rapid Influenza B Ag 04/14/2023 Negative      Acceptab* 04/14/2023 Yes     Rapid Strep A Screen 04/14/2023 Negative      Acceptab* 04/14/2023 Yes        Last Imaging:  X-Ray Ribs 2 View Left  Narrative: EXAMINATION:  XR RIBS 2 VIEW LEFT    CLINICAL HISTORY:  Pleurodynia    COMPARISON:  Chest x-ray April 18 2023    TECHNIQUE:  Frontal and lateral views of the left ribs.    FINDINGS:  Suspect subtle lingular infiltrate.  Recommend follow-up imaging to document resolution.  No convincing acute displaced rib " fracture.  Surgical clips project over the lateral left breast.  Surgical clips project over the right upper quadrant of the abdomen.  Impression: As above.    [place] [facility] [place]ronically signed by: Hayden Magana  Date:    04/18/2023  Time:    16:18  X-Ray Chest PA And Lateral  Narrative: EXAMINATION:  XR CHEST PA AND LATERAL    CLINICAL HISTORY:  .  Pleurodynia    COMPARISON:  May 4, 2021    TECHNIQUE:  PA and lateral views of the chest    FINDINGS:  The cardiac silhouette is not enlarged.  There is no mediastinal mass.  There is mild to moderate aortic arch calcification.  There is no pulmonary vascular engorgement.    There is a small amount of ill-defined increased density adjacent to the upper left cardiac border on the PA view which could reflect some early focal pneumonia.  Lungs and pleural spaces are otherwise clear.    There is moderate thoracic spondylosis  Impression: Evidence of mild localized lingular infiltrate, presumably mild pneumonia    Electronically signed by: Aron Cronin  Date:    04/18/2023  Time:    16:05         **Labs and x-rays personally reviewed by me    ** reviewed      Objective:        Assessment:       1. Rib pain on left side  X-Ray Chest PA And Lateral    X-Ray Ribs 2 View Left      2. Nasopharyngitis  cetirizine-pseudoephedrine 5-120 mg Tb12      3. Mass, chest  US Chest Mediastinum    US Breast Left Complete      4. Upper respiratory tract infection, unspecified type             Plan:         [unfilled]

## 2023-04-24 ENCOUNTER — TELEPHONE (OUTPATIENT)
Dept: FAMILY MEDICINE | Facility: CLINIC | Age: 73
End: 2023-04-24
Payer: MEDICARE

## 2023-04-24 NOTE — TELEPHONE ENCOUNTER
Called patient to reschedule appointment, patient voiced understanding. Appointment was rescheduled.

## 2023-05-02 ENCOUNTER — OFFICE VISIT (OUTPATIENT)
Dept: FAMILY MEDICINE | Facility: CLINIC | Age: 73
End: 2023-05-02
Payer: MEDICARE

## 2023-05-02 ENCOUNTER — HOSPITAL ENCOUNTER (OUTPATIENT)
Dept: RADIOLOGY | Facility: HOSPITAL | Age: 73
Discharge: HOME | End: 2023-05-02
Attending: INTERNAL MEDICINE
Payer: MEDICARE

## 2023-05-02 ENCOUNTER — APPOINTMENT (OUTPATIENT)
Dept: RADIOLOGY | Facility: CLINIC | Age: 73
End: 2023-05-02
Attending: INTERNAL MEDICINE
Payer: MEDICARE

## 2023-05-02 VITALS
HEIGHT: 62 IN | BODY MASS INDEX: 35.03 KG/M2 | DIASTOLIC BLOOD PRESSURE: 74 MMHG | WEIGHT: 190.38 LBS | HEART RATE: 94 BPM | SYSTOLIC BLOOD PRESSURE: 122 MMHG | TEMPERATURE: 97 F | OXYGEN SATURATION: 98 % | RESPIRATION RATE: 18 BRPM

## 2023-05-02 DIAGNOSIS — M47.9 SPONDYLOSIS: Chronic | ICD-10-CM

## 2023-05-02 DIAGNOSIS — J18.9 PNEUMONIA DUE TO INFECTIOUS ORGANISM, UNSPECIFIED LATERALITY, UNSPECIFIED PART OF LUNG: ICD-10-CM

## 2023-05-02 DIAGNOSIS — R22.2 MASS, CHEST: ICD-10-CM

## 2023-05-02 DIAGNOSIS — J18.9 PNEUMONIA DUE TO INFECTIOUS ORGANISM, UNSPECIFIED LATERALITY, UNSPECIFIED PART OF LUNG: Primary | ICD-10-CM

## 2023-05-02 PROCEDURE — 99213 OFFICE O/P EST LOW 20 MIN: CPT | Mod: ,,, | Performed by: INTERNAL MEDICINE

## 2023-05-02 PROCEDURE — 76641 ULTRASOUND BREAST COMPLETE: CPT | Mod: TC,LT

## 2023-05-02 PROCEDURE — 71046 X-RAY EXAM CHEST 2 VIEWS: CPT | Mod: TC,RHCUB | Performed by: INTERNAL MEDICINE

## 2023-05-02 PROCEDURE — 99213 PR OFFICE/OUTPT VISIT, EST, LEVL III, 20-29 MIN: ICD-10-PCS | Mod: ,,, | Performed by: INTERNAL MEDICINE

## 2023-05-02 PROCEDURE — 71046 X-RAY EXAM CHEST 2 VIEWS: CPT | Mod: 26,RHCUB | Performed by: RADIOLOGY

## 2023-05-03 DIAGNOSIS — Z71.89 COMPLEX CARE COORDINATION: ICD-10-CM

## 2023-05-03 NOTE — PROGRESS NOTES
Care gaps discussed with patient. Patient is due for colonoscopy, colonoscopy has been ordered.

## 2023-05-05 PROBLEM — J18.9 PNEUMONIA DUE TO INFECTIOUS ORGANISM: Status: ACTIVE | Noted: 2023-05-05

## 2023-05-05 PROBLEM — M47.9 SPONDYLOSIS: Status: ACTIVE | Noted: 2023-05-05

## 2023-05-05 NOTE — PROGRESS NOTES
Subjective:       Patient ID: Eugenie Coates is a 73 y.o. female.    Chief Complaint: Results (Abnl xray )    HPI  .  Patient does have intermittent back pain in the T-spine area x-ray shows evidence of spondylosis patient was recently diagnosed with pneumonia still receiving antibiotics p.o..  Patient stated she is feeling well overall however still has a slight intermittent cough and shortness of breath will order follow-up chest x-ray today.    Current Medications:    Current Outpatient Medications:     albuterol (VENTOLIN HFA) 90 mcg/actuation inhaler, Inhale 2 puffs into the lungs 3 (three) times daily. Rescue, Disp: 18 g, Rfl: 1    amoxicillin-clavulanate 500-125mg (AUGMENTIN) 500-125 mg Tab, Take 1 tablet (500 mg total) by mouth 2 (two) times a day., Disp: 20 tablet, Rfl: 0    apixaban (ELIQUIS) 5 mg Tab, Take 5 mg by mouth 2 (two) times daily., Disp: , Rfl:     azithromycin (Z-RONA) 250 MG tablet, Take 2 tablets by mouth on day 1; Take 1 tablet by mouth on days 2-5, Disp: 6 tablet, Rfl: 0    b complex vitamins tablet, Take 1 tablet by mouth once daily., Disp: , Rfl:     benzonatate (TESSALON) 200 MG capsule, Take 1 capsule (200 mg total) by mouth 2 (two) times a day., Disp: 60 capsule, Rfl: 0    blood sugar diagnostic Strp, Test twice daily.  Accucheck viva test strips and lancets., Disp: 300 each, Rfl: 3    calcium carbonate/vitamin D3 (CALCIUM WITH VITAMIN D3 ORAL), Take 1 tablet by mouth Daily. , Disp: , Rfl:     carvediloL (COREG) 6.25 MG tablet, Take 1 tablet (6.25 mg total) by mouth 2 (two) times daily with meals., Disp: 60 tablet, Rfl: 11    cetirizine-pseudoephedrine 5-120 mg Tb12, Take 1 tablet by mouth once daily., Disp: 30 tablet, Rfl: 0    clobetasoL (TEMOVATE) 0.05 % cream, Apply topically 2 (two) times daily., Disp: , Rfl:     dorzolamide-timolol 2-0.5% (COSOPT) 22.3-6.8 mg/mL ophthalmic solution, INSTILL 1 DROP IN BOTH EYES EVERY 12 HOURS, Disp: 10 mL, Rfl: 11    ezetimibe (ZETIA) 10 mg  "tablet, TAKE 1 TABLET(10 MG) BY MOUTH EVERY DAY, Disp: 90 tablet, Rfl: 3    furosemide (LASIX) 20 MG tablet, Take 1 tablet (20 mg total) by mouth every other day., Disp: 60 tablet, Rfl: 11    lancets (ACCU-CHEK SOFTCLIX LANCETS) Misc, 1 Units by Misc.(Non-Drug; Combo Route) route 2 (two) times daily., Disp: 300 each, Rfl: 3    latanoprost 0.005 % ophthalmic solution, Place 1 drop into both eyes every evening., Disp: 2.5 mL, Rfl: 12    metFORMIN (GLUCOPHAGE) 500 MG tablet, TAKE 1 TABLET(500 MG) BY MOUTH DAILY WITH BREAKFAST, Disp: 90 tablet, Rfl: 1    NIFEdipine (ADALAT CC) 60 MG TbSR, Take 1 tablet (60 mg total) by mouth once daily., Disp: 90 tablet, Rfl: 3    pantoprazole (PROTONIX) 40 MG tablet, Take 1 tablet (40 mg total) by mouth 2 (two) times daily., Disp: 60 tablet, Rfl: 0    rosuvastatin (CRESTOR) 40 MG Tab, TAKE 1 TABLET(40 MG) BY MOUTH EVERY EVENING, Disp: 90 tablet, Rfl: 3           Review of Systems   Constitutional:  Negative for appetite change, fatigue and fever.   Respiratory:  Negative for shortness of breath.    Cardiovascular:  Negative for chest pain.   Gastrointestinal:  Negative for abdominal pain and constipation.   Endocrine: Negative for polydipsia, polyphagia and polyuria.   Genitourinary:  Negative for difficulty urinating, frequency and hot flashes.   Allergic/Immunologic: Negative for environmental allergies.   Neurological:  Negative for dizziness and light-headedness.   Psychiatric/Behavioral:  Negative for agitation.               Vitals:    05/02/23 1423   BP: 122/74   BP Location: Left arm   Patient Position: Sitting   BP Method: Large (Manual)   Pulse: 94   Resp: 18   Temp: 97.2 °F (36.2 °C)   TempSrc: Temporal   SpO2: 98%   Weight: 86.4 kg (190 lb 6.4 oz)   Height: 5' 2" (1.575 m)        Physical Exam  Vitals and nursing note reviewed.   Constitutional:       Appearance: Normal appearance.   Cardiovascular:      Rate and Rhythm: Normal rate and regular rhythm.      Pulses: Normal " pulses.      Heart sounds: Normal heart sounds.   Pulmonary:      Effort: Pulmonary effort is normal.      Breath sounds: Normal breath sounds.   Abdominal:      General: Abdomen is flat. Bowel sounds are normal.      Palpations: Abdomen is soft.   Musculoskeletal:         General: Normal range of motion.   Skin:     General: Skin is warm and dry.   Neurological:      General: No focal deficit present.      Mental Status: She is alert and oriented to person, place, and time. Mental status is at baseline.         Last Labs:     Office Visit on 04/14/2023   Component Date Value    SARS Coronavirus 2 Antig* 04/14/2023 Negative     Rapid Influenza A Ag 04/14/2023 Negative     Rapid Influenza B Ag 04/14/2023 Negative      Acceptab* 04/14/2023 Yes     Rapid Strep A Screen 04/14/2023 Negative      Acceptab* 04/14/2023 Yes        Last Imaging:  X-Ray Chest PA And Lateral  Narrative: EXAMINATION:  XR CHEST PA AND LATERAL    CLINICAL HISTORY:  Pneumonia, unspecified organism    COMPARISON:  18 April 2023    TECHNIQUE:  XR CHEST PA AND LATERAL    FINDINGS:  The heart and mediastinum are normal in size and configuration.  The pulmonary vascularity is normal in caliber.  No lung infiltrates, effusions, pneumothorax or other abnormality is demonstrated.  Impression: No evidence of cardiopulmonary disease.    Electronically signed by: Liam Rodriguez  Date:    05/02/2023  Time:    15:08  US Breast Left Complete  Narrative: Result:   US Breast Left Complete.  Real time ultrasound images captured and stored   in PACS.     History:  Patient is 73 y.o. and is seen for diagnostic imaging. She presents with   left sided breast pain. On further questioning, she also has pain lateral   and posterior to the breast.     Films Compared:  Compared to: 12/09/2022 Mammo Digital Screening Bilat w/ Sean     Findings:     Left  There is no evidence of suspicious masses or other abnormal findings in   the left  breast.  Impression: There is no sonographic evidence of malignancy in the left breast.    BI-RADS Category:   Overall: 1 - Negative       Recommendation:  Routine screening mammogram in 1 year is recommended.         **Labs and x-rays personally reviewed by me    ** reviewed      Objective:        Assessment:       1. Pneumonia due to infectious organism, unspecified laterality, unspecified part of lung  X-Ray Chest PA And Lateral      2. Spondylosis             Plan:         [unfilled]

## 2023-06-06 ENCOUNTER — OFFICE VISIT (OUTPATIENT)
Dept: PODIATRY | Facility: CLINIC | Age: 73
End: 2023-06-06
Payer: MEDICARE

## 2023-06-06 VITALS — HEIGHT: 62 IN | BODY MASS INDEX: 35.06 KG/M2 | WEIGHT: 190.5 LBS

## 2023-06-06 DIAGNOSIS — L84 CORN OR CALLUS: ICD-10-CM

## 2023-06-06 DIAGNOSIS — E11.65 TYPE 2 DIABETES MELLITUS WITH HYPERGLYCEMIA, WITHOUT LONG-TERM CURRENT USE OF INSULIN: Primary | ICD-10-CM

## 2023-06-06 DIAGNOSIS — B35.1 ONYCHOMYCOSIS DUE TO DERMATOPHYTE: ICD-10-CM

## 2023-06-06 DIAGNOSIS — M20.42 HAMMER TOES OF BOTH FEET: ICD-10-CM

## 2023-06-06 DIAGNOSIS — M20.41 HAMMER TOES OF BOTH FEET: ICD-10-CM

## 2023-06-06 PROCEDURE — 99999 PR PBB SHADOW E&M-EST. PATIENT-LVL II: CPT | Mod: PBBFAC,,, | Performed by: PODIATRIST

## 2023-06-06 PROCEDURE — 11056 PARNG/CUTG B9 HYPRKR LES 2-4: CPT | Mod: Q9,PBBFAC,PN | Performed by: PODIATRIST

## 2023-06-06 PROCEDURE — 11056 PARNG/CUTG B9 HYPRKR LES 2-4: CPT | Mod: Q9,S$PBB,, | Performed by: PODIATRIST

## 2023-06-06 PROCEDURE — 99212 OFFICE O/P EST SF 10 MIN: CPT | Mod: PBBFAC,PN | Performed by: PODIATRIST

## 2023-06-06 PROCEDURE — 99499 NO LOS: ICD-10-PCS | Mod: Q9,S$PBB,, | Performed by: PODIATRIST

## 2023-06-06 PROCEDURE — 11056 PR TRIM BENIGN HYPERKERATOTIC SKIN LESION,2-4: ICD-10-PCS | Mod: Q9,S$PBB,, | Performed by: PODIATRIST

## 2023-06-06 PROCEDURE — 11721 DEBRIDE NAIL 6 OR MORE: CPT | Mod: Q9,59,S$PBB, | Performed by: PODIATRIST

## 2023-06-06 PROCEDURE — 99999 PR PBB SHADOW E&M-EST. PATIENT-LVL II: ICD-10-PCS | Mod: PBBFAC,,, | Performed by: PODIATRIST

## 2023-06-06 PROCEDURE — 99499 UNLISTED E&M SERVICE: CPT | Mod: Q9,S$PBB,, | Performed by: PODIATRIST

## 2023-06-06 PROCEDURE — 11721 PR DEBRIDEMENT OF NAILS, 6 OR MORE: ICD-10-PCS | Mod: Q9,59,S$PBB, | Performed by: PODIATRIST

## 2023-06-06 PROCEDURE — 11721 DEBRIDE NAIL 6 OR MORE: CPT | Mod: PBBFAC,PN | Performed by: PODIATRIST

## 2023-06-06 NOTE — PROGRESS NOTES
Subjective:      Patient ID: Eugenie Coates is a 73 y.o. female.    Chief Complaint: Diabetes Mellitus and Diabetic Foot Exam (Kelly 2/23)      Eugenie is a 73 y.o. female who presents to the clinic for routine evaluation and treatment of diabetic feet. Eugenie has a past medical history of Allergy, Anemia, Anxiety, Arthritis, Asthma, Bell palsy, Bilateral lower extremity edema, Chronic diastolic CHF (congestive heart failure), Depression, Diabetes mellitus, type 2, DVT (deep venous thrombosis), Dyslipidemia, Endometriosis, Eye injury (2014), Focal seizure (5/3/2021), GERD (gastroesophageal reflux disease), Glaucoma, History of uterine fibroid, Hyperlipidemia, Hypertension, Invasive lobular carcinoma of right breast in female, Nuclear sclerosis of both eyes (9/27/2016), Obesity, Pancreas cyst, Sleep apnea, Supraventricular tachycardia, SVT (supraventricular tachycardia) (05/2019), Thyroid disease, and Venous insufficiency. Patient relates that her toenails are in need of trimming.  Denies being painful with wearing shoe gear.  Has not attempted to self treat.  Reports good control over her blood glucose.  Denies neuropathy pain with today's exam.  Denies any additional pedal complaints.      PCP: Shashi Brown MD    Date Last Seen by PCP: 2/23     Hemoglobin A1C   Date Value Ref Range Status   03/01/2023 7.1 (H) 4.0 - 5.6 % Final     Comment:     ADA Screening Guidelines:  5.7-6.4%  Consistent with prediabetes  >or=6.5%  Consistent with diabetes    High levels of fetal hemoglobin interfere with the HbA1C  assay. Heterozygous hemoglobin variants (HbS, HgC, etc)do  not significantly interfere with this assay.   However, presence of multiple variants may affect accuracy.     09/26/2022 6.4 (H) 4.0 - 5.6 % Final     Comment:     ADA Screening Guidelines:  5.7-6.4%  Consistent with prediabetes  >or=6.5%  Consistent with diabetes    High levels of fetal hemoglobin interfere with the HbA1C  assay. Heterozygous  hemoglobin variants (HbS, HgC, etc)do  not significantly interfere with this assay.   However, presence of multiple variants may affect accuracy.     2022 6.5 (H) 4.0 - 5.6 % Final     Comment:     ADA Screening Guidelines:  5.7-6.4%  Consistent with prediabetes  >or=6.5%  Consistent with diabetes    High levels of fetal hemoglobin interfere with the HbA1C  assay. Heterozygous hemoglobin variants (HbS, HgC, etc)do  not significantly interfere with this assay.   However, presence of multiple variants may affect accuracy.             Past Medical History:   Diagnosis Date    Allergy     Anemia     Anxiety     Arthritis     Asthma     Bell palsy     Bilateral lower extremity edema     Chronic diastolic CHF (congestive heart failure)     Depression     Diabetes mellitus, type 2     DVT (deep venous thrombosis)     right LE    Dyslipidemia     Endometriosis     Eye injury     stuck with tree branch od ?     Focal seizure 5/3/2021    GERD (gastroesophageal reflux disease)     Glaucoma     History of uterine fibroid     Hyperlipidemia     Hypertension     Invasive lobular carcinoma of right breast in female     s/p surgery, radiation, tamoxifen    Nuclear sclerosis of both eyes 2016    Obesity     Pancreas cyst     Sleep apnea     uses C pap    Supraventricular tachycardia     SVT (supraventricular tachycardia) 2019    Thyroid disease     Venous insufficiency        Past Surgical History:   Procedure Laterality Date    CATARACT EXTRACTION W/  INTRAOCULAR LENS IMPLANT Left 2023    Procedure: CEIOL OMNI OS;  Surgeon: Juan Antonio Mcintosh MD;  Location: [facility] OR;  Service: Ophthalmology;  Laterality: Left;  consider block     SECTION      CHOLECYSTECTOMY      COLONOSCOPY      CRANIOTOMY FOR EXCISION OF INTRACRANIAL TUMOR Left 2021    Procedure: CRANIOTOMY, FOR INTRACRANIAL NEOPLASM EXCISION Left craniotomy for tumor resection;  Surgeon: eHrnandez Bravo MD;  Location: [facility] OR;  Service:  Neurosurgery;  Laterality: Left;    ESOPHAGOGASTRODUODENOSCOPY N/A 2/17/2023    Procedure: EGD (ESOPHAGOGASTRODUODENOSCOPY);  Surgeon: Issac Wade MD;  Location: [facility] [place] [facility];  Service: Endoscopy;  Laterality: N/A;    glaucoma laser Bilateral     HERNIA REPAIR      KNEE SURGERY Right 2008    (R) knee scope; torn meniscus    MASTECTOMY, PARTIAL Right 12/07/2020    Procedure: MASTECTOMY, PARTIAL-Right with radiological marker Consent day of surgery; Neoprobe, technitium, Frozen;  Surgeon: Narda Keating MD;  Location: [facility] OR [address] [place] [address];  Service: General;  Laterality: Right;    SENTINEL LYMPH NODE BIOPSY Right 12/07/2020    Procedure: BIOPSY, LYMPH NODE, SENTINEL;  Surgeon: Narda Keating MD;  Location: [facility] OR [address] [place] [address];  Service: General;  Laterality: Right;    TOTAL REDUCTION MAMMOPLASTY Bilateral 12/07/2020    Procedure: MAMMOPLASTY, REDUCTION-Bilateral;  Surgeon: Zack Hood MD;  Location: [address];  Service: Plastics;  Laterality: Bilateral;    VEIN SURGERY  2003, 2004    Rt leg x2       Family History   Problem Relation Age of Onset    Diabetes Mother     No Known Problems Father     No Known Problems Sister     No Known Problems Brother     Colon cancer Maternal Aunt     No Known Problems Maternal Uncle     No Known Problems Paternal Aunt     No Known Problems Paternal Uncle     No Known Problems Maternal Grandmother     No Known Problems Maternal Grandfather     No Known Problems Paternal Grandmother     No Known Problems Paternal Grandfather     Amblyopia Neg Hx     Blindness Neg Hx     Cancer Neg Hx     Cataracts Neg Hx     Glaucoma Neg Hx     Hypertension Neg Hx     Macular degeneration Neg Hx     Retinal detachment Neg Hx     Strabismus Neg Hx     Stroke Neg Hx     Thyroid disease Neg Hx     Celiac disease Neg Hx     Colon polyps Neg Hx     Esophageal cancer Neg Hx     Inflammatory bowel disease Neg Hx     Irritable bowel syndrome Neg Hx     Liver cancer Neg Hx     Liver disease Neg Hx      Rectal cancer Neg Hx     Stomach cancer Neg Hx     Ulcerative colitis Neg Hx     Cystic fibrosis Neg Hx     Crohn's disease Neg Hx     Hemochromatosis Neg Hx     Cirrhosis Neg Hx        Social History     Socioeconomic History    Marital status:    Tobacco Use    Smoking status: Never    Smokeless tobacco: Never   Substance and Sexual Activity    Alcohol use: Never     Alcohol/week: 0.0 standard drinks    Drug use: Never    Sexual activity: Yes     Partners: Male     Birth control/protection: None   Social History Narrative    1/18/19: lives with her . They have multiple children, but the youngest is 38. No children at home right now. No pets at home. Splits time between here and [place]. Her son lives in [place].      Social Determinants of Health     Financial Resource Strain: Low Risk     Difficulty of Paying Living Expenses: Not hard at all   Food Insecurity: No Food Insecurity    Worried About Running Out of Food in the Last Year: Never true    Ran Out of Food in the Last Year: Never true   Transportation Needs: No Transportation Needs    Lack of Transportation (Medical): No    Lack of Transportation (Non-Medical): No   Physical Activity: Insufficiently Active    Days of Exercise per Week: 2 days    Minutes of Exercise per Session: 10 min   Stress: No Stress Concern Present    Feeling of Stress : Only a little   Social Connections: Unknown    Frequency of Communication with Friends and Family: More than three times a week    Frequency of Social Gatherings with Friends and Family: Never    Active Member of Clubs or Organizations: Yes    Attends Club or Organization Meetings: More than 4 times per year    Marital Status:    Housing Stability: Low Risk     Unable to Pay for Housing in the Last Year: No    Number of Places Lived in the Last Year: 1    Unstable Housing in the Last Year: No       Current Outpatient Medications   Medication Sig Dispense Refill    albuterol (VENTOLIN HFA) 90  mcg/actuation inhaler Inhale 2 puffs into the lungs 3 (three) times daily. Rescue 18 g 1    amoxicillin-clavulanate 500-125mg (AUGMENTIN) 500-125 mg Tab Take 1 tablet (500 mg total) by mouth 2 (two) times a day. 20 tablet 0    apixaban (ELIQUIS) 5 mg Tab Take 5 mg by mouth 2 (two) times daily.      azithromycin (Z-RONA) 250 MG tablet Take 2 tablets by mouth on day 1; Take 1 tablet by mouth on days 2-5 6 tablet 0    b complex vitamins tablet Take 1 tablet by mouth once daily.      benzonatate (TESSALON) 200 MG capsule Take 1 capsule (200 mg total) by mouth 2 (two) times a day. 60 capsule 0    blood sugar diagnostic Strp Test twice daily.  Accucheck viva test strips and lancets. 300 each 3    calcium carbonate/vitamin D3 (CALCIUM WITH VITAMIN D3 ORAL) Take 1 tablet by mouth Daily.       carvediloL (COREG) 6.25 MG tablet Take 1 tablet (6.25 mg total) by mouth 2 (two) times daily with meals. 60 tablet 11    cetirizine-pseudoephedrine 5-120 mg Tb12 Take 1 tablet by mouth once daily. 30 tablet 0    clobetasoL (TEMOVATE) 0.05 % cream Apply topically 2 (two) times daily.      dorzolamide-timolol 2-0.5% (COSOPT) 22.3-6.8 mg/mL ophthalmic solution INSTILL 1 DROP IN BOTH EYES EVERY 12 HOURS 10 mL 11    ezetimibe (ZETIA) 10 mg tablet TAKE 1 TABLET(10 MG) BY MOUTH EVERY DAY 90 tablet 3    furosemide (LASIX) 20 MG tablet Take 1 tablet (20 mg total) by mouth every other day. 60 tablet 11    lancets (ACCU-CHEK SOFTCLIX LANCETS) Misc 1 Units by Misc.(Non-Drug; Combo Route) route 2 (two) times daily. 300 each 3    latanoprost 0.005 % ophthalmic solution Place 1 drop into both eyes every evening. 2.5 mL 12    metFORMIN (GLUCOPHAGE) 500 MG tablet TAKE 1 TABLET(500 MG) BY MOUTH DAILY WITH BREAKFAST 90 tablet 1    NIFEdipine (ADALAT CC) 60 MG TbSR Take 1 tablet (60 mg total) by mouth once daily. 90 tablet 3    pantoprazole (PROTONIX) 40 MG tablet Take 1 tablet (40 mg total) by mouth 2 (two) times daily. 60 tablet 0    rosuvastatin  (CRESTOR) 40 MG Tab TAKE 1 TABLET(40 MG) BY MOUTH EVERY EVENING 90 tablet 3     No current facility-administered medications for this visit.       Review of patient's allergies indicates:   Allergen Reactions    Percodan [oxycodone hcl-oxycodone-asa] Hives and Itching         Review of Systems   Constitutional: Negative for chills and fever.   Cardiovascular:  Positive for leg swelling. Negative for claudication.   Skin:  Positive for color change and nail changes.   Musculoskeletal:  Positive for joint swelling. Negative for joint pain, muscle cramps and muscle weakness.   Gastrointestinal:  Negative for nausea and vomiting.   Neurological:  Positive for numbness. Negative for paresthesias.   Psychiatric/Behavioral:  Negative for altered mental status.          Objective:      Physical Exam  Constitutional:       General: She is not in acute distress.     Appearance: She is well-developed. She is not diaphoretic.   Cardiovascular:      Pulses:           Dorsalis pedis pulses are 2+ on the right side and 2+ on the left side.        Posterior tibial pulses are 2+ on the right side and 2+ on the left side.      Comments: CFT < 3 seconds bilateral.  Pedal hair growth decreased bilateral.  Varicosities noted bilateral.  Moderate non pitting edema noted to bilateral lower extremity.  Toes are cool to touch bilateral.    Musculoskeletal:         General: No tenderness.      Comments: Muscle strength 5/5 in all muscle groups bilateral.  No tenderness nor crepitation with ROM of foot/ankle joints bilateral.  No tenderness with palpation of bilateral foot and ankle.  Bilateral pes planus foot type.  Bilateral hallux abducto valgus.  Bilateral semi-reducible contracture of toes 2-5.     Skin:     General: Skin is warm and dry.      Coloration: Skin is not pale.      Findings: Lesion present. No abrasion, bruising, burn, ecchymosis, erythema, laceration, petechiae or rash.      Nails: There is no clubbing.      Comments: Pedal  skin appears edematous bilateral.  Toenails x 10 appear thickened by 2 mm, elongated by 4 mm, and discolored with subungual debris.  Hemosiderin staining noted to the Rt. Lower leg.  Hyperkeratotic lesion noted to bilateral sub 5th met head.   Neurological:      Mental Status: She is alert and oriented to person, place, and time.      Sensory: Sensory deficit present.      Motor: No weakness or atrophy.      Comments: Protective sensation per [place] monofilament intact bilateral.    Vibratory sensation decreased bilateral.    Light touch intact bilateral.             Assessment:       Encounter Diagnoses   Name Primary?    Type 2 diabetes mellitus with hyperglycemia, without long-term current use of insulin Yes    Hammer toes of both feet     Onychomycosis due to dermatophyte     Corn or callus            Plan:       Eugenie was seen today for diabetes mellitus and diabetic foot exam.    Diagnoses and all orders for this visit:    Type 2 diabetes mellitus with hyperglycemia, without long-term current use of insulin    Hammer toes of both feet    Onychomycosis due to dermatophyte    Corn or callus        I counseled the patient on her conditions, their implications and medical management.    Shoe inspection. Diabetic Foot Education. Patient reminded of the importance of good nutrition and blood sugar control to help prevent podiatric complications of diabetes. Patient instructed on proper foot hygeine. We discussed wearing proper shoe gear, daily foot inspections, never walking without protective shoe gear, never putting sharp instruments to feet    Advised to continue wearing diabetic shoes/insoles that accommodate for digital deformities.     With patient's permission, nails were aggressively reduced and debrided x 10 to their soft tissue attachment mechanically and with electric , removing all offending nail and debris.  Also, a sterile #15 scalpel was used to trim a lesions x 2 down to smooth  appearing skin without incident.   Patient relates relief following the procedure.  She will continue to monitor the areas daily, inspect her feet, wear protective shoe gear when ambulatory, moisturizer to maintain skin integrity and follow in this office in approximately 4 months, sooner p.r.n.    Giuliano Garnett DPM

## 2023-06-07 ENCOUNTER — TELEPHONE (OUTPATIENT)
Dept: GASTROENTEROLOGY | Facility: CLINIC | Age: 73
End: 2023-06-07
Payer: MEDICARE

## 2023-06-07 NOTE — TELEPHONE ENCOUNTER
----- Message from Natasha Peraza sent at 6/7/2023 11:41 AM CDT -----  Regarding: advise  Contact: patient  Type: Needs Medical Advice  Who Called:  patient  Symptoms (please be specific):  seizures   How long has patient had these symptoms:    Pharmacy name and phone #:    Best Call Back Number: [phone] (work)     Additional Information: She needs to know if that will interfere with her colonoscopy; please call to advise thanks!

## 2023-06-09 DIAGNOSIS — E11.65 TYPE 2 DIABETES MELLITUS WITH HYPERGLYCEMIA, WITHOUT LONG-TERM CURRENT USE OF INSULIN: ICD-10-CM

## 2023-06-09 RX ORDER — METFORMIN HYDROCHLORIDE 500 MG/1
TABLET ORAL
Qty: 90 TABLET | Refills: 0 | Status: SHIPPED | OUTPATIENT
Start: 2023-06-09 | End: 2023-09-06

## 2023-06-09 NOTE — TELEPHONE ENCOUNTER
Care Due:                  Date            Visit Type   Department     Provider  --------------------------------------------------------------------------------                                EP -                              PRIMARY      MET INTERNAL  Last Visit: 10-      CARE ([facility])   MEDICINE       Shashi Brown  Next Visit: None Scheduled  None         None Found                                                            Last  Test          Frequency    Reason                     Performed    Due Date  --------------------------------------------------------------------------------    HBA1C.......  6 months...  metFORMIN................  03- 08-    [facility] Embedded Care Due Messages. Reference number: [id number].   6/09/2023 5:06:50 PM CDT

## 2023-06-10 NOTE — TELEPHONE ENCOUNTER
Provider Staff:  Action required for this patient     Please see care gap opportunities below in Care Due Message: A1C due 8/28/23    Thanks!  [facility]     Appointments      Date Provider   Last Visit   10/24/2022 Shashi Brown MD   Next Visit   Visit date not found Shashi Brown MD     Refill Decision Note   Eugenie Coates  is requesting a refill authorization.  Brief Assessment and Rationale for Refill:  Approve     Medication Therapy Plan:         Comments:     Note composed:8:24 PM 06/09/2023

## 2023-06-12 ENCOUNTER — PATIENT MESSAGE (OUTPATIENT)
Dept: ENDOSCOPY | Facility: HOSPITAL | Age: 73
End: 2023-06-12
Payer: MEDICARE

## 2023-06-28 RX ORDER — ALBUTEROL SULFATE 90 UG/1
AEROSOL, METERED RESPIRATORY (INHALATION)
Qty: 18 G | Refills: 1 | Status: SHIPPED | OUTPATIENT
Start: 2023-06-28 | End: 2023-08-10 | Stop reason: CLARIF

## 2023-07-03 ENCOUNTER — PATIENT OUTREACH (OUTPATIENT)
Dept: ADMINISTRATIVE | Facility: HOSPITAL | Age: 73
End: 2023-07-03
Payer: MEDICARE

## 2023-08-07 PROBLEM — J18.9 PNEUMONIA DUE TO INFECTIOUS ORGANISM: Status: RESOLVED | Noted: 2023-05-05 | Resolved: 2023-08-07

## 2023-08-09 ENCOUNTER — OFFICE VISIT (OUTPATIENT)
Dept: FAMILY MEDICINE | Facility: CLINIC | Age: 73
End: 2023-08-09
Payer: MEDICARE

## 2023-08-09 VITALS
HEART RATE: 105 BPM | WEIGHT: 190 LBS | RESPIRATION RATE: 18 BRPM | HEIGHT: 62 IN | OXYGEN SATURATION: 96 % | DIASTOLIC BLOOD PRESSURE: 113 MMHG | BODY MASS INDEX: 34.96 KG/M2 | TEMPERATURE: 98 F | SYSTOLIC BLOOD PRESSURE: 177 MMHG

## 2023-08-09 DIAGNOSIS — Z86.018 HISTORY OF MENINGIOMA: Primary | ICD-10-CM

## 2023-08-09 DIAGNOSIS — I10 ESSENTIAL HYPERTENSION: ICD-10-CM

## 2023-08-09 DIAGNOSIS — R53.1 WEAKNESS: ICD-10-CM

## 2023-08-09 DIAGNOSIS — E53.8 VITAMIN B12 DEFICIENCY: ICD-10-CM

## 2023-08-09 LAB
ANION GAP SERPL CALCULATED.3IONS-SCNC: 9 MMOL/L (ref 7–16)
BASOPHILS # BLD AUTO: 0.04 K/UL (ref 0–0.2)
BASOPHILS NFR BLD AUTO: 0.5 % (ref 0–1)
BUN SERPL-MCNC: 14 MG/DL (ref 7–18)
BUN/CREAT SERPL: 13 (ref 6–20)
CALCIUM SERPL-MCNC: 9.6 MG/DL (ref 8.5–10.1)
CHLORIDE SERPL-SCNC: 109 MMOL/L (ref 98–107)
CO2 SERPL-SCNC: 28 MMOL/L (ref 21–32)
CREAT SERPL-MCNC: 1.05 MG/DL (ref 0.55–1.02)
DIFFERENTIAL METHOD BLD: ABNORMAL
EGFR (NO RACE VARIABLE) (RUSH/TITUS): 57 ML/MIN/1.73M2
EOSINOPHIL # BLD AUTO: 0.27 K/UL (ref 0–0.5)
EOSINOPHIL NFR BLD AUTO: 3.4 % (ref 1–4)
ERYTHROCYTE [DISTWIDTH] IN BLOOD BY AUTOMATED COUNT: 13.2 % (ref 11.5–14.5)
GLUCOSE SERPL-MCNC: 149 MG/DL (ref 74–106)
HCT VFR BLD AUTO: 45.3 % (ref 38–47)
HGB BLD-MCNC: 14.8 G/DL (ref 12–16)
IMM GRANULOCYTES # BLD AUTO: 0.02 K/UL (ref 0–0.04)
IMM GRANULOCYTES NFR BLD: 0.3 % (ref 0–0.4)
LYMPHOCYTES # BLD AUTO: 2.22 K/UL (ref 1–4.8)
LYMPHOCYTES NFR BLD AUTO: 28.2 % (ref 27–41)
MCH RBC QN AUTO: 27.7 PG (ref 27–31)
MCHC RBC AUTO-ENTMCNC: 32.7 G/DL (ref 32–36)
MCV RBC AUTO: 84.8 FL (ref 80–96)
MONOCYTES # BLD AUTO: 0.52 K/UL (ref 0–0.8)
MONOCYTES NFR BLD AUTO: 6.6 % (ref 2–6)
MPC BLD CALC-MCNC: 10.5 FL (ref 9.4–12.4)
NEUTROPHILS # BLD AUTO: 4.8 K/UL (ref 1.8–7.7)
NEUTROPHILS NFR BLD AUTO: 61 % (ref 53–65)
NRBC # BLD AUTO: 0 X10E3/UL
NRBC, AUTO (.00): 0 %
PLATELET # BLD AUTO: 262 K/UL (ref 150–400)
POTASSIUM SERPL-SCNC: 3.7 MMOL/L (ref 3.5–5.1)
RBC # BLD AUTO: 5.34 M/UL (ref 4.2–5.4)
SODIUM SERPL-SCNC: 142 MMOL/L (ref 136–145)
TSH SERPL DL<=0.005 MIU/L-ACNC: 0.94 UIU/ML (ref 0.36–3.74)
VIT B12 SERPL-MCNC: 687 PG/ML (ref 193–986)
WBC # BLD AUTO: 7.87 K/UL (ref 4.5–11)

## 2023-08-09 PROCEDURE — 80048 BASIC METABOLIC PANEL: ICD-10-PCS | Mod: ,,, | Performed by: CLINICAL MEDICAL LABORATORY

## 2023-08-09 PROCEDURE — 99214 PR OFFICE/OUTPT VISIT, EST, LEVL IV, 30-39 MIN: ICD-10-PCS | Mod: ,,, | Performed by: INTERNAL MEDICINE

## 2023-08-09 PROCEDURE — 84443 TSH: ICD-10-PCS | Mod: ,,, | Performed by: CLINICAL MEDICAL LABORATORY

## 2023-08-09 PROCEDURE — 84443 ASSAY THYROID STIM HORMONE: CPT | Mod: ,,, | Performed by: CLINICAL MEDICAL LABORATORY

## 2023-08-09 PROCEDURE — 85025 CBC WITH DIFFERENTIAL: ICD-10-PCS | Mod: ,,, | Performed by: CLINICAL MEDICAL LABORATORY

## 2023-08-09 PROCEDURE — 82607 VITAMIN B12: ICD-10-PCS | Mod: ,,, | Performed by: CLINICAL MEDICAL LABORATORY

## 2023-08-09 PROCEDURE — 99214 OFFICE O/P EST MOD 30 MIN: CPT | Mod: ,,, | Performed by: INTERNAL MEDICINE

## 2023-08-09 PROCEDURE — 80048 BASIC METABOLIC PNL TOTAL CA: CPT | Mod: ,,, | Performed by: CLINICAL MEDICAL LABORATORY

## 2023-08-09 PROCEDURE — 85025 COMPLETE CBC W/AUTO DIFF WBC: CPT | Mod: ,,, | Performed by: CLINICAL MEDICAL LABORATORY

## 2023-08-09 PROCEDURE — 82607 VITAMIN B-12: CPT | Mod: ,,, | Performed by: CLINICAL MEDICAL LABORATORY

## 2023-08-10 ENCOUNTER — HOSPITAL ENCOUNTER (EMERGENCY)
Facility: HOSPITAL | Age: 73
Discharge: ANOTHER HOSPITAL | End: 2023-08-10
Attending: FAMILY MEDICINE
Payer: MEDICARE

## 2023-08-10 VITALS
OXYGEN SATURATION: 94 % | HEIGHT: 62 IN | SYSTOLIC BLOOD PRESSURE: 120 MMHG | TEMPERATURE: 98 F | RESPIRATION RATE: 18 BRPM | BODY MASS INDEX: 34.96 KG/M2 | WEIGHT: 190 LBS | DIASTOLIC BLOOD PRESSURE: 70 MMHG | HEART RATE: 131 BPM

## 2023-08-10 DIAGNOSIS — G93.89 BRAIN MASS: Primary | ICD-10-CM

## 2023-08-10 DIAGNOSIS — R53.1 GENERAL WEAKNESS: ICD-10-CM

## 2023-08-10 DIAGNOSIS — R56.9 PARTIAL SEIZURE: ICD-10-CM

## 2023-08-10 PROBLEM — G40.909 SEIZURE DISORDER: Status: ACTIVE | Noted: 2023-08-10

## 2023-08-10 LAB
ABORH RETYPE: NORMAL
ALBUMIN SERPL BCP-MCNC: 3.6 G/DL (ref 3.5–5)
ALBUMIN/GLOB SERPL: 0.8 {RATIO}
ALP SERPL-CCNC: 106 U/L (ref 55–142)
ALT SERPL W P-5'-P-CCNC: 24 U/L (ref 13–56)
ANION GAP SERPL CALCULATED.3IONS-SCNC: 13 MMOL/L (ref 7–16)
APTT PPP: 22.1 SECONDS (ref 25.2–37.3)
AST SERPL W P-5'-P-CCNC: 29 U/L (ref 15–37)
BACTERIA #/AREA URNS HPF: ABNORMAL /HPF
BASOPHILS # BLD AUTO: 0.05 K/UL (ref 0–0.2)
BASOPHILS NFR BLD AUTO: 0.5 % (ref 0–1)
BILIRUB SERPL-MCNC: 0.7 MG/DL (ref ?–1.2)
BILIRUB UR QL STRIP: NEGATIVE
BUN SERPL-MCNC: 15 MG/DL (ref 7–18)
BUN/CREAT SERPL: 12 (ref 6–20)
CALCIUM SERPL-MCNC: 9.2 MG/DL (ref 8.5–10.1)
CHLORIDE SERPL-SCNC: 105 MMOL/L (ref 98–107)
CLARITY UR: CLEAR
CO2 SERPL-SCNC: 24 MMOL/L (ref 21–32)
COLOR UR: ABNORMAL
CREAT SERPL-MCNC: 1.24 MG/DL (ref 0.55–1.02)
DIFFERENTIAL METHOD BLD: ABNORMAL
EGFR (NO RACE VARIABLE) (RUSH/TITUS): 46 ML/MIN/1.73M2
EOSINOPHIL # BLD AUTO: 0.34 K/UL (ref 0–0.5)
EOSINOPHIL NFR BLD AUTO: 3.3 % (ref 1–4)
ERYTHROCYTE [DISTWIDTH] IN BLOOD BY AUTOMATED COUNT: 13.4 % (ref 11.5–14.5)
GLOBULIN SER-MCNC: 4.8 G/DL (ref 2–4)
GLUCOSE SERPL-MCNC: 269 MG/DL (ref 74–106)
GLUCOSE UR STRIP-MCNC: 30 MG/DL
HCT VFR BLD AUTO: 48.5 % (ref 38–47)
HGB BLD-MCNC: 15.8 G/DL (ref 12–16)
HYALINE CASTS #/AREA URNS LPF: ABNORMAL /LPF
IMM GRANULOCYTES # BLD AUTO: 0.03 K/UL (ref 0–0.04)
IMM GRANULOCYTES NFR BLD: 0.3 % (ref 0–0.4)
INDIRECT COOMBS: NORMAL
INR BLD: 0.92
KETONES UR STRIP-SCNC: NEGATIVE MG/DL
LEUKOCYTE ESTERASE UR QL STRIP: ABNORMAL
LYMPHOCYTES # BLD AUTO: 3.63 K/UL (ref 1–4.8)
LYMPHOCYTES NFR BLD AUTO: 35.3 % (ref 27–41)
MCH RBC QN AUTO: 27.5 PG (ref 27–31)
MCHC RBC AUTO-ENTMCNC: 32.6 G/DL (ref 32–36)
MCV RBC AUTO: 84.5 FL (ref 80–96)
MONOCYTES # BLD AUTO: 0.74 K/UL (ref 0–0.8)
MONOCYTES NFR BLD AUTO: 7.2 % (ref 2–6)
MPC BLD CALC-MCNC: 10.4 FL (ref 9.4–12.4)
NEUTROPHILS # BLD AUTO: 5.5 K/UL (ref 1.8–7.7)
NEUTROPHILS NFR BLD AUTO: 53.4 % (ref 53–65)
NITRITE UR QL STRIP: NEGATIVE
NRBC # BLD AUTO: 0 X10E3/UL
NRBC, AUTO (.00): 0 %
PH UR STRIP: 7 PH UNITS
PLATELET # BLD AUTO: 280 K/UL (ref 150–400)
POTASSIUM SERPL-SCNC: 3.6 MMOL/L (ref 3.5–5.1)
PROT SERPL-MCNC: 8.4 G/DL (ref 6.4–8.2)
PROT UR QL STRIP: 10
PROTHROMBIN TIME: 12.3 SECONDS (ref 11.7–14.7)
RBC # BLD AUTO: 5.74 M/UL (ref 4.2–5.4)
RBC # UR STRIP: NEGATIVE /UL
RBC #/AREA URNS HPF: 2 /HPF
RH BLD: NORMAL
SODIUM SERPL-SCNC: 138 MMOL/L (ref 136–145)
SP GR UR STRIP: 1.01
SPECIMEN OUTDATE: NORMAL
SQUAMOUS #/AREA URNS LPF: ABNORMAL /HPF
UROBILINOGEN UR STRIP-ACNC: NORMAL MG/DL
WBC # BLD AUTO: 10.29 K/UL (ref 4.5–11)
WBC #/AREA URNS HPF: 6 /HPF

## 2023-08-10 PROCEDURE — 96374 THER/PROPH/DIAG INJ IV PUSH: CPT

## 2023-08-10 PROCEDURE — 80053 COMPREHEN METABOLIC PANEL: CPT | Performed by: FAMILY MEDICINE

## 2023-08-10 PROCEDURE — 99285 PR EMERGENCY DEPT VISIT,LEVEL V: ICD-10-PCS | Mod: ,,, | Performed by: EMERGENCY MEDICINE

## 2023-08-10 PROCEDURE — 86900 BLOOD TYPING SEROLOGIC ABO: CPT | Performed by: EMERGENCY MEDICINE

## 2023-08-10 PROCEDURE — 96375 TX/PRO/DX INJ NEW DRUG ADDON: CPT

## 2023-08-10 PROCEDURE — 85610 PROTHROMBIN TIME: CPT | Performed by: EMERGENCY MEDICINE

## 2023-08-10 PROCEDURE — 99285 EMERGENCY DEPT VISIT HI MDM: CPT | Mod: ,,, | Performed by: EMERGENCY MEDICINE

## 2023-08-10 PROCEDURE — 81001 URINALYSIS AUTO W/SCOPE: CPT | Performed by: FAMILY MEDICINE

## 2023-08-10 PROCEDURE — 85730 THROMBOPLASTIN TIME PARTIAL: CPT | Performed by: EMERGENCY MEDICINE

## 2023-08-10 PROCEDURE — 63600175 PHARM REV CODE 636 W HCPCS: Performed by: EMERGENCY MEDICINE

## 2023-08-10 PROCEDURE — 85025 COMPLETE CBC W/AUTO DIFF WBC: CPT | Performed by: FAMILY MEDICINE

## 2023-08-10 PROCEDURE — 63600175 PHARM REV CODE 636 W HCPCS: Performed by: FAMILY MEDICINE

## 2023-08-10 PROCEDURE — 96372 THER/PROPH/DIAG INJ SC/IM: CPT | Mod: 59 | Performed by: FAMILY MEDICINE

## 2023-08-10 PROCEDURE — 99285 EMERGENCY DEPT VISIT HI MDM: CPT | Mod: 25

## 2023-08-10 RX ORDER — NIFEDIPINE 90 MG/1
90 TABLET, FILM COATED, EXTENDED RELEASE ORAL DAILY
Qty: 30 TABLET | Refills: 0 | Status: SHIPPED | OUTPATIENT
Start: 2023-08-10 | End: 2023-10-26 | Stop reason: SDUPTHER

## 2023-08-10 RX ORDER — DEXAMETHASONE SODIUM PHOSPHATE 4 MG/ML
4 INJECTION, SOLUTION INTRA-ARTICULAR; INTRALESIONAL; INTRAMUSCULAR; INTRAVENOUS; SOFT TISSUE EVERY 6 HOURS
Status: DISCONTINUED | OUTPATIENT
Start: 2023-08-10 | End: 2023-08-10 | Stop reason: HOSPADM

## 2023-08-10 RX ORDER — LEVETIRACETAM 500 MG/5ML
2000 INJECTION, SOLUTION, CONCENTRATE INTRAVENOUS
Status: COMPLETED | OUTPATIENT
Start: 2023-08-10 | End: 2023-08-10

## 2023-08-10 RX ORDER — LEVETIRACETAM 500 MG/5ML
1000 INJECTION, SOLUTION, CONCENTRATE INTRAVENOUS
Status: DISCONTINUED | OUTPATIENT
Start: 2023-08-10 | End: 2023-08-10

## 2023-08-10 RX ORDER — DEXAMETHASONE SODIUM PHOSPHATE 4 MG/ML
10 INJECTION, SOLUTION INTRA-ARTICULAR; INTRALESIONAL; INTRAMUSCULAR; INTRAVENOUS; SOFT TISSUE
Status: COMPLETED | OUTPATIENT
Start: 2023-08-10 | End: 2023-08-10

## 2023-08-10 RX ORDER — CLONIDINE HYDROCHLORIDE 0.1 MG/1
0.1 TABLET ORAL NIGHTLY
Qty: 90 TABLET | Refills: 0 | Status: SHIPPED | OUTPATIENT
Start: 2023-08-10

## 2023-08-10 RX ORDER — LORAZEPAM 2 MG/ML
1 INJECTION INTRAMUSCULAR
Status: COMPLETED | OUTPATIENT
Start: 2023-08-10 | End: 2023-08-10

## 2023-08-10 RX ORDER — SODIUM CHLORIDE 9 MG/ML
INJECTION, SOLUTION INTRAVENOUS
Status: COMPLETED
Start: 2023-08-10 | End: 2023-08-10

## 2023-08-10 RX ADMIN — LEVETIRACETAM 2000 MG: 100 INJECTION INTRAVENOUS at 08:08

## 2023-08-10 RX ADMIN — DEXAMETHASONE SODIUM PHOSPHATE 10 MG: 4 INJECTION, SOLUTION INTRA-ARTICULAR; INTRALESIONAL; INTRAMUSCULAR; INTRAVENOUS; SOFT TISSUE at 08:08

## 2023-08-10 RX ADMIN — LORAZEPAM 1 MG: 2 INJECTION INTRAMUSCULAR; INTRAVENOUS at 06:08

## 2023-08-10 NOTE — ED NOTES
SPOKE TO  AT THIS TIME DUE TO NO CALL BACK OR SHOW UP TO ER. PATIENTS  REPORTED HE WOULD COME BACK TO ER AND WE WAS WAITING TO DISCUSS HIM TRANSFERRING POV WHEN HE ARRIVED BACK AND HE REPORTED HE HAD ALREADY LEFT AND WAS ON WAY TO HOSPITAL. HE WAS UNDER IMPRESSION PATIENT WAS GOING BY AMBULANCE AND LEFT.

## 2023-08-10 NOTE — ED PROVIDER NOTES
Encounter Date: 8/10/2023       History     Chief Complaint   Patient presents with    Dizziness     Pt states she was seen yesterday by dr mohamud with increased bp - pt states dr mohamud increased her bp meds but she has not got the new dose rx yet     Patient comes in with shakiness of her hands and lower legs.  In the past she is had a brain tumor and she feels some dizziness just like she did before the brain tumor.  She is had an elevated blood pressure that Dr. melendrez is falling.  She is just had her nifedipine increased for her blood pressure.  She has no fever no chest pain no shortness a breath no nausea vomiting or diarrhea.  Just dizziness.      According to the history given to SHAI Kline MD (emergency physician coming on duty) at 7:00 a.m. on August 10th is that she is been having some partial seizures that are worsening in intensity and frequency.  Patient reports that she had meningioma with partial resection in May of 2021 at [facility].  She had been seen at [facility] here in [facility] in January because she would had some shaking of her leg and then was transferred to [facility] in [place].  She had been given a dose of Keppra but was not discharged with antiepileptic drugs.  Patient reports that she is had bad experience from the generic version of Keppra in the past because it caused some mood swings.  Patient's symptoms have been worsening over the past few weeks and worse yesterday and that she is having some jerking motion of her legs several times a day.  This last for a couple of minutes.  Also her right arm seems to be a little weaker she says.  On exam she has a subtle drift of the right arm but no visual or speech difficulties.        Review of patient's allergies indicates:   Allergen Reactions    Oxycodone hcl-oxycodone-asa Hives, Itching and Other (See Comments)     Past Medical History:   Diagnosis Date    Allergy     Anemia     Anxiety     Arthritis     Asthma      Bell palsy     Bilateral lower extremity edema     Chronic diastolic CHF (congestive heart failure)     Depression     Diabetes mellitus, type 2     DVT (deep venous thrombosis)     right LE    Dyslipidemia     Endometriosis     Eye injury     stuck with tree branch od ?     Focal seizure 5/3/2021    GERD (gastroesophageal reflux disease)     Glaucoma     History of uterine fibroid     Hyperlipidemia     Hypertension     Invasive lobular carcinoma of right breast in female     s/p surgery, radiation, tamoxifen    Nuclear sclerosis of both eyes 2016    Obesity     Pancreas cyst     Sleep apnea     uses C pap    Supraventricular tachycardia     SVT (supraventricular tachycardia) 2019    Thyroid disease     Venous insufficiency      Past Surgical History:   Procedure Laterality Date    CATARACT EXTRACTION W/  INTRAOCULAR LENS IMPLANT Left 2023    Procedure: CEIOL OMNI OS;  Surgeon: Juan Antonio Mcintosh MD;  Location: [facility];  Service: Ophthalmology;  Laterality: Left;  consider block     SECTION      CHOLECYSTECTOMY      COLONOSCOPY      CRANIOTOMY FOR EXCISION OF INTRACRANIAL TUMOR Left 2021    Procedure: CRANIOTOMY, FOR INTRACRANIAL NEOPLASM EXCISION Left craniotomy for tumor resection;  Surgeon: Hernandez Bravo MD;  Location: [facility] OR;  Service: Neurosurgery;  Laterality: Left;    ESOPHAGOGASTRODUODENOSCOPY N/A 2023    Procedure: EGD (ESOPHAGOGASTRODUODENOSCOPY);  Surgeon: Issac Wade MD;  Location: [facility];  Service: Endoscopy;  Laterality: N/A;    glaucoma laser Bilateral     HERNIA REPAIR      KNEE SURGERY Right     (R) knee scope; torn meniscus    MASTECTOMY, PARTIAL Right 2020    Procedure: MASTECTOMY, PARTIAL-Right with radiological marker Consent day of surgery; Neoprobe, technitium, Frozen;  Surgeon: Narda Keating MD;  Location: [address];  Service: General;  Laterality: Right;    SENTINEL LYMPH NODE BIOPSY Right 2020    Procedure: BIOPSY,  LYMPH NODE, SENTINEL;  Surgeon: Narda Keating MD;  Location: [facility] OR [address] [place] [address];  Service: General;  Laterality: Right;    TOTAL REDUCTION MAMMOPLASTY Bilateral 12/07/2020    Procedure: MAMMOPLASTY, REDUCTION-Bilateral;  Surgeon: Zack Hood MD;  Location: [facility] OR [address] [place] [address];  Service: Plastics;  Laterality: Bilateral;    VEIN SURGERY  2003, 2004    Rt leg x2     Family History   Problem Relation Age of Onset    Diabetes Mother     No Known Problems Father     No Known Problems Sister     No Known Problems Brother     Colon cancer Maternal Aunt     No Known Problems Maternal Uncle     No Known Problems Paternal Aunt     No Known Problems Paternal Uncle     No Known Problems Maternal Grandmother     No Known Problems Maternal Grandfather     No Known Problems Paternal Grandmother     No Known Problems Paternal Grandfather     Amblyopia Neg Hx     Blindness Neg Hx     Cancer Neg Hx     Cataracts Neg Hx     Glaucoma Neg Hx     Hypertension Neg Hx     Macular degeneration Neg Hx     Retinal detachment Neg Hx     Strabismus Neg Hx     Stroke Neg Hx     Thyroid disease Neg Hx     Celiac disease Neg Hx     Colon polyps Neg Hx     Esophageal cancer Neg Hx     Inflammatory bowel disease Neg Hx     Irritable bowel syndrome Neg Hx     Liver cancer Neg Hx     Liver disease Neg Hx     Rectal cancer Neg Hx     Stomach cancer Neg Hx     Ulcerative colitis Neg Hx     Cystic fibrosis Neg Hx     Crohn's disease Neg Hx     Hemochromatosis Neg Hx     Cirrhosis Neg Hx      Social History     Tobacco Use    Smoking status: Never    Smokeless tobacco: Never   Substance Use Topics    Alcohol use: Never     Alcohol/week: 0.0 standard drinks of alcohol    Drug use: Never     Review of Systems   Constitutional: Negative.  Negative for fever.   HENT: Negative.  Negative for sore throat.    Eyes: Negative.    Respiratory: Negative.  Negative for shortness of breath.    Cardiovascular: Negative.  Negative for chest pain.    Gastrointestinal: Negative.  Negative for nausea.   Endocrine: Negative.    Genitourinary: Negative.  Negative for dysuria.   Musculoskeletal: Negative.  Negative for back pain.   Skin: Negative.  Negative for rash.   Allergic/Immunologic: Negative.    Neurological:  Positive for dizziness and light-headedness. Negative for weakness.        Dizziness   Hematological: Negative.  Does not bruise/bleed easily.   Psychiatric/Behavioral: Negative.     All other systems reviewed and are negative.      Physical Exam     Initial Vitals [08/10/23 0522]   BP Pulse Resp Temp SpO2   136/75 99 18 97.9 °F (36.6 °C) 95 %      MAP       --         Physical Exam    Constitutional: She appears well-developed and well-nourished.   HENT:   Head: Normocephalic and atraumatic.   Right Ear: External ear normal.   Left Ear: External ear normal.   Nose: Nose normal.   Mouth/Throat: Oropharynx is clear and moist.   Eyes: Conjunctivae and EOM are normal. Pupils are equal, round, and reactive to light.   Neck: Neck supple.   Normal range of motion.  Cardiovascular:  Normal rate, regular rhythm, normal heart sounds and intact distal pulses.           Pulmonary/Chest: Breath sounds normal.   Abdominal: Abdomen is soft. Bowel sounds are normal.   Genitourinary:    Vagina and uterus normal.     Musculoskeletal:         General: Normal range of motion.      Cervical back: Normal range of motion and neck supple.     Neurological: She is alert and oriented to person, place, and time. She has normal strength and normal reflexes.   Skin: Skin is warm. Capillary refill takes less than 2 seconds.   Psychiatric: She has a normal mood and affect. Her behavior is normal. Judgment and thought content normal.         Medical Screening Exam   See Full Note    ED Course   Procedures  Labs Reviewed   COMPREHENSIVE METABOLIC PANEL - Abnormal; Notable for the following components:       Result Value    Glucose 269 (*)     Creatinine 1.24 (*)     Total Protein  8.4 (*)     Globulin 4.8 (*)     eGFR 46 (*)     All other components within normal limits   CBC WITH DIFFERENTIAL - Abnormal; Notable for the following components:    RBC 5.74 (*)     Hematocrit 48.5 (*)     Monocytes % 7.2 (*)     All other components within normal limits   URINALYSIS, REFLEX TO URINE CULTURE - Abnormal; Notable for the following components:    Leukocytes, UA Moderate (*)     Protein, UA 10 (*)     Glucose, UA 30 (*)     All other components within normal limits   URINALYSIS, MICROSCOPIC - Abnormal; Notable for the following components:    WBC, UA 6 (*)     Bacteria, UA Occasional (*)     Squamous Epithelial Cells, UA Few (*)     Hyaline Casts, UA 0-2 (*)     All other components within normal limits   APTT - Abnormal; Notable for the following components:    PTT 22.1 (*)     All other components within normal limits   PROTIME-INR - Normal   CBC W/ AUTO DIFFERENTIAL    Narrative:     The following orders were created for panel order CBC Auto Differential.  Procedure                               Abnormality         Status                     ---------                               -----------         ------                     CBC with Differential[[id number]]        Abnormal            Final result                 Please view results for these tests on the individual orders.   ABORH RETYPE   TYPE & SCREEN          Imaging Results              X-Ray Chest 1 View (Final result)  Result time 08/10/23 09:34:18      Final result by Liam Rodriguez II, MD (08/10/23 09:34:18)                   Impression:      No evidence of acute cardiopulmonary disease.      Electronically signed by: Liam Rodriguez  Date:    08/10/2023  Time:    09:34               Narrative:    EXAMINATION:  XR CHEST 1 VIEW    CLINICAL HISTORY:  Weakness    COMPARISON:  Two May 8, 2023    TECHNIQUE:  XR CHEST 1 VIEW    FINDINGS:  The heart and mediastinum are normal in size and configuration.  The pulmonary vascularity is normal  in caliber.  No lung infiltrates, effusions, pneumothorax or other abnormality is demonstrated.                                       CT Head Without Contrast (Final result)  Result time 08/10/23 07:35:21      Final result by Abel Herrera MD (08/10/23 07:35:21)                   Impression:      Comparison made with 09/07/2022 brain MRI showing increasing tumor over the left frontal and parietal lobe as described with increase in the degree of edema of the subcortical white matter in these locations.  Uncertain by CT if this is intra or extra-axial on today's study but given the prior exam favor an extra-axial lesion with mass effect upon the underlying brain parenchyma.      Electronically signed by: Abel Herrera  Date:    08/10/2023  Time:    07:35               Narrative:    EXAMINATION:  CT HEAD WITHOUT CONTRAST    CLINICAL HISTORY:  Dizziness, arrhythmia or new vasoactive medication;    TECHNIQUE:  CT of the head performed without the use of intravenous contrast.  The CT examination was performed using one or more of the following dose reduction techniques: Automated exposure control, adjustment of the mA and kV according to patient's size, use of acute or iterative reconstruction techniques.    COMPARISON:  09/07/2022    FINDINGS:  There is been previous left-sided craniotomy.  Overlying the left posterior frontal and parietal craniotomy site is cave mostly hyperdense lesion.  Based on prior exams this appear to represent extra-axial lesion although it is much larger on today's exam measuring up to 3 point 5 x 2.7 cm.  There is underlying edema of the posterior left frontal lobe and parietal lobe white matter more so than what is appreciated on the 09/07/2022 comparison exam.    No midline shift or herniation.  No acute intracranial hemorrhage.  There are mild chronic microvascular ischemic changes.  Vascular calcifications.  The mastoid air cells are clear.                                       Medications    LORazepam injection 1 mg (1 mg Intramuscular Given 8/10/23 0609)   levETIRAcetam injection 2,000 mg (2,000 mg Intravenous Given 8/10/23 0859)   dexAMETHasone injection 10 mg (10 mg Intravenous Given 8/10/23 0859)   sodium chloride 0.9% infusion (  Override Pull 8/10/23 0845)     Medical Decision Making:   Initial Assessment:   Patient with anxiety.  Shaking of hands and lower extremity.  Feeling dizzy.  History of brain tumor that was removed causing the same symptoms.  Has had increase in blood pressure medication recently call nifedipine by Dr. Linares  Differential Diagnosis:   Dizziness 2.  Anxiety 3. History of brain    ED Management:  [facility]    Patient presents for emergent evaluation of acute seizure activity that poses a threat to life and/or bodily function.    In the ED patient found to have acute focal partial seizures , brain mass   I ordered labs and personally reviewed them.  Labs significant for electrolytes unremarkable.  Suspect renal failure creatinine 1.2..    Urinalysis unremarkable..    I ordered CT scan and personally reviewed it and reviewed the radiologist interpretation.  CT significant for brain mass.      Transfer [facility]  I discussed the patient presentation and findings with the specialist for [facility]    Patient was managed in the ED with IV Keppra IM Ativan IV dexamethasone.    The response to treatment was improved.    Patient required emergent discussion to receiving facilityfor transfer.  Discussed with neurosurgeon             ED Course as of 08/12/23 0623   Thu Aug 10, 2023   0559 Sign out.  74 y/o brain tumor removal.  Blood pressure elevated.  Anxious about brain tumor.  Given ativan.  CBC CMP pending.  No seizure.  BP stable.  PCP Dr. iSlver [PK]   0721 According to the history given to SHAI Kline MD (emergency physician coming on duty) at 7:00 a.m. on August 10th is that she is been having some partial seizures that are worsening in intensity and  frequency.  Patient reports that she had meningioma with partial resection in May of 2021 at [facility].  She had been seen at [facility] here in [place] back in January because she would had some shaking of her leg and then was transferred to [facility] in [place].  She had been given a dose of Keppra but was not discharged with antiepileptic drugs.  Patient reports that she is had bad experience from the generic version of Keppra in the past because it caused some mood swings.  Patient's symptoms have been worsening over the past few weeks and worse yesterday and that she is having some jerking motion of her legs several times a day.  This last for a couple of minutes.  Also her right arm seems to be a little weaker she says.  On exam she has a subtle drift of the right arm but no visual or speech difficulties. [PK]   0724 Patient had resection of a brain mass at [facility] in May of 2021.  She is back in the ER today complaining of partial seizures.  Compared to a MRI done in September of 2022 to the mass in the left parietal lobe is much larger.  It was 8 mm in September 2022 and now on CT today it is 4.4 x 3 x 3.8 cm and is surrounded by vasogenic edema. [PK]   0818 Discussed with Dr. Andrez Cavanaugh. The recc was 60mg/kg max of 4.5 grams of Keppra. The dexamethasone recc was 10mg times one and then 4mg Q 6.  [PK]      ED Course User Index  [PK] Tao Kline MD             .: Patient refused recommended mode of transport, explained increased risk.    Clinical Impression:   Final diagnoses:  [G93.89] Brain mass (Primary)  [R56.9] Partial seizure  [R53.1] General weakness        ED Disposition Condition    Transfer to Another Facility Stable                Tao Kline MD  08/12/23 7024

## 2023-08-10 NOTE — ED NOTES
Thank you. Your form was submitted successfully.    Please don't forget the Face Sheet & Medical Records.        Confirmation #: [id number]    Submission Date / Time: Aug 10, 2023 9:46 AM

## 2023-08-11 PROBLEM — Z71.89 ACP (ADVANCE CARE PLANNING): Status: ACTIVE | Noted: 2023-08-11

## 2023-08-11 PROBLEM — E53.8 VITAMIN B12 DEFICIENCY: Status: ACTIVE | Noted: 2023-08-11

## 2023-08-11 PROBLEM — G40.109 FOCAL MOTOR SEIZURE DISORDER: Status: ACTIVE | Noted: 2023-08-10

## 2023-08-11 PROBLEM — Z86.018 HISTORY OF MENINGIOMA: Status: ACTIVE | Noted: 2023-08-11

## 2023-08-11 NOTE — PROGRESS NOTES
Subjective:       Patient ID: Eugenie Coates is a 73 y.o. female.    Chief Complaint: Weakness, Dizziness, and Shaking    Dizziness: no fever, no light-headedness and no chest pain.no environmental allergies.  Patient seen and evaluated today.  Patient has chronic obesity, weakness history of a benign meningioma.  Patient been having dizziness and again profound weakness and fatigue.  Blood pressure is elevated as high as 175/108 today.  The plan today with his stat CT scan of her brain CBC BMP vitamin B12 level and TSH for the weakness.    Na increase nifedipine to 90 mg 1 p.o. q.day.  And start clonidine 0.1 mg 1 p.o. q.h.s. p.r.n. blood pressure greater than 180/100.    Will also arrange for the patient to have outpatient physical therapy.  .    Current Medications:  No current facility-administered medications for this visit.  No current outpatient medications on file.    Facility-Administered Medications Ordered in Other Visits:     acetaminophen tablet 650 mg, 650 mg, Oral, Q8H PRN, Mary Duvall MD    [START ON 8/14/2023] ceFAZolin injection 2 g, 2 g, Intravenous, On Call Procedure, Lou Luis PA-C    dextrose oral liquid/gel 15 g, 15 g, Oral, PRN, Mary Duvall MD    dextrose oral liquid/gel 30 g, 30 g, Oral, PRN, Mary Duvall MD    ezetimibe tablet 10 mg, 10 mg, Oral, QHS, Mary Duvall MD    insulin aspart U-100 pen 3-5 Units, 3-5 Units, Subcutaneous, PRN, Mary Duvall MD, 3 Units at 08/11/23 1302    latanoprost 0.005 % ophthalmic solution 1 drop, 1 drop, Both Eyes, QHS, Mary Duvall MD    naloxone 0.4 mg/mL injection 0.02 mg, 0.02 mg, Intravenous, PRN, Mary Duvall MD    NIFEdipine 24 hr tablet 60 mg, 60 mg, Oral, Daily, Mary Duvall MD, 60 mg at 08/11/23 0908    ondansetron injection 4 mg, 4 mg, Intravenous, Q8H PRN, Mary Duvall MD    OXcarbazepine tablet 150 mg, 150 mg, Oral, BID, Mary Duvall MD, 150 mg at  "08/11/23 0908    pantoprazole EC tablet 40 mg, 40 mg, Oral, BID, Mary Duvall MD, 40 mg at 08/11/23 0908    sodium chloride 0.9% flush 10 mL, 10 mL, Intravenous, Q12H PRN, Mary Duvall MD           Review of Systems   Constitutional:  Negative for appetite change, fatigue and fever.   Respiratory:  Negative for shortness of breath.    Cardiovascular:  Negative for chest pain.   Gastrointestinal:  Negative for abdominal pain and constipation.   Endocrine: Negative for polydipsia, polyphagia and polyuria.   Genitourinary:  Negative for difficulty urinating, frequency and hot flashes.   Allergic/Immunologic: Negative for environmental allergies.   Neurological:  Positive for dizziness. Negative for light-headedness.   Psychiatric/Behavioral:  Negative for agitation.                 Vitals:    08/09/23 1025 08/09/23 1031   BP: (!) 175/108 (!) 177/113   BP Location: Right arm Left arm   Patient Position: Sitting    BP Method: Large (Automatic)    Pulse: 105    Resp: 18    Temp: 97.8 °F (36.6 °C)    TempSrc: Temporal    SpO2: 96%    Weight: 86.2 kg (190 lb)    Height: 5' 2" (1.575 m)         Physical Exam  Vitals and nursing note reviewed.   Constitutional:       Appearance: Normal appearance. She is obese.   Cardiovascular:      Rate and Rhythm: Normal rate and regular rhythm.      Pulses: Normal pulses.      Heart sounds: Normal heart sounds.   Pulmonary:      Effort: Pulmonary effort is normal.      Breath sounds: Normal breath sounds.   Abdominal:      General: Abdomen is flat. Bowel sounds are normal.      Palpations: Abdomen is soft.   Musculoskeletal:         General: Normal range of motion.   Skin:     General: Skin is warm and dry.   Neurological:      General: No focal deficit present.      Mental Status: She is alert and oriented to person, place, and time. Mental status is at baseline.           Last Labs:     Office Visit on 08/09/2023   Component Date Value    Sodium 08/09/2023 142     " Potassium 08/09/2023 3.7     Chloride 08/09/2023 109 (H)     CO2 08/09/2023 28     Anion Gap 08/09/2023 9     Glucose 08/09/2023 149 (H)     BUN 08/09/2023 14     Creatinine 08/09/2023 1.05 (H)     BUN/Creatinine Ratio 08/09/2023 13     Calcium 08/09/2023 9.6     eGFR 08/09/2023 57 (L)     Vitamin B12 08/09/2023 687     TSH 08/09/2023 0.945     WBC 08/09/2023 7.87     RBC 08/09/2023 5.34     Hemoglobin 08/09/2023 14.8     Hematocrit 08/09/2023 45.3     MCV 08/09/2023 84.8     MCH 08/09/2023 27.7     MCHC 08/09/2023 32.7     RDW 08/09/2023 13.2     Platelet Count 08/09/2023 262     MPV 08/09/2023 10.5     Neutrophils % 08/09/2023 61.0     Lymphocytes % 08/09/2023 28.2     Monocytes % 08/09/2023 6.6 (H)     Eosinophils % 08/09/2023 3.4     Basophils % 08/09/2023 0.5     Immature Granulocytes % 08/09/2023 0.3     nRBC, Auto 08/09/2023 0.0     Neutrophils, Abs 08/09/2023 4.80     Lymphocytes, Absolute 08/09/2023 2.22     Monocytes, Absolute 08/09/2023 0.52     Eosinophils, Absolute 08/09/2023 0.27     Basophils, Absolute 08/09/2023 0.04     Immature Granulocytes, A* 08/09/2023 0.02     nRBC, Absolute 08/09/2023 0.00     Diff Type 08/09/2023 Auto        Last Imaging:  MRI Brain W WO Contrast  Narrative: EXAMINATION:  MRI BRAIN W WO CONTRAST    CLINICAL HISTORY:  Brain metastases suspected. post op craniotomy 2.5 years ago.  Brain mass..    TECHNIQUE:  Multiplanar multisequence MR imaging of the brain was performed before and after the administration of 20 mL Clariscan intravenous contrast.    COMPARISON:  CT head without contrast, 08/10/2023.  MRI brain without contrast, 09/07/2022.  Report for MRI brain, 04/02/2023.    FINDINGS:  INTRACRANIAL: Postoperative changes from left parietal craniotomy for resection of meningioma are again demonstrated.  Interval significant increase in size of residual nodular enhancement underlying the craniotomy flap compared to study from 09/07/2022, measuring approximately 4.1 x 3.5 x 2.7  cm.  By report this measured 2.2 x 1.5 x 1.4 cm on study from 04/02/2023.  The lesion is homogeneously enhancing with dural base underlying the flap in the high parietal convexity abutting the superior sagittal sinus.  No definite invasion of the superior sagittal sinus. The lesion demonstrates restricted diffusion which is likely related to high cellularity.  Increased local sulcal effacement and increased adjacent parenchymal T2 FLAIR hyperintense signal compared to study from 09/07/2022.    No definite parenchymal restricted diffusion.  Nodular foci of apparent restricted diffusion along the inferior frontal lobe bilaterally is favored artifactual though on the left at least there is a somewhat nodular appearance.  No associated abnormal T2 or T2 FLAIR signal is identified.  No definite acute intracranial hemorrhage.  Midline focus at susceptibility along the falx is unchanged.  Background scattered and confluent T2 FLAIR hyperintense white matter foci are similar to prior study, likely related to chronic microvascular ischemic change.  No hydrocephalus.  Visualized major intracranial vascular structures are normal in course and caliber.    SINUSES: Paranasal sinuses and mastoid air cells are clear.    ORBITS: Left lens replacement.  Impression: [employer] concordant.  Interval increase in size of now 4.1 x 3.5 x 2.7 cm residual/recurrent meningioma underlying the left parietal craniotomy flap with increased surrounding parenchymal vasogenic edema.    Electronically signed by: Jarrell Tomlin MD  Date:    08/11/2023  Time:    10:27         **Labs and x-rays personally reviewed by me    ** reviewed      Objective:        Assessment:       1. History of meningioma  CT Head Without Contrast      2. Weakness  Basic Metabolic Panel    CBC Auto Differential    Vitamin B12    TSH    Basic Metabolic Panel    CBC Auto Differential    Vitamin B12    TSH    Ambulatory referral/consult to Physical/Occupational Therapy       3. Vitamin B12 deficiency  Vitamin B12    Vitamin B12      4. Essential hypertension  NIFEdipine (ADALAT CC) 90 MG TbSR           Plan:         [unfilled]

## 2023-08-12 PROBLEM — R90.89 ABNORMAL CT OF BRAIN: Status: ACTIVE | Noted: 2023-08-12

## 2023-08-14 PROBLEM — R53.81 DEBILITY: Status: ACTIVE | Noted: 2023-08-14

## 2023-08-15 ENCOUNTER — TELEPHONE (OUTPATIENT)
Dept: NEUROSURGERY | Facility: CLINIC | Age: 73
End: 2023-08-15
Payer: MEDICARE

## 2023-08-15 DIAGNOSIS — G93.89 BRAIN MASS: Primary | ICD-10-CM

## 2023-08-15 NOTE — TELEPHONE ENCOUNTER
Patient's 2 week wound check along with 4 week post op appointment with CT head scheduled. Patient will receive appointments upon discharge from [facility].

## 2023-08-15 NOTE — TELEPHONE ENCOUNTER
----- Message from Kerry Adams NP sent at 8/15/2023  1:46 PM CDT -----  Regarding: Follow up  Patient will need follow up for post crani for tumor in 2 weeks for wound check and repeat head CT in 4 weeks.     Thanks  -E

## 2023-08-18 PROBLEM — Z86.718 HISTORY OF DVT (DEEP VEIN THROMBOSIS): Status: RESOLVED | Noted: 2018-01-23 | Resolved: 2023-08-18

## 2023-08-21 ENCOUNTER — OUTPATIENT CASE MANAGEMENT (OUTPATIENT)
Dept: ADMINISTRATIVE | Facility: OTHER | Age: 73
End: 2023-08-21
Payer: MEDICARE

## 2023-08-21 NOTE — PROGRESS NOTES
Outpatient Care Management  Patient Not Qualified    Patient: Eugenie Coates  MRN:  [id number]  Date of Service:  8/21/2023  Completed by:  Josi Montanez RN    Chief Complaint   Patient presents with    OPCM Chart Review     8/21/23    Case Closure     8/21/23       Patient Summary                8/21/23 Discharged to SNF. Patient not qualified for OPCM services at this time. Closing case.

## 2023-08-28 ENCOUNTER — TELEPHONE (OUTPATIENT)
Dept: NEUROSURGERY | Facility: CLINIC | Age: 73
End: 2023-08-28

## 2023-08-28 NOTE — TELEPHONE ENCOUNTER
----- Message from Linda Wiley sent at 8/28/2023 12:54 PM CDT -----  Regarding: nurse appointment  Contact: patient  Type:  Sooner Appointment Request    Caller is requesting a sooner appointment.  Caller declined first available appointment listed below.  Caller will not accept being placed on the waitlist and is requesting a message be sent to doctor.    Name of Caller:  spouse Melchor Coates  When is the first available appointment?  08/28/23 at 11:00  Symptoms:  wound care nurse appointment  Best Call Back Number:  [phone] (home) [phone] (work)  Additional Information:  Patient missed her appointment this morning  due to she is in Rehab at the hospital. She will call back when she can get it rescheduled. Thanks!

## 2023-09-05 ENCOUNTER — TELEPHONE (OUTPATIENT)
Dept: NEUROSURGERY | Facility: CLINIC | Age: 73
End: 2023-09-05
Payer: MEDICARE

## 2023-09-05 ENCOUNTER — OFFICE VISIT (OUTPATIENT)
Dept: FAMILY MEDICINE | Facility: CLINIC | Age: 73
End: 2023-09-05
Payer: MEDICARE

## 2023-09-05 VITALS
TEMPERATURE: 97 F | DIASTOLIC BLOOD PRESSURE: 80 MMHG | BODY MASS INDEX: 36.69 KG/M2 | HEIGHT: 62 IN | HEART RATE: 88 BPM | WEIGHT: 199.38 LBS | SYSTOLIC BLOOD PRESSURE: 143 MMHG | RESPIRATION RATE: 17 BRPM | OXYGEN SATURATION: 95 %

## 2023-09-05 DIAGNOSIS — G93.89 BRAIN MASS: ICD-10-CM

## 2023-09-05 DIAGNOSIS — D70.9 NEUTROPENIA, UNSPECIFIED TYPE: Primary | ICD-10-CM

## 2023-09-05 DIAGNOSIS — E11.65 TYPE 2 DIABETES MELLITUS WITH HYPERGLYCEMIA, WITHOUT LONG-TERM CURRENT USE OF INSULIN: ICD-10-CM

## 2023-09-05 DIAGNOSIS — R60.0 LEG EDEMA: ICD-10-CM

## 2023-09-05 LAB
ANION GAP SERPL CALCULATED.3IONS-SCNC: 9 MMOL/L (ref 7–16)
BASOPHILS # BLD AUTO: 0.02 K/UL (ref 0–0.2)
BASOPHILS NFR BLD AUTO: 0.3 % (ref 0–1)
BUN SERPL-MCNC: 14 MG/DL (ref 7–18)
BUN/CREAT SERPL: 15 (ref 6–20)
CALCIUM SERPL-MCNC: 9.6 MG/DL (ref 8.5–10.1)
CHLORIDE SERPL-SCNC: 106 MMOL/L (ref 98–107)
CO2 SERPL-SCNC: 29 MMOL/L (ref 21–32)
CREAT SERPL-MCNC: 0.92 MG/DL (ref 0.55–1.02)
DIFFERENTIAL METHOD BLD: ABNORMAL
EGFR (NO RACE VARIABLE) (RUSH/TITUS): 66 ML/MIN/1.73M2
EOSINOPHIL # BLD AUTO: 0.34 K/UL (ref 0–0.5)
EOSINOPHIL NFR BLD AUTO: 4.3 % (ref 1–4)
ERYTHROCYTE [DISTWIDTH] IN BLOOD BY AUTOMATED COUNT: 13.5 % (ref 11.5–14.5)
GLUCOSE SERPL-MCNC: 163 MG/DL (ref 74–106)
HCT VFR BLD AUTO: 38.9 % (ref 38–47)
HGB BLD-MCNC: 12.8 G/DL (ref 12–16)
IMM GRANULOCYTES # BLD AUTO: 0.03 K/UL (ref 0–0.04)
IMM GRANULOCYTES NFR BLD: 0.4 % (ref 0–0.4)
LYMPHOCYTES # BLD AUTO: 2.15 K/UL (ref 1–4.8)
LYMPHOCYTES NFR BLD AUTO: 26.9 % (ref 27–41)
MCH RBC QN AUTO: 28.1 PG (ref 27–31)
MCHC RBC AUTO-ENTMCNC: 32.9 G/DL (ref 32–36)
MCV RBC AUTO: 85.5 FL (ref 80–96)
MONOCYTES # BLD AUTO: 0.67 K/UL (ref 0–0.8)
MONOCYTES NFR BLD AUTO: 8.4 % (ref 2–6)
MPC BLD CALC-MCNC: 10.8 FL (ref 9.4–12.4)
NEUTROPHILS # BLD AUTO: 4.79 K/UL (ref 1.8–7.7)
NEUTROPHILS NFR BLD AUTO: 59.7 % (ref 53–65)
NRBC # BLD AUTO: 0 X10E3/UL
NRBC, AUTO (.00): 0 %
PLATELET # BLD AUTO: 166 K/UL (ref 150–400)
POTASSIUM SERPL-SCNC: 4.2 MMOL/L (ref 3.5–5.1)
RBC # BLD AUTO: 4.55 M/UL (ref 4.2–5.4)
SODIUM SERPL-SCNC: 140 MMOL/L (ref 136–145)
WBC # BLD AUTO: 8 K/UL (ref 4.5–11)

## 2023-09-05 PROCEDURE — 80048 BASIC METABOLIC PNL TOTAL CA: CPT | Mod: ,,, | Performed by: CLINICAL MEDICAL LABORATORY

## 2023-09-05 PROCEDURE — 99214 PR OFFICE/OUTPT VISIT, EST, LEVL IV, 30-39 MIN: ICD-10-PCS | Mod: ,,, | Performed by: INTERNAL MEDICINE

## 2023-09-05 PROCEDURE — 80048 BASIC METABOLIC PANEL: ICD-10-PCS | Mod: ,,, | Performed by: CLINICAL MEDICAL LABORATORY

## 2023-09-05 PROCEDURE — 99214 OFFICE O/P EST MOD 30 MIN: CPT | Mod: ,,, | Performed by: INTERNAL MEDICINE

## 2023-09-05 PROCEDURE — 85025 COMPLETE CBC W/AUTO DIFF WBC: CPT | Mod: ,,, | Performed by: CLINICAL MEDICAL LABORATORY

## 2023-09-05 PROCEDURE — 85025 CBC WITH DIFFERENTIAL: ICD-10-PCS | Mod: ,,, | Performed by: CLINICAL MEDICAL LABORATORY

## 2023-09-05 NOTE — TELEPHONE ENCOUNTER
Care Due:                  Date            Visit Type   Department     Provider  --------------------------------------------------------------------------------                                EP -                              PRIMARY      MET INTERNAL  Last Visit: 10-      [facility] ([facility])   MEDICINE       Shashi Brown  Next Visit: None Scheduled  None         None Found                                                            Last  Test          Frequency    Reason                     Performed    Due Date  --------------------------------------------------------------------------------    Office Visit  12 months..  ezetimibe, metFORMIN.....  10-   10-    [facility] Embedded Care Due Messages. Reference number: [id number].   9/05/2023 5:28:39 PM CDT

## 2023-09-05 NOTE — TELEPHONE ENCOUNTER
----- Message from Hien Wang sent at 9/5/2023  4:28 PM CDT -----  Type: Patient Call Back         Who called:Pt          What is the request in detail: Pt called in regarding requesting a copy MRI Disc send to [facility] . Also the pt need something called in for nausea and claustrophobic before up coming CT appointment on 9/12/23 . If so send to [id number] - [place], [place] - [address] AT [facility] [address]         Can the clinic reply by [facility]?no          Would the patient rather a call back or a response via [facility]? Call back          Best call back number:[phone] (mobile)        Additional Information:           Thank You

## 2023-09-06 RX ORDER — FUROSEMIDE 20 MG/1
40 TABLET ORAL EVERY MORNING
Qty: 90 TABLET | Refills: 0 | Status: SHIPPED | OUTPATIENT
Start: 2023-09-06

## 2023-09-06 RX ORDER — METFORMIN HYDROCHLORIDE 500 MG/1
TABLET ORAL
Qty: 90 TABLET | Refills: 0 | Status: SHIPPED | OUTPATIENT
Start: 2023-09-06

## 2023-09-06 NOTE — TELEPHONE ENCOUNTER
Lov 10/24/22.  Nov not booked yet.    Last gave 90  6/9/23    Saw family medicine on [facility] yesterday  I marcela send msg appt past due or choose new pcp.

## 2023-09-11 PROBLEM — D70.9 NEUTROPENIA: Status: ACTIVE | Noted: 2023-09-11

## 2023-09-11 NOTE — PROGRESS NOTES
Subjective:       Patient ID: Eugenie Coates is a 73 y.o. female.    Chief Complaint: Transitional Care (Hospital )    HPI  .  Patient complains of Jarrell's weakness she complains of leg edema she is status post removal angioma.  Also patient does have neutropenia.  Today the plan is going to refer to neurology.  BNP CBC today.  Because of bilateral lower extremity edema increase Lasix 40 milligrams 1 p.o. q.a.m. venous Dopplers of the lower extremities.  Stat   Since she is status post room will yellow order a CT scan of the right follow-up today.  Current Medications:    Current Outpatient Medications:     b complex vitamins tablet, Take 1 tablet by mouth once daily., Disp: , Rfl:     blood sugar diagnostic Strp, Test twice daily.  Accucheck viva test strips and lancets., Disp: 300 each, Rfl: 3    calcium carb-vit D3-magnesium (CORAL CALCIUM) 250-200-125 mg-unit-mg Cap, Take 1 capsule by mouth once daily., Disp: , Rfl:     carvediloL (COREG) 12.5 MG tablet, Take 1 tablet (12.5 mg total) by mouth 2 (two) times daily with meals., Disp: 60 tablet, Rfl: 11    cloNIDine (CATAPRES) 0.1 MG tablet, Take 1 tablet (0.1 mg total) by mouth every evening. Take if blood pressure is greater than 180/100., Disp: 90 tablet, Rfl: 0    dorzolamide-timolol 2-0.5% (COSOPT) 22.3-6.8 mg/mL ophthalmic solution, INSTILL 1 DROP IN BOTH EYES EVERY 12 HOURS (Patient taking differently: Place 1 drop into both eyes every 12 (twelve) hours. INSTILL 1 DROP IN BOTH EYES EVERY 12 HOURS), Disp: 10 mL, Rfl: 11    enoxaparin (LOVENOX) 40 mg/0.4 mL Syrg, Inject 0.4 mLs (40 mg total) into the skin Q24H (prophylaxis, 1700)., Disp: , Rfl:     ezetimibe (ZETIA) 10 mg tablet, TAKE 1 TABLET(10 MG) BY MOUTH EVERY DAY (Patient taking differently: Take 10 mg by mouth once daily.), Disp: 90 tablet, Rfl: 3    gabapentin (NEURONTIN) 100 MG capsule, Take 1 capsule (100 mg total) by mouth 2 (two) times daily., Disp: 60 capsule, Rfl: 5    glucosamine-chondroitin  (OSTEO BI-FLEX) 250-200 mg Tab, Take 1 tablet by mouth once daily., Disp: , Rfl:     insulin detemir U-100, Levemir, 100 unit/mL (3 mL) SubQ InPn pen, Inject 14 Units into the skin once daily., Disp: 4.2 mL, Rfl: 11    lacosamide (VIMPAT) 100 mg Tab, Take 1 tablet (100 mg total) by mouth every 12 (twelve) hours., Disp: 60 tablet, Rfl: 5    lancets (ACCU-CHEK SOFTCLIX LANCETS) Misc, 1 Units by Misc.(Non-Drug; Combo Route) route 2 (two) times daily., Disp: 300 each, Rfl: 3    latanoprost 0.005 % ophthalmic solution, Place 1 drop into both eyes every evening., Disp: 2.5 mL, Rfl: 12    mv-mn-vitC-bevPa-Gas-Mok-hc124 (AIRBORNE, ASCORBATE SODIUM,) 333-1.7 mg Chew, Take 1 each by mouth once daily., Disp: , Rfl:     NIFEdipine (ADALAT CC) 90 MG TbSR, Take 1 tablet (90 mg total) by mouth once daily., Disp: 30 tablet, Rfl: 0    pantoprazole (PROTONIX) 40 MG tablet, Take 1 tablet (40 mg total) by mouth 2 (two) times daily., Disp: 60 tablet, Rfl: 0    rosuvastatin (CRESTOR) 40 MG Tab, TAKE 1 TABLET(40 MG) BY MOUTH EVERY EVENING (Patient taking differently: Take 40 mg by mouth every evening.), Disp: 90 tablet, Rfl: 3    furosemide (LASIX) 20 MG tablet, Take 2 tablets (40 mg total) by mouth every morning., Disp: 90 tablet, Rfl: 0    metFORMIN (GLUCOPHAGE) 500 MG tablet, TAKE 1 TABLET(500 MG) BY MOUTH DAILY WITH BREAKFAST, Disp: 90 tablet, Rfl: 0           Review of Systems   Constitutional:  Negative for appetite change, fatigue and fever.   Respiratory:  Negative for shortness of breath.    Cardiovascular:  Negative for chest pain.   Gastrointestinal:  Negative for abdominal pain and constipation.   Endocrine: Negative for polydipsia, polyphagia and polyuria.   Genitourinary:  Negative for difficulty urinating, frequency and hot flashes.   Allergic/Immunologic: Negative for environmental allergies.   Neurological:  Negative for dizziness and light-headedness.   Psychiatric/Behavioral:  Negative for agitation.                "  Vitals:    09/05/23 1426 09/05/23 1427   BP: (!) 150/85 (!) 143/80   BP Location: Right arm Left arm   Patient Position: Sitting    BP Method: Large (Automatic)    Pulse: 88    Resp: 17    Temp: 97.2 °F (36.2 °C)    TempSrc: Temporal    SpO2: 95%    Weight: 90.4 kg (199 lb 6.4 oz)    Height: 5' 2" (1.575 m)         Physical Exam  Vitals and nursing note reviewed.   Constitutional:       Appearance: Normal appearance.   Cardiovascular:      Rate and Rhythm: Normal rate and regular rhythm.      Pulses: Normal pulses.      Heart sounds: Normal heart sounds.   Pulmonary:      Effort: Pulmonary effort is normal.      Breath sounds: Normal breath sounds.   Abdominal:      General: Abdomen is flat. Bowel sounds are normal.      Palpations: Abdomen is soft.   Musculoskeletal:         General: Normal range of motion.   Skin:     General: Skin is warm and dry.   Neurological:      General: No focal deficit present.      Mental Status: She is alert and oriented to person, place, and time. Mental status is at baseline.           Last Labs:     Office Visit on 09/05/2023   Component Date Value    Sodium 09/05/2023 140     Potassium 09/05/2023 4.2     Chloride 09/05/2023 106     CO2 09/05/2023 29     Anion Gap 09/05/2023 9     Glucose 09/05/2023 163 (H)     BUN 09/05/2023 14     Creatinine 09/05/2023 0.92     BUN/Creatinine Ratio 09/05/2023 15     Calcium 09/05/2023 9.6     eGFR 09/05/2023 66     WBC 09/05/2023 8.00     RBC 09/05/2023 4.55     Hemoglobin 09/05/2023 12.8     Hematocrit 09/05/2023 38.9     MCV 09/05/2023 85.5     MCH 09/05/2023 28.1     MCHC 09/05/2023 32.9     RDW 09/05/2023 13.5     Platelet Count 09/05/2023 166     MPV 09/05/2023 10.8     Neutrophils % 09/05/2023 59.7     Lymphocytes % 09/05/2023 26.9 (L)     Monocytes % 09/05/2023 8.4 (H)     Eosinophils % 09/05/2023 4.3 (H)     Basophils % 09/05/2023 0.3     Immature Granulocytes % 09/05/2023 0.4     nRBC, Auto 09/05/2023 0.0     Neutrophils, Abs 09/05/2023 " 4.79     Lymphocytes, Absolute 09/05/2023 2.15     Monocytes, Absolute 09/05/2023 0.67     Eosinophils, Absolute 09/05/2023 0.34     Basophils, Absolute 09/05/2023 0.02     Immature Granulocytes, A* 09/05/2023 0.03     nRBC, Absolute 09/05/2023 0.00     Diff Type 09/05/2023 Auto    No results displayed because visit has over 200 results.      Admission on 08/10/2023, Discharged on 08/10/2023   Component Date Value    Sodium 08/10/2023 138     Potassium 08/10/2023 3.6     Chloride 08/10/2023 105     CO2 08/10/2023 24     Anion Gap 08/10/2023 13     Glucose 08/10/2023 269 (H)     BUN 08/10/2023 15     Creatinine 08/10/2023 1.24 (H)     BUN/Creatinine Ratio 08/10/2023 12     Calcium 08/10/2023 9.2     Total Protein 08/10/2023 8.4 (H)     Albumin 08/10/2023 3.6     Globulin 08/10/2023 4.8 (H)     A/G Ratio 08/10/2023 0.8     Bilirubin, Total 08/10/2023 0.7     Alk Phos 08/10/2023 106     ALT 08/10/2023 24     AST 08/10/2023 29     eGFR 08/10/2023 46 (L)     WBC 08/10/2023 10.29     RBC 08/10/2023 5.74 (H)     Hemoglobin 08/10/2023 15.8     Hematocrit 08/10/2023 48.5 (H)     MCV 08/10/2023 84.5     MCH 08/10/2023 27.5     MCHC 08/10/2023 32.6     RDW 08/10/2023 13.4     Platelet Count 08/10/2023 280     MPV 08/10/2023 10.4     Neutrophils % 08/10/2023 53.4     Lymphocytes % 08/10/2023 35.3     Monocytes % 08/10/2023 7.2 (H)     Eosinophils % 08/10/2023 3.3     Basophils % 08/10/2023 0.5     Immature Granulocytes % 08/10/2023 0.3     nRBC, Auto 08/10/2023 0.0     Neutrophils, Abs 08/10/2023 5.50     Lymphocytes, Absolute 08/10/2023 3.63     Monocytes, Absolute 08/10/2023 0.74     Eosinophils, Absolute 08/10/2023 0.34     Basophils, Absolute 08/10/2023 0.05     Immature Granulocytes, A* 08/10/2023 0.03     nRBC, Absolute 08/10/2023 0.00     Diff Type 08/10/2023 Auto     Color, UA 08/10/2023 Light-Yellow     Clarity, UA 08/10/2023 Clear     pH, UA 08/10/2023 7.0     Leukocytes, UA 08/10/2023 Moderate (A)     Nitrites, UA  08/10/2023 Negative     Protein, UA 08/10/2023 10 (A)     Glucose, UA 08/10/2023 30 (A)     Ketones, UA 08/10/2023 Negative     Urobilinogen, UA 08/10/2023 Normal     Bilirubin, UA 08/10/2023 Negative     Blood, UA 08/10/2023 Negative     Specific Gravity, UA 08/10/2023 1.013     WBC, UA 08/10/2023 6 (H)     RBC, UA 08/10/2023 2     Bacteria, UA 08/10/2023 Occasional (A)     Squamous Epithelial Cell* 08/10/2023 Few (A)     Hyaline Casts, UA 08/10/2023 0-2 (A)     PTT 08/10/2023 22.1 (L)     PT 08/10/2023 12.3     INR 08/10/2023 0.92     ABORH Retype 08/10/2023 A POS     Specimen Outdate 08/10/2023 08/13/2023 23:59     Indirect Lacy 08/10/2023 NEG     Group & Rh 08/10/2023 A POS    Office Visit on 08/09/2023   Component Date Value    Sodium 08/09/2023 142     Potassium 08/09/2023 3.7     Chloride 08/09/2023 109 (H)     CO2 08/09/2023 28     Anion Gap 08/09/2023 9     Glucose 08/09/2023 149 (H)     BUN 08/09/2023 14     Creatinine 08/09/2023 1.05 (H)     BUN/Creatinine Ratio 08/09/2023 13     Calcium 08/09/2023 9.6     eGFR 08/09/2023 57 (L)     Vitamin B12 08/09/2023 687     TSH 08/09/2023 0.945     WBC 08/09/2023 7.87     RBC 08/09/2023 5.34     Hemoglobin 08/09/2023 14.8     Hematocrit 08/09/2023 45.3     MCV 08/09/2023 84.8     MCH 08/09/2023 27.7     MCHC 08/09/2023 32.7     RDW 08/09/2023 13.2     Platelet Count 08/09/2023 262     MPV 08/09/2023 10.5     Neutrophils % 08/09/2023 61.0     Lymphocytes % 08/09/2023 28.2     Monocytes % 08/09/2023 6.6 (H)     Eosinophils % 08/09/2023 3.4     Basophils % 08/09/2023 0.5     Immature Granulocytes % 08/09/2023 0.3     nRBC, Auto 08/09/2023 0.0     Neutrophils, Abs 08/09/2023 4.80     Lymphocytes, Absolute 08/09/2023 2.22     Monocytes, Absolute 08/09/2023 0.52     Eosinophils, Absolute 08/09/2023 0.27     Basophils, Absolute 08/09/2023 0.04     Immature Granulocytes, A* 08/09/2023 0.02     nRBC, Absolute 08/09/2023 0.00     Diff Type 08/09/2023 Auto        Last  Imaging:  MRI BRAIN W WO CONTRAST  Narrative: EXAMINATION:  MRI BRAIN W WO CONTRAST    CLINICAL HISTORY:  Postop craniotomy on 08/14/2023.    TECHNIQUE:  Multiplanar multisequence MR imaging of the brain was performed before and after the administration of 20 mL Clariscan intravenous contrast.    COMPARISON:  MRI brain with without contrast, 08/11/2023, 09/07/2022.  CT head without contrast, 08/10/2023.    FINDINGS:  INTRACRANIAL: Interval repeat left parietal craniotomy for resection of recurrence/residual extra-axial lesion.  No definite residual nodular enhancing lesion is evident.  Blood products are present within the resection cavity.  S small amount of restricted diffusion is present along the anterior margin of the resection cavity which could represent postoperative cytotoxic edema.  T2 hyperintense signal is present along the anterior right from complex adjacent to pneumocephalus and is favored artifactual.  No definitive corresponding T2 FLAIR hyperintense signal.  Thin left posterior parafalcine subdural collection measuring 2 mm in maximal thickness (series 5, image 19).  Small amount of extra-axial blood products underlying the craniotomy site and along the left hemispheric convexity.  Fluid and edema in the scalp overlying the craniotomy site.  Minor diffuse bilateral dural thickening, greater along the left hemisphere convexity than the right.  T2 FLAIR hyperintense signal surrounding the resection cavity similar to that surrounding the mass preoperatively.  Stable background scattered T2 FLAIR hyperintense white matter foci are present, likely related to chronic microvascular ischemic change.  Decreased local mass effect.  No hydrocephalus.  Midline structures have a normal configuration.  Visualized pituitary gland and infundibulum are normal.  Visualized major intracranial vascular structures demonstrate normal flow voids and are normal in course and caliber.    SINUSES: Paranasal sinuses and  mastoid air cells are clear.    ORBITS: Left lens replacement.  Impression: 1. Nighthawk concordant.  Interval gross total resection of residual/recurrence left parietal convexity mass.  2. Small area of cytotoxic edema along the anterior margin of the resection cavity.    Electronically signed by: Jarrell Tomlin MD  Date:    08/15/2023  Time:    11:45         **Labs and x-rays personally reviewed by me    ** reviewed      Objective:        Assessment:       1. Neutropenia, unspecified type  Basic Metabolic Panel    CBC Auto Differential    Basic Metabolic Panel    CBC Auto Differential    Ambulatory referral/consult to Neurology    CT Head Without Contrast    Basic Metabolic Panel      2. Leg edema  US Lower Extremity Veins Bilateral      3. Brain mass             Plan:         [unfilled]

## 2023-09-12 ENCOUNTER — HOSPITAL ENCOUNTER (OUTPATIENT)
Dept: RADIOLOGY | Facility: HOSPITAL | Age: 73
Discharge: HOME | End: 2023-09-12
Attending: NEUROLOGICAL SURGERY
Payer: MEDICARE

## 2023-09-12 ENCOUNTER — OFFICE VISIT (OUTPATIENT)
Dept: NEUROSURGERY | Facility: CLINIC | Age: 73
End: 2023-09-12
Payer: MEDICARE

## 2023-09-12 VITALS
HEIGHT: 62 IN | SYSTOLIC BLOOD PRESSURE: 129 MMHG | RESPIRATION RATE: 18 BRPM | BODY MASS INDEX: 36.62 KG/M2 | HEART RATE: 100 BPM | WEIGHT: 199 LBS | DIASTOLIC BLOOD PRESSURE: 75 MMHG

## 2023-09-12 DIAGNOSIS — G93.89 BRAIN MASS: ICD-10-CM

## 2023-09-12 DIAGNOSIS — Z98.890 S/P CRANIOTOMY: ICD-10-CM

## 2023-09-12 DIAGNOSIS — G93.89 BRAIN MASS: Primary | ICD-10-CM

## 2023-09-12 PROCEDURE — 70450 CT HEAD/BRAIN W/O DYE: CPT | Mod: TC,PO

## 2023-09-12 PROCEDURE — 70450 CT HEAD WITHOUT CONTRAST: ICD-10-PCS | Mod: 26,,, | Performed by: RADIOLOGY

## 2023-09-12 PROCEDURE — 70450 CT HEAD/BRAIN W/O DYE: CPT | Mod: 26,,, | Performed by: RADIOLOGY

## 2023-09-12 PROCEDURE — 99024 POSTOP FOLLOW-UP VISIT: CPT | Mod: POP,,, | Performed by: NEUROLOGICAL SURGERY

## 2023-09-12 PROCEDURE — 99024 PR POST-OP FOLLOW-UP VISIT: ICD-10-PCS | Mod: POP,,, | Performed by: NEUROLOGICAL SURGERY

## 2023-09-14 ENCOUNTER — EXTERNAL HOME HEALTH (OUTPATIENT)
Dept: HOME HEALTH SERVICES | Facility: HOSPITAL | Age: 73
End: 2023-09-14
Payer: MEDICARE

## 2023-09-15 ENCOUNTER — HOSPITAL ENCOUNTER (OUTPATIENT)
Dept: RADIOLOGY | Facility: HOSPITAL | Age: 73
Discharge: HOME | End: 2023-09-15
Attending: INTERNAL MEDICINE
Payer: MEDICARE

## 2023-09-15 DIAGNOSIS — D70.9 NEUTROPENIA, UNSPECIFIED TYPE: ICD-10-CM

## 2023-09-15 DIAGNOSIS — R60.0 LEG EDEMA: ICD-10-CM

## 2023-09-15 PROCEDURE — 93970 EXTREMITY STUDY: CPT | Mod: 26,,, | Performed by: RADIOLOGY

## 2023-09-15 PROCEDURE — 93970 EXTREMITY STUDY: CPT | Mod: TC

## 2023-09-15 PROCEDURE — 93970 US LOWER EXTREMITY VEINS BILATERAL: ICD-10-PCS | Mod: 26,,, | Performed by: RADIOLOGY

## 2023-09-15 PROCEDURE — 70450 CT HEAD/BRAIN W/O DYE: CPT | Mod: 26,,, | Performed by: RADIOLOGY

## 2023-09-15 PROCEDURE — 70450 CT HEAD WITHOUT CONTRAST: ICD-10-PCS | Mod: 26,,, | Performed by: RADIOLOGY

## 2023-09-15 PROCEDURE — 70450 CT HEAD/BRAIN W/O DYE: CPT | Mod: TC

## 2023-09-19 RX ORDER — BUMETANIDE 0.5 MG/1
TABLET ORAL
COMMUNITY
End: 2023-11-27

## 2023-09-19 RX ORDER — METOPROLOL TARTRATE 25 MG/1
25 TABLET, FILM COATED ORAL DAILY
COMMUNITY
End: 2023-11-27

## 2023-09-19 RX ORDER — OXCARBAZEPINE 150 MG/1
150 TABLET, FILM COATED ORAL 2 TIMES DAILY
COMMUNITY
Start: 2023-05-03 | End: 2023-11-27

## 2023-09-19 RX ORDER — BIMATOPROST 0.1 MG/ML
SOLUTION/ DROPS OPHTHALMIC
COMMUNITY
End: 2023-11-27

## 2023-09-19 RX ORDER — LEVETIRACETAM 1000 MG/1
100 TABLET ORAL DAILY
COMMUNITY
End: 2023-11-27

## 2023-09-19 RX ORDER — DORZOLAMIDE HYDROCHLORIDE AND TIMOLOL MALEATE PRESERVATIVE FREE 20; 5 MG/ML; MG/ML
SOLUTION/ DROPS OPHTHALMIC
COMMUNITY
End: 2023-11-27

## 2023-09-19 RX ORDER — OMEPRAZOLE 40 MG/1
40 CAPSULE, DELAYED RELEASE ORAL DAILY
COMMUNITY
End: 2023-11-27

## 2023-09-19 RX ORDER — LISINOPRIL 40 MG/1
40 TABLET ORAL DAILY
COMMUNITY
End: 2023-11-27

## 2023-09-19 RX ORDER — DEXAMETHASONE 4 MG/1
2 TABLET ORAL
COMMUNITY
Start: 2023-08-29 | End: 2023-11-27

## 2023-09-20 ENCOUNTER — DOCUMENT SCAN (OUTPATIENT)
Dept: HOME HEALTH SERVICES | Facility: HOSPITAL | Age: 73
End: 2023-09-20
Payer: MEDICARE

## 2023-09-20 ENCOUNTER — OFFICE VISIT (OUTPATIENT)
Dept: NEUROLOGY | Facility: CLINIC | Age: 73
End: 2023-09-20
Payer: MEDICARE

## 2023-09-20 VITALS
HEART RATE: 69 BPM | SYSTOLIC BLOOD PRESSURE: 136 MMHG | TEMPERATURE: 98 F | DIASTOLIC BLOOD PRESSURE: 82 MMHG | OXYGEN SATURATION: 97 % | WEIGHT: 199 LBS | HEIGHT: 62 IN | BODY MASS INDEX: 36.62 KG/M2 | RESPIRATION RATE: 18 BRPM

## 2023-09-20 DIAGNOSIS — D32.0 MENINGIOMA, CEREBRAL: Primary | ICD-10-CM

## 2023-09-20 DIAGNOSIS — D70.9 NEUTROPENIA, UNSPECIFIED TYPE: ICD-10-CM

## 2023-09-20 PROCEDURE — 99215 OFFICE O/P EST HI 40 MIN: CPT | Mod: PBBFAC | Performed by: PSYCHIATRY & NEUROLOGY

## 2023-09-20 PROCEDURE — 99204 OFFICE O/P NEW MOD 45 MIN: CPT | Mod: S$PBB,,, | Performed by: PSYCHIATRY & NEUROLOGY

## 2023-09-20 PROCEDURE — 99204 PR OFFICE/OUTPT VISIT, NEW, LEVL IV, 45-59 MIN: ICD-10-PCS | Mod: S$PBB,,, | Performed by: PSYCHIATRY & NEUROLOGY

## 2023-09-20 NOTE — PATIENT INSTRUCTIONS
Pt will cont Vimpat for seizure prevention   Should consider meds reconciliation per PCP   Control risk factors   Healthy lifestyle habits and good sleep hygiene  Cotn XRT and [facility]  F/u [facility] in 3 months for serial imaging  F/u 6 months

## 2023-09-20 NOTE — PROGRESS NOTES
Subjective:       Patient ID: Eugenie Coates is a 73 y.o. female     Chief Complaint:    Chief Complaint   Patient presents with    Neutropenia        Allergies:  Oxycodone hcl-oxycodone-asa and Percodan yadira    Current Medications:    Outpatient Encounter Medications as of 9/20/2023   Medication Sig Dispense Refill    b complex vitamins tablet Take 1 tablet by mouth once daily.      bimatoprost (LUMIGAN) 0.01 % Drop 1 drop into affected eye in the evening Ophthalmic Once a day      blood sugar diagnostic Strp Test twice daily.  Accucheck viva test strips and lancets. 300 each 3    bumetanide (BUMEX) 0.5 MG Tab as directed Orally two times daily      calcium carb-vit D3-magnesium (CORAL CALCIUM) 250-200-125 mg-unit-mg Cap Take 1 capsule by mouth once daily.      carvediloL (COREG) 12.5 MG tablet Take 1 tablet (12.5 mg total) by mouth 2 (two) times daily with meals. 60 tablet 11    cloNIDine (CATAPRES) 0.1 MG tablet Take 1 tablet (0.1 mg total) by mouth every evening. Take if blood pressure is greater than 180/100. 90 tablet 0    dexAMETHasone (DECADRON) 4 MG Tab Take 2 mg by mouth.      dorzolamide-timolol 2-0.5% (COSOPT) 22.3-6.8 mg/mL ophthalmic solution INSTILL 1 DROP IN BOTH EYES EVERY 12 HOURS (Patient taking differently: Place 1 drop into both eyes every 12 (twelve) hours. INSTILL 1 DROP IN BOTH EYES EVERY 12 HOURS) 10 mL 11    dorzolamide-timolol, PF, (COSOPT, PF,) 2-0.5 % Dpet ophthalmic solution 1 drop into affected eye Ophthalmic Twice a day      enoxaparin (LOVENOX) 40 mg/0.4 mL Syrg Inject 0.4 mLs (40 mg total) into the skin Q24H (prophylaxis, 1700).      ezetimibe (ZETIA) 10 mg tablet TAKE 1 TABLET(10 MG) BY MOUTH EVERY DAY (Patient taking differently: Take 10 mg by mouth once daily.) 90 tablet 3    furosemide (LASIX) 20 MG tablet Take 2 tablets (40 mg total) by mouth every morning. 90 tablet 0    gabapentin (NEURONTIN) 100 MG capsule Take 1 capsule (100 mg total) by mouth 2 (two) times daily.  60 capsule 5    glucosamine-chondroitin (OSTEO BI-FLEX) 250-200 mg Tab Take 1 tablet by mouth once daily.      insulin detemir U-100, Levemir, 100 unit/mL (3 mL) SubQ InPn pen Inject 14 Units into the skin once daily. 4.2 mL 11    lacosamide (VIMPAT) 100 mg Tab Take 1 tablet (100 mg total) by mouth every 12 (twelve) hours. 60 tablet 5    lancets (ACCU-CHEK SOFTCLIX LANCETS) Misc 1 Units by Misc.(Non-Drug; Combo Route) route 2 (two) times daily. 300 each 3    latanoprost 0.005 % ophthalmic solution Place 1 drop into both eyes every evening. 2.5 mL 12    levETIRAcetam (KEPPRA) 1000 MG tablet Take 100 mg by mouth once daily.      lisinopriL (PRINIVIL,ZESTRIL) 40 MG tablet Take 40 mg by mouth once daily.      metFORMIN (GLUCOPHAGE) 500 MG tablet TAKE 1 TABLET(500 MG) BY MOUTH DAILY WITH BREAKFAST 90 tablet 0    metoprolol tartrate (LOPRESSOR) 25 MG tablet Take 25 mg by mouth once daily.      mv-mn-vitC-[id number] (AIRBORNE, ASCORBATE SODIUM,) 333-1.7 mg Chew Take 1 each by mouth once daily.      NIFEdipine (ADALAT CC) 90 MG TbSR Take 1 tablet (90 mg total) by mouth once daily. 30 tablet 0    omeprazole (PRILOSEC) 40 MG capsule Take 40 mg by mouth once daily.      OXcarbazepine (TRILEPTAL) 150 MG Tab Take 150 mg by mouth 2 (two) times daily.      pantoprazole (PROTONIX) 40 MG tablet Take 1 tablet (40 mg total) by mouth 2 (two) times daily. 60 tablet 0    rosuvastatin (CRESTOR) 40 MG Tab TAKE 1 TABLET(40 MG) BY MOUTH EVERY EVENING (Patient taking differently: Take 40 mg by mouth every evening.) 90 tablet 3    [DISCONTINUED] omeprazole (PRILOSEC OTC) 20 MG tablet Take 1 tablet (20 mg total) by mouth once daily. 90 tablet 3     No facility-administered encounter medications on file as of 9/20/2023.       History of Present Illness  74 yo BF s/p second craniotomy for meningioma one month ago in [place] w/ Dr Bravo  Pt had hx of prev seizures few yrs ago and could not walk well and imaging found meningioma   She is still  on AED Vimpat with good control but prev on Keppra after first craniotomy but caused her anger and irritability             Past Medical History:   Diagnosis Date    Allergy     Anemia     Anxiety     Arthritis     Asthma     Bell palsy     Bilateral lower extremity edema     Chronic diastolic CHF (congestive heart failure)     Depression     Diabetes mellitus, type 2     DVT (deep venous thrombosis)     right LE    Dyslipidemia     Endometriosis     Eye injury     stuck with tree branch od ?     Focal seizure 5/3/2021    GERD (gastroesophageal reflux disease)     Glaucoma     History of DVT (deep vein thrombosis) 2018    History of uterine fibroid     Hyperlipidemia     Hypertension     Invasive lobular carcinoma of right breast in female     s/p surgery, radiation, tamoxifen    Nuclear sclerosis of both eyes 2016    Obesity     Pancreas cyst     Sleep apnea     uses C pap    Supraventricular tachycardia     SVT (supraventricular tachycardia) 2019    Thyroid disease     Venous insufficiency        Past Surgical History:   Procedure Laterality Date    CATARACT EXTRACTION W/  INTRAOCULAR LENS IMPLANT Left 2023    Procedure: CEIOL OMNI OS;  Surgeon: Juan Antonio Mcintosh MD;  Location: [facility] OR;  Service: Ophthalmology;  Laterality: Left;  consider block     SECTION      CHOLECYSTECTOMY      COLONOSCOPY      CRANIOTOMY FOR EXCISION OF INTRACRANIAL TUMOR Left 2021    Procedure: CRANIOTOMY, FOR INTRACRANIAL NEOPLASM EXCISION Left craniotomy for tumor resection;  Surgeon: Hernandez Bravo MD;  Location: [facility] OR;  Service: Neurosurgery;  Laterality: Left;    CRANIOTOMY, WITH NEOPLASM EXCISION USING COMPUTER-ASSISTED NAVIGATION Left 2023    Procedure: 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, USING COMPUTER-ASSISTED NAVIGATION-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;  Surgeon: Hernandez Bravo MD;  Location: [facility] OR;  Service: Neurosurgery;  Laterality: Left;  "   ESOPHAGOGASTRODUODENOSCOPY N/A 2/17/2023    Procedure: EGD (ESOPHAGOGASTRODUODENOSCOPY);  Surgeon: Issac Wade MD;  Location: [facility];  Service: Endoscopy;  Laterality: N/A;    glaucoma laser Bilateral     HERNIA REPAIR      KNEE SURGERY Right 2008    (R) knee scope; torn meniscus    MASTECTOMY, PARTIAL Right 12/07/2020    Procedure: MASTECTOMY, PARTIAL-Right with radiological marker Consent day of surgery; Neoprobe, technitium, Frozen;  Surgeon: Narda Keating MD;  Location: [facility] OR [address] [place] [address];  Service: General;  Laterality: Right;    SENTINEL LYMPH NODE BIOPSY Right 12/07/2020    Procedure: BIOPSY, LYMPH NODE, SENTINEL;  Surgeon: Narda Keating MD;  Location: [facility] OR [address] [place] [address];  Service: General;  Laterality: Right;    TOTAL REDUCTION MAMMOPLASTY Bilateral 12/07/2020    Procedure: MAMMOPLASTY, REDUCTION-Bilateral;  Surgeon: Zack Hood MD;  Location: [facility] OR [address] [place] [address];  Service: Plastics;  Laterality: Bilateral;    VEIN SURGERY  2003, 2004    Rt leg x2       Social History  Ms. Coates  reports that she has never smoked. She has never used smokeless tobacco. She reports that she does not drink alcohol and does not use drugs.    Family History  Ms.'s Coates family history includes Colon cancer in her maternal aunt; Diabetes in her mother; No Known Problems in her brother, father, maternal grandfather, maternal grandmother, maternal uncle, paternal aunt, paternal grandfather, paternal grandmother, paternal uncle, and sister.    Review of Systems  Review of Systems   Psychiatric/Behavioral:  Positive for depression. The patient is nervous/anxious.    All other systems reviewed and are negative.     Objective:   /82 (BP Location: Left arm, Patient Position: Sitting, BP Method: Large (Manual)) Comment: pt states she forgot her BP pill this morning  Pulse 69   Temp 97.7 °F (36.5 °C) (Temporal)   Resp 18   Ht 5' 2" (1.575 m)   Wt 90.3 kg (199 lb)   SpO2 97%   BMI 36.40 kg/m²    NEUROLOGICAL " EXAMINATION:     MENTAL STATUS   Oriented to person, place, and time.   Level of consciousness: drowsy  Knowledge: consistent with education.     CRANIAL NERVES   Cranial nerves II through XII intact.     MOTOR EXAM     Strength   Strength 5/5 throughout.     REFLEXES     Reflexes   Right brachioradialis: 1+  Left brachioradialis: 1+  Right biceps: 1+  Left biceps: 1+  Right triceps: 1+  Left triceps: 1+  Right patellar: 1+  Left patellar: 1+  Right achilles: 1+  Left achilles: 1+  Right : 1+  Left : 1+    GAIT AND COORDINATION     Gait  Gait: normal       Physical Exam  Vitals reviewed.   Constitutional:       Appearance: She is obese.   Neurological:      Mental Status: She is alert and oriented to person, place, and time. Mental status is at baseline.      Cranial Nerves: Cranial nerves 2-12 are intact.      Motor: Motor strength is normal.     Gait: Gait is intact.      Deep Tendon Reflexes:      Reflex Scores:       Tricep reflexes are 1+ on the right side and 1+ on the left side.       Bicep reflexes are 1+ on the right side and 1+ on the left side.       Brachioradialis reflexes are 1+ on the right side and 1+ on the left side.       Patellar reflexes are 1+ on the right side and 1+ on the left side.       Achilles reflexes are 1+ on the right side and 1+ on the left side.         Assessment:     Meningioma, cerebral  Comments:  s/p two prev resections and now in XRT     Neutropenia, unspecified type  -     Ambulatory referral/consult to Neurology         Primary Diagnosis and ICD10  Meningioma, cerebral [D32.0]    Plan:     Patient Instructions   Pt will cont Vimpat for seizure prevention   Should consider meds reconciliation per PCP   Control risk factors   Healthy lifestyle habits and good sleep hygiene  [facility] XRT and [facility]  F/u [facility] in 3 months for serial imaging  F/u 6 months      There are no discontinued medications.    Requested Prescriptions      No prescriptions requested or  ordered in this encounter

## 2023-09-25 DIAGNOSIS — R93.1 ABNORMAL ECHOCARDIOGRAM: Primary | ICD-10-CM

## 2023-09-26 ENCOUNTER — DOCUMENT SCAN (OUTPATIENT)
Dept: HOME HEALTH SERVICES | Facility: HOSPITAL | Age: 73
End: 2023-09-26
Payer: MEDICARE

## 2023-09-26 ENCOUNTER — OFFICE VISIT (OUTPATIENT)
Dept: FAMILY MEDICINE | Facility: CLINIC | Age: 73
End: 2023-09-26
Payer: MEDICARE

## 2023-09-26 VITALS
OXYGEN SATURATION: 97 % | DIASTOLIC BLOOD PRESSURE: 77 MMHG | RESPIRATION RATE: 19 BRPM | TEMPERATURE: 98 F | WEIGHT: 192 LBS | HEART RATE: 94 BPM | BODY MASS INDEX: 35.33 KG/M2 | HEIGHT: 62 IN | SYSTOLIC BLOOD PRESSURE: 134 MMHG

## 2023-09-26 DIAGNOSIS — Z12.31 ENCOUNTER FOR SCREENING MAMMOGRAM FOR MALIGNANT NEOPLASM OF BREAST: ICD-10-CM

## 2023-09-26 DIAGNOSIS — C50.911 INVASIVE LOBULAR CARCINOMA OF RIGHT BREAST IN FEMALE: ICD-10-CM

## 2023-09-26 DIAGNOSIS — Z12.39 ENCOUNTER FOR SCREENING FOR MALIGNANT NEOPLASM OF BREAST, UNSPECIFIED SCREENING MODALITY: ICD-10-CM

## 2023-09-26 DIAGNOSIS — I38 VALVULAR HEART DISEASE: Primary | ICD-10-CM

## 2023-09-26 PROCEDURE — 99213 OFFICE O/P EST LOW 20 MIN: CPT | Mod: ,,, | Performed by: INTERNAL MEDICINE

## 2023-09-26 PROCEDURE — 99213 PR OFFICE/OUTPT VISIT, EST, LEVL III, 20-29 MIN: ICD-10-PCS | Mod: ,,, | Performed by: INTERNAL MEDICINE

## 2023-09-27 ENCOUNTER — TELEPHONE (OUTPATIENT)
Dept: PODIATRY | Facility: CLINIC | Age: 73
End: 2023-09-27
Payer: MEDICARE

## 2023-09-27 NOTE — TELEPHONE ENCOUNTER
----- Message from Ana Ladd sent at 9/27/2023  1:50 PM CDT -----  Contact: pt  Type: Needs Medical Advice  Who Called:  patient  Best Call Back Number: [phone]   Additional Information: patient needs to reschedule upcoming appt.- needs an afternoon appt due to radiation

## 2023-10-03 ENCOUNTER — PATIENT MESSAGE (OUTPATIENT)
Dept: FAMILY MEDICINE | Facility: CLINIC | Age: 73
End: 2023-10-03
Payer: MEDICARE

## 2023-10-03 ENCOUNTER — DOCUMENT SCAN (OUTPATIENT)
Dept: HOME HEALTH SERVICES | Facility: HOSPITAL | Age: 73
End: 2023-10-03
Payer: MEDICARE

## 2023-10-03 PROBLEM — I38 VALVULAR HEART DISEASE: Status: ACTIVE | Noted: 2023-10-03

## 2023-10-03 PROBLEM — Z12.39 ENCOUNTER FOR SCREENING FOR MALIGNANT NEOPLASM OF BREAST: Status: ACTIVE | Noted: 2023-08-11

## 2023-10-03 PROBLEM — Z12.31 ENCOUNTER FOR SCREENING MAMMOGRAM FOR MALIGNANT NEOPLASM OF BREAST: Status: ACTIVE | Noted: 2023-08-11

## 2023-10-03 NOTE — PROGRESS NOTES
Subjective:       Patient ID: Eugenie Coates is a 73 y.o. female.    Chief Complaint: Neutropenia    HPI  .  PATIENT PRESENTS WITH CHRONIC DIABETES CHRONIC HYPERTENSION HISTORY OF VALVULAR HEART DISEASE.  PATIENT REQUESTING A FOLLOW UP WITH CARDIOLOGY.  WILL REFER TO CARDIOLOGY.  PATIENT DOES HAVE A 2/6 SYSTOLIC EJECTION MURMUR.    HEALTH MAINTENANCE ISSUES I WILL ORDER A MAMMOGRAM FOR THE PATIENT.  OTHERWISE THE PATIENT IS DOING FINE.  Current Medications:    Current Outpatient Medications:     b complex vitamins tablet, Take 1 tablet by mouth once daily., Disp: , Rfl:     bimatoprost (LUMIGAN) 0.01 % Drop, 1 drop into affected eye in the evening Ophthalmic Once a day, Disp: , Rfl:     blood sugar diagnostic Strp, Test twice daily.  Accucheck viva test strips and lancets., Disp: 300 each, Rfl: 3    bumetanide (BUMEX) 0.5 MG Tab, as directed Orally two times daily, Disp: , Rfl:     calcium carb-vit D3-magnesium (CORAL CALCIUM) 250-200-125 mg-unit-mg Cap, Take 1 capsule by mouth once daily., Disp: , Rfl:     carvediloL (COREG) 12.5 MG tablet, Take 1 tablet (12.5 mg total) by mouth 2 (two) times daily with meals., Disp: 60 tablet, Rfl: 11    cloNIDine (CATAPRES) 0.1 MG tablet, Take 1 tablet (0.1 mg total) by mouth every evening. Take if blood pressure is greater than 180/100., Disp: 90 tablet, Rfl: 0    dexAMETHasone (DECADRON) 4 MG Tab, Take 2 mg by mouth., Disp: , Rfl:     dorzolamide-timolol 2-0.5% (COSOPT) 22.3-6.8 mg/mL ophthalmic solution, INSTILL 1 DROP IN BOTH EYES EVERY 12 HOURS (Patient taking differently: Place 1 drop into both eyes every 12 (twelve) hours. INSTILL 1 DROP IN BOTH EYES EVERY 12 HOURS), Disp: 10 mL, Rfl: 11    dorzolamide-timolol, PF, (COSOPT, PF,) 2-0.5 % Dpet ophthalmic solution, 1 drop into affected eye Ophthalmic Twice a day, Disp: , Rfl:     enoxaparin (LOVENOX) 40 mg/0.4 mL Syrg, Inject 0.4 mLs (40 mg total) into the skin Q24H (prophylaxis, 1700)., Disp: , Rfl:     ezetimibe  (ZETIA) 10 mg tablet, TAKE 1 TABLET(10 MG) BY MOUTH EVERY DAY (Patient taking differently: Take 10 mg by mouth once daily.), Disp: 90 tablet, Rfl: 3    furosemide (LASIX) 20 MG tablet, Take 2 tablets (40 mg total) by mouth every morning., Disp: 90 tablet, Rfl: 0    gabapentin (NEURONTIN) 100 MG capsule, Take 1 capsule (100 mg total) by mouth 2 (two) times daily., Disp: 60 capsule, Rfl: 5    glucosamine-chondroitin (OSTEO BI-FLEX) 250-200 mg Tab, Take 1 tablet by mouth once daily., Disp: , Rfl:     insulin detemir U-100, Levemir, 100 unit/mL (3 mL) SubQ InPn pen, Inject 14 Units into the skin once daily., Disp: 4.2 mL, Rfl: 11    lacosamide (VIMPAT) 100 mg Tab, Take 1 tablet (100 mg total) by mouth every 12 (twelve) hours., Disp: 60 tablet, Rfl: 5    lancets (ACCU-CHEK SOFTCLIX LANCETS) Misc, 1 Units by Misc.(Non-Drug; Combo Route) route 2 (two) times daily., Disp: 300 each, Rfl: 3    latanoprost 0.005 % ophthalmic solution, Place 1 drop into both eyes every evening., Disp: 2.5 mL, Rfl: 12    levETIRAcetam (KEPPRA) 1000 MG tablet, Take 100 mg by mouth once daily., Disp: , Rfl:     lisinopriL (PRINIVIL,ZESTRIL) 40 MG tablet, Take 40 mg by mouth once daily., Disp: , Rfl:     metFORMIN (GLUCOPHAGE) 500 MG tablet, TAKE 1 TABLET(500 MG) BY MOUTH DAILY WITH BREAKFAST, Disp: 90 tablet, Rfl: 0    metoprolol tartrate (LOPRESSOR) 25 MG tablet, Take 25 mg by mouth once daily., Disp: , Rfl:     mv-mn-vitC-mxbVd-Imn-Hxr-hc124 (AIRBORNE, ASCORBATE SODIUM,) 333-1.7 mg Chew, Take 1 each by mouth once daily., Disp: , Rfl:     NIFEdipine (ADALAT CC) 90 MG TbSR, Take 1 tablet (90 mg total) by mouth once daily., Disp: 30 tablet, Rfl: 0    omeprazole (PRILOSEC) 40 MG capsule, Take 40 mg by mouth once daily., Disp: , Rfl:     OXcarbazepine (TRILEPTAL) 150 MG Tab, Take 150 mg by mouth 2 (two) times daily., Disp: , Rfl:     pantoprazole (PROTONIX) 40 MG tablet, Take 1 tablet (40 mg total) by mouth 2 (two) times daily., Disp: 60 tablet, Rfl:  "0    rosuvastatin (CRESTOR) 40 MG Tab, TAKE 1 TABLET(40 MG) BY MOUTH EVERY EVENING (Patient taking differently: Take 40 mg by mouth every evening.), Disp: 90 tablet, Rfl: 3           Review of Systems   Constitutional:  Negative for appetite change, fatigue and fever.   Respiratory:  Negative for shortness of breath.    Cardiovascular:  Negative for chest pain.   Gastrointestinal:  Negative for abdominal pain and constipation.   Endocrine: Negative for polydipsia, polyphagia and polyuria.   Genitourinary:  Negative for difficulty urinating, frequency and hot flashes.   Allergic/Immunologic: Negative for environmental allergies.   Neurological:  Negative for dizziness and light-headedness.   Psychiatric/Behavioral:  Negative for agitation.                 Vitals:    09/26/23 1404 09/26/23 1417   BP: (!) 142/79 134/77   BP Location: Right arm    Patient Position: Sitting    BP Method: X-Large (Automatic)    Pulse: 94    Resp: 19    Temp: 97.6 °F (36.4 °C)    TempSrc: Temporal    SpO2: 97%    Weight: 87.1 kg (192 lb)    Height: 5' 2" (1.575 m)         Physical Exam  Vitals and nursing note reviewed.   Constitutional:       Appearance: Normal appearance.   Cardiovascular:      Rate and Rhythm: Normal rate and regular rhythm.      Pulses: Normal pulses.      Heart sounds: Normal heart sounds.   Pulmonary:      Effort: Pulmonary effort is normal.      Breath sounds: Normal breath sounds.   Abdominal:      General: Abdomen is flat. Bowel sounds are normal.      Palpations: Abdomen is soft.   Musculoskeletal:         General: Normal range of motion.   Skin:     General: Skin is warm and dry.   Neurological:      General: No focal deficit present.      Mental Status: She is alert and oriented to person, place, and time. Mental status is at baseline.           Last Labs:     Office Visit on 09/05/2023   Component Date Value    Sodium 09/05/2023 140     Potassium 09/05/2023 4.2     Chloride 09/05/2023 106     CO2 09/05/2023 29 "     Anion Gap 09/05/2023 9     Glucose 09/05/2023 163 (H)     BUN 09/05/2023 14     Creatinine 09/05/2023 0.92     BUN/Creatinine Ratio 09/05/2023 15     Calcium 09/05/2023 9.6     eGFR 09/05/2023 66     WBC 09/05/2023 8.00     RBC 09/05/2023 4.55     Hemoglobin 09/05/2023 12.8     Hematocrit 09/05/2023 38.9     MCV 09/05/2023 85.5     MCH 09/05/2023 28.1     MCHC 09/05/2023 32.9     RDW 09/05/2023 13.5     Platelet Count 09/05/2023 166     MPV 09/05/2023 10.8     Neutrophils % 09/05/2023 59.7     Lymphocytes % 09/05/2023 26.9 (L)     Monocytes % 09/05/2023 8.4 (H)     Eosinophils % 09/05/2023 4.3 (H)     Basophils % 09/05/2023 0.3     Immature Granulocytes % 09/05/2023 0.4     nRBC, Auto 09/05/2023 0.0     Neutrophils, Abs 09/05/2023 4.79     Lymphocytes, Absolute 09/05/2023 2.15     Monocytes, Absolute 09/05/2023 0.67     Eosinophils, Absolute 09/05/2023 0.34     Basophils, Absolute 09/05/2023 0.02     Immature Granulocytes, A* 09/05/2023 0.03     nRBC, Absolute 09/05/2023 0.00     Diff Type 09/05/2023 Auto        Last Imaging:  US Lower Extremity Veins Bilateral  Narrative: EXAMINATION:  US LOWER EXTREMITY VEINS BILATERAL    CLINICAL HISTORY:  Localized edema    COMPARISON:  No relevant comparison .    TECHNIQUE:  Grayscale, spectral, and color Doppler interrogation of the bilateral lower extremity veins was performed. Augmentation and compression was performed.    FINDINGS:  Grayscale, color Doppler, and pulsed Doppler evaluation of the veins of the bilateral lower extremity demonstrate no evidence of deep venous thrombosis.  Impression: No evidence of deep venous thrombosis in either lower extremity.    Point of Service: [facility]    Electronically signed by: Hyaden Magana  Date:    09/15/2023  Time:    13:22  CT Head Without Contrast  Narrative: EXAMINATION:  CT HEAD WITHOUT CONTRAST    CLINICAL HISTORY:  Neutropenia, unspecifiedBrain mass;    COMPARISON:  CT brain September 12,  2023    TECHNIQUE:  Multiple axial tomographic images of the brain were obtained without the use of intravenous contrast.    FINDINGS:  Midline structures are nondisplaced.  No convincing evidence of acute intracranial hemorrhage.  No convincing evidence of hydrocephalus.  Mild global volume loss demonstrated.  Moderate periventricular and subcortical hypoattenuation noted which is nonspecific but consistent with chronic microvascular ischemic change. Other considerations with similar appearance include demyelinating process and vasculitis. Old lacunar infarct along the junction of superior right lentiform nucleus and the adjacent periventricular dorsal right frontal lobe white matter, similar to prior.  Redemonstration of left parietal craniotomy with small underlying encephalomalacia and chronic hypodense underlying extra-axial fluid measuring up to 1 cm in thickness, similar to prior..  Visualized paranasal sinuses and mastoid air cells are predominantly clear.  Impression: No convincing imaging evidence of acute intracranial abnormality.  Chronic/detailed findings as above.    The CT exam was performed using one or more of the following dose    reduction techniques- Automated exposure control, adjustment of the mA    and/or kV according to patient size, and/or use of iterative    reconstructed technique.    Point of Service: [facility]    Electronically signed by: Hayden Magana  Date:    09/15/2023  Time:    13:09         **Labs and x-rays personally reviewed by me    ** reviewed      Objective:        Assessment:       1. Valvular heart disease  Ambulatory referral/consult to Cardiology    CANCELED: Ambulatory referral/consult to Cardiology      2. Encounter for screening for malignant neoplasm of breast, unspecified screening modality        3. Encounter for screening mammogram for malignant neoplasm of breast        4. Invasive lobular carcinoma of right breast in female             Plan:          [unfilled]

## 2023-10-10 ENCOUNTER — PATIENT OUTREACH (OUTPATIENT)
Dept: ADMINISTRATIVE | Facility: HOSPITAL | Age: 73
End: 2023-10-10
Payer: MEDICARE

## 2023-10-10 ENCOUNTER — PATIENT MESSAGE (OUTPATIENT)
Dept: ADMINISTRATIVE | Facility: HOSPITAL | Age: 73
End: 2023-10-10
Payer: MEDICARE

## 2023-10-10 NOTE — PROGRESS NOTES
[facility] to Depression Remission at Twelve Months.        RE:  Follow up on a CMS Patient Health Questionnaire.      Message sent to patient's portal.

## 2023-10-13 ENCOUNTER — TELEPHONE (OUTPATIENT)
Dept: INTERNAL MEDICINE | Facility: CLINIC | Age: 73
End: 2023-10-13
Payer: MEDICARE

## 2023-10-13 DIAGNOSIS — Z12.31 BREAST CANCER SCREENING BY MAMMOGRAM: Primary | ICD-10-CM

## 2023-10-13 NOTE — TELEPHONE ENCOUNTER
Sent: 10/13/2023  10:43 AM CDT   To: Kevin GARCIA Staff   Subject: FW: Appointment Request                               ----- Message -----   From: Eugenie Coates   Sent: 10/12/2023   8:05 PM CDT   To: [facility] Clinical Support   Subject: Appointment Request                                Appointment Request From: Eugenie Coates      With Provider: Shashi Brown MD [[facility]]      Preferred Date Range: Any      Preferred Times: Any Time      Reason for visit: Mammogram      Comments:   A consult for a mammogram in [place]        Last done 12/9/22

## 2023-10-26 ENCOUNTER — OFFICE VISIT (OUTPATIENT)
Dept: FAMILY MEDICINE | Facility: CLINIC | Age: 73
End: 2023-10-26
Payer: MEDICARE

## 2023-10-26 VITALS
DIASTOLIC BLOOD PRESSURE: 75 MMHG | TEMPERATURE: 97 F | WEIGHT: 188.19 LBS | RESPIRATION RATE: 17 BRPM | HEIGHT: 62 IN | BODY MASS INDEX: 34.63 KG/M2 | SYSTOLIC BLOOD PRESSURE: 131 MMHG | OXYGEN SATURATION: 97 % | HEART RATE: 104 BPM

## 2023-10-26 DIAGNOSIS — E11.9 TYPE 2 DIABETES MELLITUS WITHOUT COMPLICATION, UNSPECIFIED WHETHER LONG TERM INSULIN USE: Primary | ICD-10-CM

## 2023-10-26 DIAGNOSIS — I10 ESSENTIAL HYPERTENSION: ICD-10-CM

## 2023-10-26 DIAGNOSIS — Z12.11 SCREENING FOR COLON CANCER: ICD-10-CM

## 2023-10-26 DIAGNOSIS — G93.89 BRAIN MASS: ICD-10-CM

## 2023-10-26 DIAGNOSIS — E66.9 OBESITY (BMI 30-39.9): ICD-10-CM

## 2023-10-26 PROCEDURE — 99214 OFFICE O/P EST MOD 30 MIN: CPT | Mod: ,,, | Performed by: INTERNAL MEDICINE

## 2023-10-26 PROCEDURE — 99214 PR OFFICE/OUTPT VISIT, EST, LEVL IV, 30-39 MIN: ICD-10-PCS | Mod: ,,, | Performed by: INTERNAL MEDICINE

## 2023-10-26 RX ORDER — NIFEDIPINE 90 MG/1
90 TABLET, FILM COATED, EXTENDED RELEASE ORAL DAILY
Qty: 30 TABLET | Refills: 1 | Status: SHIPPED | OUTPATIENT
Start: 2023-10-26 | End: 2023-12-27 | Stop reason: SDUPTHER

## 2023-10-26 NOTE — PROGRESS NOTES
Subjective:       Patient ID: Eugenie Coates is a 73 y.o. female.    Chief Complaint: Valvular Heart Disease    HPI  .  Patient presents with a history of a brain mass.  She is status post XRT at [facility].  Patient also has chronic obesity and chronic hypertension.  Going to refill her diltiazem today.    She wants labs done I am going to check a BMP and A1c patient either his diabetic or prediabetic but we need to follow-up on the A1c.  Sugar have been elevated at 145.  Will also order Cologuard she does not want to go with the colonoscopy.  For the obesity recommend diet exercise and lifestyle modification.    Current Medications:    Current Outpatient Medications:     b complex vitamins tablet, Take 1 tablet by mouth once daily., Disp: , Rfl:     bimatoprost (LUMIGAN) 0.01 % Drop, 1 drop into affected eye in the evening Ophthalmic Once a day, Disp: , Rfl:     blood sugar diagnostic Strp, Test twice daily.  Accucheck viva test strips and lancets., Disp: 300 each, Rfl: 3    bumetanide (BUMEX) 0.5 MG Tab, as directed Orally two times daily, Disp: , Rfl:     calcium carb-vit D3-magnesium (CORAL CALCIUM) 250-200-125 mg-unit-mg Cap, Take 1 capsule by mouth once daily., Disp: , Rfl:     carvediloL (COREG) 12.5 MG tablet, Take 1 tablet (12.5 mg total) by mouth 2 (two) times daily with meals., Disp: 60 tablet, Rfl: 11    cloNIDine (CATAPRES) 0.1 MG tablet, Take 1 tablet (0.1 mg total) by mouth every evening. Take if blood pressure is greater than 180/100., Disp: 90 tablet, Rfl: 0    dexAMETHasone (DECADRON) 4 MG Tab, Take 2 mg by mouth., Disp: , Rfl:     dorzolamide-timolol 2-0.5% (COSOPT) 22.3-6.8 mg/mL ophthalmic solution, INSTILL 1 DROP IN BOTH EYES EVERY 12 HOURS (Patient taking differently: Place 1 drop into both eyes every 12 (twelve) hours. INSTILL 1 DROP IN BOTH EYES EVERY 12 HOURS), Disp: 10 mL, Rfl: 11    dorzolamide-timolol, PF, (COSOPT, PF,) 2-0.5 % Dpet ophthalmic solution, 1 drop into affected eye  Ophthalmic Twice a day, Disp: , Rfl:     enoxaparin (LOVENOX) 40 mg/0.4 mL Syrg, Inject 0.4 mLs (40 mg total) into the skin Q24H (prophylaxis, 1700)., Disp: , Rfl:     ezetimibe (ZETIA) 10 mg tablet, TAKE 1 TABLET(10 MG) BY MOUTH EVERY DAY (Patient taking differently: Take 10 mg by mouth once daily.), Disp: 90 tablet, Rfl: 3    furosemide (LASIX) 20 MG tablet, Take 2 tablets (40 mg total) by mouth every morning., Disp: 90 tablet, Rfl: 0    gabapentin (NEURONTIN) 100 MG capsule, Take 1 capsule (100 mg total) by mouth 2 (two) times daily., Disp: 60 capsule, Rfl: 5    glucosamine-chondroitin (OSTEO BI-FLEX) 250-200 mg Tab, Take 1 tablet by mouth once daily., Disp: , Rfl:     insulin detemir U-100, Levemir, 100 unit/mL (3 mL) SubQ InPn pen, Inject 14 Units into the skin once daily., Disp: 4.2 mL, Rfl: 11    lacosamide (VIMPAT) 100 mg Tab, Take 1 tablet (100 mg total) by mouth every 12 (twelve) hours., Disp: 60 tablet, Rfl: 5    lancets (ACCU-CHEK SOFTCLIX LANCETS) Misc, 1 Units by Misc.(Non-Drug; Combo Route) route 2 (two) times daily., Disp: 300 each, Rfl: 3    latanoprost 0.005 % ophthalmic solution, Place 1 drop into both eyes every evening., Disp: 2.5 mL, Rfl: 12    levETIRAcetam (KEPPRA) 1000 MG tablet, Take 100 mg by mouth once daily., Disp: , Rfl:     lisinopriL (PRINIVIL,ZESTRIL) 40 MG tablet, Take 40 mg by mouth once daily., Disp: , Rfl:     metFORMIN (GLUCOPHAGE) 500 MG tablet, TAKE 1 TABLET(500 MG) BY MOUTH DAILY WITH BREAKFAST, Disp: 90 tablet, Rfl: 0    metoprolol tartrate (LOPRESSOR) 25 MG tablet, Take 25 mg by mouth once daily., Disp: , Rfl:     [id number] (AIRBORNE, ASCORBATE SODIUM,) 333-1.7 mg Chew, Take 1 each by mouth once daily., Disp: , Rfl:     omeprazole (PRILOSEC) 40 MG capsule, Take 40 mg by mouth once daily., Disp: , Rfl:     OXcarbazepine (TRILEPTAL) 150 MG Tab, Take 150 mg by mouth 2 (two) times daily., Disp: , Rfl:     pantoprazole (PROTONIX) 40 MG tablet, Take 1 tablet  "(40 mg total) by mouth 2 (two) times daily., Disp: 60 tablet, Rfl: 0    rosuvastatin (CRESTOR) 40 MG Tab, TAKE 1 TABLET(40 MG) BY MOUTH EVERY EVENING (Patient taking differently: Take 40 mg by mouth every evening.), Disp: 90 tablet, Rfl: 3    NIFEdipine (ADALAT CC) 90 MG TbSR, Take 1 tablet (90 mg total) by mouth once daily., Disp: 30 tablet, Rfl: 1           Review of Systems   Constitutional:  Negative for appetite change, fatigue and fever.   Respiratory:  Negative for shortness of breath.    Cardiovascular:  Negative for chest pain.   Gastrointestinal:  Negative for abdominal pain and constipation.   Endocrine: Negative for polydipsia, polyphagia and polyuria.   Genitourinary:  Negative for difficulty urinating, frequency and hot flashes.   Allergic/Immunologic: Negative for environmental allergies.   Neurological:  Negative for dizziness and light-headedness.   Psychiatric/Behavioral:  Negative for agitation.                 Vitals:    10/26/23 1407   BP: 131/75   BP Location: Left arm   Patient Position: Sitting   BP Method: X-Large (Automatic)   Pulse: 104   Resp: 17   Temp: 97.2 °F (36.2 °C)   TempSrc: Temporal   SpO2: 97%   Weight: 85.4 kg (188 lb 3.2 oz)   Height: 5' 2" (1.575 m)        Physical Exam  Vitals and nursing note reviewed.   Constitutional:       Appearance: Normal appearance. She is obese.   Cardiovascular:      Rate and Rhythm: Normal rate and regular rhythm.      Pulses: Normal pulses.      Heart sounds: Normal heart sounds.   Pulmonary:      Effort: Pulmonary effort is normal.      Breath sounds: Normal breath sounds.   Abdominal:      General: Abdomen is flat. Bowel sounds are normal.      Palpations: Abdomen is soft.   Musculoskeletal:         General: Normal range of motion.   Skin:     General: Skin is warm and dry.   Neurological:      General: No focal deficit present.      Mental Status: She is alert and oriented to person, place, and time. Mental status is at baseline.       "     Last Labs:     No visits with results within 1 Month(s) from this visit.   Latest known visit with results is:   Office Visit on 09/05/2023   Component Date Value    Sodium 09/05/2023 140     Potassium 09/05/2023 4.2     Chloride 09/05/2023 106     CO2 09/05/2023 29     Anion Gap 09/05/2023 9     Glucose 09/05/2023 163 (H)     BUN 09/05/2023 14     Creatinine 09/05/2023 0.92     BUN/Creatinine Ratio 09/05/2023 15     Calcium 09/05/2023 9.6     eGFR 09/05/2023 66     WBC 09/05/2023 8.00     RBC 09/05/2023 4.55     Hemoglobin 09/05/2023 12.8     Hematocrit 09/05/2023 38.9     MCV 09/05/2023 85.5     MCH 09/05/2023 28.1     MCHC 09/05/2023 32.9     RDW 09/05/2023 13.5     Platelet Count 09/05/2023 166     MPV 09/05/2023 10.8     Neutrophils % 09/05/2023 59.7     Lymphocytes % 09/05/2023 26.9 (L)     Monocytes % 09/05/2023 8.4 (H)     Eosinophils % 09/05/2023 4.3 (H)     Basophils % 09/05/2023 0.3     Immature Granulocytes % 09/05/2023 0.4     nRBC, Auto 09/05/2023 0.0     Neutrophils, Abs 09/05/2023 4.79     Lymphocytes, Absolute 09/05/2023 2.15     Monocytes, Absolute 09/05/2023 0.67     Eosinophils, Absolute 09/05/2023 0.34     Basophils, Absolute 09/05/2023 0.02     Immature Granulocytes, A* 09/05/2023 0.03     nRBC, Absolute 09/05/2023 0.00     Diff Type 09/05/2023 Auto        Last Imaging:  US Lower Extremity Veins Bilateral  Narrative: EXAMINATION:  US LOWER EXTREMITY VEINS BILATERAL    CLINICAL HISTORY:  Localized edema    COMPARISON:  No relevant comparison .    TECHNIQUE:  Grayscale, spectral, and color Doppler interrogation of the bilateral lower extremity veins was performed. Augmentation and compression was performed.    FINDINGS:  Grayscale, color Doppler, and pulsed Doppler evaluation of the veins of the bilateral lower extremity demonstrate no evidence of deep venous thrombosis.  Impression: No evidence of deep venous thrombosis in either lower extremity.    Point of Service: [facility]  Hospital    Electronically signed by: Hayden Magana  Date:    09/15/2023  Time:    13:22  CT Head Without Contrast  Narrative: EXAMINATION:  CT HEAD WITHOUT CONTRAST    CLINICAL HISTORY:  Neutropenia, unspecifiedBrain mass;    COMPARISON:  CT brain September 12, 2023    TECHNIQUE:  Multiple axial tomographic images of the brain were obtained without the use of intravenous contrast.    FINDINGS:  Midline structures are nondisplaced.  No convincing evidence of acute intracranial hemorrhage.  No convincing evidence of hydrocephalus.  Mild global volume loss demonstrated.  Moderate periventricular and subcortical hypoattenuation noted which is nonspecific but consistent with chronic microvascular ischemic change. Other considerations with similar appearance include demyelinating process and vasculitis. Old lacunar infarct along the junction of superior right lentiform nucleus and the adjacent periventricular dorsal right frontal lobe white matter, similar to prior.  Redemonstration of left parietal craniotomy with small underlying encephalomalacia and chronic hypodense underlying extra-axial fluid measuring up to 1 cm in thickness, similar to prior..  Visualized paranasal sinuses and mastoid air cells are predominantly clear.  Impression: No convincing imaging evidence of acute intracranial abnormality.  Chronic/detailed findings as above.    The CT exam was performed using one or more of the following dose    reduction techniques- Automated exposure control, adjustment of the mA    and/or kV according to patient size, and/or use of iterative    reconstructed technique.    Point of Service: [facility]    Electronically signed by: Hayden Magana  Date:    09/15/2023  Time:    13:09         **Labs and x-rays personally reviewed by me    ** reviewed      Objective:        Assessment:       1. Type 2 diabetes mellitus without complication, unspecified whether long term insulin use  Basic Metabolic Panel     Hemoglobin A1C      2. Essential hypertension  NIFEdipine (ADALAT CC) 90 MG TbSR      3. Screening for colon cancer  Cologuard Screening (Multitarget Stool DNA)    Cologuard Screening (Multitarget Stool DNA)      4. Brain mass        5. Obesity (BMI 30-39.9)             Plan:         [unfilled]

## 2023-10-30 NOTE — PROGRESS NOTES
Neurosurgery History & Physical    Patient ID: Eugenie Coates is a 73 y.o. female.    Chief Complaint   Patient presents with    Post-op Evaluation     Patient present to clinic today as POV with imaging        HPI:  Ms. Coates is approximately 4 weeks status post craniotomy for recurrent atypical meningioma with mass effect and brain compression.    Review of Systems  Denies fevers chills nausea or vomiting    Past Medical History:   Diagnosis Date    Allergy     Anemia     Anxiety     Arthritis     Asthma     Bell palsy     Bilateral lower extremity edema     Chronic diastolic CHF (congestive heart failure)     Depression     Diabetes mellitus, type 2     DVT (deep venous thrombosis)     right LE    Dyslipidemia     Endometriosis     Eye injury 2014    stuck with tree branch od ?     Focal seizure 5/3/2021    GERD (gastroesophageal reflux disease)     Glaucoma     History of DVT (deep vein thrombosis) 1/23/2018    History of uterine fibroid     Hyperlipidemia     Hypertension     Invasive lobular carcinoma of right breast in female     s/p surgery, radiation, tamoxifen    Nuclear sclerosis of both eyes 9/27/2016    Obesity     Pancreas cyst     Sleep apnea     uses C pap    Supraventricular tachycardia     SVT (supraventricular tachycardia) 05/2019    Thyroid disease     Venous insufficiency      Social History     Socioeconomic History    Marital status:    Tobacco Use    Smoking status: Never    Smokeless tobacco: Never   Substance and Sexual Activity    Alcohol use: Never     Alcohol/week: 0.0 standard drinks of alcohol    Drug use: Never    Sexual activity: Yes     Partners: Male     Birth control/protection: None   Social History Narrative    1/18/19: lives with her . They have multiple children, but the youngest is 38. No children at home right now. No pets at home. Splits time between here and [place]. Her son lives in [place].      Social Determinants of Health     Financial Resource  Strain: Unknown (10/20/2023)    Overall Financial Resource Strain (CARDIA)     Difficulty of Paying Living Expenses: Patient refused   Food Insecurity: Unknown (10/20/2023)    Hunger Vital Sign     Worried About Running Out of Food in the Last Year: Patient refused     Ran Out of Food in the Last Year: Patient refused   Transportation Needs: Unknown (10/20/2023)    PRAPARE - Transportation     Lack of Transportation (Medical): Patient refused     Lack of Transportation (Non-Medical): Patient refused   Physical Activity: Insufficiently Active (10/20/2023)    Exercise Vital Sign     Days of Exercise per Week: 3 days     Minutes of Exercise per Session: 10 min   Stress: Stress Concern Present (10/20/2023)    [employer] - Occupational Stress Questionnaire     Feeling of Stress : Rather much   Social Connections: Unknown (10/20/2023)    Social Connection and Isolation Panel [NHANES]     Frequency of Communication with Friends and Family: More than three times a week     Frequency of Social Gatherings with Friends and Family: Patient refused     Active Member of Clubs or Organizations: Patient refused     Attends Club or Organization Meetings: Patient refused     Marital Status:    Housing Stability: Unknown (10/20/2023)    Housing Stability Vital Sign     Unable to Pay for Housing in the Last Year: Patient refused     Number of Places Lived in the Last Year: 0     Unstable Housing in the Last Year: Patient refused     Family History   Problem Relation Age of Onset    Diabetes Mother     No Known Problems Father     No Known Problems Sister     No Known Problems Brother     Colon cancer Maternal Aunt     No Known Problems Maternal Uncle     No Known Problems Paternal Aunt     No Known Problems Paternal Uncle     No Known Problems Maternal Grandmother     No Known Problems Maternal Grandfather     No Known Problems Paternal Grandmother     No Known Problems Paternal Grandfather      Amblyopia Neg Hx     Blindness Neg Hx     Cancer Neg Hx     Cataracts Neg Hx     Glaucoma Neg Hx     Hypertension Neg Hx     Macular degeneration Neg Hx     Retinal detachment Neg Hx     Strabismus Neg Hx     Stroke Neg Hx     Thyroid disease Neg Hx     Celiac disease Neg Hx     Colon polyps Neg Hx     Esophageal cancer Neg Hx     Inflammatory bowel disease Neg Hx     Irritable bowel syndrome Neg Hx     Liver cancer Neg Hx     Liver disease Neg Hx     Rectal cancer Neg Hx     Stomach cancer Neg Hx     Ulcerative colitis Neg Hx     Cystic fibrosis Neg Hx     Crohn's disease Neg Hx     Hemochromatosis Neg Hx     Cirrhosis Neg Hx      Review of patient's allergies indicates:   Allergen Reactions    Oxycodone hcl-oxycodone-asa Hives, Itching and Other (See Comments)    Percodan yadira      Other reaction(s): Unknown       Current Outpatient Medications:     b complex vitamins tablet, Take 1 tablet by mouth once daily., Disp: , Rfl:     blood sugar diagnostic Strp, Test twice daily.  Accucheck viva test strips and lancets., Disp: 300 each, Rfl: 3    calcium carb-vit D3-magnesium (CORAL CALCIUM) 250-200-125 mg-unit-mg Cap, Take 1 capsule by mouth once daily., Disp: , Rfl:     carvediloL (COREG) 12.5 MG tablet, Take 1 tablet (12.5 mg total) by mouth 2 (two) times daily with meals., Disp: 60 tablet, Rfl: 11    cloNIDine (CATAPRES) 0.1 MG tablet, Take 1 tablet (0.1 mg total) by mouth every evening. Take if blood pressure is greater than 180/100., Disp: 90 tablet, Rfl: 0    dorzolamide-timolol 2-0.5% (COSOPT) 22.3-6.8 mg/mL ophthalmic solution, INSTILL 1 DROP IN BOTH EYES EVERY 12 HOURS (Patient taking differently: Place 1 drop into both eyes every 12 (twelve) hours. INSTILL 1 DROP IN BOTH EYES EVERY 12 HOURS), Disp: 10 mL, Rfl: 11    enoxaparin (LOVENOX) 40 mg/0.4 mL Syrg, Inject 0.4 mLs (40 mg total) into the skin Q24H (prophylaxis, 1700)., Disp: , Rfl:     ezetimibe (ZETIA) 10 mg tablet, TAKE 1 TABLET(10 MG) BY MOUTH EVERY  DAY (Patient taking differently: Take 10 mg by mouth once daily.), Disp: 90 tablet, Rfl: 3    furosemide (LASIX) 20 MG tablet, Take 2 tablets (40 mg total) by mouth every morning., Disp: 90 tablet, Rfl: 0    gabapentin (NEURONTIN) 100 MG capsule, Take 1 capsule (100 mg total) by mouth 2 (two) times daily., Disp: 60 capsule, Rfl: 5    glucosamine-chondroitin (OSTEO BI-FLEX) 250-200 mg Tab, Take 1 tablet by mouth once daily., Disp: , Rfl:     insulin detemir U-100, Levemir, 100 unit/mL (3 mL) SubQ InPn pen, Inject 14 Units into the skin once daily., Disp: 4.2 mL, Rfl: 11    lacosamide (VIMPAT) 100 mg Tab, Take 1 tablet (100 mg total) by mouth every 12 (twelve) hours., Disp: 60 tablet, Rfl: 5    lancets (ACCU-CHEK SOFTCLIX LANCETS) Misc, 1 Units by Misc.(Non-Drug; Combo Route) route 2 (two) times daily., Disp: 300 each, Rfl: 3    latanoprost 0.005 % ophthalmic solution, Place 1 drop into both eyes every evening., Disp: 2.5 mL, Rfl: 12    metFORMIN (GLUCOPHAGE) 500 MG tablet, TAKE 1 TABLET(500 MG) BY MOUTH DAILY WITH BREAKFAST, Disp: 90 tablet, Rfl: 0    mv-mn-vitC-[id number] (AIRBORNE, ASCORBATE SODIUM,) 333-1.7 mg Chew, Take 1 each by mouth once daily., Disp: , Rfl:     pantoprazole (PROTONIX) 40 MG tablet, Take 1 tablet (40 mg total) by mouth 2 (two) times daily., Disp: 60 tablet, Rfl: 0    rosuvastatin (CRESTOR) 40 MG Tab, TAKE 1 TABLET(40 MG) BY MOUTH EVERY EVENING (Patient taking differently: Take 40 mg by mouth every evening.), Disp: 90 tablet, Rfl: 3    bimatoprost (LUMIGAN) 0.01 % Drop, 1 drop into affected eye in the evening Ophthalmic Once a day, Disp: , Rfl:     bumetanide (BUMEX) 0.5 MG Tab, as directed Orally two times daily, Disp: , Rfl:     dexAMETHasone (DECADRON) 4 MG Tab, Take 2 mg by mouth., Disp: , Rfl:     dorzolamide-timolol, PF, (COSOPT, PF,) 2-0.5 % Dpet ophthalmic solution, 1 drop into affected eye Ophthalmic Twice a day, Disp: , Rfl:     levETIRAcetam (KEPPRA) 1000 MG tablet, Take 100  "mg by mouth once daily., Disp: , Rfl:     lisinopriL (PRINIVIL,ZESTRIL) 40 MG tablet, Take 40 mg by mouth once daily., Disp: , Rfl:     metoprolol tartrate (LOPRESSOR) 25 MG tablet, Take 25 mg by mouth once daily., Disp: , Rfl:     NIFEdipine (ADALAT CC) 90 MG TbSR, Take 1 tablet (90 mg total) by mouth once daily., Disp: 30 tablet, Rfl: 1    omeprazole (PRILOSEC) 40 MG capsule, Take 40 mg by mouth once daily., Disp: , Rfl:     OXcarbazepine (TRILEPTAL) 150 MG Tab, Take 150 mg by mouth 2 (two) times daily., Disp: , Rfl:   Blood pressure 129/75, pulse 100, resp. rate 18, height 5' 2" (1.575 m), weight 90.3 kg (199 lb).      Neurologic Exam  AAOx4, NAD  Strength grossly intact in the BUE  Sensation grossly intact to light touch in bilateral upper and lower extremities  Wound well healed    Imaging:  CT scan of the head without contrast dated 09/15/2023 is personally reviewed.  This indicates no acute issues.    Assessment/Plan:   Ms. Coates is a 73-year-old female approximately 4 weeks status post repeat resection of WHO grade 2 atypical meningioma.    Plan is to follow up in 3 months with repeat MRI of the brain with and without contrast.  "

## 2023-11-02 ENCOUNTER — LAB VISIT (OUTPATIENT)
Dept: LAB | Facility: HOSPITAL | Age: 73
End: 2023-11-02
Attending: INTERNAL MEDICINE
Payer: MEDICARE

## 2023-11-02 DIAGNOSIS — I10 PRIMARY HYPERTENSION: ICD-10-CM

## 2023-11-02 LAB
ALBUMIN SERPL BCP-MCNC: 3.8 G/DL (ref 3.5–5.2)
ANION GAP SERPL CALC-SCNC: 16 MMOL/L (ref 8–16)
BUN SERPL-MCNC: 22 MG/DL (ref 8–23)
CALCIUM SERPL-MCNC: 10 MG/DL (ref 8.7–10.5)
CHLORIDE SERPL-SCNC: 103 MMOL/L (ref 95–110)
CO2 SERPL-SCNC: 22 MMOL/L (ref 23–29)
CREAT SERPL-MCNC: 1.2 MG/DL (ref 0.5–1.4)
EST. GFR  (NO RACE VARIABLE): 48.1 ML/MIN/1.73 M^2
GLUCOSE SERPL-MCNC: 123 MG/DL (ref 70–110)
PHOSPHATE SERPL-MCNC: 3.7 MG/DL (ref 2.7–4.5)
POTASSIUM SERPL-SCNC: 4 MMOL/L (ref 3.5–5.1)
SODIUM SERPL-SCNC: 141 MMOL/L (ref 136–145)

## 2023-11-02 PROCEDURE — 80069 RENAL FUNCTION PANEL: CPT | Performed by: INTERNAL MEDICINE

## 2023-11-02 PROCEDURE — 36415 COLL VENOUS BLD VENIPUNCTURE: CPT | Mod: PO | Performed by: INTERNAL MEDICINE

## 2023-11-08 ENCOUNTER — OFFICE VISIT (OUTPATIENT)
Dept: CARDIOLOGY | Facility: CLINIC | Age: 73
End: 2023-11-08
Payer: MEDICARE

## 2023-11-08 VITALS
HEIGHT: 62 IN | RESPIRATION RATE: 16 BRPM | SYSTOLIC BLOOD PRESSURE: 115 MMHG | HEART RATE: 109 BPM | WEIGHT: 190 LBS | DIASTOLIC BLOOD PRESSURE: 80 MMHG | BODY MASS INDEX: 34.96 KG/M2

## 2023-11-08 DIAGNOSIS — R09.89 BRUIT OF LEFT CAROTID ARTERY: Primary | ICD-10-CM

## 2023-11-08 DIAGNOSIS — I10 ESSENTIAL HYPERTENSION: Primary | ICD-10-CM

## 2023-11-08 DIAGNOSIS — I38 VALVULAR HEART DISEASE: ICD-10-CM

## 2023-11-08 DIAGNOSIS — I10 ESSENTIAL HYPERTENSION: ICD-10-CM

## 2023-11-08 DIAGNOSIS — M79.89 LEFT LEG SWELLING: ICD-10-CM

## 2023-11-08 DIAGNOSIS — R93.1 ABNORMAL ECHOCARDIOGRAM: ICD-10-CM

## 2023-11-08 DIAGNOSIS — R60.0 LOCALIZED EDEMA: ICD-10-CM

## 2023-11-08 DIAGNOSIS — G93.89 BRAIN MASS: ICD-10-CM

## 2023-11-08 PROBLEM — I35.0 NONRHEUMATIC AORTIC VALVE STENOSIS: Status: ACTIVE | Noted: 2021-10-27

## 2023-11-08 PROCEDURE — 99204 PR OFFICE/OUTPT VISIT, NEW, LEVL IV, 45-59 MIN: ICD-10-PCS | Mod: S$PBB,,, | Performed by: STUDENT IN AN ORGANIZED HEALTH CARE EDUCATION/TRAINING PROGRAM

## 2023-11-08 PROCEDURE — 93010 ELECTROCARDIOGRAM REPORT: CPT | Mod: S$PBB,,, | Performed by: STUDENT IN AN ORGANIZED HEALTH CARE EDUCATION/TRAINING PROGRAM

## 2023-11-08 PROCEDURE — 99204 OFFICE O/P NEW MOD 45 MIN: CPT | Mod: S$PBB,,, | Performed by: STUDENT IN AN ORGANIZED HEALTH CARE EDUCATION/TRAINING PROGRAM

## 2023-11-08 PROCEDURE — 93005 ELECTROCARDIOGRAM TRACING: CPT | Mod: PBBFAC | Performed by: STUDENT IN AN ORGANIZED HEALTH CARE EDUCATION/TRAINING PROGRAM

## 2023-11-08 PROCEDURE — 99215 OFFICE O/P EST HI 40 MIN: CPT | Mod: PBBFAC | Performed by: STUDENT IN AN ORGANIZED HEALTH CARE EDUCATION/TRAINING PROGRAM

## 2023-11-08 PROCEDURE — 93010 EKG 12-LEAD: ICD-10-PCS | Mod: S$PBB,,, | Performed by: STUDENT IN AN ORGANIZED HEALTH CARE EDUCATION/TRAINING PROGRAM

## 2023-11-08 NOTE — PROGRESS NOTES
PCP: Shashi Brown MD    Referring Provider:     Subjective:   Eugenie Coates is a 73 y.o. female with hx of Meningioma s/p surgical resection on XRT, HTN, Seizures, aortic stenosis, hx of DVT, venous insufficiency who presents for evaluation of aortic stenosis.     Patient with hx of moderate aortic stenosis. She report having a few months of weekly chest pain that last upto 20 mins and worse with exertion. Also has left leg swelling and left carotid bruit. She reports exertional SOB.     Fhx: mother - extensive cardiac hx   Shx: Denies smoking, etoh or drug use    EKG - 11/8/23 - Sinus tachycardai, first degree AV block     ECHO - Results for orders placed during the hospital encounter of 08/10/23      Left Ventricle: The left ventricle is normal in size. There is mild concentric hypertrophy. Normal wall motion. There is normal systolic function with a visually estimated ejection fraction of 60 - 65%. Grade I diastolic dysfunction.    Right Ventricle: Normal right ventricular cavity size. Wall thickness is normal. Right ventricle wall motion  is normal. Systolic function is normal.    Aortic Valve: There is moderate stenosis. Aortic valve area by VTI is 1.26 cm². Aortic valve peak velocity is 3.80 m/s. Mean gradient is 31 mmHg. The dimensionless index is 0.40.    Mitral Valve: Mild mitral annular calcification.      Stress -     Lab Results   Component Value Date     11/02/2023    K 4.0 11/02/2023     11/02/2023    CO2 22 (L) 11/02/2023    BUN 22 11/02/2023    CREATININE 1.2 11/02/2023    CALCIUM 10.0 11/02/2023    ANIONGAP 16 11/02/2023    ESTGFRAFRICA SEE COMMENT 08/18/2023    EGFRNONAA SEE COMMENT 08/18/2023       Lab Results   Component Value Date    CHOL 205 (H) 03/01/2023    CHOL 225 (H) 09/26/2022    CHOL 236 (H) 04/28/2022     Lab Results   Component Value Date    HDL 40 03/01/2023    HDL 49 09/26/2022    HDL 52 04/28/2022     Lab Results   Component Value Date    LDLCALC 132.4 03/01/2023     LDLCALC 147.2 09/26/2022    LDLCALC 149.2 04/28/2022     Lab Results   Component Value Date    TRIG 163 (H) 03/01/2023    TRIG 144 09/26/2022    TRIG 174 (H) 04/28/2022     Lab Results   Component Value Date    CHOLHDL 19.5 (L) 03/01/2023    CHOLHDL 21.8 09/26/2022    CHOLHDL 22.0 04/28/2022       Lab Results   Component Value Date    WBC 8.00 09/05/2023    HGB 12.8 09/05/2023    HCT 38.9 09/05/2023    MCV 85.5 09/05/2023     09/05/2023           Current Outpatient Medications:     b complex vitamins tablet, Take 1 tablet by mouth once daily., Disp: , Rfl:     bimatoprost (LUMIGAN) 0.01 % Drop, 1 drop into affected eye in the evening Ophthalmic Once a day, Disp: , Rfl:     blood sugar diagnostic Strp, Test twice daily.  Accucheck viva test strips and lancets., Disp: 300 each, Rfl: 3    bumetanide (BUMEX) 0.5 MG Tab, as directed Orally two times daily, Disp: , Rfl:     calcium carb-vit D3-magnesium (CORAL CALCIUM) 250-200-125 mg-unit-mg Cap, Take 1 capsule by mouth once daily., Disp: , Rfl:     carvediloL (COREG) 12.5 MG tablet, Take 1 tablet (12.5 mg total) by mouth 2 (two) times daily with meals., Disp: 60 tablet, Rfl: 11    cloNIDine (CATAPRES) 0.1 MG tablet, Take 1 tablet (0.1 mg total) by mouth every evening. Take if blood pressure is greater than 180/100., Disp: 90 tablet, Rfl: 0    dexAMETHasone (DECADRON) 4 MG Tab, Take 2 mg by mouth., Disp: , Rfl:     dorzolamide-timolol 2-0.5% (COSOPT) 22.3-6.8 mg/mL ophthalmic solution, INSTILL 1 DROP IN BOTH EYES EVERY 12 HOURS (Patient taking differently: Place 1 drop into both eyes every 12 (twelve) hours. INSTILL 1 DROP IN BOTH EYES EVERY 12 HOURS), Disp: 10 mL, Rfl: 11    dorzolamide-timolol, PF, (COSOPT, PF,) 2-0.5 % Dpet ophthalmic solution, 1 drop into affected eye Ophthalmic Twice a day, Disp: , Rfl:     enoxaparin (LOVENOX) 40 mg/0.4 mL Syrg, Inject 0.4 mLs (40 mg total) into the skin Q24H (prophylaxis, 1700)., Disp: , Rfl:     ezetimibe (ZETIA) 10 mg  tablet, TAKE 1 TABLET(10 MG) BY MOUTH EVERY DAY (Patient taking differently: Take 10 mg by mouth once daily.), Disp: 90 tablet, Rfl: 3    furosemide (LASIX) 20 MG tablet, Take 2 tablets (40 mg total) by mouth every morning., Disp: 90 tablet, Rfl: 0    gabapentin (NEURONTIN) 100 MG capsule, Take 1 capsule (100 mg total) by mouth 2 (two) times daily., Disp: 60 capsule, Rfl: 5    glucosamine-chondroitin (OSTEO BI-FLEX) 250-200 mg Tab, Take 1 tablet by mouth once daily., Disp: , Rfl:     insulin detemir U-100, Levemir, 100 unit/mL (3 mL) SubQ InPn pen, Inject 14 Units into the skin once daily., Disp: 4.2 mL, Rfl: 11    lacosamide (VIMPAT) 100 mg Tab, Take 1 tablet (100 mg total) by mouth every 12 (twelve) hours., Disp: 60 tablet, Rfl: 5    lancets (ACCU-CHEK SOFTCLIX LANCETS) Misc, 1 Units by Misc.(Non-Drug; Combo Route) route 2 (two) times daily., Disp: 300 each, Rfl: 3    latanoprost 0.005 % ophthalmic solution, Place 1 drop into both eyes every evening., Disp: 2.5 mL, Rfl: 12    levETIRAcetam (KEPPRA) 1000 MG tablet, Take 100 mg by mouth once daily., Disp: , Rfl:     lisinopriL (PRINIVIL,ZESTRIL) 40 MG tablet, Take 40 mg by mouth once daily., Disp: , Rfl:     metFORMIN (GLUCOPHAGE) 500 MG tablet, TAKE 1 TABLET(500 MG) BY MOUTH DAILY WITH BREAKFAST, Disp: 90 tablet, Rfl: 0    metoprolol tartrate (LOPRESSOR) 25 MG tablet, Take 25 mg by mouth once daily., Disp: , Rfl:     mv-mn-vitC-ldqQu-Oaj-[id number] ([place], ASCORBATE SODIUM,) 333-1.7 mg Chew, Take 1 each by mouth once daily., Disp: , Rfl:     NIFEdipine (ADALAT CC) 90 MG TbSR, Take 1 tablet (90 mg total) by mouth once daily., Disp: 30 tablet, Rfl: 1    omeprazole (PRILOSEC) 40 MG capsule, Take 40 mg by mouth once daily., Disp: , Rfl:     OXcarbazepine (TRILEPTAL) 150 MG Tab, Take 150 mg by mouth 2 (two) times daily., Disp: , Rfl:     pantoprazole (PROTONIX) 40 MG tablet, Take 1 tablet (40 mg total) by mouth 2 (two) times daily., Disp: 60 tablet, Rfl: 0     "rosuvastatin (CRESTOR) 40 MG Tab, TAKE 1 TABLET(40 MG) BY MOUTH EVERY EVENING (Patient taking differently: Take 40 mg by mouth every evening.), Disp: 90 tablet, Rfl: 3    Review of Systems   Respiratory:  Negative for cough and shortness of breath.    Cardiovascular:  Positive for chest pain and leg swelling. Negative for palpitations, orthopnea, claudication and PND.         Objective:   /80   Pulse 109   Resp 16   Ht 5' 2" (1.575 m)   Wt 86.2 kg (190 lb)   BMI 34.75 kg/m²     Physical Exam  Constitutional:       Appearance: Normal appearance.   Cardiovascular:      Rate and Rhythm: Regular rhythm. Tachycardia present.      Pulses: Normal pulses.      Heart sounds: Murmur heard.   Pulmonary:      Effort: Pulmonary effort is normal.      Breath sounds: Normal breath sounds.   Musculoskeletal:         General: No swelling or deformity.      Right lower leg: No edema.      Left lower leg: Edema present.   Neurological:      Mental Status: She is alert and oriented to person, place, and time.           Assessment:     1. Bruit of left carotid artery  Radiology US Carotid Bilateral      2. Abnormal echocardiogram  Ambulatory referral/consult to Cardiology      3. Valvular heart disease  Ambulatory referral/consult to Cardiology    CBC Auto Differential    Basic Metabolic Panel    NT-Pro Natriuretic Peptide      4. Essential hypertension  EKG 12-lead      5. Left leg swelling  US Lower Extremity Veins Left      6. Localized edema  US Lower Extremity Veins Left      7. Brain mass              Plan:   Brain mass  Meningioma s/p resection on XRT    Valvular heart disease  Moderate aortic stenosis on most recent ECHO  Peak AV 3.8m/s with MG of 31mmHg  However given CP - will need a LHC and valve study to see if the AS is contributing to her angina    Left leg swelling  Unilateral +2 leg swelling   HF vs. Clot  US DVT left leg          "

## 2023-11-08 NOTE — ASSESSMENT & PLAN NOTE
Moderate aortic stenosis on most recent ECHO  Peak AV 3.8m/s with MG of 31mmHg  However given CP - will need a LHC and valve study to see if the AS is contributing to her angina

## 2023-11-09 ENCOUNTER — OFFICE VISIT (OUTPATIENT)
Dept: NEPHROLOGY | Facility: CLINIC | Age: 73
End: 2023-11-09
Payer: MEDICARE

## 2023-11-09 VITALS
HEIGHT: 62 IN | WEIGHT: 190 LBS | DIASTOLIC BLOOD PRESSURE: 64 MMHG | BODY MASS INDEX: 34.96 KG/M2 | SYSTOLIC BLOOD PRESSURE: 126 MMHG | HEART RATE: 84 BPM | OXYGEN SATURATION: 97 %

## 2023-11-09 DIAGNOSIS — I10 PRIMARY HYPERTENSION: Primary | ICD-10-CM

## 2023-11-09 DIAGNOSIS — R07.9 CHEST PAIN, UNSPECIFIED TYPE: Primary | ICD-10-CM

## 2023-11-09 PROCEDURE — 99999 PR PBB SHADOW E&M-EST. PATIENT-LVL IV: CPT | Mod: PBBFAC,,, | Performed by: INTERNAL MEDICINE

## 2023-11-09 PROCEDURE — 99214 OFFICE O/P EST MOD 30 MIN: CPT | Mod: PBBFAC,PN | Performed by: INTERNAL MEDICINE

## 2023-11-09 PROCEDURE — 99214 OFFICE O/P EST MOD 30 MIN: CPT | Mod: S$PBB,,, | Performed by: INTERNAL MEDICINE

## 2023-11-09 PROCEDURE — 99999 PR PBB SHADOW E&M-EST. PATIENT-LVL IV: ICD-10-PCS | Mod: PBBFAC,,, | Performed by: INTERNAL MEDICINE

## 2023-11-09 PROCEDURE — 99214 PR OFFICE/OUTPT VISIT, EST, LEVL IV, 30-39 MIN: ICD-10-PCS | Mod: S$PBB,,, | Performed by: INTERNAL MEDICINE

## 2023-11-09 RX ORDER — DIPHENHYDRAMINE HCL 25 MG
50 CAPSULE ORAL
Status: CANCELLED | OUTPATIENT
Start: 2023-11-09

## 2023-11-09 RX ORDER — SODIUM CHLORIDE 0.9 % (FLUSH) 0.9 %
2 SYRINGE (ML) INJECTION
Status: CANCELLED | OUTPATIENT
Start: 2023-11-30

## 2023-11-09 NOTE — PROGRESS NOTES
"  Subjective:       Patient ID: Eugenie Coates is a 73 y.o. Black or  female who presents for return patient evaluation for hypertension.      She had return of her tumor and is s/p XRT.  Her seizures have returned since last year.  She had brain surgery in August of 2023.      Review of Systems   Constitutional:  Negative for appetite change, chills and fever.   Eyes:  Negative for visual disturbance.   Respiratory:  Negative for cough and shortness of breath.    Cardiovascular:  Positive for leg swelling (occasional). Negative for chest pain.   Gastrointestinal:  Positive for abdominal pain (from GERD). Negative for diarrhea, nausea and vomiting.   Genitourinary:  Negative for difficulty urinating, dysuria, flank pain and hematuria.   Musculoskeletal:  Positive for arthralgias (knees) and gait problem. Negative for myalgias.   Skin:  Negative for rash.   Neurological:  Negative for headaches.   Psychiatric/Behavioral:  Negative for sleep disturbance.        The past medical, family and social histories were reviewed for this encounter.     /64 (BP Location: Left arm, Patient Position: Sitting)   Pulse 84   Ht 5' 2" (1.575 m)   Wt 86.2 kg (190 lb)   SpO2 97%   BMI 34.75 kg/m²     Objective:      Physical Exam  Vitals reviewed.   Constitutional:       General: She is not in acute distress.     Appearance: She is well-developed.   HENT:      Head: Normocephalic and atraumatic.   Eyes:      Conjunctiva/sclera: Conjunctivae normal.   Neck:      Vascular: No JVD.   Cardiovascular:      Rate and Rhythm: Normal rate and regular rhythm.      Heart sounds: Murmur heard.      No friction rub. No gallop.   Pulmonary:      Effort: Pulmonary effort is normal. No respiratory distress.      Breath sounds: Normal breath sounds. No wheezing or rales.   Abdominal:      General: Bowel sounds are normal. There is no distension.      Palpations: Abdomen is soft.      Tenderness: There is no abdominal " tenderness.   Musculoskeletal:      Cervical back: Neck supple.      Right lower leg: Edema (trace) present.      Left lower leg: Edema (trace) present.   Skin:     General: Skin is warm and dry.      Findings: No rash.   Neurological:      Mental Status: She is alert and oriented to person, place, and time.         Assessment:       1. Primary hypertension       Lab Results   Component Value Date    CREATININE 0.96 11/08/2023    BUN 12 11/08/2023     11/08/2023    K 3.5 11/08/2023     (H) 11/08/2023    CO2 28 11/08/2023     Lab Results   Component Value Date    CALCIUM 9.5 11/08/2023    PHOS 3.7 11/02/2023     Lab Results   Component Value Date    HCT 43.2 11/08/2023     Prot/Creat Ratio, Urine   Date Value Ref Range Status   02/22/2019 Unable to calculate 0.00 - 0.20 Final      Plan:   Return to clinic in 12 months.  Baseline creatinine is 1.1.  Labs for next visit include rp.  Renal US shows R 9.5 cm, L 10.1 cm.  Your blood pressure is controlled on your current medications.  Keep in mind that weight loss often improves blood pressure.  If you do diet and lose weight we will likely need to adjust your medications.

## 2023-11-10 LAB — NONINV COLON CA DNA+OCC BLD SCRN STL QL: NEGATIVE

## 2023-11-14 DIAGNOSIS — E11.51 TYPE 2 DIABETES MELLITUS WITH DIABETIC PERIPHERAL ANGIOPATHY WITHOUT GANGRENE, WITHOUT LONG-TERM CURRENT USE OF INSULIN: Primary | ICD-10-CM

## 2023-11-14 RX ORDER — DAPAGLIFLOZIN 10 MG/1
10 TABLET, FILM COATED ORAL DAILY
Qty: 90 TABLET | Refills: 1 | Status: SHIPPED | OUTPATIENT
Start: 2023-11-14

## 2023-11-14 RX ORDER — DAPAGLIFLOZIN 10 MG/1
10 TABLET, FILM COATED ORAL DAILY
COMMUNITY
End: 2023-11-14 | Stop reason: SDUPTHER

## 2023-11-16 ENCOUNTER — OFFICE VISIT (OUTPATIENT)
Dept: PODIATRY | Facility: CLINIC | Age: 73
End: 2023-11-16
Payer: MEDICARE

## 2023-11-16 VITALS — WEIGHT: 190.06 LBS | BODY MASS INDEX: 34.98 KG/M2 | HEIGHT: 62 IN

## 2023-11-16 DIAGNOSIS — M20.42 HAMMER TOES OF BOTH FEET: ICD-10-CM

## 2023-11-16 DIAGNOSIS — B35.1 ONYCHOMYCOSIS DUE TO DERMATOPHYTE: ICD-10-CM

## 2023-11-16 DIAGNOSIS — L84 CORN OR CALLUS: ICD-10-CM

## 2023-11-16 DIAGNOSIS — M20.41 HAMMER TOES OF BOTH FEET: ICD-10-CM

## 2023-11-16 DIAGNOSIS — E11.65 TYPE 2 DIABETES MELLITUS WITH HYPERGLYCEMIA, WITHOUT LONG-TERM CURRENT USE OF INSULIN: Primary | ICD-10-CM

## 2023-11-16 PROCEDURE — 11056 PARNG/CUTG B9 HYPRKR LES 2-4: CPT | Mod: PBBFAC,PO | Performed by: PODIATRIST

## 2023-11-16 PROCEDURE — 99499 UNLISTED E&M SERVICE: CPT | Mod: S$PBB,,, | Performed by: PODIATRIST

## 2023-11-16 PROCEDURE — 11056 PR TRIM BENIGN HYPERKERATOTIC SKIN LESION,2-4: ICD-10-PCS | Mod: Q9,S$PBB,, | Performed by: PODIATRIST

## 2023-11-16 PROCEDURE — 99999 PR PBB SHADOW E&M-EST. PATIENT-LVL II: ICD-10-PCS | Mod: PBBFAC,,, | Performed by: PODIATRIST

## 2023-11-16 PROCEDURE — 99499 NO LOS: ICD-10-PCS | Mod: S$PBB,,, | Performed by: PODIATRIST

## 2023-11-16 PROCEDURE — 99999 PR PBB SHADOW E&M-EST. PATIENT-LVL II: CPT | Mod: PBBFAC,,, | Performed by: PODIATRIST

## 2023-11-16 PROCEDURE — 99212 OFFICE O/P EST SF 10 MIN: CPT | Mod: PBBFAC,PO | Performed by: PODIATRIST

## 2023-11-16 PROCEDURE — 11721 DEBRIDE NAIL 6 OR MORE: CPT | Performed by: PODIATRIST

## 2023-11-16 PROCEDURE — 11721 DEBRIDE NAIL 6 OR MORE: CPT | Mod: 59,Q9,S$PBB, | Performed by: PODIATRIST

## 2023-11-16 PROCEDURE — 11056 PARNG/CUTG B9 HYPRKR LES 2-4: CPT | Mod: Q9,S$PBB,, | Performed by: PODIATRIST

## 2023-11-16 PROCEDURE — 11721 PR DEBRIDEMENT OF NAILS, 6 OR MORE: ICD-10-PCS | Mod: 59,Q9,S$PBB, | Performed by: PODIATRIST

## 2023-11-16 NOTE — PROGRESS NOTES
Subjective:      Patient ID: Eugenie Coates is a 73 y.o. female.    Chief Complaint: Diabetes Mellitus (Silver 10/26/23) and Diabetic Foot Exam      Eugenie is a 73 y.o. female who presents to the clinic for routine evaluation and treatment of diabetic feet. Eugenie has a past medical history of Allergy, Anemia, Anxiety, Arthritis, Asthma, Bell palsy, Bilateral lower extremity edema, Chronic diastolic CHF (congestive heart failure), Depression, Diabetes mellitus, type 2, DVT (deep venous thrombosis), Dyslipidemia, Endometriosis, Eye injury (2014), Focal seizure (5/3/2021), GERD (gastroesophageal reflux disease), Glaucoma, History of DVT (deep vein thrombosis) (1/23/2018), History of uterine fibroid, Hyperlipidemia, Hypertension, Invasive lobular carcinoma of right breast in female, Nuclear sclerosis of both eyes (9/27/2016), Obesity, Pancreas cyst, Sleep apnea, Supraventricular tachycardia, SVT (supraventricular tachycardia) (05/2019), Thyroid disease, and Venous insufficiency. Patient relates that her toenails are in need of trimming.  Denies being painful with wearing shoe gear.  Has not attempted to self treat.  Relates the usual control over her blood glucose.  Denies neuropathy pain with today's exam.   Denies any additional pedal complaints.      PCP: Shashi Brown MD    Date Last Seen by PCP: 10/23     Hemoglobin A1C   Date Value Ref Range Status   10/26/2023 7.0 (H) 4.5 - 6.6 % Final     Comment:       Normal:               <5.7%  Pre-Diabetic:       5.7% to 6.4%  Diabetic:             >6.4%  Diabetic Goal:     <7%   08/14/2023 7.1 (H) 0.0 - 5.6 % Final     Comment:     Reference Interval:  5.0 - 5.6 Normal   5.7 - 6.4 High Risk   > 6.5 Diabetic      Hgb A1c results are standardized based on the (NGSP) National   Glycohemoglobin Standardization Program.      Hemoglobin A1C levels are related to mean serum/plasma glucose   during the preceding 2-3 months.        03/01/2023 7.1 (H) 4.0 - 5.6 % Final      Comment:     ADA Screening Guidelines:  5.7-6.4%  Consistent with prediabetes  >or=6.5%  Consistent with diabetes    High levels of fetal hemoglobin interfere with the HbA1C  assay. Heterozygous hemoglobin variants (HbS, HgC, etc)do  not significantly interfere with this assay.   However, presence of multiple variants may affect accuracy.     2022 6.4 (H) 4.0 - 5.6 % Final     Comment:     ADA Screening Guidelines:  5.7-6.4%  Consistent with prediabetes  >or=6.5%  Consistent with diabetes    High levels of fetal hemoglobin interfere with the HbA1C  assay. Heterozygous hemoglobin variants (HbS, HgC, etc)do  not significantly interfere with this assay.   However, presence of multiple variants may affect accuracy.             Past Medical History:   Diagnosis Date    Allergy     Anemia     Anxiety     Arthritis     Asthma     Bell palsy     Bilateral lower extremity edema     Chronic diastolic CHF (congestive heart failure)     Depression     Diabetes mellitus, type 2     DVT (deep venous thrombosis)     right LE    Dyslipidemia     Endometriosis     Eye injury     stuck with tree branch od ?     Focal seizure 5/3/2021    GERD (gastroesophageal reflux disease)     Glaucoma     History of DVT (deep vein thrombosis) 2018    History of uterine fibroid     Hyperlipidemia     Hypertension     Invasive lobular carcinoma of right breast in female     s/p surgery, radiation, tamoxifen    Nuclear sclerosis of both eyes 2016    Obesity     Pancreas cyst     Sleep apnea     uses C pap    Supraventricular tachycardia     SVT (supraventricular tachycardia) 2019    Thyroid disease     Venous insufficiency        Past Surgical History:   Procedure Laterality Date    CATARACT EXTRACTION W/  INTRAOCULAR LENS IMPLANT Left 2023    Procedure: CEIOL OMNI OS;  Surgeon: Juan Antonio Mcintosh MD;  Location: [facility] OR;  Service: Ophthalmology;  Laterality: Left;  consider block     SECTION      CHOLECYSTECTOMY       COLONOSCOPY      CRANIOTOMY FOR EXCISION OF INTRACRANIAL TUMOR Left 05/04/2021    Procedure: CRANIOTOMY, FOR INTRACRANIAL NEOPLASM EXCISION Left craniotomy for tumor resection;  Surgeon: Hernandez Bravo MD;  Location: [facility] OR;  Service: Neurosurgery;  Laterality: Left;    CRANIOTOMY, WITH NEOPLASM EXCISION USING COMPUTER-ASSISTED NAVIGATION Left 8/14/2023    Procedure: 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, USING COMPUTER-ASSISTED NAVIGATION-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;  Surgeon: Hernandez Bravo MD;  Location: [facility] OR;  Service: Neurosurgery;  Laterality: Left;    ESOPHAGOGASTRODUODENOSCOPY N/A 2/17/2023    Procedure: EGD (ESOPHAGOGASTRODUODENOSCOPY);  Surgeon: Issac Wade MD;  Location: [facility] ENDO;  Service: Endoscopy;  Laterality: N/A;    glaucoma laser Bilateral     HERNIA REPAIR      KNEE SURGERY Right 2008    (R) knee scope; torn meniscus    MASTECTOMY, PARTIAL Right 12/07/2020    Procedure: MASTECTOMY, PARTIAL-Right with radiological marker Consent day of surgery; Neoprobe, technitium, Frozen;  Surgeon: Narda Keating MD;  Location: [facility] OR [address] [place] [address];  Service: General;  Laterality: Right;    SENTINEL LYMPH NODE BIOPSY Right 12/07/2020    Procedure: BIOPSY, LYMPH NODE, SENTINEL;  Surgeon: Narda Keating MD;  Location: [facility] OR [address] [place] [address];  Service: General;  Laterality: Right;    TOTAL REDUCTION MAMMOPLASTY Bilateral 12/07/2020    Procedure: MAMMOPLASTY, REDUCTION-Bilateral;  Surgeon: Zack Hood MD;  Location: [facility] OR [address] [place] [address];  Service: Plastics;  Laterality: Bilateral;    VEIN SURGERY  2003, 2004    Rt leg x2       Family History   Problem Relation Age of Onset    Diabetes Mother     No Known Problems Father     No Known Problems Sister     No Known Problems Brother     Colon cancer Maternal Aunt     No Known Problems Maternal Uncle     No Known Problems Paternal Aunt     No Known Problems Paternal Uncle     No Known Problems Maternal Grandmother      No Known Problems Maternal Grandfather     No Known Problems Paternal Grandmother     No Known Problems Paternal Grandfather     Amblyopia Neg Hx     Blindness Neg Hx     Cancer Neg Hx     Cataracts Neg Hx     Glaucoma Neg Hx     Hypertension Neg Hx     Macular degeneration Neg Hx     Retinal detachment Neg Hx     Strabismus Neg Hx     Stroke Neg Hx     Thyroid disease Neg Hx     Celiac disease Neg Hx     Colon polyps Neg Hx     Esophageal cancer Neg Hx     Inflammatory bowel disease Neg Hx     Irritable bowel syndrome Neg Hx     Liver cancer Neg Hx     Liver disease Neg Hx     Rectal cancer Neg Hx     Stomach cancer Neg Hx     Ulcerative colitis Neg Hx     Cystic fibrosis Neg Hx     Crohn's disease Neg Hx     Hemochromatosis Neg Hx     Cirrhosis Neg Hx        Social History     Socioeconomic History    Marital status:    Tobacco Use    Smoking status: Never    Smokeless tobacco: Never   Substance and Sexual Activity    Alcohol use: Never     Alcohol/week: 0.0 standard drinks of alcohol    Drug use: Never    Sexual activity: Yes     Partners: Male     Birth control/protection: None   Social History Narrative    1/18/19: lives with her . They have multiple children, but the youngest is 38. No children at home right now. No pets at home. Splits time between here and [place]. Her son lives in [place].      Social Determinants of Health     Financial Resource Strain: Unknown (11/1/2023)    Overall Financial Resource Strain (CARDIA)     Difficulty of Paying Living Expenses: Patient refused   Food Insecurity: Unknown (11/1/2023)    Hunger Vital Sign     Worried About Running Out of Food in the Last Year: Patient refused     Ran Out of Food in the Last Year: Patient refused   Transportation Needs: Unknown (11/1/2023)    PRAPARE - Transportation     Lack of Transportation (Medical): Patient refused     Lack of Transportation (Non-Medical): Patient refused   Physical Activity: Insufficiently Active  (11/1/2023)    Exercise Vital Sign     Days of Exercise per Week: 2 days     Minutes of Exercise per Session: 20 min   Stress: Stress Concern Present (11/1/2023)    [employer] - Occupational Stress Questionnaire     Feeling of Stress : To some extent   Social Connections: Unknown (11/1/2023)    Social Connection and Isolation Panel [NHANES]     Frequency of Communication with Friends and Family: More than three times a week     Frequency of Social Gatherings with Friends and Family: Patient refused     Active Member of Clubs or Organizations: Patient refused     Attends Club or Organization Meetings: Patient refused     Marital Status:    Housing Stability: Unknown (11/1/2023)    Housing Stability Vital Sign     Unable to Pay for Housing in the Last Year: Patient refused     Number of Places Lived in the Last Year: 0     Unstable Housing in the Last Year: Patient refused       Current Outpatient Medications   Medication Sig Dispense Refill    b complex vitamins tablet Take 1 tablet by mouth once daily.      bimatoprost (LUMIGAN) 0.01 % Drop 1 drop into affected eye in the evening Ophthalmic Once a day      blood sugar diagnostic Strp Test twice daily.  Accucheck viva test strips and lancets. 300 each 3    bumetanide (BUMEX) 0.5 MG Tab as directed Orally two times daily      calcium carb-vit D3-magnesium (CORAL CALCIUM) 250-200-125 mg-unit-mg Cap Take 1 capsule by mouth once daily.      carvediloL (COREG) 12.5 MG tablet Take 1 tablet (12.5 mg total) by mouth 2 (two) times daily with meals. 60 tablet 11    cloNIDine (CATAPRES) 0.1 MG tablet Take 1 tablet (0.1 mg total) by mouth every evening. Take if blood pressure is greater than 180/100. 90 tablet 0    dapagliflozin propanediol (FARXIGA) 10 mg tablet Take 1 tablet (10 mg total) by mouth once daily. 90 tablet 1    dexAMETHasone (DECADRON) 4 MG Tab Take 2 mg by mouth.      dorzolamide-timolol 2-0.5% (COSOPT) 22.3-6.8 mg/mL  ophthalmic solution INSTILL 1 DROP IN BOTH EYES EVERY 12 HOURS (Patient not taking: Reported on 11/9/2023) 10 mL 11    dorzolamide-timolol, PF, (COSOPT, PF,) 2-0.5 % Dpet ophthalmic solution 1 drop into affected eye Ophthalmic Twice a day      enoxaparin (LOVENOX) 40 mg/0.4 mL Syrg Inject 0.4 mLs (40 mg total) into the skin Q24H (prophylaxis, 1700). (Patient not taking: Reported on 11/9/2023)      ezetimibe (ZETIA) 10 mg tablet TAKE 1 TABLET(10 MG) BY MOUTH EVERY DAY (Patient not taking: Reported on 11/9/2023) 90 tablet 3    furosemide (LASIX) 20 MG tablet Take 2 tablets (40 mg total) by mouth every morning. 90 tablet 0    gabapentin (NEURONTIN) 100 MG capsule Take 1 capsule (100 mg total) by mouth 2 (two) times daily. (Patient not taking: Reported on 11/9/2023) 60 capsule 5    glucosamine-chondroitin (OSTEO BI-FLEX) 250-200 mg Tab Take 1 tablet by mouth once daily.      insulin detemir U-100, Levemir, 100 unit/mL (3 mL) SubQ InPn pen Inject 14 Units into the skin once daily. (Patient not taking: Reported on 11/9/2023) 4.2 mL 11    lacosamide (VIMPAT) 100 mg Tab Take 1 tablet (100 mg total) by mouth every 12 (twelve) hours. (Patient not taking: Reported on 11/9/2023) 60 tablet 5    lancets (ACCU-CHEK SOFTCLIX LANCETS) Misc 1 Units by Misc.(Non-Drug; Combo Route) route 2 (two) times daily. 300 each 3    latanoprost 0.005 % ophthalmic solution Place 1 drop into both eyes every evening. 2.5 mL 12    levETIRAcetam (KEPPRA) 1000 MG tablet Take 100 mg by mouth once daily.      lisinopriL (PRINIVIL,ZESTRIL) 40 MG tablet Take 40 mg by mouth once daily.      metFORMIN (GLUCOPHAGE) 500 MG tablet TAKE 1 TABLET(500 MG) BY MOUTH DAILY WITH BREAKFAST 90 tablet 0    metoprolol tartrate (LOPRESSOR) 25 MG tablet Take 25 mg by mouth once daily.      [id number] (A[place], ASCORBATE SODIUM,) 333-1.7 mg Chew Take 1 each by mouth once daily.      NIFEdipine (ADALAT CC) 90 MG TbSR Take 1 tablet (90 mg total) by mouth  once daily. 30 tablet 1    omeprazole (PRILOSEC) 40 MG capsule Take 40 mg by mouth once daily.      OXcarbazepine (TRILEPTAL) 150 MG Tab Take 150 mg by mouth 2 (two) times daily.      pantoprazole (PROTONIX) 40 MG tablet Take 1 tablet (40 mg total) by mouth 2 (two) times daily. 60 tablet 0    rosuvastatin (CRESTOR) 40 MG Tab TAKE 1 TABLET(40 MG) BY MOUTH EVERY EVENING (Patient not taking: Reported on 11/9/2023) 90 tablet 3     No current facility-administered medications for this visit.       Review of patient's allergies indicates:   Allergen Reactions    Percodan [oxycodone hcl-oxycodone-asa] Hives and Itching         Review of Systems   Constitutional: Negative for chills and fever.   Cardiovascular:  Positive for leg swelling. Negative for claudication.   Skin:  Positive for color change and nail changes.   Musculoskeletal:  Positive for joint swelling. Negative for joint pain, muscle cramps and muscle weakness.   Gastrointestinal:  Negative for nausea and vomiting.   Neurological:  Positive for numbness. Negative for paresthesias.   Psychiatric/Behavioral:  Negative for altered mental status.            Objective:      Physical Exam  Constitutional:       General: She is not in acute distress.     Appearance: She is well-developed. She is not diaphoretic.   Cardiovascular:      Pulses:           Dorsalis pedis pulses are 2+ on the right side and 2+ on the left side.        Posterior tibial pulses are 2+ on the right side and 2+ on the left side.      Comments: CFT < 3 seconds bilateral.  Pedal hair growth decreased bilateral.  Varicosities noted bilateral.  Moderate non pitting edema noted to bilateral lower extremity.  Toes are cool to touch bilateral.    Musculoskeletal:         General: No tenderness.      Comments: Muscle strength 5/5 in all muscle groups bilateral.  No tenderness nor crepitation with ROM of foot/ankle joints bilateral.  No tenderness with palpation of bilateral foot and ankle.  Bilateral  pes planus foot type.  Bilateral hallux abducto valgus.  Bilateral semi-reducible contracture of toes 2-5.     Skin:     General: Skin is warm and dry.      Coloration: Skin is not pale.      Findings: Lesion present. No abrasion, bruising, burn, ecchymosis, erythema, laceration, petechiae or rash.      Nails: There is no clubbing.      Comments: Pedal skin appears edematous bilateral.  Toenails x 10 appear thickened by 2 mm, elongated by 4 mm, and discolored with subungual debris.  Hemosiderin staining noted to the Rt. Lower leg.  Hyperkeratotic lesion noted to bilateral sub 5th met head.   Neurological:      Mental Status: She is alert and oriented to person, place, and time.      Sensory: Sensory deficit present.      Motor: No weakness or atrophy.      Comments: Protective sensation per [place]Heron monofilament intact bilateral.    Vibratory sensation decreased bilateral.    Light touch intact bilateral.               Assessment:       Encounter Diagnoses   Name Primary?    Type 2 diabetes mellitus with hyperglycemia, without long-term current use of insulin Yes    Hammer toes of both feet     Onychomycosis due to dermatophyte     Corn or callus              Plan:       Eugenie was seen today for diabetes mellitus and diabetic foot exam.    Diagnoses and all orders for this visit:    Type 2 diabetes mellitus with hyperglycemia, without long-term current use of insulin    Hammer toes of both feet    Onychomycosis due to dermatophyte    Corn or callus        I counseled the patient on her conditions, their implications and medical management.    Shoe inspection. Diabetic Foot Education. Patient reminded of the importance of good nutrition and blood sugar control to help prevent podiatric complications of diabetes. Patient instructed on proper foot hygeine. We discussed wearing proper shoe gear, daily foot inspections, never walking without protective shoe gear, never putting sharp instruments to  feet    Advised to continue wearing diabetic shoes/insoles that accommodate for digital deformities.     With patient's permission, nails were aggressively reduced and debrided x 10 to their soft tissue attachment mechanically and with electric , removing all offending nail and debris.  Also, a sterile #15 scalpel was used to trim a lesions x 2 down to smooth appearing skin without incident.   Patient relates relief following the procedure.  She will continue to monitor the areas daily, inspect her feet, wear protective shoe gear when ambulatory, moisturizer to maintain skin integrity and follow in this office in approximately 4 months, sooner [email or web address].    Giuliano Garnett DPM

## 2023-11-22 ENCOUNTER — TELEPHONE (OUTPATIENT)
Dept: CARDIOLOGY | Facility: CLINIC | Age: 73
End: 2023-11-22
Payer: MEDICARE

## 2023-11-22 NOTE — TELEPHONE ENCOUNTER
Pt. Had notified Tatiana () to cancel u/s carotids due to illness many unsuccessful attempts to reach pt. To r/s test, many VM left on pt. Phone to contact office. No returned calls from pt. Received.

## 2023-11-27 ENCOUNTER — OFFICE VISIT (OUTPATIENT)
Dept: FAMILY MEDICINE | Facility: CLINIC | Age: 73
End: 2023-11-27
Payer: MEDICARE

## 2023-11-27 VITALS
HEART RATE: 84 BPM | HEIGHT: 62 IN | SYSTOLIC BLOOD PRESSURE: 132 MMHG | DIASTOLIC BLOOD PRESSURE: 80 MMHG | TEMPERATURE: 98 F | WEIGHT: 188.81 LBS | OXYGEN SATURATION: 99 % | BODY MASS INDEX: 34.74 KG/M2 | RESPIRATION RATE: 18 BRPM

## 2023-11-27 DIAGNOSIS — J02.9 SORE THROAT: ICD-10-CM

## 2023-11-27 DIAGNOSIS — R53.1 GENERALIZED WEAKNESS: Primary | ICD-10-CM

## 2023-11-27 PROCEDURE — 99213 OFFICE O/P EST LOW 20 MIN: CPT | Mod: ,,, | Performed by: NURSE PRACTITIONER

## 2023-11-27 PROCEDURE — 87880 STREP A ASSAY W/OPTIC: CPT | Mod: RHCUB | Performed by: NURSE PRACTITIONER

## 2023-11-27 PROCEDURE — 87428 SARSCOV & INF VIR A&B AG IA: CPT | Mod: RHCUB | Performed by: NURSE PRACTITIONER

## 2023-11-27 PROCEDURE — 99213 PR OFFICE/OUTPT VISIT, EST, LEVL III, 20-29 MIN: ICD-10-PCS | Mod: ,,, | Performed by: NURSE PRACTITIONER

## 2023-11-27 NOTE — PROGRESS NOTES
"[facility]  Chief Complaint      Chief Complaint   Patient presents with    Fatigue     Patient c/o weakness and widespread body aches. She states that she woke up feeling this way this morning.     Extremity Weakness     Patient c/o weakness in her arms and legs.    Health Maintenance     Care gaps not addressed, patient ill today.    Maren Leyva CMA       History of Present Illness      Eugenie Coates is a 73 y.o. female with chronic conditions of GERD, Hypertension, Hyperlipidemia, Hx of DVT, Anxiety, DM Type 2, Anemia, Sleep Apnea, Hx of Seizure, Hx of Brain Mass treated with radiation, and Obesity who presents today for c/o fatigue and weakness in arms and legs. She reports feeling like this upon awakening this morning. She reports her glucose was 105 fasting this morning. She reports having a cough "every now and then" but does report having some sore throat. Her main complaint is just feeling weakness in her arms and legs. Rapid Influenza A, Influenza B, Covid, and Strep all negative today.       Past Medical History:  Past Medical History:   Diagnosis Date    Allergy     Anemia     Anxiety     Arthritis     Asthma     Bell palsy     Bilateral lower extremity edema     Chronic diastolic CHF (congestive heart failure)     Depression     Diabetes mellitus, type 2     DVT (deep venous thrombosis)     right LE    Dyslipidemia     Endometriosis     Eye injury 2014    stuck with tree branch od ?     Focal seizure 5/3/2021    GERD (gastroesophageal reflux disease)     Glaucoma     History of DVT (deep vein thrombosis) 1/23/2018    History of uterine fibroid     Hyperlipidemia     Hypertension     Invasive lobular carcinoma of right breast in female     s/p surgery, radiation, tamoxifen    Nuclear sclerosis of both eyes 9/27/2016    Obesity     Pancreas cyst     Sleep apnea     uses C pap    Supraventricular tachycardia     SVT (supraventricular tachycardia) 05/2019    Thyroid disease  " "   Venous insufficiency        Past Surgical History:   has a past surgical history that includes Cholecystectomy;  section; Hernia repair; Vein Surgery (, ); Knee surgery (Right, ); glaucoma laser (Bilateral); Mastectomy, partial (Right, 2020); Horatio lymph node biopsy (Right, 2020); Total Reduction Mammoplasty (Bilateral, 2020); Craniotomy for excision of intracranial tumor (Left, 2021); Cataract extraction w/  intraocular lens implant (Left, 2023); Colonoscopy; Esophagogastroduodenoscopy (N/A, 2023); and craniotomy, with neoplasm excision using computer-assisted navigation (Left, 2023).    Social History:  Social History     Tobacco Use    Smoking status: Never    Smokeless tobacco: Never   Substance Use Topics    Alcohol use: Never     Alcohol/week: 0.0 standard drinks of alcohol    Drug use: Never       I personally reviewed all past medical, surgical, and social.     Review of Systems   Constitutional:  Positive for fatigue.   HENT:  Positive for sore throat and trouble swallowing ("I have thyroid problems"). Negative for congestion, ear pain and rhinorrhea.    Respiratory:  Positive for cough ("every now and then") and shortness of breath ("I have a little shortness of breath").    Cardiovascular:  Negative for chest pain.   Gastrointestinal:  Negative for abdominal pain, constipation and diarrhea.   Genitourinary:  Positive for frequency. Negative for dysuria and urgency.   Musculoskeletal:  Positive for extremity weakness.   Neurological:  Positive for dizziness ("when I got up this morning") and weakness. Negative for headaches.        Medications:  Outpatient Encounter Medications as of 2023   Medication Sig Dispense Refill    b complex vitamins tablet Take 1 tablet by mouth once daily.      blood sugar diagnostic Strp Test twice daily.  Accucheck viva test strips and lancets. 300 each 3    calcium carb-vit D3-magnesium (CORAL CALCIUM) " 250-200-125 mg-unit-mg Cap Take 1 capsule by mouth once daily.      carvediloL (COREG) 12.5 MG tablet Take 1 tablet (12.5 mg total) by mouth 2 (two) times daily with meals. 60 tablet 11    cloNIDine (CATAPRES) 0.1 MG tablet Take 1 tablet (0.1 mg total) by mouth every evening. Take if blood pressure is greater than 180/100. 90 tablet 0    dapagliflozin propanediol (FARXIGA) 10 mg tablet Take 1 tablet (10 mg total) by mouth once daily. 90 tablet 1    furosemide (LASIX) 20 MG tablet Take 2 tablets (40 mg total) by mouth every morning. 90 tablet 0    glucosamine-chondroitin (OSTEO BI-FLEX) 250-200 mg Tab Take 1 tablet by mouth once daily.      lancets (ACCU-CHEK SOFTCLIX LANCETS) Misc 1 Units by Misc.(Non-Drug; Combo Route) route 2 (two) times daily. 300 each 3    latanoprost 0.005 % ophthalmic solution Place 1 drop into both eyes every evening. 2.5 mL 12    metFORMIN (GLUCOPHAGE) 500 MG tablet TAKE 1 TABLET(500 MG) BY MOUTH DAILY WITH BREAKFAST 90 tablet 0    mv-mn-vitC-[id number] (AIR[place]ORNE, ASCORBATE SODIUM,) 333-1.7 mg Chew Take 1 each by mouth once daily.      NIFEdipine (ADALAT CC) 90 MG TbSR Take 1 tablet (90 mg total) by mouth once daily. 30 tablet 1    pantoprazole (PROTONIX) 40 MG tablet Take 1 tablet (40 mg total) by mouth 2 (two) times daily. 60 tablet 0    [DISCONTINUED] bimatoprost (LUMIGAN) 0.01 % Drop 1 drop into affected eye in the evening Ophthalmic Once a day      [DISCONTINUED] bumetanide (BUMEX) 0.5 MG Tab as directed Orally two times daily      [DISCONTINUED] dexAMETHasone (DECADRON) 4 MG Tab Take 2 mg by mouth.      [DISCONTINUED] dorzolamide-timolol 2-0.5% (COSOPT) 22.3-6.8 mg/mL ophthalmic solution INSTILL 1 DROP IN BOTH EYES EVERY 12 HOURS (Patient not taking: Reported on 11/9/2023) 10 mL 11    [DISCONTINUED] dorzolamide-timolol, PF, (COSOPT, PF,) 2-0.5 % Dpet ophthalmic solution 1 drop into affected eye Ophthalmic Twice a day      [DISCONTINUED] enoxaparin (LOVENOX) 40 mg/0.4 mL Syrg  Inject 0.4 mLs (40 mg total) into the skin Q24H (prophylaxis, 1700). (Patient not taking: Reported on 11/9/2023)      [DISCONTINUED] ezetimibe (ZETIA) 10 mg tablet TAKE 1 TABLET(10 MG) BY MOUTH EVERY DAY (Patient not taking: Reported on 11/9/2023) 90 tablet 3    [DISCONTINUED] gabapentin (NEURONTIN) 100 MG capsule Take 1 capsule (100 mg total) by mouth 2 (two) times daily. (Patient not taking: Reported on 11/9/2023) 60 capsule 5    [DISCONTINUED] insulin detemir U-100, Levemir, 100 unit/mL (3 mL) SubQ InPn pen Inject 14 Units into the skin once daily. (Patient not taking: Reported on 11/9/2023) 4.2 mL 11    [DISCONTINUED] lacosamide (VIMPAT) 100 mg Tab Take 1 tablet (100 mg total) by mouth every 12 (twelve) hours. (Patient not taking: Reported on 11/9/2023) 60 tablet 5    [DISCONTINUED] levETIRAcetam (KEPPRA) 1000 MG tablet Take 100 mg by mouth once daily.      [DISCONTINUED] lisinopriL (PRINIVIL,ZESTRIL) 40 MG tablet Take 40 mg by mouth once daily.      [DISCONTINUED] metoprolol tartrate (LOPRESSOR) 25 MG tablet Take 25 mg by mouth once daily.      [DISCONTINUED] omeprazole (PRILOSEC OTC) 20 MG tablet Take 1 tablet (20 mg total) by mouth once daily. 90 tablet 3    [DISCONTINUED] omeprazole (PRILOSEC) 40 MG capsule Take 40 mg by mouth once daily.      [DISCONTINUED] OXcarbazepine (TRILEPTAL) 150 MG Tab Take 150 mg by mouth 2 (two) times daily.      [DISCONTINUED] rosuvastatin (CRESTOR) 40 MG Tab TAKE 1 TABLET(40 MG) BY MOUTH EVERY EVENING (Patient not taking: Reported on 11/9/2023) 90 tablet 3     No facility-administered encounter medications on file as of 11/27/2023.       Allergies:  Review of patient's allergies indicates:   Allergen Reactions    Oxycodone hcl-oxycodone-asa Hives, Itching and Other (See Comments)    Percodan yadira      Other reaction(s): Unknown       Health Maintenance:  Immunization History   Administered Date(s) Administered    COVID-19 Vaccine 08/24/2021, 02/07/2022    COVID-19, MRNA, LN-S,  "PF (MODERNA FULL 0.5 ML DOSE) 07/27/2021, 07/05/2022    COVID-19, mRNA, LNP-S, bivalent booster, PF (Moderna Omicron)12 + YEARS 09/27/2022    Influenza 11/25/2016    Influenza (FLUAD) - Trivalent - Adjuvanted - PF (65+) 08/22/2019    Influenza - High Dose - PF (65 years and older) 10/02/2017, 09/19/2018    Influenza - Quadrivalent 11/03/2014, 11/10/2015    Influenza - Quadrivalent - High Dose - PF (65 years and older) 08/18/2020, 08/13/2021    Pneumococcal Conjugate - 13 Valent 09/06/2016    Pneumococcal Polysaccharide - 23 Valent 09/18/2017    Tdap 11/30/2016    Zoster 10/06/2016, 11/25/2016    Zoster Recombinant 05/24/2019, 08/24/2019        Health Maintenance   Topic Date Due    Colorectal Cancer Screening  11/02/2023    Mammogram  12/09/2023    DEXA Scan  12/15/2023    Eye Exam  01/10/2024    Foot Exam  01/12/2024    Lipid Panel  03/01/2024    Hemoglobin A1c  04/26/2024    Low Dose Statin  10/26/2024    TETANUS VACCINE  11/30/2026    Hepatitis C Screening  Completed    Shingles Vaccine  Completed        Physical Exam      Vital Signs  Temp: 97.9 °F (36.6 °C)  Temp Source: Oral  Pulse: 84  Resp: 18  SpO2: 99 %  BP: 132/80  BP Location: Left arm  Patient Position: Sitting  Pain Score:   8  Pain Loc: Generalized  Height and Weight  Height: 5' 2" (157.5 cm)  Weight: 85.6 kg (188 lb 12.8 oz)  BSA (Calculated - sq m): 1.94 sq meters  BMI (Calculated): 34.5  Weight in (lb) to have BMI = 25: 136.4]    Physical Exam  Constitutional:       General: She is not in acute distress.     Appearance: She is obese.   HENT:      Head: Normocephalic.      Right Ear: Tympanic membrane, ear canal and external ear normal. There is no impacted cerumen.      Left Ear: Tympanic membrane, ear canal and external ear normal. There is no impacted cerumen.      Nose: No congestion.      Mouth/Throat:      Mouth: Mucous membranes are moist.      Pharynx: Posterior oropharyngeal erythema (mild) present.   Cardiovascular:      Rate and Rhythm: " Normal rate and regular rhythm.      Pulses: Normal pulses.      Heart sounds: Normal heart sounds.   Pulmonary:      Effort: Pulmonary effort is normal.      Breath sounds: Normal breath sounds. No wheezing.   Abdominal:      Palpations: Abdomen is soft.      Tenderness: There is no abdominal tenderness.   Skin:     General: Skin is warm and dry.   Neurological:      Mental Status: She is alert and oriented to person, place, and time.   Psychiatric:         Mood and Affect: Mood normal.         Behavior: Behavior normal.          Laboratory:    Lab Results   Component Value Date     (H) 11/08/2023     (H) 11/02/2023     11/08/2023     11/02/2023    K 3.5 11/08/2023    K 4.0 11/02/2023     (H) 11/08/2023     11/02/2023    CO2 28 11/08/2023    CO2 22 (L) 11/02/2023    BUN 12 11/08/2023    BUN 22 11/02/2023    CREATININE 0.96 11/08/2023    CREATININE 1.2 11/02/2023    CALCIUM 9.5 11/08/2023    CALCIUM 10.0 11/02/2023    PROT 6.4 08/20/2023    ALBUMIN 3.8 11/02/2023    BILITOT 0.8 08/20/2023    ALKPHOS 59 08/20/2023    AST 46 (H) 08/20/2023    ALT 23 08/20/2023    ANIONGAP 9 11/08/2023    ANIONGAP 16 11/02/2023    ESTGFRAFRICA SEE COMMENT 08/18/2023    EGFRNONAA SEE COMMENT 08/18/2023       Lab Results   Component Value Date    WBC 6.90 11/08/2023    WBC 13.75 (H) 08/20/2023    RBC 5.06 11/08/2023    RBC 4.93 08/20/2023    HGB 14.1 11/08/2023    HGB 13.8 08/20/2023    HCT 43.2 11/08/2023    HCT 41.0 08/20/2023    MCV 85.4 11/08/2023    MCV 83 08/20/2023    RDW 12.4 11/08/2023    RDW 13.0 08/20/2023     11/08/2023     08/20/2023        Lab Results   Component Value Date    CHOL 205 (H) 03/01/2023    TRIG 163 (H) 03/01/2023    HDL 40 03/01/2023    LDLCALC 132.4 03/01/2023    TOTALCHOLEST 5.1 (H) 03/01/2023       Lab Results   Component Value Date    TSH 0.945 08/09/2023    TSH 0.346 (L) 09/26/2022       Lab Results   Component Value Date    HGBA1C 7.0 (H) 10/26/2023     HGBA1C 7.1 (H) 08/14/2023    ESTIMATEDAVG 147 10/26/2023    ESTIMATEDAVG 157 (H) 08/14/2023        Lab Results   Component Value Date    YBUMNXHF46 687 08/09/2023    OMBOBKIG74 627 08/06/2018       Lab Results   Component Value Date    [id number] 39 08/06/2018       Point Of Care Testing:  WBC, UA   Date Value Ref Range Status   04/01/2013 2++  Final     Nitrites, UA   Date Value Ref Range Status   08/10/2023 Negative Negative Final     Urobilinogen, UA   Date Value Ref Range Status   08/10/2023 Normal 0.2, 1.0, Normal mg/dL Final     Protein, POC   Date Value Ref Range Status   04/01/2013 negative  Final     pH, UA   Date Value Ref Range Status   08/10/2023 7.0 5.0 to 8.0 pH Units Final     Specific Gravity, UA   Date Value Ref Range Status   08/10/2023 1.013 <=1.030 Final     Ketones, UA   Date Value Ref Range Status   08/10/2023 Negative Negative mg/dL Final     Bilirubin, POC   Date Value Ref Range Status   04/01/2013 negative  Final     Glucose, UA   Date Value Ref Range Status   04/01/2013 negative  Final       Lab Results   Component Value Date    [id number] Negative 11/27/2023    RAPFLUA Negative 11/27/2023    RAPFLUB Negative 11/27/2023        RAPID STREP                                             Negative                           11/27/2023      Assessment/Plan     Generalized weakness  -     POCT SARS-COV2 (COVID) with Flu Antigen    Sore throat  -     POCT rapid strep A      Push Fluids  Rest with Activity as tolerated   Follow up with primary care provider sooner if symptoms persist, worsen, or new symptoms develop      Questions answered to desired level of satisfaction    Verbalized understanding to all information and instructions provided      [facility]

## 2023-11-27 NOTE — PATIENT INSTRUCTIONS
Push Fluids  Rest with Activity as tolerated   Follow up with primary care provider sooner if symptoms persist, worsen, or new symptoms develop

## 2023-12-03 DIAGNOSIS — E11.65 TYPE 2 DIABETES MELLITUS WITH HYPERGLYCEMIA, WITHOUT LONG-TERM CURRENT USE OF INSULIN: ICD-10-CM

## 2023-12-03 DIAGNOSIS — Z71.89 COMPLEX CARE COORDINATION: ICD-10-CM

## 2023-12-04 RX ORDER — METFORMIN HYDROCHLORIDE 500 MG/1
TABLET ORAL
Qty: 90 TABLET | Refills: 1 | OUTPATIENT
Start: 2023-12-04

## 2023-12-04 NOTE — TELEPHONE ENCOUNTER
Refill Routing Note   Medication(s) are not appropriate for processing by [facility] for the following reason(s):        Responsible provider unclear    [facility] action(s):  Defer               Appointments  past 12m or future 3m with PCP    Date Provider   Last Visit   10/24/2022 Shashi Brown MD   Next Visit   Visit date not found Shashi Brown MD   ED visits in past 90 days: 0        Note composed:11:08 AM 12/04/2023

## 2023-12-07 ENCOUNTER — TELEPHONE (OUTPATIENT)
Dept: NEUROSURGERY | Facility: CLINIC | Age: 73
End: 2023-12-07
Payer: MEDICARE

## 2023-12-07 RX ORDER — DIAZEPAM 5 MG/1
5 TABLET ORAL ONCE
Qty: 1 TABLET | Refills: 0 | Status: SHIPPED | OUTPATIENT
Start: 2023-12-07 | End: 2024-03-07 | Stop reason: SDUPTHER

## 2023-12-07 RX ORDER — LORAZEPAM 0.5 MG/1
0.5 TABLET ORAL EVERY 6 HOURS PRN
Status: CANCELLED | OUTPATIENT
Start: 2023-12-07

## 2023-12-07 NOTE — TELEPHONE ENCOUNTER
----- Message from Nicolas Griffin sent at 12/6/2023  5:07 PM CST -----  Type:  Patient Returning Call    Who Called:pt  Who Left Message for Patient:  Does the patient know what this is regarding?:rx   Would the patient rather a call back or a response via [facility]? call  Best Call Back Number:[phone]  Additional Information: pt has an mri scheduled for 12/13 and the pt would like an sedative or an medication to help relax to be sent  into the pharmacy     [id number] - [place] [facility] [place] [place] - [address] AT [facility] OF [address]   Phone: [phone]

## 2023-12-07 NOTE — TELEPHONE ENCOUNTER
----- Message from Nicolas Griffin sent at 12/6/2023  5:07 PM CST -----  Type:  Patient Returning Call    Who Called:pt  Who Left Message for Patient:  Does the patient know what this is regarding?:rx   Would the patient rather a call back or a response via [facility]? call  Best Call Back Number:[phone]  Additional Information: pt has an mri scheduled for 12/13 and the pt would like an sedative or an medication to help relax to be sent  into the pharmacy     [id number] [place] - [address] AT [facility] OF [address]   Phone: [phone]

## 2023-12-13 ENCOUNTER — HOSPITAL ENCOUNTER (OUTPATIENT)
Dept: RADIOLOGY | Facility: HOSPITAL | Age: 73
Discharge: HOME | End: 2023-12-13
Attending: NEUROLOGICAL SURGERY
Payer: MEDICARE

## 2023-12-13 ENCOUNTER — OFFICE VISIT (OUTPATIENT)
Dept: NEUROSURGERY | Facility: CLINIC | Age: 73
End: 2023-12-13
Payer: MEDICARE

## 2023-12-13 VITALS
WEIGHT: 188.69 LBS | BODY MASS INDEX: 34.72 KG/M2 | SYSTOLIC BLOOD PRESSURE: 142 MMHG | HEIGHT: 62 IN | DIASTOLIC BLOOD PRESSURE: 80 MMHG | RESPIRATION RATE: 18 BRPM | HEART RATE: 84 BPM

## 2023-12-13 DIAGNOSIS — D42.0 ATYPICAL INTRACRANIAL MENINGIOMA: ICD-10-CM

## 2023-12-13 DIAGNOSIS — Z98.890 S/P CRANIOTOMY: ICD-10-CM

## 2023-12-13 DIAGNOSIS — Z98.890 S/P CRANIOTOMY: Primary | ICD-10-CM

## 2023-12-13 PROCEDURE — 70553 MRI BRAIN STEM W/O & W/DYE: CPT | Mod: TC,PO

## 2023-12-13 PROCEDURE — 70553 MRI BRAIN STEM W/O & W/DYE: CPT | Mod: 26,,, | Performed by: RADIOLOGY

## 2023-12-13 PROCEDURE — 70553 MRI BRAIN W WO CONTRAST: ICD-10-PCS | Mod: 26,,, | Performed by: RADIOLOGY

## 2023-12-13 PROCEDURE — 99214 OFFICE O/P EST MOD 30 MIN: CPT | Mod: S$PBB,,, | Performed by: NURSE PRACTITIONER

## 2023-12-13 PROCEDURE — A9585 GADOBUTROL INJECTION: HCPCS | Mod: PO | Performed by: NEUROLOGICAL SURGERY

## 2023-12-13 PROCEDURE — 25500020 PHARM REV CODE 255: Mod: PO | Performed by: NEUROLOGICAL SURGERY

## 2023-12-13 PROCEDURE — 99214 PR OFFICE/OUTPT VISIT, EST, LEVL IV, 30-39 MIN: ICD-10-PCS | Mod: S$PBB,,, | Performed by: NURSE PRACTITIONER

## 2023-12-13 RX ORDER — GADOBUTROL 604.72 MG/ML
8 INJECTION INTRAVENOUS
Status: COMPLETED | OUTPATIENT
Start: 2023-12-13 | End: 2023-12-13

## 2023-12-13 RX ADMIN — GADOBUTROL 8 ML: 604.72 INJECTION INTRAVENOUS at 10:12

## 2023-12-13 NOTE — PROGRESS NOTES
Neurosurgery History & Physical    Patient ID: Eugenie Coates is a 73 y.o. female.    Chief Complaint   Patient presents with    Follow-up     3 mo f/u with MRI.        HPI:  Ms. Coates is approximately 4 months status post craniotomy for recurrent atypical meningioma with mass effect and brain compression. She has completed radiation therapy with Rad/Onc at [facility] in [place], [place]. She is taking the seizure medication but is having undesired side effects with mood lability. No seizure activity since surgery.     She is ambulating independently with occasional use of a cane for assistance.     She denies new weakness, numbness or other neurologic symptom.     Review of Systems  Denies fevers chills nausea or vomiting    Past Medical History:   Diagnosis Date    Allergy     Anemia     Anxiety     Arthritis     Asthma     Bell palsy     Bilateral lower extremity edema     Chronic diastolic CHF (congestive heart failure)     Depression     Diabetes mellitus, type 2     DVT (deep venous thrombosis)     right LE    Dyslipidemia     Endometriosis     Eye injury 2014    stuck with tree branch od ?     Focal seizure 5/3/2021    GERD (gastroesophageal reflux disease)     Glaucoma     History of DVT (deep vein thrombosis) 1/23/2018    History of uterine fibroid     Hyperlipidemia     Hypertension     Invasive lobular carcinoma of right breast in female     s/p surgery, radiation, tamoxifen    Nuclear sclerosis of both eyes 9/27/2016    Obesity     Pancreas cyst     Sleep apnea     uses C pap    Supraventricular tachycardia     SVT (supraventricular tachycardia) 05/2019    Thyroid disease     Venous insufficiency      Social History     Socioeconomic History    Marital status:    Tobacco Use    Smoking status: Never    Smokeless tobacco: Never   Substance and Sexual Activity    Alcohol use: Never     Alcohol/week: 0.0 standard drinks of alcohol    Drug use: Never    Sexual activity: Yes     Partners: Male      Birth control/protection: None   Social History Narrative    1/18/19: lives with her . They have multiple children, but the youngest is 38. No children at home right now. No pets at home. Splits time between here and [place]. Her son lives in [place].      Social Determinants of Health     Financial Resource Strain: Unknown (11/1/2023)    Overall Financial Resource Strain (CARDIA)     Difficulty of Paying Living Expenses: Patient refused   Food Insecurity: Unknown (11/1/2023)    Hunger Vital Sign     Worried About Running Out of Food in the Last Year: Patient refused     Ran Out of Food in the Last Year: Patient refused   Transportation Needs: Unknown (11/1/2023)    PRAPARE - Transportation     Lack of Transportation (Medical): Patient refused     Lack of Transportation (Non-Medical): Patient refused   Physical Activity: Insufficiently Active (11/1/2023)    Exercise Vital Sign     Days of Exercise per Week: 2 days     Minutes of Exercise per Session: 20 min   Stress: Stress Concern Present (11/1/2023)    [employer] - Occupational Stress Questionnaire     Feeling of Stress : To some extent   Social Connections: Unknown (11/1/2023)    Social Connection and Isolation Panel [NHANES]     Frequency of Communication with Friends and Family: More than three times a week     Frequency of Social Gatherings with Friends and Family: Patient refused     Active Member of Clubs or Organizations: Patient refused     Attends Club or Organization Meetings: Patient refused     Marital Status:    Housing Stability: Unknown (11/1/2023)    Housing Stability Vital Sign     Unable to Pay for Housing in the Last Year: Patient refused     Number of Places Lived in the Last Year: 0     Unstable Housing in the Last Year: Patient refused     Family History   Problem Relation Age of Onset    Diabetes Mother     No Known Problems Father     No Known Problems Sister     No Known Problems Brother      Colon cancer Maternal Aunt     No Known Problems Maternal Uncle     No Known Problems Paternal Aunt     No Known Problems Paternal Uncle     No Known Problems Maternal Grandmother     No Known Problems Maternal Grandfather     No Known Problems Paternal Grandmother     No Known Problems Paternal Grandfather     Amblyopia Neg Hx     Blindness Neg Hx     Cancer Neg Hx     Cataracts Neg Hx     Glaucoma Neg Hx     Hypertension Neg Hx     Macular degeneration Neg Hx     Retinal detachment Neg Hx     Strabismus Neg Hx     Stroke Neg Hx     Thyroid disease Neg Hx     Celiac disease Neg Hx     Colon polyps Neg Hx     Esophageal cancer Neg Hx     Inflammatory bowel disease Neg Hx     Irritable bowel syndrome Neg Hx     Liver cancer Neg Hx     Liver disease Neg Hx     Rectal cancer Neg Hx     Stomach cancer Neg Hx     Ulcerative colitis Neg Hx     Cystic fibrosis Neg Hx     Crohn's disease Neg Hx     Hemochromatosis Neg Hx     Cirrhosis Neg Hx      Review of patient's allergies indicates:   Allergen Reactions    Oxycodone hcl-oxycodone-asa Hives, Itching and Other (See Comments)    Percodan yadira      Other reaction(s): Unknown       Current Outpatient Medications:     b complex vitamins tablet, Take 1 tablet by mouth once daily., Disp: , Rfl:     blood sugar diagnostic Strp, Test twice daily.  Accucheck viva test strips and lancets., Disp: 300 each, Rfl: 3    calcium carb-vit D3-magnesium (CORAL CALCIUM) 250-200-125 mg-unit-mg Cap, Take 1 capsule by mouth once daily., Disp: , Rfl:     carvediloL (COREG) 12.5 MG tablet, Take 1 tablet (12.5 mg total) by mouth 2 (two) times daily with meals., Disp: 60 tablet, Rfl: 11    cloNIDine (CATAPRES) 0.1 MG tablet, Take 1 tablet (0.1 mg total) by mouth every evening. Take if blood pressure is greater than 180/100., Disp: 90 tablet, Rfl: 0    dapagliflozin propanediol (FARXIGA) 10 mg tablet, Take 1 tablet (10 mg total) by mouth once daily., Disp: 90 tablet, Rfl: 1    furosemide (LASIX)  "20 MG tablet, Take 2 tablets (40 mg total) by mouth every morning., Disp: 90 tablet, Rfl: 0    glucosamine-chondroitin (OSTEO BI-FLEX) 250-200 mg Tab, Take 1 tablet by mouth once daily., Disp: , Rfl:     lancets (ACCU-CHEK SOFTCLIX LANCETS) Misc, 1 Units by Misc.(Non-Drug; Combo Route) route 2 (two) times daily., Disp: 300 each, Rfl: 3    latanoprost 0.005 % ophthalmic solution, Place 1 drop into both eyes every evening., Disp: 2.5 mL, Rfl: 12    metFORMIN (GLUCOPHAGE) 500 MG tablet, TAKE 1 TABLET(500 MG) BY MOUTH DAILY WITH BREAKFAST, Disp: 90 tablet, Rfl: 0    mv-mn-vitC-sgxTu-Msh-Mpe-hc124 (A[place], ASCORBATE SODIUM,) 333-1.7 mg Chew, Take 1 each by mouth once daily., Disp: , Rfl:     NIFEdipine (ADALAT CC) 90 MG TbSR, Take 1 tablet (90 mg total) by mouth once daily., Disp: 30 tablet, Rfl: 1    diazePAM (VALIUM) 5 MG tablet, Take 1 tablet (5 mg total) by mouth once. for 1 dose, Disp: 1 tablet, Rfl: 0    pantoprazole (PROTONIX) 40 MG tablet, Take 1 tablet (40 mg total) by mouth 2 (two) times daily., Disp: 60 tablet, Rfl: 0  No current facility-administered medications for this visit.  Blood pressure (!) 142/80, pulse 84, resp. rate 18, height 5' 2" (1.575 m), weight 85.6 kg (188 lb 11.4 oz).      Neurologic Exam  AAOx4, NAD  Strength grossly intact in the BUE  Sensation grossly intact to light touch in bilateral upper and lower extremities  Wound well healed    Imaging personally reviewed and discussed with the patient:  MRI brain dated 12/13/23:  FINDINGS:  Intracranial compartment:     As seen on comparison CT, there are postsurgical changes of posterior left parietal craniotomy.  There is an underlying resection cavity at the site of previously demonstrated atypical meningioma.  There has been significant improvement the amount of adjacent reaction in the brain.  There is now mild nonspecific periventricular and scattered subcortical white matter FLAIR and T2 hyperintense signal.  These changes likely reflect " sequelae of chronic small vessel disease.  There is a remote lacunar infarction in the right corona radiata.  There is encephalomalacia and gliosis in the left parietal lobe at the site of resection.  A mild amount of old blood products and/or laminar necrosis is present at this site.  Minimal linear enhancement is present which likely represents post treatment change.  Mild nodular enhancement is seen medially in the left parafalcine location, also likely representing post treatment change.  Previously demonstrated mass effect has essentially resolved.  The basilar cisterns are open.  Vessels are patent as suggested by flow voids.  The cerebellar tonsils are normal position.     Skull/extracranial contents:     Other than left parietal craniotomy, baseline marrow signal is normal.  There is trace scattered mucosal thickening in the paranasal sinuses.  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  The patient has had prior left lens replacement surgery.  The right orbit is normal.     Impression:     1. Postsurgical changes of left parietal craniotomy are redemonstrated.  There has been resection of previously demonstrated large left parietal atypical meningioma.  There is minimal residual signal abnormality and enhancement along the medial aspect of the resection site which is nonspecific and could represent post treatment change with minimal enhancement in the left parafalcine location present.  No large residual or recurrent tumor is present.  There are minimal old blood products at the resection site as well.  .    Assessment/Plan:   Ms. Coates is a 73-year-old female approximately 4months status post repeat resection of WHO grade 2 atypical meningioma. She is doing well from a neurological standpoint. We will continue to monitor resection cavity intermittently. Plan is to follow up in 6 months with repeat MRI of the brain with and without contrast. She will discuss AED side effects with Neurology in the  interim and continue with Rad Onc per their recommendations.

## 2023-12-19 ENCOUNTER — TELEPHONE (OUTPATIENT)
Dept: FAMILY MEDICINE | Facility: CLINIC | Age: 73
End: 2023-12-19
Payer: MEDICARE

## 2023-12-19 ENCOUNTER — HOSPITAL ENCOUNTER (OUTPATIENT)
Dept: RADIOLOGY | Facility: CLINIC | Age: 73
Discharge: HOME | End: 2023-12-19
Attending: INTERNAL MEDICINE
Payer: MEDICARE

## 2023-12-19 DIAGNOSIS — Z12.31 BREAST CANCER SCREENING BY MAMMOGRAM: ICD-10-CM

## 2023-12-19 PROCEDURE — 77067 SCR MAMMO BI INCL CAD: CPT | Mod: 26,,, | Performed by: RADIOLOGY

## 2023-12-19 PROCEDURE — 77063 MAMMO DIGITAL SCREENING BILAT WITH TOMO: ICD-10-PCS | Mod: 26,,, | Performed by: RADIOLOGY

## 2023-12-19 PROCEDURE — 77067 SCR MAMMO BI INCL CAD: CPT | Mod: TC,PO

## 2023-12-19 PROCEDURE — 77067 MAMMO DIGITAL SCREENING BILAT WITH TOMO: ICD-10-PCS | Mod: 26,,, | Performed by: RADIOLOGY

## 2023-12-19 PROCEDURE — 77063 BREAST TOMOSYNTHESIS BI: CPT | Mod: 26,,, | Performed by: RADIOLOGY

## 2023-12-19 NOTE — TELEPHONE ENCOUNTER
----- Message from Jarrell Silver MD sent at 12/19/2023 11:00 AM CST -----  Need to see in  1 month  please  abnl results     1630 Pt is scheduled to come in on 12/27/23

## 2023-12-19 NOTE — PROGRESS NOTES
"  Eugenie Coates presented for a  Medicare AWV and comprehensive Health Risk Assessment today.     The following components were reviewed and updated:    Medical history  Family History  Social history  Allergies and Current Medications  Health Risk Assessment  Health Maintenance  Care Team         ** See Completed Assessments for Annual Wellness Visit within the encounter summary.**         The following assessments were completed:  Living Situation  CAGE  Depression Screening  Timed Get Up and Go  Whisper Test  Cognitive Function Screening  Nutrition Screening  ADL Screening  PAQ Screening        Vitals:    12/26/23 1515   BP: 110/60   Patient Position: Sitting   BP Method: Medium (Manual)   Pulse: 85   Resp: 16   Temp: 98.7 °F (37.1 °C)   SpO2: 98%   Weight: 83.5 kg (184 lb)   Height: 5' 2" (1.575 m)     Body mass index is 33.65 kg/m².      Physical Exam  Constitutional:       Appearance: She is obese.   HENT:      Head: Normocephalic.      Right Ear: External ear normal.      Left Ear: External ear normal.   Cardiovascular:      Rate and Rhythm: Normal rate and regular rhythm.      Pulses: Normal pulses.      Heart sounds: Normal heart sounds.   Pulmonary:      Effort: Pulmonary effort is normal.      Breath sounds: Normal breath sounds.   Abdominal:      General: Bowel sounds are normal.      Palpations: Abdomen is soft.      Tenderness: There is no abdominal tenderness.   Musculoskeletal:      Right lower leg: Edema present.   Skin:     General: Skin is warm and dry.   Neurological:      Mental Status: She is alert and oriented to person, place, and time.      Gait: Gait abnormal (uses cane to assit with ambulation).   Psychiatric:         Mood and Affect: Mood normal.         Behavior: Behavior normal.             Diagnoses and health risks identified today and associated recommendations/orders:    1. Encounter for initial annual wellness visit (AWV) in Medicare patient  -return to clinic in 12 months for " annual wellness visit  - follow up with PCP 12/27/2023 @ 1430      2. Gastroesophageal reflux disease, unspecified whether esophagitis present  -stable  -continue pantoprazole as directed    3. Type 2 diabetes mellitus without complication, unspecified whether long term insulin use  - 08/14/2023 A1c 7.3%  - Microalbumin/Creatinine Ratio, Urine; collected today results pending   - continue Metformin 500 mg one tablet with breakfast   - continue Farxiga 10 mg one tablet daily   - followed by podiatry for diabetic foot exam has pending appointment 03/22/2024 @ 0920    4. Hyperlipidemia, unspecified hyperlipidemia type  - currently not on a statin   - 03/01/2023 Chol 205 Trig 163  HDL 40    5. Essential hypertension  -/60 HR 85  - Microalbumin/Creatinine Ratio, Urine; collected today results pending   - continue Clonidine 0.1 mg one tab in the evening if BP is greater than 180/100  - continue carvedilol 12.5 mg one tablet twice daily   - continue nifedipine 90 mg ne tablet daily     6. Meningioma, cerebral  -treated with radiation  - followed by neurology & neurosurgery     7. Abnormality of gait and mobility  - uses cane to assist with ambulation     8. Obesity (BMI 30.0-34.9)  - encouraged heart healthy diabetic diet  - encouraged some form of physical activity 5-7 days for at least 30 minutes as tolerated     9. Postmenopausal  - DXA Bone Density Axial Skeleton 1 or more sites; scheduled 01/09/2024 @ 1100    10. Screening for osteoporosis  - DXA Bone Density Axial Skeleton 1 or more sites; scheduled 01/09/2024 @ 1100    11. Obstructive sleep apnea syndrome  - followed by sleep medicine    12. Focal Motor Seizure  - s/p craniotomy   - followed by neurology & neurosurgery     13. Venous insufficiency of right lower extremity  - swelling noted to right lower extremity   - wear compression hose to right lower extremity       RSV vaccine  -VIS (10/19/2023) given with instructions to take at pharmacy, Covid  vaccine - CYNDI faxed to [facility], Diabetic urine screening- ordered this visit, Dexa scan - scheduled this visit with appointment given.    I offered to discuss advanced care planning, including how to pick a person who would make decisions for you if you were unable to make them for yourself, called a health care power of , and what kind of decisions you might make such as use of life sustaining treatments such as ventilators and tube feeding when faced with a life limiting illness recorded on a living will that they will need to know. (How you want to be cared for as you near the end of your natural life)     X Patient is interested in learning more about how to make advanced directives.  I provided them paperwork and offered to discuss this with them.    Provided Eugenie with a 5-10 year written screening schedule and personal prevention plan. Recommendations were developed using the USPSTF age appropriate recommendations. Education, counseling, and referrals were provided as needed. After Visit Summary printed and given to patient which includes a list of additional screenings\tests needed.    Follow up in about 1 year (around 12/26/2024).    Lakisha Morel NP  [facility]

## 2023-12-21 ENCOUNTER — TELEPHONE (OUTPATIENT)
Dept: FAMILY MEDICINE | Facility: CLINIC | Age: 73
End: 2023-12-21
Payer: MEDICARE

## 2023-12-21 NOTE — TELEPHONE ENCOUNTER
----- Message from Jarrell Silver MD sent at 12/21/2023  4:11 PM CST -----  Need to see in 3 week please  abnl results     1622 Pt is scheduled to come in on 12/27/23

## 2023-12-26 ENCOUNTER — OFFICE VISIT (OUTPATIENT)
Dept: FAMILY MEDICINE | Facility: CLINIC | Age: 73
End: 2023-12-26
Payer: MEDICARE

## 2023-12-26 VITALS
SYSTOLIC BLOOD PRESSURE: 110 MMHG | HEART RATE: 85 BPM | WEIGHT: 184 LBS | HEIGHT: 62 IN | RESPIRATION RATE: 16 BRPM | DIASTOLIC BLOOD PRESSURE: 60 MMHG | OXYGEN SATURATION: 98 % | TEMPERATURE: 99 F | BODY MASS INDEX: 33.86 KG/M2

## 2023-12-26 DIAGNOSIS — Z78.0 POSTMENOPAUSAL: ICD-10-CM

## 2023-12-26 DIAGNOSIS — G47.33 OBSTRUCTIVE SLEEP APNEA SYNDROME: ICD-10-CM

## 2023-12-26 DIAGNOSIS — R26.9 ABNORMALITY OF GAIT AND MOBILITY: ICD-10-CM

## 2023-12-26 DIAGNOSIS — G40.109 FOCAL MOTOR SEIZURE: ICD-10-CM

## 2023-12-26 DIAGNOSIS — E66.9 OBESITY (BMI 30.0-34.9): ICD-10-CM

## 2023-12-26 DIAGNOSIS — K21.9 GASTROESOPHAGEAL REFLUX DISEASE, UNSPECIFIED WHETHER ESOPHAGITIS PRESENT: ICD-10-CM

## 2023-12-26 DIAGNOSIS — I87.2 VENOUS INSUFFICIENCY OF RIGHT LOWER EXTREMITY: ICD-10-CM

## 2023-12-26 DIAGNOSIS — E11.9 TYPE 2 DIABETES MELLITUS WITHOUT COMPLICATION, UNSPECIFIED WHETHER LONG TERM INSULIN USE: ICD-10-CM

## 2023-12-26 DIAGNOSIS — Z13.820 SCREENING FOR OSTEOPOROSIS: ICD-10-CM

## 2023-12-26 DIAGNOSIS — D32.0 MENINGIOMA, CEREBRAL: ICD-10-CM

## 2023-12-26 DIAGNOSIS — Z00.00 ENCOUNTER FOR INITIAL ANNUAL WELLNESS VISIT (AWV) IN MEDICARE PATIENT: Primary | ICD-10-CM

## 2023-12-26 DIAGNOSIS — I10 ESSENTIAL HYPERTENSION: ICD-10-CM

## 2023-12-26 DIAGNOSIS — E78.5 HYPERLIPIDEMIA, UNSPECIFIED HYPERLIPIDEMIA TYPE: ICD-10-CM

## 2023-12-26 LAB
CREAT UR-MCNC: 72 MG/DL (ref 28–219)
MICROALBUMIN UR-MCNC: 0.7 MG/DL (ref 0–2.8)
MICROALBUMIN/CREAT RATIO PNL UR: 9.7 MG/G (ref 0–30)

## 2023-12-26 PROCEDURE — 82043 MICROALBUMIN / CREATININE RATIO URINE: ICD-10-PCS | Mod: ,,, | Performed by: CLINICAL MEDICAL LABORATORY

## 2023-12-26 PROCEDURE — 82570 ASSAY OF URINE CREATININE: CPT | Mod: ,,, | Performed by: CLINICAL MEDICAL LABORATORY

## 2023-12-26 PROCEDURE — G0439 PR MEDICARE ANNUAL WELLNESS SUBSEQUENT VISIT: ICD-10-PCS | Mod: ,,, | Performed by: NURSE PRACTITIONER

## 2023-12-26 PROCEDURE — G0439 PPPS, SUBSEQ VISIT: HCPCS | Mod: ,,, | Performed by: NURSE PRACTITIONER

## 2023-12-26 PROCEDURE — 82570 MICROALBUMIN / CREATININE RATIO URINE: ICD-10-PCS | Mod: ,,, | Performed by: CLINICAL MEDICAL LABORATORY

## 2023-12-26 PROCEDURE — 82043 UR ALBUMIN QUANTITATIVE: CPT | Mod: ,,, | Performed by: CLINICAL MEDICAL LABORATORY

## 2023-12-26 NOTE — PATIENT INSTRUCTIONS
Counseling and Referral of Other Preventative  (Italic type indicates deductible and co-insurance are waived)    Patient Name: Eugenie Coates  Today's Date: 12/26/2023    Health Maintenance       Date Due Completion Date    Low Dose Statin Never done ---    RSV Vaccine (Age 60+ and Pregnant patients) (1 - 1-dose 60+ series) Never done ---    COVID-19 Vaccine (6 - 2023-24 season) 09/01/2023 9/27/2022    Diabetes Urine Screening 09/26/2023 9/26/2022    Colorectal Cancer Screening 11/02/2023 11/1/2023    Override on 8/1/2012: Done (in media)    DEXA Scan 12/15/2023 12/15/2021    Eye Exam 01/10/2024 1/10/2023    Foot Exam 01/12/2024 1/12/2023    Override on 11/3/2020: Done    Override on 6/18/2019: Done    Override on 8/8/2018: Done    Override on 9/27/2016: Done    Lipid Panel 03/01/2024 3/1/2023    Hemoglobin A1c 04/26/2024 10/26/2023    Mammogram 12/19/2024 12/19/2023    Override on 9/28/2017: Done    Override on 9/15/2016: Done (in media)    TETANUS VACCINE 11/30/2026 11/30/2016        No orders of the defined types were placed in this encounter.    The following information is provided to all patients.  This information is to help you find resources for any of the problems found today that may be affecting your health:                Living healthy guide: www.Counts include 234 beds at the [facility].[place].gov      Understanding Diabetes: www.diabetes.org      Eating healthy: www.cdc.gov/healthyweight      CDC home safety checklist: www.cdc.gov/steadi/patient.html      Agency on Aging: www.goea.[place].gov      Alcoholics anonymous (AA): www.aa.org      Physical Activity: www.lesly.nih.gov/as0vpjn      Tobacco use: www.quitwithusla.org

## 2023-12-27 ENCOUNTER — APPOINTMENT (OUTPATIENT)
Dept: RADIOLOGY | Facility: CLINIC | Age: 73
End: 2023-12-27
Attending: INTERNAL MEDICINE
Payer: MEDICARE

## 2023-12-27 ENCOUNTER — OFFICE VISIT (OUTPATIENT)
Dept: FAMILY MEDICINE | Facility: CLINIC | Age: 73
End: 2023-12-27
Payer: MEDICARE

## 2023-12-27 ENCOUNTER — TELEPHONE (OUTPATIENT)
Dept: FAMILY MEDICINE | Facility: CLINIC | Age: 73
End: 2023-12-27
Payer: MEDICARE

## 2023-12-27 VITALS
OXYGEN SATURATION: 95 % | TEMPERATURE: 97 F | HEIGHT: 62 IN | WEIGHT: 181.81 LBS | SYSTOLIC BLOOD PRESSURE: 132 MMHG | BODY MASS INDEX: 33.46 KG/M2 | DIASTOLIC BLOOD PRESSURE: 70 MMHG | HEART RATE: 79 BPM | RESPIRATION RATE: 18 BRPM

## 2023-12-27 DIAGNOSIS — I10 ESSENTIAL HYPERTENSION: ICD-10-CM

## 2023-12-27 DIAGNOSIS — I83.90 VARICOSE VEINS OF LOWER EXTREMITY, UNSPECIFIED LATERALITY, UNSPECIFIED WHETHER COMPLICATED: ICD-10-CM

## 2023-12-27 DIAGNOSIS — I87.2 VENOUS INSUFFICIENCY OF RIGHT LOWER EXTREMITY: ICD-10-CM

## 2023-12-27 DIAGNOSIS — R10.9 ABDOMINAL PAIN, UNSPECIFIED ABDOMINAL LOCATION: Primary | ICD-10-CM

## 2023-12-27 DIAGNOSIS — I87.2 VENOUS INSUFFICIENCY: ICD-10-CM

## 2023-12-27 LAB
BACTERIA #/AREA URNS HPF: ABNORMAL /HPF
BILIRUB UR QL STRIP: NEGATIVE
CAOX CRY URNS QL MICRO: ABNORMAL /HPF
CLARITY UR: ABNORMAL
COLOR UR: ABNORMAL
GLUCOSE UR STRIP-MCNC: NORMAL MG/DL
KETONES UR STRIP-SCNC: NEGATIVE MG/DL
LEUKOCYTE ESTERASE UR QL STRIP: ABNORMAL
MUCOUS, UA: ABNORMAL /LPF
NITRITE UR QL STRIP: NEGATIVE
PH UR STRIP: 7.5 PH UNITS
PROT UR QL STRIP: 20
RBC # UR STRIP: NEGATIVE /UL
RBC #/AREA URNS HPF: 1 /HPF
SP GR UR STRIP: 1.02
SQUAMOUS #/AREA URNS LPF: ABNORMAL /HPF
UROBILINOGEN UR STRIP-ACNC: NORMAL MG/DL
WBC #/AREA URNS HPF: 7 /HPF

## 2023-12-27 PROCEDURE — 81001 URINALYSIS AUTO W/SCOPE: CPT | Mod: ,,, | Performed by: CLINICAL MEDICAL LABORATORY

## 2023-12-27 PROCEDURE — 74018 RADEX ABDOMEN 1 VIEW: CPT | Mod: TC,RHCUB | Performed by: INTERNAL MEDICINE

## 2023-12-27 PROCEDURE — 99214 OFFICE O/P EST MOD 30 MIN: CPT | Mod: ,,, | Performed by: INTERNAL MEDICINE

## 2023-12-27 RX ORDER — NIFEDIPINE 90 MG/1
90 TABLET, FILM COATED, EXTENDED RELEASE ORAL DAILY
Qty: 30 TABLET | Refills: 1 | Status: SHIPPED | OUTPATIENT
Start: 2023-12-27 | End: 2024-01-17 | Stop reason: SDUPTHER

## 2023-12-27 NOTE — TELEPHONE ENCOUNTER
----- Message from Jarrell Silver MD sent at 12/27/2023  3:56 PM CST -----  Need to see in  1 week please  abnl results     1558 Pt is scheduled to come in on 01/17/24

## 2024-01-02 PROBLEM — R10.9 ABDOMINAL PAIN: Status: ACTIVE | Noted: 2024-01-02

## 2024-01-02 PROBLEM — I83.90 VARICOSE VEINS OF LOWER EXTREMITY: Status: ACTIVE | Noted: 2024-01-02

## 2024-01-02 NOTE — PROGRESS NOTES
Subjective:       Patient ID: Eugenie Coates is a 73 y.o. female.    Chief Complaint: Follow-up (2 mo follow up)    Follow-up  Pertinent negatives include no abdominal pain, chest pain, fatigue or fever.   Patient presents with multiple complaints patient complains of swelling both lower extremities she is chronic venous insufficiency patient has chronic hypertension and chronic hypothyroidism also she complains of pain in the right flank.  The plan today refer to vein clinic ultrasound right kidney order KUB today TSH UA CNS 12 point system review is unremarkable  .    Current Medications:    Current Outpatient Medications:     b complex vitamins tablet, Take 1 tablet by mouth once daily., Disp: , Rfl:     blood sugar diagnostic Strp, Test twice daily.  Accucheck viva test strips and lancets., Disp: 300 each, Rfl: 3    calcium carb-vit D3-magnesium (CORAL CALCIUM) 250-200-125 mg-unit-mg Cap, Take 1 capsule by mouth once daily., Disp: , Rfl:     cloNIDine (CATAPRES) 0.1 MG tablet, Take 1 tablet (0.1 mg total) by mouth every evening. Take if blood pressure is greater than 180/100., Disp: 90 tablet, Rfl: 0    furosemide (LASIX) 20 MG tablet, Take 2 tablets (40 mg total) by mouth every morning., Disp: 90 tablet, Rfl: 0    glucosamine-chondroitin (OSTEO BI-FLEX) 250-200 mg Tab, Take 1 tablet by mouth once daily., Disp: , Rfl:     lancets (ACCU-CHEK SOFTCLIX LANCETS) Misc, 1 Units by Misc.(Non-Drug; Combo Route) route 2 (two) times daily., Disp: 300 each, Rfl: 3    latanoprost 0.005 % ophthalmic solution, Place 1 drop into both eyes every evening., Disp: 2.5 mL, Rfl: 12    metFORMIN (GLUCOPHAGE) 500 MG tablet, TAKE 1 TABLET(500 MG) BY MOUTH DAILY WITH BREAKFAST, Disp: 90 tablet, Rfl: 0    mv-mn-vitC-hfmYd-Mrx-Fgi-hc124 (AIRBORNE, ASCORBATE SODIUM,) 333-1.7 mg Chew, Take 1 each by mouth once daily., Disp: , Rfl:     carvediloL (COREG) 12.5 MG tablet, Take 1 tablet (12.5 mg total) by mouth 2 (two) times daily with  "meals. (Patient not taking: Reported on 12/26/2023), Disp: 60 tablet, Rfl: 11    dapagliflozin propanediol (FARXIGA) 10 mg tablet, Take 1 tablet (10 mg total) by mouth once daily. (Patient not taking: Reported on 12/26/2023), Disp: 90 tablet, Rfl: 1    diazePAM (VALIUM) 5 MG tablet, Take 1 tablet (5 mg total) by mouth once. for 1 dose (Patient not taking: Reported on 12/26/2023), Disp: 1 tablet, Rfl: 0    NIFEdipine (ADALAT CC) 90 MG TbSR, Take 1 tablet (90 mg total) by mouth once daily., Disp: 30 tablet, Rfl: 1    pantoprazole (PROTONIX) 40 MG tablet, Take 1 tablet (40 mg total) by mouth 2 (two) times daily., Disp: 60 tablet, Rfl: 0           Review of Systems   Constitutional:  Negative for appetite change, fatigue and fever.   Respiratory:  Negative for shortness of breath.    Cardiovascular:  Negative for chest pain.   Gastrointestinal:  Negative for abdominal pain and constipation.   Endocrine: Negative for polydipsia, polyphagia and polyuria.   Genitourinary:  Negative for difficulty urinating, frequency and hot flashes.   Allergic/Immunologic: Negative for environmental allergies.   Neurological:  Negative for dizziness and light-headedness.   Psychiatric/Behavioral:  Negative for agitation.                 Vitals:    12/27/23 1433   BP: 132/70   BP Location: Left arm   Patient Position: Sitting   BP Method: Large (Automatic)   Pulse: 79   Resp: 18   Temp: 97.2 °F (36.2 °C)   TempSrc: Tympanic   SpO2: 95%   Weight: 82.5 kg (181 lb 12.8 oz)   Height: 5' 2" (1.575 m)        Physical Exam  Vitals and nursing note reviewed.   Constitutional:       Appearance: Normal appearance.   HENT:      Head: Normocephalic and atraumatic.      Right Ear: Tympanic membrane, ear canal and external ear normal.      Left Ear: Tympanic membrane, ear canal and external ear normal.      Nose: Nose normal.      Mouth/Throat:      Mouth: Mucous membranes are moist.      Pharynx: Oropharynx is clear.   Eyes:      Extraocular Movements: " Extraocular movements intact.      Conjunctiva/sclera: Conjunctivae normal.      Pupils: Pupils are equal, round, and reactive to light.   Cardiovascular:      Rate and Rhythm: Normal rate and regular rhythm.      Pulses: Normal pulses.      Heart sounds: Normal heart sounds.   Pulmonary:      Effort: Pulmonary effort is normal.      Breath sounds: Normal breath sounds.   Abdominal:      General: Abdomen is flat. Bowel sounds are normal.      Palpations: Abdomen is soft.   Musculoskeletal:         General: Normal range of motion.      Cervical back: Normal range of motion and neck supple.   Skin:     General: Skin is warm and dry.   Neurological:      General: No focal deficit present.      Mental Status: She is alert. Mental status is at baseline. She is disoriented.   Psychiatric:         Mood and Affect: Mood normal.         Behavior: Behavior normal.         Thought Content: Thought content normal.         Judgment: Judgment normal.           Last Labs:     Office Visit on 12/27/2023   Component Date Value    Color, UA 12/27/2023 Dark-Yellow     Clarity, UA 12/27/2023 Turbid     pH, UA 12/27/2023 7.5     Leukocytes, UA 12/27/2023 Small (A)     Nitrites, UA 12/27/2023 Negative     Protein, UA 12/27/2023 20 (A)     Glucose, UA 12/27/2023 Normal     Ketones, UA 12/27/2023 Negative     Urobilinogen, UA 12/27/2023 Normal     Bilirubin, UA 12/27/2023 Negative     Blood, UA 12/27/2023 Negative     Specific Gravity, UA 12/27/2023 1.020     WBC, UA 12/27/2023 7 (H)     RBC, UA 12/27/2023 1     Bacteria, UA 12/27/2023 Occasional (A)     Squamous Epithelial Cell* 12/27/2023 Few (A)     Calcium Oxalate Crystals* 12/27/2023 Occasional (A)     Mucous 12/27/2023 Few (A)    Office Visit on 12/26/2023   Component Date Value    Creatinine, Urine 12/26/2023 72     Microalbumin 12/26/2023 0.7     Microalbumin/Creatinine * 12/26/2023 9.7    Office Visit on 11/27/2023   Component Date Value    SARS Coronavirus 2 Antig* 11/27/2023  Negative     Rapid Influenza A Ag 11/27/2023 Negative     Rapid Influenza B Ag 11/27/2023 Negative      Acceptab* 11/27/2023 Yes     Rapid Strep A Screen 11/27/2023 Negative      Acceptab* 11/27/2023 Yes        Last Imaging:  X-Ray KUB  Narrative: EXAMINATION:  XR KUB    CLINICAL HISTORY:  Essential (primary) hypertension    TECHNIQUE:  Single AP supine view of the abdomen (KUB) was performed    COMPARISON:  03/26/2021 abdomen and pelvic CT    FINDINGS:  Calcifications over the left renal shadow and punctate calcific foci over the right renal shadow are again seen.  Surgical clips right upper quadrant.  Calcification all of the uterus similar prior.  No bowel obstruction.  Impression: Similar appearance of the bilateral renal stones.  Calcified uterine fibroid.  No acute abnormality identified.    Electronically signed by: Abel Herrera  Date:    12/27/2023  Time:    15:44         **Labs and x-rays personally reviewed by me    ** reviewed      Objective:        Assessment:       1. Abdominal pain, unspecified abdominal location  US Kidney      2. Essential hypertension  NIFEdipine (ADALAT CC) 90 MG TbSR    TSH    Urinalysis, Reflex to Urine Culture    X-Ray KUB    Urinalysis, Reflex to Urine Culture    Urinalysis, Microscopic      3. Varicose veins of lower extremity, unspecified laterality, unspecified whether complicated        4. Venous insufficiency of right lower extremity        5. Venous insufficiency  Ambulatory referral/consult to [facility]           Plan:         1. Abdominal pain, unspecified abdominal location  -     US Kidney; Future; Expected date: 12/27/2023    2. Essential hypertension  -     NIFEdipine (ADALAT CC) 90 MG TbSR; Take 1 tablet (90 mg total) by mouth once daily.  Dispense: 30 tablet; Refill: 1  -     TSH; Future; Expected date: 12/27/2023  -     Urinalysis, Reflex to Urine Culture; Future; Expected date: 12/27/2023  -     X-Ray KUB; Future; Expected  date: 12/27/2023  -     Urinalysis, Microscopic    3. Varicose veins of lower extremity, unspecified laterality, unspecified whether complicated    4. Venous insufficiency of right lower extremity    5. Venous insufficiency  -     Ambulatory referral/consult to [facility]; Future; Expected date: 01/03/2024

## 2024-01-03 ENCOUNTER — HOSPITAL ENCOUNTER (OUTPATIENT)
Dept: RADIOLOGY | Facility: HOSPITAL | Age: 74
Discharge: HOME | End: 2024-01-03
Attending: INTERNAL MEDICINE
Payer: MEDICARE

## 2024-01-03 ENCOUNTER — TELEPHONE (OUTPATIENT)
Dept: FAMILY MEDICINE | Facility: CLINIC | Age: 74
End: 2024-01-03
Payer: MEDICARE

## 2024-01-03 ENCOUNTER — HOSPITAL ENCOUNTER (OUTPATIENT)
Dept: RADIOLOGY | Facility: HOSPITAL | Age: 74
Discharge: HOME | End: 2024-01-03
Attending: NURSE PRACTITIONER
Payer: MEDICARE

## 2024-01-03 DIAGNOSIS — Z13.820 SCREENING FOR OSTEOPOROSIS: ICD-10-CM

## 2024-01-03 DIAGNOSIS — R10.9 ABDOMINAL PAIN, UNSPECIFIED ABDOMINAL LOCATION: ICD-10-CM

## 2024-01-03 DIAGNOSIS — Z78.0 POSTMENOPAUSAL: ICD-10-CM

## 2024-01-03 PROCEDURE — 77080 DXA BONE DENSITY AXIAL: CPT | Mod: TC

## 2024-01-03 PROCEDURE — 77080 DXA BONE DENSITY AXIAL: CPT | Mod: 26,,, | Performed by: STUDENT IN AN ORGANIZED HEALTH CARE EDUCATION/TRAINING PROGRAM

## 2024-01-03 PROCEDURE — 76770 US EXAM ABDO BACK WALL COMP: CPT | Mod: 26,,, | Performed by: STUDENT IN AN ORGANIZED HEALTH CARE EDUCATION/TRAINING PROGRAM

## 2024-01-03 PROCEDURE — 76770 US EXAM ABDO BACK WALL COMP: CPT | Mod: TC

## 2024-01-03 NOTE — TELEPHONE ENCOUNTER
----- Message from Jarrell Silver MD sent at 1/3/2024  1:46 PM CST -----  Need to see in  1 week please  abnl results     [id number] Pt is scheduled to come in on 01/17/24

## 2024-01-10 ENCOUNTER — HOSPITAL ENCOUNTER (OUTPATIENT)
Dept: RADIOLOGY | Facility: HOSPITAL | Age: 74
Discharge: HOME | End: 2024-01-10
Attending: INTERNAL MEDICINE
Payer: MEDICARE

## 2024-01-10 DIAGNOSIS — R92.8 ABNORMAL MAMMOGRAM OF LEFT BREAST: ICD-10-CM

## 2024-01-10 PROCEDURE — 77065 DX MAMMO INCL CAD UNI: CPT | Mod: 26,LT,, | Performed by: RADIOLOGY

## 2024-01-10 PROCEDURE — 77061 BREAST TOMOSYNTHESIS UNI: CPT | Mod: 26,LT,, | Performed by: RADIOLOGY

## 2024-01-10 PROCEDURE — 77061 BREAST TOMOSYNTHESIS UNI: CPT | Mod: TC,LT

## 2024-01-10 PROCEDURE — 77065 DX MAMMO INCL CAD UNI: CPT | Mod: TC,LT

## 2024-01-11 ENCOUNTER — OFFICE VISIT (OUTPATIENT)
Dept: VASCULAR SURGERY | Facility: CLINIC | Age: 74
End: 2024-01-11
Payer: MEDICARE

## 2024-01-11 ENCOUNTER — HOSPITAL ENCOUNTER (OUTPATIENT)
Dept: RADIOLOGY | Facility: HOSPITAL | Age: 74
Discharge: HOME | End: 2024-01-11
Attending: FAMILY MEDICINE
Payer: MEDICARE

## 2024-01-11 VITALS
BODY MASS INDEX: 32.46 KG/M2 | DIASTOLIC BLOOD PRESSURE: 84 MMHG | WEIGHT: 176.38 LBS | HEIGHT: 62 IN | SYSTOLIC BLOOD PRESSURE: 136 MMHG | RESPIRATION RATE: 14 BRPM | HEART RATE: 78 BPM

## 2024-01-11 DIAGNOSIS — M79.605 LEG PAIN, BILATERAL: ICD-10-CM

## 2024-01-11 DIAGNOSIS — M79.604 LEG PAIN, BILATERAL: ICD-10-CM

## 2024-01-11 DIAGNOSIS — I87.2 VENOUS INSUFFICIENCY: Primary | ICD-10-CM

## 2024-01-11 DIAGNOSIS — I87.2 VENOUS INSUFFICIENCY: ICD-10-CM

## 2024-01-11 DIAGNOSIS — L81.9 HYPERPIGMENTATION OF SKIN: ICD-10-CM

## 2024-01-11 DIAGNOSIS — R60.0 EDEMA, LOWER EXTREMITY: ICD-10-CM

## 2024-01-11 PROCEDURE — 99214 OFFICE O/P EST MOD 30 MIN: CPT | Mod: PBBFAC,25 | Performed by: FAMILY MEDICINE

## 2024-01-11 PROCEDURE — 93970 EXTREMITY STUDY: CPT | Mod: TC

## 2024-01-11 PROCEDURE — 93970 EXTREMITY STUDY: CPT | Mod: 26,,, | Performed by: FAMILY MEDICINE

## 2024-01-11 PROCEDURE — 99204 OFFICE O/P NEW MOD 45 MIN: CPT | Mod: S$PBB,,, | Performed by: FAMILY MEDICINE

## 2024-01-11 RX ORDER — SODIUM CHLORIDE 9 MG/ML
INJECTION, SOLUTION INTRAVENOUS CONTINUOUS
OUTPATIENT
Start: 2024-03-01

## 2024-01-11 RX ORDER — DORZOLAMIDE HYDROCHLORIDE AND TIMOLOL MALEATE 20; 5 MG/ML; MG/ML
1 SOLUTION/ DROPS OPHTHALMIC 2 TIMES DAILY
COMMUNITY
Start: 2024-01-03

## 2024-01-11 RX ORDER — SODIUM CHLORIDE 9 MG/ML
INJECTION, SOLUTION INTRAVENOUS CONTINUOUS
Status: CANCELLED | OUTPATIENT
Start: 2024-02-23

## 2024-01-11 NOTE — PROGRESS NOTES
VEIN CENTER CLINIC NOTE    Patient ID: Eugenie Coates is a 73 y.o. female.    I. HISTORY     Chief Complaint:   Chief Complaint   Patient presents with    Chronic Venous Insufficiency     VENOUS INSUFFICIENCY REF BY AYAZ SILVER          HPI: Eugenie Coates is a 73 y.o. female who is referred here today by Dr. Silver for evaluation of bilateral lower extremity swelling, hyperpigmentation, previous ulceration and pain.  Symptoms are progressive/worsening and began about 20 years ago.  Location is bilateral lower extremities below the knees, worse on the right. Symptoms are worse at the end of the day.  History of venous interventions includes some type of valve surgery in 2003 and 2004 the right lower extremity according to the patient..  Positive, mother, family history of venous disease.  The patient states that she has been wearing compression socks on a daily basis for many years now.  She also elevates her legs in the evening and walks regularly.  The patient also had history of extensive right lower extremity DVT in 2017 from the superficial femoral vein down to the posterior tibials.  She was placed on Eliquis for several years.  She was taken off Eliquis several years ago and is not currently on an anticoagulant or antiplatelet medication.    The patient underwent a bilateral complete venous reflux study today, 01/11/2024, and the results were discussed with the patient.  This study shows no evidence of DVT bilaterally.  Thrombus noted within the proximal calf of the right great saphenous vein.  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  The study also shows dilation and axial reflux of the bilateral great saphenous veins and right small saphenous vein.  No reflux noted in the left small saphenous vein.  Refluxing varicosities noted right Gator region.    Arterial duplex with ABIs performed 03/31/2021 which shows a right EDOUARD 1.02 and a  left EDOUARD of 1.05.  No vascular occlusions noted.    Venous Disease Medical Necessity Documentation Initial Visit Date:  01/11/2024 Return Check Date:    Have you ever had a rupture or bleed from a varicose vein in your leg(s)?              [] Yes  [x] No   [] Yes   [] No   Have you ever been diagnosed with phlebitis, cellulitis, or inflammation in the area of the varicose veins of  your leg(s)?  [] Yes  [x] No    [] Yes   [] No   Do you have darkened or inflamed skin on your legs?   [x] Yes   [] No   [] Yes   [] No   Do you have leg swelling?     [x] Yes   [] No   [] Yes   [] No   Do you have leg pain?   [x] Yes   [] No   [] Yes   [] No   If yes, describe the type of pain?    []   Stabbing []  Radiating []  Aching   [x]  Tightness []  Throbbing               [x]  Burning []  Cramping              Do you have leg discomfort?   [x] Yes   [] No   [] Yes   [] No   If yes, describe the type of discomfort?    [x]  Heaviness [x]  Fullness   [x]  Restlessness [] Tired/Fatigued [x] Itching              Have you ever worn compression hose?   [x] Yes   [] No   [] Yes   [] No   If yes, how long?    24 YEARS       Do you elevate your legs while sitting?   [x] Yes   [] No   [] Yes   [] No   Does venous disease (varicose veins, ulcers, skin changes, leg pain/swelling) interfere with your daily life?  If yes, check activities you are limited or unable to do.    []  Shower  [x]   Walk  []  Exercise  [] Play with children/grandchildren  []  Shop [] Work [x] Stand for any period of time [x] Sleep                               [x] Sitting for an extended period of time.           [x] Yes   [] No   [] Yes   [] No   Do you exercise/have you tried to exercise (i.e.  Walk our participate in a regular exercise routine)?  [x] Yes  [] No   [] Yes   [] No   BMI   32.26           Past Medical History:   Diagnosis Date    Allergy     Anemia     Anxiety     Arthritis     Asthma     Bell palsy     Bilateral lower extremity edema     Chronic  diastolic CHF (congestive heart failure)     Depression     Diabetes mellitus, type 2     DVT (deep venous thrombosis)     right LE    Dyslipidemia     Endometriosis     Eye injury     stuck with tree branch od ?     Focal seizure 5/3/2021    GERD (gastroesophageal reflux disease)     Glaucoma     History of DVT (deep vein thrombosis) 2018    History of uterine fibroid     Hyperlipidemia     Hypertension     Invasive lobular carcinoma of right breast in female     s/p surgery, radiation, tamoxifen    Nuclear sclerosis of both eyes 2016    Obesity     Pancreas cyst     Sleep apnea     uses C pap    Supraventricular tachycardia     SVT (supraventricular tachycardia) 2019    Thyroid disease     Venous insufficiency         Past Surgical History:   Procedure Laterality Date    CATARACT EXTRACTION W/  INTRAOCULAR LENS IMPLANT Left 2023    Procedure: CEIOL OMNI OS;  Surgeon: Juan Antonio Mcintosh MD;  Location: [facility] OR;  Service: Ophthalmology;  Laterality: Left;  consider block     SECTION      CHOLECYSTECTOMY      COLONOSCOPY      CRANIOTOMY FOR EXCISION OF INTRACRANIAL TUMOR Left 2021    Procedure: CRANIOTOMY, FOR INTRACRANIAL NEOPLASM EXCISION Left craniotomy for tumor resection;  Surgeon: Hernandez Bravo MD;  Location: [facility] OR;  Service: Neurosurgery;  Laterality: Left;    CRANIOTOMY, WITH NEOPLASM EXCISION USING COMPUTER-ASSISTED NAVIGATION Left 2023    Procedure: 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, USING COMPUTER-ASSISTED NAVIGATION-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;  Surgeon: Hernandez Bravo MD;  Location: [facility] OR;  Service: Neurosurgery;  Laterality: Left;    ESOPHAGOGASTRODUODENOSCOPY N/A 2023    Procedure: EGD (ESOPHAGOGASTRODUODENOSCOPY);  Surgeon: Issac Wade MD;  Location: [facility];  Service: Endoscopy;  Laterality: N/A;    glaucoma laser Bilateral     HERNIA REPAIR      KNEE SURGERY Right     (R) knee scope; torn  meniscus    MASTECTOMY, PARTIAL Right 12/07/2020    Procedure: MASTECTOMY, PARTIAL-Right with radiological marker Consent day of surgery; Neoprobe, technitium, Frozen;  Surgeon: Narda Keatign MD;  Location: [facility] OR [address] [place] [address];  Service: General;  Laterality: Right;    SENTINEL LYMPH NODE BIOPSY Right 12/07/2020    Procedure: BIOPSY, LYMPH NODE, SENTINEL;  Surgeon: Narda Keating MD;  Location: [facility] OR [address] [place] [address];  Service: General;  Laterality: Right;    TOTAL REDUCTION MAMMOPLASTY Bilateral 12/07/2020    Procedure: MAMMOPLASTY, REDUCTION-Bilateral;  Surgeon: Zack Hood MD;  Location: [facility] OR [address] [place] [address];  Service: Plastics;  Laterality: Bilateral;    VEIN SURGERY  2003, 2004    Rt leg x2       Social History     Tobacco Use   Smoking Status Never   Smokeless Tobacco Never         Current Outpatient Medications:     b complex vitamins tablet, Take 1 tablet by mouth once daily., Disp: , Rfl:     blood sugar diagnostic Strp, Test twice daily.  Accucheck viva test strips and lancets., Disp: 300 each, Rfl: 3    calcium carb-vit D3-magnesium (CORAL CALCIUM) 250-200-125 mg-unit-mg Cap, Take 1 capsule by mouth once daily., Disp: , Rfl:     cloNIDine (CATAPRES) 0.1 MG tablet, Take 1 tablet (0.1 mg total) by mouth every evening. Take if blood pressure is greater than 180/100., Disp: 90 tablet, Rfl: 0    dorzolamide-timolol 2-0.5% (COSOPT) 22.3-6.8 mg/mL ophthalmic solution, Place 1 drop into both eyes 2 (two) times daily., Disp: , Rfl:     furosemide (LASIX) 20 MG tablet, Take 2 tablets (40 mg total) by mouth every morning., Disp: 90 tablet, Rfl: 0    glucosamine-chondroitin (OSTEO BI-FLEX) 250-200 mg Tab, Take 1 tablet by mouth once daily., Disp: , Rfl:     lancets (ACCU-CHEK SOFTCLIX LANCETS) Misc, 1 Units by Misc.(Non-Drug; Combo Route) route 2 (two) times daily., Disp: 300 each, Rfl: 3    latanoprost 0.005 % ophthalmic solution, Place 1 drop into both eyes every evening., Disp: 2.5 mL, Rfl: 12    metFORMIN (GLUCOPHAGE)  500 MG tablet, TAKE 1 TABLET(500 MG) BY MOUTH DAILY WITH BREAKFAST, Disp: 90 tablet, Rfl: 0    mv-mn-vitC-zwwIy-Edi-San-hc124 (AIRBORNE, ASCORBATE SODIUM,) 333-1.7 mg Chew, Take 1 each by mouth once daily., Disp: , Rfl:     NIFEdipine (ADALAT CC) 90 MG TbSR, Take 1 tablet (90 mg total) by mouth once daily., Disp: 30 tablet, Rfl: 1    carvediloL (COREG) 12.5 MG tablet, Take 1 tablet (12.5 mg total) by mouth 2 (two) times daily with meals., Disp: 60 tablet, Rfl: 11    dapagliflozin propanediol (FARXIGA) 10 mg tablet, Take 1 tablet (10 mg total) by mouth once daily., Disp: 90 tablet, Rfl: 1    diazePAM (VALIUM) 5 MG tablet, Take 1 tablet (5 mg total) by mouth once. for 1 dose, Disp: 1 tablet, Rfl: 0    pantoprazole (PROTONIX) 40 MG tablet, Take 1 tablet (40 mg total) by mouth 2 (two) times daily., Disp: 60 tablet, Rfl: 0    Review of Systems   Constitutional:  Negative for activity change, chills, diaphoresis, fatigue and fever.   Respiratory:  Negative for cough and shortness of breath.    Cardiovascular:  Positive for leg swelling. Negative for chest pain and claudication.        Hyperpigmentation LE   Gastrointestinal:  Negative for nausea and vomiting.   Musculoskeletal:  Positive for leg pain. Negative for joint swelling.   Integumentary:  Negative for rash and wound.   Neurological:  Negative for weakness and numbness.          II. PHYSICAL EXAM     Physical Exam  Constitutional:       General: She is awake. She is not in acute distress.     Appearance: Normal appearance. She is overweight. She is not ill-appearing or toxic-appearing.   HENT:      Head: Normocephalic and atraumatic.   Eyes:      Extraocular Movements: Extraocular movements intact.      Conjunctiva/sclera: Conjunctivae normal.      Pupils: Pupils are equal, round, and reactive to light.   Neck:      Vascular: No carotid bruit or JVD.   Cardiovascular:      Rate and Rhythm: Normal rate and regular rhythm.      Pulses:           Dorsalis pedis  pulses are detected w/ Doppler on the right side and detected w/ Doppler on the left side.        Posterior tibial pulses are detected w/ Doppler on the right side and detected w/ Doppler on the left side.      Heart sounds: Murmur heard.      Systolic murmur is present with a grade of 2/6.   Pulmonary:      Effort: Pulmonary effort is normal. No respiratory distress.      Breath sounds: No stridor. No wheezing, rhonchi or rales.   Musculoskeletal:         General: No swelling, tenderness or deformity.      Right lower le+ Pitting Edema present.      Left lower le+ Pitting Edema present.      Comments: Pitting edema of the bilateral extremities along with hyperpigmentation scarring from previous venous ulcerations of the right medial Gator region.  No evidence of acute cellulitis or ulcerations.   Feet:      Comments: Triphasic hand-held dopplerable pulses of the bilateral dorsalis pedis and posterior tibial arteries.  Skin:     General: Skin is warm.      Capillary Refill: Capillary refill takes less than 2 seconds.      Coloration: Skin is not ashen.      Findings: No bruising, erythema, lesion, rash or wound.   Neurological:      Mental Status: She is alert and oriented to person, place, and time.      Motor: No weakness.   Psychiatric:         Speech: Speech normal.         Behavior: Behavior normal. Behavior is cooperative.         Reticular/Spider veins noted:  RLE: anterior calf and medial calf  LLE: anterior calf and medial calf    Varicose veins noted:  RLE: anterior calf and medial calf  LLE:  none    CEAP Classification  Clinical Signs: Class 5 - Skin changes as defined above with healed ulceration  Etiologic Classification: Primary  Anatomic distribution: Superficial  Pathophysiologic dysfunction: Reflux                         Venous Clinical Severity Score  Pain:2=Daily, moderate activity limitation, occasional analgesics  Varicose Veins: 2=Multiple. GS varicose veins confined to calf or  thigh  Venous Edema: 2=Afternoon edema, above ankle  Pigmentation: 2=Diffuse over most of gaiter distribution (lower 1/3) or recent pigmentation (purple)  Inflammation: 0=None  Induration: 1=Focal, circummalleolar (< 5 cm)  Number of Active Ulcers: 0=0  Active Ulceration, Duration: 0=None  Active Ulcer Size: 0=None  Compressive Therapy: 3=Full compliance, stockings + elevation  Total Score: 12       III. ASSESSMENT & PLAN (MEDICAL DECISION MAKING)     1. Venous insufficiency    2. Hyperpigmentation of skin    3. Edema, lower extremity    4. Leg pain, bilateral        Assessment/Diagnosis and Plan:  The patient continues to have sequela of chronic venous insufficiency despite over 3 months of conservative therapy. The patient continues to have life altering symptoms as well as a positive reflux study as noted in the history of present illness above. At this time I believe it would be reasonable to proceed with a bilateral great saphenous vein non thermal adhesive endovenous ablation for symptomatic venous insufficiency.  Also believe it would be reasonable to perform a right small saphenous vein endovenous radiofrequency ablation for symptomatic venous insufficiency.  Untreated venous disease increases the patient's risk for cellulitis, deep vein thrombosis, chronic skin changes and venous ulceration.  Risk and benefits of the procedure were explained to the patient and the patient wishes to proceed. The patient does have a history of DVT and will require prophylactic anticoagulation.  We will start the patient on anticoagulation 1 day prior to the procedure and discontinue at 1 month with a clear postablation ultrasound.    Orders Placed This Encounter    US Venous Reflux Study Bilateral          Pepito Walker DO

## 2024-01-17 ENCOUNTER — OFFICE VISIT (OUTPATIENT)
Dept: FAMILY MEDICINE | Facility: CLINIC | Age: 74
End: 2024-01-17
Payer: MEDICARE

## 2024-01-17 VITALS
RESPIRATION RATE: 18 BRPM | HEART RATE: 104 BPM | DIASTOLIC BLOOD PRESSURE: 72 MMHG | WEIGHT: 181.63 LBS | SYSTOLIC BLOOD PRESSURE: 125 MMHG | HEIGHT: 62 IN | BODY MASS INDEX: 33.43 KG/M2 | OXYGEN SATURATION: 96 % | TEMPERATURE: 98 F

## 2024-01-17 DIAGNOSIS — I10 ESSENTIAL HYPERTENSION: ICD-10-CM

## 2024-01-17 DIAGNOSIS — M85.80 OSTEOPENIA, UNSPECIFIED LOCATION: Primary | ICD-10-CM

## 2024-01-17 DIAGNOSIS — R92.8 ABNORMAL MAMMOGRAM: ICD-10-CM

## 2024-01-17 PROCEDURE — 99213 OFFICE O/P EST LOW 20 MIN: CPT | Mod: ,,, | Performed by: INTERNAL MEDICINE

## 2024-01-17 RX ORDER — NIFEDIPINE 90 MG/1
90 TABLET, FILM COATED, EXTENDED RELEASE ORAL DAILY
Qty: 90 TABLET | Refills: 1 | Status: SHIPPED | OUTPATIENT
Start: 2024-01-17 | End: 2024-04-17 | Stop reason: SDUPTHER

## 2024-01-18 PROBLEM — R92.8 ABNORMAL MAMMOGRAM: Status: ACTIVE | Noted: 2024-01-18

## 2024-01-18 NOTE — PROGRESS NOTES
Subjective:       Patient ID: Eugenie Coates is a 73 y.o. female.    Chief Complaint: Results (Mammogram results )    HPI  .  Patient seen for abnormal mammogram results there is some fibroglandular tissues and possible lymph node mammogram will be will be repeated in 6 months DEXA scan shows evidence of osteopenia recommend the patient continue calcium and vitamin-D patient also has obesity recommend diet exercise and lifestyle modification.  Current Medications:    Current Outpatient Medications:     b complex vitamins tablet, Take 1 tablet by mouth once daily., Disp: , Rfl:     blood sugar diagnostic Strp, Test twice daily.  Accucheck viva test strips and lancets., Disp: 300 each, Rfl: 3    calcium carb-vit D3-magnesium (CORAL CALCIUM) 250-200-125 mg-unit-mg Cap, Take 1 capsule by mouth once daily., Disp: , Rfl:     carvediloL (COREG) 12.5 MG tablet, Take 1 tablet (12.5 mg total) by mouth 2 (two) times daily with meals., Disp: 60 tablet, Rfl: 11    cloNIDine (CATAPRES) 0.1 MG tablet, Take 1 tablet (0.1 mg total) by mouth every evening. Take if blood pressure is greater than 180/100., Disp: 90 tablet, Rfl: 0    dapagliflozin propanediol (FARXIGA) 10 mg tablet, Take 1 tablet (10 mg total) by mouth once daily., Disp: 90 tablet, Rfl: 1    dorzolamide-timolol 2-0.5% (COSOPT) 22.3-6.8 mg/mL ophthalmic solution, Place 1 drop into both eyes 2 (two) times daily., Disp: , Rfl:     furosemide (LASIX) 20 MG tablet, Take 2 tablets (40 mg total) by mouth every morning., Disp: 90 tablet, Rfl: 0    glucosamine-chondroitin (OSTEO BI-FLEX) 250-200 mg Tab, Take 1 tablet by mouth once daily., Disp: , Rfl:     lancets (ACCU-CHEK SOFTCLIX LANCETS) Misc, 1 Units by Misc.(Non-Drug; Combo Route) route 2 (two) times daily., Disp: 300 each, Rfl: 3    latanoprost 0.005 % ophthalmic solution, Place 1 drop into both eyes every evening., Disp: 2.5 mL, Rfl: 12    metFORMIN (GLUCOPHAGE) 500 MG tablet, TAKE 1 TABLET(500 MG) BY MOUTH DAILY  "WITH BREAKFAST, Disp: 90 tablet, Rfl: 0    mv-mn-[id number]-[id number]-Cul-Tut-hc124 (AIRBORNE, ASCORBATE SODIUM,) 333-1.7 mg Chew, Take 1 each by mouth once daily., Disp: , Rfl:     pantoprazole (PROTONIX) 40 MG tablet, Take 1 tablet (40 mg total) by mouth 2 (two) times daily., Disp: 60 tablet, Rfl: 0    diazePAM (VALIUM) 5 MG tablet, Take 1 tablet (5 mg total) by mouth once. for 1 dose, Disp: 1 tablet, Rfl: 0    NIFEdipine (ADALAT CC) 90 MG TbSR, Take 1 tablet (90 mg total) by mouth once daily., Disp: 90 tablet, Rfl: 1           Review of Systems             Vitals:    01/17/24 1431   BP: 125/72   BP Location: Left arm   Patient Position: Sitting   BP Method: Large (Automatic)   Pulse: 104   Resp: 18   Temp: 97.9 °F (36.6 °C)   TempSrc: Temporal   SpO2: 96%   Weight: 82.4 kg (181 lb 9.6 oz)   Height: 5' 2" (1.575 m)        Physical Exam  Vitals and nursing note reviewed.   Constitutional:       Appearance: Normal appearance. She is obese.   HENT:      Head: Normocephalic and atraumatic.      Right Ear: Tympanic membrane, ear canal and external ear normal.      Left Ear: Tympanic membrane, ear canal and external ear normal.      Nose: Nose normal.      Mouth/Throat:      Mouth: Mucous membranes are moist.      Pharynx: Oropharynx is clear.   Eyes:      Extraocular Movements: Extraocular movements intact.      Conjunctiva/sclera: Conjunctivae normal.      Pupils: Pupils are equal, round, and reactive to light.   Cardiovascular:      Rate and Rhythm: Normal rate and regular rhythm.      Pulses: Normal pulses.      Heart sounds: Normal heart sounds.   Pulmonary:      Effort: Pulmonary effort is normal.      Breath sounds: Normal breath sounds.   Abdominal:      General: Abdomen is flat. Bowel sounds are normal.      Palpations: Abdomen is soft.   Musculoskeletal:         General: Normal range of motion.      Cervical back: Normal range of motion and neck supple.   Skin:     General: Skin is warm and dry.   Neurological:      " General: No focal deficit present.      Mental Status: She is alert. Mental status is at baseline. She is disoriented.   Psychiatric:         Mood and Affect: Mood normal.         Behavior: Behavior normal.         Thought Content: Thought content normal.         Judgment: Judgment normal.           Last Labs:     Office Visit on 12/27/2023   Component Date Value    Color, UA 12/27/2023 Dark-Yellow     Clarity, UA 12/27/2023 Turbid     pH, UA 12/27/2023 7.5     Leukocytes, UA 12/27/2023 Small (A)     Nitrites, UA 12/27/2023 Negative     Protein, UA 12/27/2023 20 (A)     Glucose, UA 12/27/2023 Normal     Ketones, UA 12/27/2023 Negative     Urobilinogen, UA 12/27/2023 Normal     Bilirubin, UA 12/27/2023 Negative     Blood, UA 12/27/2023 Negative     Specific Gravity, UA 12/27/2023 1.020     WBC, UA 12/27/2023 7 (H)     RBC, UA 12/27/2023 1     Bacteria, UA 12/27/2023 Occasional (A)     Squamous Epithelial Cell* 12/27/2023 Few (A)     Calcium Oxalate Crystals* 12/27/2023 Occasional (A)     Mucous 12/27/2023 Few (A)    Office Visit on 12/26/2023   Component Date Value    Creatinine, Urine 12/26/2023 72     Microalbumin 12/26/2023 0.7     Microalbumin/Creatinine * 12/26/2023 9.7        Last Imaging:  US Venous Reflux Study Bilateral  Narrative: EXAMINATION:  Bilateral complete venous reflux study    CLINICAL HISTORY:  Bilateral lower extremity edema, pain, hyperpigmentation and varicose veins    TECHNIQUE:  Complete venous evaluation performed with the patient in the standing and supine position.  The deep system was evaluated for augmentation, phasicity, compressibility and presence or absence of DVT.  The superficial system was evaluated with spectral analysis and color-flow to determine reflux times.    COMPARISON:  None    FINDINGS:  Bilateral common femoral veins, femoral veins, popliteal veins, deep femoral veins, peroneal veins, and posterior tibial veins appear to be widely patent with no evidence of DVT.  There  is normal augmentation and phasicity of the sampled vessels.  There appears to be an arteriovenous fistula in the mid right thigh which is widely patent.    The right saphenofemoral junction measures 6.0mm in diameter with 0.62 seconds of reflux time noted.    The right great saphenous vein at the proximal thigh measures 6.5 mm in diameter with 0.52 seconds of reflux time noted.    Maximum reflux time noted in the right great saphenous vein is 0.9 seconds noted at the level of theproximal calf.    A portion of the proximal calf area of the great saphenous vein also appears to be thrombosed.    The right saphenopopliteal junction not visualized.    The right small saphenous vein measures 3.9 mm in diameter with 0.61 seconds of reflux time noted.    Varicose veins right medial calf measuring 3.3 mm in diameter.    The left saphenofemoral junction measures 7.1mm in diameter with 0.51 seconds of reflux time noted.    The left great saphenous vein at the proximal thigh measures 6.9 mm in diameter with 0.91 seconds of reflux time noted.    Maximum reflux time noted in the left great saphenous vein is 1.4 seconds noted at the level of themid calf.    The left saphenopopliteal junction not visualized.    The left small saphenous vein measures 3.2 mm in diameter with 0 seconds of reflux time noted.    There are no areas of pre-rupture or pathological perforators noted bilaterally.  Impression: No evidence of DVT noted bilaterally.  Superficial thrombus noted within the proximal calf portion of the right great saphenous vein.    Dilation and axial reflux of the bilateral great and right small saphenous veins with associated refluxing varicosities as detailed above.    Electronically signed by: Pepito Walker  Date:    01/11/2024  Time:    16:56         **Labs and x-rays personally reviewed by me    ** reviewed      Objective:        Assessment:       1. Osteopenia, unspecified location        2. Essential hypertension   NIFEdipine (ADALAT CC) 90 MG TbSR      3. Abnormal mammogram             Plan:         1. Osteopenia, unspecified location    2. Essential hypertension  -     NIFEdipine (ADALAT CC) 90 MG TbSR; Take 1 tablet (90 mg total) by mouth once daily.  Dispense: 90 tablet; Refill: 1    3. Abnormal mammogram

## 2024-01-24 ENCOUNTER — PATIENT MESSAGE (OUTPATIENT)
Dept: FAMILY MEDICINE | Facility: CLINIC | Age: 74
End: 2024-01-24
Payer: MEDICARE

## 2024-02-07 ENCOUNTER — OFFICE VISIT (OUTPATIENT)
Dept: FAMILY MEDICINE | Facility: CLINIC | Age: 74
End: 2024-02-07
Payer: MEDICARE

## 2024-02-07 VITALS
SYSTOLIC BLOOD PRESSURE: 117 MMHG | HEART RATE: 104 BPM | BODY MASS INDEX: 33.49 KG/M2 | DIASTOLIC BLOOD PRESSURE: 60 MMHG | HEIGHT: 62 IN | OXYGEN SATURATION: 97 % | RESPIRATION RATE: 18 BRPM | WEIGHT: 182 LBS | TEMPERATURE: 98 F

## 2024-02-07 DIAGNOSIS — Z20.822 EXPOSURE TO CONFIRMED CASE OF COVID-19: Primary | ICD-10-CM

## 2024-02-07 DIAGNOSIS — U07.1 COVID-19 VIRUS DETECTED: ICD-10-CM

## 2024-02-07 LAB
CTP QC/QA: YES
MOLECULAR STREP A: NEGATIVE
POC MOLECULAR INFLUENZA A AGN: NEGATIVE
POC MOLECULAR INFLUENZA B AGN: NEGATIVE
SARS-COV-2 RDRP RESP QL NAA+PROBE: POSITIVE

## 2024-02-07 PROCEDURE — 87502 INFLUENZA DNA AMP PROBE: CPT | Mod: RHCUB | Performed by: INTERNAL MEDICINE

## 2024-02-07 PROCEDURE — 87635 SARS-COV-2 COVID-19 AMP PRB: CPT | Mod: RHCUB | Performed by: INTERNAL MEDICINE

## 2024-02-07 PROCEDURE — 87651 STREP A DNA AMP PROBE: CPT | Mod: RHCUB | Performed by: INTERNAL MEDICINE

## 2024-02-07 PROCEDURE — 99214 OFFICE O/P EST MOD 30 MIN: CPT | Mod: ,,, | Performed by: INTERNAL MEDICINE

## 2024-02-07 RX ORDER — LORATADINE 10 MG/1
10 TABLET ORAL DAILY
Qty: 10 TABLET | Refills: 0 | Status: SHIPPED | OUTPATIENT
Start: 2024-02-07 | End: 2024-04-17

## 2024-02-07 RX ORDER — PHENOL 1.4 %
AEROSOL, SPRAY (ML) MUCOUS MEMBRANE 2 TIMES DAILY
Qty: 177 ML | Refills: 0 | Status: SHIPPED | OUTPATIENT
Start: 2024-02-07

## 2024-02-07 RX ORDER — BENZONATATE 200 MG/1
200 CAPSULE ORAL 2 TIMES DAILY
Qty: 30 CAPSULE | Refills: 0 | Status: SHIPPED | OUTPATIENT
Start: 2024-02-07 | End: 2024-04-17 | Stop reason: ALTCHOICE

## 2024-02-08 ENCOUNTER — NURSE TRIAGE (OUTPATIENT)
Dept: ADMINISTRATIVE | Facility: CLINIC | Age: 74
End: 2024-02-08
Payer: MEDICARE

## 2024-02-08 NOTE — TELEPHONE ENCOUNTER
No contact made with caller x 2 attempts. No option to leave vm.    Reason for Disposition   Second attempt to contact caller AND no contact made. Phone number verified.    Protocols used: No Contact or Duplicate Contact Call-A-OH

## 2024-02-09 ENCOUNTER — NURSE TRIAGE (OUTPATIENT)
Dept: ADMINISTRATIVE | Facility: CLINIC | Age: 74
End: 2024-02-09
Payer: MEDICARE

## 2024-02-09 NOTE — TELEPHONE ENCOUNTER
OOC RN   COVID POSITIVE [facility].    2/7/24    Looking to get paxlovid filled by [facility].  Out of stock.   Care advise discuss with PCp and callback by rn within 1 hour.   For any new or worsening symptoms to callback OOC RN.        Reason for Disposition   HIGH RISK patient (e.g., weak immune system, age > 64 years, obesity with BMI of 30 or higher, pregnant, chronic lung disease or other chronic medical condition) and COVID symptoms (e.g., cough, fever)  (Exceptions: Already seen by doctor or NP/PA and no new or worsening symptoms.)    Additional Information   Negative: SEVERE difficulty breathing (e.g., struggling for each breath, speaks in single words)   Negative: Difficult to awaken or acting confused (e.g., disoriented, slurred speech)   Negative: Bluish (or gray) lips or face now   Negative: Shock suspected (e.g., cold/pale/clammy skin, too weak to stand, low BP, rapid pulse)   Negative: Sounds like a life-threatening emergency to the triager   Negative: SEVERE or constant chest pain or pressure  (Exception: Mild central chest pain, present only when coughing.)   Negative: MODERATE difficulty breathing (e.g., speaks in phrases, SOB even at rest, pulse 100-120)   Negative: Headache and stiff neck (can't touch chin to chest)   Negative: Oxygen level (e.g., pulse oximetry) 90% or lower   Negative: Chest pain or pressure  (Exception: MILD central chest pain, present only when coughing.)   Negative: Drinking very little and dehydration suspected (e.g., no urine > 12 hours, very dry mouth, very lightheaded)   Negative: Patient sounds very sick or weak to the triager   Negative: MILD difficulty breathing (e.g., minimal/no SOB at rest, SOB with walking, pulse <100)   Negative: Fever > 103 F (39.4 C)   Negative: Fever > 101 F (38.3 C) and over 60 years of age   Negative: Fever > 100.0 F (37.8 C) and bedridden (e.g., CVA, chronic illness, recovering from surgery)    Protocols used: Coronavirus (COVID-19)  Diagnosed or [id number]

## 2024-02-11 ENCOUNTER — PATIENT MESSAGE (OUTPATIENT)
Dept: INTERNAL MEDICINE | Facility: CLINIC | Age: 74
End: 2024-02-11
Payer: MEDICARE

## 2024-02-12 ENCOUNTER — NURSE TRIAGE (OUTPATIENT)
Dept: ADMINISTRATIVE | Facility: CLINIC | Age: 74
End: 2024-02-12
Payer: MEDICARE

## 2024-02-12 NOTE — TELEPHONE ENCOUNTER
"Pt requesting a Mammogram order.    "Its because I need your help again with the order for this additional mammogram,I am thankful to you for your help and understanding with the my last mammogram at [facility], [address] [place] [address] Phone # [phone]. I would really appreciate anything you are able to give me help with this matter thank you so much Mrs. Eugenie Coates. "  "

## 2024-02-12 NOTE — TELEPHONE ENCOUNTER
M caller Eugenie. Diagnosed with Covid on 2/7/24. Eugenie reports weakness, cannot stand up, back & neck stiffness on right side of the body. Symptoms began this morning. Pt states BP was low this morning. Advised pt per triage protocol to call  now. V/u.  Reason for Disposition   SEVERE weakness (i.e., unable to walk or barely able to walk, requires support) and new-onset or worsening    Additional Information   Negative: SEVERE difficulty breathing (e.g., struggling for each breath, speaks in single words)   Negative: Shock suspected (e.g., cold/pale/clammy skin, too weak to stand, low BP, rapid pulse)   Negative: Difficult to awaken or acting confused (e.g., disoriented, slurred speech)   Negative: Fainted > 15 minutes ago and still feels too weak or dizzy to stand    Protocols used: Weakness (Generalized) and Fatigue-A-OH

## 2024-02-18 PROBLEM — Z20.822 EXPOSURE TO CONFIRMED CASE OF COVID-19: Status: ACTIVE | Noted: 2024-02-18

## 2024-02-19 NOTE — PROGRESS NOTES
Subjective:       Patient ID: Eugenie Coates is a 73 y.o. female.    Chief Complaint:  Cough shortness for breath     Patient presents with cough shortness a breath subjective fever chills general body pains and aches patient is positive for COVID the plan Paxlovid Claritin Tessalon Perles see below for additional plans      .    Current Medications:    Current Outpatient Medications:     b complex vitamins tablet, Take 1 tablet by mouth once daily., Disp: , Rfl:     blood sugar diagnostic Strp, Test twice daily.  Accucheck viva test strips and lancets., Disp: 300 each, Rfl: 3    calcium carb-vit D3-magnesium (CORAL CALCIUM) 250-200-125 mg-unit-mg Cap, Take 1 capsule by mouth once daily., Disp: , Rfl:     carvediloL (COREG) 12.5 MG tablet, Take 1 tablet (12.5 mg total) by mouth 2 (two) times daily with meals., Disp: 60 tablet, Rfl: 11    cloNIDine (CATAPRES) 0.1 MG tablet, Take 1 tablet (0.1 mg total) by mouth every evening. Take if blood pressure is greater than 180/100., Disp: 90 tablet, Rfl: 0    dapagliflozin propanediol (FARXIGA) 10 mg tablet, Take 1 tablet (10 mg total) by mouth once daily., Disp: 90 tablet, Rfl: 1    dorzolamide-timolol 2-0.5% (COSOPT) 22.3-6.8 mg/mL ophthalmic solution, Place 1 drop into both eyes 2 (two) times daily., Disp: , Rfl:     furosemide (LASIX) 20 MG tablet, Take 2 tablets (40 mg total) by mouth every morning., Disp: 90 tablet, Rfl: 0    glucosamine-chondroitin (OSTEO BI-FLEX) 250-200 mg Tab, Take 1 tablet by mouth once daily., Disp: , Rfl:     lancets (ACCU-CHEK SOFTCLIX LANCETS) Misc, 1 Units by Misc.(Non-Drug; Combo Route) route 2 (two) times daily., Disp: 300 each, Rfl: 3    latanoprost 0.005 % ophthalmic solution, Place 1 drop into both eyes every evening., Disp: 2.5 mL, Rfl: 12    metFORMIN (GLUCOPHAGE) 500 MG tablet, TAKE 1 TABLET(500 MG) BY MOUTH DAILY WITH BREAKFAST, Disp: 90 tablet, Rfl: 0    mv-mn-vitC-gzdBs-Soc-Lmi-hc124 (AIRBORNE, ASCORBATE SODIUM,) 333-1.7 mg  "Chew, Take 1 each by mouth once daily., Disp: , Rfl:     NIFEdipine (ADALAT CC) 90 MG TbSR, Take 1 tablet (90 mg total) by mouth once daily., Disp: 90 tablet, Rfl: 1    pantoprazole (PROTONIX) 40 MG tablet, Take 1 tablet (40 mg total) by mouth 2 (two) times daily., Disp: 60 tablet, Rfl: 0    benzonatate (TESSALON) 200 MG capsule, Take 1 capsule (200 mg total) by mouth 2 (two) times a day., Disp: 30 capsule, Rfl: 0    diazePAM (VALIUM) 5 MG tablet, Take 1 tablet (5 mg total) by mouth once. for 1 dose, Disp: 1 tablet, Rfl: 0    loratadine (CLARITIN) 10 mg tablet, Take 1 tablet (10 mg total) by mouth once daily., Disp: 10 tablet, Rfl: 0    nirmatrelvir-ritonavir 300 mg (150 mg x 2)-100 mg copackaged tablets (EUA), Take 3 tablets by mouth 2 (two) times daily. Each dose contains 2 nirmatrelvir (pink tablets) and 1 ritonavir (white tablet). Take all 3 tablets together, Disp: 30 tablet, Rfl: 0    phenoL (CHLORASEPTIC THROAT SPRAY) 1.4 % SprA, by Mucous Membrane route 2 (two) times a day., Disp: 177 mL, Rfl: 0           Review of Systems   HENT:  Positive for postnasal drip, rhinorrhea, sinus pressure/congestion and sore throat.    Respiratory:  Positive for cough.                 Vitals:    02/07/24 1356   BP: 117/60   BP Location: Left arm   Patient Position: Sitting   BP Method: Large (Automatic)   Pulse: 104   Resp: 18   Temp: 97.7 °F (36.5 °C)   TempSrc: Temporal   SpO2: 97%   Weight: 82.6 kg (182 lb)   Height: 5' 2" (1.575 m)        Physical Exam  Vitals and nursing note reviewed.   Constitutional:       Appearance: Normal appearance.   Cardiovascular:      Rate and Rhythm: Normal rate and regular rhythm.      Pulses: Normal pulses.      Heart sounds: Normal heart sounds.   Pulmonary:      Effort: Pulmonary effort is normal.      Breath sounds: Wheezing present.   Abdominal:      General: Abdomen is flat. Bowel sounds are normal.      Palpations: Abdomen is soft.   Musculoskeletal:         General: Normal range of " motion.   Skin:     General: Skin is warm and dry.   Neurological:      General: No focal deficit present.      Mental Status: She is alert and oriented to person, place, and time. Mental status is at baseline.           Last Labs:     Office Visit on 02/07/2024   Component Date Value    Molecular Strep A, POC 02/07/2024 Negative      Acceptab* 02/07/2024 Yes     POC Rapid COVID 02/07/2024 Positive (A)      Acceptab* 02/07/2024 Yes     POC Molecular Influenza * 02/07/2024 Negative     POC Molecular Influenza * 02/07/2024 Negative      Acceptab* 02/07/2024 Yes        Last Imaging:  US Venous Reflux Study Bilateral  Narrative: EXAMINATION:  Bilateral complete venous reflux study    CLINICAL HISTORY:  Bilateral lower extremity edema, pain, hyperpigmentation and varicose veins    TECHNIQUE:  Complete venous evaluation performed with the patient in the standing and supine position.  The deep system was evaluated for augmentation, phasicity, compressibility and presence or absence of DVT.  The superficial system was evaluated with spectral analysis and color-flow to determine reflux times.    COMPARISON:  None    FINDINGS:  Bilateral common femoral veins, femoral veins, popliteal veins, deep femoral veins, peroneal veins, and posterior tibial veins appear to be widely patent with no evidence of DVT.  There is normal augmentation and phasicity of the sampled vessels.  There appears to be an arteriovenous fistula in the mid right thigh which is widely patent.    The right saphenofemoral junction measures 6.0mm in diameter with 0.62 seconds of reflux time noted.    The right great saphenous vein at the proximal thigh measures 6.5 mm in diameter with 0.52 seconds of reflux time noted.    Maximum reflux time noted in the right great saphenous vein is 0.9 seconds noted at the level of theproximal calf.    A portion of the proximal calf area of the great saphenous vein also appears to  be thrombosed.    The right saphenopopliteal junction not visualized.    The right small saphenous vein measures 3.9 mm in diameter with 0.61 seconds of reflux time noted.    Varicose veins right medial calf measuring 3.3 mm in diameter.    The left saphenofemoral junction measures 7.1mm in diameter with 0.51 seconds of reflux time noted.    The left great saphenous vein at the proximal thigh measures 6.9 mm in diameter with 0.91 seconds of reflux time noted.    Maximum reflux time noted in the left great saphenous vein is 1.4 seconds noted at the level of themid calf.    The left saphenopopliteal junction not visualized.    The left small saphenous vein measures 3.2 mm in diameter with 0 seconds of reflux time noted.    There are no areas of pre-rupture or pathological perforators noted bilaterally.  Impression: No evidence of DVT noted bilaterally.  Superficial thrombus noted within the proximal calf portion of the right great saphenous vein.    Dilation and axial reflux of the bilateral great and right small saphenous veins with associated refluxing varicosities as detailed above.    Electronically signed by: Pepito Walker  Date:    01/11/2024  Time:    16:56         **Labs and x-rays personally reviewed by me    ** reviewed      Objective:        Assessment:       1. Exposure to confirmed case of COVID-19  POCT Strep A, Molecular    POCT COVID-19 Rapid Screening    POCT Influenza A/B Molecular           Plan:         1. Exposure to confirmed case of COVID-19  -     POCT Strep A, Molecular  -     POCT COVID-19 Rapid Screening  -     POCT Influenza A/B Molecular    Other orders  -     phenoL (CHLORASEPTIC THROAT SPRAY) 1.4 % SprA; by Mucous Membrane route 2 (two) times a day.  Dispense: 177 mL; Refill: 0  -     nirmatrelvir-ritonavir 300 mg (150 mg x 2)-100 mg copackaged tablets (EUA); Take 3 tablets by mouth 2 (two) times daily. Each dose contains 2 nirmatrelvir (pink tablets) and 1 ritonavir (white tablet).  Take all 3 tablets together  Dispense: 30 tablet; Refill: 0  -     benzonatate (TESSALON) 200 MG capsule; Take 1 capsule (200 mg total) by mouth 2 (two) times a day.  Dispense: 30 capsule; Refill: 0  -     loratadine (CLARITIN) 10 mg tablet; Take 1 tablet (10 mg total) by mouth once daily.  Dispense: 10 tablet; Refill: 0

## 2024-02-22 RX ORDER — LIDOCAINE HYDROCHLORIDE 10 MG/ML
2 INJECTION INFILTRATION; PERINEURAL ONCE
Status: ACTIVE | OUTPATIENT
Start: 2024-02-23

## 2024-02-23 ENCOUNTER — ANESTHESIA (OUTPATIENT)
Dept: SURGERY | Facility: HOSPITAL | Age: 74
End: 2024-02-23
Payer: MEDICARE

## 2024-02-23 ENCOUNTER — HOSPITAL ENCOUNTER (OUTPATIENT)
Facility: HOSPITAL | Age: 74
Discharge: HOME | End: 2024-02-23
Attending: FAMILY MEDICINE | Admitting: FAMILY MEDICINE
Payer: MEDICARE

## 2024-02-23 ENCOUNTER — ANESTHESIA EVENT (OUTPATIENT)
Dept: SURGERY | Facility: HOSPITAL | Age: 74
End: 2024-02-23
Payer: MEDICARE

## 2024-02-23 VITALS
BODY MASS INDEX: 33.13 KG/M2 | HEART RATE: 67 BPM | OXYGEN SATURATION: 100 % | SYSTOLIC BLOOD PRESSURE: 143 MMHG | WEIGHT: 180 LBS | SYSTOLIC BLOOD PRESSURE: 91 MMHG | HEART RATE: 73 BPM | TEMPERATURE: 97 F | OXYGEN SATURATION: 94 % | DIASTOLIC BLOOD PRESSURE: 72 MMHG | HEIGHT: 62 IN | DIASTOLIC BLOOD PRESSURE: 55 MMHG | RESPIRATION RATE: 16 BRPM | RESPIRATION RATE: 16 BRPM

## 2024-02-23 DIAGNOSIS — I87.2 VENOUS INSUFFICIENCY: Primary | ICD-10-CM

## 2024-02-23 LAB
GLUCOSE SERPL-MCNC: 104 MG/DL (ref 70–105)
GLUCOSE SERPL-MCNC: 167 MG/DL (ref 70–105)

## 2024-02-23 PROCEDURE — 27201423 OPTIME MED/SURG SUP & DEVICES STERILE SUPPLY: Performed by: FAMILY MEDICINE

## 2024-02-23 PROCEDURE — 37000009 HC ANESTHESIA EA ADD 15 MINS: Performed by: FAMILY MEDICINE

## 2024-02-23 PROCEDURE — 25000003 PHARM REV CODE 250: Performed by: NURSE ANESTHETIST, CERTIFIED REGISTERED

## 2024-02-23 PROCEDURE — 27000284 HC CANNULA NASAL: Performed by: NURSE ANESTHETIST, CERTIFIED REGISTERED

## 2024-02-23 PROCEDURE — 71000015 HC POSTOP RECOV 1ST HR: Performed by: FAMILY MEDICINE

## 2024-02-23 PROCEDURE — 82962 GLUCOSE BLOOD TEST: CPT | Mod: 91

## 2024-02-23 PROCEDURE — 63600175 PHARM REV CODE 636 W HCPCS: Performed by: NURSE ANESTHETIST, CERTIFIED REGISTERED

## 2024-02-23 PROCEDURE — D9220A PRA ANESTHESIA: Mod: CRNA,,, | Performed by: NURSE ANESTHETIST, CERTIFIED REGISTERED

## 2024-02-23 PROCEDURE — 71000033 HC RECOVERY, INTIAL HOUR: Performed by: FAMILY MEDICINE

## 2024-02-23 PROCEDURE — 37000008 HC ANESTHESIA 1ST 15 MINUTES: Performed by: FAMILY MEDICINE

## 2024-02-23 PROCEDURE — 27000716 HC OXISENSOR PROBE, ANY SIZE: Performed by: NURSE ANESTHETIST, CERTIFIED REGISTERED

## 2024-02-23 PROCEDURE — D9220A PRA ANESTHESIA: Mod: ANES,,, | Performed by: ANESTHESIOLOGY

## 2024-02-23 PROCEDURE — 36000704 HC OR TIME LEV I 1ST 15 MIN: Performed by: FAMILY MEDICINE

## 2024-02-23 PROCEDURE — 36482 ENDOVEN THER CHEM ADHES 1ST: CPT | Mod: RT,,, | Performed by: FAMILY MEDICINE

## 2024-02-23 PROCEDURE — 25000003 PHARM REV CODE 250: Performed by: FAMILY MEDICINE

## 2024-02-23 PROCEDURE — 36000705 HC OR TIME LEV I EA ADD 15 MIN: Performed by: FAMILY MEDICINE

## 2024-02-23 RX ORDER — SODIUM CHLORIDE 9 MG/ML
INJECTION, SOLUTION INTRAVENOUS CONTINUOUS
Status: DISCONTINUED | OUTPATIENT
Start: 2024-02-23 | End: 2024-02-23 | Stop reason: HOSPADM

## 2024-02-23 RX ORDER — LIDOCAINE HYDROCHLORIDE 20 MG/ML
INJECTION, SOLUTION EPIDURAL; INFILTRATION; INTRACAUDAL; PERINEURAL
Status: DISCONTINUED | OUTPATIENT
Start: 2024-02-23 | End: 2024-02-23

## 2024-02-23 RX ORDER — PROPOFOL 10 MG/ML
VIAL (ML) INTRAVENOUS CONTINUOUS PRN
Status: DISCONTINUED | OUTPATIENT
Start: 2024-02-23 | End: 2024-02-23

## 2024-02-23 RX ADMIN — SODIUM CHLORIDE: 9 INJECTION, SOLUTION INTRAVENOUS at 10:02

## 2024-02-23 RX ADMIN — SODIUM CHLORIDE: 9 INJECTION, SOLUTION INTRAVENOUS at 08:02

## 2024-02-23 RX ADMIN — LIDOCAINE HYDROCHLORIDE 75 MG: 20 INJECTION, SOLUTION INTRAVENOUS at 10:02

## 2024-02-23 RX ADMIN — PROPOFOL 150 MCG/KG/MIN: 10 INJECTION, EMULSION INTRAVENOUS at 10:02

## 2024-02-23 NOTE — DISCHARGE SUMMARY
[facility]  Discharge Note  Short Stay    Procedure(s) (LRB):  Right GSV VenaSeal Ablation (Right)      OUTCOME: Patient tolerated treatment/procedure well without complication and is now ready for discharge.    DISPOSITION: Home or Self Care    FINAL DIAGNOSIS:  Venous insufficiency    FOLLOWUP: In clinic    DISCHARGE INSTRUCTIONS:  No discharge procedures on file.     TIME SPENT ON DISCHARGE: 5 minutes   Peer relay called. Pt assessed. Pt alert and oriented. Pt steady on his feet. Vital signs stable. Await disposition.

## 2024-02-23 NOTE — OP NOTE
Date: 2/23/24   Surgeon: Dr Zach Walker DO    Procedure: Endovenous Adhesive Ablation of the right Greater Saphenous Vein     Estimated blood loss: 3 cc.  Specimens removed:  None.  Complications:  None.  Implants or grafts:  None.    Findings: Treatment Length  40 (cm):  Maximum diameter of the vein treated 6.5 mm with a maximum reflux time of 0.90 seconds.    Pre-Procedure: Patient's vital signs and informed consent were obtained. Patient history was checked and updated with any changes since the last visit. The patient continues to present with symptoms of venous disease as proven via DUS Venous Insufficiency exam completed prior to procedure. A complete Time Out was performed; with all parties present, which verified patient's identification, intended procedure, correct procedure site, and all equipment/supplies needed to complete the procedure.     Procedure: Ultrasound guidance was used to vanessa the target vein with the patient positioned on the treatment table. The entire lower extremity was prepped with a 50/50 % solution of isopropyl alcohol and chlorhexidine; then sterile drapes were applied. Once the desired access point was identified; less than 5mL of 0.5% Lidocaine buffered with Sodium Bicarbonate was distributed, to comfortably gain access in real-time with ultrasound guidance. A guidewire was used as necessary, which allowed insertion of the blue introduction catheter. Once positioned, the delivery catheter was prepped and inserted into the introducer sheath. The delivery catheter position was confirmed via ultrasound 5cm distal to Saphenofemoral junction. Following the IFU, adhesive was delivered, segmentally, into the target vein. Ultrasound visualization confirmed successful treatment of the targeted vein with no evidence of complications at the junction. All devices were then removed, and hemostasis was achieved with a suture. Dressings with compression were applied to the access site and to  any puncture sites requiring them. There was minimal blood loss.

## 2024-02-23 NOTE — H&P
Patient ID: Eugenie Coates is a 73 y.o. female.     I. HISTORY      Chief Complaint:        Chief Complaint   Patient presents with    Chronic Venous Insufficiency       VENOUS INSUFFICIENCY REF BY AYAZ SILVER           HPI: Eugenie Coates is a 73 y.o. female who is referred here today by Dr. Silver for evaluation of bilateral lower extremity swelling, hyperpigmentation, previous ulceration and pain.  Symptoms are progressive/worsening and began about 20 years ago.  Location is bilateral lower extremities below the knees, worse on the right. Symptoms are worse at the end of the day.  History of venous interventions includes some type of valve surgery in 2003 and 2004 the right lower extremity according to the patient..  Positive, mother, family history of venous disease.  The patient states that she has been wearing compression socks on a daily basis for many years now.  She also elevates her legs in the evening and walks regularly.  The patient also had history of extensive right lower extremity DVT in 2017 from the superficial femoral vein down to the posterior tibials.  She was placed on Eliquis for several years.  She was taken off Eliquis several years ago and is not currently on an anticoagulant or antiplatelet medication.     The patient underwent a bilateral complete venous reflux study today, 01/11/2024, and the results were discussed with the patient.  This study shows no evidence of DVT bilaterally.  Thrombus noted within the proximal calf of the right great saphenous vein.  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  The study also shows dilation and axial reflux of the bilateral great saphenous veins and right small saphenous vein.  No reflux noted in the left small saphenous vein.  Refluxing varicosities noted right Gator region.     Arterial duplex with ABIs performed 03/31/2021 which shows a right EDOUARD 1.02 and a left EDOUARD of  1.05.  No vascular occlusions noted.     Venous Disease Medical Necessity Documentation Initial Visit Date:  01/11/2024 Return Check Date:    Have you ever had a rupture or bleed from a varicose vein in your leg(s)?              [] Yes  [x] No   [] Yes   [] No   Have you ever been diagnosed with phlebitis, cellulitis, or inflammation in the area of the varicose veins of  your leg(s)?  [] Yes  [x] No    [] Yes   [] No   Do you have darkened or inflamed skin on your legs?   [x] Yes   [] No   [] Yes   [] No   Do you have leg swelling?      [x] Yes   [] No   [] Yes   [] No   Do you have leg pain?   [x] Yes   [] No   [] Yes   [] No   If yes, describe the type of pain?    []   Stabbing []  Radiating []  Aching   [x]  Tightness []  Throbbing               [x]  Burning []  Cramping               Do you have leg discomfort?   [x] Yes   [] No   [] Yes   [] No   If yes, describe the type of discomfort?    [x]  Heaviness [x]  Fullness   [x]  Restlessness [] Tired/Fatigued [x] Itching               Have you ever worn compression hose?   [x] Yes   [] No   [] Yes   [] No   If yes, how long?    24 YEARS        Do you elevate your legs while sitting?   [x] Yes   [] No   [] Yes   [] No   Does venous disease (varicose veins, ulcers, skin changes, leg pain/swelling) interfere with your daily life?  If yes, check activities you are limited or unable to do.     []  Shower  [x]   Walk  []  Exercise  [] Play with children/grandchildren  []  Shop [] Work [x] Stand for any period of time [x] Sleep                               [x] Sitting for an extended period of time.              [x] Yes   [] No   [] Yes   [] No   Do you exercise/have you tried to exercise (i.e.  Walk our participate in a regular exercise routine)?  [x] Yes  [] No   [] Yes   [] No   BMI   32.26                   Past Medical History:   Diagnosis Date    Allergy      Anemia      Anxiety      Arthritis      Asthma      Bell palsy      Bilateral lower extremity edema       Chronic diastolic CHF (congestive heart failure)      Depression      Diabetes mellitus, type 2      DVT (deep venous thrombosis)       right LE    Dyslipidemia      Endometriosis      Eye injury      stuck with tree branch od ?     Focal seizure 5/3/2021    GERD (gastroesophageal reflux disease)      Glaucoma      History of DVT (deep vein thrombosis) 2018    History of uterine fibroid      Hyperlipidemia      Hypertension      Invasive lobular carcinoma of right breast in female       s/p surgery, radiation, tamoxifen    Nuclear sclerosis of both eyes 2016    Obesity      Pancreas cyst      Sleep apnea       uses C pap    Supraventricular tachycardia      SVT (supraventricular tachycardia) 2019    Thyroid disease      Venous insufficiency                 Past Surgical History:   Procedure Laterality Date    CATARACT EXTRACTION W/  INTRAOCULAR LENS IMPLANT Left 2023     Procedure: CEIOL OMNI OS;  Surgeon: Juan Antonio Mcintosh MD;  Location: [facility] OR;  Service: Ophthalmology;  Laterality: Left;  consider block     SECTION        CHOLECYSTECTOMY        COLONOSCOPY        CRANIOTOMY FOR EXCISION OF INTRACRANIAL TUMOR Left 2021     Procedure: CRANIOTOMY, FOR INTRACRANIAL NEOPLASM EXCISION Left craniotomy for tumor resection;  Surgeon: Hernandez Bravo MD;  Location: [facility] OR;  Service: Neurosurgery;  Laterality: Left;    CRANIOTOMY, WITH NEOPLASM EXCISION USING COMPUTER-ASSISTED NAVIGATION Left 2023     Procedure: 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, USING COMPUTER-ASSISTED NAVIGATION-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;  Surgeon: Hernandez Bravo MD;  Location: [facility] OR;  Service: Neurosurgery;  Laterality: Left;    ESOPHAGOGASTRODUODENOSCOPY N/A 2023     Procedure: EGD (ESOPHAGOGASTRODUODENOSCOPY);  Surgeon: Issac Wade MD;  Location: [facility];  Service: Endoscopy;  Laterality: N/A;    glaucoma laser Bilateral      HERNIA REPAIR         KNEE SURGERY Right 2008     (R) knee scope; torn meniscus    MASTECTOMY, PARTIAL Right 12/07/2020     Procedure: MASTECTOMY, PARTIAL-Right with radiological marker Consent day of surgery; Neoprobe, technitium, Frozen;  Surgeon: Narda Keating MD;  Location: [facility] OR [address] [place] [address];  Service: General;  Laterality: Right;    SENTINEL LYMPH NODE BIOPSY Right 12/07/2020     Procedure: BIOPSY, LYMPH NODE, SENTINEL;  Surgeon: Narda Keating MD;  Location: [facility] OR [address] [place] [address];  Service: General;  Laterality: Right;    TOTAL REDUCTION MAMMOPLASTY Bilateral 12/07/2020     Procedure: MAMMOPLASTY, REDUCTION-Bilateral;  Surgeon: Zack Hood MD;  Location: [facility] OR [address] [place] [address];  Service: Plastics;  Laterality: Bilateral;    VEIN SURGERY   2003, 2004     Rt leg x2         Social History          Tobacco Use   Smoking Status Never   Smokeless Tobacco Never            Current Outpatient Medications:     b complex vitamins tablet, Take 1 tablet by mouth once daily., Disp: , Rfl:     blood sugar diagnostic Strp, Test twice daily.  Accucheck viva test strips and lancets., Disp: 300 each, Rfl: 3    calcium carb-vit D3-magnesium (CORAL CALCIUM) 250-200-125 mg-unit-mg Cap, Take 1 capsule by mouth once daily., Disp: , Rfl:     cloNIDine (CATAPRES) 0.1 MG tablet, Take 1 tablet (0.1 mg total) by mouth every evening. Take if blood pressure is greater than 180/100., Disp: 90 tablet, Rfl: 0    dorzolamide-timolol 2-0.5% (COSOPT) 22.3-6.8 mg/mL ophthalmic solution, Place 1 drop into both eyes 2 (two) times daily., Disp: , Rfl:     furosemide (LASIX) 20 MG tablet, Take 2 tablets (40 mg total) by mouth every morning., Disp: 90 tablet, Rfl: 0    glucosamine-chondroitin (OSTEO BI-FLEX) 250-200 mg Tab, Take 1 tablet by mouth once daily., Disp: , Rfl:     lancets (ACCU-CHEK SOFTCLIX LANCETS) Misc, 1 Units by Misc.(Non-Drug; Combo Route) route 2 (two) times daily., Disp: 300 each, Rfl: 3    latanoprost 0.005 % ophthalmic solution, Place 1 drop into both eyes  every evening., Disp: 2.5 mL, Rfl: 12    metFORMIN (GLUCOPHAGE) 500 MG tablet, TAKE 1 TABLET(500 MG) BY MOUTH DAILY WITH BREAKFAST, Disp: 90 tablet, Rfl: 0    mv-mn-vitC-lmpOt-Bpp-[id number]-hc124 (A[place]NE, ASCORBATE SODIUM,) 333-1.7 mg Chew, Take 1 each by mouth once daily., Disp: , Rfl:     NIFEdipine (ADALAT CC) 90 MG TbSR, Take 1 tablet (90 mg total) by mouth once daily., Disp: 30 tablet, Rfl: 1    carvediloL (COREG) 12.5 MG tablet, Take 1 tablet (12.5 mg total) by mouth 2 (two) times daily with meals., Disp: 60 tablet, Rfl: 11    dapagliflozin propanediol (FARXIGA) 10 mg tablet, Take 1 tablet (10 mg total) by mouth once daily., Disp: 90 tablet, Rfl: 1    diazePAM (VALIUM) 5 MG tablet, Take 1 tablet (5 mg total) by mouth once. for 1 dose, Disp: 1 tablet, Rfl: 0    pantoprazole (PROTONIX) 40 MG tablet, Take 1 tablet (40 mg total) by mouth 2 (two) times daily., Disp: 60 tablet, Rfl: 0     Review of Systems   Constitutional:  Negative for activity change, chills, diaphoresis, fatigue and fever.   Respiratory:  Negative for cough and shortness of breath.    Cardiovascular:  Positive for leg swelling. Negative for chest pain and claudication.        Hyperpigmentation LE   Gastrointestinal:  Negative for nausea and vomiting.   Musculoskeletal:  Positive for leg pain. Negative for joint swelling.   Integumentary:  Negative for rash and wound.   Neurological:  Negative for weakness and numbness.            II. PHYSICAL EXAM      Physical Exam  Constitutional:       General: She is awake. She is not in acute distress.     Appearance: Normal appearance. She is overweight. She is not ill-appearing or toxic-appearing.   HENT:      Head: Normocephalic and atraumatic.   Eyes:      Extraocular Movements: Extraocular movements intact.      Conjunctiva/sclera: Conjunctivae normal.      Pupils: Pupils are equal, round, and reactive to light.   Neck:      Vascular: No carotid bruit or JVD.   Cardiovascular:      Rate and Rhythm: Normal  rate and regular rhythm.      Pulses:           Dorsalis pedis pulses are detected w/ Doppler on the right side and detected w/ Doppler on the left side.        Posterior tibial pulses are detected w/ Doppler on the right side and detected w/ Doppler on the left side.      Heart sounds: Murmur heard.      Systolic murmur is present with a grade of 2/6.   Pulmonary:      Effort: Pulmonary effort is normal. No respiratory distress.      Breath sounds: No stridor. No wheezing, rhonchi or rales.   Musculoskeletal:         General: No swelling, tenderness or deformity.      Right lower le+ Pitting Edema present.      Left lower le+ Pitting Edema present.      Comments: Pitting edema of the bilateral extremities along with hyperpigmentation scarring from previous venous ulcerations of the right medial Gator region.  No evidence of acute cellulitis or ulcerations.   Feet:      Comments: Triphasic hand-held dopplerable pulses of the bilateral dorsalis pedis and posterior tibial arteries.  Skin:     General: Skin is warm.      Capillary Refill: Capillary refill takes less than 2 seconds.      Coloration: Skin is not ashen.      Findings: No bruising, erythema, lesion, rash or wound.   Neurological:      Mental Status: She is alert and oriented to person, place, and time.      Motor: No weakness.   Psychiatric:         Speech: Speech normal.         Behavior: Behavior normal. Behavior is cooperative.            Reticular/Spider veins noted:  RLE: anterior calf and medial calf  LLE: anterior calf and medial calf     Varicose veins noted:  RLE: anterior calf and medial calf  LLE:  none     CEAP Classification  Clinical Signs: Class 5 - Skin changes as defined above with healed ulceration  Etiologic Classification: Primary  Anatomic distribution: Superficial  Pathophysiologic dysfunction: Reflux                               Venous Clinical Severity Score  Pain:2=Daily, moderate activity limitation, occasional  analgesics  Varicose Veins: 2=Multiple. GS varicose veins confined to calf or thigh  Venous Edema: 2=Afternoon edema, above ankle  Pigmentation: 2=Diffuse over most of gaiter distribution (lower 1/3) or recent pigmentation (purple)  Inflammation: 0=None  Induration: 1=Focal, circummalleolar (< 5 cm)  Number of Active Ulcers: 0=0  Active Ulceration, Duration: 0=None  Active Ulcer Size: 0=None  Compressive Therapy: 3=Full compliance, stockings + elevation  Total Score: 12        III. ASSESSMENT & PLAN (MEDICAL DECISION MAKING)      1. Venous insufficiency    2. Hyperpigmentation of skin    3. Edema, lower extremity    4. Leg pain, bilateral          Assessment/Diagnosis and Plan:  The patient continues to have sequela of chronic venous insufficiency despite over 3 months of conservative therapy. The patient continues to have life altering symptoms as well as a positive reflux study as noted in the history of present illness above. At this time I believe it would be reasonable to proceed with a bilateral great saphenous vein non thermal adhesive endovenous ablation for symptomatic venous insufficiency.  Also believe it would be reasonable to perform a right small saphenous vein endovenous radiofrequency ablation for symptomatic venous insufficiency.  Untreated venous disease increases the patient's risk for cellulitis, deep vein thrombosis, chronic skin changes and venous ulceration.  Risk and benefits of the procedure were explained to the patient and the patient wishes to proceed. The patient does have a history of DVT and will require prophylactic anticoagulation.  We will start the patient on anticoagulation 1 day prior to the procedure and discontinue at 1 month with a clear postablation ultrasound.         Orders Placed This Encounter    US Venous Reflux Study Bilateral            Pepito Walker DO

## 2024-02-23 NOTE — ANESTHESIA POSTPROCEDURE EVALUATION
Anesthesia Post Evaluation    Patient: Eugenie Coates    Procedure(s) Performed: Procedure(s) (LRB):  Right GSV VenaSeal Ablation (Right)    Final Anesthesia Type: general      Patient location during evaluation: PACU  Patient participation: Yes- Able to Participate  Level of consciousness: awake and alert  Post-procedure vital signs: reviewed and stable  Pain management: adequate  Airway patency: patent  AFSHIN mitigation strategies: Multimodal analgesia  PONV status at discharge: No PONV  Anesthetic complications: no      Cardiovascular status: blood pressure returned to baseline  Respiratory status: unassisted  Hydration status: euvolemic  Follow-up not needed.              Vitals Value Taken Time   /72 02/23/24 1146   Temp 36.1 °C (97 °F) 02/23/24 1057   Pulse 73 02/23/24 1155   Resp 16 02/23/24 1145   SpO2 98 % 02/23/24 1155   Vitals shown include unvalidated device data.      Event Time   Out of Recovery 11:25:00         Pain/Jodi Score: Jodi Score: 10 (2/23/2024 12:01 PM)  Modified Jodi Score: 20 (2/23/2024 12:01 PM)

## 2024-02-23 NOTE — ANESTHESIA PREPROCEDURE EVALUATION
02/23/2024  Eugenie Coates is a 73 y.o., female.      Pre-op Assessment    I have reviewed the Patient Summary Reports.     I have reviewed the Nursing Notes. I have reviewed the NPO Status.   I have reviewed the Medications.     Review of Systems  Anesthesia Hx:             Denies Family Hx of Anesthesia complications.    Denies Personal Hx of Anesthesia complications.                    Social:  Non-Smoker, No Alcohol Use       Hematology/Oncology:  Hematology Normal   Oncology Normal                                   EENT/Dental:  EENT/Dental Normal           Cardiovascular:        Congestive Heart Failure (CHF)                Hypertension         Pulmonary:    Asthma:    Obstructive Sleep Apnea (AFSHIN).           Renal/:  Renal/ Normal                 Hepatic/GI:     Gerd          Musculoskeletal:       Arthritis          Neurological:           Craniotomy 8/2023 Dx of Headaches   Arthritis      Seizure Disorder                        Neuromuscular Disease   Endocrine:   Diabetes                Hyperthyroidism           Obesity / BMI > 30  Dermatological:  Skin Normal        Physical Exam  General: Well nourished, Cooperative, Alert and Oriented    Airway:  Mallampati: II / II  Mouth Opening: Normal  TM Distance: Normal  Neck ROM: Normal ROM    Dental:  Intact    Chest/Lungs:  Clear to auscultation    Heart:  Rate: Normal  Rhythm: Regular Rhythm  Sounds: Normal        Anesthesia Plan  Type of Anesthesia, risks & benefits discussed:    Anesthesia Type: Gen Natural Airway, MAC  Intra-op Monitoring Plan: Standard ASA Monitors  Post Op Pain Control Plan: multimodal analgesia  Induction:  IV  Airway Plan: Direct  Informed Consent: Informed consent signed with the Patient and all parties understand the risks and agree with anesthesia plan.  All questions answered. Patient consented to blood products?  Refused (Shinto objection)  Patient blood refusal form needs to be completed. Please review the form for refusal details in the  tab of Chart Review.  ASA Score: 3  Day of Surgery Review of History & Physical: H&P Update referred to the surgeon/provider.    Ready For Surgery From Anesthesia Perspective.     .

## 2024-02-23 NOTE — TRANSFER OF CARE
"Anesthesia Transfer of Care Note    Patient: Eugenie Coates    Procedure(s) Performed: Procedure(s) (LRB):  Right GSV VenaSeal Ablation (Right)    Patient location: PACU    Anesthesia Type: general    Transport from OR: Transported from OR on 6-10 L/min O2 by face mask with adequate spontaneous ventilation    Post pain: adequate analgesia    Post assessment: no apparent anesthetic complications    Post vital signs: stable    Level of consciousness: awake, alert and oriented    Nausea/Vomiting: no nausea/vomiting    Complications: none    Transfer of care protocol was followedComments: Good SV continue, NAD noted, VSS, RTRN      Last vitals: Visit Vitals  /70 (BP Location: Right arm, Patient Position: Lying)   Pulse 72   Temp 36.1 °C (97 °F) (Oral)   Resp 18   Ht 5' 2" (1.575 m)   Wt 81.6 kg (180 lb)   SpO2 98%   Breastfeeding No   BMI 32.92 kg/m²     "

## 2024-02-23 NOTE — PLAN OF CARE
1055  pt to pacu pt resp reg and non labored pt dsg d/I to r lower ext pt sao2 99% on ra pt d/I to r lower ext pt pain level 0 at present accu check 104    1125  pt vs stable pt sao2 99% on ra orders to release to room per md pt awake and alert    1130 pt released to earl singh rn b/p 140/71 pulse 75 resp 16 sao2 98% on ra family at bedside pt awake and alert

## 2024-02-23 NOTE — H&P (VIEW-ONLY)
Patient ID: Eugenie Coates is a 73 y.o. female.     I. HISTORY      Chief Complaint:        Chief Complaint   Patient presents with    Chronic Venous Insufficiency       VENOUS INSUFFICIENCY REF BY AYAZ SILVER           HPI: Eugenie Coates is a 73 y.o. female who is referred here today by Dr. Silver for evaluation of bilateral lower extremity swelling, hyperpigmentation, previous ulceration and pain.  Symptoms are progressive/worsening and began about 20 years ago.  Location is bilateral lower extremities below the knees, worse on the right. Symptoms are worse at the end of the day.  History of venous interventions includes some type of valve surgery in 2003 and 2004 the right lower extremity according to the patient..  Positive, mother, family history of venous disease.  The patient states that she has been wearing compression socks on a daily basis for many years now.  She also elevates her legs in the evening and walks regularly.  The patient also had history of extensive right lower extremity DVT in 2017 from the superficial femoral vein down to the posterior tibials.  She was placed on Eliquis for several years.  She was taken off Eliquis several years ago and is not currently on an anticoagulant or antiplatelet medication.     The patient underwent a bilateral complete venous reflux study today, 01/11/2024, and the results were discussed with the patient.  This study shows no evidence of DVT bilaterally.  Thrombus noted within the proximal calf of the right great saphenous vein.  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  The study also shows dilation and axial reflux of the bilateral great saphenous veins and right small saphenous vein.  No reflux noted in the left small saphenous vein.  Refluxing varicosities noted right Gator region.     Arterial duplex with ABIs performed 03/31/2021 which shows a right EDOUARD 1.02 and a left EDOUARD of  1.05.  No vascular occlusions noted.     Venous Disease Medical Necessity Documentation Initial Visit Date:  01/11/2024 Return Check Date:    Have you ever had a rupture or bleed from a varicose vein in your leg(s)?              [] Yes  [x] No   [] Yes   [] No   Have you ever been diagnosed with phlebitis, cellulitis, or inflammation in the area of the varicose veins of  your leg(s)?  [] Yes  [x] No    [] Yes   [] No   Do you have darkened or inflamed skin on your legs?   [x] Yes   [] No   [] Yes   [] No   Do you have leg swelling?      [x] Yes   [] No   [] Yes   [] No   Do you have leg pain?   [x] Yes   [] No   [] Yes   [] No   If yes, describe the type of pain?    []   Stabbing []  Radiating []  Aching   [x]  Tightness []  Throbbing               [x]  Burning []  Cramping               Do you have leg discomfort?   [x] Yes   [] No   [] Yes   [] No   If yes, describe the type of discomfort?    [x]  Heaviness [x]  Fullness   [x]  Restlessness [] Tired/Fatigued [x] Itching               Have you ever worn compression hose?   [x] Yes   [] No   [] Yes   [] No   If yes, how long?    24 YEARS        Do you elevate your legs while sitting?   [x] Yes   [] No   [] Yes   [] No   Does venous disease (varicose veins, ulcers, skin changes, leg pain/swelling) interfere with your daily life?  If yes, check activities you are limited or unable to do.     []  Shower  [x]   Walk  []  Exercise  [] Play with children/grandchildren  []  Shop [] Work [x] Stand for any period of time [x] Sleep                               [x] Sitting for an extended period of time.              [x] Yes   [] No   [] Yes   [] No   Do you exercise/have you tried to exercise (i.e.  Walk our participate in a regular exercise routine)?  [x] Yes  [] No   [] Yes   [] No   BMI   32.26                   Past Medical History:   Diagnosis Date    Allergy      Anemia      Anxiety      Arthritis      Asthma      Bell palsy      Bilateral lower extremity edema       Chronic diastolic CHF (congestive heart failure)      Depression      Diabetes mellitus, type 2      DVT (deep venous thrombosis)       right LE    Dyslipidemia      Endometriosis      Eye injury      stuck with tree branch od ?     Focal seizure 5/3/2021    GERD (gastroesophageal reflux disease)      Glaucoma      History of DVT (deep vein thrombosis) 2018    History of uterine fibroid      Hyperlipidemia      Hypertension      Invasive lobular carcinoma of right breast in female       s/p surgery, radiation, tamoxifen    Nuclear sclerosis of both eyes 2016    Obesity      Pancreas cyst      Sleep apnea       uses C pap    Supraventricular tachycardia      SVT (supraventricular tachycardia) 2019    Thyroid disease      Venous insufficiency                 Past Surgical History:   Procedure Laterality Date    CATARACT EXTRACTION W/  INTRAOCULAR LENS IMPLANT Left 2023     Procedure: CEIOL OMNI OS;  Surgeon: Juan Antonio Mcintosh MD;  Location: [facility] OR;  Service: Ophthalmology;  Laterality: Left;  consider block     SECTION        CHOLECYSTECTOMY        COLONOSCOPY        CRANIOTOMY FOR EXCISION OF INTRACRANIAL TUMOR Left 2021     Procedure: CRANIOTOMY, FOR INTRACRANIAL NEOPLASM EXCISION Left craniotomy for tumor resection;  Surgeon: Hernandez Bravo MD;  Location: [facility] OR;  Service: Neurosurgery;  Laterality: Left;    CRANIOTOMY, WITH NEOPLASM EXCISION USING COMPUTER-ASSISTED NAVIGATION Left 2023     Procedure: 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, USING COMPUTER-ASSISTED NAVIGATION-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;  Surgeon: Hernandez Bravo MD;  Location: [facility] OR;  Service: Neurosurgery;  Laterality: Left;    ESOPHAGOGASTRODUODENOSCOPY N/A 2023     Procedure: EGD (ESOPHAGOGASTRODUODENOSCOPY);  Surgeon: Issac Wade MD;  Location: [facility];  Service: Endoscopy;  Laterality: N/A;    glaucoma laser Bilateral      HERNIA REPAIR         KNEE SURGERY Right 2008     (R) knee scope; torn meniscus    MASTECTOMY, PARTIAL Right 12/07/2020     Procedure: MASTECTOMY, PARTIAL-Right with radiological marker Consent day of surgery; Neoprobe, technitium, Frozen;  Surgeon: Narda Keating MD;  Location: [facility] OR [address] [place] [address];  Service: General;  Laterality: Right;    SENTINEL LYMPH NODE BIOPSY Right 12/07/2020     Procedure: BIOPSY, LYMPH NODE, SENTINEL;  Surgeon: Narda Keating MD;  Location: [facility] OR [address] [place] [address];  Service: General;  Laterality: Right;    TOTAL REDUCTION MAMMOPLASTY Bilateral 12/07/2020     Procedure: MAMMOPLASTY, REDUCTION-Bilateral;  Surgeon: Zack Hood MD;  Location: [facility] OR [address] [place] [address];  Service: Plastics;  Laterality: Bilateral;    VEIN SURGERY   2003, 2004     Rt leg x2         Social History          Tobacco Use   Smoking Status Never   Smokeless Tobacco Never            Current Outpatient Medications:     b complex vitamins tablet, Take 1 tablet by mouth once daily., Disp: , Rfl:     blood sugar diagnostic Strp, Test twice daily.  Accucheck viva test strips and lancets., Disp: 300 each, Rfl: 3    calcium carb-vit D3-magnesium (CORAL CALCIUM) 250-200-125 mg-unit-mg Cap, Take 1 capsule by mouth once daily., Disp: , Rfl:     cloNIDine (CATAPRES) 0.1 MG tablet, Take 1 tablet (0.1 mg total) by mouth every evening. Take if blood pressure is greater than 180/100., Disp: 90 tablet, Rfl: 0    dorzolamide-timolol 2-0.5% (COSOPT) 22.3-6.8 mg/mL ophthalmic solution, Place 1 drop into both eyes 2 (two) times daily., Disp: , Rfl:     furosemide (LASIX) 20 MG tablet, Take 2 tablets (40 mg total) by mouth every morning., Disp: 90 tablet, Rfl: 0    glucosamine-chondroitin (OSTEO BI-FLEX) 250-200 mg Tab, Take 1 tablet by mouth once daily., Disp: , Rfl:     lancets (ACCU-CHEK SOFTCLIX LANCETS) Misc, 1 Units by Misc.(Non-Drug; Combo Route) route 2 (two) times daily., Disp: 300 each, Rfl: 3    latanoprost 0.005 % ophthalmic solution, Place 1 drop into both eyes  every evening., Disp: 2.5 mL, Rfl: 12    metFORMIN (GLUCOPHAGE) 500 MG tablet, TAKE 1 TABLET(500 MG) BY MOUTH DAILY WITH BREAKFAST, Disp: 90 tablet, Rfl: 0    mv-mn-vitC-muwGj-Pom-Wwy-hc124 (AIRBORNE, ASCORBATE SODIUM,) 333-1.7 mg Chew, Take 1 each by mouth once daily., Disp: , Rfl:     NIFEdipine (ADALAT CC) 90 MG TbSR, Take 1 tablet (90 mg total) by mouth once daily., Disp: 30 tablet, Rfl: 1    carvediloL (COREG) 12.5 MG tablet, Take 1 tablet (12.5 mg total) by mouth 2 (two) times daily with meals., Disp: 60 tablet, Rfl: 11    dapagliflozin propanediol (FARXIGA) 10 mg tablet, Take 1 tablet (10 mg total) by mouth once daily., Disp: 90 tablet, Rfl: 1    diazePAM (VALIUM) 5 MG tablet, Take 1 tablet (5 mg total) by mouth once. for 1 dose, Disp: 1 tablet, Rfl: 0    pantoprazole (PROTONIX) 40 MG tablet, Take 1 tablet (40 mg total) by mouth 2 (two) times daily., Disp: 60 tablet, Rfl: 0     Review of Systems   Constitutional:  Negative for activity change, chills, diaphoresis, fatigue and fever.   Respiratory:  Negative for cough and shortness of breath.    Cardiovascular:  Positive for leg swelling. Negative for chest pain and claudication.        Hyperpigmentation LE   Gastrointestinal:  Negative for nausea and vomiting.   Musculoskeletal:  Positive for leg pain. Negative for joint swelling.   Integumentary:  Negative for rash and wound.   Neurological:  Negative for weakness and numbness.            II. PHYSICAL EXAM      Physical Exam  Constitutional:       General: She is awake. She is not in acute distress.     Appearance: Normal appearance. She is overweight. She is not ill-appearing or toxic-appearing.   HENT:      Head: Normocephalic and atraumatic.   Eyes:      Extraocular Movements: Extraocular movements intact.      Conjunctiva/sclera: Conjunctivae normal.      Pupils: Pupils are equal, round, and reactive to light.   Neck:      Vascular: No carotid bruit or JVD.   Cardiovascular:      Rate and Rhythm: Normal  rate and regular rhythm.      Pulses:           Dorsalis pedis pulses are detected w/ Doppler on the right side and detected w/ Doppler on the left side.        Posterior tibial pulses are detected w/ Doppler on the right side and detected w/ Doppler on the left side.      Heart sounds: Murmur heard.      Systolic murmur is present with a grade of 2/6.   Pulmonary:      Effort: Pulmonary effort is normal. No respiratory distress.      Breath sounds: No stridor. No wheezing, rhonchi or rales.   Musculoskeletal:         General: No swelling, tenderness or deformity.      Right lower le+ Pitting Edema present.      Left lower le+ Pitting Edema present.      Comments: Pitting edema of the bilateral extremities along with hyperpigmentation scarring from previous venous ulcerations of the right medial Gator region.  No evidence of acute cellulitis or ulcerations.   Feet:      Comments: Triphasic hand-held dopplerable pulses of the bilateral dorsalis pedis and posterior tibial arteries.  Skin:     General: Skin is warm.      Capillary Refill: Capillary refill takes less than 2 seconds.      Coloration: Skin is not ashen.      Findings: No bruising, erythema, lesion, rash or wound.   Neurological:      Mental Status: She is alert and oriented to person, place, and time.      Motor: No weakness.   Psychiatric:         Speech: Speech normal.         Behavior: Behavior normal. Behavior is cooperative.            Reticular/Spider veins noted:  RLE: anterior calf and medial calf  LLE: anterior calf and medial calf     Varicose veins noted:  RLE: anterior calf and medial calf  LLE:  none     CEAP Classification  Clinical Signs: Class 5 - Skin changes as defined above with healed ulceration  Etiologic Classification: Primary  Anatomic distribution: Superficial  Pathophysiologic dysfunction: Reflux                               Venous Clinical Severity Score  Pain:2=Daily, moderate activity limitation, occasional  analgesics  Varicose Veins: 2=Multiple. GS varicose veins confined to calf or thigh  Venous Edema: 2=Afternoon edema, above ankle  Pigmentation: 2=Diffuse over most of gaiter distribution (lower 1/3) or recent pigmentation (purple)  Inflammation: 0=None  Induration: 1=Focal, circummalleolar (< 5 cm)  Number of Active Ulcers: 0=0  Active Ulceration, Duration: 0=None  Active Ulcer Size: 0=None  Compressive Therapy: 3=Full compliance, stockings + elevation  Total Score: 12        III. ASSESSMENT & PLAN (MEDICAL DECISION MAKING)      1. Venous insufficiency    2. Hyperpigmentation of skin    3. Edema, lower extremity    4. Leg pain, bilateral          Assessment/Diagnosis and Plan:  The patient continues to have sequela of chronic venous insufficiency despite over 3 months of conservative therapy. The patient continues to have life altering symptoms as well as a positive reflux study as noted in the history of present illness above. At this time I believe it would be reasonable to proceed with a bilateral great saphenous vein non thermal adhesive endovenous ablation for symptomatic venous insufficiency.  Also believe it would be reasonable to perform a right small saphenous vein endovenous radiofrequency ablation for symptomatic venous insufficiency.  Untreated venous disease increases the patient's risk for cellulitis, deep vein thrombosis, chronic skin changes and venous ulceration.  Risk and benefits of the procedure were explained to the patient and the patient wishes to proceed. The patient does have a history of DVT and will require prophylactic anticoagulation.  We will start the patient on anticoagulation 1 day prior to the procedure and discontinue at 1 month with a clear postablation ultrasound.         Orders Placed This Encounter    US Venous Reflux Study Bilateral            Pepito Walker DO

## 2024-02-29 ENCOUNTER — HOSPITAL ENCOUNTER (OUTPATIENT)
Dept: RADIOLOGY | Facility: HOSPITAL | Age: 74
Discharge: HOME | End: 2024-02-29
Attending: FAMILY MEDICINE
Payer: MEDICARE

## 2024-02-29 ENCOUNTER — PROCEDURE VISIT (OUTPATIENT)
Dept: VASCULAR SURGERY | Facility: CLINIC | Age: 74
End: 2024-02-29
Payer: MEDICARE

## 2024-02-29 VITALS — BODY MASS INDEX: 31.83 KG/M2 | WEIGHT: 173 LBS | HEIGHT: 62 IN

## 2024-02-29 DIAGNOSIS — M79.604 LEG PAIN, BILATERAL: ICD-10-CM

## 2024-02-29 DIAGNOSIS — R60.0 EDEMA, LOWER EXTREMITY: ICD-10-CM

## 2024-02-29 DIAGNOSIS — I87.2 VENOUS INSUFFICIENCY: Primary | ICD-10-CM

## 2024-02-29 DIAGNOSIS — I87.2 VENOUS INSUFFICIENCY: ICD-10-CM

## 2024-02-29 DIAGNOSIS — L81.9 HYPERPIGMENTATION OF SKIN: ICD-10-CM

## 2024-02-29 DIAGNOSIS — M79.605 LEG PAIN, BILATERAL: ICD-10-CM

## 2024-02-29 PROCEDURE — 93971 EXTREMITY STUDY: CPT | Mod: TC,59

## 2024-02-29 PROCEDURE — 36482 ENDOVEN THER CHEM ADHES 1ST: CPT | Mod: PBBFAC | Performed by: FAMILY MEDICINE

## 2024-02-29 PROCEDURE — 99999PBSHW PR PBB SHADOW TECHNICAL ONLY FILED TO HB: Mod: PBBFAC,,,

## 2024-02-29 PROCEDURE — 27000272 HC BANDAGE KERLIX

## 2024-02-29 PROCEDURE — C1888 ENDOVAS NON-CARDIAC ABL CATH: HCPCS

## 2024-02-29 PROCEDURE — 93971 EXTREMITY STUDY: CPT | Mod: 26,,, | Performed by: FAMILY MEDICINE

## 2024-02-29 PROCEDURE — 27000932 HC BANDAGE ELAST SELF CLOSURE

## 2024-02-29 PROCEDURE — 36482 ENDOVEN THER CHEM ADHES 1ST: CPT | Mod: S$PBB,LT,, | Performed by: FAMILY MEDICINE

## 2024-02-29 RX ORDER — LIDOCAINE HYDROCHLORIDE 10 MG/ML
10 INJECTION INFILTRATION; PERINEURAL ONCE
Status: COMPLETED | OUTPATIENT
Start: 2024-02-29 | End: 2024-02-29

## 2024-02-29 RX ADMIN — LIDOCAINE HYDROCHLORIDE 1.5 ML: 10 INJECTION, SOLUTION INFILTRATION; PERINEURAL at 08:02

## 2024-02-29 NOTE — PROCEDURES
Date: 2/29/24   Surgeon: Dr Zach Walker DO    Procedure: Endovenous Adhesive Ablation of the  left Greater Saphenous Vein     Estimated blood loss: 3 cc.  Specimens removed:  None.  Complications:  None.  Implants or grafts:  None.    Findings: Treatment Length  35 (cm):  maximum diameter of the vein treated 6.9 mm with a maximum reflux time of 1.4 seconds.     Pre-Procedure: Patient's vital signs and informed consent were obtained. Patient history was checked and updated with any changes since the last visit. The patient continues to present with symptoms of venous disease as proven via DUS Venous Insufficiency exam completed prior to procedure. A complete Time Out was performed; with all parties present, which verified patient's identification, intended procedure, correct procedure site, and all equipment/supplies needed to complete the procedure.     Procedure: Ultrasound guidance was used to vanessa the target vein with the patient positioned on the treatment table. The entire lower extremity was prepped with a 50/50 % solution of isopropyl alcohol and chlorhexidine; then sterile drapes were applied. Once the desired access point was identified; less than 5mL of 0.5% Lidocaine buffered with Sodium Bicarbonate was distributed, to comfortably gain access in real-time with ultrasound guidance. A guidewire was used as necessary, which allowed insertion of the blue introduction catheter. Once positioned, the delivery catheter was prepped and inserted into the introducer sheath. The delivery catheter position was confirmed via ultrasound 5cm distal to Saphenofemoral junction. Following the IFU, adhesive was delivered, segmentally, into the target vein. Ultrasound visualization confirmed successful treatment of the targeted vein with no evidence of complications at the junction. All devices were then removed, and hemostasis was achieved with a suture. Dressings with compression were applied to the access site and to  any puncture sites requiring them. There was minimal blood loss.

## 2024-02-29 NOTE — PATIENT INSTRUCTIONS
Pre Prodedure Information    Procedure (s) and Date (s):  1. Right SSV RF Ablation 03/01/2024 @ 0830 a.m.    Do not eat or drink anything after midnight.  You may take medications that are listed above with a small sip of water morning of procedure.  We will call you the day prior with your time to report for surgery.  You will check in at Ambulatory Surgery.    Shower prior to procedure as your leg will be wrapped for 48 hours and you will not be able to shower.  Do not wear lotion, oil, or cream on you legs on the day of your procedure.  This will cause the Doctor's vanessa to fade.  Wear shorts, skirt, or loose pants and larger shoes (house shoes).   Bring a pillow or two and leave in the car (to prop your leg).  Bring someone with you to drive, stay during the procedure, and drive you home.  Someone will also need to stay with you at home the first night.  No driving for 24 hours after sedation.  Prepare to walk 15 minutes out of each hour for the first 48 hours post op.  Prepare to keep your legs propped up while sitting for the first 48 hours or 2 days following your procedure (recliner, couch, or chair).  Dressings will remain on your legs for at least 48 hours or 2 days after procedure.  Full discharge instructions will be provided on the day of your procedure.  Typically, you are in and out within a few hours.  We will follow you postoperatively with a 4 day post ablation ultrasound and then a 1 month, 3 month, 6 month, and 1 year post ablation visit with the physician or nurse practitioner with or without an ultrasound.  Please make every effort to attend these appointments as they will be essential to following you progress.  Please kindly notify us as soon as possible if you need to reschedule your appointment.  As always, we look forward to caring for you and are happy to answer your questions.  Please call us at [phone].

## 2024-03-01 ENCOUNTER — ANESTHESIA EVENT (OUTPATIENT)
Dept: SURGERY | Facility: HOSPITAL | Age: 74
End: 2024-03-01
Payer: MEDICARE

## 2024-03-01 ENCOUNTER — ANESTHESIA (OUTPATIENT)
Dept: SURGERY | Facility: HOSPITAL | Age: 74
End: 2024-03-01
Payer: MEDICARE

## 2024-03-01 ENCOUNTER — HOSPITAL ENCOUNTER (OUTPATIENT)
Facility: HOSPITAL | Age: 74
Discharge: HOME | End: 2024-03-01
Attending: FAMILY MEDICINE | Admitting: FAMILY MEDICINE
Payer: MEDICARE

## 2024-03-01 VITALS
DIASTOLIC BLOOD PRESSURE: 63 MMHG | RESPIRATION RATE: 16 BRPM | HEART RATE: 67 BPM | WEIGHT: 173 LBS | OXYGEN SATURATION: 98 % | HEIGHT: 62 IN | TEMPERATURE: 98 F | BODY MASS INDEX: 31.83 KG/M2 | SYSTOLIC BLOOD PRESSURE: 132 MMHG

## 2024-03-01 DIAGNOSIS — I87.2 VENOUS INSUFFICIENCY: Primary | ICD-10-CM

## 2024-03-01 LAB
GLUCOSE SERPL-MCNC: 105 MG/DL (ref 70–105)
GLUCOSE SERPL-MCNC: 168 MG/DL (ref 70–105)

## 2024-03-01 PROCEDURE — 25000003 PHARM REV CODE 250: Performed by: ANESTHESIOLOGY

## 2024-03-01 PROCEDURE — 36475 ENDOVENOUS RF 1ST VEIN: CPT | Mod: RT,,, | Performed by: FAMILY MEDICINE

## 2024-03-01 PROCEDURE — C1888 ENDOVAS NON-CARDIAC ABL CATH: HCPCS | Performed by: FAMILY MEDICINE

## 2024-03-01 PROCEDURE — D9220A PRA ANESTHESIA: Mod: ANES,,, | Performed by: ANESTHESIOLOGY

## 2024-03-01 PROCEDURE — 37000009 HC ANESTHESIA EA ADD 15 MINS: Performed by: FAMILY MEDICINE

## 2024-03-01 PROCEDURE — 63600175 PHARM REV CODE 636 W HCPCS: Performed by: ANESTHESIOLOGY

## 2024-03-01 PROCEDURE — 37000008 HC ANESTHESIA 1ST 15 MINUTES: Performed by: FAMILY MEDICINE

## 2024-03-01 PROCEDURE — 71000015 HC POSTOP RECOV 1ST HR: Performed by: FAMILY MEDICINE

## 2024-03-01 PROCEDURE — 36000705 HC OR TIME LEV I EA ADD 15 MIN: Performed by: FAMILY MEDICINE

## 2024-03-01 PROCEDURE — D9220A PRA ANESTHESIA: Mod: CRNA,,, | Performed by: ANESTHESIOLOGY

## 2024-03-01 PROCEDURE — 82962 GLUCOSE BLOOD TEST: CPT | Mod: 91

## 2024-03-01 PROCEDURE — 25000003 PHARM REV CODE 250: Performed by: FAMILY MEDICINE

## 2024-03-01 PROCEDURE — 36000704 HC OR TIME LEV I 1ST 15 MIN: Performed by: FAMILY MEDICINE

## 2024-03-01 PROCEDURE — 71000033 HC RECOVERY, INTIAL HOUR: Performed by: FAMILY MEDICINE

## 2024-03-01 RX ORDER — DIPHENHYDRAMINE HYDROCHLORIDE 50 MG/ML
25 INJECTION INTRAMUSCULAR; INTRAVENOUS EVERY 6 HOURS PRN
Status: DISCONTINUED | OUTPATIENT
Start: 2024-03-01 | End: 2024-03-01 | Stop reason: HOSPADM

## 2024-03-01 RX ORDER — IPRATROPIUM BROMIDE AND ALBUTEROL SULFATE 2.5; .5 MG/3ML; MG/3ML
3 SOLUTION RESPIRATORY (INHALATION) ONCE AS NEEDED
Status: DISCONTINUED | OUTPATIENT
Start: 2024-03-01 | End: 2024-03-01 | Stop reason: HOSPADM

## 2024-03-01 RX ORDER — LIDOCAINE HYDROCHLORIDE 20 MG/ML
INJECTION, SOLUTION EPIDURAL; INFILTRATION; INTRACAUDAL; PERINEURAL
Status: DISCONTINUED | OUTPATIENT
Start: 2024-03-01 | End: 2024-03-01

## 2024-03-01 RX ORDER — PROPOFOL 10 MG/ML
VIAL (ML) INTRAVENOUS
Status: DISCONTINUED | OUTPATIENT
Start: 2024-03-01 | End: 2024-03-01

## 2024-03-01 RX ORDER — ONDANSETRON HYDROCHLORIDE 2 MG/ML
4 INJECTION, SOLUTION INTRAVENOUS DAILY PRN
Status: DISCONTINUED | OUTPATIENT
Start: 2024-03-01 | End: 2024-03-01 | Stop reason: HOSPADM

## 2024-03-01 RX ORDER — SODIUM CHLORIDE 9 MG/ML
INJECTION, SOLUTION INTRAVENOUS CONTINUOUS
Status: DISCONTINUED | OUTPATIENT
Start: 2024-03-01 | End: 2024-03-01 | Stop reason: HOSPADM

## 2024-03-01 RX ORDER — MIDAZOLAM HYDROCHLORIDE 1 MG/ML
INJECTION INTRAMUSCULAR; INTRAVENOUS
Status: DISCONTINUED | OUTPATIENT
Start: 2024-03-01 | End: 2024-03-01

## 2024-03-01 RX ADMIN — SODIUM CHLORIDE: 9 INJECTION, SOLUTION INTRAVENOUS at 09:03

## 2024-03-01 RX ADMIN — PROPOFOL 50 MG: 10 INJECTION, EMULSION INTRAVENOUS at 12:03

## 2024-03-01 RX ADMIN — LIDOCAINE HYDROCHLORIDE,EPINEPHRINE BITARTRATE: 10; .01 INJECTION, SOLUTION INFILTRATION; PERINEURAL at 12:03

## 2024-03-01 RX ADMIN — PROPOFOL 50 MG: 10 INJECTION, EMULSION INTRAVENOUS at 11:03

## 2024-03-01 RX ADMIN — MIDAZOLAM 2 MG: 1 INJECTION INTRAMUSCULAR; INTRAVENOUS at 11:03

## 2024-03-01 RX ADMIN — LIDOCAINE HYDROCHLORIDE 100 MG: 20 INJECTION, SOLUTION INTRAVENOUS at 11:03

## 2024-03-01 NOTE — ANESTHESIA PREPROCEDURE EVALUATION
03/01/2024  Eugenie Coates is a 73 y.o., female.      Pre-op Assessment    I have reviewed the Patient Summary Reports.     I have reviewed the Nursing Notes. I have reviewed the NPO Status.   I have reviewed the Medications.     Review of Systems  Anesthesia Hx:  No problems with previous Anesthesia             Denies Family Hx of Anesthesia complications.    Denies Personal Hx of Anesthesia complications.                    Social:  Non-Smoker, No Alcohol Use       Hematology/Oncology:  Hematology Normal   Oncology Normal                                   EENT/Dental:  EENT/Dental Normal           Cardiovascular:  Exercise tolerance: good   Hypertension       CHF       ECG has been reviewed.       Congestive Heart Failure (CHF)                Hypertension         Pulmonary:    Asthma    Sleep Apnea   Asthma:    Obstructive Sleep Apnea (AFSHIN).           Renal/:  Renal/ Normal                 Hepatic/GI:     GERD      Gerd          Musculoskeletal:  Arthritis        Arthritis          Neurological:    Neuromuscular Disease,  Headaches Seizures    Craniotomy 8/2023 Dx of Headaches   Arthritis      Seizure Disorder                        Neuromuscular Disease   Endocrine:  Diabetes, type 2  Hyperthyroidism  Diabetes                Hyperthyroidism           Obesity / BMI > 30  Dermatological:  Skin Normal    Psych:  Psychiatric History                  Physical Exam  General: Well nourished, Cooperative, Alert and Oriented    Airway:  Mallampati: II / II  Mouth Opening: Normal  TM Distance: Normal  Neck ROM: Normal ROM    Dental:  Intact    Chest/Lungs:  Clear to auscultation    Heart:  Rate: Normal  Rhythm: Regular Rhythm  Sounds: Normal        Anesthesia Plan  Type of Anesthesia, risks & benefits discussed:    Anesthesia Type: Gen Natural Airway, MAC  Intra-op Monitoring Plan: Standard ASA  Monitors  Post Op Pain Control Plan: multimodal analgesia  Induction:  IV  Airway Plan: Direct  Informed Consent: Informed consent signed with the Patient and all parties understand the risks and agree with anesthesia plan.  All questions answered. Patient consented to blood products? Refused (Zoroastrianism objection)  Patient blood refusal form needs to be completed. Please review the form for refusal details in the  tab of Chart Review.  ASA Score: 3  Day of Surgery Review of History & Physical: H&P Update referred to the surgeon/provider.I have interviewed and examined the patient. I have reviewed the patient's H&P dated: There are no significant changes.     Ready For Surgery From Anesthesia Perspective.     .

## 2024-03-01 NOTE — TRANSFER OF CARE
"Anesthesia Transfer of Care Note    Patient: Eugenie Coates    Procedure(s) Performed: Procedure(s) (LRB):  Right SSV RF Ablation (Right)    Patient location: PACU    Anesthesia Type: MAC    Transport from OR: Transported from OR on room air with adequate spontaneous ventilation    Post pain: adequate analgesia    Post assessment: no apparent anesthetic complications    Post vital signs: stable    Level of consciousness: awake and responds to stimulation    Nausea/Vomiting: no nausea/vomiting    Complications: none    Transfer of care protocol was followed      Last vitals: Visit Vitals  /63   Pulse 64   Temp 36.4 °C (97.6 °F) (Oral)   Resp 17   Ht 5' 2" (1.575 m)   Wt 78.5 kg (173 lb)   SpO2 100%   Breastfeeding No   BMI 31.64 kg/m²     "

## 2024-03-01 NOTE — DISCHARGE SUMMARY
[facility] - Periop Services  Discharge Note  Short Stay    Procedure(s) (LRB):  Right SSV RF Ablation (Right)      OUTCOME: Patient tolerated treatment/procedure well without complication and is now ready for discharge.    DISPOSITION: Home or Self Care    FINAL DIAGNOSIS:  Venous insufficiency    FOLLOWUP: In clinic    DISCHARGE INSTRUCTIONS:  No discharge procedures on file.     TIME SPENT ON DISCHARGE: 5 minutes

## 2024-03-01 NOTE — OR NURSING
1217-Pt rec'd to PACU sedated, responds to stimulation, NADN, resp unlabored, O2@2L via nc, SaO2-100%, IV fluids infusing, dressing C/D/I to right leg, toes are warm/dry to touch, cap refill < 3 secs, no c/o pain, will con't to monitor, safety measures ongoing.    1220-Blood glucose check 105.    1228-ETCO2 applied.    1230-Pt placed on room air, SaO2-100%, denies any c/o pain at present.    1247-Awake/alert, no c/o pain, dressing remains C/D/I to right leg, NADN, orders to discharge to ASC 1.    1255-Pt care released to DonitaRN), pt awake with  at bedside, vs hr-67, resp-12, SaO2-98% on room air, b/p 130/65.

## 2024-03-01 NOTE — INTERVAL H&P NOTE
The patient has been examined and the H&P has been reviewed:    I concur with the findings and no changes have occurred since H&P was written.    Surgery risks, benefits and alternative options discussed and understood by patient/family for a right small saphenous vein radiofrequency ablation.          There are no hospital problems to display for this patient.

## 2024-03-01 NOTE — OP NOTE
Date: 3/1/24  Surgeon: Dr Zach Walker DO    Procedure:  Endovenous radio frequency ablation of the Right small saphenous vein(s) of the lower extremity.    Preprocedure and postprocedure diagnosis: Symptomatic varicose veins and venous insufficiency of the Right leg with other complications to include leg pain, leg edema and chronic skin changes.  The patient has previously failed conservative therapy consider a C4 chronic venous disease with venous hypertension.    Anesthesia: Local infiltration with monitored anesthesia care.  Estimated blood loss: 3 cc.  Specimens removed: none.   Complications: none.  Implants or grafts: none.    Findings:  A total of 6 cycles of radiofrequency ablation was used to treat 35 cm of vein over 2 minutes.  Maximum diameter of the vein treated is 3.9 mm with a maximum reflux time of 0.61 seconds.    Procedure detail:  After patient identification, surgical site verification, and marked symptomatic Right leg, the patient was taken to the procedure room and placed in the prone position.  Monitored anesthesia care was induced.  The patient was prepped and draped in the normal sterile fashion leaving the Right leg exposed.  A time-out performed identifying the patient and the correct surgical site.  Tumescent anesthesia was then infiltrated at the site of expected cannulation of the Right small saphenous vein.  The Right small saphenous vein was then cannulated using the modified Seldinger technique to place a 6 Swedish sheath.  I then advanced a 10x60 cm radiofrequency ablation catheter through the sheath up the saphenous vein to approximately 2 cm distal to the epigastric vein.  Tumescence anesthesia was then infiltrated around the vein to be treated.  The patient was placed in the head-down position.  Radiofrequency ablation of the Right small saphenous vein was then performed using a total of 6 cycles of radiofrequency ablation to treat 35 cm of vein over 2 minutes.  After this was  done the catheter and sheath were removed and noted to be intact.  Completion ultrasound revealed a widely patent popliteal vein, sapheno popliteal junction and an ablated small saphenous vein.  The patient's leg was then cleaned and dried.  Sterile dressings and wraps were applied.  The patient was transported to the recovery room in stable condition.

## 2024-03-04 ENCOUNTER — HOSPITAL ENCOUNTER (OUTPATIENT)
Dept: RADIOLOGY | Facility: HOSPITAL | Age: 74
Discharge: HOME | End: 2024-03-04
Attending: FAMILY MEDICINE
Payer: MEDICARE

## 2024-03-04 ENCOUNTER — OFFICE VISIT (OUTPATIENT)
Dept: VASCULAR SURGERY | Facility: CLINIC | Age: 74
End: 2024-03-04
Payer: MEDICARE

## 2024-03-04 VITALS
DIASTOLIC BLOOD PRESSURE: 84 MMHG | HEART RATE: 84 BPM | WEIGHT: 173 LBS | RESPIRATION RATE: 20 BRPM | SYSTOLIC BLOOD PRESSURE: 134 MMHG | HEIGHT: 62 IN | BODY MASS INDEX: 31.83 KG/M2

## 2024-03-04 DIAGNOSIS — I87.2 PERIPHERAL VENOUS INSUFFICIENCY: ICD-10-CM

## 2024-03-04 DIAGNOSIS — L81.9 HYPERPIGMENTATION OF SKIN: ICD-10-CM

## 2024-03-04 DIAGNOSIS — I87.2 VENOUS INSUFFICIENCY: ICD-10-CM

## 2024-03-04 DIAGNOSIS — I87.2 VENOUS INSUFFICIENCY: Primary | ICD-10-CM

## 2024-03-04 DIAGNOSIS — R60.0 EDEMA, LOWER EXTREMITY: ICD-10-CM

## 2024-03-04 PROCEDURE — 93971 EXTREMITY STUDY: CPT | Mod: 26,,, | Performed by: FAMILY MEDICINE

## 2024-03-04 PROCEDURE — 99024 POSTOP FOLLOW-UP VISIT: CPT | Mod: ,,, | Performed by: FAMILY MEDICINE

## 2024-03-04 PROCEDURE — 93971 EXTREMITY STUDY: CPT | Mod: TC

## 2024-03-04 PROCEDURE — 99215 OFFICE O/P EST HI 40 MIN: CPT | Mod: PBBFAC,25 | Performed by: FAMILY MEDICINE

## 2024-03-04 PROCEDURE — 99214 OFFICE O/P EST MOD 30 MIN: CPT | Mod: S$PBB,,, | Performed by: FAMILY MEDICINE

## 2024-03-04 NOTE — PROGRESS NOTES
VEIN CENTER CLINIC NOTE    Patient ID: Eugenie Coates is a 73 y.o. female.    I. HISTORY     Chief Complaint:   Chief Complaint   Patient presents with    Follow-up     Room 3. 4D S/P LT GSV VENASEAL AB&3D S/P RT SSV RF AB        HPI: Eugenie Coates is a 73 y.o. female who presents today for a 96 hour follow-up after undergoing right small saphenous vein radiofrequency ablation and a left great saphenous vein non thermal adhesive ablation.  The patient states she did well over the weekend without complications.  States her leg feels less tight and less heavy.  She is continue with her postoperative compression and anticoagulation with Eliquis secondary to history of DVT.  Overall she feels she is doing well and satisfied with her procedure.  She also had a left great saphenous vein non thermal adhesive ablation 2 weeks prior      Clinical summary:  Patient initially presented on 01/11/2024 by referral from Dr. Silver for evaluation of bilateral lower extremity swelling, hyperpigmentation, previous ulceration and pain.  Symptoms are progressive/worsening and began about 20 years ago.  Location is bilateral lower extremities below the knees, worse on the right. Symptoms are worse at the end of the day.  History of venous interventions includes some type of valve surgery in 2003 and 2004 the right lower extremity according to the patient..  Positive, mother, family history of venous disease.  The patient states that she has been wearing compression socks on a daily basis for many years now.  She also elevates her legs in the evening and walks regularly.  The patient also had history of extensive right lower extremity DVT in 2017 from the superficial femoral vein down to the posterior tibials.  She was placed on Eliquis for several years.  She was taken off Eliquis several years ago and is not currently on an anticoagulant or antiplatelet medication.    Bilateral complete venous reflux study, 01/11/2024, shows  no evidence of DVT bilaterally.  Thrombus noted within the proximal calf of the right great saphenous vein.  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  The study also shows dilation and axial reflux of the bilateral great saphenous veins and right small saphenous vein.  No reflux noted in the left small saphenous vein.  Refluxing varicosities noted right Gator region.    Arterial duplex with ABIs performed 03/31/2021 which shows a right EDOUARD 1.02 and a left EDOUARD of 1.05.  No vascular occlusions noted.    Venous Disease Medical Necessity Documentation Initial Visit Date:  01/11/2024 Return Check Date:    Have you ever had a rupture or bleed from a varicose vein in your leg(s)?              [] Yes  [x] No   [] Yes   [] No   Have you ever been diagnosed with phlebitis, cellulitis, or inflammation in the area of the varicose veins of  your leg(s)?  [] Yes  [x] No    [] Yes   [] No   Do you have darkened or inflamed skin on your legs?   [x] Yes   [] No   [] Yes   [] No   Do you have leg swelling?     [x] Yes   [] No   [] Yes   [] No   Do you have leg pain?   [x] Yes   [] No   [] Yes   [] No   If yes, describe the type of pain?    []   Stabbing []  Radiating []  Aching   [x]  Tightness []  Throbbing               [x]  Burning []  Cramping              Do you have leg discomfort?   [x] Yes   [] No   [] Yes   [] No   If yes, describe the type of discomfort?    [x]  Heaviness [x]  Fullness   [x]  Restlessness [] Tired/Fatigued [x] Itching              Have you ever worn compression hose?   [x] Yes   [] No   [] Yes   [] No   If yes, how long?    24 YEARS       Do you elevate your legs while sitting?   [x] Yes   [] No   [] Yes   [] No   Does venous disease (varicose veins, ulcers, skin changes, leg pain/swelling) interfere with your daily life?  If yes, check activities you are limited or unable to do.    []  Shower  [x]   Walk  []  Exercise  [] Play with  children/grandchildren  []  Shop [] Work [x] Stand for any period of time [x] Sleep                               [x] Sitting for an extended period of time.           [x] Yes   [] No   [] Yes   [] No   Do you exercise/have you tried to exercise (i.e.  Walk our participate in a regular exercise routine)?  [x] Yes  [] No   [] Yes   [] No   BMI   32.26           Past Medical History:   Diagnosis Date    Allergy     Anemia     Anxiety     Arthritis     Asthma     Bell palsy     Bilateral lower extremity edema     Chronic diastolic CHF (congestive heart failure)     Depression     Diabetes mellitus, type 2     DVT (deep venous thrombosis)     right LE    Dyslipidemia     Endometriosis     Eye injury     stuck with tree branch od ?     Focal seizure 5/3/2021    GERD (gastroesophageal reflux disease)     Glaucoma     History of DVT (deep vein thrombosis) 2018    History of uterine fibroid     Hyperlipidemia     Hypertension     Invasive lobular carcinoma of right breast in female     s/p surgery, radiation, tamoxifen    Nuclear sclerosis of both eyes 2016    Obesity     Pancreas cyst     Sleep apnea     uses C pap    Supraventricular tachycardia     SVT (supraventricular tachycardia) 2019    Thyroid disease     Venous insufficiency         Past Surgical History:   Procedure Laterality Date    ABLATION, CHEMICAL SEALANT, VARICOSE VEIN Right 2024    Procedure: Right GSV VenaSeal Ablation;  Surgeon: Pepito Walker DO;  Location: [facility] OR;  Service: Peripheral Vascular;  Laterality: Right;    ABLATION, CHEMICAL SEALANT, VARICOSE VEIN Left 2024    Left GSV Venaseal Ablation performed by Dr. Zach Walker.    CATARACT EXTRACTION W/  INTRAOCULAR LENS IMPLANT Left 2023    Procedure: CEIOL OMNI OS;  Surgeon: Juan Antonio Mcintosh MD;  Location: [facility] OR;  Service: Ophthalmology;  Laterality: Left;  consider block     SECTION      CHOLECYSTECTOMY      COLONOSCOPY      CRANIOTOMY FOR  EXCISION OF INTRACRANIAL TUMOR Left 05/04/2021    Procedure: CRANIOTOMY, FOR INTRACRANIAL NEOPLASM EXCISION Left craniotomy for tumor resection;  Surgeon: Hernandez Bravo MD;  Location: [facility] OR;  Service: Neurosurgery;  Laterality: Left;    CRANIOTOMY, WITH NEOPLASM EXCISION USING COMPUTER-ASSISTED NAVIGATION Left 08/14/2023    Procedure: 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, USING COMPUTER-ASSISTED NAVIGATION-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;  Surgeon: Hernandez Bravo MD;  Location: [facility] OR;  Service: Neurosurgery;  Laterality: Left;    ESOPHAGOGASTRODUODENOSCOPY N/A 02/17/2023    Procedure: EGD (ESOPHAGOGASTRODUODENOSCOPY);  Surgeon: Issac Wade MD;  Location: [facility] ENDO;  Service: Endoscopy;  Laterality: N/A;    glaucoma laser Bilateral     HERNIA REPAIR      KNEE SURGERY Right 2008    (R) knee scope; torn meniscus    MASTECTOMY, PARTIAL Right 12/07/2020    Procedure: MASTECTOMY, PARTIAL-Right with radiological marker Consent day of surgery; Neoprobe, technitium, Frozen;  Surgeon: Narda Keating MD;  Location: [address];  Service: General;  Laterality: Right;    RADIOFREQUENCY ABLATION Right 3/1/2024    Procedure: Right SSV RF Ablation;  Surgeon: Pepito Walker DO;  Location: [facility] OR;  Service: Peripheral Vascular;  Laterality: Right;    SENTINEL LYMPH NODE BIOPSY Right 12/07/2020    Procedure: BIOPSY, LYMPH NODE, SENTINEL;  Surgeon: Narda Keating MD;  [facility];  Service: General;  Laterality: Right;    TOTAL REDUCTION MAMMOPLASTY Bilateral 12/07/2020    Procedure: MAMMOPLASTY, REDUCTION-Bilateral;  Surgeon: Zack Hood MD;  Location: [facility] OR [address] [place] [address];  Service: Plastics;  Laterality: Bilateral;    VEIN SURGERY  2003, 2004    Rt leg x2       Social History     Tobacco Use   Smoking Status Never   Smokeless Tobacco Never         Current Outpatient Medications:     apixaban (ELIQUIS ORAL), Take 1 tablet by mouth once. Took 2  tablets yesterday & one tablet this am, Disp: , Rfl:     b complex vitamins tablet, Take 1 tablet by mouth once daily., Disp: , Rfl:     benzonatate (TESSALON) 200 MG capsule, Take 1 capsule (200 mg total) by mouth 2 (two) times a day., Disp: 30 capsule, Rfl: 0    blood sugar diagnostic Strp, Test twice daily.  Accucheck viva test strips and lancets., Disp: 300 each, Rfl: 3    calcium carb-vit D3-magnesium (CORAL CALCIUM) 250-200-125 mg-unit-mg Cap, Take 1 capsule by mouth once daily., Disp: , Rfl:     carvediloL (COREG) 12.5 MG tablet, Take 1 tablet (12.5 mg total) by mouth 2 (two) times daily with meals., Disp: 60 tablet, Rfl: 11    cloNIDine (CATAPRES) 0.1 MG tablet, Take 1 tablet (0.1 mg total) by mouth every evening. Take if blood pressure is greater than 180/100., Disp: 90 tablet, Rfl: 0    dapagliflozin propanediol (FARXIGA) 10 mg tablet, Take 1 tablet (10 mg total) by mouth once daily., Disp: 90 tablet, Rfl: 1    dorzolamide-timolol 2-0.5% (COSOPT) 22.3-6.8 mg/mL ophthalmic solution, Place 1 drop into both eyes 2 (two) times daily., Disp: , Rfl:     furosemide (LASIX) 20 MG tablet, Take 2 tablets (40 mg total) by mouth every morning., Disp: 90 tablet, Rfl: 0    glucosamine-chondroitin (OSTEO BI-FLEX) 250-200 mg Tab, Take 1 tablet by mouth once daily., Disp: , Rfl:     lancets (ACCU-CHEK SOFTCLIX LANCETS) Misc, 1 Units by Misc.(Non-Drug; Combo Route) route 2 (two) times daily., Disp: 300 each, Rfl: 3    latanoprost 0.005 % ophthalmic solution, Place 1 drop into both eyes every evening., Disp: 2.5 mL, Rfl: 12    loratadine (CLARITIN) 10 mg tablet, Take 1 tablet (10 mg total) by mouth once daily., Disp: 10 tablet, Rfl: 0    metFORMIN (GLUCOPHAGE) 500 MG tablet, TAKE 1 TABLET(500 MG) BY MOUTH DAILY WITH BREAKFAST, Disp: 90 tablet, Rfl: 0    mv-[id number] (AIRBORNE, ASCORBATE SODIUM,) 333-1.7 mg Chew, Take 1 each by mouth once daily., Disp: , Rfl:     NIFEdipine (ADALAT CC) 90 MG TbSR, Take 1  tablet (90 mg total) by mouth once daily., Disp: 90 tablet, Rfl: 1    nirmatrelvir-ritonavir 300 mg (150 mg x 2)-100 mg copackaged tablets (EUA), Take 3 tablets by mouth 2 (two) times daily. Each dose contains 2 nirmatrelvir (pink tablets) and 1 ritonavir (white tablet). Take all 3 tablets together, Disp: 30 tablet, Rfl: 0    phenoL (CHLORASEPTIC THROAT SPRAY) 1.4 % SprA, by Mucous Membrane route 2 (two) times a day., Disp: 177 mL, Rfl: 0    diazePAM (VALIUM) 5 MG tablet, Take 1 tablet (5 mg total) by mouth once. for 1 dose, Disp: 1 tablet, Rfl: 0    pantoprazole (PROTONIX) 40 MG tablet, Take 1 tablet (40 mg total) by mouth 2 (two) times daily., Disp: 60 tablet, Rfl: 0  No current facility-administered medications for this visit.    Facility-Administered Medications Ordered in Other Visits:     LIDOcaine HCL 10 mg/ml (1%) injection 2 mL, 2 mL, Intradermal, Once, Pepito Walker, DO    Review of Systems   Constitutional:  Negative for activity change, chills, diaphoresis, fatigue and fever.   Respiratory:  Negative for cough and shortness of breath.    Cardiovascular:  Positive for leg swelling. Negative for chest pain and claudication.        Hyperpigmentation LE   Gastrointestinal:  Negative for nausea and vomiting.   Musculoskeletal:  Positive for leg pain. Negative for joint swelling.   Integumentary:  Negative for rash and wound.   Neurological:  Negative for weakness and numbness.          II. PHYSICAL EXAM     Physical Exam  Constitutional:       General: She is awake. She is not in acute distress.     Appearance: Normal appearance. She is overweight. She is not ill-appearing or toxic-appearing.   HENT:      Head: Normocephalic and atraumatic.   Eyes:      Extraocular Movements: Extraocular movements intact.      Conjunctiva/sclera: Conjunctivae normal.      Pupils: Pupils are equal, round, and reactive to light.   Neck:      Vascular: No carotid bruit or JVD.   Cardiovascular:      Rate and Rhythm: Normal  rate and regular rhythm.      Pulses:           Dorsalis pedis pulses are detected w/ Doppler on the right side and detected w/ Doppler on the left side.        Posterior tibial pulses are detected w/ Doppler on the right side and detected w/ Doppler on the left side.      Heart sounds: Murmur heard.      Systolic murmur is present with a grade of 2/6.   Pulmonary:      Effort: Pulmonary effort is normal. No respiratory distress.      Breath sounds: No stridor. No wheezing, rhonchi or rales.   Musculoskeletal:         General: No swelling, tenderness or deformity.      Right lower le+ Pitting Edema present.      Left lower le+ Pitting Edema present.      Comments: Pitting edema of the bilateral extremities along with hyperpigmentation scarring from previous venous ulcerations of the right medial Gator region.  No evidence of acute cellulitis or ulcerations.   Feet:      Comments: Triphasic hand-held dopplerable pulses of the bilateral dorsalis pedis and posterior tibial arteries.  Skin:     General: Skin is warm.      Capillary Refill: Capillary refill takes less than 2 seconds.      Coloration: Skin is not ashen.      Findings: No bruising, erythema, lesion, rash or wound.   Neurological:      Mental Status: She is alert and oriented to person, place, and time.      Motor: No weakness.   Psychiatric:         Speech: Speech normal.         Behavior: Behavior normal. Behavior is cooperative.         Reticular/Spider veins noted:  RLE: anterior calf and medial calf  LLE: anterior calf and medial calf    Varicose veins noted:  RLE: anterior calf and medial calf  LLE:  none    CEAP Classification  Clinical Signs: Class 5 - Skin changes as defined above with healed ulceration  Etiologic Classification: Primary  Anatomic distribution: Superficial  Pathophysiologic dysfunction: Reflux                         Venous Clinical Severity Score  Pain:2=Daily, moderate activity limitation, occasional analgesics  Varicose  Veins: 2=Multiple. GS varicose veins confined to calf or thigh  Venous Edema: 2=Afternoon edema, above ankle  Pigmentation: 2=Diffuse over most of gaiter distribution (lower 1/3) or recent pigmentation (purple)  Inflammation: 0=None  Induration: 1=Focal, circummalleolar (< 5 cm)  Number of Active Ulcers: 0=0  Active Ulceration, Duration: 0=None  Active Ulcer Size: 0=None  Compressive Therapy: 3=Full compliance, stockings + elevation  Total Score: 12       III. ASSESSMENT & PLAN (MEDICAL DECISION MAKING)     1. Venous insufficiency    2. Hyperpigmentation of skin    3. Edema, lower extremity    4. Peripheral venous insufficiency        Assessment/Diagnosis and Plan:  The patient is doing well post ablation and encouraged to continue wearing her thigh-high postoperative compression for 2 weeks.  She has been encouraged to wear knee-high compression along with daily leg exercise and leg elevation when appropriate.    Follow-up in one-month with post ablation ultrasound.  Discontinue anticoagulation if clear.    Orders Placed This Encounter    US Post Ablation Venous    SUTURE REMOVAL          Pepito Walker, DO

## 2024-03-05 DIAGNOSIS — D42.0 ATYPICAL INTRACRANIAL MENINGIOMA: ICD-10-CM

## 2024-03-05 DIAGNOSIS — G93.89 BRAIN MASS: ICD-10-CM

## 2024-03-05 DIAGNOSIS — Z98.890 S/P CRANIOTOMY: Primary | ICD-10-CM

## 2024-03-05 LAB
LEFT EYE DM RETINOPATHY: NEGATIVE
RIGHT EYE DM RETINOPATHY: NEGATIVE

## 2024-03-05 NOTE — ANESTHESIA POSTPROCEDURE EVALUATION
Anesthesia Post Evaluation    Patient: Eugenie Coates    Procedure(s) Performed: Procedure(s) (LRB):  Right SSV RF Ablation (Right)    Final Anesthesia Type: general      Patient location during evaluation: PACU  Post-procedure vital signs: reviewed and stable  Pain management: adequate  Airway patency: patent    PONV status at discharge: No PONV  Anesthetic complications: no      Cardiovascular status: hemodynamically stable  Respiratory status: unassisted  Hydration status: euvolemic  Follow-up not needed.              Vitals Value Taken Time   /63 03/01/24 1301   Temp 36.4 °C (97.6 °F) 03/01/24 1228   Pulse 67 03/01/24 1310   Resp 16 03/01/24 1300   SpO2 100 % 03/01/24 1310   Vitals shown include unvalidated device data.      Event Time   Out of Recovery 12:47:00         Pain/Jodi Score: No data recorded

## 2024-03-22 ENCOUNTER — OFFICE VISIT (OUTPATIENT)
Dept: PODIATRY | Facility: CLINIC | Age: 74
End: 2024-03-22
Payer: MEDICARE

## 2024-03-22 VITALS — WEIGHT: 173.06 LBS | BODY MASS INDEX: 31.85 KG/M2 | HEIGHT: 62 IN

## 2024-03-22 DIAGNOSIS — L84 CORN OR CALLUS: ICD-10-CM

## 2024-03-22 DIAGNOSIS — E11.65 TYPE 2 DIABETES MELLITUS WITH HYPERGLYCEMIA, WITHOUT LONG-TERM CURRENT USE OF INSULIN: Primary | ICD-10-CM

## 2024-03-22 DIAGNOSIS — M20.41 HAMMER TOES OF BOTH FEET: ICD-10-CM

## 2024-03-22 DIAGNOSIS — B35.1 ONYCHOMYCOSIS DUE TO DERMATOPHYTE: ICD-10-CM

## 2024-03-22 DIAGNOSIS — M20.42 HAMMER TOES OF BOTH FEET: ICD-10-CM

## 2024-03-22 PROCEDURE — 99213 OFFICE O/P EST LOW 20 MIN: CPT | Mod: 25,S$PBB,, | Performed by: PODIATRIST

## 2024-03-22 PROCEDURE — 11056 PARNG/CUTG B9 HYPRKR LES 2-4: CPT | Mod: PBBFAC,PO | Performed by: PODIATRIST

## 2024-03-22 PROCEDURE — 11056 PARNG/CUTG B9 HYPRKR LES 2-4: CPT | Mod: Q9,S$PBB,, | Performed by: PODIATRIST

## 2024-03-22 PROCEDURE — 99213 OFFICE O/P EST LOW 20 MIN: CPT | Mod: PBBFAC,PO,25 | Performed by: PODIATRIST

## 2024-03-22 PROCEDURE — 11721 DEBRIDE NAIL 6 OR MORE: CPT | Performed by: PODIATRIST

## 2024-03-22 PROCEDURE — 99999 PR PBB SHADOW E&M-EST. PATIENT-LVL III: CPT | Mod: PBBFAC,,, | Performed by: PODIATRIST

## 2024-03-22 PROCEDURE — 11721 DEBRIDE NAIL 6 OR MORE: CPT | Mod: 59,Q9,S$PBB, | Performed by: PODIATRIST

## 2024-03-22 NOTE — PROGRESS NOTES
Subjective:      Patient ID: Eugenie Coates is a 73 y.o. female.    Chief Complaint: Diabetes Mellitus (4 month nail care)      Eugenie is a 73 y.o. female who presents to the clinic for routine evaluation and treatment of diabetic feet. Eugenie has a past medical history of Allergy, Anemia, Anxiety, Arthritis, Asthma, Bell palsy, Bilateral lower extremity edema, Chronic diastolic CHF (congestive heart failure), Depression, Diabetes mellitus, type 2, DVT (deep venous thrombosis), Dyslipidemia, Endometriosis, Eye injury (2014), Focal seizure (5/3/2021), GERD (gastroesophageal reflux disease), Glaucoma, History of DVT (deep vein thrombosis) (1/23/2018), History of uterine fibroid, Hyperlipidemia, Hypertension, Invasive lobular carcinoma of right breast in female, Nuclear sclerosis of both eyes (9/27/2016), Obesity, Pancreas cyst, Sleep apnea, Supraventricular tachycardia, SVT (supraventricular tachycardia) (05/2019), Thyroid disease, and Venous insufficiency. Patient relates needing her toenails trimmed. Denies being painful with wearing shoe gear.  Has not attempted to self treat.  Continues with decent control over her blood glucose.   Denies neuropathy pain with today's exam.  Notes a slight decrease in swelling of the Rt. LE s/p EVLT of veins in said extremity.  Denies any additional pedal complaints.      PCP: Jarrell Silver MD    Date Last Seen by PCP: 2/24     Hemoglobin A1C   Date Value Ref Range Status   10/26/2023 7.0 (H) 4.5 - 6.6 % Final     Comment:       Normal:               <5.7%  Pre-Diabetic:       5.7% to 6.4%  Diabetic:             >6.4%  Diabetic Goal:     <7%   08/14/2023 7.1 (H) 0.0 - 5.6 % Final     Comment:     Reference Interval:  5.0 - 5.6 Normal   5.7 - 6.4 High Risk   > 6.5 Diabetic      Hgb A1c results are standardized based on the (NGSP) National   Glycohemoglobin Standardization Program.      Hemoglobin A1C levels are related to mean serum/plasma glucose   during the  preceding 2-3 months.        03/01/2023 7.1 (H) 4.0 - 5.6 % Final     Comment:     ADA Screening Guidelines:  5.7-6.4%  Consistent with prediabetes  >or=6.5%  Consistent with diabetes    High levels of fetal hemoglobin interfere with the HbA1C  assay. Heterozygous hemoglobin variants (HbS, HgC, etc)do  not significantly interfere with this assay.   However, presence of multiple variants may affect accuracy.     09/26/2022 6.4 (H) 4.0 - 5.6 % Final     Comment:     ADA Screening Guidelines:  5.7-6.4%  Consistent with prediabetes  >or=6.5%  Consistent with diabetes    High levels of fetal hemoglobin interfere with the HbA1C  assay. Heterozygous hemoglobin variants (HbS, HgC, etc)do  not significantly interfere with this assay.   However, presence of multiple variants may affect accuracy.             Past Medical History:   Diagnosis Date    Allergy     Anemia     Anxiety     Arthritis     Asthma     Bell palsy     Bilateral lower extremity edema     Chronic diastolic CHF (congestive heart failure)     Depression     Diabetes mellitus, type 2     DVT (deep venous thrombosis)     right LE    Dyslipidemia     Endometriosis     Eye injury 2014    stuck with tree branch od ?     Focal seizure 5/3/2021    GERD (gastroesophageal reflux disease)     Glaucoma     History of DVT (deep vein thrombosis) 1/23/2018    History of uterine fibroid     Hyperlipidemia     Hypertension     Invasive lobular carcinoma of right breast in female     s/p surgery, radiation, tamoxifen    Nuclear sclerosis of both eyes 9/27/2016    Obesity     Pancreas cyst     Sleep apnea     uses C pap    Supraventricular tachycardia     SVT (supraventricular tachycardia) 05/2019    Thyroid disease     Venous insufficiency        Past Surgical History:   Procedure Laterality Date    ABLATION, CHEMICAL SEALANT, VARICOSE VEIN Right 02/23/2024    Procedure: Right GSV VenaSeal Ablation;  Surgeon: Pepito Walker DO;  Location: [facility] OR;  Service: Peripheral  Vascular;  Laterality: Right;    ABLATION, CHEMICAL SEALANT, VARICOSE VEIN Left 2024    Left GSV Venaseal Ablation performed by Dr. Zach Walker.    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    Procedure: CRANIOTOMY, FOR INTRACRANIAL NEOPLASM EXCISION Left craniotomy for tumor resection;  Surgeon: Hernandez Bravo MD;  Location: [facility] OR;  Service: Neurosurgery;  Laterality: Left;    CRANIOTOMY, WITH NEOPLASM EXCISION USING COMPUTER-ASSISTED NAVIGATION Left 2023    Procedure: 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, USING COMPUTER-ASSISTED NAVIGATION-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;  Surgeon: Hernandez Bravo MD;  Location: [facility] OR;  Service: Neurosurgery;  Laterality: Left;    ESOPHAGOGASTRODUODENOSCOPY N/A 2023    Procedure: EGD (ESOPHAGOGASTRODUODENOSCOPY);  Surgeon: Issac Wade MD;  Location: [facility];  Service: Endoscopy;  Laterality: N/A;    glaucoma laser Bilateral     HERNIA REPAIR      KNEE SURGERY Right     (R) knee scope; torn meniscus    MASTECTOMY, PARTIAL Right 2020    Procedure: MASTECTOMY, PARTIAL-Right with radiological marker Consent day of surgery; Neoprobe, technitium, Frozen;  Surgeon: Narda Keating MD;  [facility];  Service: General;  Laterality: Right;    RADIOFREQUENCY ABLATION Right 3/1/2024    Procedure: Right SSV RF Ablation;  Surgeon: Pepito Walker DO;  Location: [facility] OR;  Service: Peripheral Vascular;  Laterality: Right;    SENTINEL LYMPH NODE BIOPSY Right 2020    Procedure: BIOPSY, LYMPH NODE, SENTINEL;  Surgeon: Narda Keating MD;  [facility];  Service: General;  Laterality: Right;    TOTAL  REDUCTION MAMMOPLASTY Bilateral 12/07/2020    Procedure: MAMMOPLASTY, REDUCTION-Bilateral;  Surgeon: Zack Hood MD;  Location: [facility] OR [address] [place] [address];  Service: Plastics;  Laterality: Bilateral;    VEIN SURGERY  2003, 2004    Rt leg x2       Family History   Problem Relation Age of Onset    Diabetes Mother     No Known Problems Father     No Known Problems Sister     No Known Problems Brother     Colon cancer Maternal Aunt     No Known Problems Maternal Uncle     No Known Problems Paternal Aunt     No Known Problems Paternal Uncle     No Known Problems Maternal Grandmother     No Known Problems Maternal Grandfather     No Known Problems Paternal Grandmother     No Known Problems Paternal Grandfather     Amblyopia Neg Hx     Blindness Neg Hx     Cancer Neg Hx     Cataracts Neg Hx     Glaucoma Neg Hx     Hypertension Neg Hx     Macular degeneration Neg Hx     Retinal detachment Neg Hx     Strabismus Neg Hx     Stroke Neg Hx     Thyroid disease Neg Hx     Celiac disease Neg Hx     Colon polyps Neg Hx     Esophageal cancer Neg Hx     Inflammatory bowel disease Neg Hx     Irritable bowel syndrome Neg Hx     Liver cancer Neg Hx     Liver disease Neg Hx     Rectal cancer Neg Hx     Stomach cancer Neg Hx     Ulcerative colitis Neg Hx     Cystic fibrosis Neg Hx     Crohn's disease Neg Hx     Hemochromatosis Neg Hx     Cirrhosis Neg Hx        Social History     Socioeconomic History    Marital status:    Tobacco Use    Smoking status: Never    Smokeless tobacco: Never   Substance and Sexual Activity    Alcohol use: Never     Alcohol/week: 0.0 standard drinks of alcohol    Drug use: Never    Sexual activity: Yes     Partners: Male     Birth control/protection: None   Social History Narrative    1/18/19: lives with her . They have multiple children, but the youngest is 38. No children at home right now. No pets at home. Splits time between here and [place]. Her son lives in [place].      Social  Determinants of Health     Financial Resource Strain: Low Risk  (12/26/2023)    Overall Financial Resource Strain (CARDIA)     Difficulty of Paying Living Expenses: Not very hard   Food Insecurity: No Food Insecurity (12/26/2023)    Hunger Vital Sign     Worried About Running Out of Food in the Last Year: Never true     Ran Out of Food in the Last Year: Never true   Transportation Needs: No Transportation Needs (12/26/2023)    PRAPARE - Transportation     Lack of Transportation (Medical): No     Lack of Transportation (Non-Medical): No   Physical Activity: Insufficiently Active (12/26/2023)    Exercise Vital Sign     Days of Exercise per Week: 3 days     Minutes of Exercise per Session: 20 min   Stress: No Stress Concern Present (12/26/2023)    Citizen of [place] of Occupational Health - Occupational Stress Questionnaire     Feeling of Stress : Only a little   Recent Concern: Stress - Stress Concern Present (12/20/2023)    Citizen [place] of Occupational Health - Occupational Stress Questionnaire     Feeling of Stress : To some extent   Social Connections: Socially Integrated (12/26/2023)    Social Connection and Isolation Panel [NHANES]     Frequency of Communication with Friends and Family: More than three times a week     Frequency of Social Gatherings with Friends and Family: Three times a week     Attends [employer] Services: More than 4 times per year     Active Member of Clubs or Organizations: Yes     Attends Club or Organization Meetings: More than 4 times per year     Marital Status:    Housing Stability: Low Risk  (12/26/2023)    Housing Stability Vital Sign     Unable to Pay for Housing in the Last Year: No     Number of Places Lived in the Last Year: 1     Unstable Housing in the Last Year: No       Current Outpatient Medications   Medication Sig Dispense Refill    b complex vitamins tablet Take 1 tablet by mouth once daily.      benzonatate (TESSALON) 200 MG capsule Take 1 capsule (200 mg total)  by mouth 2 (two) times a day. 30 capsule 0    blood sugar diagnostic Strp Test twice daily.  Accucheck viva test strips and lancets. 300 each 3    calcium carb-vit D3-magnesium (CORAL CALCIUM) 250-200-125 mg-unit-mg Cap Take 1 capsule by mouth once daily.      carvediloL (COREG) 12.5 MG tablet Take 1 tablet (12.5 mg total) by mouth 2 (two) times daily with meals. 60 tablet 11    dapagliflozin propanediol (FARXIGA) 10 mg tablet Take 1 tablet (10 mg total) by mouth once daily. 90 tablet 1    dorzolamide-timolol 2-0.5% (COSOPT) 22.3-6.8 mg/mL ophthalmic solution Place 1 drop into both eyes 2 (two) times daily.      glucosamine-chondroitin (OSTEO BI-FLEX) 250-200 mg Tab Take 1 tablet by mouth once daily.      lancets (ACCU-CHEK SOFTCLIX LANCETS) Misc 1 Units by Misc.(Non-Drug; Combo Route) route 2 (two) times daily. 300 each 3    latanoprost 0.005 % ophthalmic solution Place 1 drop into both eyes every evening. 2.5 mL 12    loratadine (CLARITIN) 10 mg tablet Take 1 tablet (10 mg total) by mouth once daily. 10 tablet 0    metFORMIN (GLUCOPHAGE) 500 MG tablet TAKE 1 TABLET(500 MG) BY MOUTH DAILY WITH BREAKFAST 90 tablet 0    mv-[id number] (A[place]ORNE, ASCORBATE SODIUM,) 333-1.7 mg Chew Take 1 each by mouth once daily.      NIFEdipine (ADALAT CC) 90 MG TbSR Take 1 tablet (90 mg total) by mouth once daily. 90 tablet 1    apixaban (ELIQUIS ORAL) Take 1 tablet by mouth once. Took 2 tablets yesterday & one tablet this am      cloNIDine (CATAPRES) 0.1 MG tablet Take 1 tablet (0.1 mg total) by mouth every evening. Take if blood pressure is greater than 180/100. (Patient not taking: Reported on 3/22/2024) 90 tablet 0    diazePAM (VALIUM) 5 MG tablet Take 1 tablet (5 mg total) by mouth once. Take 1 hour prior to imaging. for 1 dose 1 tablet 0    furosemide (LASIX) 20 MG tablet Take 2 tablets (40 mg total) by mouth every morning. (Patient not taking: Reported on 3/22/2024) 90 tablet 0    nirmatrelvir-ritonavir 300 mg  (150 mg x 2)-100 mg copackaged tablets (EUA) Take 3 tablets by mouth 2 (two) times daily. Each dose contains 2 nirmatrelvir (pink tablets) and 1 ritonavir (white tablet). Take all 3 tablets together (Patient not taking: Reported on 3/22/2024) 30 tablet 0    pantoprazole (PROTONIX) 40 MG tablet Take 1 tablet (40 mg total) by mouth 2 (two) times daily. 60 tablet 0    phenoL (CHLORASEPTIC THROAT SPRAY) 1.4 % SprA by Mucous Membrane route 2 (two) times a day. (Patient not taking: Reported on 3/22/2024) 177 mL 0     No current facility-administered medications for this visit.     Facility-Administered Medications Ordered in Other Visits   Medication Dose Route Frequency Provider Last Rate Last Admin    LIDOcaine HCL 10 mg/ml (1%) injection 2 mL  2 mL Intradermal Once Pepito Walker DO           Review of patient's allergies indicates:   Allergen Reactions    Percodan [oxycodone hcl-oxycodone-asa] Hives and Itching         Review of Systems   Constitutional: Negative for chills and fever.   Cardiovascular:  Positive for leg swelling. Negative for claudication.   Skin:  Positive for color change and nail changes.   Musculoskeletal:  Positive for joint swelling. Negative for joint pain, muscle cramps and muscle weakness.   Gastrointestinal:  Negative for nausea and vomiting.   Neurological:  Positive for numbness. Negative for paresthesias.   Psychiatric/Behavioral:  Negative for altered mental status.            Objective:      Physical Exam  Constitutional:       General: She is not in acute distress.     Appearance: She is well-developed. She is not diaphoretic.   Cardiovascular:      Pulses:           Dorsalis pedis pulses are 2+ on the right side and 2+ on the left side.        Posterior tibial pulses are 2+ on the right side and 2+ on the left side.      Comments: CFT < 3 seconds bilateral.  Pedal hair growth decreased bilateral.  Varicosities noted bilateral.  Mild to moderate non pitting edema noted to Rt. lower  extremity and moderate edema of the Lt. LE.  Toes are cool to touch bilateral.    Musculoskeletal:         General: No tenderness.      Comments: Muscle strength 5/5 in all muscle groups bilateral.  No tenderness nor crepitation with ROM of foot/ankle joints bilateral.  No tenderness with palpation of bilateral foot and ankle.  Bilateral pes planus foot type.  Bilateral hallux abducto valgus.  Bilateral semi-reducible contracture of toes 2-5.     Skin:     General: Skin is warm and dry.      Coloration: Skin is not pale.      Findings: Lesion present. No abrasion, bruising, burn, ecchymosis, erythema, laceration, petechiae or rash.      Nails: There is no clubbing.      Comments: Pedal skin appears edematous bilateral.  Toenails x 10 appear thickened by 2 mm, elongated by 4 mm, and discolored with subungual debris.  Hemosiderin staining noted to the Rt. Lower leg.  Hyperkeratotic lesion noted to bilateral sub 5th met head.   Neurological:      Mental Status: She is alert and oriented to person, place, and time.      Sensory: Sensory deficit present.      Motor: No weakness or atrophy.      Comments: Protective sensation per Maurepas-Heron monofilament intact bilateral.    Vibratory sensation decreased bilateral.    Light touch intact bilateral.               Assessment:       Encounter Diagnoses   Name Primary?    Type 2 diabetes mellitus with hyperglycemia, without long-term current use of insulin Yes    Hammer toes of both feet     Onychomycosis due to dermatophyte     Corn or callus              Plan:       Eugenie was seen today for diabetes mellitus.    Diagnoses and all orders for this visit:    Type 2 diabetes mellitus with hyperglycemia, without long-term current use of insulin    Hammer toes of both feet    Onychomycosis due to dermatophyte    Corn or callus        I counseled the patient on her conditions, their implications and medical management.    Shoe inspection. Diabetic Foot Education. Patient  reminded of the importance of good nutrition and blood sugar control to help prevent podiatric complications of diabetes. Patient instructed on proper foot hygeine. We discussed wearing proper shoe gear, daily foot inspections, never walking without protective shoe gear, never putting sharp instruments to feet    Advised to continue wearing diabetic shoes/insoles that accommodate for digital deformities.     With patient's permission, nails were aggressively reduced and debrided x 10 to their soft tissue attachment mechanically and with electric , removing all offending nail and debris.  Also, a sterile #15 scalpel was used to trim a lesions x 2 down to smooth appearing skin without incident.   Patient relates relief following the procedure.  She will continue to monitor the areas daily, inspect her feet, wear protective shoe gear when ambulatory, moisturizer to maintain skin integrity and follow in this office in approximately 4 months, sooner p.r.n.    Giuliano Garnett DPM

## 2024-03-25 ENCOUNTER — OFFICE VISIT (OUTPATIENT)
Dept: NEUROLOGY | Facility: CLINIC | Age: 74
End: 2024-03-25
Payer: MEDICARE

## 2024-03-25 VITALS
BODY MASS INDEX: 31.83 KG/M2 | HEIGHT: 62 IN | SYSTOLIC BLOOD PRESSURE: 128 MMHG | OXYGEN SATURATION: 98 % | HEART RATE: 93 BPM | WEIGHT: 173 LBS | RESPIRATION RATE: 18 BRPM | DIASTOLIC BLOOD PRESSURE: 68 MMHG

## 2024-03-25 DIAGNOSIS — G93.89 BRAIN MASS: Primary | ICD-10-CM

## 2024-03-25 PROCEDURE — 99214 OFFICE O/P EST MOD 30 MIN: CPT | Mod: S$PBB,,, | Performed by: PSYCHIATRY & NEUROLOGY

## 2024-03-25 PROCEDURE — 99215 OFFICE O/P EST HI 40 MIN: CPT | Mod: PBBFAC | Performed by: PSYCHIATRY & NEUROLOGY

## 2024-03-25 NOTE — PATIENT INSTRUCTIONS
Cont current meds   Good family support network   F/u Miguel Ángel in [place], [place] in JUNE   F/u 12 months

## 2024-03-25 NOTE — PROGRESS NOTES
Subjective:       Patient ID: Eugenie Coates is a 73 y.o. female     Chief Complaint:    Chief Complaint   Patient presents with    meningioma cerabral     Pt. States doing good.        Allergies:  Oxycodone hcl-oxycodone-asa and Percodan yadira    Current Medications:    Outpatient Encounter Medications as of 3/25/2024   Medication Sig Dispense Refill    apixaban (ELIQUIS ORAL) Take 1 tablet by mouth once. Took 2 tablets yesterday & one tablet this am      b complex vitamins tablet Take 1 tablet by mouth once daily.      benzonatate (TESSALON) 200 MG capsule Take 1 capsule (200 mg total) by mouth 2 (two) times a day. 30 capsule 0    blood sugar diagnostic Strp Test twice daily.  Accucheck viva test strips and lancets. 300 each 3    calcium carb-vit D3-magnesium (CORAL CALCIUM) 250-200-125 mg-unit-mg Cap Take 1 capsule by mouth once daily.      carvediloL (COREG) 12.5 MG tablet Take 1 tablet (12.5 mg total) by mouth 2 (two) times daily with meals. 60 tablet 11    cloNIDine (CATAPRES) 0.1 MG tablet Take 1 tablet (0.1 mg total) by mouth every evening. Take if blood pressure is greater than 180/100. 90 tablet 0    dapagliflozin propanediol (FARXIGA) 10 mg tablet Take 1 tablet (10 mg total) by mouth once daily. 90 tablet 1    dorzolamide-timolol 2-0.5% (COSOPT) 22.3-6.8 mg/mL ophthalmic solution Place 1 drop into both eyes 2 (two) times daily.      furosemide (LASIX) 20 MG tablet Take 2 tablets (40 mg total) by mouth every morning. 90 tablet 0    glucosamine-chondroitin (OSTEO BI-FLEX) 250-200 mg Tab Take 1 tablet by mouth once daily.      lancets (ACCU-CHEK SOFTCLIX LANCETS) Misc 1 Units by Misc.(Non-Drug; Combo Route) route 2 (two) times daily. 300 each 3    latanoprost 0.005 % ophthalmic solution Place 1 drop into both eyes every evening. 2.5 mL 12    loratadine (CLARITIN) 10 mg tablet Take 1 tablet (10 mg total) by mouth once daily. 10 tablet 0    metFORMIN (GLUCOPHAGE) 500 MG tablet TAKE 1 TABLET(500 MG) BY  MOUTH DAILY WITH BREAKFAST 90 tablet 0    mv-mn-vitC-[id number]-[id number]-[id number]-hc124 (AIRBORNE, ASCORBATE SODIUM,) 333-1.7 mg Chew Take 1 each by mouth once daily.      NIFEdipine (ADALAT CC) 90 MG TbSR Take 1 tablet (90 mg total) by mouth once daily. 90 tablet 1    nirmatrelvir-ritonavir 300 mg (150 mg x 2)-100 mg copackaged tablets (EUA) Take 3 tablets by mouth 2 (two) times daily. Each dose contains 2 nirmatrelvir (pink tablets) and 1 ritonavir (white tablet). Take all 3 tablets together 30 tablet 0    phenoL (CHLORASEPTIC THROAT SPRAY) 1.4 % SprA by Mucous Membrane route 2 (two) times a day. 177 mL 0    diazePAM (VALIUM) 5 MG tablet Take 1 tablet (5 mg total) by mouth once. Take 1 hour prior to imaging. for 1 dose 1 tablet 0    pantoprazole (PROTONIX) 40 MG tablet Take 1 tablet (40 mg total) by mouth 2 (two) times daily. 60 tablet 0    [DISCONTINUED] diazePAM (VALIUM) 5 MG tablet Take 1 tablet (5 mg total) by mouth once. for 1 dose 1 tablet 0    [DISCONTINUED] omeprazole (PRILOSEC OTC) 20 MG tablet Take 1 tablet (20 mg total) by mouth once daily. 90 tablet 3     Facility-Administered Encounter Medications as of 3/25/2024   Medication Dose Route Frequency Provider Last Rate Last Admin    LIDOcaine HCL 10 mg/ml (1%) injection 2 mL  2 mL Intradermal Once Pepito Walker DO        [COMPLETED] LIDOcaine-EPINEPHrine 1%-1:100,000 30 mL, LIDOcaine HCL 10 mg/ml (1%) 20 mL, sodium bicarbonate 10 mL in sodium chloride 0.9% 500 mL solution   MISCELLANEOUS Once Pepito Walker DO   Stopped at 03/01/24 1202       History of Present Illness  72 yo BF s/p meningioma resection 2021 and 2023 - placed on Keppra but poor SE's anger, irritability  Switched to vimpat and doing well currently  Has serial Mri Every 6-12  months in [place], [place] and pending in couple months  Pt doing well and looks well   Vimpat stopped few months ago and no seizure events          Past Medical History:   Diagnosis Date    Allergy     Anemia     Anxiety      Arthritis     Asthma     Bell palsy     Bilateral lower extremity edema     Chronic diastolic CHF (congestive heart failure)     Depression     Diabetes mellitus, type 2     DVT (deep venous thrombosis)     right LE    Dyslipidemia     Endometriosis     Eye injury     stuck with tree branch od ?     Focal seizure 5/3/2021    GERD (gastroesophageal reflux disease)     Glaucoma     History of DVT (deep vein thrombosis) 2018    History of uterine fibroid     Hyperlipidemia     Hypertension     Invasive lobular carcinoma of right breast in female     s/p surgery, radiation, tamoxifen    Nuclear sclerosis of both eyes 2016    Obesity     Pancreas cyst     Sleep apnea     uses C pap    Supraventricular tachycardia     SVT (supraventricular tachycardia) 2019    Thyroid disease     Venous insufficiency        Past Surgical History:   Procedure Laterality Date    ABLATION, CHEMICAL SEALANT, VARICOSE VEIN Right 2024    Procedure: Right GSV VenaSeal Ablation;  Surgeon: Pepito Walker DO;  Location: [facility] OR;  Service: Peripheral Vascular;  Laterality: Right;    ABLATION, CHEMICAL SEALANT, VARICOSE VEIN Left 2024    Left GSV Venaseal Ablation performed by Dr. Zach Walker.    CATARACT EXTRACTION W/  INTRAOCULAR LENS IMPLANT Left 2023    Procedure: CEIOL OMNI OS;  Surgeon: Juan Antonio Mcintosh MD;  Location: [facility] OR;  Service: Ophthalmology;  Laterality: Left;  consider block     SECTION      CHOLECYSTECTOMY      COLONOSCOPY      CRANIOTOMY FOR EXCISION OF INTRACRANIAL TUMOR Left 2021    Procedure: CRANIOTOMY, FOR INTRACRANIAL NEOPLASM EXCISION Left craniotomy for tumor resection;  Surgeon: Hernandez Bravo MD;  Location: [facility] OR;  Service: Neurosurgery;  Laterality: Left;    CRANIOTOMY, WITH NEOPLASM EXCISION USING COMPUTER-ASSISTED NAVIGATION Left 2023    Procedure: 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, USING COMPUTER-ASSISTED NAVIGATION-LEFT FRONTAL  CRANIOTOMY FOR RESECTION OF BRAIN MASS;  Surgeon: Hernandez Bravo MD;  Location: [facility] OR;  Service: Neurosurgery;  Laterality: Left;    ESOPHAGOGASTRODUODENOSCOPY N/A 02/17/2023    Procedure: EGD (ESOPHAGOGASTRODUODENOSCOPY);  Surgeon: Issac Wade MD;  Location: [facility] ENDO;  Service: Endoscopy;  Laterality: N/A;    glaucoma laser Bilateral     HERNIA REPAIR      KNEE SURGERY Right 2008    (R) knee scope; torn meniscus    MASTECTOMY, PARTIAL Right 12/07/2020    Procedure: MASTECTOMY, PARTIAL-Right with radiological marker Consent day of surgery; Neoprobe, technitium, Frozen;  Surgeon: Narda Keating MD;  [facility];  Service: General;  Laterality: Right;    RADIOFREQUENCY ABLATION Right 3/1/2024    Procedure: Right SSV RF Ablation;  Surgeon: Pepito Walker DO;  Location: [facility] OR;  Service: Peripheral Vascular;  Laterality: Right;    SENTINEL LYMPH NODE BIOPSY Right 12/07/2020    Procedure: BIOPSY, LYMPH NODE, SENTINEL;  Surgeon: Narda Keating MD;  [facility];  Service: General;  Laterality: Right;    TOTAL REDUCTION MAMMOPLASTY Bilateral 12/07/2020    Procedure: MAMMOPLASTY, REDUCTION-Bilateral;  Surgeon: Zack Hood MD;  Location: [facility] OR [address] [place] [address];  Service: Plastics;  Laterality: Bilateral;    VEIN SURGERY  2003, 2004    Rt leg x2       Social History  Ms. Coates  reports that she has never smoked. She has never used smokeless tobacco. She reports that she does not drink alcohol and does not use drugs.    Family History  Ms.'s Coates family history includes Colon cancer in her maternal aunt; Diabetes in her mother; No Known Problems in her brother, father, maternal grandfather, maternal grandmother, maternal uncle, paternal aunt, paternal grandfather, paternal grandmother, paternal uncle, and sister.    Review of Systems  Review of Systems   All other systems reviewed and are negative.     Objective:   /68 (BP Location: Left arm, Patient Position: Sitting,  "BP Method: Large (Automatic))   Pulse 93   Resp 18   Ht 5' 2" (1.575 m)   Wt 78.5 kg (173 lb)   SpO2 98%   BMI 31.64 kg/m²    NEUROLOGICAL EXAMINATION:     MENTAL STATUS   Oriented to person, place, and time.   Level of consciousness: alert  Knowledge: consistent with education.     CRANIAL NERVES   Cranial nerves II through XII intact.     MOTOR EXAM     Strength   Strength 5/5 throughout.     GAIT AND COORDINATION        Using cane         Physical Exam  Vitals reviewed.   Constitutional:       Appearance: She is normal weight.   Neurological:      Mental Status: She is alert and oriented to person, place, and time. Mental status is at baseline.      Cranial Nerves: Cranial nerves 2-12 are intact.      Motor: Motor strength is normal.         Assessment:     Brain mass         Primary Diagnosis and ICD10  Brain mass [G93.89]    Plan:     Patient Instructions   Cont current meds   Good family support network   F/u [facility]sgy in [place], [place] in JUNE   F/u 12 months      There are no discontinued medications.    Requested Prescriptions      No prescriptions requested or ordered in this encounter       "

## 2024-04-09 ENCOUNTER — HOSPITAL ENCOUNTER (OUTPATIENT)
Dept: RADIOLOGY | Facility: HOSPITAL | Age: 74
Discharge: HOME | End: 2024-04-09
Attending: FAMILY MEDICINE
Payer: MEDICARE

## 2024-04-09 ENCOUNTER — OFFICE VISIT (OUTPATIENT)
Dept: VASCULAR SURGERY | Facility: CLINIC | Age: 74
End: 2024-04-09
Payer: MEDICARE

## 2024-04-09 VITALS
RESPIRATION RATE: 18 BRPM | HEIGHT: 62 IN | HEART RATE: 76 BPM | SYSTOLIC BLOOD PRESSURE: 136 MMHG | WEIGHT: 173 LBS | BODY MASS INDEX: 31.83 KG/M2 | DIASTOLIC BLOOD PRESSURE: 83 MMHG

## 2024-04-09 DIAGNOSIS — R60.0 EDEMA, LOWER EXTREMITY: ICD-10-CM

## 2024-04-09 DIAGNOSIS — L81.9 HYPERPIGMENTATION OF SKIN: ICD-10-CM

## 2024-04-09 DIAGNOSIS — I87.2 VENOUS INSUFFICIENCY: Primary | ICD-10-CM

## 2024-04-09 DIAGNOSIS — I87.2 PERIPHERAL VENOUS INSUFFICIENCY: ICD-10-CM

## 2024-04-09 PROCEDURE — 93971 EXTREMITY STUDY: CPT | Mod: TC

## 2024-04-09 PROCEDURE — 99214 OFFICE O/P EST MOD 30 MIN: CPT | Mod: S$PBB,,, | Performed by: FAMILY MEDICINE

## 2024-04-09 PROCEDURE — 99215 OFFICE O/P EST HI 40 MIN: CPT | Mod: PBBFAC,25 | Performed by: FAMILY MEDICINE

## 2024-04-09 PROCEDURE — 93971 EXTREMITY STUDY: CPT | Mod: 26,,, | Performed by: FAMILY MEDICINE

## 2024-04-09 NOTE — PROGRESS NOTES
VEIN CENTER CLINIC NOTE    Patient ID: Eugenie Coates is a 73 y.o. female.    I. HISTORY     Chief Complaint:   Chief Complaint   Patient presents with    Follow-up     US 1M PA         HPI: Eugenie Coates is a 73 y.o. female who presents today for a 1 month follow-up after undergoing a bilateral great saphenous vein non thermal adhesive ablation and a right small saphenous vein radiofrequency ablation.  The patient states she is done well since her procedures in his noted less edema and leg discomfort since then.  She feels as though her skin has also improved.  She was placed on Eliquis secondary to history of DVT.  Post ablation ultrasound today shows well ablated bilateral great saphenous veins and right small saphenous vein.  No evidence of chemical or heat induced thrombus noted.    Clinical summary:  Patient initially presented on 01/11/2024 by referral from Dr. Silver for evaluation of bilateral lower extremity swelling, hyperpigmentation, previous ulceration and pain.  Symptoms are progressive/worsening and began about 20 years ago.  Location is bilateral lower extremities below the knees, worse on the right. Symptoms are worse at the end of the day.  History of venous interventions includes some type of valve surgery in 2003 and 2004 the right lower extremity according to the patient..  Positive, mother, family history of venous disease.  The patient states that she has been wearing compression socks on a daily basis for many years now.  She also elevates her legs in the evening and walks regularly.  The patient also had history of extensive right lower extremity DVT in 2017 from the superficial femoral vein down to the posterior tibials.  She was placed on Eliquis for several years.  She was taken off Eliquis several years ago and is not currently on an anticoagulant or antiplatelet medication.    Bilateral complete venous reflux study, 01/11/2024, shows no evidence of DVT bilaterally.  Thrombus  noted within the proximal calf of the right great saphenous vein.  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  The study also shows dilation and axial reflux of the bilateral great saphenous veins and right small saphenous vein.  No reflux noted in the left small saphenous vein.  Refluxing varicosities noted right Gator region.    Arterial duplex with ABIs performed 03/31/2021 which shows a right EDOUARD 1.02 and a left EDOUARD of 1.05.  No vascular occlusions noted.    Venous Disease Medical Necessity Documentation Initial Visit Date:  01/11/2024 Return Check Date:    Have you ever had a rupture or bleed from a varicose vein in your leg(s)?              [] Yes  [x] No   [] Yes   [] No   Have you ever been diagnosed with phlebitis, cellulitis, or inflammation in the area of the varicose veins of  your leg(s)?  [] Yes  [x] No    [] Yes   [] No   Do you have darkened or inflamed skin on your legs?   [x] Yes   [] No   [] Yes   [] No   Do you have leg swelling?     [x] Yes   [] No   [] Yes   [] No   Do you have leg pain?   [x] Yes   [] No   [] Yes   [] No   If yes, describe the type of pain?    []   Stabbing []  Radiating []  Aching   [x]  Tightness []  Throbbing               [x]  Burning []  Cramping              Do you have leg discomfort?   [x] Yes   [] No   [] Yes   [] No   If yes, describe the type of discomfort?    [x]  Heaviness [x]  Fullness   [x]  Restlessness [] Tired/Fatigued [x] Itching              Have you ever worn compression hose?   [x] Yes   [] No   [] Yes   [] No   If yes, how long?    24 YEARS       Do you elevate your legs while sitting?   [x] Yes   [] No   [] Yes   [] No   Does venous disease (varicose veins, ulcers, skin changes, leg pain/swelling) interfere with your daily life?  If yes, check activities you are limited or unable to do.    []  Shower  [x]   Walk  []  Exercise  [] Play with children/grandchildren  []  Shop [] Work [x]  Stand for any period of time [x] Sleep                               [x] Sitting for an extended period of time.           [x] Yes   [] No   [] Yes   [] No   Do you exercise/have you tried to exercise (i.e.  Walk our participate in a regular exercise routine)?  [x] Yes  [] No   [] Yes   [] No   BMI   32.26           Past Medical History:   Diagnosis Date    Allergy     Anemia     Anxiety     Arthritis     Asthma     Bell palsy     Bilateral lower extremity edema     Chronic diastolic CHF (congestive heart failure)     Depression     Diabetes mellitus, type 2     DVT (deep venous thrombosis)     right LE    Dyslipidemia     Endometriosis     Eye injury     stuck with tree branch od ?     Focal seizure 5/3/2021    GERD (gastroesophageal reflux disease)     Glaucoma     History of DVT (deep vein thrombosis) 2018    History of uterine fibroid     Hyperlipidemia     Hypertension     Invasive lobular carcinoma of right breast in female     s/p surgery, radiation, tamoxifen    Nuclear sclerosis of both eyes 2016    Obesity     Pancreas cyst     Sleep apnea     uses C pap    Supraventricular tachycardia     SVT (supraventricular tachycardia) 2019    Thyroid disease     Venous insufficiency         Past Surgical History:   Procedure Laterality Date    ABLATION, CHEMICAL SEALANT, VARICOSE VEIN Right 2024    Procedure: Right GSV VenaSeal Ablation;  Surgeon: Pepito Walker DO;  Location: [facility] OR;  Service: Peripheral Vascular;  Laterality: Right;    ABLATION, CHEMICAL SEALANT, VARICOSE VEIN Left 2024    Left GSV Venaseal Ablation performed by Dr. Zach Walker.    CATARACT EXTRACTION W/  INTRAOCULAR LENS IMPLANT Left 2023    Procedure: CEIOL OMNI OS;  Surgeon: Juan Antonio Mcintosh MD;  Location: [facility] OR;  Service: Ophthalmology;  Laterality: Left;  consider block     SECTION      CHOLECYSTECTOMY      COLONOSCOPY      CRANIOTOMY FOR EXCISION OF INTRACRANIAL TUMOR Left 2021     Procedure: CRANIOTOMY, FOR INTRACRANIAL NEOPLASM EXCISION Left craniotomy for tumor resection;  Surgeon: Hernandez Bravo MD;  Location: [facility] OR;  Service: Neurosurgery;  Laterality: Left;    CRANIOTOMY, WITH NEOPLASM EXCISION USING COMPUTER-ASSISTED NAVIGATION Left 08/14/2023    Procedure: 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, USING COMPUTER-ASSISTED NAVIGATION-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;  Surgeon: Hernandez Bravo MD;  Location: [facility] OR;  Service: Neurosurgery;  Laterality: Left;    ESOPHAGOGASTRODUODENOSCOPY N/A 02/17/2023    Procedure: EGD (ESOPHAGOGASTRODUODENOSCOPY);  Surgeon: Issac Wade MD;  Location: [facility] ENDO;  Service: Endoscopy;  Laterality: N/A;    glaucoma laser Bilateral     HERNIA REPAIR      KNEE SURGERY Right 2008    (R) knee scope; torn meniscus    MASTECTOMY, PARTIAL Right 12/07/2020    Procedure: MASTECTOMY, PARTIAL-Right with radiological marker Consent day of surgery; Neoprobe, technitium, Frozen;  Surgeon: Narda Keating MD;  Location: [facility] OR [address] [place] [address];  Service: General;  Laterality: Right;    RADIOFREQUENCY ABLATION Right 3/1/2024    Procedure: Right SSV RF Ablation;  Surgeon: Pepito Walker DO;  Location: [facility] OR;  Service: Peripheral Vascular;  Laterality: Right;    SENTINEL LYMPH NODE BIOPSY Right 12/07/2020    Procedure: BIOPSY, LYMPH NODE, SENTINEL;  Surgeon: Narda Keating MD;  [facility];  Service: General;  Laterality: Right;    TOTAL REDUCTION MAMMOPLASTY Bilateral 12/07/2020    Procedure: MAMMOPLASTY, REDUCTION-Bilateral;  Surgeon: Zack Hood MD;  [facility];  Service: Plastics;  Laterality: Bilateral;    VEIN SURGERY  2003, 2004    Rt leg x2       Social History     Tobacco Use   Smoking Status Never   Smokeless Tobacco Never         Current Outpatient Medications:     apixaban (ELIQUIS ORAL), Take 1 tablet by mouth once. Took 2 tablets yesterday & one tablet this am, Disp: , Rfl:      b complex vitamins tablet, Take 1 tablet by mouth once daily., Disp: , Rfl:     benzonatate (TESSALON) 200 MG capsule, Take 1 capsule (200 mg total) by mouth 2 (two) times a day., Disp: 30 capsule, Rfl: 0    blood sugar diagnostic Strp, Test twice daily.  Accucheck viva test strips and lancets., Disp: 300 each, Rfl: 3    calcium carb-vit D3-magnesium (CORAL CALCIUM) 250-200-125 mg-unit-mg Cap, Take 1 capsule by mouth once daily., Disp: , Rfl:     carvediloL (COREG) 12.5 MG tablet, Take 1 tablet (12.5 mg total) by mouth 2 (two) times daily with meals., Disp: 60 tablet, Rfl: 11    cloNIDine (CATAPRES) 0.1 MG tablet, Take 1 tablet (0.1 mg total) by mouth every evening. Take if blood pressure is greater than 180/100., Disp: 90 tablet, Rfl: 0    dapagliflozin propanediol (FARXIGA) 10 mg tablet, Take 1 tablet (10 mg total) by mouth once daily., Disp: 90 tablet, Rfl: 1    dorzolamide-timolol 2-0.5% (COSOPT) 22.3-6.8 mg/mL ophthalmic solution, Place 1 drop into both eyes 2 (two) times daily., Disp: , Rfl:     furosemide (LASIX) 20 MG tablet, Take 2 tablets (40 mg total) by mouth every morning., Disp: 90 tablet, Rfl: 0    glucosamine-chondroitin (OSTEO BI-FLEX) 250-200 mg Tab, Take 1 tablet by mouth once daily., Disp: , Rfl:     lancets (ACCU-CHEK SOFTCLIX LANCETS) Misc, 1 Units by Misc.(Non-Drug; Combo Route) route 2 (two) times daily., Disp: 300 each, Rfl: 3    latanoprost 0.005 % ophthalmic solution, Place 1 drop into both eyes every evening., Disp: 2.5 mL, Rfl: 12    loratadine (CLARITIN) 10 mg tablet, Take 1 tablet (10 mg total) by mouth once daily., Disp: 10 tablet, Rfl: 0    metFORMIN (GLUCOPHAGE) 500 MG tablet, TAKE 1 TABLET(500 MG) BY MOUTH DAILY WITH BREAKFAST, Disp: 90 tablet, Rfl: 0    mv-mn-vitC-gptSl-Lrh-Ehx-hc124 (AIRBORNE, ASCORBATE SODIUM,) 333-1.7 mg Chew, Take 1 each by mouth once daily., Disp: , Rfl:     NIFEdipine (ADALAT CC) 90 MG TbSR, Take 1 tablet (90 mg total) by mouth once daily., Disp: 90  tablet, Rfl: 1    nirmatrelvir-ritonavir 300 mg (150 mg x 2)-100 mg copackaged tablets (EUA), Take 3 tablets by mouth 2 (two) times daily. Each dose contains 2 nirmatrelvir (pink tablets) and 1 ritonavir (white tablet). Take all 3 tablets together, Disp: 30 tablet, Rfl: 0    phenoL (CHLORASEPTIC THROAT SPRAY) 1.4 % SprA, by Mucous Membrane route 2 (two) times a day., Disp: 177 mL, Rfl: 0    diazePAM (VALIUM) 5 MG tablet, Take 1 tablet (5 mg total) by mouth once. Take 1 hour prior to imaging. for 1 dose, Disp: 1 tablet, Rfl: 0    pantoprazole (PROTONIX) 40 MG tablet, Take 1 tablet (40 mg total) by mouth 2 (two) times daily., Disp: 60 tablet, Rfl: 0  No current facility-administered medications for this visit.    Facility-Administered Medications Ordered in Other Visits:     LIDOcaine HCL 10 mg/ml (1%) injection 2 mL, 2 mL, Intradermal, Once, Pepito Walker, DO    Review of Systems   Constitutional:  Negative for activity change, chills, diaphoresis, fatigue and fever.   Respiratory:  Negative for cough and shortness of breath.    Cardiovascular:  Positive for leg swelling. Negative for chest pain and claudication.        Hyperpigmentation LE   Gastrointestinal:  Negative for nausea and vomiting.   Musculoskeletal:  Positive for leg pain. Negative for joint swelling.   Integumentary:  Negative for rash and wound.   Neurological:  Negative for weakness and numbness.          II. PHYSICAL EXAM     Physical Exam  Constitutional:       General: She is awake. She is not in acute distress.     Appearance: Normal appearance. She is overweight. She is not ill-appearing or toxic-appearing.   HENT:      Head: Normocephalic and atraumatic.   Eyes:      Extraocular Movements: Extraocular movements intact.      Conjunctiva/sclera: Conjunctivae normal.      Pupils: Pupils are equal, round, and reactive to light.   Neck:      Vascular: No carotid bruit or JVD.   Cardiovascular:      Rate and Rhythm: Normal rate and regular rhythm.       Pulses:           Dorsalis pedis pulses are detected w/ Doppler on the right side and detected w/ Doppler on the left side.        Posterior tibial pulses are detected w/ Doppler on the right side and detected w/ Doppler on the left side.      Heart sounds: Murmur heard.      Systolic murmur is present with a grade of 2/6.   Pulmonary:      Effort: Pulmonary effort is normal. No respiratory distress.      Breath sounds: No stridor. No wheezing, rhonchi or rales.   Musculoskeletal:         General: No swelling, tenderness or deformity.      Right lower le+ Pitting Edema present.      Left lower le+ Pitting Edema present.      Comments: Pitting edema of the bilateral extremities along with hyperpigmentation scarring from previous venous ulcerations of the right medial Gator region.  No evidence of acute cellulitis or ulcerations.   Feet:      Comments: Triphasic hand-held dopplerable pulses of the bilateral dorsalis pedis and posterior tibial arteries.  Skin:     General: Skin is warm.      Capillary Refill: Capillary refill takes less than 2 seconds.      Coloration: Skin is not ashen.      Findings: No bruising, erythema, lesion, rash or wound.   Neurological:      Mental Status: She is alert and oriented to person, place, and time.      Motor: No weakness.   Psychiatric:         Speech: Speech normal.         Behavior: Behavior normal. Behavior is cooperative.         Reticular/Spider veins noted:  RLE: anterior calf and medial calf  LLE: anterior calf and medial calf    Varicose veins noted:  RLE: anterior calf and medial calf  LLE:  none    CEAP Classification  Clinical Signs: Class 5 - Skin changes as defined above with healed ulceration  Etiologic Classification: Primary  Anatomic distribution: Superficial  Pathophysiologic dysfunction: Reflux       Venous Clinical Severity Score  Pain:2=Daily, moderate activity limitation, occasional analgesics  Varicose Veins: 2=Multiple. GS varicose veins  confined to calf or thigh  Venous Edema: 2=Afternoon edema, above ankle  Pigmentation: 2=Diffuse over most of gaiter distribution (lower 1/3) or recent pigmentation (purple)  Inflammation: 0=None  Induration: 1=Focal, circummalleolar (< 5 cm)  Number of Active Ulcers: 0=0  Active Ulceration, Duration: 0=None  Active Ulcer Size: 0=None  Compressive Therapy: 3=Full compliance, stockings + elevation  Total Score: 12       III. ASSESSMENT & PLAN (MEDICAL DECISION MAKING)     1. Venous insufficiency    2. Hyperpigmentation of skin    3. Edema, lower extremity        Assessment/Diagnosis and Plan:  The patient is doing well post ablation and encouraged to wear knee-high compression along with daily leg exercise and leg elevation when appropriate.    Discontinue anticoagulation today.    Follow-up in 2 months for three-month post ablation visit.  No ultrasound unless she is having trouble..       Pepito Walker, DO

## 2024-04-17 ENCOUNTER — OFFICE VISIT (OUTPATIENT)
Dept: FAMILY MEDICINE | Facility: CLINIC | Age: 74
End: 2024-04-17
Payer: MEDICARE

## 2024-04-17 VITALS
OXYGEN SATURATION: 96 % | HEART RATE: 85 BPM | BODY MASS INDEX: 32.57 KG/M2 | TEMPERATURE: 99 F | HEIGHT: 62 IN | WEIGHT: 177 LBS | RESPIRATION RATE: 13 BRPM | DIASTOLIC BLOOD PRESSURE: 70 MMHG | SYSTOLIC BLOOD PRESSURE: 136 MMHG

## 2024-04-17 DIAGNOSIS — D70.9 NEUTROPENIA, UNSPECIFIED TYPE: ICD-10-CM

## 2024-04-17 DIAGNOSIS — E11.51 TYPE 2 DIABETES MELLITUS WITH DIABETIC PERIPHERAL ANGIOPATHY WITHOUT GANGRENE, WITHOUT LONG-TERM CURRENT USE OF INSULIN: ICD-10-CM

## 2024-04-17 DIAGNOSIS — F33.9 DEPRESSION, RECURRENT: ICD-10-CM

## 2024-04-17 DIAGNOSIS — G40.109 FOCAL MOTOR SEIZURE: ICD-10-CM

## 2024-04-17 DIAGNOSIS — I47.20 VENTRICULAR TACHYCARDIA: ICD-10-CM

## 2024-04-17 DIAGNOSIS — Z80.3 FAMILY HISTORY OF BREAST CANCER IN FEMALE: ICD-10-CM

## 2024-04-17 DIAGNOSIS — I50.32 CHRONIC HEART FAILURE WITH PRESERVED EJECTION FRACTION: Primary | ICD-10-CM

## 2024-04-17 DIAGNOSIS — C50.611 MALIGNANT NEOPLASM OF AXILLARY TAIL OF RIGHT FEMALE BREAST, UNSPECIFIED ESTROGEN RECEPTOR STATUS: ICD-10-CM

## 2024-04-17 DIAGNOSIS — I10 ESSENTIAL HYPERTENSION: ICD-10-CM

## 2024-04-17 DIAGNOSIS — E11.9 TYPE 2 DIABETES MELLITUS WITHOUT COMPLICATION, UNSPECIFIED WHETHER LONG TERM INSULIN USE: ICD-10-CM

## 2024-04-17 DIAGNOSIS — F32.1 MAJOR DEPRESSIVE DISORDER, SINGLE EPISODE, MODERATE: ICD-10-CM

## 2024-04-17 DIAGNOSIS — I82.511 CHRONIC DEEP VEIN THROMBOSIS (DVT) OF FEMORAL VEIN OF RIGHT LOWER EXTREMITY: ICD-10-CM

## 2024-04-17 PROCEDURE — 99214 OFFICE O/P EST MOD 30 MIN: CPT | Mod: ,,, | Performed by: FAMILY MEDICINE

## 2024-04-17 RX ORDER — NIFEDIPINE 90 MG/1
90 TABLET, FILM COATED, EXTENDED RELEASE ORAL DAILY
Qty: 90 TABLET | Refills: 1 | Status: SHIPPED | OUTPATIENT
Start: 2024-04-17

## 2024-04-17 NOTE — Clinical Note
Discussed care gaps with pt. Pt reports had eye exam last month with a f/u tomorrow at [facility]. RSV/covid vaccines not available at clinic

## 2024-04-17 NOTE — PROGRESS NOTES
Eugenie Coates is a 73 y.o. female seen today for initial visit.  She has a past medical history of diabetes hyperlipidemia hypertension obesity what sounds like a malignant meningioma status post excision chronic diastolic congestive heart failure valvular disease and reflux disease.  Patient has not had lab work in some time and I have asked her to come in fasting to the clinic for follow-up blood work.  Patient also needs follow-up with Cardiology and due to history of breast cancer on the right a problem-solving mammogram ordered at her usual facility on the left.   Currently the patient has no new complaints and is overall doing well.  Patient is status post recent venous surgery and is recovering well.  She suffers from chronic depression which she reports his mostly secondary to moving to the area from [place] in 2005.  She reports the symptoms are stable.    Past Medical History:   Diagnosis Date    Allergy     Anemia     Anxiety     Arthritis     Asthma     Bell palsy     Bilateral lower extremity edema     Chronic diastolic CHF (congestive heart failure)     Depression     Diabetes mellitus, type 2     DVT (deep venous thrombosis)     right LE    Dyslipidemia     Endometriosis     Eye injury 2014    stuck with tree branch od ?     Focal seizure 5/3/2021    GERD (gastroesophageal reflux disease)     Glaucoma     History of DVT (deep vein thrombosis) 1/23/2018    History of uterine fibroid     Hyperlipidemia     Hypertension     Invasive lobular carcinoma of right breast in female     s/p surgery, radiation, tamoxifen    Nuclear sclerosis of both eyes 9/27/2016    Obesity     Pancreas cyst     Sleep apnea     uses C pap    Supraventricular tachycardia     SVT (supraventricular tachycardia) 05/2019    Thyroid disease     Venous insufficiency      Family History   Problem Relation Name Age of Onset    Diabetes Mother Sara Vick     No Known Problems Father      No Known Problems Sister      No Known  Problems Brother      Colon cancer Maternal Aunt      No Known Problems Maternal Uncle      No Known Problems Paternal Aunt      No Known Problems Paternal Uncle      No Known Problems Maternal Grandmother      No Known Problems Maternal Grandfather      No Known Problems Paternal Grandmother      No Known Problems Paternal Grandfather      Amblyopia Neg Hx      Blindness Neg Hx      Cancer Neg Hx      Cataracts Neg Hx      Glaucoma Neg Hx      Hypertension Neg Hx      Macular degeneration Neg Hx      Retinal detachment Neg Hx      Strabismus Neg Hx      Stroke Neg Hx      Thyroid disease Neg Hx      Celiac disease Neg Hx      Colon polyps Neg Hx      Esophageal cancer Neg Hx      Inflammatory bowel disease Neg Hx      Irritable bowel syndrome Neg Hx      Liver cancer Neg Hx      Liver disease Neg Hx      Rectal cancer Neg Hx      Stomach cancer Neg Hx      Ulcerative colitis Neg Hx      Cystic fibrosis Neg Hx      Crohn's disease Neg Hx      Hemochromatosis Neg Hx      Cirrhosis Neg Hx       Current Outpatient Medications on File Prior to Visit   Medication Sig Dispense Refill    b complex vitamins tablet Take 1 tablet by mouth once daily.      blood sugar diagnostic Strp Test twice daily.  Accucheck viva test strips and lancets. 300 each 3    calcium carb-vit D3-magnesium (CORAL CALCIUM) 250-200-125 mg-unit-mg Cap Take 1 capsule by mouth once daily.      glucosamine-chondroitin (OSTEO BI-FLEX) 250-200 mg Tab Take 1 tablet by mouth once daily.      lancets (ACCU-CHEK SOFTCLIX LANCETS) Misc 1 Units by Misc.(Non-Drug; Combo Route) route 2 (two) times daily. 300 each 3    latanoprost 0.005 % ophthalmic solution Place 1 drop into both eyes every evening. 2.5 mL 12    mv-[id number] (A[place]NE, ASCORBATE SODIUM,) 333-1.7 mg Chew Take 1 each by mouth once daily.      phenoL (CHLORASEPTIC THROAT SPRAY) 1.4 % SprA by Mucous Membrane route 2 (two) times a day. 177 mL 0    [DISCONTINUED] NIFEdipine (ADALAT CC)  90 MG TbSR Take 1 tablet (90 mg total) by mouth once daily. 90 tablet 1    diazePAM (VALIUM) 5 MG tablet Take 1 tablet (5 mg total) by mouth once. Take 1 hour prior to imaging. for 1 dose 1 tablet 0    dorzolamide-timolol 2-0.5% (COSOPT) 22.3-6.8 mg/mL ophthalmic solution Place 1 drop into both eyes 2 (two) times daily.      pantoprazole (PROTONIX) 40 MG tablet Take 1 tablet (40 mg total) by mouth 2 (two) times daily. 60 tablet 0    [DISCONTINUED] apixaban (ELIQUIS ORAL) Take 1 tablet by mouth once. Took 2 tablets yesterday & one tablet this am (Patient not taking: Reported on 4/17/2024)      [DISCONTINUED] benzonatate (TESSALON) 200 MG capsule Take 1 capsule (200 mg total) by mouth 2 (two) times a day. (Patient not taking: Reported on 4/17/2024) 30 capsule 0    [DISCONTINUED] carvediloL (COREG) 12.5 MG tablet Take 1 tablet (12.5 mg total) by mouth 2 (two) times daily with meals. (Patient not taking: Reported on 4/17/2024) 60 tablet 11    [DISCONTINUED] cloNIDine (CATAPRES) 0.1 MG tablet Take 1 tablet (0.1 mg total) by mouth every evening. Take if blood pressure is greater than 180/100. (Patient not taking: Reported on 4/17/2024) 90 tablet 0    [DISCONTINUED] dapagliflozin propanediol (FARXIGA) 10 mg tablet Take 1 tablet (10 mg total) by mouth once daily. (Patient not taking: Reported on 4/17/2024) 90 tablet 1    [DISCONTINUED] furosemide (LASIX) 20 MG tablet Take 2 tablets (40 mg total) by mouth every morning. (Patient not taking: Reported on 4/17/2024) 90 tablet 0    [DISCONTINUED] loratadine (CLARITIN) 10 mg tablet Take 1 tablet (10 mg total) by mouth once daily. (Patient not taking: Reported on 4/17/2024) 10 tablet 0    [DISCONTINUED] metFORMIN (GLUCOPHAGE) 500 MG tablet TAKE 1 TABLET(500 MG) BY MOUTH DAILY WITH BREAKFAST (Patient not taking: Reported on 4/17/2024) 90 tablet 0    [DISCONTINUED] nirmatrelvir-ritonavir 300 mg (150 mg x 2)-100 mg copackaged tablets (EUA) Take 3 tablets by mouth 2 (two) times daily.  Each dose contains 2 nirmatrelvir (pink tablets) and 1 ritonavir (white tablet). Take all 3 tablets together (Patient not taking: Reported on 4/17/2024) 30 tablet 0    [DISCONTINUED] omeprazole (PRILOSEC OTC) 20 MG tablet Take 1 tablet (20 mg total) by mouth once daily. 90 tablet 3     Current Facility-Administered Medications on File Prior to Visit   Medication Dose Route Frequency Provider Last Rate Last Admin    LIDOcaine HCL 10 mg/ml (1%) injection 2 mL  2 mL Intradermal Once Pepito Walker DO         Immunization History   Administered Date(s) Administered    COVID-19 MRNA, LN-S PF (MODERNA HALF 0.25 ML DOSE) 07/05/2022    COVID-19 Vaccine 08/24/2021, 02/07/2022    COVID-19, MRNA, LN-S, PF (MODERNA FULL 0.5 ML DOSE) 07/27/2021    COVID-19, mRNA, LNP-S, bivalent booster, PF (Moderna Omicron)12 + YEARS 09/27/2022    Influenza 11/25/2016    Influenza (FLUAD) - Trivalent - Adjuvanted - PF (65+) 08/22/2019    Influenza - High Dose - PF (65 years and older) 10/02/2017, 09/19/2018    Influenza - Quadrivalent 11/03/2014, 11/10/2015    Influenza - Quadrivalent - High Dose - PF (65 years and older) 08/18/2020, 08/13/2021    Pneumococcal Conjugate - 13 Valent 09/06/2016    Pneumococcal Polysaccharide - 23 Valent 09/18/2017    Tdap 11/30/2016    Zoster 10/06/2016, 11/25/2016    Zoster Recombinant 05/24/2019, 08/24/2019       Review of Systems   Constitutional:  Negative for fever, malaise/fatigue and weight loss.   Respiratory:  Negative for shortness of breath.    Cardiovascular:  Positive for leg swelling. Negative for chest pain and palpitations.   Gastrointestinal:  Negative for nausea and vomiting.   Psychiatric/Behavioral:  Negative for depression.         Vitals:    04/17/24 1355   BP: 136/70   Pulse: 85   Resp: 13   Temp: 98.5 °F (36.9 °C)       Physical Exam  Vitals reviewed.   Constitutional:       Appearance: Normal appearance.   HENT:      Head: Normocephalic.   Eyes:      Extraocular Movements: Extraocular  movements intact.      Conjunctiva/sclera: Conjunctivae normal.      Pupils: Pupils are equal, round, and reactive to light.   Neck:      Thyroid: No thyroid mass or thyromegaly.   Cardiovascular:      Rate and Rhythm: Normal rate and regular rhythm.      Heart sounds: Murmur heard.      Systolic murmur is present with a grade of 3/6.      No gallop.   Pulmonary:      Effort: Pulmonary effort is normal. No respiratory distress.      Breath sounds: Normal breath sounds. No wheezing or rales.   Skin:     General: Skin is warm and dry.      Coloration: Skin is not jaundiced or pale.   Neurological:      Mental Status: She is alert.   Psychiatric:         Mood and Affect: Mood normal.         Behavior: Behavior normal.         Thought Content: Thought content normal.         Judgment: Judgment normal.          Assessment and Plan  1. Chronic heart failure with preserved ejection fraction  -     Ambulatory referral/consult to Cardiology; Future; Expected date: 04/24/2024    2. Family history of breast cancer in female  -     Mammo Digital Diagnostic Left; Future; Expected date: 04/17/2024    3. Malignant neoplasm of axillary tail of right female breast, unspecified estrogen receptor status  -     Mammo Digital Diagnostic Left; Future; Expected date: 04/17/2024    4. Type 2 diabetes mellitus without complication, unspecified whether long term insulin use  -     Hemoglobin A1C; Future; Expected date: 04/18/2024    5. Essential hypertension  -     CBC Auto Differential; Future; Expected date: 04/18/2024  -     Comprehensive Metabolic Panel; Future; Expected date: 04/18/2024  -     Lipid Panel; Future; Expected date: 04/18/2024  -     NIFEdipine (ADALAT CC) 90 MG TbSR; Take 1 tablet (90 mg total) by mouth once daily.  Dispense: 90 tablet; Refill: 1    6. Type 2 diabetes mellitus with diabetic peripheral angiopathy without gangrene, without long-term current use of insulin    7. Major depressive disorder, single episode,  moderate    8. Ventricular tachycardia    9. Focal motor seizure    10. Chronic deep vein thrombosis (DVT) of femoral vein of right lower extremity    11. Neutropenia, unspecified type    12. Depression, recurrent             Return to clinic in 3 months or as needed.    Health Maintenance Topics with due status: Not Due       Topic Last Completion Date    TETANUS VACCINE 11/30/2016    Colorectal Cancer Screening 11/01/2023    Diabetes Urine Screening 12/26/2023    DEXA Scan 01/03/2024    Mammogram 01/10/2024    Foot Exam 03/22/2024

## 2024-04-18 ENCOUNTER — LAB VISIT (OUTPATIENT)
Dept: LAB | Facility: CLINIC | Age: 74
End: 2024-04-18
Payer: MEDICARE

## 2024-04-18 DIAGNOSIS — I10 ESSENTIAL HYPERTENSION: ICD-10-CM

## 2024-04-18 DIAGNOSIS — D70.9 NEUTROPENIA, UNSPECIFIED TYPE: ICD-10-CM

## 2024-04-18 DIAGNOSIS — R92.8 ABNORMAL MAMMOGRAM OF LEFT BREAST: Primary | ICD-10-CM

## 2024-04-18 DIAGNOSIS — E11.9 TYPE 2 DIABETES MELLITUS WITHOUT COMPLICATION, UNSPECIFIED WHETHER LONG TERM INSULIN USE: ICD-10-CM

## 2024-04-18 LAB
ALBUMIN SERPL BCP-MCNC: 4 G/DL (ref 3.5–5)
ALBUMIN/GLOB SERPL: 1 {RATIO}
ALP SERPL-CCNC: 144 U/L (ref 55–142)
ALT SERPL W P-5'-P-CCNC: 23 U/L (ref 13–56)
ANION GAP SERPL CALCULATED.3IONS-SCNC: 11 MMOL/L (ref 7–16)
AST SERPL W P-5'-P-CCNC: 20 U/L (ref 15–37)
BASOPHILS # BLD AUTO: 0.04 K/UL (ref 0–0.2)
BASOPHILS NFR BLD AUTO: 0.5 % (ref 0–1)
BILIRUB SERPL-MCNC: 0.6 MG/DL (ref ?–1.2)
BUN SERPL-MCNC: 16 MG/DL (ref 7–18)
BUN/CREAT SERPL: 17 (ref 6–20)
CALCIUM SERPL-MCNC: 9.8 MG/DL (ref 8.5–10.1)
CHLORIDE SERPL-SCNC: 109 MMOL/L (ref 98–107)
CHOLEST SERPL-MCNC: 242 MG/DL (ref 0–200)
CHOLEST/HDLC SERPL: 4.2 {RATIO}
CO2 SERPL-SCNC: 27 MMOL/L (ref 21–32)
CREAT SERPL-MCNC: 0.96 MG/DL (ref 0.55–1.02)
DIFFERENTIAL METHOD BLD: ABNORMAL
EGFR (NO RACE VARIABLE) (RUSH/TITUS): 63 ML/MIN/1.73M2
EOSINOPHIL # BLD AUTO: 0.15 K/UL (ref 0–0.5)
EOSINOPHIL NFR BLD AUTO: 1.9 % (ref 1–4)
ERYTHROCYTE [DISTWIDTH] IN BLOOD BY AUTOMATED COUNT: 13.5 % (ref 11.5–14.5)
EST. AVERAGE GLUCOSE BLD GHB EST-MCNC: 134 MG/DL
GLOBULIN SER-MCNC: 3.9 G/DL (ref 2–4)
GLUCOSE SERPL-MCNC: 136 MG/DL (ref 74–106)
HBA1C MFR BLD HPLC: 6.3 % (ref 4.5–6.6)
HCT VFR BLD AUTO: 45.1 % (ref 38–47)
HDLC SERPL-MCNC: 57 MG/DL (ref 40–60)
HGB BLD-MCNC: 14 G/DL (ref 12–16)
IMM GRANULOCYTES # BLD AUTO: 0.03 K/UL (ref 0–0.04)
IMM GRANULOCYTES NFR BLD: 0.4 % (ref 0–0.4)
LDLC SERPL CALC-MCNC: 161 MG/DL
LDLC/HDLC SERPL: 2.8 {RATIO}
LYMPHOCYTES # BLD AUTO: 1.75 K/UL (ref 1–4.8)
LYMPHOCYTES NFR BLD AUTO: 22.4 % (ref 27–41)
MCH RBC QN AUTO: 27.2 PG (ref 27–31)
MCHC RBC AUTO-ENTMCNC: 31 G/DL (ref 32–36)
MCV RBC AUTO: 87.7 FL (ref 80–96)
MONOCYTES # BLD AUTO: 0.51 K/UL (ref 0–0.8)
MONOCYTES NFR BLD AUTO: 6.5 % (ref 2–6)
MPC BLD CALC-MCNC: 11.1 FL (ref 9.4–12.4)
NEUTROPHILS # BLD AUTO: 5.33 K/UL (ref 1.8–7.7)
NEUTROPHILS NFR BLD AUTO: 68.3 % (ref 53–65)
NONHDLC SERPL-MCNC: 185 MG/DL
NRBC # BLD AUTO: 0 X10E3/UL
NRBC, AUTO (.00): 0 %
PLATELET # BLD AUTO: 281 K/UL (ref 150–400)
POTASSIUM SERPL-SCNC: 4.1 MMOL/L (ref 3.5–5.1)
PROT SERPL-MCNC: 7.9 G/DL (ref 6.4–8.2)
RBC # BLD AUTO: 5.14 M/UL (ref 4.2–5.4)
SODIUM SERPL-SCNC: 143 MMOL/L (ref 136–145)
TRIGL SERPL-MCNC: 119 MG/DL (ref 35–150)
TSH SERPL DL<=0.005 MIU/L-ACNC: 0.44 UIU/ML (ref 0.36–3.74)
VLDLC SERPL-MCNC: 24 MG/DL
WBC # BLD AUTO: 7.81 K/UL (ref 4.5–11)

## 2024-04-18 PROCEDURE — 85025 COMPLETE CBC W/AUTO DIFF WBC: CPT | Mod: ,,, | Performed by: CLINICAL MEDICAL LABORATORY

## 2024-04-18 PROCEDURE — 83036 HEMOGLOBIN GLYCOSYLATED A1C: CPT | Mod: ,,, | Performed by: CLINICAL MEDICAL LABORATORY

## 2024-04-18 PROCEDURE — 80061 LIPID PANEL: CPT | Mod: ,,, | Performed by: CLINICAL MEDICAL LABORATORY

## 2024-04-18 PROCEDURE — 84443 ASSAY THYROID STIM HORMONE: CPT | Mod: ,,, | Performed by: CLINICAL MEDICAL LABORATORY

## 2024-04-18 PROCEDURE — 80053 COMPREHEN METABOLIC PANEL: CPT | Mod: ,,, | Performed by: CLINICAL MEDICAL LABORATORY

## 2024-04-19 ENCOUNTER — TELEPHONE (OUTPATIENT)
Dept: FAMILY MEDICINE | Facility: CLINIC | Age: 74
End: 2024-04-19
Payer: MEDICARE

## 2024-04-19 NOTE — TELEPHONE ENCOUNTER
----- Message from Jarrell Silver MD sent at 4/19/2024  8:51 AM CDT -----  Need to see in  1 week please  abnl results     0909 Pt is scheduled to come in on 04/30/24

## 2024-04-22 ENCOUNTER — TELEPHONE (OUTPATIENT)
Dept: FAMILY MEDICINE | Facility: CLINIC | Age: 74
End: 2024-04-22
Payer: MEDICARE

## 2024-04-22 ENCOUNTER — PATIENT OUTREACH (OUTPATIENT)
Dept: ADMINISTRATIVE | Facility: HOSPITAL | Age: 74
End: 2024-04-22
Payer: MEDICARE

## 2024-04-22 DIAGNOSIS — R74.8 ELEVATED LIVER ENZYMES: Primary | ICD-10-CM

## 2024-04-22 NOTE — LETTER
AUTHORIZATION FOR RELEASE OF   CONFIDENTIAL INFORMATION    Dear [facility],    We are seeing Eugenie Coates, date of birth 1950, in the clinic at [facility]. Omar Horn MD is the patient's PCP. Eugenie Coates has an outstanding lab/procedure at the time we reviewed her chart. In order to help keep her health information updated, she has authorized us to request the following medical record(s):        (  )  MAMMOGRAM                                      (  )  COLONOSCOPY      (  )  PAP SMEAR                                          (  )  OUTSIDE LAB RESULTS     (  )  DEXA SCAN                                          ( XX )  EYE EXAM            (  )  FOOT EXAM                                          (  )  ENTIRE RECORD     (  )  OUTSIDE IMMUNIZATIONS                 (  )  _______________         Please fax records to [facility], [place], Edward D, MD, [phone]     If you have any questions, please contact [facility] at [phone].           Patient Name: Eugenie Coates  : 1950  Patient Phone #: [phone]

## 2024-04-22 NOTE — PROGRESS NOTES
Population Health Chart Review & Patient Outreach Details      Further Action Needed If Patient Returns Outreach:        24 CYNDI sent to [facility] for recent diabetic eye exam Tc, Lpn-CCC    Updates Requested / Reviewed:     [x]  Care Everywhere    [x]     []  External Sources ([facility], [facility], [facility], etc.)    [] [facility]   [] [facility]   [] Other:    [x]  Care Team Updated   []  Removed  or Duplicate Orders   []  Immunization Reconciliation Completed / Queried    [] [place]   [] [place]   [] [place]   [] [place]      Health Maintenance Topics Addressed and Outreach Outcomes / Actions Taken:             Breast Cancer Screening []  Mammogram Order Placed    []  Mammogram Screening Scheduled    []  External Records Requested & Care Team Updated if Applicable    []  External Records Uploaded & Care Team Updated if Applicable    []  Pt Declined Scheduling Mammogram    []  Pt Will Schedule with External Provider / Order Routed & Care Team Updated if Applicable              Cervical Cancer Screening []  Pap Smear Scheduled in Primary Care or OBGYN    []  External Records Requested & Care Team Updated if Applicable       []  External Records Uploaded, Care Team Updated, & History Updated if Applicable    []  Patient Declined Scheduling Pap Smear    []  Patient Will Schedule with External Provider & Care Team Updated if Applicable                  Colorectal Cancer Screening []  Colonoscopy Case Request / Referral / Home Test Order Placed    []  External Records Requested & Care Team Updated if Applicable    []  External Records Uploaded, Care Team Updated, & History Updated if Applicable    []  Patient Declined Completing Colon Cancer Screening    []  Patient Will Schedule with External Provider & Care Team Updated if Applicable    []  Fit Kit Mailed (add the SmartPhrase under additional notes)    []  Reminded Patient to Complete Home Test                Diabetic Eye Exam []  Eye Exam Screening Order  Placed    []  Eye Camera Scheduled or Optometry/Ophthalmology Referral Placed    [x]  External Records Requested & Care Team Updated if Applicable    []  External Records Uploaded, Care Team Updated, & History Updated if Applicable    []  Patient Declined Scheduling Eye Exam    []  Patient Will Schedule with External Provider & Care Team Updated if Applicable             Blood Pressure Control []  Primary Care Follow Up Visit Scheduled     []  Remote Blood Pressure Reading Captured    []  Patient Declined Remote Reading or Scheduling Appt - Escalated to PCP    []  Patient Will Call Back or Send Portal Message with Reading                 HbA1c & Other Labs []  Overdue Lab(s) Ordered    []  Overdue Lab(s) Scheduled    []  External Records Uploaded & Care Team Updated if Applicable    []  Primary Care Follow Up Visit Scheduled     []  Reminded Patient to Complete A1c Home Test    []  Patient Declined Scheduling Labs or Will Call Back to Schedule    []  Patient Will Schedule with External Provider / Order Routed, & Care Team Updated if Applicable           Primary Care Appointment []  Primary Care Appt Scheduled    []  Patient Declined Scheduling or Will Call Back to Schedule    []  Pt Established with External Provider, Updated Care Team, & Informed Pt to Notify Payor if Applicable           Medication Adherence /    Statin Use []  Primary Care Appointment Scheduled    []  Patient Reminded to  Prescription    []  Patient Declined, Provider Notified if Needed    []  Sent Provider Message to Review to Evaluate Pt for Statin, Add Exclusion Dx Codes, Document   Exclusion in Problem List, Change Statin Intensity Level to Moderate or High Intensity if Applicable                Osteoporosis Screening []  Dexa Order Placed    []  Dexa Appointment Scheduled    []  External Records Requested & Care Team Updated    []  External Records Uploaded, Care Team Updated, & History Updated if Applicable    []  Patient Declined  Scheduling Dexa or Will Call Back to Schedule    []  Patient Will Schedule with External Provider / Order Routed & Care Team Updated if Applicable       Additional Notes:

## 2024-04-22 NOTE — TELEPHONE ENCOUNTER
Pt notified that her A1c is good but Dr Horn recommends an OV for further discuss chol. She voiced understanding. Also, she was notified that her liver enzyme was elevated and Dr Horn recommends a fasting Fract. Alk Phos. She voiced understanding and plans to rtc for lab. She was transferred to scheduling.

## 2024-04-22 NOTE — TELEPHONE ENCOUNTER
----- Message from Omar Horn MD sent at 4/19/2024  7:59 AM CDT -----  Good A1c but office visit for LDL cholesterol.  Patient will also need a fasting alkaline phosphatase

## 2024-04-25 ENCOUNTER — HOSPITAL ENCOUNTER (OUTPATIENT)
Dept: RADIOLOGY | Facility: HOSPITAL | Age: 74
Discharge: HOME | End: 2024-04-25
Attending: FAMILY MEDICINE
Payer: MEDICARE

## 2024-04-25 DIAGNOSIS — R92.8 ABNORMAL MAMMOGRAM OF LEFT BREAST: ICD-10-CM

## 2024-04-25 PROCEDURE — 76642 ULTRASOUND BREAST LIMITED: CPT | Mod: 26,LT,, | Performed by: RADIOLOGY

## 2024-04-25 PROCEDURE — 77061 BREAST TOMOSYNTHESIS UNI: CPT | Mod: 26,LT,, | Performed by: RADIOLOGY

## 2024-04-25 PROCEDURE — 77065 DX MAMMO INCL CAD UNI: CPT | Mod: 26,LT,, | Performed by: RADIOLOGY

## 2024-04-25 PROCEDURE — 77061 BREAST TOMOSYNTHESIS UNI: CPT | Mod: TC,LT

## 2024-04-25 PROCEDURE — 77065 DX MAMMO INCL CAD UNI: CPT | Mod: TC,LT

## 2024-04-25 PROCEDURE — 76642 ULTRASOUND BREAST LIMITED: CPT | Mod: TC,LT

## 2024-04-26 ENCOUNTER — TELEPHONE (OUTPATIENT)
Dept: FAMILY MEDICINE | Facility: CLINIC | Age: 74
End: 2024-04-26
Payer: MEDICARE

## 2024-04-26 DIAGNOSIS — R92.8 FOLLOW-UP EXAMINATION OF ABNORMAL MAMMOGRAM: Primary | ICD-10-CM

## 2024-04-26 NOTE — TELEPHONE ENCOUNTER
Patient notified that her ultrasound report is consistent with a benign process and she voiced understanding.

## 2024-04-26 NOTE — TELEPHONE ENCOUNTER
----- Message from Omar Horn MD sent at 4/25/2024 11:57 AM CDT -----  Ultrasound report is consistent with a benign process

## 2024-04-30 ENCOUNTER — TELEPHONE (OUTPATIENT)
Dept: FAMILY MEDICINE | Facility: CLINIC | Age: 74
End: 2024-04-30
Payer: MEDICARE

## 2024-04-30 DIAGNOSIS — R74.8 ELEVATED LIVER ENZYMES: Primary | ICD-10-CM

## 2024-04-30 NOTE — TELEPHONE ENCOUNTER
Pt aware bone scan is scheduled for 5/10/24 at 9:00 am and liver us on 5/10/24 at 10:00 am, advised to be NPO prior to imaging, she voiced understanding and agreed.

## 2024-04-30 NOTE — TELEPHONE ENCOUNTER
Pt notified that liver and bone sub fract are elevated and that Dr Horn recommended a liver us and bone scan, she voiced understanding and agreed.

## 2024-04-30 NOTE — TELEPHONE ENCOUNTER
----- Message from Omar Horn MD sent at 4/29/2024  5:20 PM CDT -----  Patient has elevated liver and bone sub fractions.  I recommend a bone scan and liver ultrasound.

## 2024-05-02 ENCOUNTER — OFFICE VISIT (OUTPATIENT)
Dept: FAMILY MEDICINE | Facility: CLINIC | Age: 74
End: 2024-05-02
Payer: MEDICARE

## 2024-05-02 VITALS
OXYGEN SATURATION: 97 % | BODY MASS INDEX: 32.94 KG/M2 | WEIGHT: 179 LBS | DIASTOLIC BLOOD PRESSURE: 70 MMHG | HEART RATE: 89 BPM | HEIGHT: 62 IN | SYSTOLIC BLOOD PRESSURE: 134 MMHG | RESPIRATION RATE: 18 BRPM | TEMPERATURE: 99 F

## 2024-05-02 DIAGNOSIS — E78.5 HYPERLIPIDEMIA, UNSPECIFIED HYPERLIPIDEMIA TYPE: Primary | ICD-10-CM

## 2024-05-02 DIAGNOSIS — E11.9 TYPE 2 DIABETES MELLITUS WITHOUT COMPLICATION, UNSPECIFIED WHETHER LONG TERM INSULIN USE: ICD-10-CM

## 2024-05-02 PROCEDURE — 99214 OFFICE O/P EST MOD 30 MIN: CPT | Mod: ,,, | Performed by: FAMILY MEDICINE

## 2024-05-02 RX ORDER — ATORVASTATIN CALCIUM 40 MG/1
40 TABLET, FILM COATED ORAL DAILY
Qty: 90 TABLET | Refills: 3 | Status: SHIPPED | OUTPATIENT
Start: 2024-05-02 | End: 2025-05-02

## 2024-05-02 NOTE — PROGRESS NOTES
Eugenie Coates is a 74 y.o. female seen today for lab follow-up.  Unfortunately her LDL cholesterol is markedly elevated.  Patient had been on Crestor in the past and this did cause a bad taste in her mouth.  We discussed a trial of atorvastatin instead and a low-fat diet.  Her alkaline phosphatase is also elevated and a subsequent fractionated alkaline phosphatase did have elevated sub fractions in both the liver and bone areas.  Already, a nuclear bone scan has been ordered as well as a liver ultrasound.      Past Medical History:   Diagnosis Date    Allergy     Anemia     Anxiety     Arthritis     Asthma     Bell palsy     Bilateral lower extremity edema     Chronic diastolic CHF (congestive heart failure)     Depression     Diabetes mellitus, type 2     DVT (deep venous thrombosis)     right LE    Dyslipidemia     Endometriosis     Eye injury 2014    stuck with tree branch od ?     Focal seizure 5/3/2021    GERD (gastroesophageal reflux disease)     Glaucoma     History of DVT (deep vein thrombosis) 1/23/2018    History of uterine fibroid     Hyperlipidemia     Hypertension     Invasive lobular carcinoma of right breast in female     s/p surgery, radiation, tamoxifen    Nuclear sclerosis of both eyes 9/27/2016    Obesity     Pancreas cyst     Sleep apnea     uses C pap    Supraventricular tachycardia     SVT (supraventricular tachycardia) 05/2019    Thyroid disease     Venous insufficiency      Family History   Problem Relation Name Age of Onset    Diabetes Mother Sara Vick     No Known Problems Father      No Known Problems Sister      No Known Problems Brother      Colon cancer Maternal Aunt      No Known Problems Maternal Uncle      No Known Problems Paternal Aunt      No Known Problems Paternal Uncle      No Known Problems Maternal Grandmother      No Known Problems Maternal Grandfather      No Known Problems Paternal Grandmother      No Known Problems Paternal Grandfather      Amblyopia Neg Hx       Blindness Neg Hx      Cancer Neg Hx      Cataracts Neg Hx      Glaucoma Neg Hx      Hypertension Neg Hx      Macular degeneration Neg Hx      Retinal detachment Neg Hx      Strabismus Neg Hx      Stroke Neg Hx      Thyroid disease Neg Hx      Celiac disease Neg Hx      Colon polyps Neg Hx      Esophageal cancer Neg Hx      Inflammatory bowel disease Neg Hx      Irritable bowel syndrome Neg Hx      Liver cancer Neg Hx      Liver disease Neg Hx      Rectal cancer Neg Hx      Stomach cancer Neg Hx      Ulcerative colitis Neg Hx      Cystic fibrosis Neg Hx      Crohn's disease Neg Hx      Hemochromatosis Neg Hx      Cirrhosis Neg Hx       Current Outpatient Medications on File Prior to Visit   Medication Sig Dispense Refill    b complex vitamins tablet Take 1 tablet by mouth once daily.      blood sugar diagnostic Strp Test twice daily.  Codemasters viva test strips and lancets. 300 each 3    calcium carb-vit D3-magnesium (CORAL CALCIUM) 250-200-125 mg-unit-mg Cap Take 1 capsule by mouth once daily.      dorzolamide-timolol 2-0.5% (COSOPT) 22.3-6.8 mg/mL ophthalmic solution Place 1 drop into both eyes 2 (two) times daily.      glucosamine-chondroitin (OSTEO BI-FLEX) 250-200 mg Tab Take 1 tablet by mouth once daily.      lancets (ACCU-CHEK SOFTCLIX LANCETS) Misc 1 Units by Misc.(Non-Drug; Combo Route) route 2 (two) times daily. 300 each 3    latanoprost 0.005 % ophthalmic solution Place 1 drop into both eyes every evening. 2.5 mL 12    mv-mn-vitC-[id number] (AIRBORNE, ASCORBATE SODIUM,) 333-1.7 mg Chew Take 1 each by mouth once daily.      NIFEdipine (ADALAT CC) 90 MG TbSR Take 1 tablet (90 mg total) by mouth once daily. 90 tablet 1    phenoL (CHLORASEPTIC THROAT SPRAY) 1.4 % SprA by Mucous Membrane route 2 (two) times a day. 177 mL 0    diazePAM (VALIUM) 5 MG tablet Take 1 tablet (5 mg total) by mouth once. Take 1 hour prior to imaging. for 1 dose 1 tablet 0    pantoprazole (PROTONIX) 40 MG tablet Take 1 tablet  (40 mg total) by mouth 2 (two) times daily. 60 tablet 0    [DISCONTINUED] omeprazole (PRILOSEC OTC) 20 MG tablet Take 1 tablet (20 mg total) by mouth once daily. 90 tablet 3     Current Facility-Administered Medications on File Prior to Visit   Medication Dose Route Frequency Provider Last Rate Last Admin    LIDOcaine HCL 10 mg/ml (1%) injection 2 mL  2 mL Intradermal Once Pepito Walker DO         Immunization History   Administered Date(s) Administered    COVID-19 MRNA, LN-S PF (MODERNA HALF 0.25 ML DOSE) 07/05/2022    COVID-19 Vaccine 08/24/2021, 02/07/2022    COVID-19, MRNA, LN-S, PF (MODERNA FULL 0.5 ML DOSE) 07/27/2021    COVID-19, mRNA, LNP-S, bivalent booster, PF (Moderna Omicron)12 + YEARS 09/27/2022    Influenza 11/25/2016    Influenza (FLUAD) - Trivalent - Adjuvanted - PF (65+) 08/22/2019    Influenza - High Dose - PF (65 years and older) 10/02/2017, 09/19/2018    Influenza - Quadrivalent 11/03/2014, 11/10/2015    Influenza - Quadrivalent - High Dose - PF (65 years and older) 08/18/2020, 08/13/2021    Pneumococcal Conjugate - 13 Valent 09/06/2016    Pneumococcal Polysaccharide - 23 Valent 09/18/2017    Tdap 11/30/2016    Zoster 10/06/2016, 11/25/2016    Zoster Recombinant 05/24/2019, 08/24/2019       Review of Systems   Constitutional:  Negative for fever, malaise/fatigue and weight loss.   Respiratory:  Negative for shortness of breath.    Cardiovascular:  Negative for chest pain and palpitations.   Gastrointestinal:  Negative for nausea and vomiting.   Musculoskeletal:  Positive for joint pain.   Psychiatric/Behavioral:  Negative for depression.         Vitals:    05/02/24 1542   BP: 134/70   Pulse: 89   Resp: 18   Temp: 98.7 °F (37.1 °C)       Physical Exam  Vitals reviewed.   Eyes:      Conjunctiva/sclera: Conjunctivae normal.   Pulmonary:      Effort: Pulmonary effort is normal.   Neurological:      Mental Status: She is alert.   Psychiatric:         Mood and Affect: Mood normal.         Behavior:  Behavior normal.         Thought Content: Thought content normal.         Judgment: Judgment normal.          Assessment and Plan  1. Hyperlipidemia, unspecified hyperlipidemia type  -     atorvastatin (LIPITOR) 40 MG tablet; Take 1 tablet (40 mg total) by mouth once daily.  Dispense: 90 tablet; Refill: 3  -     CBC Auto Differential; Future; Expected date: 06/02/2024  -     Comprehensive Metabolic Panel; Future; Expected date: 06/02/2024  -     Lipid Panel; Future; Expected date: 06/02/2024    2. Type 2 diabetes mellitus without complication, unspecified whether long term insulin use  -     Hemoglobin A1C; Future; Expected date: 06/02/2024             Return to clinic in 3 months or as needed once her ultrasound and bone scan are in.    Health Maintenance Topics with due status: Not Due       Topic Last Completion Date    TETANUS VACCINE 11/30/2016    Colorectal Cancer Screening 11/01/2023    Diabetes Urine Screening 12/26/2023    DEXA Scan 01/03/2024    Foot Exam 03/22/2024    Lipid Panel 04/18/2024    Hemoglobin A1c 04/18/2024    Mammogram 04/25/2024    Low Dose Statin 05/02/2024

## 2024-05-07 ENCOUNTER — OFFICE VISIT (OUTPATIENT)
Dept: FAMILY MEDICINE | Facility: CLINIC | Age: 74
End: 2024-05-07
Payer: MEDICARE

## 2024-05-07 ENCOUNTER — PATIENT OUTREACH (OUTPATIENT)
Dept: ADMINISTRATIVE | Facility: HOSPITAL | Age: 74
End: 2024-05-07
Payer: MEDICARE

## 2024-05-07 ENCOUNTER — APPOINTMENT (OUTPATIENT)
Dept: RADIOLOGY | Facility: CLINIC | Age: 74
End: 2024-05-07
Attending: NURSE PRACTITIONER
Payer: MEDICARE

## 2024-05-07 VITALS
HEART RATE: 84 BPM | DIASTOLIC BLOOD PRESSURE: 81 MMHG | RESPIRATION RATE: 18 BRPM | SYSTOLIC BLOOD PRESSURE: 133 MMHG | HEIGHT: 62 IN | BODY MASS INDEX: 32.74 KG/M2 | OXYGEN SATURATION: 98 % | TEMPERATURE: 98 F

## 2024-05-07 DIAGNOSIS — R35.0 FREQUENCY OF URINATION: ICD-10-CM

## 2024-05-07 DIAGNOSIS — R10.9 FLANK PAIN: ICD-10-CM

## 2024-05-07 DIAGNOSIS — N20.0 NEPHROLITHIASIS: ICD-10-CM

## 2024-05-07 DIAGNOSIS — N20.0 NEPHROLITHIASIS: Primary | ICD-10-CM

## 2024-05-07 LAB
BILIRUB SERPL-MCNC: NORMAL MG/DL
BLOOD URINE, POC: NORMAL
COLOR, POC UA: YELLOW
GLUCOSE UR QL STRIP: NORMAL
KETONES UR QL STRIP: NORMAL
LEUKOCYTE ESTERASE URINE, POC: NORMAL
NITRITE, POC UA: NORMAL
PH, POC UA: 7.5
PROTEIN, POC: NORMAL
SPECIFIC GRAVITY, POC UA: 1.02
UROBILINOGEN, POC UA: 0.2

## 2024-05-07 PROCEDURE — 81003 URINALYSIS AUTO W/O SCOPE: CPT | Mod: RHCUB | Performed by: NURSE PRACTITIONER

## 2024-05-07 PROCEDURE — 74018 RADEX ABDOMEN 1 VIEW: CPT | Mod: 26,,, | Performed by: RADIOLOGY

## 2024-05-07 PROCEDURE — 74018 RADEX ABDOMEN 1 VIEW: CPT | Mod: TC,RHCUB | Performed by: NURSE PRACTITIONER

## 2024-05-07 PROCEDURE — 99213 OFFICE O/P EST LOW 20 MIN: CPT | Mod: ,,, | Performed by: NURSE PRACTITIONER

## 2024-05-07 RX ORDER — TAMSULOSIN HYDROCHLORIDE 0.4 MG/1
CAPSULE ORAL
Qty: 90 CAPSULE | OUTPATIENT
Start: 2024-05-07

## 2024-05-07 RX ORDER — TAMSULOSIN HYDROCHLORIDE 0.4 MG/1
0.4 CAPSULE ORAL DAILY
Qty: 30 CAPSULE | Refills: 0 | Status: SHIPPED | OUTPATIENT
Start: 2024-05-07

## 2024-05-07 RX ORDER — CIPROFLOXACIN 500 MG/1
500 TABLET ORAL EVERY 12 HOURS
Qty: 10 TABLET | Refills: 0 | Status: SHIPPED | OUTPATIENT
Start: 2024-05-07

## 2024-05-07 NOTE — TELEPHONE ENCOUNTER
Notified patient that her KUB did show , Patient has abundant stool as well as a 2 kidney stones seen in the L kidney- Cristina [facility] FNP is sending in some medications for her to take. Patient verbalized understanding.

## 2024-05-07 NOTE — TELEPHONE ENCOUNTER
----- Message from FLACA Yanez sent at 5/7/2024  1:30 PM CDT -----  Patient has abundant stool as well as a 2 kidney stones seen in the L kidney- I am sending in some medications for her to take.

## 2024-05-07 NOTE — PROGRESS NOTES
Patient has abundant stool as well as a 2 kidney stones seen in the L kidney- I am sending in some medications for her to take.

## 2024-05-07 NOTE — PROGRESS NOTES
[facility]    Chief Complaint      Chief Complaint   Patient presents with    Abdominal Pain     Onset of yesterday. Pt reports wraps around to her back. States she has not had a bm since yesterday    Flank Pain     Bilateral- onset of yesterday as well    Urinary Frequency     Onset of yesterday. No dysuria or hematuria noted.     Depression     Pt has mild depression score of 8       History of Present Illness      Eugenie Coates is a 74 y.o. female. She  has a past medical history of Allergy, Anemia, Anxiety, Arthritis, Asthma, Bell palsy, Bilateral lower extremity edema, Chronic diastolic CHF (congestive heart failure), Depression, Diabetes mellitus, type 2, DVT (deep venous thrombosis), Dyslipidemia, Endometriosis, Eye injury (2014), Focal seizure (5/3/2021), GERD (gastroesophageal reflux disease), Glaucoma, History of DVT (deep vein thrombosis) (1/23/2018), History of uterine fibroid, Hyperlipidemia, Hypertension, Invasive lobular carcinoma of right breast in female, Nuclear sclerosis of both eyes (9/27/2016), Obesity, Pancreas cyst, Sleep apnea, Supraventricular tachycardia, SVT (supraventricular tachycardia) (05/2019), Thyroid disease, and Venous insufficiency., who presents today for abdominal and back pain since yesterday.  She says the pain pretty much wraps around from her abdomen to both sides of her lower back.  She admits to having issues for constipation for a while.     Past Medical History:  Past Medical History:   Diagnosis Date    Allergy     Anemia     Anxiety     Arthritis     Asthma     Bell palsy     Bilateral lower extremity edema     Chronic diastolic CHF (congestive heart failure)     Depression     Diabetes mellitus, type 2     DVT (deep venous thrombosis)     right LE    Dyslipidemia     Endometriosis     Eye injury 2014    stuck with tree branch od ?     Focal seizure 5/3/2021    GERD (gastroesophageal reflux disease)     Glaucoma     History of DVT (deep vein thrombosis)  2018    History of uterine fibroid     Hyperlipidemia     Hypertension     Invasive lobular carcinoma of right breast in female     s/p surgery, radiation, tamoxifen    Nuclear sclerosis of both eyes 2016    Obesity     Pancreas cyst     Sleep apnea     uses C pap    Supraventricular tachycardia     SVT (supraventricular tachycardia) 2019    Thyroid disease     Venous insufficiency        Past Surgical History:   has a past surgical history that includes Cholecystectomy;  section; Hernia repair; Vein Surgery (, ); Knee surgery (Right, ); glaucoma laser (Bilateral); Mastectomy, partial (Right, 2020); [place] lymph node biopsy (Right, 2020); Total Reduction Mammoplasty (Bilateral, 2020); Craniotomy for excision of intracranial tumor (Left, 2021); Cataract extraction w/  intraocular lens implant (Left, 2023); Colonoscopy; Esophagogastroduodenoscopy (N/A, 2023); craniotomy, with neoplasm excision using computer-assisted navigation (Left, 2023); ABLATION, CHEMICAL SEALANT, VARICOSE VEIN (Right, 2024); ABLATION, CHEMICAL SEALANT, VARICOSE VEIN (Left, 2024); and Radiofrequency ablation (Right, 3/1/2024).    Social History:  Social History     Tobacco Use    Smoking status: Never    Smokeless tobacco: Never   Substance Use Topics    Alcohol use: Never     Alcohol/week: 0.0 standard drinks of alcohol    Drug use: Never       I personally reviewed all past medical, surgical, and social.     Review of Systems   Constitutional:  Negative for chills and fever.   Eyes:  Negative for pain and visual disturbance.   Respiratory:  Negative for shortness of breath.    Cardiovascular: Negative.    Gastrointestinal:  Positive for abdominal pain and constipation. Negative for diarrhea, nausea and vomiting.   Genitourinary:  Positive for flank pain, frequency and urgency. Negative for difficulty urinating, dysuria and vaginal discharge.    Musculoskeletal:  Positive for back pain. Negative for gait problem.   Skin: Negative.    Neurological:  Negative for dizziness, light-headedness and headaches.        Medications:  Outpatient Encounter Medications as of 5/7/2024   Medication Sig Dispense Refill    atorvastatin (LIPITOR) 40 MG tablet Take 1 tablet (40 mg total) by mouth once daily. 90 tablet 3    b complex vitamins tablet Take 1 tablet by mouth once daily.      blood sugar diagnostic Strp Test twice daily.  Accucheck viva test strips and lancets. 300 each 3    calcium carb-vit D3-magnesium (CORAL CALCIUM) 250-200-125 mg-unit-mg Cap Take 1 capsule by mouth once daily.      dorzolamide-timolol 2-0.5% (COSOPT) 22.3-6.8 mg/mL ophthalmic solution Place 1 drop into both eyes 2 (two) times daily.      glucosamine-chondroitin (OSTEO BI-FLEX) 250-200 mg Tab Take 1 tablet by mouth once daily.      lancets (ACCU-CHEK SOFTCLIX LANCETS) Misc 1 Units by Misc.(Non-Drug; Combo Route) route 2 (two) times daily. 300 each 3    latanoprost 0.005 % ophthalmic solution Place 1 drop into both eyes every evening. 2.5 mL 12    mv-mn-vitC-yaqSa-[id number]-[id number]-hc124 (AIRBORNE, ASCORBATE SODIUM,) 333-1.7 mg Chew Take 1 each by mouth once daily.      NIFEdipine (ADALAT CC) 90 MG TbSR Take 1 tablet (90 mg total) by mouth once daily. 90 tablet 1    phenoL (CHLORASEPTIC THROAT SPRAY) 1.4 % SprA by Mucous Membrane route 2 (two) times a day. 177 mL 0    ciprofloxacin HCl (CIPRO) 500 MG tablet Take 1 tablet (500 mg total) by mouth every 12 (twelve) hours. 10 tablet 0    diazePAM (VALIUM) 5 MG tablet Take 1 tablet (5 mg total) by mouth once. Take 1 hour prior to imaging. for 1 dose 1 tablet 0    pantoprazole (PROTONIX) 40 MG tablet Take 1 tablet (40 mg total) by mouth 2 (two) times daily. 60 tablet 0    tamsulosin (FLOMAX) 0.4 mg Cap Take 1 capsule (0.4 mg total) by mouth once daily. 30 capsule 0    [DISCONTINUED] omeprazole (PRILOSEC OTC) 20 MG tablet Take 1 tablet (20 mg total) by mouth  "once daily. 90 tablet 3     Facility-Administered Encounter Medications as of 5/7/2024   Medication Dose Route Frequency Provider Last Rate Last Admin    LIDOcaine HCL 10 mg/ml (1%) injection 2 mL  2 mL Intradermal Once Pepito Walker DO           Allergies:  Review of patient's allergies indicates:   Allergen Reactions    Oxycodone hcl-oxycodone-asa Hives, Itching and Other (See Comments)    Percodan yadira      Other reaction(s): Unknown       Health Maintenance:  Immunization History   Administered Date(s) Administered    COVID-19 MRNA, LN-S PF (MODERNA HALF 0.25 ML DOSE) 07/05/2022    COVID-19 Vaccine 08/24/2021, 02/07/2022    COVID-19, MRNA, LN-S, PF (MODERNA FULL 0.5 ML DOSE) 07/27/2021, 05/24/2023, 02/02/2024    COVID-19, mRNA, LNP-S, bivalent booster, PF (Moderna Omicron)12 + YEARS 09/27/2022    Influenza 11/25/2016    Influenza (FLUAD) - Trivalent - Adjuvanted - PF (65+) 08/22/2019    Influenza - High Dose - PF (65 years and older) 10/02/2017, 09/19/2018    Influenza - Quadrivalent 11/03/2014, 11/10/2015    Influenza - Quadrivalent - High Dose - PF (65 years and older) 08/18/2020, 08/13/2021    Pneumococcal Conjugate - 13 Valent 09/06/2016    Pneumococcal Polysaccharide - 23 Valent 09/18/2017    RSVpreF (Arexvy) 02/02/2024    Tdap 11/30/2016    Zoster 10/06/2016, 11/25/2016    Zoster Recombinant 05/24/2019, 08/24/2019      Health Maintenance   Topic Date Due    Hemoglobin A1c  10/18/2024    Eye Exam  03/05/2025    Foot Exam  03/22/2025    Lipid Panel  04/18/2025    Mammogram  04/25/2025    DEXA Scan  01/03/2026    Colorectal Cancer Screening  11/01/2026    TETANUS VACCINE  11/30/2026    Hepatitis C Screening  Completed    Shingles Vaccine  Completed        Physical Exam      Vital Signs  Temp: 98.3 °F (36.8 °C)  Temp Source: Oral  Pulse: 84  Resp: 18  SpO2: 98 %  BP: 133/81  BP Location: Right arm  Patient Position: Sitting  Pain Score:   7  Pain Loc: Back  Height and Weight  Height: 5' 2" (157.5 " cm)  Weight in (lb) to have BMI = 25: 136.4]    Physical Exam  Vitals and nursing note reviewed.   Constitutional:       Appearance: Normal appearance. She is well-developed.   HENT:      Head: Normocephalic.      Right Ear: Hearing normal.      Left Ear: Hearing normal.      Nose: Nose normal.   Eyes:      General: Lids are normal.      Conjunctiva/sclera: Conjunctivae normal.   Cardiovascular:      Rate and Rhythm: Normal rate and regular rhythm.      Heart sounds: Normal heart sounds.   Pulmonary:      Effort: Pulmonary effort is normal.      Breath sounds: Normal breath sounds.   Abdominal:      General: Abdomen is flat. Bowel sounds are decreased.      Palpations: Abdomen is soft.      Tenderness: There is no abdominal tenderness.       Musculoskeletal:         General: Normal range of motion.      Cervical back: Normal range of motion and neck supple.        Back:    Skin:     General: Skin is warm and dry.   Neurological:      Mental Status: She is alert and oriented to person, place, and time.      Gait: Gait is intact.   Psychiatric:         Behavior: Behavior is cooperative.        EXAMINATION:  XR KUB     CLINICAL HISTORY:  Unspecified abdominal pain     TECHNIQUE:  KUB, 2 supine views     COMPARISON:  10/3/2015 and 12/27/2023     FINDINGS:  There is abundant stool but no suggestion of an obstruction.  Overlying the left kidney, there are 2 calcifications measuring 2 mm and 6 mm in size.  No calcifications are seen with certainty over the right kidney.  There is a 5.3 cm calcified pelvic mass consistent with a uterine fibroid.  Surgical clips are seen in the right upper quadrant.     Degenerative changes are present in the lower lumbar spine.     Impression:     1.  Abundant stool.     2.  Left nephrolithiasis.  If there is any concern for distal ureterolithiasis, additional imaging would be needed.     Place of service: [facility]        Electronically signed by:Cristin Martinez  Date:                                             05/07/2024  Time:                                           11:02           Exam Ended: 05/07/24 10:00 CDT Last Resulted: 05/07/24 11:02 CDT       Order Details        View Encounter        Lab and Collection Details        Routing        Result History     View All Conversations on this Encounter           Result Care Coordination      Result Notes     FLACA Yanez  5/7/2024  1:30 PM CDT Back to Top      Patient has abundant stool as well as a 2 kidney stones seen in the L kidney- I am sending in some medications for her to take.     Patient Communication     Add Comments   Seen Back to Top        Follow-up Encounters    5/7/2024 Refill Back to Top             X-Ray KUB: Patient Communication     Add Comments   Seen     External Result Report    External Result Report     Narrative & Impression    EXAMINATION:  XR KUB     CLINICAL HISTORY:  Unspecified abdominal pain     TECHNIQUE:  KUB, 2 supine views     COMPARISON:  10/3/2015 and 12/27/2023     FINDINGS:  There is abundant stool but no suggestion of an obstruction.  Overlying the left kidney, there are 2 calcifications measuring 2 mm and 6 mm in size.  No calcifications are seen with certainty over the right kidney.  There is a 5.3 cm calcified pelvic mass consistent with a uterine fibroid.  Surgical clips are seen in the right upper quadrant.     Degenerative changes are present in the lower lumbar spine.     Impression:     1.  Abundant stool.     2.  Left nephrolithiasis.  If there is any concern for distal ureterolithiasis, additional imaging would be needed.     Place of service: [facility]        Electronically signed by:Cristin Martinez  Date:                                            05/07/2024  Time:                                           11:02        Laboratory:  CBC:  Recent Labs   Lab 09/05/23  1614 11/08/23  1459 04/18/24  1105   WBC 8.00 6.90 7.81   RBC 4.55 5.06 5.14   Hemoglobin 12.8 14.1  14.0   Hematocrit 38.9 43.2 45.1   Platelet Count 166 276 281   MCV 85.5 85.4 87.7   MCH 28.1 27.9 27.2   MCHC 32.9 32.6 31.0 L     CMP:  Recent Labs   Lab 08/19/23  0511 08/20/23  0423 09/05/23  1614 04/18/24  1105   Glucose 209 H 224 H   < > 136 H   Calcium 8.2 L 8.2 L   < > 9.8   Albumin 3.0 L 3.1 L   < > 4.0   Total Protein 6.1 6.4  --  7.9   Sodium 136 133 L   < > 143   Potassium 4.3 5.0   < > 4.1   CO2 29 28   < > 27   Chloride 103 100   < > 109 H   BUN 30 H 32 H   < > 16   Alkaline Phosphatase 64 59  --   --    Alk Phos  --   --   --  144 H   ALT 21 23  --  23   AST 28 46 H  --  20   Total Bilirubin 0.7 0.8  --   --    Bilirubin, Total  --   --   --  0.6    < > = values in this interval not displayed.     LIPIDS:  Recent Labs   Lab 04/28/22  1452 09/26/22  1239 03/01/23  0840 08/09/23  1106 04/18/24  1105   TSH  --  0.346 L  --  0.945 0.442   HDL 52 49 40  --   --    HDL Cholesterol  --   --   --   --  57   Cholesterol 236 H 225 H 205 H  --  242 H   Triglycerides 174 H 144 163 H  --  119   LDL Cholesterol 149.2 147.2 132.4  --   --    LDL Calculated  --   --   --   --  161   HDL/Cholesterol Ratio 22.0 21.8 19.5 L  --   --    Cholesterol/HDL Ratio (Risk Factor)  --   --   --   --  4.2   Non-HDL Cholesterol 184 176 165  --   --    Non-HDL  --   --   --   --  185   Total Cholesterol/HDL Ratio 4.5 4.6 5.1 H  --   --      TSH:  Recent Labs   Lab 09/26/22  1239 08/09/23  1106 04/18/24  1105   TSH 0.346 L 0.945 0.442     A1C:  Recent Labs   Lab 09/09/21  1038 12/30/21  0844 04/28/22  1452 09/26/22  1239 03/01/23  0840 08/14/23  0653 10/26/23  1518 04/18/24  1105   Hemoglobin A1C 6.3 H 6.3 H 6.5 H 6.4 H 7.1 H 7.1 H 7.0 H 6.3       Assessment/Plan     Eugenie Coates is a 74 y.o.female with:     1. Nephrolithiasis  -     ciprofloxacin HCl (CIPRO) 500 MG tablet; Take 1 tablet (500 mg total) by mouth every 12 (twelve) hours.  Dispense: 10 tablet; Refill: 0  -     tamsulosin (FLOMAX) 0.4 mg Cap; Take 1 capsule (0.4  mg total) by mouth once daily.  Dispense: 30 capsule; Refill: 0    2. Flank pain  -     X-Ray KUB; Future; Expected date: 05/07/2024    3. Frequency of urination  -     POCT URINALYSIS W/O SCOPE  -     Urinalysis, Reflex to Urine Culture  -     ciprofloxacin HCl (CIPRO) 500 MG tablet; Take 1 tablet (500 mg total) by mouth every 12 (twelve) hours.  Dispense: 10 tablet; Refill: 0       Kidney stones in L kidney noted    Total time spent face-to-face and non-face-to-face coordinating care for this encounter was: 20 minutes     Chronic conditions status updated as per HPI.  Other than changes above, cont current medications and maintain follow up with specialists.  Return to clinic prn if symptoms worsen or fail to improve.    Cristina Doty, DANETTEP  [facility]

## 2024-05-07 NOTE — PROGRESS NOTES

## 2024-05-10 ENCOUNTER — HOSPITAL ENCOUNTER (OUTPATIENT)
Dept: RADIOLOGY | Facility: HOSPITAL | Age: 74
Discharge: HOME | End: 2024-05-10
Attending: FAMILY MEDICINE
Payer: MEDICARE

## 2024-05-10 DIAGNOSIS — R74.8 ELEVATED LIVER ENZYMES: ICD-10-CM

## 2024-05-10 PROCEDURE — 76705 ECHO EXAM OF ABDOMEN: CPT | Mod: TC

## 2024-05-10 PROCEDURE — A9503 TC99M MEDRONATE: HCPCS | Performed by: FAMILY MEDICINE

## 2024-05-10 PROCEDURE — 76705 ECHO EXAM OF ABDOMEN: CPT | Mod: 26,,, | Performed by: RADIOLOGY

## 2024-05-10 PROCEDURE — 78306 BONE IMAGING WHOLE BODY: CPT | Mod: 26,,, | Performed by: STUDENT IN AN ORGANIZED HEALTH CARE EDUCATION/TRAINING PROGRAM

## 2024-05-10 PROCEDURE — 78306 BONE IMAGING WHOLE BODY: CPT | Mod: TC

## 2024-05-10 RX ORDER — TC 99M MEDRONATE 20 MG/10ML
25.3 INJECTION, POWDER, LYOPHILIZED, FOR SOLUTION INTRAVENOUS
Status: COMPLETED | OUTPATIENT
Start: 2024-05-10 | End: 2024-05-10

## 2024-05-10 RX ADMIN — TECHNETIUM TC 99M MEDRONATE 25.3 MILLICURIE: 20 INJECTION, POWDER, LYOPHILIZED, FOR SOLUTION INTRAVENOUS at 08:05

## 2024-05-13 ENCOUNTER — TELEPHONE (OUTPATIENT)
Dept: FAMILY MEDICINE | Facility: CLINIC | Age: 74
End: 2024-05-13
Payer: MEDICARE

## 2024-05-13 ENCOUNTER — OFFICE VISIT (OUTPATIENT)
Dept: CARDIOLOGY | Facility: CLINIC | Age: 74
End: 2024-05-13
Payer: MEDICARE

## 2024-05-13 VITALS
BODY MASS INDEX: 32.57 KG/M2 | SYSTOLIC BLOOD PRESSURE: 120 MMHG | RESPIRATION RATE: 16 BRPM | HEIGHT: 62 IN | HEART RATE: 87 BPM | WEIGHT: 177 LBS | DIASTOLIC BLOOD PRESSURE: 70 MMHG

## 2024-05-13 DIAGNOSIS — I35.0 NONRHEUMATIC AORTIC VALVE STENOSIS: Primary | ICD-10-CM

## 2024-05-13 DIAGNOSIS — I10 ESSENTIAL HYPERTENSION: Primary | ICD-10-CM

## 2024-05-13 DIAGNOSIS — I50.32 CHRONIC HEART FAILURE WITH PRESERVED EJECTION FRACTION: ICD-10-CM

## 2024-05-13 DIAGNOSIS — I87.2 VENOUS INSUFFICIENCY: ICD-10-CM

## 2024-05-13 DIAGNOSIS — I10 ESSENTIAL HYPERTENSION: ICD-10-CM

## 2024-05-13 PROCEDURE — 93010 ELECTROCARDIOGRAM REPORT: CPT | Mod: S$PBB,,, | Performed by: STUDENT IN AN ORGANIZED HEALTH CARE EDUCATION/TRAINING PROGRAM

## 2024-05-13 PROCEDURE — 99214 OFFICE O/P EST MOD 30 MIN: CPT | Mod: PBBFAC,25 | Performed by: STUDENT IN AN ORGANIZED HEALTH CARE EDUCATION/TRAINING PROGRAM

## 2024-05-13 PROCEDURE — 99213 OFFICE O/P EST LOW 20 MIN: CPT | Mod: S$PBB,,, | Performed by: STUDENT IN AN ORGANIZED HEALTH CARE EDUCATION/TRAINING PROGRAM

## 2024-05-13 PROCEDURE — 93005 ELECTROCARDIOGRAM TRACING: CPT | Mod: PBBFAC | Performed by: STUDENT IN AN ORGANIZED HEALTH CARE EDUCATION/TRAINING PROGRAM

## 2024-05-13 NOTE — TELEPHONE ENCOUNTER
----- Message from Omar Horn MD sent at 5/10/2024  9:48 AM CDT -----  Please let her know that her liver ultrasound is normal and she is asymptomatic regarding abdominal discomfort or bloating there is no evidence of liver pathology.

## 2024-05-13 NOTE — PROGRESS NOTES
PCP: Omar Horn MD    Referring Provider:     Subjective:   Eugenie Coates is a 74 y.o. female with hx of Meningioma s/p surgical resection on XRT, HTN, Seizures, aortic stenosis, hx of DVT, venous insufficiency who presents for followup    5/13/24 - Patient no showed to her R[facility]. Now reports chest pain has resolved. Denies any SOB, orthopnea, PND or leg swelling. She had bilateral knee surgery and following with pain clinic.     11/2023 - Patient with hx of moderate aortic stenosis. She report having a few months of weekly chest pain that last upto 20 mins and worse with exertion. Also has left leg swelling and left carotid bruit. She reports exertional SOB.     Fhx: mother - extensive cardiac hx   Shx: Denies smoking, etoh or drug use    EKG - 11/8/23 - Sinus tachycardai, first degree AV block     ECHO - Results for orders placed during the hospital encounter of 08/10/23      Left Ventricle: The left ventricle is normal in size. There is mild concentric hypertrophy. Normal wall motion. There is normal systolic function with a visually estimated ejection fraction of 60 - 65%. Grade I diastolic dysfunction.    Right Ventricle: Normal right ventricular cavity size. Wall thickness is normal. Right ventricle wall motion  is normal. Systolic function is normal.    Aortic Valve: There is moderate stenosis. Aortic valve area by VTI is 1.26 cm². Aortic valve peak velocity is 3.80 m/s. Mean gradient is 31 mmHg. The dimensionless index is 0.40.    Mitral Valve: Mild mitral annular calcification.      Stress -     Lab Results   Component Value Date     04/18/2024    K 4.1 04/18/2024     (H) 04/18/2024    CO2 27 04/18/2024    BUN 16 04/18/2024    CREATININE 0.96 04/18/2024    CALCIUM 9.8 04/18/2024    ANIONGAP 11 04/18/2024    ESTGFRAFRICA SEE COMMENT 08/18/2023    EGFRNONAA SEE COMMENT 08/18/2023       Lab Results   Component Value Date    CHOL 242 (H) 04/18/2024    CHOL 205 (H) 03/01/2023    CHOL 225 (H)  09/26/2022     Lab Results   Component Value Date    HDL 57 04/18/2024    HDL 40 03/01/2023    HDL 49 09/26/2022     Lab Results   Component Value Date    LDLCALC 161 04/18/2024    LDLCALC 132.4 03/01/2023    LDLCALC 147.2 09/26/2022     Lab Results   Component Value Date    TRIG 119 04/18/2024    TRIG 163 (H) 03/01/2023    TRIG 144 09/26/2022     Lab Results   Component Value Date    CHOLHDL 4.2 04/18/2024    CHOLHDL 19.5 (L) 03/01/2023    CHOLHDL 21.8 09/26/2022       Lab Results   Component Value Date    WBC 7.81 04/18/2024    HGB 14.0 04/18/2024    HCT 45.1 04/18/2024    MCV 87.7 04/18/2024     04/18/2024           Current Outpatient Medications:     atorvastatin (LIPITOR) 40 MG tablet, Take 1 tablet (40 mg total) by mouth once daily., Disp: 90 tablet, Rfl: 3    b complex vitamins tablet, Take 1 tablet by mouth once daily., Disp: , Rfl:     blood sugar diagnostic Strp, Test twice daily.  Accucheck viva test strips and lancets., Disp: 300 each, Rfl: 3    calcium carb-vit D3-magnesium (CORAL CALCIUM) 250-200-125 mg-unit-mg Cap, Take 1 capsule by mouth once daily., Disp: , Rfl:     ciprofloxacin HCl (CIPRO) 500 MG tablet, Take 1 tablet (500 mg total) by mouth every 12 (twelve) hours., Disp: 10 tablet, Rfl: 0    diazePAM (VALIUM) 5 MG tablet, Take 1 tablet (5 mg total) by mouth once. Take 1 hour prior to imaging. for 1 dose, Disp: 1 tablet, Rfl: 0    dorzolamide-timolol 2-0.5% (COSOPT) 22.3-6.8 mg/mL ophthalmic solution, Place 1 drop into both eyes 2 (two) times daily., Disp: , Rfl:     glucosamine-chondroitin (OSTEO BI-FLEX) 250-200 mg Tab, Take 1 tablet by mouth once daily., Disp: , Rfl:     lancets (ACCU-CHEK SOFTCLIX LANCETS) Misc, 1 Units by Misc.(Non-Drug; Combo Route) route 2 (two) times daily., Disp: 300 each, Rfl: 3    latanoprost 0.005 % ophthalmic solution, Place 1 drop into both eyes every evening., Disp: 2.5 mL, Rfl: 12    mv-mn-vitC-[id number]-[id number]-hc124 (AIRBORNE, ASCORBATE SODIUM,) 333-1.7 mg  "Chew, Take 1 each by mouth once daily., Disp: , Rfl:     NIFEdipine (ADALAT CC) 90 MG TbSR, Take 1 tablet (90 mg total) by mouth once daily., Disp: 90 tablet, Rfl: 1    pantoprazole (PROTONIX) 40 MG tablet, Take 1 tablet (40 mg total) by mouth 2 (two) times daily., Disp: 60 tablet, Rfl: 0    phenoL (CHLORASEPTIC THROAT SPRAY) 1.4 % SprA, by Mucous Membrane route 2 (two) times a day., Disp: 177 mL, Rfl: 0    tamsulosin (FLOMAX) 0.4 mg Cap, Take 1 capsule (0.4 mg total) by mouth once daily., Disp: 30 capsule, Rfl: 0  No current facility-administered medications for this visit.    Facility-Administered Medications Ordered in Other Visits:     LIDOcaine HCL 10 mg/ml (1%) injection 2 mL, 2 mL, Intradermal, Once, Pepito Walker DO    Review of Systems   Respiratory:  Negative for cough and shortness of breath.    Cardiovascular:  Negative for chest pain, palpitations, orthopnea, claudication, leg swelling and PND.         Objective:   /70   Pulse 87   Resp 16   Ht 5' 2" (1.575 m)   Wt 80.3 kg (177 lb)   BMI 32.37 kg/m²     Physical Exam  Constitutional:       Appearance: Normal appearance.   Cardiovascular:      Rate and Rhythm: Regular rhythm. Tachycardia present.      Pulses: Normal pulses.      Heart sounds: Murmur heard.   Pulmonary:      Effort: Pulmonary effort is normal.      Breath sounds: Normal breath sounds.   Musculoskeletal:         General: No swelling or deformity.      Right lower leg: No edema.      Left lower leg: No edema.   Neurological:      Mental Status: She is alert and oriented to person, place, and time.           Assessment:     1. Nonrheumatic aortic valve stenosis        2. Chronic heart failure with preserved ejection fraction  Ambulatory referral/consult to Cardiology      3. Venous insufficiency              Plan:   Nonrheumatic aortic valve stenosis  Moderate aortic stenosis on most recent ECHO  Peak AV 3.8m/s with MG of 31mmHg  Denies syncope, angina or CHF symptoms  Yearly " surveillance.     Venous insufficiency  Following Dr. Walker

## 2024-05-13 NOTE — TELEPHONE ENCOUNTER
Pt notified that bone scan is consisted with degenerative arthritis and no worrisome lesions noted, she voiced understanding.

## 2024-05-13 NOTE — TELEPHONE ENCOUNTER
Pt notified that liver US is normal. However, she does report abdominal pain but feels as if this is due to lack of bowel movements. She reports she is taking dulcolax with no improvement at this time. Dr Horn consulted, offered pt ov for further evaluation.

## 2024-05-13 NOTE — TELEPHONE ENCOUNTER
----- Message from Omar Horn MD sent at 5/13/2024  8:07 AM CDT -----  Uptake is consistent with degenerative arthritis and no worrisome lesions were noted

## 2024-05-13 NOTE — ASSESSMENT & PLAN NOTE
Moderate aortic stenosis on most recent ECHO  Peak AV 3.8m/s with MG of 31mmHg  Denies syncope, angina or CHF symptoms  Yearly surveillance.

## 2024-05-14 ENCOUNTER — APPOINTMENT (OUTPATIENT)
Dept: RADIOLOGY | Facility: CLINIC | Age: 74
End: 2024-05-14
Attending: NURSE PRACTITIONER
Payer: MEDICARE

## 2024-05-14 ENCOUNTER — OFFICE VISIT (OUTPATIENT)
Dept: FAMILY MEDICINE | Facility: CLINIC | Age: 74
End: 2024-05-14
Payer: MEDICARE

## 2024-05-14 VITALS
TEMPERATURE: 98 F | RESPIRATION RATE: 18 BRPM | HEIGHT: 62 IN | DIASTOLIC BLOOD PRESSURE: 81 MMHG | HEART RATE: 80 BPM | OXYGEN SATURATION: 97 % | SYSTOLIC BLOOD PRESSURE: 122 MMHG | WEIGHT: 177.19 LBS | BODY MASS INDEX: 32.61 KG/M2

## 2024-05-14 DIAGNOSIS — K59.09 CHRONIC CONSTIPATION: Primary | ICD-10-CM

## 2024-05-14 DIAGNOSIS — N20.0 NEPHROLITHIASIS: ICD-10-CM

## 2024-05-14 PROCEDURE — 74018 RADEX ABDOMEN 1 VIEW: CPT | Mod: 26,,, | Performed by: RADIOLOGY

## 2024-05-14 PROCEDURE — 99213 OFFICE O/P EST LOW 20 MIN: CPT | Mod: ,,, | Performed by: NURSE PRACTITIONER

## 2024-05-14 PROCEDURE — 74018 RADEX ABDOMEN 1 VIEW: CPT | Mod: TC,RHCUB | Performed by: NURSE PRACTITIONER

## 2024-05-14 NOTE — PROGRESS NOTES
[facility]    Chief Complaint      Chief Complaint   Patient presents with    Constipation     With nausea , patient reports she has not had a bowel movement x2 days ,  with abdominal pain patient has tried ducloex.        History of Present Illness      Eugenie Coates is a 74 y.o. female. She  has a past medical history of Allergy, Anemia, Anxiety, Arthritis, Asthma, Bell palsy, Bilateral lower extremity edema, Chronic diastolic CHF (congestive heart failure), Depression, Diabetes mellitus, type 2, DVT (deep venous thrombosis), Dyslipidemia, Endometriosis, Eye injury (2014), Focal seizure (5/3/2021), GERD (gastroesophageal reflux disease), Glaucoma, History of DVT (deep vein thrombosis) (1/23/2018), History of uterine fibroid, Hyperlipidemia, Hypertension, Invasive lobular carcinoma of right breast in female, Nuclear sclerosis of both eyes (9/27/2016), Obesity, Pancreas cyst, Sleep apnea, Supraventricular tachycardia, SVT (supraventricular tachycardia) (05/2019), Thyroid disease, and Venous insufficiency., who presents today for constipation. Patient was seen 1 week ago by this provider for abdominal and flank pain.  She stated she has had chronic constipation for years.  She had a KUB at that visit which revealed L nephrolithiasis and abundant stool.     Past Medical History:  Past Medical History:   Diagnosis Date    Allergy     Anemia     Anxiety     Arthritis     Asthma     Bell palsy     Bilateral lower extremity edema     Chronic diastolic CHF (congestive heart failure)     Depression     Diabetes mellitus, type 2     DVT (deep venous thrombosis)     right LE    Dyslipidemia     Endometriosis     Eye injury 2014    stuck with tree branch od ?     Focal seizure 5/3/2021    GERD (gastroesophageal reflux disease)     Glaucoma     History of DVT (deep vein thrombosis) 1/23/2018    History of uterine fibroid     Hyperlipidemia     Hypertension     Invasive lobular carcinoma of right breast in female      s/p surgery, radiation, tamoxifen    Nuclear sclerosis of both eyes 2016    Obesity     Pancreas cyst     Sleep apnea     uses C pap    Supraventricular tachycardia     SVT (supraventricular tachycardia) 2019    Thyroid disease     Venous insufficiency        Past Surgical History:   has a past surgical history that includes Cholecystectomy;  section; Hernia repair; Vein Surgery (, ); Knee surgery (Right, ); glaucoma laser (Bilateral); Mastectomy, partial (Right, 2020); [place] lymph node biopsy (Right, 2020); Total Reduction Mammoplasty (Bilateral, 2020); Craniotomy for excision of intracranial tumor (Left, 2021); Cataract extraction w/  intraocular lens implant (Left, 2023); Colonoscopy; Esophagogastroduodenoscopy (N/A, 2023); craniotomy, with neoplasm excision using computer-assisted navigation (Left, 2023); ABLATION, CHEMICAL SEALANT, VARICOSE VEIN (Right, 2024); ABLATION, CHEMICAL SEALANT, VARICOSE VEIN (Left, 2024); and Radiofrequency ablation (Right, 3/1/2024).    Social History:  Social History     Tobacco Use    Smoking status: Never     Passive exposure: Never    Smokeless tobacco: Never   Substance Use Topics    Alcohol use: Never     Alcohol/week: 0.0 standard drinks of alcohol    Drug use: Never       I personally reviewed all past medical, surgical, and social.     Review of Systems   Constitutional:  Negative for chills and fever.   Respiratory:  Negative for shortness of breath.    Cardiovascular: Negative.    Gastrointestinal:  Positive for constipation. Negative for abdominal pain, diarrhea, nausea and vomiting.        Medications:  Outpatient Encounter Medications as of 2024   Medication Sig Dispense Refill    atorvastatin (LIPITOR) 40 MG tablet Take 1 tablet (40 mg total) by mouth once daily. 90 tablet 3    b complex vitamins tablet Take 1 tablet by mouth once daily.      blood sugar diagnostic Strp Test  twice daily.  [employer] viva test strips and lancets. 300 each 3    calcium carb-vit D3-magnesium (CORAL CALCIUM) 250-200-125 mg-unit-mg Cap Take 1 capsule by mouth once daily.      ciprofloxacin HCl (CIPRO) 500 MG tablet Take 1 tablet (500 mg total) by mouth every 12 (twelve) hours. 10 tablet 0    dorzolamide-timolol 2-0.5% (COSOPT) 22.3-6.8 mg/mL ophthalmic solution Place 1 drop into both eyes 2 (two) times daily.      glucosamine-chondroitin (OSTEO BI-FLEX) 250-200 mg Tab Take 1 tablet by mouth once daily.      lancets (ACCU-CHEK SOFTCLIX LANCETS) Misc 1 Units by Misc.(Non-Drug; Combo Route) route 2 (two) times daily. 300 each 3    latanoprost 0.005 % ophthalmic solution Place 1 drop into both eyes every evening. 2.5 mL 12    mv-mn-vitC-[id number]-Eiz-Eup-hc124 (AIRBORNE, ASCORBATE SODIUM,) 333-1.7 mg Chew Take 1 each by mouth once daily.      NIFEdipine (ADALAT CC) 90 MG TbSR Take 1 tablet (90 mg total) by mouth once daily. 90 tablet 1    phenoL (CHLORASEPTIC THROAT SPRAY) 1.4 % SprA by Mucous Membrane route 2 (two) times a day. 177 mL 0    tamsulosin (FLOMAX) 0.4 mg Cap Take 1 capsule (0.4 mg total) by mouth once daily. 30 capsule 0    diazePAM (VALIUM) 5 MG tablet Take 1 tablet (5 mg total) by mouth once. Take 1 hour prior to imaging. for 1 dose 1 tablet 0    linaCLOtide (LINZESS) 72 mcg Cap capsule Take 1 capsule (72 mcg total) by mouth before breakfast. 90 capsule 0    pantoprazole (PROTONIX) 40 MG tablet Take 1 tablet (40 mg total) by mouth 2 (two) times daily. 60 tablet 0    [DISCONTINUED] omeprazole (PRILOSEC OTC) 20 MG tablet Take 1 tablet (20 mg total) by mouth once daily. 90 tablet 3     Facility-Administered Encounter Medications as of 5/14/2024   Medication Dose Route Frequency Provider Last Rate Last Admin    LIDOcaine HCL 10 mg/ml (1%) injection 2 mL  2 mL Intradermal Once Pepito Walker, DO           Allergies:  Review of patient's allergies indicates:   Allergen Reactions    Oxycodone  "hcl-oxycodone-asa Hives, Itching and Other (See Comments)    Percodan yadira      Other reaction(s): Unknown       Health Maintenance:  Immunization History   Administered Date(s) Administered    COVID-19 MRNA, LN-S PF (MODERNA HALF 0.25 ML DOSE) 07/05/2022    COVID-19 Vaccine 08/24/2021, 02/07/2022    COVID-19, MRNA, LN-S, PF (MODERNA FULL 0.5 ML DOSE) 07/27/2021, 05/24/2023, 02/02/2024    COVID-19, mRNA, LNP-S, bivalent booster, PF (Moderna Omicron)12 + YEARS 09/27/2022    Influenza 11/25/2016    Influenza (FLUAD) - Trivalent - Adjuvanted - PF (65+) 08/22/2019    Influenza - High Dose - PF (65 years and older) 10/02/2017, 09/19/2018    Influenza - Quadrivalent 11/03/2014, 11/10/2015    Influenza - Quadrivalent - High Dose - PF (65 years and older) 08/18/2020, 08/13/2021    Pneumococcal Conjugate - 13 Valent 09/06/2016    Pneumococcal Polysaccharide - 23 Valent 09/18/2017    RSVpreF (Arexvy) 02/02/2024    Tdap 11/30/2016    Zoster 10/06/2016, 11/25/2016    Zoster Recombinant 05/24/2019, 08/24/2019      Health Maintenance   Topic Date Due    Hemoglobin A1c  10/18/2024    Eye Exam  03/05/2025    Foot Exam  03/22/2025    Lipid Panel  04/18/2025    Mammogram  04/25/2025    DEXA Scan  01/03/2026    Colorectal Cancer Screening  11/01/2026    TETANUS VACCINE  11/30/2026    Hepatitis C Screening  Completed    Shingles Vaccine  Completed        Physical Exam      Vital Signs  Temp: 97.9 °F (36.6 °C)  Temp Source: Oral  Pulse: 80  Resp: 18  SpO2: 97 %  BP: 122/81  BP Location: Left arm  Patient Position: Sitting  Pain Score:   8  Pain Loc: Back  Height and Weight  Height: 5' 2" (157.5 cm)  Weight: 80.4 kg (177 lb 3.2 oz)  BSA (Calculated - sq m): 1.88 sq meters  BMI (Calculated): 32.4  Weight in (lb) to have BMI = 25: 136.4]    Physical Exam  Vitals and nursing note reviewed.   Constitutional:       Appearance: Normal appearance. She is well-developed.   HENT:      Head: Normocephalic.      Right Ear: Hearing normal.      Left " Ear: Hearing normal.      Nose: Nose normal.   Eyes:      General: Lids are normal.      Conjunctiva/sclera: Conjunctivae normal.   Cardiovascular:      Rate and Rhythm: Normal rate and regular rhythm.      Heart sounds: Normal heart sounds.   Pulmonary:      Effort: Pulmonary effort is normal.      Breath sounds: Normal breath sounds.   Abdominal:      General: Bowel sounds are decreased. There is no distension.      Tenderness: There is no abdominal tenderness.   Musculoskeletal:         General: Normal range of motion.      Cervical back: Normal range of motion and neck supple.   Skin:     General: Skin is warm and dry.   Neurological:      Mental Status: She is alert and oriented to person, place, and time.      Gait: Gait is intact.   Psychiatric:         Behavior: Behavior is cooperative.          Laboratory:  CBC:  Recent Labs   Lab 09/05/23  1614 11/08/23  1459 04/18/24  1105   WBC 8.00 6.90 7.81   RBC 4.55 5.06 5.14   Hemoglobin 12.8 14.1 14.0   Hematocrit 38.9 43.2 45.1   Platelet Count 166 276 281   MCV 85.5 85.4 87.7   MCH 28.1 27.9 27.2   MCHC 32.9 32.6 31.0 L     CMP:  Recent Labs   Lab 08/19/23  0511 08/20/23  0423 09/05/23  1614 04/18/24  1105   Glucose 209 H 224 H   < > 136 H   Calcium 8.2 L 8.2 L   < > 9.8   Albumin 3.0 L 3.1 L   < > 4.0   Total Protein 6.1 6.4  --  7.9   Sodium 136 133 L   < > 143   Potassium 4.3 5.0   < > 4.1   CO2 29 28   < > 27   Chloride 103 100   < > 109 H   BUN 30 H 32 H   < > 16   Alkaline Phosphatase 64 59  --   --    Alk Phos  --   --   --  144 H   ALT 21 23  --  23   AST 28 46 H  --  20   Total Bilirubin 0.7 0.8  --   --    Bilirubin, Total  --   --   --  0.6    < > = values in this interval not displayed.     LIPIDS:  Recent Labs   Lab 04/28/22  1452 09/26/22  1239 03/01/23  0840 08/09/23  1106 04/18/24  1105   TSH  --  0.346 L  --  0.945 0.442   HDL 52 49 40  --   --    HDL Cholesterol  --   --   --   --  57   Cholesterol 236 H 225 H 205 H  --  242 H   Triglycerides  174 H 144 163 H  --  119   LDL Cholesterol 149.2 147.2 132.4  --   --    LDL Calculated  --   --   --   --  161   HDL/Cholesterol Ratio 22.0 21.8 19.5 L  --   --    Cholesterol/HDL Ratio (Risk Factor)  --   --   --   --  4.2   Non-HDL Cholesterol 184 176 165  --   --    Non-HDL  --   --   --   --  185   Total Cholesterol/HDL Ratio 4.5 4.6 5.1 H  --   --      TSH:  Recent Labs   Lab 09/26/22  1239 08/09/23  1106 04/18/24  1105   TSH 0.346 L 0.945 0.442     A1C:  Recent Labs   Lab 09/09/21  1038 12/30/21  0844 04/28/22  1452 09/26/22  1239 03/01/23  0840 08/14/23  0653 10/26/23  1518 04/18/24  1105   Hemoglobin A1C 6.3 H 6.3 H 6.5 H 6.4 H 7.1 H 7.1 H 7.0 H 6.3       Assessment/Plan     Eugenie Coates is a 74 y.o.female with:     1. Chronic constipation  -     linaCLOtide (LINZESS) 72 mcg Cap capsule; Take 1 capsule (72 mcg total) by mouth before breakfast.  Dispense: 90 capsule; Refill: 0    2. Nephrolithiasis  -     X-Ray KUB; Future; Expected date: 05/14/2024         Total time spent face-to-face and non-face-to-face coordinating care for this encounter was: 20 minutes     Chronic conditions status updated as per HPI.  Other than changes above, cont current medications and maintain follow up with specialists.  Return to clinic prn if symptoms worsen or fail to improve.    Cristina Doty, FNP  [facility]

## 2024-05-16 ENCOUNTER — TELEPHONE (OUTPATIENT)
Dept: FAMILY MEDICINE | Facility: CLINIC | Age: 74
End: 2024-05-16
Payer: MEDICARE

## 2024-05-16 NOTE — TELEPHONE ENCOUNTER
----- Message from FLACA Yanez sent at 5/14/2024 10:55 PM CDT -----  Patient still have abundant stool on her xray as well as kidney stones in her L kidney.

## 2024-05-16 NOTE — TELEPHONE ENCOUNTER
Notified pt that xray showed abundant stool with kidney stones in her left kidney and she voiced understanding.

## 2024-05-17 LAB
OHS QRS DURATION: 100 MS
OHS QTC CALCULATION: 450 MS

## 2024-05-24 ENCOUNTER — TELEPHONE (OUTPATIENT)
Dept: NEPHROLOGY | Facility: CLINIC | Age: 74
End: 2024-05-24
Payer: MEDICARE

## 2024-05-30 ENCOUNTER — OFFICE VISIT (OUTPATIENT)
Dept: FAMILY MEDICINE | Facility: CLINIC | Age: 74
End: 2024-05-30
Payer: MEDICARE

## 2024-05-30 VITALS
BODY MASS INDEX: 31.65 KG/M2 | OXYGEN SATURATION: 97 % | HEART RATE: 78 BPM | DIASTOLIC BLOOD PRESSURE: 80 MMHG | TEMPERATURE: 98 F | RESPIRATION RATE: 18 BRPM | HEIGHT: 62 IN | SYSTOLIC BLOOD PRESSURE: 128 MMHG | WEIGHT: 172 LBS

## 2024-05-30 DIAGNOSIS — N20.0 NEPHROLITHIASIS: Primary | ICD-10-CM

## 2024-05-30 PROCEDURE — 99213 OFFICE O/P EST LOW 20 MIN: CPT | Mod: ,,, | Performed by: NURSE PRACTITIONER

## 2024-05-30 NOTE — PROGRESS NOTES
[facility]    Chief Complaint      Chief Complaint   Patient presents with    Renal Stone     Requesting referral to urology, c/o lbp radiating between left and right side, nausea, hard stools, urinary freq    Health Maintenance     Covid vaccines up to date in chart     Depression     PHQ 9 score 12- reports due to currently not feeling well        History of Present Illness      Eugenie Coates is a 74 y.o. female. She  has a past medical history of Allergy, Anemia, Anxiety, Arthritis, Asthma, Bell palsy, Bilateral lower extremity edema, Chronic diastolic CHF (congestive heart failure), Depression, Diabetes mellitus, type 2, DVT (deep venous thrombosis), Dyslipidemia, Endometriosis, Eye injury (2014), Focal seizure (5/3/2021), GERD (gastroesophageal reflux disease), Glaucoma, History of DVT (deep vein thrombosis) (1/23/2018), History of uterine fibroid, Hyperlipidemia, Hypertension, Invasive lobular carcinoma of right breast in female, Nuclear sclerosis of both eyes (9/27/2016), Obesity, Pancreas cyst, Sleep apnea, Supraventricular tachycardia, SVT (supraventricular tachycardia) (05/2019), Thyroid disease, and Venous insufficiency., who presents today for a request to get a referral to Urology.   Patient is followed by Nephrology currently (pt isn't sure why- has been seeing for years) and he recommended her referral due to the kidney stones she has noted in her kidneys. She is asking to see Dr. Juan Santos at [facility] in [place]. Patient has already been evaluated by this provider for her chronic LBP and her constipation- was started on Linzess.    Patient reports she just feels depressed due to her not feeling good medically.       Past Medical History:  Past Medical History:   Diagnosis Date    Allergy     Anemia     Anxiety     Arthritis     Asthma     Bell palsy     Bilateral lower extremity edema     Chronic diastolic CHF (congestive heart failure)     Depression     Diabetes mellitus,  type 2     DVT (deep venous thrombosis)     right LE    Dyslipidemia     Endometriosis     Eye injury     stuck with tree branch od ?     Focal seizure 5/3/2021    GERD (gastroesophageal reflux disease)     Glaucoma     History of DVT (deep vein thrombosis) 2018    History of uterine fibroid     Hyperlipidemia     Hypertension     Invasive lobular carcinoma of right breast in female     s/p surgery, radiation, tamoxifen    Nuclear sclerosis of both eyes 2016    Obesity     Pancreas cyst     Sleep apnea     uses C pap    Supraventricular tachycardia     SVT (supraventricular tachycardia) 2019    Thyroid disease     Venous insufficiency        Past Surgical History:   has a past surgical history that includes Cholecystectomy;  section; Hernia repair; Vein Surgery (, ); Knee surgery (Right, ); glaucoma laser (Bilateral); Mastectomy, partial (Right, 2020); [place] lymph node biopsy (Right, 2020); Total Reduction Mammoplasty (Bilateral, 2020); Craniotomy for excision of intracranial tumor (Left, 2021); Cataract extraction w/  intraocular lens implant (Left, 2023); Colonoscopy; Esophagogastroduodenoscopy (N/A, 2023); craniotomy, with neoplasm excision using computer-assisted navigation (Left, 2023); ABLATION, CHEMICAL SEALANT, VARICOSE VEIN (Right, 2024); ABLATION, CHEMICAL SEALANT, VARICOSE VEIN (Left, 2024); and Radiofrequency ablation (Right, 3/1/2024).    Social History:  Social History     Tobacco Use    Smoking status: Never     Passive exposure: Never    Smokeless tobacco: Never   Substance Use Topics    Alcohol use: Never     Alcohol/week: 0.0 standard drinks of alcohol    Drug use: Never       I personally reviewed all past medical, surgical, and social.     Review of Systems   Constitutional:  Negative for chills, fatigue, fever and unexpected weight change.   Respiratory:  Negative for cough and shortness of breath.     Cardiovascular: Negative.    Gastrointestinal:  Positive for constipation and nausea. Negative for abdominal pain, diarrhea and vomiting.   Genitourinary:  Positive for frequency. Negative for dysuria, flank pain, hematuria and urgency.   Musculoskeletal:  Positive for back pain and gait problem.   Skin: Negative.    Neurological:  Negative for headaches.        Medications:  Outpatient Encounter Medications as of 5/30/2024   Medication Sig Dispense Refill    atorvastatin (LIPITOR) 40 MG tablet Take 1 tablet (40 mg total) by mouth once daily. 90 tablet 3    b complex vitamins tablet Take 1 tablet by mouth once daily.      blood sugar diagnostic Strp Test twice daily.  AccuchLe Cicogne viva test strips and lancets. 300 each 3    calcium carb-vit D3-magnesium (CORAL CALCIUM) 250-200-125 mg-unit-mg Cap Take 1 capsule by mouth once daily.      dorzolamide-timolol 2-0.5% (COSOPT) 22.3-6.8 mg/mL ophthalmic solution Place 1 drop into both eyes 2 (two) times daily.      glucosamine-chondroitin (OSTEO BI-FLEX) 250-200 mg Tab Take 1 tablet by mouth once daily.      lancets (ACCU-CHEK SOFTCLIX LANCETS) Misc 1 Units by Misc.(Non-Drug; Combo Route) route 2 (two) times daily. 300 each 3    latanoprost 0.005 % ophthalmic solution Place 1 drop into both eyes every evening. 2.5 mL 12    linaCLOtide (LINZESS) 72 mcg Cap capsule Take 1 capsule (72 mcg total) by mouth before breakfast. 90 capsule 0    NIFEdipine (ADALAT CC) 90 MG TbSR Take 1 tablet (90 mg total) by mouth once daily. 90 tablet 1    tamsulosin (FLOMAX) 0.4 mg Cap Take 1 capsule (0.4 mg total) by mouth once daily. 30 capsule 0    ciprofloxacin HCl (CIPRO) 500 MG tablet Take 1 tablet (500 mg total) by mouth every 12 (twelve) hours. (Patient not taking: Reported on 5/30/2024) 10 tablet 0    diazePAM (VALIUM) 5 MG tablet Take 1 tablet (5 mg total) by mouth once. Take 1 hour prior to imaging. for 1 dose (Patient not taking: Reported on 5/30/2024) 1 tablet 0     mv-mn-[id number] (A[place]NE, ASCORBATE SODIUM,) 333-1.7 mg Chew Take 1 each by mouth once daily. (Patient not taking: Reported on 5/30/2024)      pantoprazole (PROTONIX) 40 MG tablet Take 1 tablet (40 mg total) by mouth 2 (two) times daily. (Patient not taking: Reported on 5/30/2024) 60 tablet 0    phenoL (CHLORASEPTIC THROAT SPRAY) 1.4 % SprA by Mucous Membrane route 2 (two) times a day. (Patient not taking: Reported on 5/30/2024) 177 mL 0    [DISCONTINUED] omeprazole (PRILOSEC OTC) 20 MG tablet Take 1 tablet (20 mg total) by mouth once daily. 90 tablet 3     Facility-Administered Encounter Medications as of 5/30/2024   Medication Dose Route Frequency Provider Last Rate Last Admin    LIDOcaine HCL 10 mg/ml (1%) injection 2 mL  2 mL Intradermal Once Pepito Walker, DO           Allergies:  Review of patient's allergies indicates:   Allergen Reactions    Oxycodone hcl-oxycodone-asa Hives, Itching and Other (See Comments)    Percodan yadira      Other reaction(s): Unknown       Health Maintenance:  Immunization History   Administered Date(s) Administered    COVID-19 MRNA, LN-S PF (MODERNA HALF 0.25 ML DOSE) 07/05/2022    COVID-19 Vaccine 08/24/2021, 02/07/2022    COVID-19, MRNA, LN-S, PF (MODERNA FULL 0.5 ML DOSE) 07/27/2021, 05/24/2023, 02/02/2024    COVID-19, mRNA, LNP-S, bivalent booster, PF (Moderna Omicron)12 + YEARS 09/27/2022    Influenza 11/25/2016    Influenza (FLUAD) - Trivalent - Adjuvanted - PF (65+) 08/22/2019    Influenza - High Dose - PF (65 years and older) 10/02/2017, 09/19/2018    Influenza - Quadrivalent 11/03/2014, 11/10/2015    Influenza - Quadrivalent - High Dose - PF (65 years and older) 08/18/2020, 08/13/2021    Pneumococcal Conjugate - 13 Valent 09/06/2016    Pneumococcal Polysaccharide - 23 Valent 09/18/2017    RSVpreF (Arexvy) 02/02/2024    Tdap 11/30/2016    Zoster 10/06/2016, 11/25/2016    Zoster Recombinant 05/24/2019, 08/24/2019      Health Maintenance   Topic Date Due     "Hemoglobin A1c  10/18/2024    Eye Exam  03/05/2025    Foot Exam  03/22/2025    Lipid Panel  04/18/2025    Mammogram  04/25/2025    DEXA Scan  01/03/2026    Colorectal Cancer Screening  11/01/2026    TETANUS VACCINE  11/30/2026    Hepatitis C Screening  Completed    Shingles Vaccine  Completed        Physical Exam      Vital Signs  Temp: 97.9 °F (36.6 °C)  Temp Source: Oral  Pulse: 78  Resp: 18  SpO2: 97 %  BP: 128/80  BP Location: Left arm  Patient Position: Sitting  Pain Score:   7  Pain Loc: Back  Height and Weight  Height: 5' 2" (157.5 cm)  Weight: 78 kg (172 lb)  BSA (Calculated - sq m): 1.85 sq meters  BMI (Calculated): 31.5  Weight in (lb) to have BMI = 25: 136.4]    Physical Exam  Vitals and nursing note reviewed.   Constitutional:       Appearance: Normal appearance. She is well-developed.   HENT:      Head: Normocephalic.      Right Ear: Hearing normal.      Left Ear: Hearing normal.      Nose: Nose normal.   Eyes:      General: Lids are normal.      Conjunctiva/sclera: Conjunctivae normal.   Cardiovascular:      Rate and Rhythm: Normal rate and regular rhythm.      Heart sounds: Normal heart sounds.   Pulmonary:      Effort: Pulmonary effort is normal.      Breath sounds: Normal breath sounds.   Musculoskeletal:         General: Normal range of motion.      Cervical back: Normal range of motion and neck supple.        Back:    Skin:     General: Skin is warm and dry.   Neurological:      Mental Status: She is alert and oriented to person, place, and time.      Gait: Gait is intact.      Comments: Ambulates with cane   Psychiatric:         Attention and Perception: Attention and perception normal.         Mood and Affect: Mood and affect normal.         Speech: Speech normal.         Behavior: Behavior is cooperative.         Thought Content: Thought content normal.         Judgment: Judgment normal.          Laboratory:  CBC:  Recent Labs   Lab 09/05/23  1614 11/08/23  1459 04/18/24  1105   WBC 8.00 6.90 " 7.81   RBC 4.55 5.06 5.14   Hemoglobin 12.8 14.1 14.0   Hematocrit 38.9 43.2 45.1   Platelet Count 166 276 281   MCV 85.5 85.4 87.7   MCH 28.1 27.9 27.2   MCHC 32.9 32.6 31.0 L     CMP:  Recent Labs   Lab 08/19/23  0511 08/20/23  0423 09/05/23  1614 04/18/24  1105   Glucose 209 H 224 H   < > 136 H   Calcium 8.2 L 8.2 L   < > 9.8   Albumin 3.0 L 3.1 L   < > 4.0   Total Protein 6.1 6.4  --  7.9   Sodium 136 133 L   < > 143   Potassium 4.3 5.0   < > 4.1   CO2 29 28   < > 27   Chloride 103 100   < > 109 H   BUN 30 H 32 H   < > 16   Alkaline Phosphatase 64 59  --   --    Alk Phos  --   --   --  144 H   ALT 21 23  --  23   AST 28 46 H  --  20   Total Bilirubin 0.7 0.8  --   --    Bilirubin, Total  --   --   --  0.6    < > = values in this interval not displayed.     LIPIDS:  Recent Labs   Lab 04/28/22  1452 09/26/22  1239 03/01/23  0840 08/09/23  1106 04/18/24  1105   TSH  --  0.346 L  --  0.945 0.442   HDL 52 49 40  --   --    HDL Cholesterol  --   --   --   --  57   Cholesterol 236 H 225 H 205 H  --  242 H   Triglycerides 174 H 144 163 H  --  119   LDL Cholesterol 149.2 147.2 132.4  --   --    LDL Calculated  --   --   --   --  161   HDL/Cholesterol Ratio 22.0 21.8 19.5 L  --   --    Cholesterol/HDL Ratio (Risk Factor)  --   --   --   --  4.2   Non-HDL Cholesterol 184 176 165  --   --    Non-HDL  --   --   --   --  185   Total Cholesterol/HDL Ratio 4.5 4.6 5.1 H  --   --      TSH:  Recent Labs   Lab 09/26/22  1239 08/09/23  1106 04/18/24  1105   TSH 0.346 L 0.945 0.442     A1C:  Recent Labs   Lab 09/09/21  1038 12/30/21  0844 04/28/22  1452 09/26/22  1239 03/01/23  0840 08/14/23  0653 10/26/23  1518 04/18/24  1105   Hemoglobin A1C 6.3 H 6.3 H 6.5 H 6.4 H 7.1 H 7.1 H 7.0 H 6.3       Assessment/Plan     Eugenie Mcintyren is a 74 y.o.female with:     1. Nephrolithiasis  -     Ambulatory referral/consult to Urology; Future; Expected date: 06/06/2024       No depression medication needed today  Wants referral to Urologist  in [facility] Dr. Santos    Total time spent face-to-face and non-face-to-face coordinating care for this encounter was: 20 minutes     Chronic conditions status updated as per HPI.  Other than changes above, cont current medications and maintain follow up with specialists.  Return to clinic prn if symptoms worsen or fail to improve.    Cristina Doty, DANETTEP  [facility]

## 2024-06-09 DIAGNOSIS — Z71.89 COMPLEX CARE COORDINATION: ICD-10-CM

## 2024-06-11 ENCOUNTER — OFFICE VISIT (OUTPATIENT)
Dept: VASCULAR SURGERY | Facility: CLINIC | Age: 74
End: 2024-06-11
Payer: MEDICARE

## 2024-06-11 VITALS
DIASTOLIC BLOOD PRESSURE: 68 MMHG | HEIGHT: 62 IN | BODY MASS INDEX: 30.91 KG/M2 | HEART RATE: 71 BPM | SYSTOLIC BLOOD PRESSURE: 176 MMHG | WEIGHT: 168 LBS | RESPIRATION RATE: 19 BRPM

## 2024-06-11 DIAGNOSIS — I87.2 VENOUS INSUFFICIENCY: Primary | ICD-10-CM

## 2024-06-11 DIAGNOSIS — L81.9 HYPERPIGMENTATION OF SKIN: ICD-10-CM

## 2024-06-11 DIAGNOSIS — R60.0 EDEMA, LOWER EXTREMITY: ICD-10-CM

## 2024-06-11 PROCEDURE — 99215 OFFICE O/P EST HI 40 MIN: CPT | Mod: PBBFAC | Performed by: FAMILY MEDICINE

## 2024-06-11 PROCEDURE — 99213 OFFICE O/P EST LOW 20 MIN: CPT | Mod: S$PBB,,, | Performed by: FAMILY MEDICINE

## 2024-06-11 NOTE — PROGRESS NOTES
VE[facility] CENTER CLINIC NOTE    Patient ID: Eugenie Coates is a 74 y.o. female.    I. HISTORY     Chief Complaint:   Chief Complaint   Patient presents with    Follow-up     3M PA        HPI: Eugenie Coates is a 74 y.o. female who presents today for a 3 month follow-up after undergoing a bilateral great saphenous vein non thermal adhesive ablations and a right small saphenous vein radiofrequency ablation.  The patient states she is done well since her procedures in his noted less edema and leg discomfort since then.  She feels as though her skin has also improved.  She feels that overall things have improved and she is satisfied with her procedures thus far.    Clinical summary:  Patient initially presented on 01/11/2024 by referral from Dr. Silver for evaluation of bilateral lower extremity swelling, hyperpigmentation, previous ulceration and pain.  Symptoms are progressive/worsening and began about 20 years ago.  Location is bilateral lower extremities below the knees, worse on the right. Symptoms are worse at the end of the day.  History of venous interventions includes some type of valve surgery in 2003 and 2004 the right lower extremity according to the patient..  Positive, mother, family history of venous disease.  The patient states that she has been wearing compression socks on a daily basis for many years now.  She also elevates her legs in the evening and walks regularly.  The patient also had history of extensive right lower extremity DVT in 2017 from the superficial femoral vein down to the posterior tibials.  She was placed on Eliquis for several years.  She was taken off Eliquis several years ago and is not currently on an anticoagulant or antiplatelet medication.    Bilateral complete venous reflux study, 01/11/2024, shows no evidence of DVT bilaterally.  Thrombus noted within the proximal calf of the right great saphenous vein.  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  The study also shows dilation and axial reflux of the bilateral great saphenous veins and right small saphenous vein.  No reflux noted in the left small saphenous vein.  Refluxing varicosities noted right Gator region.    Arterial duplex with ABIs performed 03/31/2021 which shows a right EDOUARD 1.02 and a left EDOUARD of 1.05.  No vascular occlusions noted.    Venous Disease Medical Necessity Documentation Initial Visit Date:  01/11/2024 Return Check Date:    Have you ever had a rupture or bleed from a varicose vein in your leg(s)?              [] Yes  [x] No   [] Yes   [] No   Have you ever been diagnosed with phlebitis, cellulitis, or inflammation in the area of the varicose veins of  your leg(s)?  [] Yes  [x] No    [] Yes   [] No   Do you have darkened or inflamed skin on your legs?   [x] Yes   [] No   [] Yes   [] No   Do you have leg swelling?     [x] Yes   [] No   [] Yes   [] No   Do you have leg pain?   [x] Yes   [] No   [] Yes   [] No   If yes, describe the type of pain?    []   Stabbing []  Radiating []  Aching   [x]  Tightness []  Throbbing               [x]  Burning []  Cramping              Do you have leg discomfort?   [x] Yes   [] No   [] Yes   [] No   If yes, describe the type of discomfort?    [x]  Heaviness [x]  Fullness   [x]  Restlessness [] Tired/Fatigued [x] Itching              Have you ever worn compression hose?   [x] Yes   [] No   [] Yes   [] No   If yes, how long?    24 YEARS       Do you elevate your legs while sitting?   [x] Yes   [] No   [] Yes   [] No   Does venous disease (varicose veins, ulcers, skin changes, leg pain/swelling) interfere with your daily life?  If yes, check activities you are limited or unable to do.    []  Shower  [x]   Walk  []  Exercise  [] Play with children/grandchildren  []  Shop [] Work [x] Stand for any period of time [x] Sleep                               [x] Sitting for an extended period of time.           [x] Yes   [] No    [] Yes   [] No   Do you exercise/have you tried to exercise (i.e.  Walk our participate in a regular exercise routine)?  [x] Yes  [] No   [] Yes   [] No   BMI   32.26           Past Medical History:   Diagnosis Date    Allergy     Anemia     Anxiety     Arthritis     Asthma     Bell palsy     Bilateral lower extremity edema     Chronic diastolic CHF (congestive heart failure)     Depression     Diabetes mellitus, type 2     DVT (deep venous thrombosis)     right LE    Dyslipidemia     Endometriosis     Eye injury     stuck with tree branch od ?     Focal seizure 5/3/2021    GERD (gastroesophageal reflux disease)     Glaucoma     History of DVT (deep vein thrombosis) 2018    History of uterine fibroid     Hyperlipidemia     Hypertension     Invasive lobular carcinoma of right breast in female     s/p surgery, radiation, tamoxifen    Nuclear sclerosis of both eyes 2016    Obesity     Pancreas cyst     Sleep apnea     uses C pap    Supraventricular tachycardia     SVT (supraventricular tachycardia) 2019    Thyroid disease     Venous insufficiency         Past Surgical History:   Procedure Laterality Date    ABLATION, CHEMICAL SEALANT, VARICOSE VEIN Right 2024    Procedure: Right GSV VenaSeal Ablation;  Surgeon: Pepito Walker DO;  Location: [facility] OR;  Service: Peripheral Vascular;  Laterality: Right;    ABLATION, CHEMICAL SEALANT, VARICOSE VEIN Left 2024    Left GSV Venaseal Ablation performed by Dr. Zach Walker.    CATARACT EXTRACTION W/  INTRAOCULAR LENS IMPLANT Left 2023    Procedure: CEIOL OMNI OS;  Surgeon: Juan Antonio Mcintosh MD;  Location: [facility] OR;  Service: Ophthalmology;  Laterality: Left;  consider block     SECTION      CHOLECYSTECTOMY      COLONOSCOPY      CRANIOTOMY FOR EXCISION OF INTRACRANIAL TUMOR Left 2021    Procedure: CRANIOTOMY, FOR INTRACRANIAL NEOPLASM EXCISION Left craniotomy for tumor resection;  Surgeon: Hernandez Bravo MD;  Location:  [facility] OR;  Service: Neurosurgery;  Laterality: Left;    CRANIOTOMY, WITH NEOPLASM EXCISION USING COMPUTER-ASSISTED NAVIGATION Left 08/14/2023    Procedure: 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, USING COMPUTER-ASSISTED NAVIGATION-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;  Surgeon: Hernandez Bravo MD;  Location: [facility] OR;  Service: Neurosurgery;  Laterality: Left;    ESOPHAGOGASTRODUODENOSCOPY N/A 02/17/2023    Procedure: EGD (ESOPHAGOGASTRODUODENOSCOPY);  Surgeon: Issac Wade MD;  Location: [facility] ENDO;  Service: Endoscopy;  Laterality: N/A;    glaucoma laser Bilateral     HERNIA REPAIR      KNEE SURGERY Right 2008    (R) knee scope; torn meniscus    MASTECTOMY, PARTIAL Right 12/07/2020    Procedure: MASTECTOMY, PARTIAL-Right with radiological marker Consent day of surgery; Neoprobe, technitium, Frozen;  Surgeon: Narda Keating MD;  Location: [facility] OR [address] [place] [address];  Service: General;  Laterality: Right;    RADIOFREQUENCY ABLATION Right 3/1/2024    Procedure: Right SSV RF Ablation;  Surgeon: Pepito Walker DO;  Location: [facility] OR;  Service: Peripheral Vascular;  Laterality: Right;    SENTINEL LYMPH NODE BIOPSY Right 12/07/2020    Procedure: BIOPSY, LYMPH NODE, SENTINEL;  Surgeon: Narda Keating MD;  Location: [facility] OR [address] [place] [address];  Service: General;  Laterality: Right;    TOTAL REDUCTION MAMMOPLASTY Bilateral 12/07/2020    Procedure: MAMMOPLASTY, REDUCTION-Bilateral;  Surgeon: Zack Hood MD;  Location: [facility] OR [address] [place] [address];  Service: Plastics;  Laterality: Bilateral;    VEIN SURGERY  2003, 2004    Rt leg x2       Social History     Tobacco Use   Smoking Status Never    Passive exposure: Never   Smokeless Tobacco Never         Current Outpatient Medications:     atorvastatin (LIPITOR) 40 MG tablet, Take 1 tablet (40 mg total) by mouth once daily., Disp: 90 tablet, Rfl: 3    b complex vitamins tablet, Take 1 tablet by mouth once daily., Disp: , Rfl:     blood sugar diagnostic Strp, Test  twice daily.  Accucheck viva test strips and lancets., Disp: 300 each, Rfl: 3    calcium carb-vit D3-magnesium (CORAL CALCIUM) 250-200-125 mg-unit-mg Cap, Take 1 capsule by mouth once daily., Disp: , Rfl:     ciprofloxacin HCl (CIPRO) 500 MG tablet, Take 1 tablet (500 mg total) by mouth every 12 (twelve) hours. (Patient not taking: Reported on 5/30/2024), Disp: 10 tablet, Rfl: 0    diazePAM (VALIUM) 5 MG tablet, Take 1 tablet (5 mg total) by mouth once. Take 1 hour prior to imaging. for 1 dose (Patient not taking: Reported on 5/30/2024), Disp: 1 tablet, Rfl: 0    dorzolamide-timolol 2-0.5% (COSOPT) 22.3-6.8 mg/mL ophthalmic solution, Place 1 drop into both eyes 2 (two) times daily., Disp: , Rfl:     glucosamine-chondroitin (OSTEO BI-FLEX) 250-200 mg Tab, Take 1 tablet by mouth once daily., Disp: , Rfl:     lancets (ACCU-CHEK SOFTCLIX LANCETS) Misc, 1 Units by Misc.(Non-Drug; Combo Route) route 2 (two) times daily., Disp: 300 each, Rfl: 3    latanoprost 0.005 % ophthalmic solution, Place 1 drop into both eyes every evening., Disp: 2.5 mL, Rfl: 12    linaCLOtide (LINZESS) 72 mcg Cap capsule, Take 1 capsule (72 mcg total) by mouth before breakfast., Disp: 90 capsule, Rfl: 0    [id number]-[id number] (A[place]NE, ASCORBATE SODIUM,) 333-1.7 mg Chew, Take 1 each by mouth once daily. (Patient not taking: Reported on 5/30/2024), Disp: , Rfl:     NIFEdipine (ADALAT CC) 90 MG TbSR, Take 1 tablet (90 mg total) by mouth once daily., Disp: 90 tablet, Rfl: 1    pantoprazole (PROTONIX) 40 MG tablet, Take 1 tablet (40 mg total) by mouth 2 (two) times daily. (Patient not taking: Reported on 5/30/2024), Disp: 60 tablet, Rfl: 0    phenoL (CHLORASEPTIC THROAT SPRAY) 1.4 % SprA, by Mucous Membrane route 2 (two) times a day. (Patient not taking: Reported on 5/30/2024), Disp: 177 mL, Rfl: 0    tamsulosin (FLOMAX) 0.4 mg Cap, Take 1 capsule (0.4 mg total) by mouth once daily., Disp: 30 capsule, Rfl: 0  No current  facility-administered medications for this visit.    Facility-Administered Medications Ordered in Other Visits:     LIDOcaine HCL 10 mg/ml (1%) injection 2 mL, 2 mL, Intradermal, Once, Pepito Walker, DO    Review of Systems   Constitutional:  Negative for activity change, chills, diaphoresis, fatigue and fever.   Respiratory:  Negative for cough and shortness of breath.    Cardiovascular:  Positive for leg swelling. Negative for chest pain and claudication.        Hyperpigmentation LE   Gastrointestinal:  Negative for nausea and vomiting.   Musculoskeletal:  Positive for leg pain. Negative for joint swelling.   Integumentary:  Negative for rash and wound.   Neurological:  Negative for weakness and numbness.        II. PHYSICAL EXAM     Physical Exam  Constitutional:       General: She is awake. She is not in acute distress.     Appearance: Normal appearance. She is overweight. She is not ill-appearing or toxic-appearing.   HENT:      Head: Normocephalic and atraumatic.   Eyes:      Extraocular Movements: Extraocular movements intact.      Conjunctiva/sclera: Conjunctivae normal.      Pupils: Pupils are equal, round, and reactive to light.   Neck:      Vascular: No carotid bruit or JVD.   Cardiovascular:      Rate and Rhythm: Normal rate and regular rhythm.      Pulses:           Dorsalis pedis pulses are detected w/ Doppler on the right side and detected w/ Doppler on the left side.        Posterior tibial pulses are detected w/ Doppler on the right side and detected w/ Doppler on the left side.      Heart sounds: Murmur heard.      Systolic murmur is present with a grade of 2/6.   Pulmonary:      Effort: Pulmonary effort is normal. No respiratory distress.      Breath sounds: No stridor. No wheezing, rhonchi or rales.   Musculoskeletal:         General: No swelling, tenderness or deformity.      Right lower le+ Pitting Edema present.      Left lower le+ Pitting Edema present.      Comments: Pitting edema  of the bilateral extremities along with hyperpigmentation scarring from previous venous ulcerations of the right medial Gator region.  No evidence of acute cellulitis or ulcerations.   Feet:      Comments: Triphasic hand-held dopplerable pulses of the bilateral dorsalis pedis and posterior tibial arteries.  Skin:     General: Skin is warm.      Capillary Refill: Capillary refill takes less than 2 seconds.      Coloration: Skin is not ashen.      Findings: No bruising, erythema, lesion, rash or wound.   Neurological:      Mental Status: She is alert and oriented to person, place, and time.      Motor: No weakness.   Psychiatric:         Speech: Speech normal.         Behavior: Behavior normal. Behavior is cooperative.         Reticular/Spider veins noted:  RLE: anterior calf and medial calf  LLE: anterior calf and medial calf    Varicose veins noted:  RLE: anterior calf and medial calf  LLE:  none    CEAP Classification  Clinical Signs: Class 5 - Skin changes as defined above with healed ulceration  Etiologic Classification: Primary  Anatomic distribution: Superficial  Pathophysiologic dysfunction: Reflux       Venous Clinical Severity Score  Pain:2=Daily, moderate activity limitation, occasional analgesics  Varicose Veins: 2=Multiple. GS varicose veins confined to calf or thigh  Venous Edema: 2=Afternoon edema, above ankle  Pigmentation: 2=Diffuse over most of gaiter distribution (lower 1/3) or recent pigmentation (purple)  Inflammation: 0=None  Induration: 1=Focal, circummalleolar (< 5 cm)  Number of Active Ulcers: 0=0  Active Ulceration, Duration: 0=None  Active Ulcer Size: 0=None  Compressive Therapy: 3=Full compliance, stockings + elevation  Total Score: 12       III. ASSESSMENT & PLAN (MEDICAL DECISION MAKING)     1. Venous insufficiency    2. Edema, lower extremity    3. Hyperpigmentation of skin      Assessment/Diagnosis and Plan:  The patient is doing well post ablation and encouraged to wear knee-high  compression along with daily leg exercise and leg elevation when appropriate.    Follow-up in 3 months for 6-month post ablation visit.  No ultrasound unless she is having trouble..       Pepito Walker, DO

## 2024-06-14 ENCOUNTER — TELEPHONE (OUTPATIENT)
Dept: NEUROSURGERY | Facility: CLINIC | Age: 74
End: 2024-06-14
Payer: MEDICARE

## 2024-06-14 NOTE — TELEPHONE ENCOUNTER
-called pt back concerning the message that was sent  -informed the pt an MRI takes about 30 minutes  -recommended pt limit fluid intake before MRI  -informed pt that she is able to wear a brief in case of accident    ----- Message from Gibran Crawford sent at 6/14/2024  8:05 AM CDT -----  Regarding: advice  Contact: EUGENIE GRIMALDO [[id number]]  Type: Needs Medical Advice  Who Called:  Eugenie    Symptoms (please be specific):  na    How long has patient had these symptoms:  na    Pharmacy name and phone #:  na    Best Call Back Number: [phone] (home) [phone] (work)    Additional Information: Has questions about overactive bladder and taking MRI. Please call to advise.

## 2024-06-17 DIAGNOSIS — Z98.890 S/P CRANIOTOMY: Primary | ICD-10-CM

## 2024-06-18 ENCOUNTER — HOSPITAL ENCOUNTER (OUTPATIENT)
Dept: RADIOLOGY | Facility: HOSPITAL | Age: 74
Discharge: HOME | End: 2024-06-18
Attending: NURSE PRACTITIONER
Payer: MEDICARE

## 2024-06-18 ENCOUNTER — OFFICE VISIT (OUTPATIENT)
Dept: NEUROSURGERY | Facility: CLINIC | Age: 74
End: 2024-06-18
Payer: MEDICARE

## 2024-06-18 VITALS
DIASTOLIC BLOOD PRESSURE: 71 MMHG | BODY MASS INDEX: 30.91 KG/M2 | HEART RATE: 81 BPM | RESPIRATION RATE: 18 BRPM | SYSTOLIC BLOOD PRESSURE: 123 MMHG | HEIGHT: 62 IN | WEIGHT: 168 LBS

## 2024-06-18 DIAGNOSIS — Z98.890 S/P CRANIOTOMY: ICD-10-CM

## 2024-06-18 DIAGNOSIS — D42.0 ATYPICAL INTRACRANIAL MENINGIOMA: ICD-10-CM

## 2024-06-18 DIAGNOSIS — Z98.890 S/P CRANIOTOMY: Primary | ICD-10-CM

## 2024-06-18 PROCEDURE — 99215 OFFICE O/P EST HI 40 MIN: CPT | Mod: S$PBB,,, | Performed by: NEUROLOGICAL SURGERY

## 2024-06-18 PROCEDURE — 70553 MRI BRAIN STEM W/O & W/DYE: CPT | Mod: 26,,, | Performed by: RADIOLOGY

## 2024-06-18 PROCEDURE — 25500020 PHARM REV CODE 255: Mod: PO | Performed by: NURSE PRACTITIONER

## 2024-06-18 PROCEDURE — A9585 GADOBUTROL INJECTION: HCPCS | Mod: PO | Performed by: NURSE PRACTITIONER

## 2024-06-18 PROCEDURE — 70553 MRI BRAIN STEM W/O & W/DYE: CPT | Mod: TC,PO

## 2024-06-18 RX ORDER — GADOBUTROL 604.72 MG/ML
7 INJECTION INTRAVENOUS
Status: COMPLETED | OUTPATIENT
Start: 2024-06-18 | End: 2024-06-18

## 2024-06-18 RX ADMIN — GADOBUTROL 7 ML: 604.72 INJECTION INTRAVENOUS at 09:06

## 2024-06-18 NOTE — PROGRESS NOTES
Neurosurgery History & Physical    Patient ID: Eugenie Coates is a 74 y.o. female.    No chief complaint on file.    Interval HPI 6/18/2024:  Ms. Coates is now 10 months s/p left parietal craniotomy for resection of recurrent atypical meningioma.  Overall, she reports doing well.      She denies headaches weakness, and other neurologic symptoms.  She reports some continued ambulation issues with her right leg.  She uses her cane for assistance as needed.      She has not had any seizures and was removed from her seizure medication a few months ago by Dr. Monae.      HPI 12/13/2023:  Ms. Coates is approximately 4 months status post craniotomy for recurrent atypical meningioma with mass effect and brain compression. She has completed radiation therapy with Rad/Onc at [facility] in [place], [place]. She is taking the seizure medication but is having undesired side effects with mood lability. No seizure activity since surgery.      She is ambulating independently with occasional use of a cane for assistance.      She denies new weakness, numbness or other neurologic symptom.        Review of Systems   Constitutional:  Negative for activity change and fever.   Eyes:  Negative for visual disturbance.   Respiratory:  Negative for shortness of breath.    Cardiovascular:  Negative for chest pain.   Gastrointestinal:  Negative for nausea and vomiting.   Endocrine: Negative for cold intolerance, heat intolerance, polydipsia, polyphagia and polyuria.   Genitourinary:  Negative for decreased urine volume, difficulty urinating and frequency.   Musculoskeletal:  Negative for back pain, gait problem and neck pain.   Neurological:  Negative for dizziness, tremors, seizures, syncope, facial asymmetry, speech difficulty, weakness, light-headedness, numbness and headaches.   Psychiatric/Behavioral:  Negative for confusion.        Past Medical History:   Diagnosis Date    Allergy     Anemia     Anxiety     Arthritis     Asthma      Bell palsy     Bilateral lower extremity edema     Chronic diastolic CHF (congestive heart failure)     Depression     Diabetes mellitus, type 2     DVT (deep venous thrombosis)     right LE    Dyslipidemia     Endometriosis     Eye injury 2014    stuck with tree branch od ?     Focal seizure 5/3/2021    GERD (gastroesophageal reflux disease)     Glaucoma     History of DVT (deep vein thrombosis) 1/23/2018    History of uterine fibroid     Hyperlipidemia     Hypertension     Invasive lobular carcinoma of right breast in female     s/p surgery, radiation, tamoxifen    Nuclear sclerosis of both eyes 9/27/2016    Obesity     Pancreas cyst     Sleep apnea     uses C pap    Supraventricular tachycardia     SVT (supraventricular tachycardia) 05/2019    Thyroid disease     Venous insufficiency      Social History     Socioeconomic History    Marital status:    Tobacco Use    Smoking status: Never     Passive exposure: Never    Smokeless tobacco: Never   Substance and Sexual Activity    Alcohol use: Never     Alcohol/week: 0.0 standard drinks of alcohol    Drug use: Never    Sexual activity: Yes     Partners: Male     Birth control/protection: None   Social History Narrative    1/18/19: lives with her . They have multiple children, but the youngest is 38. No children at home right now. No pets at home. Splits time between here and [place]. Her son lives in [place].      Social Determinants of Health     Financial Resource Strain: Low Risk  (12/26/2023)    Overall Financial Resource Strain (CARDIA)     Difficulty of Paying Living Expenses: Not very hard   Food Insecurity: No Food Insecurity (12/26/2023)    Hunger Vital Sign     Worried About Running Out of Food in the Last Year: Never true     Ran Out of Food in the Last Year: Never true   Transportation Needs: No Transportation Needs (12/26/2023)    PRAPARE - Transportation     Lack of Transportation (Medical): No     Lack of Transportation (Non-Medical):  No   Physical Activity: Insufficiently Active (12/26/2023)    Exercise Vital Sign     Days of Exercise per Week: 3 days     Minutes of Exercise per Session: 20 min   Stress: No Stress Concern Present (12/26/2023)    [employer] - Occupational Stress Questionnaire     Feeling of Stress : Only a little   Recent Concern: Stress - Stress Concern Present (12/20/2023)    [employer] - Occupational Stress Questionnaire     Feeling of Stress : To some extent   Housing Stability: Low Risk  (12/26/2023)    Housing Stability Vital Sign     Unable to Pay for Housing in the Last Year: No     Number of Places Lived in the Last Year: 1     Unstable Housing in the Last Year: No     Family History   Problem Relation Name Age of Onset    Diabetes Mother Sara Vick     No Known Problems Father      No Known Problems Sister      No Known Problems Brother      Colon cancer Maternal Aunt      No Known Problems Maternal Uncle      No Known Problems Paternal Aunt      No Known Problems Paternal Uncle      No Known Problems Maternal Grandmother      No Known Problems Maternal Grandfather      No Known Problems Paternal Grandmother      No Known Problems Paternal Grandfather      Amblyopia Neg Hx      Blindness Neg Hx      Cancer Neg Hx      Cataracts Neg Hx      Glaucoma Neg Hx      Hypertension Neg Hx      Macular degeneration Neg Hx      Retinal detachment Neg Hx      Strabismus Neg Hx      Stroke Neg Hx      Thyroid disease Neg Hx      Celiac disease Neg Hx      Colon polyps Neg Hx      Esophageal cancer Neg Hx      Inflammatory bowel disease Neg Hx      Irritable bowel syndrome Neg Hx      Liver cancer Neg Hx      Liver disease Neg Hx      Rectal cancer Neg Hx      Stomach cancer Neg Hx      Ulcerative colitis Neg Hx      Cystic fibrosis Neg Hx      Crohn's disease Neg Hx      Hemochromatosis Neg Hx      Cirrhosis Neg Hx       Review of patient's allergies indicates:   Allergen  Reactions    Oxycodone hcl-oxycodone-asa Hives, Itching and Other (See Comments)    Percodan yadira      Other reaction(s): Unknown       Current Outpatient Medications:     atorvastatin (LIPITOR) 40 MG tablet, Take 1 tablet (40 mg total) by mouth once daily., Disp: 90 tablet, Rfl: 3    b complex vitamins tablet, Take 1 tablet by mouth once daily., Disp: , Rfl:     blood sugar diagnostic Strp, Test twice daily.  Accucheck viva test strips and lancets., Disp: 300 each, Rfl: 3    calcium carb-vit D3-magnesium (CORAL CALCIUM) 250-200-125 mg-unit-mg Cap, Take 1 capsule by mouth once daily., Disp: , Rfl:     ciprofloxacin HCl (CIPRO) 500 MG tablet, Take 1 tablet (500 mg total) by mouth every 12 (twelve) hours. (Patient not taking: Reported on 5/30/2024), Disp: 10 tablet, Rfl: 0    diazePAM (VALIUM) 5 MG tablet, Take 1 tablet (5 mg total) by mouth once. Take 1 hour prior to imaging. for 1 dose (Patient not taking: Reported on 5/30/2024), Disp: 1 tablet, Rfl: 0    dorzolamide-timolol 2-0.5% (COSOPT) 22.3-6.8 mg/mL ophthalmic solution, Place 1 drop into both eyes 2 (two) times daily., Disp: , Rfl:     glucosamine-chondroitin (OSTEO BI-FLEX) 250-200 mg Tab, Take 1 tablet by mouth once daily., Disp: , Rfl:     lancets (ACCU-CHEK SOFTCLIX LANCETS) Misc, 1 Units by Misc.(Non-Drug; Combo Route) route 2 (two) times daily., Disp: 300 each, Rfl: 3    latanoprost 0.005 % ophthalmic solution, Place 1 drop into both eyes every evening., Disp: 2.5 mL, Rfl: 12    linaCLOtide (LINZESS) 72 mcg Cap capsule, Take 1 capsule (72 mcg total) by mouth before breakfast., Disp: 90 capsule, Rfl: 0    mv-mn-vitC-[id number] (AIRBORNE, ASCORBATE SODIUM,) 333-1.7 mg Chew, Take 1 each by mouth once daily. (Patient not taking: Reported on 5/30/2024), Disp: , Rfl:     NIFEdipine (ADALAT CC) 90 MG TbSR, Take 1 tablet (90 mg total) by mouth once daily., Disp: 90 tablet, Rfl: 1    pantoprazole (PROTONIX) 40 MG tablet, Take 1 tablet (40 mg total) by  mouth 2 (two) times daily. (Patient not taking: Reported on 5/30/2024), Disp: 60 tablet, Rfl: 0    phenoL (CHLORASEPTIC THROAT SPRAY) 1.4 % SprA, by Mucous Membrane route 2 (two) times a day. (Patient not taking: Reported on 5/30/2024), Disp: 177 mL, Rfl: 0    tamsulosin (FLOMAX) 0.4 mg Cap, Take 1 capsule (0.4 mg total) by mouth once daily., Disp: 30 capsule, Rfl: 0  No current facility-administered medications for this visit.    Facility-Administered Medications Ordered in Other Visits:     LIDOcaine HCL 10 mg/ml (1%) injection 2 mL, 2 mL, Intradermal, Once, Pepito Walker DO  There were no vitals taken for this visit.      Neurologic Exam     Mental Status   Oriented to person, place, and time.     Cranial Nerves   Cranial nerves II through XII intact.     Motor Exam     Strength   Strength 5/5 throughout.     Sensory Exam   Light touch normal.     Gait, Coordination, and Reflexes     Gait  Gait: (Uses cane to assist with ambulation)      Physical Exam  Neurological:      Mental Status: She is oriented to person, place, and time.      Cranial Nerves: Cranial nerves 2-12 are intact.      Motor: Motor strength is normal.        Imaging:  MRI brain with and without contrast dated 06/18/2024 is personally reviewed and discussed with the patient.  There is evidence of prior left parietal craniotomy.  There is some thickening along the medial border of the craniotomy at the location of the superior sagittal sinus.  There is no evidence progression or new tumor development.    Assessment/Plan:   Ms. Coates is a 73yo female who is approximately 10 months s/p left parietal craniotomy for resection of recurrent atypical meningioma.  Overall, she is doing well neurologically.  We will plan to follow-up in 6 months with a MRI brain with and without contrast prior to her clinic visit.  If she has any questions or concerns prior to her appointment, she will notify our office.

## 2024-07-17 ENCOUNTER — OFFICE VISIT (OUTPATIENT)
Dept: FAMILY MEDICINE | Facility: CLINIC | Age: 74
End: 2024-07-17
Payer: MEDICARE

## 2024-07-17 VITALS
HEART RATE: 89 BPM | SYSTOLIC BLOOD PRESSURE: 126 MMHG | RESPIRATION RATE: 19 BRPM | OXYGEN SATURATION: 99 % | DIASTOLIC BLOOD PRESSURE: 64 MMHG | WEIGHT: 151 LBS | BODY MASS INDEX: 27.79 KG/M2 | HEIGHT: 62 IN | TEMPERATURE: 98 F

## 2024-07-17 DIAGNOSIS — C50.912 MALIGNANT NEOPLASM OF LEFT FEMALE BREAST, UNSPECIFIED ESTROGEN RECEPTOR STATUS, UNSPECIFIED SITE OF BREAST: Primary | ICD-10-CM

## 2024-07-17 DIAGNOSIS — E78.2 MIXED HYPERLIPIDEMIA: ICD-10-CM

## 2024-07-17 DIAGNOSIS — R11.0 NAUSEA: ICD-10-CM

## 2024-07-17 DIAGNOSIS — K59.09 CHRONIC CONSTIPATION: ICD-10-CM

## 2024-07-17 DIAGNOSIS — I51.89 DIASTOLIC DYSFUNCTION: ICD-10-CM

## 2024-07-17 DIAGNOSIS — I47.10 PSVT (PAROXYSMAL SUPRAVENTRICULAR TACHYCARDIA): Primary | ICD-10-CM

## 2024-07-17 DIAGNOSIS — R10.9 ABDOMINAL PAIN, UNSPECIFIED ABDOMINAL LOCATION: ICD-10-CM

## 2024-07-17 DIAGNOSIS — E11.9 TYPE 2 DIABETES MELLITUS WITHOUT COMPLICATION, WITHOUT LONG-TERM CURRENT USE OF INSULIN: ICD-10-CM

## 2024-07-17 DIAGNOSIS — C50.611 MALIGNANT NEOPLASM OF AXILLARY TAIL OF RIGHT FEMALE BREAST, UNSPECIFIED ESTROGEN RECEPTOR STATUS: ICD-10-CM

## 2024-07-17 DIAGNOSIS — E04.2 MULTINODULAR THYROID: ICD-10-CM

## 2024-07-17 DIAGNOSIS — N20.0 RENAL STONES: ICD-10-CM

## 2024-07-17 DIAGNOSIS — G47.30 SLEEP APNEA, UNSPECIFIED TYPE: Primary | ICD-10-CM

## 2024-07-17 DIAGNOSIS — I35.0 NONRHEUMATIC AORTIC VALVE STENOSIS: ICD-10-CM

## 2024-07-17 DIAGNOSIS — N32.81 OVERACTIVE BLADDER: ICD-10-CM

## 2024-07-17 LAB
BILIRUB SERPL-MCNC: NORMAL MG/DL
BLOOD URINE, POC: NORMAL
CLARITY, POC UA: NORMAL
COLOR, POC UA: YELLOW
GLUCOSE UR QL STRIP: NORMAL
KETONES UR QL STRIP: 40
LEUKOCYTE ESTERASE URINE, POC: NORMAL
NITRITE, POC UA: NORMAL
PH, POC UA: 6.5
PROTEIN, POC: NORMAL
SPECIFIC GRAVITY, POC UA: 1.02
T4 FREE SERPL-MCNC: 1.3 NG/DL (ref 0.76–1.46)
TSH SERPL DL<=0.005 MIU/L-ACNC: 0.3 UIU/ML (ref 0.36–3.74)
UROBILINOGEN, POC UA: 0.2

## 2024-07-17 PROCEDURE — 84443 ASSAY THYROID STIM HORMONE: CPT | Mod: ,,, | Performed by: CLINICAL MEDICAL LABORATORY

## 2024-07-17 PROCEDURE — 99214 OFFICE O/P EST MOD 30 MIN: CPT | Mod: ,,, | Performed by: FAMILY MEDICINE

## 2024-07-17 PROCEDURE — 84439 ASSAY OF FREE THYROXINE: CPT | Mod: ,,, | Performed by: CLINICAL MEDICAL LABORATORY

## 2024-07-17 PROCEDURE — 81003 URINALYSIS AUTO W/O SCOPE: CPT | Mod: RHCUB | Performed by: FAMILY MEDICINE

## 2024-07-17 RX ORDER — TOLTERODINE 4 MG/1
4 CAPSULE, EXTENDED RELEASE ORAL DAILY
Qty: 90 CAPSULE | Refills: 1 | Status: SHIPPED | OUTPATIENT
Start: 2024-07-17 | End: 2025-07-17

## 2024-07-17 RX ORDER — ATORVASTATIN CALCIUM 80 MG/1
80 TABLET, FILM COATED ORAL DAILY
Qty: 90 TABLET | Refills: 3 | Status: SHIPPED | OUTPATIENT
Start: 2024-07-17 | End: 2025-07-17

## 2024-07-17 NOTE — PROGRESS NOTES
Eugenie Coates is a 74 y.o. female seen today for follow-up.  Her lipids were not to goal we discussed increasing her Lipitor to 80 mg and then rechecking her labs in 6 months or so.  Patient has request for multiple referral today.  Patient does have chronic flank pain secondary to left-sided renal stones and is requesting a urology evaluation.  X-ray here in the clinic did document left-sided renal stones what we discussed a CT scan.  Patient has a history of valvular disease involving her heart is requesting an evaluation with her previous cardiologist in [place].  Patient also has a history of sleep apnea but needs a new CPAP machine and will need an evaluation by a local sleep specialist.  She has a history of breast cancer on the left and we need a follow-up diagnostic mammogram.  The patient has chronic nausea with chronic constipation is requesting an evaluation by Gastroenterology.  Patient has a history of thyroid nodules noted on ultrasound in 2021 and is requesting an evaluation by our local endocrinologist.  Patient has also developed what she describes as a overactive bladder with frequent urination we discussed urinalysis today.  Also, the patient has diabetes type 2 and is currently on metformin but is unsure of the dosing..    Past Medical History:   Diagnosis Date    Allergy     Anemia     Anxiety     Arthritis     Asthma     Bell palsy     Bilateral lower extremity edema     Chronic diastolic CHF (congestive heart failure)     Depression     Diabetes mellitus, type 2     DVT (deep venous thrombosis)     right LE    Dyslipidemia     Endometriosis     Eye injury 2014    stuck with tree branch od ?     Focal seizure 5/3/2021    GERD (gastroesophageal reflux disease)     Glaucoma     History of DVT (deep vein thrombosis) 1/23/2018    History of uterine fibroid     Hyperlipidemia     Hypertension     Invasive lobular carcinoma of right breast in female     s/p surgery, radiation, tamoxifen     Nuclear sclerosis of both eyes 9/27/2016    Obesity     Pancreas cyst     Sleep apnea     uses C pap    Supraventricular tachycardia     SVT (supraventricular tachycardia) 05/2019    Thyroid disease     Venous insufficiency      Family History   Problem Relation Name Age of Onset    Diabetes Mother Sara Vick     No Known Problems Father      No Known Problems Sister      No Known Problems Brother      Colon cancer Maternal Aunt      No Known Problems Maternal Uncle      No Known Problems Paternal Aunt      No Known Problems Paternal Uncle      No Known Problems Maternal Grandmother      No Known Problems Maternal Grandfather      No Known Problems Paternal Grandmother      No Known Problems Paternal Grandfather      Amblyopia Neg Hx      Blindness Neg Hx      Cancer Neg Hx      Cataracts Neg Hx      Glaucoma Neg Hx      Hypertension Neg Hx      Macular degeneration Neg Hx      Retinal detachment Neg Hx      Strabismus Neg Hx      Stroke Neg Hx      Thyroid disease Neg Hx      Celiac disease Neg Hx      Colon polyps Neg Hx      Esophageal cancer Neg Hx      Inflammatory bowel disease Neg Hx      Irritable bowel syndrome Neg Hx      Liver cancer Neg Hx      Liver disease Neg Hx      Rectal cancer Neg Hx      Stomach cancer Neg Hx      Ulcerative colitis Neg Hx      Cystic fibrosis Neg Hx      Crohn's disease Neg Hx      Hemochromatosis Neg Hx      Cirrhosis Neg Hx       Current Outpatient Medications on File Prior to Visit   Medication Sig Dispense Refill    b complex vitamins tablet Take 1 tablet by mouth once daily.      blood sugar diagnostic Strp Test twice daily.  Accucheck viva test strips and lancets. 300 each 3    calcium carb-vit D3-magnesium (CORAL CALCIUM) 250-200-125 mg-unit-mg Cap Take 1 capsule by mouth once daily.      dorzolamide-timolol 2-0.5% (COSOPT) 22.3-6.8 mg/mL ophthalmic solution Place 1 drop into both eyes 2 (two) times daily.      glucosamine-chondroitin (OSTEO BI-FLEX) 250-200 mg Tab Take  1 tablet by mouth once daily.      lancets (ACCU-CHEK SOFTCLIX LANCETS) Misc 1 Units by Misc.(Non-Drug; Combo Route) route 2 (two) times daily. 300 each 3    latanoprost 0.005 % ophthalmic solution Place 1 drop into both eyes every evening. 2.5 mL 12    linaCLOtide (LINZESS) 72 mcg Cap capsule Take 1 capsule (72 mcg total) by mouth before breakfast. 90 capsule 0    NIFEdipine (ADALAT CC) 90 MG TbSR Take 1 tablet (90 mg total) by mouth once daily. 90 tablet 1    tamsulosin (FLOMAX) 0.4 mg Cap Take 1 capsule (0.4 mg total) by mouth once daily. 30 capsule 0    [DISCONTINUED] atorvastatin (LIPITOR) 40 MG tablet Take 1 tablet (40 mg total) by mouth once daily. 90 tablet 3    ciprofloxacin HCl (CIPRO) 500 MG tablet Take 1 tablet (500 mg total) by mouth every 12 (twelve) hours. (Patient not taking: Reported on 5/30/2024) 10 tablet 0    diazePAM (VALIUM) 5 MG tablet Take 1 tablet (5 mg total) by mouth once. Take 1 hour prior to imaging. for 1 dose 1 tablet 0    mv-mn-vitC-mbvIj-Wbs-Bdd-hc124 (AIRBORNE, ASCORBATE SODIUM,) 333-1.7 mg Chew Take 1 each by mouth once daily. (Patient not taking: Reported on 5/30/2024)      pantoprazole (PROTONIX) 40 MG tablet Take 1 tablet (40 mg total) by mouth 2 (two) times daily. 60 tablet 0    phenoL (CHLORASEPTIC THROAT SPRAY) 1.4 % SprA by Mucous Membrane route 2 (two) times a day. (Patient not taking: Reported on 5/30/2024) 177 mL 0    [DISCONTINUED] omeprazole (PRILOSEC OTC) 20 MG tablet Take 1 tablet (20 mg total) by mouth once daily. 90 tablet 3     Current Facility-Administered Medications on File Prior to Visit   Medication Dose Route Frequency Provider Last Rate Last Admin    LIDOcaine HCL 10 mg/ml (1%) injection 2 mL  2 mL Intradermal Once Pepito Walker DO         Immunization History   Administered Date(s) Administered    COVID-19 MRNA, LN-S PF (MODERNA HALF 0.25 ML DOSE) 07/05/2022    COVID-19 Vaccine 08/24/2021, 02/07/2022    COVID-19, MRNA, LN-S, PF (MODERNA FULL 0.5 ML  DOSE) 07/27/2021, 05/24/2023, 02/02/2024    COVID-19, mRNA, LNP-S, bivalent booster, PF (Moderna Omicron)12 + YEARS 09/27/2022    Influenza 11/25/2016    Influenza (FLUAD) - Trivalent - Adjuvanted - PF (65+) 08/22/2019    Influenza - High Dose - PF (65 years and older) 10/02/2017, 09/19/2018    Influenza - Quadrivalent 11/03/2014, 11/10/2015    Influenza - Quadrivalent - High Dose - PF (65 years and older) 08/18/2020, 08/13/2021    Pneumococcal Conjugate - 13 Valent 09/06/2016    Pneumococcal Polysaccharide - 23 Valent 09/18/2017    RSVpreF (Arexvy) 02/02/2024    Tdap 11/30/2016    Zoster 10/06/2016, 11/25/2016    Zoster Recombinant 05/24/2019, 08/24/2019       Review of Systems   Constitutional:  Negative for fever, malaise/fatigue and weight loss.   Respiratory:  Negative for shortness of breath.    Cardiovascular:  Negative for chest pain and palpitations.   Gastrointestinal:  Negative for nausea and vomiting.   Genitourinary:  Positive for flank pain and frequency.   Musculoskeletal:  Positive for myalgias and neck pain.   Psychiatric/Behavioral:  Negative for depression.         Vitals:    07/17/24 0827   BP: 126/64   Pulse: 89   Resp: 19   Temp: 97.8 °F (36.6 °C)       Physical Exam  Vitals reviewed.   Constitutional:       Appearance: Normal appearance.   HENT:      Head: Normocephalic.   Eyes:      Extraocular Movements: Extraocular movements intact.      Conjunctiva/sclera: Conjunctivae normal.      Pupils: Pupils are equal, round, and reactive to light.   Neck:      Thyroid: No thyroid mass or thyromegaly.   Cardiovascular:      Rate and Rhythm: Normal rate and regular rhythm.      Heart sounds: Normal heart sounds. No murmur heard.     No gallop.   Pulmonary:      Effort: Pulmonary effort is normal. No respiratory distress.      Breath sounds: Normal breath sounds. No wheezing or rales.   Skin:     General: Skin is warm and dry.      Coloration: Skin is not jaundiced or pale.   Neurological:      Mental  Status: She is alert.   Psychiatric:         Mood and Affect: Mood normal.         Behavior: Behavior normal.         Thought Content: Thought content normal.         Judgment: Judgment normal.          Assessment and Plan  1. Sleep apnea, unspecified type  -     Ambulatory referral/consult to Sleep Disorders; Future; Expected date: 07/24/2024    2. Renal stones  -     Ambulatory referral/consult to Urology; Future; Expected date: 07/24/2024    3. Abdominal pain, unspecified abdominal location  -     CT Abdomen Pelvis W Wo Contrast; Future; Expected date: 07/17/2024    4. Overactive bladder  -     Ambulatory referral/consult to Urology; Future; Expected date: 07/24/2024  -     POCT URINALYSIS W/O SCOPE    5. Nausea  -     Ambulatory referral/consult to Gastroenterology; Future; Expected date: 07/24/2024    6. Chronic constipation  -     Ambulatory referral/consult to Gastroenterology; Future; Expected date: 07/24/2024    7. Diastolic dysfunction  -     Ambulatory referral/consult to Cardiology; Future; Expected date: 07/24/2024    8. Nonrheumatic aortic valve stenosis  -     Ambulatory referral/consult to Cardiology; Future; Expected date: 07/24/2024    9. Malignant neoplasm of axillary tail of right female breast, unspecified estrogen receptor status  -     Mammo Digital Diagnostic Bilat with Sean; Future; Expected date: 07/17/2024    10. Multinodular thyroid  -     Ambulatory referral/consult to Endocrinology; Future; Expected date: 07/24/2024  -     T4, Free; Future; Expected date: 07/17/2024  -     TSH; Future; Expected date: 07/17/2024    11. Mixed hyperlipidemia  -     atorvastatin (LIPITOR) 80 MG tablet; Take 1 tablet (80 mg total) by mouth once daily.  Dispense: 90 tablet; Refill: 3    12. Type 2 diabetes mellitus without complication, without long-term current use of insulin                UA:  Urine dip is clear so we will try Detrol with the patient.  We reviewed the side effects of this  medication.      Return to clinic in 6 months for follow-up blood work and an office visit or as needed.  The patient will call back with her current dose of metformin which is not documented in her chart.    Health Maintenance Topics with due status: Not Due       Topic Last Completion Date    TETANUS VACCINE 11/30/2016    Colorectal Cancer Screening 11/01/2023    Influenza Vaccine 11/27/2023    Diabetes Urine Screening 12/26/2023    DEXA Scan 01/03/2024    Eye Exam 03/05/2024    Foot Exam 03/22/2024    Mammogram 04/25/2024    Lipid Panel 07/15/2024    Hemoglobin A1c 07/15/2024

## 2024-07-18 RX ORDER — METFORMIN HYDROCHLORIDE 500 MG/1
500 TABLET ORAL
Qty: 90 TABLET | Refills: 1 | Status: SHIPPED | OUTPATIENT
Start: 2024-07-18

## 2024-07-18 RX ORDER — METFORMIN HYDROCHLORIDE 500 MG/1
500 TABLET ORAL
COMMUNITY
End: 2024-07-18 | Stop reason: SDUPTHER

## 2024-07-18 NOTE — TELEPHONE ENCOUNTER
----- Message from Saharabeata Martinez sent at 7/18/2024  9:41 AM CDT -----  Regarding: Missing meds at pharmacy after visit.  Contact: Patient  The only medication sent in was for her cholesterol on her visit yesterday. She needs all of them sent.  The one for overactive bladder was one and Metformin 500 mg tablet one tablet a day with food.    Pharmacy: [id number] [place], [place] - [address] AT [facility] [address]    Phone #: [phone] (Z)

## 2024-07-19 ENCOUNTER — TELEPHONE (OUTPATIENT)
Dept: NEUROSURGERY | Facility: CLINIC | Age: 74
End: 2024-07-19
Payer: MEDICARE

## 2024-07-19 DIAGNOSIS — Z98.890 S/P CRANIOTOMY: Primary | ICD-10-CM

## 2024-07-19 DIAGNOSIS — D42.0 ATYPICAL INTRACRANIAL MENINGIOMA: ICD-10-CM

## 2024-07-19 NOTE — TELEPHONE ENCOUNTER
----- Message from Olena Jones MA sent at 7/19/2024 10:45 AM CDT -----  Calling about scheduling her 6 month f/u and imaging.  Was seen in June and didn't know if we will call her closer to the 6 months or schedule now

## 2024-07-23 ENCOUNTER — TELEPHONE (OUTPATIENT)
Dept: NEPHROLOGY | Facility: CLINIC | Age: 74
End: 2024-07-23
Payer: MEDICARE

## 2024-07-23 DIAGNOSIS — I10 ESSENTIAL HYPERTENSION: Primary | ICD-10-CM

## 2024-07-23 NOTE — TELEPHONE ENCOUNTER
----- Message from Natasha Peraza sent at 7/23/2024 11:34 AM CDT -----  Regarding: call back  Contact: pt  Type: Needs Medical Advice  Who Called:  patient  Symptoms (please be specific):    How long has patient had these symptoms:    Pharmacy name and phone #:   Best Call Back Number: [phone]    Additional Information: pt needs blood work before scheduling a sooner apt are u available MRN: [id number] ALCIDES GRIMALDO

## 2024-07-23 NOTE — TELEPHONE ENCOUNTER
Patient states she needed urology visit for stones.  Appt given.  She will keep regularly scheduled appt with Owsaldo.

## 2024-07-24 ENCOUNTER — TELEPHONE (OUTPATIENT)
Dept: FAMILY MEDICINE | Facility: CLINIC | Age: 74
End: 2024-07-24
Payer: MEDICARE

## 2024-07-24 NOTE — TELEPHONE ENCOUNTER
----- Message from Omar Horn MD sent at 7/17/2024  4:51 PM CDT -----  Her TSH is mildly suppressed which may mean she is mildly hyperthyroid so definitely she does need a follow-up appointment with Endocrinology which has been ordered

## 2024-07-24 NOTE — TELEPHONE ENCOUNTER
Pt attempted, no answer, phone would ring and then disconnect, tried to contact patient on 2 different lines with same out come.

## 2024-07-25 ENCOUNTER — PATIENT MESSAGE (OUTPATIENT)
Dept: FAMILY MEDICINE | Facility: CLINIC | Age: 74
End: 2024-07-25
Payer: MEDICARE

## 2024-07-25 ENCOUNTER — TELEPHONE (OUTPATIENT)
Dept: PODIATRY | Facility: CLINIC | Age: 74
End: 2024-07-25
Payer: MEDICARE

## 2024-07-25 ENCOUNTER — TELEPHONE (OUTPATIENT)
Dept: ORTHOPEDICS | Facility: CLINIC | Age: 74
End: 2024-07-25
Payer: MEDICARE

## 2024-07-25 NOTE — TELEPHONE ENCOUNTER
Called the patient to reschedule appointment. Patient's phone went to [email or web address] and a message was left for a call back.

## 2024-07-26 ENCOUNTER — OFFICE VISIT (OUTPATIENT)
Dept: UROLOGY | Facility: CLINIC | Age: 74
End: 2024-07-26
Payer: MEDICARE

## 2024-07-26 VITALS — BODY MASS INDEX: 29.21 KG/M2 | WEIGHT: 158.75 LBS | HEIGHT: 62 IN

## 2024-07-26 DIAGNOSIS — N32.81 OVERACTIVE BLADDER: Primary | ICD-10-CM

## 2024-07-26 DIAGNOSIS — R10.9 ABDOMINAL PAIN, UNSPECIFIED ABDOMINAL LOCATION: ICD-10-CM

## 2024-07-26 DIAGNOSIS — N20.0 RENAL STONES: ICD-10-CM

## 2024-07-26 DIAGNOSIS — Z13.89 SCREENING FOR BLOOD OR PROTEIN IN URINE: ICD-10-CM

## 2024-07-26 LAB
BACTERIA #/AREA URNS AUTO: NORMAL /HPF
BILIRUBIN, UA POC OHS: NEGATIVE
BLOOD, UA POC OHS: ABNORMAL
CLARITY, UA POC OHS: CLEAR
COLOR, UA POC OHS: YELLOW
GLUCOSE, UA POC OHS: NEGATIVE
KETONES, UA POC OHS: NEGATIVE
LEUKOCYTES, UA POC OHS: NEGATIVE
MICROSCOPIC COMMENT: NORMAL
NITRITE, UA POC OHS: NEGATIVE
PH, UA POC OHS: 5
PROTEIN, UA POC OHS: NEGATIVE
RBC #/AREA URNS AUTO: 1 /HPF (ref 0–4)
SPECIFIC GRAVITY, UA POC OHS: 1.01
SQUAMOUS #/AREA URNS AUTO: 15 /HPF
UROBILINOGEN, UA POC OHS: 0.2
WBC #/AREA URNS AUTO: 4 /HPF (ref 0–5)

## 2024-07-26 PROCEDURE — 87086 URINE CULTURE/COLONY COUNT: CPT | Performed by: UROLOGY

## 2024-07-26 PROCEDURE — 81001 URINALYSIS AUTO W/SCOPE: CPT | Performed by: UROLOGY

## 2024-07-26 PROCEDURE — 99999 PR PBB SHADOW E&M-EST. PATIENT-LVL IV: CPT | Mod: PBBFAC,,, | Performed by: UROLOGY

## 2024-07-26 PROCEDURE — 99214 OFFICE O/P EST MOD 30 MIN: CPT | Mod: PBBFAC,PO | Performed by: UROLOGY

## 2024-07-26 RX ORDER — VIBEGRON 75 MG/1
75 TABLET, FILM COATED ORAL DAILY
Qty: 30 TABLET | Refills: 11 | Status: SHIPPED | OUTPATIENT
Start: 2024-07-26 | End: 2025-07-26

## 2024-07-26 NOTE — PROGRESS NOTES
[facility] Urology New Patient/H&P:    Eugenie Coates is a 74 y.o. female who presents for overactive bladder.    Patient with a history of multiple medical problems including breast cancer, CHF, depression, AFSHIN and SVT who was referred for overactive bladder and possible kidney stones.     She reports a several month history of progressively worsening overactive bladder. She states that she has been urinating every 30 minutes to an hour. No stress or urge incontinence. She was prescribed Flomax, but states she never took it. Also prescribed Tolterodine, but she stopped it as she had abdominal pain.     Urine dipstick with trace blood today.     KUB on 5/14/24 with several calcifications in the left kidney. She is scheduled for CT abdomen pelvis w wo contrast per her PCP on 8/5/24. Has never passed a stone.     Retired from the [employer]. Never smoked.    Denies any fever, chills, gross hematuria, bone pain, unintentional weight loss,  trauma or history of  malignancy.       A1C  6.1  7/15/24      Past Medical History:   Diagnosis Date    Allergy     Anemia     Anxiety     Arthritis     Asthma     Bell palsy     Bilateral lower extremity edema     Chronic diastolic CHF (congestive heart failure)     Depression     Diabetes mellitus, type 2     DVT (deep venous thrombosis)     right LE    Dyslipidemia     Endometriosis     Eye injury 2014    stuck with tree branch od ?     Focal seizure 5/3/2021    GERD (gastroesophageal reflux disease)     Glaucoma     History of DVT (deep vein thrombosis) 1/23/2018    History of uterine fibroid     Hyperlipidemia     Hypertension     Invasive lobular carcinoma of right breast in female     s/p surgery, radiation, tamoxifen    Nuclear sclerosis of both eyes 9/27/2016    Obesity     Pancreas cyst     Sleep apnea     uses C pap    Supraventricular tachycardia     SVT (supraventricular tachycardia) 05/2019    Thyroid disease     Venous insufficiency        Past  Surgical History:   Procedure Laterality Date    ABLATION, CHEMICAL SEALANT, VARICOSE VEIN Right 2024    Procedure: Right GSV VenaSeal Ablation;  Surgeon: Pepito Walker DO;  Location: [facility] OR;  Service: Peripheral Vascular;  Laterality: Right;    ABLATION, CHEMICAL SEALANT, VARICOSE VEIN Left 2024    Left GSV Venaseal Ablation performed by Dr. Zach Walker.    CATARACT EXTRACTION W/  INTRAOCULAR LENS IMPLANT Left 2023    Procedure: CEIOL OMNI OS;  Surgeon: Juan Antonio Mcintosh MD;  Location: [facility] OR;  Service: Ophthalmology;  Laterality: Left;  consider block     SECTION      CHOLECYSTECTOMY      COLONOSCOPY      CRANIOTOMY FOR EXCISION OF INTRACRANIAL TUMOR Left 2021    Procedure: CRANIOTOMY, FOR INTRACRANIAL NEOPLASM EXCISION Left craniotomy for tumor resection;  Surgeon: Hernandez Bravo MD;  Location: [facility];  Service: Neurosurgery;  Laterality: Left;    CRANIOTOMY, WITH NEOPLASM EXCISION USING COMPUTER-ASSISTED NAVIGATION Left 2023    Procedure: 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, USING COMPUTER-ASSISTED NAVIGATION-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;  Surgeon: Hernandez Bravo MD;  Location: [facility];  Service: Neurosurgery;  Laterality: Left;    ESOPHAGOGASTRODUODENOSCOPY N/A 2023    Procedure: EGD (ESOPHAGOGASTRODUODENOSCOPY);  Surgeon: Issac Wade MD;  Location: [facility];  Service: Endoscopy;  Laterality: N/A;    glaucoma laser Bilateral     HERNIA REPAIR      KNEE SURGERY Right     (R) knee scope; torn meniscus    MASTECTOMY, PARTIAL Right 2020    Procedure: MASTECTOMY, PARTIAL-Right with radiological marker Consent day of surgery; Neoprobe, technitium, Frozen;  Surgeon: Narda Keating MD;  Location: [address];  Service: General;  Laterality: Right;    RADIOFREQUENCY ABLATION Right 3/1/2024    Procedure: Right SSV RF Ablation;  Surgeon: Pepito Wlaker DO;  Location: [facility];  Service:  Peripheral Vascular;  Laterality: Right;    SENTINEL LYMPH NODE BIOPSY Right 12/07/2020    Procedure: BIOPSY, LYMPH NODE, SENTINEL;  Surgeon: Narda Keating MD;  Location: [facility] OR [address] [place] [address];  Service: General;  Laterality: Right;    TOTAL REDUCTION MAMMOPLASTY Bilateral 12/07/2020    Procedure: MAMMOPLASTY, REDUCTION-Bilateral;  Surgeon: Zack Hood MD;  Location: [facility] OR [address] [place] [address];  Service: Plastics;  Laterality: Bilateral;    VEIN SURGERY  2003, 2004    Rt leg x2       Family History   Problem Relation Name Age of Onset    Diabetes Mother Sara Vick     No Known Problems Father      No Known Problems Sister      No Known Problems Brother      Colon cancer Maternal Aunt      No Known Problems Maternal Uncle      No Known Problems Paternal Aunt      No Known Problems Paternal Uncle      No Known Problems Maternal Grandmother      No Known Problems Maternal Grandfather      No Known Problems Paternal Grandmother      No Known Problems Paternal Grandfather      Amblyopia Neg Hx      Blindness Neg Hx      Cancer Neg Hx      Cataracts Neg Hx      Glaucoma Neg Hx      Hypertension Neg Hx      Macular degeneration Neg Hx      Retinal detachment Neg Hx      Strabismus Neg Hx      Stroke Neg Hx      Thyroid disease Neg Hx      Celiac disease Neg Hx      Colon polyps Neg Hx      Esophageal cancer Neg Hx      Inflammatory bowel disease Neg Hx      Irritable bowel syndrome Neg Hx      Liver cancer Neg Hx      Liver disease Neg Hx      Rectal cancer Neg Hx      Stomach cancer Neg Hx      Ulcerative colitis Neg Hx      Cystic fibrosis Neg Hx      Crohn's disease Neg Hx      Hemochromatosis Neg Hx      Cirrhosis Neg Hx         Review of patient's allergies indicates:   Allergen Reactions    Oxycodone hcl-oxycodone-asa Hives, Itching and Other (See Comments)    Percodan yadira      Other reaction(s): Unknown       Medications Reviewed: see MAR    PHYSICAL EXAM:    There were no vitals filed for this visit.  Body mass index  "is 29.03 kg/m². Weight: 72 kg (158 lb 11.7 oz) Height: 5' 2" (157.5 cm)       General: Alert, cooperative, no distress, appears stated age  Abdomen: Soft, non-tender, no CVA tenderness, non-distended      LABS:    Recent Results (from the past 336 hour(s))   Lipid Panel    Collection Time: 07/15/24  9:03 AM   Result Value Ref Range    Triglycerides 198 (H) 35 - 150 mg/dL    Cholesterol 201 (H) 0 - 200 mg/dL    HDL Cholesterol 49 40 - 60 mg/dL    Cholesterol/HDL Ratio (Risk Factor) 4.1     Non- mg/dL    LDL Calculated 112 mg/dL    LDL/HDL 2.3     VLDL 40 mg/dL   Hemoglobin A1C    Collection Time: 07/15/24  9:03 AM   Result Value Ref Range    Hemoglobin A1C 6.1 4.5 - 6.6 %    Estimated Average Glucose 128 mg/dL   Comprehensive Metabolic Panel    Collection Time: 07/15/24  9:03 AM   Result Value Ref Range    Sodium 143 136 - 145 mmol/L    Potassium 4.3 3.5 - 5.1 mmol/L    Chloride 107 98 - 107 mmol/L    CO2 27 21 - 32 mmol/L    Anion Gap 13 7 - 16 mmol/L    Glucose 144 (H) 74 - 106 mg/dL    BUN 11 7 - 18 mg/dL    Creatinine 0.92 0.55 - 1.02 mg/dL    BUN/Creatinine Ratio 12 6 - 20    Calcium 9.8 8.5 - 10.1 mg/dL    Total Protein 7.7 6.4 - 8.2 g/dL    Albumin 3.9 3.5 - 5.0 g/dL    Globulin 3.8 2.0 - 4.0 g/dL    A/G Ratio 1.0     Bilirubin, Total 0.7 >0.0 - 1.2 mg/dL    Alk Phos 137 55 - 142 U/L    ALT 25 13 - 56 U/L    AST 22 15 - 37 U/L    eGFR 66 >=60 mL/min/1.73m2   CBC with Differential    Collection Time: 07/15/24  9:03 AM   Result Value Ref Range    WBC 6.85 4.50 - 11.00 K/uL    RBC 5.48 (H) 4.20 - 5.40 M/uL    Hemoglobin 14.8 12.0 - 16.0 g/dL    Hematocrit 47.9 (H) 38.0 - 47.0 %    MCV 87.4 80.0 - 96.0 fL    MCH 27.0 27.0 - 31.0 pg    MCHC 30.9 (L) 32.0 - 36.0 g/dL    RDW 13.5 11.5 - 14.5 %    Platelet Count 288 150 - 400 K/uL    MPV 11.3 9.4 - 12.4 fL    Neutrophils % 64.9 53.0 - 65.0 %    Lymphocytes % 25.7 (L) 27.0 - 41.0 %    Monocytes % 6.4 (H) 2.0 - 6.0 %    Eosinophils % 2.2 1.0 - 4.0 %    Basophils " % 0.7 0.0 - 1.0 %    Immature Granulocytes % 0.1 0.0 - 0.4 %    nRBC, Auto 0.0 <=0.0 %    Neutrophils, Abs 4.44 1.80 - 7.70 K/uL    Lymphocytes, Absolute 1.76 1.00 - 4.80 K/uL    Monocytes, Absolute 0.44 0.00 - 0.80 K/uL    Eosinophils, Absolute 0.15 0.00 - 0.50 K/uL    Basophils, Absolute 0.05 0.00 - 0.20 K/uL    Immature Granulocytes, Absolute 0.01 0.00 - 0.04 K/uL    nRBC, Absolute 0.00 <=0.00 x10e3/uL    Diff Type Auto    TSH    Collection Time: 07/17/24  9:12 AM   Result Value Ref Range    TSH 0.300 (L) 0.358 - 3.740 uIU/mL   T4, Free    Collection Time: 07/17/24  9:12 AM   Result Value Ref Range    Free T4 1.30 0.76 - 1.46 ng/dL   POCT URINALYSIS W/O SCOPE    Collection Time: 07/17/24  9:26 AM   Result Value Ref Range    Color, UA Yellow     Clarity, UA Slightly Cloudy     Spec Grav UA 1.02     pH, UA 6.5     WBC, UA NEG     Nitrite, UA NEG     Protein, POC NEG     Glucose, UA NEG     Ketones, UA 40     Bilirubin, POC NEG     Urobilinogen, UA 0.2     Blood, UA NEG    POCT Urinalysis(Instrument)    Collection Time: 07/26/24 10:53 AM   Result Value Ref Range    Color, POC UA Yellow Yellow, Straw, Colorless    Clarity, POC UA Clear Clear    Glucose, POC UA Negative Negative    Bilirubin, POC UA Negative Negative    Ketones, POC UA Negative Negative    Spec Grav POC UA 1.015 1.005 - 1.030    Blood, POC UA Trace-lysed (A) Negative    pH, POC UA 5.0 5.0 - 8.0    Protein, POC UA Negative Negative    Urobilinogen, POC UA 0.2 <=1.0    Nitrite, POC UA Negative Negative    WBC, POC UA Negative Negative         Assessment/Diagnosis:    1. Overactive bladder  Ambulatory referral/consult to Urology    POCT Urinalysis(Instrument)      2. Renal stones  Ambulatory referral/consult to Urology    POCT Urinalysis(Instrument)      3. Abdominal pain, unspecified abdominal location  CANCELED: CT Abdomen Pelvis W Wo Contrast      4. Screening for blood or protein in urine            Plans:    - I spent 45 minutes of the day of this  encounter preparing for, treating and managing this patient. Visit today included increased complexity associated with the care of the episodic problem addressed and managing the longitudinal care of the patient due to the serious and/or complex managed problem(s) overactive bladder. Extensive discussion with her regarding her overactive bladder. Could not tolerate Tolterodine due to GI side effects.   - Urine culture and micro today.   - Proceed with scheduled CT abdomen pelvis on 8/5/24 to further evaluate stone burden seen on KUB.   - RX for Gemtesa 75 mg sent to [facility] pharmacy.   - RTC in 6 months with PVR.

## 2024-08-05 ENCOUNTER — HOSPITAL ENCOUNTER (OUTPATIENT)
Dept: RADIOLOGY | Facility: HOSPITAL | Age: 74
Discharge: HOME | End: 2024-08-05
Attending: FAMILY MEDICINE
Payer: MEDICARE

## 2024-08-05 DIAGNOSIS — R10.9 ABDOMINAL PAIN, UNSPECIFIED ABDOMINAL LOCATION: ICD-10-CM

## 2024-08-05 PROCEDURE — 74178 CT ABD&PLV WO CNTR FLWD CNTR: CPT | Mod: 26,,, | Performed by: RADIOLOGY

## 2024-08-05 PROCEDURE — 74178 CT ABD&PLV WO CNTR FLWD CNTR: CPT | Mod: TC

## 2024-08-05 PROCEDURE — 25500020 PHARM REV CODE 255: Performed by: FAMILY MEDICINE

## 2024-08-05 RX ORDER — IOPAMIDOL 755 MG/ML
100 INJECTION, SOLUTION INTRAVASCULAR
Status: COMPLETED | OUTPATIENT
Start: 2024-08-05 | End: 2024-08-05

## 2024-08-05 RX ADMIN — IOPAMIDOL 100 ML: 755 INJECTION, SOLUTION INTRAVENOUS at 08:08

## 2024-08-06 ENCOUNTER — PATIENT MESSAGE (OUTPATIENT)
Dept: FAMILY MEDICINE | Facility: CLINIC | Age: 74
End: 2024-08-06
Payer: MEDICARE

## 2024-08-16 ENCOUNTER — HOSPITAL ENCOUNTER (OUTPATIENT)
Dept: CARDIOLOGY | Facility: HOSPITAL | Age: 74
Discharge: HOME | End: 2024-08-16
Attending: INTERNAL MEDICINE
Payer: MEDICARE

## 2024-08-16 ENCOUNTER — HOSPITAL ENCOUNTER (OUTPATIENT)
Dept: CARDIOLOGY | Facility: CLINIC | Age: 74
Discharge: HOME | End: 2024-08-16
Payer: MEDICARE

## 2024-08-16 ENCOUNTER — OFFICE VISIT (OUTPATIENT)
Dept: ELECTROPHYSIOLOGY | Facility: CLINIC | Age: 74
End: 2024-08-16
Payer: MEDICARE

## 2024-08-16 VITALS
DIASTOLIC BLOOD PRESSURE: 68 MMHG | SYSTOLIC BLOOD PRESSURE: 142 MMHG | WEIGHT: 159.19 LBS | BODY MASS INDEX: 29.3 KG/M2 | HEIGHT: 62 IN | HEART RATE: 82 BPM

## 2024-08-16 VITALS
DIASTOLIC BLOOD PRESSURE: 68 MMHG | SYSTOLIC BLOOD PRESSURE: 142 MMHG | HEART RATE: 63 BPM | HEIGHT: 62 IN | BODY MASS INDEX: 29.3 KG/M2 | WEIGHT: 159.19 LBS

## 2024-08-16 DIAGNOSIS — I10 ESSENTIAL HYPERTENSION: ICD-10-CM

## 2024-08-16 DIAGNOSIS — I35.0 AORTIC VALVE STENOSIS, ETIOLOGY OF CARDIAC VALVE DISEASE UNSPECIFIED: ICD-10-CM

## 2024-08-16 DIAGNOSIS — E66.9 OBESITY (BMI 30-39.9): ICD-10-CM

## 2024-08-16 DIAGNOSIS — I47.10 PSVT (PAROXYSMAL SUPRAVENTRICULAR TACHYCARDIA): ICD-10-CM

## 2024-08-16 DIAGNOSIS — E78.2 MIXED HYPERLIPIDEMIA: ICD-10-CM

## 2024-08-16 DIAGNOSIS — I35.0 NONRHEUMATIC AORTIC VALVE STENOSIS: ICD-10-CM

## 2024-08-16 DIAGNOSIS — E11.51 TYPE 2 DIABETES MELLITUS WITH DIABETIC PERIPHERAL ANGIOPATHY WITHOUT GANGRENE, WITHOUT LONG-TERM CURRENT USE OF INSULIN: ICD-10-CM

## 2024-08-16 DIAGNOSIS — G47.33 OSA ON CPAP: ICD-10-CM

## 2024-08-16 DIAGNOSIS — I10 ESSENTIAL HYPERTENSION: Primary | ICD-10-CM

## 2024-08-16 DIAGNOSIS — I51.89 DIASTOLIC DYSFUNCTION: ICD-10-CM

## 2024-08-16 LAB
ASCENDING AORTA: 2.93 CM
AV INDEX (PROSTH): 0.44
AV MEAN GRADIENT: 21 MMHG
AV PEAK GRADIENT: 37 MMHG
AV VALVE AREA BY VELOCITY RATIO: 1.26 CM²
AV VALVE AREA: 1.38 CM²
AV VELOCITY RATIO: 0.4
BSA FOR ECHO PROCEDURE: 1.78 M2
CV ECHO LV RWT: 0.5 CM
DOP CALC AO PEAK VEL: 3.06 M/S
DOP CALC AO VTI: 68.98 CM
DOP CALC LVOT AREA: 3.1 CM2
DOP CALC LVOT DIAMETER: 2 CM
DOP CALC LVOT PEAK VEL: 1.23 M/S
DOP CALC LVOT STROKE VOLUME: 94.99 CM3
DOP CALCLVOT PEAK VEL VTI: 30.25 CM
E WAVE DECELERATION TIME: 495.46 MSEC
E/A RATIO: 0.55
E/E' RATIO: 9.71 M/S
ECHO LV POSTERIOR WALL: 0.94 CM (ref 0.6–1.1)
FRACTIONAL SHORTENING: 35 % (ref 28–44)
INTERVENTRICULAR SEPTUM: 1.11 CM (ref 0.6–1.1)
IVRT: 88.49 MSEC
LA MAJOR: 5.02 CM
LA MINOR: 5.2 CM
LA WIDTH: 3.87 CM
LEFT ATRIUM SIZE: 3.8 CM
LEFT ATRIUM VOLUME INDEX: 36.9 ML/M2
LEFT ATRIUM VOLUME: 63.86 CM3
LEFT INTERNAL DIMENSION IN SYSTOLE: 2.44 CM (ref 2.1–4)
LEFT VENTRICLE DIASTOLIC VOLUME INDEX: 34.2 ML/M2
LEFT VENTRICLE DIASTOLIC VOLUME: 59.16 ML
LEFT VENTRICLE MASS INDEX: 68 G/M2
LEFT VENTRICLE SYSTOLIC VOLUME INDEX: 12.2 ML/M2
LEFT VENTRICLE SYSTOLIC VOLUME: 21.05 ML
LEFT VENTRICULAR INTERNAL DIMENSION IN DIASTOLE: 3.73 CM (ref 3.5–6)
LEFT VENTRICULAR MASS: 118.08 G
LV LATERAL E/E' RATIO: 8.5 M/S
LV SEPTAL E/E' RATIO: 11.33 M/S
MV A" WAVE DURATION": 11.42 MSEC
MV PEAK A VEL: 1.23 M/S
MV PEAK E VEL: 0.68 M/S
MV STENOSIS PRESSURE HALF TIME: 143.68 MS
MV VALVE AREA P 1/2 METHOD: 1.53 CM2
OHS QRS DURATION: 84 MS
OHS QTC CALCULATION: 439 MS
PISA TR MAX VEL: 2.51 M/S
PULM VEIN S/D RATIO: 1.9
PV PEAK D VEL: 0.2 M/S
PV PEAK S VEL: 0.38 M/S
RA MAJOR: 4.53 CM
RA PRESSURE ESTIMATED: 3 MMHG
RA WIDTH: 3.27 CM
RIGHT VENTRICLE DIASTOLIC BASEL DIMENSION: 3.6 CM
RV TB RVSP: 6 MMHG
SINUS: 2.86 CM
STJ: 2.42 CM
TDI LATERAL: 0.08 M/S
TDI SEPTAL: 0.06 M/S
TDI: 0.07 M/S
TR MAX PG: 25 MMHG
TRICUSPID ANNULAR PLANE SYSTOLIC EXCURSION: 2.34 CM
TV REST PULMONARY ARTERY PRESSURE: 28 MMHG
Z-SCORE OF LEFT VENTRICULAR DIMENSION IN END DIASTOLE: -2.51
Z-SCORE OF LEFT VENTRICULAR DIMENSION IN END SYSTOLE: -1.55

## 2024-08-16 PROCEDURE — 99999 PR PBB SHADOW E&M-EST. PATIENT-LVL IV: CPT | Mod: PBBFAC,,, | Performed by: INTERNAL MEDICINE

## 2024-08-16 PROCEDURE — 93306 TTE W/DOPPLER COMPLETE: CPT

## 2024-08-16 PROCEDURE — 99214 OFFICE O/P EST MOD 30 MIN: CPT | Mod: PBBFAC | Performed by: INTERNAL MEDICINE

## 2024-08-16 PROCEDURE — 93010 ELECTROCARDIOGRAM REPORT: CPT | Mod: S$PBB,,, | Performed by: INTERNAL MEDICINE

## 2024-08-16 PROCEDURE — 93005 ELECTROCARDIOGRAM TRACING: CPT | Mod: PBBFAC | Performed by: INTERNAL MEDICINE

## 2024-08-16 PROCEDURE — 93306 TTE W/DOPPLER COMPLETE: CPT | Mod: 26,,, | Performed by: INTERNAL MEDICINE

## 2024-08-16 NOTE — PROGRESS NOTES
Subjective:    Patient ID:  Eugenie Coates is a 74 y.o. female who presents for evaluation of No chief complaint on file.      HPI   74 y.o. F [employer] and retired [employer]   HTN on meds  HL on meds  DM on meds  AFSHIN, on CPAP    Has had palps c/w SVT x2 years. Lasts up to 20 mins. SOB, TRAN, chest discomf.  Went to [facility] ER with SVT recently. Rx BB.  Palpitations are SS and radiate to neck.     bpm. Short RP.  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 Since, she feels great! No palpitations at all.  8/18 60-65% LVEF    8/2024:  Presents today for second opinion re: valve. Says she's been seeing a doc in [place], who wants to do surgery but she doesn't know which kind. Says it's because of the murmur. Also has progressive SOB/TRAN, as well as chest pain with exertion. No syncope. No arrhythmic symptoms.  Past echo shows moderate AS.    My interpretation of today's ECG is SR 82 bpm    Review of Systems   Constitutional: Negative. Negative for malaise/fatigue.   HENT: Negative.  Negative for ear pain and tinnitus.    Eyes:  Negative for blurred vision.   Cardiovascular:  Positive for chest pain and dyspnea on exertion. Negative for near-syncope, palpitations and syncope.   Respiratory: Negative.  Negative for shortness of breath.    Endocrine: Negative.  Negative for polyuria.   Hematologic/Lymphatic: Does not bruise/bleed easily.   Skin: Negative.  Negative for rash.   Musculoskeletal: Negative.  Negative for joint pain and muscle weakness.   Gastrointestinal: Negative.  Negative for abdominal pain and change in bowel habit.   Genitourinary:  Negative for frequency.   Neurological: Negative.  Negative for dizziness and weakness.   Psychiatric/Behavioral: Negative.  Negative for depression. The patient is not nervous/anxious.    Allergic/Immunologic: Negative for environmental allergies.        Objective:    Physical Exam  Vitals and nursing note reviewed.    Constitutional:       Appearance: Normal appearance. She is well-developed.   HENT:      Head: Normocephalic and atraumatic.   Eyes:      Conjunctiva/sclera: Conjunctivae normal.   Neck:      Thyroid: No thyroid mass or thyromegaly.      Trachea: No tracheal deviation.   Cardiovascular:      Rate and Rhythm: Normal rate and regular rhythm. No extrasystoles are present.     Chest Wall: PMI is not displaced.      Pulses: Intact distal pulses.      Heart sounds: Murmur heard.   Pulmonary:      Effort: Pulmonary effort is normal.      Breath sounds: Normal breath sounds. No wheezing or rales.   Abdominal:      General: There is no distension.   Musculoskeletal:         General: Normal range of motion.      Cervical back: Normal range of motion. No edema.   Skin:     General: Skin is warm and dry.      Findings: No rash.   Neurological:      Mental Status: She is alert and oriented to person, place, and time.      Coordination: Coordination normal.   Psychiatric:         Speech: Speech normal.         Behavior: Behavior normal. Behavior is cooperative.         Thought Content: Thought content normal.           Assessment:       1. Essential hypertension    2. Diastolic dysfunction    3. Nonrheumatic aortic valve stenosis    4. Mixed hyperlipidemia    5. Obesity (BMI 30-39.9)    6. Type 2 diabetes mellitus with diabetic peripheral angiopathy without gangrene, without long-term current use of insulin    7. AFSHIN on CPAP    8. Aortic valve stenosis, etiology of cardiac valve disease unspecified         Plan:       Doing great re: arrhythmia since RFA of SVT.    I suspect progression of her aortic stenosis.  Will update echo and refer to structural heart clinic.    f/u in EP clinic prn.

## 2024-08-19 ENCOUNTER — TELEPHONE (OUTPATIENT)
Dept: CARDIOLOGY | Facility: CLINIC | Age: 74
End: 2024-08-19
Payer: MEDICARE

## 2024-08-19 DIAGNOSIS — I35.0 NONRHEUMATIC AORTIC VALVE STENOSIS: Primary | ICD-10-CM

## 2024-08-19 NOTE — TELEPHONE ENCOUNTER
Attempted to contact patient again.  First number does not have VM set up , second number immediately disconnects.  Will send message via  zafar.    Patient referred to  by Dr. Joe for AS.  Attempted to contact patient to schedule appt.  No answer, unable to leave VM.  Will attempt again.

## 2024-08-20 ENCOUNTER — PATIENT MESSAGE (OUTPATIENT)
Dept: GASTROENTEROLOGY | Facility: CLINIC | Age: 74
End: 2024-08-20
Payer: MEDICARE

## 2024-08-22 ENCOUNTER — PATIENT MESSAGE (OUTPATIENT)
Dept: CARDIOLOGY | Facility: CLINIC | Age: 74
End: 2024-08-22
Payer: MEDICARE

## 2024-08-23 ENCOUNTER — PATIENT MESSAGE (OUTPATIENT)
Dept: CARDIOLOGY | Facility: CLINIC | Age: 74
End: 2024-08-23
Payer: MEDICARE

## 2024-08-29 ENCOUNTER — OFFICE VISIT (OUTPATIENT)
Dept: PODIATRY | Facility: CLINIC | Age: 74
End: 2024-08-29
Payer: MEDICARE

## 2024-08-29 ENCOUNTER — TELEPHONE (OUTPATIENT)
Dept: CARDIOLOGY | Facility: CLINIC | Age: 74
End: 2024-08-29
Payer: MEDICARE

## 2024-08-29 ENCOUNTER — PATIENT MESSAGE (OUTPATIENT)
Dept: CARDIOLOGY | Facility: CLINIC | Age: 74
End: 2024-08-29
Payer: MEDICARE

## 2024-08-29 VITALS — WEIGHT: 159.19 LBS | BODY MASS INDEX: 29.3 KG/M2 | HEIGHT: 62 IN

## 2024-08-29 DIAGNOSIS — M20.41 HAMMER TOES OF BOTH FEET: ICD-10-CM

## 2024-08-29 DIAGNOSIS — M20.42 HAMMER TOES OF BOTH FEET: ICD-10-CM

## 2024-08-29 DIAGNOSIS — B35.1 ONYCHOMYCOSIS DUE TO DERMATOPHYTE: ICD-10-CM

## 2024-08-29 DIAGNOSIS — E11.65 TYPE 2 DIABETES MELLITUS WITH HYPERGLYCEMIA, WITHOUT LONG-TERM CURRENT USE OF INSULIN: Primary | ICD-10-CM

## 2024-08-29 DIAGNOSIS — L84 CORN OR CALLUS: ICD-10-CM

## 2024-08-29 PROCEDURE — 99212 OFFICE O/P EST SF 10 MIN: CPT | Mod: PBBFAC,PO,25 | Performed by: PODIATRIST

## 2024-08-29 PROCEDURE — 11721 DEBRIDE NAIL 6 OR MORE: CPT | Mod: PBBFAC,PO | Performed by: PODIATRIST

## 2024-08-29 PROCEDURE — 99999 PR PBB SHADOW E&M-EST. PATIENT-LVL II: CPT | Mod: PBBFAC,,, | Performed by: PODIATRIST

## 2024-08-29 PROCEDURE — 11056 PARNG/CUTG B9 HYPRKR LES 2-4: CPT | Mod: PBBFAC,PO | Performed by: PODIATRIST

## 2024-08-29 NOTE — PROGRESS NOTES
Subjective:      Patient ID: Eugenie Coates is a 74 y.o. female.    Chief Complaint: No chief complaint on file.      Eugenie is a 74 y.o. female who presents to the clinic for routine evaluation and treatment of diabetic feet. Eugenie has a past medical history of Allergy, Anemia, Anxiety, Arthritis, Asthma, Bell palsy, Bilateral lower extremity edema, Chronic diastolic CHF (congestive heart failure), Depression, Diabetes mellitus, type 2, DVT (deep venous thrombosis), Dyslipidemia, Endometriosis, Eye injury (2014), Focal seizure (5/3/2021), GERD (gastroesophageal reflux disease), Glaucoma, History of DVT (deep vein thrombosis) (1/23/2018), History of uterine fibroid, Hyperlipidemia, Hypertension, Invasive lobular carcinoma of right breast in female, Nuclear sclerosis of both eyes (9/27/2016), Obesity, Pancreas cyst, Sleep apnea, Supraventricular tachycardia, SVT (supraventricular tachycardia) (05/2019), Thyroid disease, and Venous insufficiency. Patient relates needing her toenails trimmed. Denies being painful with wearing shoe gear.  Has not attempted to self treat. Relates excellent control over her blood glucose.  Denies experiencing neuropathy pain in the lower extremities.  Denies any additional pedal complaints.      PCP: Omar Horn MD    Date Last Seen by PCP: 7/24     Hemoglobin A1C   Date Value Ref Range Status   07/15/2024 6.1 4.5 - 6.6 % Final     Comment:       Normal:               <5.7%  Pre-Diabetic:       5.7% to 6.4%  Diabetic:             >6.4%  Diabetic Goal:     <7%   04/18/2024 6.3 4.5 - 6.6 % Final     Comment:       Normal:               <5.7%  Pre-Diabetic:       5.7% to 6.4%  Diabetic:             >6.4%  Diabetic Goal:     <7%   10/26/2023 7.0 (H) 4.5 - 6.6 % Final     Comment:       Normal:               <5.7%  Pre-Diabetic:       5.7% to 6.4%  Diabetic:             >6.4%  Diabetic Goal:     <7%   08/14/2023 7.1 (H) 0.0 - 5.6 % Final     Comment:     Reference  Interval:  5.0 - 5.6 Normal   5.7 - 6.4 High Risk   > 6.5 Diabetic      Hgb A1c results are standardized based on the (NGSP) National   Glycohemoglobin Standardization Program.      Hemoglobin A1C levels are related to mean serum/plasma glucose   during the preceding 2-3 months.        03/01/2023 7.1 (H) 4.0 - 5.6 % Final     Comment:     ADA Screening Guidelines:  5.7-6.4%  Consistent with prediabetes  >or=6.5%  Consistent with diabetes    High levels of fetal hemoglobin interfere with the HbA1C  assay. Heterozygous hemoglobin variants (HbS, HgC, etc)do  not significantly interfere with this assay.   However, presence of multiple variants may affect accuracy.     09/26/2022 6.4 (H) 4.0 - 5.6 % Final     Comment:     ADA Screening Guidelines:  5.7-6.4%  Consistent with prediabetes  >or=6.5%  Consistent with diabetes    High levels of fetal hemoglobin interfere with the HbA1C  assay. Heterozygous hemoglobin variants (HbS, HgC, etc)do  not significantly interfere with this assay.   However, presence of multiple variants may affect accuracy.             Past Medical History:   Diagnosis Date    Allergy     Anemia     Anxiety     Arthritis     Asthma     Bell palsy     Bilateral lower extremity edema     Chronic diastolic CHF (congestive heart failure)     Depression     Diabetes mellitus, type 2     DVT (deep venous thrombosis)     right LE    Dyslipidemia     Endometriosis     Eye injury 2014    stuck with tree branch od ?     Focal seizure 5/3/2021    GERD (gastroesophageal reflux disease)     Glaucoma     History of DVT (deep vein thrombosis) 1/23/2018    History of uterine fibroid     Hyperlipidemia     Hypertension     Invasive lobular carcinoma of right breast in female     s/p surgery, radiation, tamoxifen    Nuclear sclerosis of both eyes 9/27/2016    Obesity     Pancreas cyst     Sleep apnea     uses C pap    Supraventricular tachycardia     SVT (supraventricular tachycardia) 05/2019    Thyroid disease      Venous insufficiency        Past Surgical History:   Procedure Laterality Date    ABLATION, CHEMICAL SEALANT, VARICOSE VEIN Right 2024    Procedure: Right GSV VenaSeal Ablation;  Surgeon: Pepito Walker DO;  Location: [facility] OR;  Service: Peripheral Vascular;  Laterality: Right;    ABLATION, CHEMICAL SEALANT, VARICOSE VEIN Left 2024    Left GSV Venaseal Ablation performed by Dr. Zach Walker.    CATARACT EXTRACTION W/  INTRAOCULAR LENS IMPLANT Left 2023    Procedure: CEIOL OMNI OS;  Surgeon: Juan Antonio Mcintosh MD;  Location: [facility] OR;  Service: Ophthalmology;  Laterality: Left;  consider block     SECTION      CHOLECYSTECTOMY      COLONOSCOPY      CRANIOTOMY FOR EXCISION OF INTRACRANIAL TUMOR Left 2021    Procedure: CRANIOTOMY, FOR INTRACRANIAL NEOPLASM EXCISION Left craniotomy for tumor resection;  Surgeon: Hernandez Bravo MD;  Location: [facility];  Service: Neurosurgery;  Laterality: Left;    CRANIOTOMY, WITH NEOPLASM EXCISION USING COMPUTER-ASSISTED NAVIGATION Left 2023    Procedure: 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, USING COMPUTER-ASSISTED NAVIGATION-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;  Surgeon: Hernandez Bravo MD;  Location: [facility];  Service: Neurosurgery;  Laterality: Left;    ESOPHAGOGASTRODUODENOSCOPY N/A 2023    Procedure: EGD (ESOPHAGOGASTRODUODENOSCOPY);  Surgeon: Issac Wade MD;  Location: [facility];  Service: Endoscopy;  Laterality: N/A;    glaucoma laser Bilateral     HERNIA REPAIR      KNEE SURGERY Right     (R) knee scope; torn meniscus    MASTECTOMY, PARTIAL Right 2020    Procedure: MASTECTOMY, PARTIAL-Right with radiological marker Consent day of surgery; [place], technitium, Frozen;  Surgeon: aNrda Keating MD;  Location: [address];  Service: General;  Laterality: Right;    RADIOFREQUENCY ABLATION Right 3/1/2024    Procedure: Right SSV RF Ablation;  Surgeon: Pepito Walker DO;   Location: [facility] OR;  Service: Peripheral Vascular;  Laterality: Right;    SENTINEL LYMPH NODE BIOPSY Right 12/07/2020    Procedure: BIOPSY, LYMPH NODE, SENTINEL;  Surgeon: Narda Keating MD;  Location: [address];  Service: General;  Laterality: Right;    TOTAL REDUCTION MAMMOPLASTY Bilateral 12/07/2020    Procedure: MAMMOPLASTY, REDUCTION-Bilateral;  Surgeon: Zack Hood MD;  Location: [facility] OR [address] [place] [address];  Service: Plastics;  Laterality: Bilateral;    VEIN SURGERY  2003, 2004    Rt leg x2       Family History   Problem Relation Name Age of Onset    Diabetes Mother Sara Vick     No Known Problems Father      No Known Problems Sister      No Known Problems Brother      Colon cancer Maternal Aunt      No Known Problems Maternal Uncle      No Known Problems Paternal Aunt      No Known Problems Paternal Uncle      No Known Problems Maternal Grandmother      No Known Problems Maternal Grandfather      No Known Problems Paternal Grandmother      No Known Problems Paternal Grandfather      Amblyopia Neg Hx      Blindness Neg Hx      Cancer Neg Hx      Cataracts Neg Hx      Glaucoma Neg Hx      Hypertension Neg Hx      Macular degeneration Neg Hx      Retinal detachment Neg Hx      Strabismus Neg Hx      Stroke Neg Hx      Thyroid disease Neg Hx      Celiac disease Neg Hx      Colon polyps Neg Hx      Esophageal cancer Neg Hx      Inflammatory bowel disease Neg Hx      Irritable bowel syndrome Neg Hx      Liver cancer Neg Hx      Liver disease Neg Hx      Rectal cancer Neg Hx      Stomach cancer Neg Hx      Ulcerative colitis Neg Hx      Cystic fibrosis Neg Hx      Crohn's disease Neg Hx      Hemochromatosis Neg Hx      Cirrhosis Neg Hx         Social History     Socioeconomic History    Marital status:    Tobacco Use    Smoking status: Never     Passive exposure: Never    Smokeless tobacco: Never   Substance and Sexual Activity    Alcohol use: Never     Alcohol/week: 0.0 standard drinks of alcohol     Drug use: Never    Sexual activity: Yes     Partners: Male     Birth control/protection: None   Social History Narrative    1/18/19: lives with her . They have multiple children, but the youngest is 38. No children at home right now. No pets at home. Splits time between here and [place]. Her son lives in [place].      Social Determinants of Health     Financial Resource Strain: Low Risk  (12/26/2023)    Overall Financial Resource Strain (CARDIA)     Difficulty of Paying Living Expenses: Not very hard   Food Insecurity: No Food Insecurity (12/26/2023)    Hunger Vital Sign     Worried About Running Out of Food in the Last Year: Never true     Ran Out of Food in the Last Year: Never true   Transportation Needs: No Transportation Needs (12/26/2023)    PRAPARE - Transportation     Lack of Transportation (Medical): No     Lack of Transportation (Non-Medical): No   Physical Activity: Insufficiently Active (12/26/2023)    Exercise Vital Sign     Days of Exercise per Week: 3 days     Minutes of Exercise per Session: 20 min   Stress: No Stress Concern Present (12/26/2023)    [employer] - Occupational Stress Questionnaire     Feeling of Stress : Only a little   Recent Concern: Stress - Stress Concern Present (12/20/2023)    [employer] - Occupational Stress Questionnaire     Feeling of Stress : To some extent   Housing Stability: Low Risk  (12/26/2023)    Housing Stability Vital Sign     Unable to Pay for Housing in the Last Year: No     Number of Places Lived in the Last Year: 1     Unstable Housing in the Last Year: No       Current Outpatient Medications   Medication Sig Dispense Refill    atorvastatin (LIPITOR) 80 MG tablet Take 1 tablet (80 mg total) by mouth once daily. 90 tablet 3    b complex vitamins tablet Take 1 tablet by mouth once daily.      blood sugar diagnostic Strp Test twice daily.  Accucheck viva test strips and lancets. 300 each 3     calcium carb-vit D3-magnesium (CORAL CALCIUM) 250-200-125 mg-unit-mg Cap Take 1 capsule by mouth once daily.      dorzolamide-timolol 2-0.5% (COSOPT) 22.3-6.8 mg/mL ophthalmic solution Place 1 drop into both eyes 2 (two) times daily.      glucosamine-chondroitin (OSTEO BI-FLEX) 250-200 mg Tab Take 1 tablet by mouth once daily.      lancets (ACCU-CHEK SOFTCLIX LANCETS) Misc 1 Units by Misc.(Non-Drug; Combo Route) route 2 (two) times daily. 300 each 3    latanoprost 0.005 % ophthalmic solution Place 1 drop into both eyes every evening. 2.5 mL 12    linaCLOtide (LINZESS) 72 mcg Cap capsule Take 1 capsule (72 mcg total) by mouth before breakfast. (Patient not taking: Reported on 8/16/2024) 90 capsule 0    metFORMIN (GLUCOPHAGE) 500 MG tablet Take 1 tablet (500 mg total) by mouth daily with breakfast. 90 tablet 1    NIFEdipine (ADALAT CC) 90 MG TbSR Take 1 tablet (90 mg total) by mouth once daily. 90 tablet 1    tolterodine (DETROL LA) 4 MG 24 hr capsule Take 1 capsule (4 mg total) by mouth once daily. (Patient not taking: Reported on 8/16/2024) 90 capsule 1    vibegron (GEMTESA) 75 mg Tab Take 1 tablet (75 mg total) by mouth once daily. (Patient not taking: Reported on 8/16/2024) 30 tablet 11     No current facility-administered medications for this visit.     Facility-Administered Medications Ordered in Other Visits   Medication Dose Route Frequency Provider Last Rate Last Admin    LIDOcaine HCL 10 mg/ml (1%) injection 2 mL  2 mL Intradermal Once Pepito Walker, DO           Review of patient's allergies indicates:   Allergen Reactions    Percodan [oxycodone hcl-oxycodone-asa] Hives and Itching         Review of Systems   Constitutional: Negative for chills and fever.   Cardiovascular:  Positive for leg swelling. Negative for claudication.   Skin:  Positive for color change and nail changes.   Musculoskeletal:  Positive for joint swelling. Negative for joint pain, muscle cramps and muscle weakness.    Gastrointestinal:  Negative for nausea and vomiting.   Neurological:  Positive for numbness. Negative for paresthesias.   Psychiatric/Behavioral:  Negative for altered mental status.            Objective:      Physical Exam  Constitutional:       General: She is not in acute distress.     Appearance: She is well-developed. She is not diaphoretic.   Cardiovascular:      Pulses:           Dorsalis pedis pulses are 2+ on the right side and 2+ on the left side.        Posterior tibial pulses are 2+ on the right side and 2+ on the left side.      Comments: CFT < 3 seconds bilateral.  Pedal hair growth decreased bilateral.  Varicosities noted bilateral.  Mild to moderate non pitting edema noted to Rt. lower extremity and moderate edema of the Lt. LE.  Toes are cool to touch bilateral.    Musculoskeletal:         General: No tenderness.      Comments: Muscle strength 5/5 in all muscle groups bilateral.  No tenderness nor crepitation with ROM of foot/ankle joints bilateral.  No tenderness with palpation of bilateral foot and ankle.  Bilateral pes planus foot type.  Bilateral hallux abducto valgus.  Bilateral semi-reducible contracture of toes 2-5.     Skin:     General: Skin is warm and dry.      Coloration: Skin is not pale.      Findings: Lesion present. No abrasion, bruising, burn, ecchymosis, erythema, laceration, petechiae or rash.      Nails: There is no clubbing.      Comments: Pedal skin appears edematous bilateral.  Toenails x 10 appear thickened by 2 mm, elongated by 4 mm, and discolored with subungual debris.  Hemosiderin staining noted to the Rt. Lower leg.  Hyperkeratotic lesion noted to bilateral sub 5th met head.   Neurological:      Mental Status: She is alert and oriented to person, place, and time.      Sensory: Sensory deficit present.      Motor: No weakness or atrophy.      Comments: Protective sensation per [place] monofilament intact bilateral.    Vibratory sensation decreased  bilateral.    Light touch intact bilateral.             Assessment:       Encounter Diagnoses   Name Primary?    Type 2 diabetes mellitus with hyperglycemia, without long-term current use of insulin Yes    Hammer toes of both feet     Onychomycosis due to dermatophyte     Corn or callus              Plan:       Diagnoses and all orders for this visit:    Type 2 diabetes mellitus with hyperglycemia, without long-term current use of insulin    Hammer toes of both feet    Onychomycosis due to dermatophyte    Corn or callus        I counseled the patient on her conditions, their implications and medical management.    Shoe inspection. Diabetic Foot Education. Patient reminded of the importance of good nutrition and blood sugar control to help prevent podiatric complications of diabetes. Patient instructed on proper foot hygeine. We discussed wearing proper shoe gear, daily foot inspections, never walking without protective shoe gear, never putting sharp instruments to feet    Advised to continue wearing diabetic shoes/insoles that accommodate for digital deformities.     With patient's permission, nails were aggressively reduced and debrided x 10 to their soft tissue attachment mechanically and with electric , removing all offending nail and debris.  Also, a sterile #15 scalpel was used to trim a lesions x 2 down to smooth appearing skin without incident.   Patient relates relief following the procedure.  She will continue to monitor the areas daily, inspect her feet, wear protective shoe gear when ambulatory, moisturizer to maintain skin integrity and follow in this office in approximately 4 months, sooner p.r.n.    Giuliano Garnett DPM

## 2024-08-29 NOTE — TELEPHONE ENCOUNTER
Attempted to contact patient again for IC referral.  Unable to leave messages on either number in chart.  Will send another message via portal with contact information to our office.

## 2024-09-25 ENCOUNTER — OFFICE VISIT (OUTPATIENT)
Dept: CARDIOLOGY | Facility: CLINIC | Age: 74
End: 2024-09-25
Attending: INTERNAL MEDICINE
Payer: MEDICARE

## 2024-09-25 ENCOUNTER — HOSPITAL ENCOUNTER (OUTPATIENT)
Dept: RADIOLOGY | Facility: HOSPITAL | Age: 74
Discharge: HOME | End: 2024-09-25
Attending: INTERNAL MEDICINE
Payer: MEDICARE

## 2024-09-25 VITALS
DIASTOLIC BLOOD PRESSURE: 86 MMHG | HEIGHT: 62 IN | WEIGHT: 158 LBS | SYSTOLIC BLOOD PRESSURE: 163 MMHG | BODY MASS INDEX: 29.08 KG/M2 | OXYGEN SATURATION: 97 % | HEART RATE: 109 BPM

## 2024-09-25 DIAGNOSIS — I35.0 AORTIC VALVE STENOSIS, ETIOLOGY OF CARDIAC VALVE DISEASE UNSPECIFIED: ICD-10-CM

## 2024-09-25 DIAGNOSIS — I35.0 NONRHEUMATIC AORTIC VALVE STENOSIS: ICD-10-CM

## 2024-09-25 DIAGNOSIS — I10 ESSENTIAL HYPERTENSION: ICD-10-CM

## 2024-09-25 DIAGNOSIS — I35.0 AORTIC STENOSIS, MODERATE: Primary | ICD-10-CM

## 2024-09-25 PROCEDURE — 99215 OFFICE O/P EST HI 40 MIN: CPT | Mod: PBBFAC,25 | Performed by: INTERNAL MEDICINE

## 2024-09-25 PROCEDURE — 99999 PR PBB SHADOW E&M-EST. PATIENT-LVL V: CPT | Mod: PBBFAC,,, | Performed by: INTERNAL MEDICINE

## 2024-09-25 PROCEDURE — 74174 CTA ABD&PLVS W/CONTRAST: CPT | Mod: TC

## 2024-09-25 PROCEDURE — 74174 CTA ABD&PLVS W/CONTRAST: CPT | Mod: 26,,, | Performed by: RADIOLOGY

## 2024-09-25 PROCEDURE — 71275 CT ANGIOGRAPHY CHEST: CPT | Mod: 26,,, | Performed by: RADIOLOGY

## 2024-09-25 PROCEDURE — 25500020 PHARM REV CODE 255: Performed by: INTERNAL MEDICINE

## 2024-09-25 PROCEDURE — 71275 CT ANGIOGRAPHY CHEST: CPT | Mod: TC

## 2024-09-25 RX ADMIN — IOHEXOL 100 ML: 350 INJECTION, SOLUTION INTRAVENOUS at 03:09

## 2024-09-25 NOTE — ASSESSMENT & PLAN NOTE
- TTE on 8/16/2024 shows moderate aortic severe with CANDY 1.38, peak velocity 3.06, and mean gradient 21 with EF 60-65%  - discussed with patient that current diagnosis of moderate AS does not meet guidelines for valve replacement  - follow-up in clinic in 1 year with echo to monitor progression of AS  - would recommend continuing to follow with general cardiologist

## 2024-09-25 NOTE — PROGRESS NOTES
Interventional Cardiology Clinic Note    Reason for Visit: evaluation of aortic stenosis    HPI:     Eugenie Coates is a 74 y.o. female with HTN, HLD, DM, AVNRT s/p RFA 2019 hx of DVT, venous insufficiency, seizures, and meningioma s/p surgical resection on XRT, who presents for evaluation of aortic stenosis .    Recent TTE on 8/16/2024 shows moderate aortic severe with CANDY 1.38, peak velocity 3.06, and mean gradient 21 with EF 60-65%.    Patient is referred by Dr. Joe. She has been following with cardiology in [place] and was scheduled for [facility] in may but did not due to resolved symptoms. Today, she reports waking up in the middle of the night with heart-racing feeling, palpitations, and associated chest pains. She also notes occasionally feeling short of breath when laying down. Denies shortness of breath with exertion and exertional chest pains. She states that she is not as active as she used to be following her recent surgeries and arthritis. Fhx of heart failure. She is not on any asa or blood thinners. No acute symptoms at this time.    ROS:      Review of Systems   Cardiovascular:  Positive for chest pain and palpitations. Negative for dyspnea on exertion, leg swelling, near-syncope, orthopnea and syncope.   All other systems reviewed and are negative.      PMH:     Past Medical History:   Diagnosis Date    Allergy     Anemia     Anxiety     Arthritis     Asthma     Bell palsy     Bilateral lower extremity edema     Chronic diastolic CHF (congestive heart failure)     Depression     Diabetes mellitus, type 2     DVT (deep venous thrombosis)     right LE    Dyslipidemia     Endometriosis     Eye injury 2014    stuck with tree branch od ?     Focal seizure 5/3/2021    GERD (gastroesophageal reflux disease)     Glaucoma     History of DVT (deep vein thrombosis) 1/23/2018    History of uterine fibroid     Hyperlipidemia     Hypertension     Invasive lobular carcinoma of right breast in female     s/p  surgery, radiation, tamoxifen    Nuclear sclerosis of both eyes 2016    Obesity     Pancreas cyst     Sleep apnea     uses C pap    Supraventricular tachycardia     SVT (supraventricular tachycardia) 2019    Thyroid disease     Venous insufficiency      Past Surgical History:   Procedure Laterality Date    ABLATION, CHEMICAL SEALANT, VARICOSE VEIN Right 2024    Procedure: Right GSV VenaSeal Ablation;  Surgeon: Pepito Walker DO;  Location: [facility] OR;  Service: Peripheral Vascular;  Laterality: Right;    ABLATION, CHEMICAL SEALANT, VARICOSE VEIN Left 2024    Left GSV Venaseal Ablation performed by Dr. Zach Walker.    CATARACT EXTRACTION W/  INTRAOCULAR LENS IMPLANT Left 2023    Procedure: CEIOL OMNI OS;  Surgeon: Juan Antonio Mcintosh MD;  Location: [facility] OR;  Service: Ophthalmology;  Laterality: Left;  consider block     SECTION      CHOLECYSTECTOMY      COLONOSCOPY      CRANIOTOMY FOR EXCISION OF INTRACRANIAL TUMOR Left 2021    Procedure: CRANIOTOMY, FOR INTRACRANIAL NEOPLASM EXCISION Left craniotomy for tumor resection;  Surgeon: Hernandez Bravo MD;  Location: [facility] OR;  Service: Neurosurgery;  Laterality: Left;    CRANIOTOMY, WITH NEOPLASM EXCISION USING COMPUTER-ASSISTED NAVIGATION Left 2023    Procedure: 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, USING COMPUTER-ASSISTED NAVIGATION-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;  Surgeon: Hernandez Bravo MD;  Location: [facility] OR;  Service: Neurosurgery;  Laterality: Left;    ESOPHAGOGASTRODUODENOSCOPY N/A 2023    Procedure: EGD (ESOPHAGOGASTRODUODENOSCOPY);  Surgeon: Issac Wade MD;  Location: [facility];  Service: Endoscopy;  Laterality: N/A;    glaucoma laser Bilateral     HERNIA REPAIR      KNEE SURGERY Right     (R) knee scope; torn meniscus    MASTECTOMY, PARTIAL Right 2020    Procedure: MASTECTOMY, PARTIAL-Right with radiological marker Consent day of surgery; [place],  technitium, Frozen;  Surgeon: Narda Keating MD;  Location: [facility] OR [address] [place] [address];  Service: General;  Laterality: Right;    RADIOFREQUENCY ABLATION Right 3/1/2024    Procedure: Right SSV RF Ablation;  Surgeon: Pepito Walker DO;  Location: [facility];  Service: Peripheral Vascular;  Laterality: Right;    SENTINEL LYMPH NODE BIOPSY Right 12/07/2020    Procedure: BIOPSY, LYMPH NODE, SENTINEL;  Surgeon: Narda Keating MD;  [facility];  Service: General;  Laterality: Right;    TOTAL REDUCTION MAMMOPLASTY Bilateral 12/07/2020    Procedure: MAMMOPLASTY, REDUCTION-Bilateral;  Surgeon: Zack Hood MD;  Location: [facility] OR [address] [place] [address];  Service: Plastics;  Laterality: Bilateral;    VEIN SURGERY  2003, 2004    Rt leg x2     Allergies:     Review of patient's allergies indicates:   Allergen Reactions    Oxycodone hcl-oxycodone-asa Hives, Itching and Other (See Comments)    Percodan yadira      Other reaction(s): Unknown     Medications:     Current Outpatient Medications on File Prior to Visit   Medication Sig Dispense Refill    atorvastatin (LIPITOR) 80 MG tablet Take 1 tablet (80 mg total) by mouth once daily. 90 tablet 3    b complex vitamins tablet Take 1 tablet by mouth once daily.      blood sugar diagnostic Strp Test twice daily.  Accucheck viva test strips and lancets. 300 each 3    calcium carb-vit D3-magnesium (CORAL CALCIUM) 250-200-125 mg-unit-mg Cap Take 1 capsule by mouth once daily.      dorzolamide-timolol 2-0.5% (COSOPT) 22.3-6.8 mg/mL ophthalmic solution Place 1 drop into both eyes 2 (two) times daily.      glucosamine-chondroitin (OSTEO BI-FLEX) 250-200 mg Tab Take 1 tablet by mouth once daily.      lancets (ACCU-CHEK SOFTCLIX LANCETS) Misc 1 Units by Misc.(Non-Drug; Combo Route) route 2 (two) times daily. 300 each 3    latanoprost 0.005 % ophthalmic solution Place 1 drop into both eyes every evening. 2.5 mL 12    metFORMIN (GLUCOPHAGE) 500 MG tablet Take 1 tablet (500 mg total) by mouth  daily with breakfast. 90 tablet 1    NIFEdipine (ADALAT CC) 90 MG TbSR Take 1 tablet (90 mg total) by mouth once daily. 90 tablet 1    linaCLOtide (LINZESS) 72 mcg Cap capsule Take 1 capsule (72 mcg total) by mouth before breakfast. (Patient not taking: Reported on 8/16/2024) 90 capsule 0    tolterodine (DETROL LA) 4 MG 24 hr capsule Take 1 capsule (4 mg total) by mouth once daily. (Patient not taking: Reported on 8/16/2024) 90 capsule 1    vibegron (GEMTESA) 75 mg Tab Take 1 tablet (75 mg total) by mouth once daily. (Patient not taking: Reported on 8/16/2024) 30 tablet 11    [DISCONTINUED] omeprazole (PRILOSEC OTC) 20 MG tablet Take 1 tablet (20 mg total) by mouth once daily. 90 tablet 3     Current Facility-Administered Medications on File Prior to Visit   Medication Dose Route Frequency Provider Last Rate Last Admin    [COMPLETED] iohexoL (OMNIPAQUE 350) injection 100 mL  100 mL Intravenous ONCE PRN Grupo Robertson MD   100 mL at 09/25/24 1518    LIDOcaine HCL 10 mg/ml (1%) injection 2 mL  2 mL Intradermal Once Pepito Walker, DO         Social History:     Social History     Tobacco Use    Smoking status: Never     Passive exposure: Never    Smokeless tobacco: Never   Substance Use Topics    Alcohol use: Never     Alcohol/week: 0.0 standard drinks of alcohol     Family History:     Family History   Problem Relation Name Age of Onset    Diabetes Mother Sara Vick     No Known Problems Father      No Known Problems Sister      No Known Problems Brother      Colon cancer Maternal Aunt      No Known Problems Maternal Uncle      No Known Problems Paternal Aunt      No Known Problems Paternal Uncle      No Known Problems Maternal Grandmother      No Known Problems Maternal Grandfather      No Known Problems Paternal Grandmother      No Known Problems Paternal Grandfather      Amblyopia Neg Hx      Blindness Neg Hx      Cancer Neg Hx      Cataracts Neg Hx      Glaucoma Neg Hx      Hypertension Neg Hx       "Macular degeneration Neg Hx      Retinal detachment Neg Hx      Strabismus Neg Hx      Stroke Neg Hx      Thyroid disease Neg Hx      Celiac disease Neg Hx      Colon polyps Neg Hx      Esophageal cancer Neg Hx      Inflammatory bowel disease Neg Hx      Irritable bowel syndrome Neg Hx      Liver cancer Neg Hx      Liver disease Neg Hx      Rectal cancer Neg Hx      Stomach cancer Neg Hx      Ulcerative colitis Neg Hx      Cystic fibrosis Neg Hx      Crohn's disease Neg Hx      Hemochromatosis Neg Hx      Cirrhosis Neg Hx       Physical Exam:   BP (!) 163/86 (BP Location: Left arm, Patient Position: Sitting, BP Method: Large (Automatic))   Pulse 109   Ht 5' 2" (1.575 m)   Wt 71.7 kg (158 lb)   SpO2 97%   BMI 28.90 kg/m²        Physical Exam  Vitals and nursing note reviewed.   Constitutional:       Appearance: Normal appearance.   HENT:      Head: Normocephalic and atraumatic.      Mouth/Throat:      Mouth: Mucous membranes are moist.   Eyes:      Pupils: Pupils are equal, round, and reactive to light.   Cardiovascular:      Rate and Rhythm: Normal rate and regular rhythm.      Heart sounds: Murmur heard.      Systolic murmur is present with a grade of 3/6.      No friction rub. No gallop.   Pulmonary:      Effort: Pulmonary effort is normal.   Abdominal:      General: Abdomen is flat.   Musculoskeletal:         General: Normal range of motion.      Cervical back: Normal range of motion and neck supple.      Right lower leg: No edema.      Left lower leg: No edema.   Skin:     General: Skin is warm and dry.      Capillary Refill: Capillary refill takes less than 2 seconds.   Neurological:      General: No focal deficit present.      Mental Status: She is alert.          Labs:     Lab Results   Component Value Date     09/25/2024    K 4.2 09/25/2024     09/25/2024    CO2 23 09/25/2024    BUN 12 09/25/2024    CREATININE 1.0 09/25/2024    ANIONGAP 11 09/25/2024     Lab Results   Component Value Date    " HGBA1C 6.1 07/15/2024    HGBA1C 7.1 (H) 08/14/2023     Lab Results   Component Value Date     (H) 04/28/2022    Lab Results   Component Value Date    WBC 8.41 09/25/2024    HGB 13.9 09/25/2024    HCT 46.4 09/25/2024     09/25/2024    GRAN 8.6 (H) 08/14/2023    GRAN 79.9 (H) 08/14/2023     Lab Results   Component Value Date    CHOL 201 (H) 07/15/2024    CHOL 205 (H) 03/01/2023    HDL 49 07/15/2024    HDL 40 03/01/2023    LDLCALC 112 07/15/2024    LDLCALC 132.4 03/01/2023    TRIG 198 (H) 07/15/2024    TRIG 163 (H) 03/01/2023          Lipids:  Recent Labs   Lab 07/15/24  0903   LDL Calculated 112   HDL Cholesterol 49      Renal:  Recent Labs   Lab 09/25/24  1018   Creatinine 1.0   Potassium 4.2   CO2 23   BUN 12     Liver:  Recent Labs   Lab 07/15/24  0903   AST 22   ALT 25       Imaging:     Echocardiograms:   Results for orders placed or performed during the hospital encounter of 08/16/24   Echo    Narrative      Left Ventricle: The left ventricle is normal in size. Normal wall   thickness. There is concentric remodeling. There is normal systolic   function with a visually estimated ejection fraction of 60 - 65%.    Right Ventricle: Normal right ventricular cavity size. Wall thickness   is normal. Systolic function is normal.    Left Atrium: Left atrium is mildly dilated.    Aortic Valve: The aortic valve is a trileaflet valve. There is moderate   aortic valve sclerosis. Mildly restricted motion. There is moderate   stenosis. Aortic valve area by VTI is 1.38 cm². Aortic valve peak velocity   is 3.06 m/s. Mean gradient is 21 mmHg. The dimensionless index is 0.44.    Mitral Valve: There is moderate mitral annular calcification present.    Tricuspid Valve: There is mild regurgitation.    Pulmonary Artery: The estimated pulmonary artery systolic pressure is   28 mmHg.    IVC/SVC: Normal venous pressure at 3 mmHg.       Caths:   None    Other:  CTA 9/25/2024:            Assessment & Plan:     1. Aortic  stenosis, moderate    2. Aortic valve stenosis, etiology of cardiac valve disease unspecified    3. Essential hypertension        Aortic stenosis, moderate  - TTE on 8/16/2024 shows moderate aortic severe with CANDY 1.38, peak velocity 3.06, and mean gradient 21 with EF 60-65%  - discussed with patient that current diagnosis of moderate AS does not meet guidelines for valve replacement  - follow-up in clinic in 1 year with echo to monitor progression of AS  - would recommend continuing to follow with general cardiologist    Essential hypertension  - continue current regimen  - continue follow-up with general cardiology      Follow up in 1 year with repeat echo before.    Signed:  Mily Hopkins PA-C  Interventional Cardiology

## 2024-10-02 ENCOUNTER — OFFICE VISIT (OUTPATIENT)
Dept: GASTROENTEROLOGY | Facility: CLINIC | Age: 74
End: 2024-10-02
Payer: MEDICARE

## 2024-10-02 VITALS
OXYGEN SATURATION: 97 % | HEIGHT: 62 IN | DIASTOLIC BLOOD PRESSURE: 75 MMHG | BODY MASS INDEX: 28.89 KG/M2 | WEIGHT: 157 LBS | SYSTOLIC BLOOD PRESSURE: 129 MMHG | HEART RATE: 92 BPM

## 2024-10-02 DIAGNOSIS — R13.10 DYSPHAGIA, UNSPECIFIED TYPE: Primary | ICD-10-CM

## 2024-10-02 DIAGNOSIS — K59.09 CHRONIC CONSTIPATION: ICD-10-CM

## 2024-10-02 DIAGNOSIS — R11.0 NAUSEA: ICD-10-CM

## 2024-10-02 DIAGNOSIS — K76.0 FATTY LIVER: ICD-10-CM

## 2024-10-02 PROCEDURE — 99214 OFFICE O/P EST MOD 30 MIN: CPT | Mod: S$PBB,,, | Performed by: INTERNAL MEDICINE

## 2024-10-02 PROCEDURE — 99999 PR PBB SHADOW E&M-EST. PATIENT-LVL V: CPT | Mod: PBBFAC,,, | Performed by: INTERNAL MEDICINE

## 2024-10-02 PROCEDURE — 99215 OFFICE O/P EST HI 40 MIN: CPT | Mod: PBBFAC | Performed by: INTERNAL MEDICINE

## 2024-10-02 RX ORDER — PANTOPRAZOLE SODIUM 40 MG/1
40 TABLET, DELAYED RELEASE ORAL DAILY
Qty: 90 TABLET | Refills: 3 | Status: SHIPPED | OUTPATIENT
Start: 2024-10-02 | End: 2025-10-02

## 2024-10-02 RX ORDER — POLYETHYLENE GLYCOL 3350, SODIUM SULFATE ANHYDROUS, SODIUM BICARBONATE, SODIUM CHLORIDE, POTASSIUM CHLORIDE 236; 22.74; 6.74; 5.86; 2.97 G/4L; G/4L; G/4L; G/4L; G/4L
4 POWDER, FOR SOLUTION ORAL ONCE
Qty: 4000 ML | Refills: 0 | Status: SHIPPED | OUTPATIENT
Start: 2024-10-02 | End: 2024-10-02

## 2024-10-02 NOTE — PROGRESS NOTES
"CC: GERD    HPI 74 y.o. female with history of GERD and Schatzki ring presents for evaluation of dysphagia ongoing for several months associated with regurgitation, abdominal discomfort and epigastric burning sensation. She states the symptoms have been present and had seen [facility] in past. Last had EGD in 2023 with normal esophagus, regular z-line, mild schatzki ring which was dilated with 18 mm Savary, medium sized hiatal hernia, gastritis, normal duodenum. She has not had colonoscopy in many years. She had cologuard negative in 2023. Otherwise does suffer from constipation. Has tried linzess in the past which has helped symptoms. Would like refill. CT 7/2024 concerning for fatty liver. No history of fatty liver and no elastography in past so this is a new diagnosis. Does have history of meningioma      Medical records reviewed. Additional history supplemented by nursing.     Past Medical History:   Diagnosis Date    Allergy     Anemia     Anxiety     Arthritis     Asthma     Bell palsy     Bilateral lower extremity edema     Chronic diastolic CHF (congestive heart failure)     Depression     Diabetes mellitus, type 2     DVT (deep venous thrombosis)     right LE    Dyslipidemia     Endometriosis     Eye injury 2014    stuck with tree branch od ?     Focal seizure 5/3/2021    GERD (gastroesophageal reflux disease)     Glaucoma     History of DVT (deep vein thrombosis) 1/23/2018    History of uterine fibroid     Hyperlipidemia     Hypertension     Invasive lobular carcinoma of right breast in female     s/p surgery, radiation, tamoxifen    Nuclear sclerosis of both eyes 9/27/2016    Obesity     Pancreas cyst     Sleep apnea     uses C pap    Supraventricular tachycardia     SVT (supraventricular tachycardia) 05/2019    Thyroid disease     Venous insufficiency      Physical Examination  /75   Pulse 92   Ht 5' 2" (1.575 m)   Wt 71.2 kg (157 lb)   SpO2 97%   BMI 28.72 kg/m²   General appearance: alert, " cooperative, no distress  HENT: Normocephalic, atraumatic, neck symmetrical  Eyes: conjunctivae clear  Lungs: No labored breathing  Skin: No jaundice  Neurologic: Alert and oriented X 3    Labs:  Lab Results   Component Value Date    WBC 8.41 09/25/2024    HGB 13.9 09/25/2024    HCT 46.4 09/25/2024    MCV 88 09/25/2024     09/25/2024     CMP  Sodium   Date Value Ref Range Status   09/25/2024 143 136 - 145 mmol/L Final     Potassium   Date Value Ref Range Status   09/25/2024 4.2 3.5 - 5.1 mmol/L Final     Chloride   Date Value Ref Range Status   09/25/2024 109 95 - 110 mmol/L Final     CO2   Date Value Ref Range Status   09/25/2024 23 23 - 29 mmol/L Final     Glucose   Date Value Ref Range Status   09/25/2024 128 (H) 70 - 110 mg/dL Final     BUN   Date Value Ref Range Status   09/25/2024 12 8 - 23 mg/dL Final     Creatinine   Date Value Ref Range Status   09/25/2024 1.0 0.5 - 1.4 mg/dL Final     Calcium   Date Value Ref Range Status   09/25/2024 9.9 8.7 - 10.5 mg/dL Final     Total Protein   Date Value Ref Range Status   07/15/2024 7.7 6.4 - 8.2 g/dL Final   08/20/2023 6.4 6.0 - 8.4 g/dL Final     Albumin   Date Value Ref Range Status   09/25/2024 3.9 3.5 - 5.2 g/dL Final     Total Bilirubin   Date Value Ref Range Status   08/20/2023 0.8 0.2 - 1.3 mg/dL Final     Bilirubin, Total   Date Value Ref Range Status   07/15/2024 0.7 >0.0 - 1.2 mg/dL Final     Alkaline Phosphatase   Date Value Ref Range Status   08/20/2023 59 38 - 145 U/L Final     Alk Phos   Date Value Ref Range Status   07/15/2024 137 55 - 142 U/L Final     AST   Date Value Ref Range Status   07/15/2024 22 15 - 37 U/L Final   08/20/2023 46 (H) 14 - 36 U/L Final     ALT   Date Value Ref Range Status   07/15/2024 25 13 - 56 U/L Final   08/20/2023 23 0 - 35 U/L Final     Anion Gap   Date Value Ref Range Status   09/25/2024 11 8 - 16 mmol/L Final     eGFR if    Date Value Ref Range Status   08/18/2023 SEE COMMENT >60 mL/min/1.73 m^2  Final     Comment:     Result not available.     eGFR if non    Date Value Ref Range Status   08/18/2023 SEE COMMENT >60 mL/min/1.73 m^2 Final     Comment:     Calculation used to obtain the estimated glomerular filtration  rate (eGFR) is the CKD-EPI equation.   Result not available.       Imaging:  CT: Lung bases are clear.     Fatty infiltration of the liver.  No focal hepatic lesion.  Gallbladder is absent.  The adrenals, spleen, pancreas unremarkable.  There are nonobstructing stones seen of the kidneys bilaterally.  No hydronephrosis.  No hydroureter.  No solid renal lesion.  Urinary bladder unremarkable.  Fibroid uterus.  No adnexal lesion.     No pneumoperitoneum.  No ascites.  Previous anterior abdominal wall hernia repair changes are seen.  No bowel obstruction.  No acute bowel abnormality.  Mild diffuse colonic stool.  Small hiatal hernia.     No adenopathy.  Aortic and iliac calcifications.  There is also prominent calcification at the origin of the right and left renal artery.     No fracture.    Assessment:   Dysphagia  Constipation  Fatty liver    Plan:  -Schedule EGD for evaluation of dysphagia  -Schedule colonoscopy due to history of constipation  -Trial of Linzess 72 mcg daily as this has worked in the past for constipation  -Protonix 40 mg PO daily for GERD  -US Elastography for evaluation of fatty liver  -Recommendations to follow endoscopy      Sai Sruthi Veerisetty, MD [facility]

## 2024-10-11 ENCOUNTER — HOSPITAL ENCOUNTER (OUTPATIENT)
Dept: RADIOLOGY | Facility: HOSPITAL | Age: 74
Discharge: HOME | End: 2024-10-11
Attending: INTERNAL MEDICINE
Payer: MEDICARE

## 2024-10-11 DIAGNOSIS — K76.0 FATTY LIVER: ICD-10-CM

## 2024-10-11 PROCEDURE — 76981 USE PARENCHYMA: CPT | Mod: TC

## 2024-10-11 PROCEDURE — 76981 USE PARENCHYMA: CPT | Mod: 26,,, | Performed by: STUDENT IN AN ORGANIZED HEALTH CARE EDUCATION/TRAINING PROGRAM

## 2024-10-15 ENCOUNTER — HOSPITAL ENCOUNTER (OUTPATIENT)
Dept: RADIOLOGY | Facility: HOSPITAL | Age: 74
Discharge: HOME | End: 2024-10-15
Attending: FAMILY MEDICINE
Payer: MEDICARE

## 2024-10-15 ENCOUNTER — TELEPHONE (OUTPATIENT)
Dept: GASTROENTEROLOGY | Facility: CLINIC | Age: 74
End: 2024-10-15
Payer: MEDICARE

## 2024-10-15 ENCOUNTER — PATIENT MESSAGE (OUTPATIENT)
Dept: GASTROENTEROLOGY | Facility: CLINIC | Age: 74
End: 2024-10-15
Payer: MEDICARE

## 2024-10-15 DIAGNOSIS — C50.912 MALIGNANT NEOPLASM OF LEFT FEMALE BREAST, UNSPECIFIED ESTROGEN RECEPTOR STATUS, UNSPECIFIED SITE OF BREAST: ICD-10-CM

## 2024-10-15 DIAGNOSIS — C50.611 MALIGNANT NEOPLASM OF AXILLARY TAIL OF RIGHT FEMALE BREAST, UNSPECIFIED ESTROGEN RECEPTOR STATUS: ICD-10-CM

## 2024-10-15 PROCEDURE — 76642 ULTRASOUND BREAST LIMITED: CPT | Mod: 26,LT,, | Performed by: RADIOLOGY

## 2024-10-15 PROCEDURE — 77066 DX MAMMO INCL CAD BI: CPT | Mod: 26,,, | Performed by: RADIOLOGY

## 2024-10-15 PROCEDURE — 76642 ULTRASOUND BREAST LIMITED: CPT | Mod: TC,LT

## 2024-10-15 PROCEDURE — 77062 BREAST TOMOSYNTHESIS BI: CPT | Mod: 26,,, | Performed by: RADIOLOGY

## 2024-10-15 PROCEDURE — 77066 DX MAMMO INCL CAD BI: CPT | Mod: TC

## 2024-10-15 NOTE — TELEPHONE ENCOUNTER
----- Message from Colin Allison MD sent at 10/14/2024  2:30 PM CDT -----  Please notify patient that ultrasound was without any abnormal findings. No fibrosis seen.

## 2024-11-05 ENCOUNTER — PATIENT MESSAGE (OUTPATIENT)
Dept: GASTROENTEROLOGY | Facility: CLINIC | Age: 74
End: 2024-11-05
Payer: MEDICARE

## 2024-11-06 ENCOUNTER — TELEPHONE (OUTPATIENT)
Dept: GASTROENTEROLOGY | Facility: CLINIC | Age: 74
End: 2024-11-06
Payer: MEDICARE

## 2024-11-06 NOTE — TELEPHONE ENCOUNTER
Spoke w/ pt in regards to received message of d/c of scheduled appts of 2/10/25 egd and 2/18/25 colonoscopy - pt states she was just feeling bad and aching all over - advised pt to f/u w/ PCP regarding s/s - also encouraged pt to keep appts scheduled r/t being so far away and she did not know how she would be feeling by then - pt stated she just wanted to cancel the appts - advised to call for rescheduling when she feels better - understanding voiced - will cancel appts per pt request

## 2024-11-13 ENCOUNTER — TELEPHONE (OUTPATIENT)
Dept: NEPHROLOGY | Facility: CLINIC | Age: 74
End: 2024-11-13

## 2024-11-13 ENCOUNTER — OFFICE VISIT (OUTPATIENT)
Dept: NEPHROLOGY | Facility: CLINIC | Age: 74
End: 2024-11-13
Payer: MEDICARE

## 2024-11-13 VITALS
WEIGHT: 156.94 LBS | SYSTOLIC BLOOD PRESSURE: 122 MMHG | BODY MASS INDEX: 28.88 KG/M2 | OXYGEN SATURATION: 99 % | DIASTOLIC BLOOD PRESSURE: 64 MMHG | HEART RATE: 88 BPM | HEIGHT: 62 IN

## 2024-11-13 DIAGNOSIS — I10 ESSENTIAL HYPERTENSION: ICD-10-CM

## 2024-11-13 DIAGNOSIS — I10 PRIMARY HYPERTENSION: Primary | ICD-10-CM

## 2024-11-13 PROCEDURE — 99213 OFFICE O/P EST LOW 20 MIN: CPT | Mod: PBBFAC,PN | Performed by: INTERNAL MEDICINE

## 2024-11-13 PROCEDURE — 99999 PR PBB SHADOW E&M-EST. PATIENT-LVL III: CPT | Mod: PBBFAC,,, | Performed by: INTERNAL MEDICINE

## 2024-11-13 PROCEDURE — 99213 OFFICE O/P EST LOW 20 MIN: CPT | Mod: S$PBB,,, | Performed by: INTERNAL MEDICINE

## 2024-11-13 RX ORDER — NIFEDIPINE 90 MG/1
90 TABLET, EXTENDED RELEASE ORAL DAILY
Qty: 90 TABLET | Refills: 1 | Status: SHIPPED | OUTPATIENT
Start: 2024-11-13

## 2024-11-13 NOTE — TELEPHONE ENCOUNTER
Patient would like to schedule with Cardiologist in [place].     She is established in another region.     Please contact her to schedule.  Thanks!

## 2024-11-13 NOTE — PROGRESS NOTES
"  Subjective:       Patient ID: Eugenie Coaets is a 74 y.o. Black or  female who presents for return patient evaluation for hypertension.      She had return of her tumor and is s/p XRT.  Her seizures have returned since last year.  She had brain surgery in August of 2023.      Review of Systems   Constitutional:  Negative for appetite change, chills and fever.   Eyes:  Negative for visual disturbance.   Respiratory:  Negative for cough and shortness of breath.    Cardiovascular:  Positive for leg swelling (occasional). Negative for chest pain.   Gastrointestinal:  Positive for abdominal pain (from GERD). Negative for diarrhea, nausea and vomiting.   Genitourinary:  Positive for difficulty urinating (she does have some bladder symptoms). Negative for dysuria, flank pain and hematuria.   Musculoskeletal:  Positive for arthralgias (knees) and gait problem. Negative for myalgias.   Skin:  Negative for rash.   Neurological:  Negative for headaches.   Psychiatric/Behavioral:  Negative for sleep disturbance.        The past medical, family and social histories were reviewed for this encounter.     /64 (BP Location: Left arm, Patient Position: Sitting)   Pulse 88   Ht 5' 2" (1.575 m)   Wt 71.2 kg (156 lb 15.5 oz)   SpO2 99%   BMI 28.71 kg/m²     Objective:      Physical Exam  Vitals reviewed.   Constitutional:       General: She is not in acute distress.     Appearance: She is well-developed.   HENT:      Head: Normocephalic and atraumatic.   Eyes:      Conjunctiva/sclera: Conjunctivae normal.   Neck:      Vascular: No JVD.   Cardiovascular:      Rate and Rhythm: Normal rate and regular rhythm.      Heart sounds: Murmur heard.      No friction rub. No gallop.   Pulmonary:      Effort: Pulmonary effort is normal. No respiratory distress.      Breath sounds: Normal breath sounds. No wheezing or rales.   Abdominal:      General: Bowel sounds are normal. There is no distension.      Palpations: " Abdomen is soft.      Tenderness: There is no abdominal tenderness.   Musculoskeletal:      Cervical back: Neck supple.      Right lower leg: Edema (trace) present.      Left lower leg: Edema (trace) present.   Skin:     General: Skin is warm and dry.      Findings: No rash.   Neurological:      Mental Status: She is alert and oriented to person, place, and time.         Assessment:       1. Primary hypertension       Lab Results   Component Value Date    CREATININE 1.11 11/06/2024    BUN 13 11/06/2024     11/06/2024    K 4.4 11/06/2024     11/06/2024    CO2 26 11/06/2024     Lab Results   Component Value Date    CALCIUM 9.8 11/06/2024    PHOS 3.4 11/06/2024     Lab Results   Component Value Date    HCT 46.4 09/25/2024     Prot/Creat Ratio, Urine   Date Value Ref Range Status   02/22/2019 Unable to calculate 0.00 - 0.20 Final      Plan:   Return to clinic in 12 months.  Baseline creatinine is 1.1.  Labs for next visit include rp.  Renal US shows R 9.5 cm, L 10.1 cm.  Your blood pressure is controlled on your current medications.  Keep in mind that weight loss often improves blood pressure.  If you do diet and lose weight we will likely need to adjust your medications.

## 2024-12-02 ENCOUNTER — TELEPHONE (OUTPATIENT)
Dept: NEUROSURGERY | Facility: CLINIC | Age: 74
End: 2024-12-02
Payer: MEDICARE

## 2024-12-02 NOTE — TELEPHONE ENCOUNTER
----- Message from Leila sent at 12/2/2024  9:06 AM CST -----  Contact: self  Type:  Needs Medical Advice    Who Called: self  Symptoms (please be specific): pt needs a sedative for her MRI that on 12/17.   Would the patient rather a call back or a response via [employer]? call  Best Call Back Number: [phone] (home) [phone] (work)    Additional Information: please advise and thank you.

## 2024-12-11 ENCOUNTER — TELEPHONE (OUTPATIENT)
Dept: NEUROSURGERY | Facility: CLINIC | Age: 74
End: 2024-12-11
Payer: MEDICARE

## 2024-12-11 ENCOUNTER — OFFICE VISIT (OUTPATIENT)
Dept: VASCULAR SURGERY | Facility: CLINIC | Age: 74
End: 2024-12-11
Payer: MEDICARE

## 2024-12-11 VITALS
HEIGHT: 62 IN | RESPIRATION RATE: 16 BRPM | DIASTOLIC BLOOD PRESSURE: 60 MMHG | WEIGHT: 153.19 LBS | SYSTOLIC BLOOD PRESSURE: 123 MMHG | HEART RATE: 76 BPM | BODY MASS INDEX: 28.19 KG/M2

## 2024-12-11 DIAGNOSIS — M79.604 LEG PAIN, BILATERAL: ICD-10-CM

## 2024-12-11 DIAGNOSIS — I87.2 VENOUS INSUFFICIENCY: Primary | ICD-10-CM

## 2024-12-11 DIAGNOSIS — G93.89 BRAIN MASS: Primary | ICD-10-CM

## 2024-12-11 DIAGNOSIS — M79.605 LEG PAIN, BILATERAL: ICD-10-CM

## 2024-12-11 DIAGNOSIS — L81.9 HYPERPIGMENTATION OF SKIN: ICD-10-CM

## 2024-12-11 DIAGNOSIS — R60.0 EDEMA, LOWER EXTREMITY: ICD-10-CM

## 2024-12-11 PROCEDURE — 99214 OFFICE O/P EST MOD 30 MIN: CPT | Mod: PBBFAC | Performed by: FAMILY MEDICINE

## 2024-12-11 PROCEDURE — 99214 OFFICE O/P EST MOD 30 MIN: CPT | Mod: S$PBB,,, | Performed by: FAMILY MEDICINE

## 2024-12-11 PROCEDURE — 99999 PR PBB SHADOW E&M-EST. PATIENT-LVL IV: CPT | Mod: PBBFAC,,, | Performed by: FAMILY MEDICINE

## 2024-12-11 RX ORDER — DIAZEPAM 5 MG/1
5 TABLET ORAL ONCE
Qty: 1 TABLET | Refills: 0 | Status: SHIPPED | OUTPATIENT
Start: 2024-12-11 | End: 2024-12-12 | Stop reason: SDUPTHER

## 2024-12-11 NOTE — PROGRESS NOTES
VEIN CENTER CLINIC NOTE    Patient ID: Eugenie Coates is a 74 y.o. female.    I. HISTORY     Chief Complaint:   Chief Complaint   Patient presents with    Follow-up     6M PA JOSE LUIS GSV VENASEAL AND RT SSV RF ABL         HPI: Eugenie Coates is a 74 y.o. female who presents today for a 6 month follow-up after undergoing a bilateral great saphenous vein non thermal adhesive ablations and a right small saphenous vein radiofrequency ablation.  The patient states she is done well since her procedures in his noted less edema and leg discomfort since then.  She feels as though her skin has also improved.  She is also increased her efforts to exercise in his now walking more on a daily basis.  She feels that overall things have improved and she is satisfied with her procedures thus far.    Clinical summary:  Patient initially presented on 01/11/2024 by referral from Dr. Silver for evaluation of bilateral lower extremity swelling, hyperpigmentation, previous ulceration and pain.  Symptoms are progressive/worsening and began about 20 years ago.  Location is bilateral lower extremities below the knees, worse on the right. Symptoms are worse at the end of the day.  History of venous interventions includes some type of valve surgery in 2003 and 2004 the right lower extremity according to the patient..  Positive, mother, family history of venous disease.  The patient states that she has been wearing compression socks on a daily basis for many years now.  She also elevates her legs in the evening and walks regularly.  The patient also had history of extensive right lower extremity DVT in 2017 from the superficial femoral vein down to the posterior tibials.  She was placed on Eliquis for several years.  She was taken off Eliquis several years ago and is not currently on an anticoagulant or antiplatelet medication.    Bilateral complete venous reflux study, 01/11/2024, shows no evidence of DVT bilaterally.  Thrombus noted  within the proximal calf of the right great saphenous vein.  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  The study also shows dilation and axial reflux of the bilateral great saphenous veins and right small saphenous vein.  No reflux noted in the left small saphenous vein.  Refluxing varicosities noted right Gator region.    Arterial duplex with ABIs performed 03/31/2021 which shows a right EDOUARD 1.02 and a left EDOUARD of 1.05.  No vascular occlusions noted.    Venous Disease Medical Necessity Documentation Initial Visit Date:  01/11/2024 Return Check Date:    Have you ever had a rupture or bleed from a varicose vein in your leg(s)?              [] Yes  [x] No   [] Yes   [] No   Have you ever been diagnosed with phlebitis, cellulitis, or inflammation in the area of the varicose veins of  your leg(s)?  [] Yes  [x] No    [] Yes   [] No   Do you have darkened or inflamed skin on your legs?   [x] Yes   [] No   [] Yes   [] No   Do you have leg swelling?     [x] Yes   [] No   [] Yes   [] No   Do you have leg pain?   [x] Yes   [] No   [] Yes   [] No   If yes, describe the type of pain?    []   Stabbing []  Radiating []  Aching   [x]  Tightness []  Throbbing               [x]  Burning []  Cramping              Do you have leg discomfort?   [x] Yes   [] No   [] Yes   [] No   If yes, describe the type of discomfort?    [x]  Heaviness [x]  Fullness   [x]  Restlessness [] Tired/Fatigued [x] Itching              Have you ever worn compression hose?   [x] Yes   [] No   [] Yes   [] No   If yes, how long?    24 YEARS       Do you elevate your legs while sitting?   [x] Yes   [] No   [] Yes   [] No   Does venous disease (varicose veins, ulcers, skin changes, leg pain/swelling) interfere with your daily life?  If yes, check activities you are limited or unable to do.    []  Shower  [x]   Walk  []  Exercise  [] Play with children/grandchildren  []  Shop [] Work [x] Stand for  any period of time [x] Sleep                               [x] Sitting for an extended period of time.           [x] Yes   [] No   [] Yes   [] No   Do you exercise/have you tried to exercise (i.e.  Walk our participate in a regular exercise routine)?  [x] Yes  [] No   [] Yes   [] No   BMI   32.26           Past Medical History:   Diagnosis Date    Allergy     Anemia     Anxiety     Arthritis     Asthma     Bell palsy     Bilateral lower extremity edema     Chronic diastolic CHF (congestive heart failure)     Depression     Diabetes mellitus, type 2     DVT (deep venous thrombosis)     right LE    Dyslipidemia     Endometriosis     Eye injury     stuck with tree branch od ?     Focal seizure 5/3/2021    GERD (gastroesophageal reflux disease)     Glaucoma     History of DVT (deep vein thrombosis) 2018    History of uterine fibroid     Hyperlipidemia     Hypertension     Invasive lobular carcinoma of right breast in female     s/p surgery, radiation, tamoxifen    Nuclear sclerosis of both eyes 2016    Obesity     Pancreas cyst     Sleep apnea     uses C pap    Supraventricular tachycardia     SVT (supraventricular tachycardia) 2019    Thyroid disease     Venous insufficiency         Past Surgical History:   Procedure Laterality Date    ABLATION, CHEMICAL SEALANT, VARICOSE VEIN Right 2024    Procedure: Right GSV VenaSeal Ablation;  Surgeon: Pepito Walker DO;  Location: [facility] OR;  Service: Peripheral Vascular;  Laterality: Right;    ABLATION, CHEMICAL SEALANT, VARICOSE VEIN Left 2024    Left GSV Venaseal Ablation performed by Dr. Zach Walker.    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    Procedure:  CRANIOTOMY, FOR INTRACRANIAL NEOPLASM EXCISION Left craniotomy for tumor resection;  Surgeon: Hernandez Bravo MD;  Location: [facility] OR;  Service: Neurosurgery;  Laterality: Left;    CRANIOTOMY, WITH NEOPLASM EXCISION USING COMPUTER-ASSISTED NAVIGATION Left 08/14/2023    Procedure: 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, USING COMPUTER-ASSISTED NAVIGATION-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;  Surgeon: Hernandez Bravo MD;  Location: [facility] OR;  Service: Neurosurgery;  Laterality: Left;    ESOPHAGOGASTRODUODENOSCOPY N/A 02/17/2023    Procedure: EGD (ESOPHAGOGASTRODUODENOSCOPY);  Surgeon: Issac Wade MD;  Location: [facility] ENDO;  Service: Endoscopy;  Laterality: N/A;    glaucoma laser Bilateral     HERNIA REPAIR      KNEE SURGERY Right 2008    (R) knee scope; torn meniscus    MASTECTOMY, PARTIAL Right 12/07/2020    Procedure: MASTECTOMY, PARTIAL-Right with radiological marker Consent day of surgery; Neoprobe, technitium, Frozen;  Surgeon: Narda Keating MD;  [facility];  Service: General;  Laterality: Right;    RADIOFREQUENCY ABLATION Right 3/1/2024    Procedure: Right SSV RF Ablation;  Surgeon: Pepito Walker DO;  Location: [facility] OR;  Service: Peripheral Vascular;  Laterality: Right;    SENTINEL LYMPH NODE BIOPSY Right 12/07/2020    Procedure: BIOPSY, LYMPH NODE, SENTINEL;  Surgeon: Narda Keating MD;  [facility];  Service: General;  Laterality: Right;    TOTAL REDUCTION MAMMOPLASTY Bilateral 12/07/2020    Procedure: MAMMOPLASTY, REDUCTION-Bilateral;  Surgeon: Zack Hood MD;  [facility];  Service: Plastics;  Laterality: Bilateral;    VEIN SURGERY  2003, 2004    Rt leg x2       Social History     Tobacco Use   Smoking Status Never    Passive exposure: Never   Smokeless Tobacco Never         Current Outpatient Medications:     atorvastatin (LIPITOR) 80 MG tablet, Take 1 tablet (80 mg total) by mouth once daily., Disp: 90  tablet, Rfl: 3    b complex vitamins tablet, Take 1 tablet by mouth once daily., Disp: , Rfl:     blood sugar diagnostic Strp, Test twice daily.  Accucheck viva test strips and lancets., Disp: 300 each, Rfl: 3    calcium carb-vit D3-magnesium (CORAL CALCIUM) 250-200-125 mg-unit-mg Cap, Take 1 capsule by mouth once daily., Disp: , Rfl:     dorzolamide-timolol 2-0.5% (COSOPT) 22.3-6.8 mg/mL ophthalmic solution, Place 1 drop into both eyes 2 (two) times daily., Disp: , Rfl:     glucosamine-chondroitin (OSTEO BI-FLEX) 250-200 mg Tab, Take 1 tablet by mouth once daily., Disp: , Rfl:     lancets (ACCU-CHEK SOFTCLIX LANCETS) Misc, 1 Units by Misc.(Non-Drug; Combo Route) route 2 (two) times daily., Disp: 300 each, Rfl: 3    latanoprost 0.005 % ophthalmic solution, Place 1 drop into both eyes every evening., Disp: 2.5 mL, Rfl: 12    linaCLOtide (LINZESS) 72 mcg Cap capsule, Take 1 capsule (72 mcg total) by mouth before breakfast., Disp: 90 capsule, Rfl: 3    metFORMIN (GLUCOPHAGE) 500 MG tablet, Take 1 tablet (500 mg total) by mouth daily with breakfast., Disp: 90 tablet, Rfl: 1    NIFEdipine (ADALAT CC) 90 MG TbSR, Take 1 tablet (90 mg total) by mouth once daily., Disp: 90 tablet, Rfl: 1    pantoprazole (PROTONIX) 40 MG tablet, Take 1 tablet (40 mg total) by mouth once daily., Disp: 90 tablet, Rfl: 3    tolterodine (DETROL LA) 4 MG 24 hr capsule, Take 1 capsule (4 mg total) by mouth once daily., Disp: 90 capsule, Rfl: 1    vibegron (GEMTESA) 75 mg Tab, Take 1 tablet (75 mg total) by mouth once daily., Disp: 30 tablet, Rfl: 11    Review of Systems   Constitutional:  Negative for activity change, chills, diaphoresis, fatigue and fever.   Respiratory:  Negative for cough and shortness of breath.    Cardiovascular:  Positive for leg swelling. Negative for chest pain and claudication.        Hyperpigmentation LE   Gastrointestinal:  Negative for nausea and vomiting.   Musculoskeletal:  Positive for leg pain. Negative for joint  swelling.   Integumentary:  Negative for rash and wound.   Neurological:  Negative for weakness and numbness.        II. PHYSICAL EXAM     Physical Exam  Constitutional:       General: She is awake. She is not in acute distress.     Appearance: Normal appearance. She is overweight. She is not ill-appearing or toxic-appearing.   HENT:      Head: Normocephalic and atraumatic.   Eyes:      Extraocular Movements: Extraocular movements intact.      Conjunctiva/sclera: Conjunctivae normal.      Pupils: Pupils are equal, round, and reactive to light.   Neck:      Vascular: No carotid bruit or JVD.   Cardiovascular:      Rate and Rhythm: Normal rate and regular rhythm.      Pulses:           Dorsalis pedis pulses are detected w/ Doppler on the right side and detected w/ Doppler on the left side.        Posterior tibial pulses are detected w/ Doppler on the right side and detected w/ Doppler on the left side.      Heart sounds: Murmur heard.      Systolic murmur is present with a grade of 2/6.   Pulmonary:      Effort: Pulmonary effort is normal. No respiratory distress.      Breath sounds: No stridor. No wheezing, rhonchi or rales.   Musculoskeletal:         General: No swelling, tenderness or deformity.      Right lower le+ Pitting Edema present.      Left lower le+ Pitting Edema present.      Comments: Pitting edema of the bilateral extremities along with hyperpigmentation scarring from previous venous ulcerations of the right medial Gator region.  No evidence of acute cellulitis or ulcerations.   Feet:      Comments: Triphasic hand-held dopplerable pulses of the bilateral dorsalis pedis and posterior tibial arteries.  Skin:     General: Skin is warm.      Capillary Refill: Capillary refill takes less than 2 seconds.      Coloration: Skin is not ashen.      Findings: No bruising, erythema, lesion, rash or wound.   Neurological:      Mental Status: She is alert and oriented to person, place, and time.      Motor:  No weakness.   Psychiatric:         Speech: Speech normal.         Behavior: Behavior normal. Behavior is cooperative.         Reticular/Spider veins noted:  RLE: anterior calf and medial calf  LLE: anterior calf and medial calf    Varicose veins noted:  RLE: anterior calf and medial calf  LLE:  none    CEAP Classification  Clinical Signs: Class 5 - Skin changes as defined above with healed ulceration  Etiologic Classification: Primary  Anatomic distribution: Superficial  Pathophysiologic dysfunction: Reflux       Venous Clinical Severity Score  Pain:1=Occasional, not restricting activity or requiring analgesics  Varicose Veins: 2=Multiple. GS varicose veins confined to calf or thigh  Venous Edema: 1=Evening ankle edema only  Pigmentation: 1=Diffuse, but limited in area and old (brown)  Inflammation: 0=None  Induration: 1=Focal, circummalleolar (< 5 cm)  Number of Active Ulcers: 0=0  Active Ulceration, Duration: 0=None  Active Ulcer Size: 0=None  Compressive Therapy: 3=Full compliance, stockings + elevation  Total Score: 9       III. ASSESSMENT & PLAN (MEDICAL DECISION MAKING)     1. Venous insufficiency    2. Edema, lower extremity    3. Hyperpigmentation of skin    4. Leg pain, bilateral        Assessment/Diagnosis and Plan:  The patient is doing well post ablation and encouraged to wear knee-high compression along with daily leg exercise and leg elevation when appropriate.    Follow-up in 6 months for 12-month post ablation visit.  No ultrasound unless she is having trouble.       Pepito Walker, DO

## 2024-12-11 NOTE — TELEPHONE ENCOUNTER
----- Message from Dinorah sent at 12/11/2024  2:33 PM CST -----  Type:  Needs Medical Advice    Who Called: pt     Pharmacy name and phone #:    [id number] - [place], [place] - [address] AT [facility] [address]  [address]  [facility] [place] [address]  Phone: [phone] Fax: [phone]         Would the patient rather a call back or a response via [facility]? Call back       Best Call Back Number: [phone]       Additional Information: pt needs sedative sent in to her pharmacy before her mri appt.  Please call back to advise. Thank you.

## 2024-12-11 NOTE — PROGRESS NOTES
I called and spoke to Ms. Coates about her request for a sedative prior to her MRI on 12/17/2024.  I discussed that I can send in a Valium 5mg x1 dose, however, I need to send it into a [place] pharmacy since it is a controlled substance.  After discussion with her and her  and review of [place] , I escribed a 1-time dose of Valium 5mg to the [facility] at the corner of [address]  They will pick the prescription up on the way to the imaging appointment on 12/17/2024.  She is aware that she must have a  to and from the imaging.

## 2024-12-12 DIAGNOSIS — G93.89 BRAIN MASS: ICD-10-CM

## 2024-12-12 RX ORDER — DIAZEPAM 5 MG/1
5 TABLET ORAL ONCE
Qty: 1 TABLET | Refills: 0 | Status: CANCELLED | OUTPATIENT
Start: 2024-12-12 | End: 2024-12-12

## 2024-12-12 NOTE — TELEPHONE ENCOUNTER
----- Message from Dinorah sent at 12/12/2024 11:34 AM CST -----  Type:  Needs Medical Advice    Who Called: pt      Would the patient rather a call back or a response via [facility]? Call back       Best Call Back Number: [phone]       Additional Information: pt needs medication diazePAM (VALIUM) 5 MG tablet send to  [facility] [address] [facility] [address] [place] [address]  Phone: [phone] Fax: [phone]  Pt will pick it up at that location before mri.  Please call back to advise. Thank you.

## 2024-12-13 RX ORDER — DIAZEPAM 5 MG/1
5 TABLET ORAL ONCE
Qty: 1 TABLET | Refills: 0 | Status: SHIPPED | OUTPATIENT
Start: 2024-12-13 | End: 2024-12-14

## 2024-12-17 ENCOUNTER — OFFICE VISIT (OUTPATIENT)
Dept: NEUROSURGERY | Facility: CLINIC | Age: 74
End: 2024-12-17
Attending: NEUROLOGICAL SURGERY
Payer: MEDICARE

## 2024-12-17 ENCOUNTER — HOSPITAL ENCOUNTER (OUTPATIENT)
Dept: RADIOLOGY | Facility: HOSPITAL | Age: 74
Discharge: HOME | End: 2024-12-17
Attending: NEUROLOGICAL SURGERY
Payer: MEDICARE

## 2024-12-17 VITALS
WEIGHT: 153.25 LBS | RESPIRATION RATE: 18 BRPM | HEIGHT: 62 IN | SYSTOLIC BLOOD PRESSURE: 139 MMHG | DIASTOLIC BLOOD PRESSURE: 86 MMHG | HEART RATE: 126 BPM | BODY MASS INDEX: 28.2 KG/M2

## 2024-12-17 DIAGNOSIS — G93.89 BRAIN MASS: Primary | ICD-10-CM

## 2024-12-17 DIAGNOSIS — Z98.890 S/P CRANIOTOMY: ICD-10-CM

## 2024-12-17 DIAGNOSIS — D42.0 ATYPICAL INTRACRANIAL MENINGIOMA: ICD-10-CM

## 2024-12-17 PROCEDURE — 70553 MRI BRAIN STEM W/O & W/DYE: CPT | Mod: TC,PO

## 2024-12-17 PROCEDURE — 70553 MRI BRAIN STEM W/O & W/DYE: CPT | Mod: 26,,, | Performed by: RADIOLOGY

## 2024-12-17 PROCEDURE — 25500020 PHARM REV CODE 255: Mod: PO | Performed by: NEUROLOGICAL SURGERY

## 2024-12-17 PROCEDURE — A9585 GADOBUTROL INJECTION: HCPCS | Mod: PO | Performed by: NEUROLOGICAL SURGERY

## 2024-12-17 PROCEDURE — 99215 OFFICE O/P EST HI 40 MIN: CPT | Mod: S$PBB,,, | Performed by: NEUROLOGICAL SURGERY

## 2024-12-17 RX ORDER — GADOBUTROL 604.72 MG/ML
7 INJECTION INTRAVENOUS
Status: COMPLETED | OUTPATIENT
Start: 2024-12-17 | End: 2024-12-17

## 2024-12-17 RX ADMIN — GADOBUTROL 7 ML: 604.72 INJECTION INTRAVENOUS at 09:12

## 2024-12-17 NOTE — PROGRESS NOTES
Neurosurgery History & Physical    Patient ID: Eugenie Coates is a 74 y.o. female.    Chief Complaint   Patient presents with    Follow-up     6 month follow up; image review        Interval HPI 12/17/2024:  Ms. Coates returns today for routine six-month follow-up resection of recurrent atypical meningioma.  Overall she has been doing well, with stable RLE weakness.  She has been dealing with some GI issues with her PCP.      Interval HPI 6/18/2024:  Ms. Coates is now 10 months s/p left parietal craniotomy for resection of recurrent atypical meningioma.  Overall, she reports doing well.       She denies headaches weakness, and other neurologic symptoms.  She reports some continued ambulation issues with her right leg.  She uses her cane for assistance as needed.       She has not had any seizures and was removed from her seizure medication a few months ago by Dr. Monae.       HPI 12/13/2023:  Ms. Coates is approximately 4 months status post craniotomy for recurrent atypical meningioma with mass effect and brain compression. She has completed radiation therapy with Rad/Onc at [facility] in [place], [place]. She is taking the seizure medication but is having undesired side effects with mood lability. No seizure activity since surgery.      She is ambulating independently with occasional use of a cane for assistance.      She denies new weakness, numbness or other neurologic symptom.     Review of Systems   Constitutional:  Negative for activity change and fever.   Eyes:  Negative for visual disturbance.   Respiratory:  Negative for shortness of breath.    Cardiovascular:  Negative for chest pain.   Gastrointestinal:  Negative for nausea and vomiting.   Endocrine: Negative for cold intolerance, heat intolerance, polydipsia, polyphagia and polyuria.   Genitourinary:  Negative for decreased urine volume, difficulty urinating and frequency.   Musculoskeletal:  Negative for back pain, gait problem and neck pain.    Neurological:  Negative for dizziness, tremors, seizures, syncope, facial asymmetry, speech difficulty, weakness, light-headedness, numbness and headaches.   Psychiatric/Behavioral:  Negative for confusion.        Past Medical History:   Diagnosis Date    Allergy     Anemia     Anxiety     Arthritis     Asthma     Bell palsy     Bilateral lower extremity edema     Chronic diastolic CHF (congestive heart failure)     Depression     Diabetes mellitus, type 2     DVT (deep venous thrombosis)     right LE    Dyslipidemia     Endometriosis     Eye injury 2014    stuck with tree branch od ?     Focal seizure 5/3/2021    GERD (gastroesophageal reflux disease)     Glaucoma     History of DVT (deep vein thrombosis) 1/23/2018    History of uterine fibroid     Hyperlipidemia     Hypertension     Invasive lobular carcinoma of right breast in female     s/p surgery, radiation, tamoxifen    Nuclear sclerosis of both eyes 9/27/2016    Obesity     Pancreas cyst     Sleep apnea     uses C pap    Supraventricular tachycardia     SVT (supraventricular tachycardia) 05/2019    Thyroid disease     Venous insufficiency      Social History     Socioeconomic History    Marital status:    Tobacco Use    Smoking status: Never     Passive exposure: Never    Smokeless tobacco: Never   Substance and Sexual Activity    Alcohol use: Never     Alcohol/week: 0.0 standard drinks of alcohol    Drug use: Never    Sexual activity: Yes     Partners: Male     Birth control/protection: None   Social History Narrative    1/18/19: lives with her . They have multiple children, but the youngest is 38. No children at home right now. No pets at home. Splits time between here and [place]. Her son lives in [place].      Social Drivers of Health     Financial Resource Strain: Low Risk  (12/26/2023)    Overall Financial Resource Strain (CARDIA)     Difficulty of Paying Living Expenses: Not very hard   Food Insecurity: No Food Insecurity  (12/26/2023)    Hunger Vital Sign     Worried About Running Out of Food in the Last Year: Never true     Ran Out of Food in the Last Year: Never true   Transportation Needs: No Transportation Needs (12/26/2023)    PRAPARE - Transportation     Lack of Transportation (Medical): No     Lack of Transportation (Non-Medical): No   Physical Activity: Insufficiently Active (12/26/2023)    Exercise Vital Sign     Days of Exercise per Week: 3 days     Minutes of Exercise per Session: 20 min   Stress: No Stress Concern Present (12/26/2023)    [employer] - Occupational Stress Questionnaire     Feeling of Stress : Only a little   Recent Concern: Stress - Stress Concern Present (12/20/2023)    [employer] [place] - Occupational Stress Questionnaire     Feeling of Stress : To some extent   Housing Stability: Low Risk  (12/26/2023)    Housing Stability Vital Sign     Unable to Pay for Housing in the Last Year: No     Number of Places Lived in the Last Year: 1     Unstable Housing in the Last Year: No     Family History   Problem Relation Name Age of Onset    Diabetes Mother Sara Vick     No Known Problems Father      No Known Problems Sister      No Known Problems Brother      Colon cancer Maternal Aunt      No Known Problems Maternal Uncle      No Known Problems Paternal Aunt      No Known Problems Paternal Uncle      No Known Problems Maternal Grandmother      No Known Problems Maternal Grandfather      No Known Problems Paternal Grandmother      No Known Problems Paternal Grandfather      Amblyopia Neg Hx      Blindness Neg Hx      Cancer Neg Hx      Cataracts Neg Hx      Glaucoma Neg Hx      Hypertension Neg Hx      Macular degeneration Neg Hx      Retinal detachment Neg Hx      Strabismus Neg Hx      Stroke Neg Hx      Thyroid disease Neg Hx      Celiac disease Neg Hx      Colon polyps Neg Hx      Esophageal cancer Neg Hx      Inflammatory bowel disease Neg Hx      Irritable  bowel syndrome Neg Hx      Liver cancer Neg Hx      Liver disease Neg Hx      Rectal cancer Neg Hx      Stomach cancer Neg Hx      Ulcerative colitis Neg Hx      Cystic fibrosis Neg Hx      Crohn's disease Neg Hx      Hemochromatosis Neg Hx      Cirrhosis Neg Hx       Review of patient's allergies indicates:   Allergen Reactions    Oxycodone hcl-oxycodone-asa Hives, Itching and Other (See Comments)    Percodan yadira      Other reaction(s): Unknown       Current Outpatient Medications:     atorvastatin (LIPITOR) 80 MG tablet, Take 1 tablet (80 mg total) by mouth once daily., Disp: 90 tablet, Rfl: 3    b complex vitamins tablet, Take 1 tablet by mouth once daily., Disp: , Rfl:     blood sugar diagnostic Strp, Test twice daily.  Accucheck viva test strips and lancets., Disp: 300 each, Rfl: 3    calcium carb-vit D3-magnesium (CORAL CALCIUM) 250-200-125 mg-unit-mg Cap, Take 1 capsule by mouth once daily., Disp: , Rfl:     diazePAM (VALIUM) 5 MG tablet, Take 1 tablet (5 mg total) by mouth once. Take 1 hour prior to imaging. for 1 dose, Disp: 1 tablet, Rfl: 0    dorzolamide-timolol 2-0.5% (COSOPT) 22.3-6.8 mg/mL ophthalmic solution, Place 1 drop into both eyes 2 (two) times daily., Disp: , Rfl:     glucosamine-chondroitin (OSTEO BI-FLEX) 250-200 mg Tab, Take 1 tablet by mouth once daily., Disp: , Rfl:     lancets (ACCU-CHEK SOFTCLIX LANCETS) Misc, 1 Units by Misc.(Non-Drug; Combo Route) route 2 (two) times daily., Disp: 300 each, Rfl: 3    latanoprost 0.005 % ophthalmic solution, Place 1 drop into both eyes every evening., Disp: 2.5 mL, Rfl: 12    linaCLOtide (LINZESS) 72 mcg Cap capsule, Take 1 capsule (72 mcg total) by mouth before breakfast., Disp: 90 capsule, Rfl: 3    metFORMIN (GLUCOPHAGE) 500 MG tablet, Take 1 tablet (500 mg total) by mouth daily with breakfast., Disp: 90 tablet, Rfl: 1    NIFEdipine (ADALAT CC) 90 MG TbSR, Take 1 tablet (90 mg total) by mouth once daily., Disp: 90 tablet, Rfl: 1    pantoprazole  "(PROTONIX) 40 MG tablet, Take 1 tablet (40 mg total) by mouth once daily., Disp: 90 tablet, Rfl: 3    tolterodine (DETROL LA) 4 MG 24 hr capsule, Take 1 capsule (4 mg total) by mouth once daily., Disp: 90 capsule, Rfl: 1    vibegron (GEMTESA) 75 mg Tab, Take 1 tablet (75 mg total) by mouth once daily., Disp: 30 tablet, Rfl: 11  No current facility-administered medications for this visit.  Resp. rate 18, height 5' 2" (1.575 m), weight 69.5 kg (153 lb 3.5 oz).      Neurological Exam  AAOx4, NAD  Strength 5/5 in the BUE/BLE  Sensation grossly intact  Incision well healed    Imaging:  MRI of the brain with and without contrast dated 12/17/2024 is personally reviewed and discussed with the patient.  Prior resection cavity is noted in the left frontoparietal region.  This appears stable when compared to prior MRI.  There is thickening along the falx which is unchanged in size or shape.    The radiology report makes mention of increased conspicuity of a nodule along the medial side of the craniotomy flap.  This appears to be within the seam of the bone flap.  When I look at preoperative MRI from 8/11/2023 I see this spot.  It is less conspicuous on the MRI from 12/13/2023 and 6/18/2024.      Assessment/Plan:    Ms. Coates is a 75yo female s/p resection of left parietal brain lesion consistent with atypical meningioma.  She continues to do well neurologically.  The bulk of her resection cavity looks stable on MRI.  There is a subtle change noted on the MRI report which falls within the bone flap.     This may be evolving scar/treatment effect but change in tumor burden cannot be ruled out.    I think it is reasonable to repeat MRI in 6 months to monitor this spot.    I spent a total of 40 minutes on the day of the visit.This includes face to face time and non-face to face time preparing to see the patient (eg, review of tests), obtaining and/or reviewing separately obtained history, documenting clinical information in the " electronic or other health record, independently interpreting results and communicating results to the patient/family/caregiver, or care coordinator.

## 2025-01-02 ENCOUNTER — PATIENT MESSAGE (OUTPATIENT)
Dept: NEUROSURGERY | Facility: CLINIC | Age: 75
End: 2025-01-02
Payer: MEDICARE

## 2025-01-06 NOTE — PROGRESS NOTES
"  Eugenie Coates presented for a  Medicare AWV and comprehensive Health Risk Assessment today. The following components were reviewed and updated:    Medical history  Family History  Social history  Allergies and Current Medications  Health Risk Assessment  Health Maintenance  Care Team          See Completed Assessments for Annual Wellness Visit within the encounter summary.  Eye exam schedule for 2/253025 @ 0800.       The following assessments were completed:  Living Situation  CAGE  Depression Screening  Timed Get Up and Go  Whisper Test  Cognitive Function Screening  Nutrition Screening  ADL Screening  PAQ Screening        Opioid Documentation    does not have a current opioid prescription.       Vitals:    01/07/25 0933   BP: 130/74   Pulse: 78   Resp: 14   Temp: 98.1 °F (36.7 °C)   SpO2: 98%   Weight: 70.9 kg (156 lb 6.4 oz)   Height: 5' 2" (1.575 m)     Body mass index is 28.61 kg/m².  Physical Exam  Vitals and nursing note reviewed.   Constitutional:       Appearance: Normal appearance. She is well-developed.   HENT:      Head: Normocephalic.      Right Ear: Hearing normal.      Left Ear: Hearing normal.      Nose: Nose normal.   Eyes:      General: Lids are normal.      Conjunctiva/sclera: Conjunctivae normal.   Cardiovascular:      Rate and Rhythm: Normal rate.   Pulmonary:      Effort: Pulmonary effort is normal. No respiratory distress.   Musculoskeletal:         General: Normal range of motion.      Cervical back: Normal range of motion and neck supple.      Right lower leg: No edema.      Left lower leg: No edema.   Skin:     General: Skin is warm and dry.   Neurological:      Mental Status: She is alert and oriented to person, place, and time.      Gait: Gait is intact.   Psychiatric:         Behavior: Behavior is cooperative.               Diagnoses and health risks identified today and associated recommendations/orders:    Problem List Items Addressed This Visit       Type 2 diabetes mellitus without " complication    Relevant Medications    metFORMIN (GLUCOPHAGE) 500 MG tablet    Mixed hyperlipidemia    Relevant Orders    Lipid Panel    Essential hypertension    Type 2 diabetes mellitus with diabetic peripheral angiopathy without gangrene, without long-term current use of insulin    Relevant Medications    metFORMIN (GLUCOPHAGE) 500 MG tablet    Other Relevant Orders    Microalbumin/Creatinine Ratio, Urine    Hemoglobin A1C    Major depressive disorder, single episode, moderate     Other Visit Diagnoses       Encounter for subsequent annual wellness visit (AWV) in Medicare patient    -  Primary    Hyperlipidemia, unspecified hyperlipidemia type        Relevant Orders    Lipid Panel    BMI 28.0-28.9,adult        Dependence on other enabling machines and devices        Abnormality of gait and mobility        Overactive bladder        Relevant Medications    tolterodine (DETROL LA) 4 MG 24 hr capsule            Provided Eugenie with a 5-10 year written screening schedule and personal prevention plan. Recommendations were developed using the USPSTF age appropriate recommendations. Education, counseling, and referrals were provided as needed. After Visit Summary printed and given to patient which includes a list of additional screenings\tests needed.    Follow up for 1 year for Annual Wellness Visit.    FLACA Yanez     I offered to discuss advanced care planning, including how to pick a person who would make decisions for you if you were unable to make them for yourself, called a health care power of , and what kind of decisions you might make such as use of life sustaining treatments such as ventilators and tube feeding when faced with a life limiting illness recorded on a living will that they will need to know. (How you want to be cared for as you near the end of your natural life)     X  Patient has advanced directives on file, they would like to make changes. I provided information on how to  revoke their previous directives and make new ones.

## 2025-01-07 ENCOUNTER — OFFICE VISIT (OUTPATIENT)
Dept: FAMILY MEDICINE | Facility: CLINIC | Age: 75
End: 2025-01-07
Payer: MEDICARE

## 2025-01-07 VITALS
DIASTOLIC BLOOD PRESSURE: 74 MMHG | RESPIRATION RATE: 14 BRPM | HEIGHT: 62 IN | WEIGHT: 156.38 LBS | SYSTOLIC BLOOD PRESSURE: 130 MMHG | HEART RATE: 78 BPM | TEMPERATURE: 98 F | BODY MASS INDEX: 28.78 KG/M2 | OXYGEN SATURATION: 98 %

## 2025-01-07 DIAGNOSIS — E11.51 TYPE 2 DIABETES MELLITUS WITH DIABETIC PERIPHERAL ANGIOPATHY WITHOUT GANGRENE, WITHOUT LONG-TERM CURRENT USE OF INSULIN: ICD-10-CM

## 2025-01-07 DIAGNOSIS — N32.81 OVERACTIVE BLADDER: ICD-10-CM

## 2025-01-07 DIAGNOSIS — E11.9 TYPE 2 DIABETES MELLITUS WITHOUT COMPLICATION, WITHOUT LONG-TERM CURRENT USE OF INSULIN: ICD-10-CM

## 2025-01-07 DIAGNOSIS — Z00.00 ENCOUNTER FOR SUBSEQUENT ANNUAL WELLNESS VISIT (AWV) IN MEDICARE PATIENT: Primary | ICD-10-CM

## 2025-01-07 DIAGNOSIS — F32.1 MAJOR DEPRESSIVE DISORDER, SINGLE EPISODE, MODERATE: ICD-10-CM

## 2025-01-07 DIAGNOSIS — E78.5 HYPERLIPIDEMIA, UNSPECIFIED HYPERLIPIDEMIA TYPE: ICD-10-CM

## 2025-01-07 DIAGNOSIS — E78.2 MIXED HYPERLIPIDEMIA: ICD-10-CM

## 2025-01-07 DIAGNOSIS — Z99.89 DEPENDENCE ON OTHER ENABLING MACHINES AND DEVICES: ICD-10-CM

## 2025-01-07 DIAGNOSIS — R26.9 ABNORMALITY OF GAIT AND MOBILITY: ICD-10-CM

## 2025-01-07 DIAGNOSIS — I10 ESSENTIAL HYPERTENSION: ICD-10-CM

## 2025-01-07 LAB
ALBUMIN SERPL BCP-MCNC: 3.9 G/DL (ref 3.4–4.8)
ALBUMIN/GLOB SERPL: 1.1 {RATIO}
ALP SERPL-CCNC: 137 U/L (ref 40–150)
ALT SERPL W P-5'-P-CCNC: 20 U/L
ANION GAP SERPL CALCULATED.3IONS-SCNC: 14 MMOL/L (ref 7–16)
AST SERPL W P-5'-P-CCNC: 23 U/L (ref 5–34)
BASOPHILS # BLD AUTO: 0.05 K/UL (ref 0–0.2)
BASOPHILS NFR BLD AUTO: 0.5 % (ref 0–1)
BILIRUB SERPL-MCNC: 0.6 MG/DL
BUN SERPL-MCNC: 17 MG/DL (ref 10–20)
BUN/CREAT SERPL: 18 (ref 6–20)
CALCIUM SERPL-MCNC: 9.2 MG/DL (ref 8.4–10.2)
CHLORIDE SERPL-SCNC: 106 MMOL/L (ref 98–107)
CO2 SERPL-SCNC: 25 MMOL/L (ref 23–31)
CREAT SERPL-MCNC: 0.92 MG/DL (ref 0.55–1.02)
CREAT UR-MCNC: 97 MG/DL (ref 15–325)
DIFFERENTIAL METHOD BLD: ABNORMAL
EGFR (NO RACE VARIABLE) (RUSH/TITUS): 66 ML/MIN/1.73M2
EOSINOPHIL # BLD AUTO: 0.15 K/UL (ref 0–0.5)
EOSINOPHIL NFR BLD AUTO: 1.6 % (ref 1–4)
ERYTHROCYTE [DISTWIDTH] IN BLOOD BY AUTOMATED COUNT: 13.3 % (ref 11.5–14.5)
EST. AVERAGE GLUCOSE BLD GHB EST-MCNC: 117 MG/DL
GLOBULIN SER-MCNC: 3.5 G/DL (ref 2–4)
GLUCOSE SERPL-MCNC: 143 MG/DL (ref 82–115)
HBA1C MFR BLD HPLC: 5.7 %
HCT VFR BLD AUTO: 47.4 % (ref 38–47)
HGB BLD-MCNC: 14.6 G/DL (ref 12–16)
IMM GRANULOCYTES # BLD AUTO: 0.02 K/UL (ref 0–0.04)
IMM GRANULOCYTES NFR BLD: 0.2 % (ref 0–0.4)
LYMPHOCYTES # BLD AUTO: 1.62 K/UL (ref 1–4.8)
LYMPHOCYTES NFR BLD AUTO: 17.7 % (ref 27–41)
MCH RBC QN AUTO: 26.9 PG (ref 27–31)
MCHC RBC AUTO-ENTMCNC: 30.8 G/DL (ref 32–36)
MCV RBC AUTO: 87.5 FL (ref 80–96)
MICROALBUMIN UR-MCNC: 1.2 MG/DL
MICROALBUMIN/CREAT RATIO PNL UR: 12.4 MG/G (ref 0–30)
MONOCYTES # BLD AUTO: 0.58 K/UL (ref 0–0.8)
MONOCYTES NFR BLD AUTO: 6.3 % (ref 2–6)
MPC BLD CALC-MCNC: 11.4 FL (ref 9.4–12.4)
NEUTROPHILS # BLD AUTO: 6.75 K/UL (ref 1.8–7.7)
NEUTROPHILS NFR BLD AUTO: 73.7 % (ref 53–65)
NRBC # BLD AUTO: 0 X10E3/UL
NRBC, AUTO (.00): 0 %
PLATELET # BLD AUTO: 258 K/UL (ref 150–400)
POTASSIUM SERPL-SCNC: 4.1 MMOL/L (ref 3.5–5.1)
PROT SERPL-MCNC: 7.4 G/DL (ref 5.8–7.6)
RBC # BLD AUTO: 5.42 M/UL (ref 4.2–5.4)
SODIUM SERPL-SCNC: 141 MMOL/L (ref 136–145)
WBC # BLD AUTO: 9.17 K/UL (ref 4.5–11)

## 2025-01-07 PROCEDURE — 83036 HEMOGLOBIN GLYCOSYLATED A1C: CPT | Mod: ,,, | Performed by: CLINICAL MEDICAL LABORATORY

## 2025-01-07 PROCEDURE — 80053 COMPREHEN METABOLIC PANEL: CPT | Mod: ,,, | Performed by: CLINICAL MEDICAL LABORATORY

## 2025-01-07 PROCEDURE — 82043 UR ALBUMIN QUANTITATIVE: CPT | Mod: ,,, | Performed by: CLINICAL MEDICAL LABORATORY

## 2025-01-07 PROCEDURE — 82570 ASSAY OF URINE CREATININE: CPT | Mod: ,,, | Performed by: CLINICAL MEDICAL LABORATORY

## 2025-01-07 PROCEDURE — 85025 COMPLETE CBC W/AUTO DIFF WBC: CPT | Mod: ,,, | Performed by: CLINICAL MEDICAL LABORATORY

## 2025-01-07 PROCEDURE — G0439 PPPS, SUBSEQ VISIT: HCPCS | Mod: ,,, | Performed by: NURSE PRACTITIONER

## 2025-01-07 RX ORDER — METFORMIN HYDROCHLORIDE 500 MG/1
500 TABLET ORAL
Qty: 90 TABLET | Refills: 1 | Status: SHIPPED | OUTPATIENT
Start: 2025-01-07

## 2025-01-07 RX ORDER — TOLTERODINE 4 MG/1
4 CAPSULE, EXTENDED RELEASE ORAL DAILY
Qty: 90 CAPSULE | Refills: 1 | Status: SHIPPED | OUTPATIENT
Start: 2025-01-07

## 2025-01-07 NOTE — PATIENT INSTRUCTIONS
Counseling and Referral of Other Preventative  (Italic type indicates deductible and co-insurance are waived)    Patient Name: Eugenie Coates  Today's Date: 1/7/2025    Health Maintenance       Date Due Completion Date    COVID-19 Vaccine (8 - 2024-25 season) 09/01/2024 2/2/2024    Diabetes Urine Screening 12/26/2024 12/26/2023    Hemoglobin A1c 01/15/2025 7/15/2024    Eye Exam 03/05/2025 3/5/2024    Foot Exam 03/22/2025 3/22/2024    Override on 11/3/2020: Done    Override on 6/18/2019: Done    Override on 8/8/2018: Done    Override on 9/27/2016: Done    Lipid Panel 07/15/2025 7/15/2024    Mammogram 10/15/2025 10/15/2024    Override on 9/28/2017: Done    Override on 9/15/2016: Done (in media)    DEXA Scan 01/03/2026 1/3/2024    Colorectal Cancer Screening 11/01/2026 11/1/2023    Override on 8/1/2012: Done (in media)    TETANUS VACCINE 11/30/2026 11/30/2016        No orders of the defined types were placed in this encounter.    The following information is provided to all patients.  This information is to help you find resources for any of the problems found today that may be affecting your health:                  Living healthy guide: [place].gov    Understanding Diabetes: www.diabetes.org      Eating healthy: www.cdc.gov/healthyweight      CDC home safety checklist: www.cdc.gov/steadi/patient.html      Agency on Aging: [place].gov    Alcoholics anonymous (AA): www.aa.org      Physical Activity: www.lesly.nih.gov/ed5hfpd      Tobacco use: [place].gov

## 2025-01-08 DIAGNOSIS — R73.9 BLOOD GLUCOSE ELEVATED: Primary | ICD-10-CM

## 2025-01-09 ENCOUNTER — OFFICE VISIT (OUTPATIENT)
Dept: PODIATRY | Facility: CLINIC | Age: 75
End: 2025-01-09
Payer: MEDICARE

## 2025-01-09 VITALS — BODY MASS INDEX: 28.76 KG/M2 | WEIGHT: 156.31 LBS | HEIGHT: 62 IN

## 2025-01-09 DIAGNOSIS — M20.41 HAMMER TOES OF BOTH FEET: ICD-10-CM

## 2025-01-09 DIAGNOSIS — E11.65 TYPE 2 DIABETES MELLITUS WITH HYPERGLYCEMIA, WITHOUT LONG-TERM CURRENT USE OF INSULIN: Primary | ICD-10-CM

## 2025-01-09 DIAGNOSIS — M20.42 HAMMER TOES OF BOTH FEET: ICD-10-CM

## 2025-01-09 DIAGNOSIS — L84 CORN OR CALLUS: ICD-10-CM

## 2025-01-09 DIAGNOSIS — B35.1 ONYCHOMYCOSIS DUE TO DERMATOPHYTE: ICD-10-CM

## 2025-01-09 PROCEDURE — 11055 PARING/CUTG B9 HYPRKER LES 1: CPT | Mod: PBBFAC,PO | Performed by: PODIATRIST

## 2025-01-09 PROCEDURE — 99213 OFFICE O/P EST LOW 20 MIN: CPT | Mod: PBBFAC,PO | Performed by: PODIATRIST

## 2025-01-09 PROCEDURE — 11721 DEBRIDE NAIL 6 OR MORE: CPT | Mod: PBBFAC,PO | Performed by: PODIATRIST

## 2025-01-09 PROCEDURE — 99999 PR PBB SHADOW E&M-EST. PATIENT-LVL III: CPT | Mod: PBBFAC,,, | Performed by: PODIATRIST

## 2025-01-09 NOTE — PROGRESS NOTES
Subjective:      Patient ID: Eugenie Coates is a 74 y.o. female.    Chief Complaint: Diabetes Mellitus and Nail Care (4 month nail care, DM)      Eugenie is a 74 y.o. female who presents to the clinic for routine evaluation and treatment of diabetic feet. Eugenie has a past medical history of Allergy, Anemia, Anxiety, Arthritis, Asthma, Bell palsy, Bilateral lower extremity edema, Chronic diastolic CHF (congestive heart failure), Depression, Diabetes mellitus, type 2, DVT (deep venous thrombosis), Dyslipidemia, Endometriosis, Eye injury (2014), Focal seizure (5/3/2021), GERD (gastroesophageal reflux disease), Glaucoma, History of DVT (deep vein thrombosis) (1/23/2018), History of uterine fibroid, Hyperlipidemia, Hypertension, Invasive lobular carcinoma of right breast in female, Nuclear sclerosis of both eyes (9/27/2016), Obesity, Pancreas cyst, Sleep apnea, Supraventricular tachycardia, SVT (supraventricular tachycardia) (05/2019), Thyroid disease, and Venous insufficiency. Patient relates needing her toenails trimmed. Denies being painful with wearing shoe gear.  Has not attempted to self treat. Continues with good control over her blood glucose.  Denies claudication and neuropathy pain symptoms.  Denies any additional pedal complaints.      PCP: FLACA Morse  Date Last Seen by PCP:1/25     Hemoglobin A1C   Date Value Ref Range Status   01/07/2025 5.7 <=7.0 % Final     Comment:       Normal:               <5.7%  Pre-Diabetic:       5.7% to 6.4%  Diabetic:             >6.4%  Diabetic Goal:     <7%   07/15/2024 6.1 4.5 - 6.6 % Final     Comment:       Normal:               <5.7%  Pre-Diabetic:       5.7% to 6.4%  Diabetic:             >6.4%  Diabetic Goal:     <7%   04/18/2024 6.3 4.5 - 6.6 % Final     Comment:       Normal:               <5.7%  Pre-Diabetic:       5.7% to 6.4%  Diabetic:             >6.4%  Diabetic Goal:     <7%   08/14/2023 7.1 (H) 0.0 - 5.6 % Final     Comment:     Reference  Interval:  5.0 - 5.6 Normal   5.7 - 6.4 High Risk   > 6.5 Diabetic      Hgb A1c results are standardized based on the (NGSP) National   Glycohemoglobin Standardization Program.      Hemoglobin A1C levels are related to mean serum/plasma glucose   during the preceding 2-3 months.        03/01/2023 7.1 (H) 4.0 - 5.6 % Final     Comment:     ADA Screening Guidelines:  5.7-6.4%  Consistent with prediabetes  >or=6.5%  Consistent with diabetes    High levels of fetal hemoglobin interfere with the HbA1C  assay. Heterozygous hemoglobin variants (HbS, HgC, etc)do  not significantly interfere with this assay.   However, presence of multiple variants may affect accuracy.     09/26/2022 6.4 (H) 4.0 - 5.6 % Final     Comment:     ADA Screening Guidelines:  5.7-6.4%  Consistent with prediabetes  >or=6.5%  Consistent with diabetes    High levels of fetal hemoglobin interfere with the HbA1C  assay. Heterozygous hemoglobin variants (HbS, HgC, etc)do  not significantly interfere with this assay.   However, presence of multiple variants may affect accuracy.             Past Medical History:   Diagnosis Date    Allergy     Anemia     Anxiety     Arthritis     Asthma     Bell palsy     Bilateral lower extremity edema     Chronic diastolic CHF (congestive heart failure)     Depression     Diabetes mellitus, type 2     DVT (deep venous thrombosis)     right LE    Dyslipidemia     Endometriosis     Eye injury 2014    stuck with tree branch od ?     Focal seizure 5/3/2021    GERD (gastroesophageal reflux disease)     Glaucoma     History of DVT (deep vein thrombosis) 1/23/2018    History of uterine fibroid     Hyperlipidemia     Hypertension     Invasive lobular carcinoma of right breast in female     s/p surgery, radiation, tamoxifen    Nuclear sclerosis of both eyes 9/27/2016    Obesity     Pancreas cyst     Sleep apnea     uses C pap    Supraventricular tachycardia     SVT (supraventricular tachycardia) 05/2019    Thyroid disease      Venous insufficiency        Past Surgical History:   Procedure Laterality Date    ABLATION, CHEMICAL SEALANT, VARICOSE VEIN Right 2024    Procedure: Right GSV VenaSeal Ablation;  Surgeon: Pepito Walker DO;  Location: [facility] OR;  Service: Peripheral Vascular;  Laterality: Right;    ABLATION, CHEMICAL SEALANT, VARICOSE VEIN Left 2024    Left GSV Venaseal Ablation performed by Dr. Zach Walker.    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    Procedure: CRANIOTOMY, FOR INTRACRANIAL NEOPLASM EXCISION Left craniotomy for tumor resection;  Surgeon: Hernandez Bravo MD;  Location: [facility];  Service: Neurosurgery;  Laterality: Left;    CRANIOTOMY, WITH NEOPLASM EXCISION USING COMPUTER-ASSISTED NAVIGATION Left 2023    Procedure: 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, USING COMPUTER-ASSISTED NAVIGATION-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;  Surgeon: Hernandez Bravo MD;  Location: [facility];  Service: Neurosurgery;  Laterality: Left;    ESOPHAGOGASTRODUODENOSCOPY N/A 2023    Procedure: EGD (ESOPHAGOGASTRODUODENOSCOPY);  Surgeon: Issac Wade MD;  Location: [facility];  Service: Endoscopy;  Laterality: N/A;    glaucoma laser Bilateral     HERNIA REPAIR      KNEE SURGERY Right     (R) knee scope; torn meniscus    MASTECTOMY, PARTIAL Right 2020    Procedure: MASTECTOMY, PARTIAL-Right with radiological marker Consent day of surgery; [place], technitium, Frozen;  Surgeon: Narda Keating MD;  Location: [address];  Service: General;  Laterality: Right;    RADIOFREQUENCY ABLATION Right 3/1/2024    Procedure: Right SSV RF Ablation;  Surgeon: Pepito Walker DO;   Location: [facility] OR;  Service: Peripheral Vascular;  Laterality: Right;    SENTINEL LYMPH NODE BIOPSY Right 12/07/2020    Procedure: BIOPSY, LYMPH NODE, SENTINEL;  Surgeon: Narda Keating MD;  Location: [address];  Service: General;  Laterality: Right;    TOTAL REDUCTION MAMMOPLASTY Bilateral 12/07/2020    Procedure: MAMMOPLASTY, REDUCTION-Bilateral;  Surgeon: Zack Hood MD;  Location: [facility] OR [address] [place] [address];  Service: Plastics;  Laterality: Bilateral;    VEIN SURGERY  2003, 2004    Rt leg x2       Family History   Problem Relation Name Age of Onset    Diabetes Mother Sara Vick     No Known Problems Father      No Known Problems Sister      No Known Problems Brother      Colon cancer Maternal Aunt      No Known Problems Maternal Uncle      No Known Problems Paternal Aunt      No Known Problems Paternal Uncle      No Known Problems Maternal Grandmother      No Known Problems Maternal Grandfather      No Known Problems Paternal Grandmother      No Known Problems Paternal Grandfather      Amblyopia Neg Hx      Blindness Neg Hx      Cancer Neg Hx      Cataracts Neg Hx      Glaucoma Neg Hx      Hypertension Neg Hx      Macular degeneration Neg Hx      Retinal detachment Neg Hx      Strabismus Neg Hx      Stroke Neg Hx      Thyroid disease Neg Hx      Celiac disease Neg Hx      Colon polyps Neg Hx      Esophageal cancer Neg Hx      Inflammatory bowel disease Neg Hx      Irritable bowel syndrome Neg Hx      Liver cancer Neg Hx      Liver disease Neg Hx      Rectal cancer Neg Hx      Stomach cancer Neg Hx      Ulcerative colitis Neg Hx      Cystic fibrosis Neg Hx      Crohn's disease Neg Hx      Hemochromatosis Neg Hx      Cirrhosis Neg Hx         Social History     Socioeconomic History    Marital status:    Tobacco Use    Smoking status: Never     Passive exposure: Never    Smokeless tobacco: Never   Substance and Sexual Activity    Alcohol use: Never     Alcohol/week: 0.0 standard drinks of alcohol     Drug use: Never    Sexual activity: Yes     Partners: Male     Birth control/protection: None   Social History Narrative    1/18/19: lives with her . They have multiple children, but the youngest is 38. No children at home right now. No pets at home. Splits time between here and [place]. Her son lives in [place].      Social Drivers of Health     Financial Resource Strain: Low Risk  (12/26/2023)    Overall Financial Resource Strain (CARDIA)     Difficulty of Paying Living Expenses: Not very hard   Food Insecurity: No Food Insecurity (12/26/2023)    Hunger Vital Sign     Worried About Running Out of Food in the Last Year: Never true     Ran Out of Food in the Last Year: Never true   Transportation Needs: No Transportation Needs (12/26/2023)    PRAPARE - Transportation     Lack of Transportation (Medical): No     Lack of Transportation (Non-Medical): No   Physical Activity: Insufficiently Active (12/26/2023)    Exercise Vital Sign     Days of Exercise per Week: 3 days     Minutes of Exercise per Session: 20 min   Stress: No Stress Concern Present (12/26/2023)    Citizen of [place] [employer] - Occupational Stress Questionnaire     Feeling of Stress : Only a little   Recent Concern: Stress - Stress Concern Present (12/20/2023)    Citizen of [place] [employer] - Occupational Stress Questionnaire     Feeling of Stress : To some extent   Housing Stability: Low Risk  (12/26/2023)    Housing Stability Vital Sign     Unable to Pay for Housing in the Last Year: No     Number of Places Lived in the Last Year: 1     Unstable Housing in the Last Year: No       Current Outpatient Medications   Medication Sig Dispense Refill    atorvastatin (LIPITOR) 80 MG tablet Take 1 tablet (80 mg total) by mouth once daily. 90 tablet 3    b complex vitamins tablet Take 1 tablet by mouth once daily.      blood sugar diagnostic Strp Test twice daily.  Accucheck viva test strips and lancets. 300 each 3     calcium carb-vit D3-magnesium (CORAL CALCIUM) 250-200-125 mg-unit-mg Cap Take 1 capsule by mouth once daily.      dorzolamide-timolol 2-0.5% (COSOPT) 22.3-6.8 mg/mL ophthalmic solution Place 1 drop into both eyes 2 (two) times daily.      glucosamine-chondroitin (OSTEO BI-FLEX) 250-200 mg Tab Take 1 tablet by mouth once daily.      lancets (ACCU-CHEK SOFTCLIX LANCETS) Misc 1 Units by Misc.(Non-Drug; Combo Route) route 2 (two) times daily. 300 each 3    latanoprost 0.005 % ophthalmic solution Place 1 drop into both eyes every evening. 2.5 mL 12    linaCLOtide (LINZESS) 72 mcg Cap capsule Take 1 capsule (72 mcg total) by mouth before breakfast. 90 capsule 3    metFORMIN (GLUCOPHAGE) 500 MG tablet Take 1 tablet (500 mg total) by mouth daily with breakfast. 90 tablet 1    NIFEdipine (ADALAT CC) 90 MG TbSR Take 1 tablet (90 mg total) by mouth once daily. 90 tablet 1    pantoprazole (PROTONIX) 40 MG tablet Take 1 tablet (40 mg total) by mouth once daily. 90 tablet 3    tolterodine (DETROL LA) 4 MG 24 hr capsule Take 1 capsule (4 mg total) by mouth once daily. 90 capsule 1    vibegron (GEMTESA) 75 mg Tab Take 1 tablet (75 mg total) by mouth once daily. 30 tablet 11    diazePAM (VALIUM) 5 MG tablet Take 1 tablet (5 mg total) by mouth once. Take 1 hour prior to imaging. for 1 dose 1 tablet 0     No current facility-administered medications for this visit.       Review of patient's allergies indicates:   Allergen Reactions    Percodan [oxycodone hcl-oxycodone-asa] Hives and Itching         Review of Systems   Constitutional: Negative for chills and fever.   Cardiovascular:  Positive for leg swelling. Negative for claudication.   Skin:  Positive for color change and nail changes.   Musculoskeletal:  Positive for joint swelling. Negative for joint pain, muscle cramps and muscle weakness.   Gastrointestinal:  Negative for nausea and vomiting.   Neurological:  Positive for numbness. Negative for paresthesias.    Psychiatric/Behavioral:  Negative for altered mental status.            Objective:      Physical Exam  Constitutional:       General: She is not in acute distress.     Appearance: She is well-developed. She is not diaphoretic.   Cardiovascular:      Pulses:           Dorsalis pedis pulses are 2+ on the right side and 2+ on the left side.        Posterior tibial pulses are 2+ on the right side and 2+ on the left side.      Comments: CFT < 3 seconds bilateral.  Pedal hair growth decreased bilateral.  Varicosities noted bilateral.  Mild to moderate non pitting edema noted to Rt. lower extremity and moderate edema of the Lt. LE.  Toes are cool to touch bilateral.    Musculoskeletal:         General: No tenderness.      Comments: Muscle strength 5/5 in all muscle groups bilateral.  No tenderness nor crepitation with ROM of foot/ankle joints bilateral.  No tenderness with palpation of bilateral foot and ankle.  Bilateral pes planus foot type.  Bilateral hallux abducto valgus.  Bilateral semi-reducible contracture of toes 2-5.     Skin:     General: Skin is warm and dry.      Coloration: Skin is not pale.      Findings: Lesion present. No abrasion, bruising, burn, ecchymosis, erythema, laceration, petechiae or rash.      Nails: There is no clubbing.      Comments: Pedal skin appears edematous bilateral.  Toenails x 10 appear thickened by 2 mm, elongated by 4 mm, and discolored with subungual debris.  Hemosiderin staining noted to the Rt. Lower leg.  Hyperkeratotic lesion noted to the Rt. Dorsal lateral 5th PIP joint.     Neurological:      Mental Status: She is alert and oriented to person, place, and time.      Sensory: Sensory deficit present.      Motor: No weakness or atrophy.      Comments: Protective sensation per [place] monofilament intact bilateral.    Vibratory sensation decreased bilateral.    Light touch intact bilateral.               Assessment:       Encounter Diagnoses   Name Primary?    Type 2  "diabetes mellitus with hyperglycemia, without long-term current use of insulin Yes    Hammer toes of both feet     Onychomycosis due to dermatophyte     Corn or callus              Plan:       Eugenie Arvizu" was seen today for diabetes mellitus and nail care.    Diagnoses and all orders for this visit:    Type 2 diabetes mellitus with hyperglycemia, without long-term current use of insulin    Hammer toes of both feet    Onychomycosis due to dermatophyte    Corn or callus        I counseled the patient on her conditions, their implications and medical management.    Shoe inspection. Diabetic Foot Education. Patient reminded of the importance of good nutrition and blood sugar control to help prevent podiatric complications of diabetes. Patient instructed on proper foot hygeine. We discussed wearing proper shoe gear, daily foot inspections, never walking without protective shoe gear, never putting sharp instruments to feet    Advised to continue wearing diabetic shoes/insoles that accommodate for digital deformities.     With patient's permission, nails were aggressively reduced and debrided x 10 to their soft tissue attachment mechanically and with electric , removing all offending nail and debris.  Also, a sterile #15 scalpel was used to trim a lesion x 1 down to smooth appearing skin without incident.   Patient relates relief following the procedure.  She will continue to monitor the areas daily, inspect her feet, wear protective shoe gear when ambulatory, moisturizer to maintain skin integrity and follow in this office in approximately 4 months, sooner p.r.n.    Giuliano Garnett DPM            "

## 2025-01-10 ENCOUNTER — TELEPHONE (OUTPATIENT)
Dept: FAMILY MEDICINE | Facility: CLINIC | Age: 75
End: 2025-01-10
Payer: MEDICARE

## 2025-01-10 ENCOUNTER — PATIENT MESSAGE (OUTPATIENT)
Dept: FAMILY MEDICINE | Facility: CLINIC | Age: 75
End: 2025-01-10
Payer: MEDICARE

## 2025-01-10 NOTE — TELEPHONE ENCOUNTER
----- Message from Omar Horn MD sent at 1/8/2025 11:40 AM CST -----  Her diabetes control is excellent

## 2025-01-10 NOTE — TELEPHONE ENCOUNTER
Attempted to notify patient of results. Unable to get through on phones lines, *all circuits busy* response. Will message patient through the portal as she last logged on earlier today for her to call clinic back to discuss lab results.

## 2025-01-17 ENCOUNTER — APPOINTMENT (OUTPATIENT)
Dept: RADIOLOGY | Facility: CLINIC | Age: 75
End: 2025-01-17
Attending: FAMILY MEDICINE
Payer: MEDICARE

## 2025-01-17 ENCOUNTER — OFFICE VISIT (OUTPATIENT)
Dept: FAMILY MEDICINE | Facility: CLINIC | Age: 75
End: 2025-01-17
Payer: MEDICARE

## 2025-01-17 VITALS
RESPIRATION RATE: 19 BRPM | HEART RATE: 86 BPM | SYSTOLIC BLOOD PRESSURE: 130 MMHG | DIASTOLIC BLOOD PRESSURE: 62 MMHG | OXYGEN SATURATION: 97 % | TEMPERATURE: 98 F | HEIGHT: 62 IN | BODY MASS INDEX: 28.59 KG/M2

## 2025-01-17 DIAGNOSIS — C50.911 INVASIVE LOBULAR CARCINOMA OF RIGHT BREAST IN FEMALE: ICD-10-CM

## 2025-01-17 DIAGNOSIS — R92.8 ABNORMAL MAMMOGRAM: ICD-10-CM

## 2025-01-17 DIAGNOSIS — Z12.31 ENCOUNTER FOR SCREENING MAMMOGRAM FOR MALIGNANT NEOPLASM OF BREAST: ICD-10-CM

## 2025-01-17 DIAGNOSIS — H91.93 DECREASED HEARING OF BOTH EARS: ICD-10-CM

## 2025-01-17 DIAGNOSIS — E78.2 MIXED HYPERLIPIDEMIA: ICD-10-CM

## 2025-01-17 DIAGNOSIS — R07.89 OTHER CHEST PAIN: ICD-10-CM

## 2025-01-17 DIAGNOSIS — R10.9 FLANK PAIN: Primary | ICD-10-CM

## 2025-01-17 DIAGNOSIS — G40.109 FOCAL MOTOR SEIZURE: ICD-10-CM

## 2025-01-17 DIAGNOSIS — I47.20 VENTRICULAR TACHYCARDIA: ICD-10-CM

## 2025-01-17 DIAGNOSIS — I50.32 CHRONIC HEART FAILURE WITH PRESERVED EJECTION FRACTION: ICD-10-CM

## 2025-01-17 DIAGNOSIS — M54.6 CHRONIC RIGHT-SIDED THORACIC BACK PAIN: ICD-10-CM

## 2025-01-17 DIAGNOSIS — F32.A DEPRESSION, UNSPECIFIED DEPRESSION TYPE: ICD-10-CM

## 2025-01-17 DIAGNOSIS — G89.29 CHRONIC RIGHT-SIDED THORACIC BACK PAIN: ICD-10-CM

## 2025-01-17 PROBLEM — I82.501 LEG DVT (DEEP VENOUS THROMBOEMBOLISM), CHRONIC, RIGHT: Status: RESOLVED | Noted: 2021-03-26 | Resolved: 2025-01-17

## 2025-01-17 LAB
BILIRUB SERPL-MCNC: NORMAL MG/DL
BILIRUB UR QL STRIP: NEGATIVE
BLOOD URINE, POC: NORMAL
CLARITY UR: CLEAR
CLARITY, UA: NORMAL
COLOR UR: NORMAL
COLOR, UA: YELLOW
GLUCOSE UR QL STRIP: NORMAL
GLUCOSE UR STRIP-MCNC: NORMAL MG/DL
KETONES UR QL STRIP: NORMAL
KETONES UR STRIP-SCNC: NEGATIVE MG/DL
LEUKOCYTE ESTERASE UR QL STRIP: NEGATIVE
LEUKOCYTE ESTERASE URINE, POC: NORMAL
NITRITE UR QL STRIP: NEGATIVE
NITRITE, POC UA: NORMAL
PH UR STRIP: 8 PH UNITS
PH, POC UA: 8
PROT UR QL STRIP: NEGATIVE
PROTEIN, POC: NORMAL
RBC # UR STRIP: NEGATIVE /UL
SP GR UR STRIP: 1.01
SPECIFIC GRAVITY, POC UA: 1.02
UROBILINOGEN UR STRIP-ACNC: NORMAL MG/DL
UROBILINOGEN, POC UA: 0.2

## 2025-01-17 PROCEDURE — 71046 X-RAY EXAM CHEST 2 VIEWS: CPT | Mod: TC,RHCUB | Performed by: FAMILY MEDICINE

## 2025-01-17 PROCEDURE — 71046 X-RAY EXAM CHEST 2 VIEWS: CPT | Mod: 26,,, | Performed by: RADIOLOGY

## 2025-01-17 PROCEDURE — 81003 URINALYSIS AUTO W/O SCOPE: CPT | Mod: QW,,, | Performed by: CLINICAL MEDICAL LABORATORY

## 2025-01-17 PROCEDURE — 99214 OFFICE O/P EST MOD 30 MIN: CPT | Mod: ,,, | Performed by: FAMILY MEDICINE

## 2025-01-17 RX ORDER — SERTRALINE HYDROCHLORIDE 25 MG/1
25 TABLET, FILM COATED ORAL DAILY
Qty: 90 TABLET | Refills: 1 | Status: SHIPPED | OUTPATIENT
Start: 2025-01-17 | End: 2026-01-17

## 2025-01-17 RX ORDER — EZETIMIBE 10 MG/1
10 TABLET ORAL DAILY
Qty: 90 TABLET | Refills: 3 | Status: SHIPPED | OUTPATIENT
Start: 2025-01-17 | End: 2026-01-17

## 2025-01-17 NOTE — PROGRESS NOTES
Eugenie Coates is a 74 y.o. female seen today for follow-up on her lipids and diabetes.  Patient also scored mildly elevated on her PHQ 9.  We discussed a trial of low-dose Zoloft.  Patient also complains of intermittent and chronic right-sided flank pain and thoracic pain her urine dip here in the office did have trace blood and we have sent the urine off for formal urinalysis.  Her chest x-ray showed no acute changes in the thoracic spine was done which does seem to show degenerative disc disease of the T-spine.  We discussed an evaluation by pain management.  Her lipids were not to goal  and we discussed adding Zetia to her regimen.  We also discussed a low-fat diet.  The patient will need a follow-up mammogram and will need a cardiology evaluation and she would prefer [place].    Past Medical History:   Diagnosis Date    Allergy     Anemia     Anxiety     Arthritis     Asthma     Bell palsy     Bilateral lower extremity edema     Chronic diastolic CHF (congestive heart failure)     Depression     Diabetes mellitus, type 2     DVT (deep venous thrombosis)     right LE    Dyslipidemia     Endometriosis     Eye injury 2014    stuck with tree branch od ?     Focal seizure 5/3/2021    GERD (gastroesophageal reflux disease)     Glaucoma     History of DVT (deep vein thrombosis) 1/23/2018    History of uterine fibroid     Hyperlipidemia     Hypertension     Invasive lobular carcinoma of right breast in female     s/p surgery, radiation, tamoxifen    Nuclear sclerosis of both eyes 9/27/2016    Obesity     Pancreas cyst     Sleep apnea     uses C pap    Supraventricular tachycardia     SVT (supraventricular tachycardia) 05/2019    Thyroid disease     Venous insufficiency      Family History   Problem Relation Name Age of Onset    Diabetes Mother Sara Vick     No Known Problems Father      No Known Problems Sister      No Known Problems Brother      Colon cancer Maternal Aunt      No Known Problems Maternal Uncle       No Known Problems Paternal Aunt      No Known Problems Paternal Uncle      No Known Problems Maternal Grandmother      No Known Problems Maternal Grandfather      No Known Problems Paternal Grandmother      No Known Problems Paternal Grandfather      Amblyopia Neg Hx      Blindness Neg Hx      Cancer Neg Hx      Cataracts Neg Hx      Glaucoma Neg Hx      Hypertension Neg Hx      Macular degeneration Neg Hx      Retinal detachment Neg Hx      Strabismus Neg Hx      Stroke Neg Hx      Thyroid disease Neg Hx      Celiac disease Neg Hx      Colon polyps Neg Hx      Esophageal cancer Neg Hx      Inflammatory bowel disease Neg Hx      Irritable bowel syndrome Neg Hx      Liver cancer Neg Hx      Liver disease Neg Hx      Rectal cancer Neg Hx      Stomach cancer Neg Hx      Ulcerative colitis Neg Hx      Cystic fibrosis Neg Hx      Crohn's disease Neg Hx      Hemochromatosis Neg Hx      Cirrhosis Neg Hx       Current Outpatient Medications on File Prior to Visit   Medication Sig Dispense Refill    atorvastatin (LIPITOR) 80 MG tablet Take 1 tablet (80 mg total) by mouth once daily. 90 tablet 3    b complex vitamins tablet Take 1 tablet by mouth once daily.      blood sugar diagnostic Strp Test twice daily.  Accucheck viva test strips and lancets. 300 each 3    calcium carb-vit D3-magnesium (CORAL CALCIUM) 250-200-125 mg-unit-mg Cap Take 1 capsule by mouth once daily.      dorzolamide-timolol 2-0.5% (COSOPT) 22.3-6.8 mg/mL ophthalmic solution Place 1 drop into both eyes 2 (two) times daily.      glucosamine-chondroitin (OSTEO BI-FLEX) 250-200 mg Tab Take 1 tablet by mouth once daily.      lancets (ACCU-CHEK SOFTCLIX LANCETS) Misc 1 Units by Misc.(Non-Drug; Combo Route) route 2 (two) times daily. 300 each 3    latanoprost 0.005 % ophthalmic solution Place 1 drop into both eyes every evening. 2.5 mL 12    linaCLOtide (LINZESS) 72 mcg Cap capsule Take 1 capsule (72 mcg total) by mouth before breakfast. 90 capsule 3     metFORMIN (GLUCOPHAGE) 500 MG tablet Take 1 tablet (500 mg total) by mouth daily with breakfast. 90 tablet 1    NIFEdipine (ADALAT CC) 90 MG TbSR Take 1 tablet (90 mg total) by mouth once daily. 90 tablet 1    pantoprazole (PROTONIX) 40 MG tablet Take 1 tablet (40 mg total) by mouth once daily. 90 tablet 3    diazePAM (VALIUM) 5 MG tablet Take 1 tablet (5 mg total) by mouth once. Take 1 hour prior to imaging. for 1 dose 1 tablet 0    tolterodine (DETROL LA) 4 MG 24 hr capsule Take 1 capsule (4 mg total) by mouth once daily. (Patient not taking: Reported on 1/17/2025) 90 capsule 1    vibegron (GEMTESA) 75 mg Tab Take 1 tablet (75 mg total) by mouth once daily. (Patient not taking: Reported on 1/17/2025) 30 tablet 11    [DISCONTINUED] omeprazole (PRILOSEC OTC) 20 MG tablet Take 1 tablet (20 mg total) by mouth once daily. 90 tablet 3     No current facility-administered medications on file prior to visit.     Immunization History   Administered Date(s) Administered    COVID-19 MRNA, LN-S PF (MODERNA HALF 0.25 ML DOSE) 07/05/2022    COVID-19 Vaccine 08/24/2021, 02/07/2022    COVID-19, MRNA, LN-S, PF (MODERNA FULL 0.5 ML DOSE) 07/27/2021, 05/24/2023, 02/02/2024    COVID-19, mRNA, LNP-S, bivalent booster, PF (Moderna Omicron)12 + YEARS 09/27/2022    Influenza 11/25/2016    Influenza - Quadrivalent 11/03/2014, 11/10/2015    Influenza - Quadrivalent - High Dose - PF (65 years and older) 08/18/2020, 08/13/2021    Influenza - Trivalent - Fluad - Adjuvanted - PF (65 years and older 08/22/2019    Influenza - Trivalent - Fluzone High Dose - PF (65 years and older) 10/02/2017, 09/19/2018    Pneumococcal Conjugate - 13 Valent 09/06/2016    Pneumococcal Polysaccharide - 23 Valent 09/18/2017    RSVpreF (Arexvy) 02/02/2024    Tdap 11/30/2016    Zoster 10/06/2016, 11/25/2016    Zoster Recombinant 05/24/2019, 08/24/2019       Review of Systems   Constitutional:  Positive for malaise/fatigue. Negative for fever and weight loss.    Respiratory:  Negative for shortness of breath.    Cardiovascular:  Negative for chest pain and palpitations.   Gastrointestinal:  Negative for nausea and vomiting.   Musculoskeletal:  Positive for back pain, joint pain and myalgias.   Psychiatric/Behavioral:  Negative for depression.         Vitals:    01/17/25 0811   BP: 130/62   Pulse: 86   Resp: 19   Temp: 98.1 °F (36.7 °C)       Physical Exam  Vitals reviewed.   Constitutional:       Appearance: Normal appearance.   HENT:      Head: Normocephalic.   Eyes:      Extraocular Movements: Extraocular movements intact.      Conjunctiva/sclera: Conjunctivae normal.      Pupils: Pupils are equal, round, and reactive to light.   Neck:      Thyroid: No thyroid mass or thyromegaly.   Cardiovascular:      Rate and Rhythm: Normal rate and regular rhythm.      Heart sounds: Normal heart sounds. No murmur heard.     No gallop.   Pulmonary:      Effort: Pulmonary effort is normal. No respiratory distress.      Breath sounds: Normal breath sounds. No wheezing or rales.   Musculoskeletal:      Thoracic back: Spasms and tenderness present. Decreased range of motion.   Skin:     General: Skin is warm and dry.      Coloration: Skin is not jaundiced or pale.      Findings: No rash.   Neurological:      Mental Status: She is alert.   Psychiatric:         Mood and Affect: Mood normal.         Behavior: Behavior normal.         Thought Content: Thought content normal.         Judgment: Judgment normal.          Assessment and Plan  1. Flank pain  -     POCT URINALYSIS W/O SCOPE  -     X-Ray Chest PA And Lateral; Future; Expected date: 01/17/2025  -     Urinalysis, Reflex to Urine Culture  -     X-Ray Thoracic Spine AP Lateral; Future; Expected date: 01/17/2025    2. Other chest pain  -     X-Ray Chest PA And Lateral; Future; Expected date: 01/17/2025    3. Invasive lobular carcinoma of right breast in female  -     Phosphorus    4. Decreased hearing of both ears  -     Ambulatory  referral/consult to Audiology; Future; Expected date: 01/24/2025    5. Mixed hyperlipidemia  -     ezetimibe (ZETIA) 10 mg tablet; Take 1 tablet (10 mg total) by mouth once daily.  Dispense: 90 tablet; Refill: 3    6. Focal motor seizure  Assessment & Plan:  We will continue current plan and Pt f/u with Neuro      7. Chronic heart failure with preserved ejection fraction  Assessment & Plan:  Will refer to Cardiology    Orders:  -     Ambulatory referral/consult to Cardiology; Future; Expected date: 01/24/2025    8. Ventricular tachycardia  Assessment & Plan:  Will refer to cardiology    Orders:  -     Ambulatory referral/consult to Cardiology; Future; Expected date: 01/24/2025    9. Abnormal mammogram  -     Cancel: Mammo Digital Diagnostic Bilat with Sean; Future; Expected date: 01/17/2025  -     Mammo Digital Screening Bilat w/ Sean; Future; Expected date: 04/17/2025    10. Chronic right-sided thoracic back pain  -     Ambulatory referral/consult to Pain Clinic; Future; Expected date: 01/24/2025    11. Encounter for screening mammogram for malignant neoplasm of breast  -     Mammo Digital Screening Bilat w/ Sean; Future; Expected date: 04/17/2025             Return to clinic in 1 month to follow up on her depression or as needed.    Health Maintenance Topics with due status: Not Due       Topic Last Completion Date    TETANUS VACCINE 11/30/2016    Colorectal Cancer Screening 11/01/2023    DEXA Scan 01/03/2024    Mammogram 10/15/2024    Diabetes Urine Screening 01/07/2025    Hemoglobin A1c 01/07/2025    Foot Exam 01/09/2025    Lipid Panel 01/15/2025

## 2025-01-24 ENCOUNTER — TELEPHONE (OUTPATIENT)
Dept: FAMILY MEDICINE | Facility: CLINIC | Age: 75
End: 2025-01-24
Payer: MEDICARE

## 2025-01-24 NOTE — TELEPHONE ENCOUNTER
----- Message from Omar Horn MD sent at 1/17/2025 10:44 AM CST -----  X-rays confirm degenerative disc disease as we discussed.

## 2025-01-24 NOTE — TELEPHONE ENCOUNTER
----- Message from Omar Horn MD sent at 1/21/2025  9:31 AM CST -----  Please let the patient know that her hospital urinalysis was negative.

## 2025-01-27 ENCOUNTER — OFFICE VISIT (OUTPATIENT)
Dept: OTOLARYNGOLOGY | Facility: CLINIC | Age: 75
End: 2025-01-27
Payer: MEDICARE

## 2025-01-27 ENCOUNTER — CLINICAL SUPPORT (OUTPATIENT)
Dept: AUDIOLOGY | Facility: CLINIC | Age: 75
End: 2025-01-27
Payer: MEDICARE

## 2025-01-27 VITALS — HEIGHT: 62 IN | BODY MASS INDEX: 28.71 KG/M2 | WEIGHT: 156 LBS

## 2025-01-27 DIAGNOSIS — H90.3 SENSORINEURAL HEARING LOSS, BILATERAL: Primary | ICD-10-CM

## 2025-01-27 DIAGNOSIS — H90.3 SENSORINEURAL HEARING LOSS (SNHL) OF BOTH EARS: Primary | ICD-10-CM

## 2025-01-27 DIAGNOSIS — H91.93 DECREASED HEARING OF BOTH EARS: ICD-10-CM

## 2025-01-27 PROCEDURE — 99212 OFFICE O/P EST SF 10 MIN: CPT | Mod: PBBFAC | Performed by: AUDIOLOGIST

## 2025-01-27 PROCEDURE — 99999 PR PBB SHADOW E&M-EST. PATIENT-LVL II: CPT | Mod: PBBFAC,,, | Performed by: AUDIOLOGIST

## 2025-01-27 PROCEDURE — 99999 PR PBB SHADOW E&M-EST. PATIENT-LVL III: CPT | Mod: PBBFAC,,, | Performed by: OTOLARYNGOLOGY

## 2025-01-27 PROCEDURE — 99204 OFFICE O/P NEW MOD 45 MIN: CPT | Mod: S$PBB,,, | Performed by: OTOLARYNGOLOGY

## 2025-01-27 PROCEDURE — 92557 COMPREHENSIVE HEARING TEST: CPT | Mod: PBBFAC | Performed by: AUDIOLOGIST

## 2025-01-27 PROCEDURE — 99213 OFFICE O/P EST LOW 20 MIN: CPT | Mod: PBBFAC | Performed by: OTOLARYNGOLOGY

## 2025-01-27 NOTE — PROGRESS NOTES
Subjective:       Patient ID: Eugenie Coates is a 74 y.o. female.    Chief Complaint: Hearing Loss (Bilateral ears. Hearing test done. )    HPI  Review of Systems   HENT:  Positive for hearing loss.    All other systems reviewed and are negative.      Objective:      Physical Exam  General: NAD  Head: Normocephalic, atraumatic, no facial asymmetry/normal strength,  Ears: Both auricules normal in appearance, w/o deformities tympanic membranes normal external auditory canals normal  Nose: External nose w/o deformities normal turbinates no drainage or inflammation  Oral Cavity: Lips, gums, floor of mouth, tongue hard palate, and buccal mucosa without mass/lesion  Oropharynx: Mucosa pink and moist, soft palate, posterior pharynx and oropharyngeal wall without mass/lesion  Neck: Supple, symmetric, trachea midline, no palpable mass/lesion, no palpable cervical lymphadenopathy  Skin: Warm and dry, no concerning lesions  Respiratory: Respirations even, unlabored  Assessment:       1. Sensorineural hearing loss (SNHL) of both ears        Plan:       Audio reviewed and interpreted   Rec Hearing aids if worsens

## 2025-02-05 ENCOUNTER — TELEPHONE (OUTPATIENT)
Dept: FAMILY MEDICINE | Facility: CLINIC | Age: 75
End: 2025-02-05
Payer: MEDICARE

## 2025-02-12 ENCOUNTER — OFFICE VISIT (OUTPATIENT)
Dept: PAIN MEDICINE | Facility: CLINIC | Age: 75
End: 2025-02-12
Payer: MEDICARE

## 2025-02-12 VITALS
HEART RATE: 84 BPM | HEIGHT: 62 IN | RESPIRATION RATE: 18 BRPM | SYSTOLIC BLOOD PRESSURE: 125 MMHG | BODY MASS INDEX: 30 KG/M2 | WEIGHT: 163 LBS | DIASTOLIC BLOOD PRESSURE: 64 MMHG

## 2025-02-12 DIAGNOSIS — M54.6 CHRONIC RIGHT-SIDED THORACIC BACK PAIN: ICD-10-CM

## 2025-02-12 DIAGNOSIS — Z79.899 ENCOUNTER FOR LONG-TERM (CURRENT) USE OF HIGH-RISK MEDICATION: Primary | ICD-10-CM

## 2025-02-12 DIAGNOSIS — M62.838 MUSCLE SPASTICITY: ICD-10-CM

## 2025-02-12 DIAGNOSIS — G89.29 CHRONIC RIGHT-SIDED THORACIC BACK PAIN: ICD-10-CM

## 2025-02-12 LAB
CTP QC/QA: YES
POC (AMP) AMPHETAMINE: NEGATIVE
POC (BAR) BARBITURATES: NEGATIVE
POC (BUP) BUPRENORPHINE: NEGATIVE
POC (BZO) BENZODIAZEPINES: NEGATIVE
POC (COC) COCAINE: NEGATIVE
POC (MDMA) METHYLENEDIOXYMETHAMPHETAMINE 3,4: NEGATIVE
POC (MET) METHAMPHETAMINE: NEGATIVE
POC (MOP) OPIATES: NEGATIVE
POC (MTD) METHADONE: NEGATIVE
POC (OXY) OXYCODONE: NEGATIVE
POC (PCP) PHENCYCLIDINE: NEGATIVE
POC (TCA) TRICYCLIC ANTIDEPRESSANTS: NEGATIVE
POC TEMPERATURE (URINE): 90
POC THC: NEGATIVE

## 2025-02-12 PROCEDURE — 99215 OFFICE O/P EST HI 40 MIN: CPT | Mod: PBBFAC | Performed by: PAIN MEDICINE

## 2025-02-12 PROCEDURE — 99203 OFFICE O/P NEW LOW 30 MIN: CPT | Mod: S$PBB,,, | Performed by: PAIN MEDICINE

## 2025-02-12 PROCEDURE — 99999PBSHW POCT URINE DRUG SCREEN PRESUMP: Mod: PBBFAC,,,

## 2025-02-12 PROCEDURE — 99999 PR PBB SHADOW E&M-EST. PATIENT-LVL V: CPT | Mod: PBBFAC,,, | Performed by: PAIN MEDICINE

## 2025-02-12 PROCEDURE — 80305 DRUG TEST PRSMV DIR OPT OBS: CPT | Mod: PBBFAC | Performed by: PAIN MEDICINE

## 2025-02-12 RX ORDER — METHYLPREDNISOLONE 4 MG/1
TABLET ORAL
Qty: 21 EACH | Refills: 0 | Status: SHIPPED | OUTPATIENT
Start: 2025-02-12 | End: 2025-03-05

## 2025-02-12 RX ORDER — CELECOXIB 200 MG/1
200 CAPSULE ORAL DAILY
Qty: 30 CAPSULE | Refills: 0 | Status: SHIPPED | OUTPATIENT
Start: 2025-02-12 | End: 2025-03-14

## 2025-02-12 RX ORDER — MELOXICAM 15 MG/1
15 TABLET ORAL DAILY
Qty: 30 TABLET | Refills: 0 | Status: SHIPPED | OUTPATIENT
Start: 2025-02-12 | End: 2025-02-12 | Stop reason: CLARIF

## 2025-02-12 RX ORDER — CYCLOBENZAPRINE HCL 10 MG
10 TABLET ORAL 3 TIMES DAILY PRN
Qty: 90 TABLET | Refills: 0 | Status: SHIPPED | OUTPATIENT
Start: 2025-02-12 | End: 2025-03-14

## 2025-02-12 NOTE — PROGRESS NOTES
Chronic Pain - New Consult    Referring Physician: mOar Horn MD       SUBJECTIVE: Disclaimer: This note has been generated using voice-recognition software. There may be typographical errors that have been missed during proof-reading      Initial encounter:    Eugenie Coates presents to the clinic for the evaluation of thoracic pain.       74-year-old female presents for new patient evaluation and consultation from Dr. Horn.  She has multiple medical problems including a history of  breast cancer, status post mastectomy at [facility] and radiation at [facility] in 2020.  She also has a history of DVT, history of brain mass status post craniotomy in 2021 and 2023.  She presents with complain of right thoracic and rhomboid muscle spasticity for several weeks with progressive worsening.  She denies any precipitating events.  She received topical ointments without significant improvement.  She denies rash or lesions.  The pain is primarily along the right thoracic paraspinous muscles without radicular symptoms to the anterior chest wall.  X-ray of the thoracic spine revealed multilevel degenerative changes.      Pain Assessment  Pain Assessment: 0-10  Pain Score:   6  Pain Location: Back  Pain Orientation: Right, Lower  Pain Descriptors: Aching, Constant, Pins and needles  Pain Frequency: Continuous  Pain Onset: On-going  Clinical Progression: Gradually worsening  Aggravating Factors: Bending, Stretching, Exercise, Kneeling, Squatting, Standing, Walking  Pain Intervention(s): Home medication, Rest      Physical Therapy/Home Exercise: no,       Pain Medications:  has a current medication list which includes the following prescription(s): atorvastatin, b complex vitamins, blood sugar diagnostic, coral calcium, dorzolamide-timolol 2-0.5%, ezetimibe, glucosamine-chondroitin, lancets, latanoprost, linaclotide, metformin, nifedipine, pantoprazole, sertraline, celecoxib,  "cyclobenzaprine, methylprednisolone, and [DISCONTINUED] omeprazole.      Tried in Past:  NSAIDS-yes  TCA-no  SNRI-no  Anti-convulsants-no  Muscle Relaxants-no  Opioids-no  Benzodiazepines-no     4A"s of Opioid Risk Assessment  Activity: Patient has difficulty  performing  ADL  Analgesia:  Patient's pain is partially controlled by current medication.   Aberrant Behavior:  reviewed with no aberrant drug seeking/taking behavior     report:  Reviewed and consistent with medication use as prescribed.    Patient denies suicidal or homicidal ideations    Pain interventional therapy-no    Chiropractor -no  Acupuncture - no  TENS unit -no  Massage therapy-no  Spinal decompression -no  Joint replacement -no       Review of Systems   Constitutional: Negative.    HENT: Negative.     Eyes: Negative.    Respiratory: Negative.     Cardiovascular: Negative.    Gastrointestinal: Negative.    Endocrine: Negative.    Genitourinary: Negative.    Musculoskeletal:  Positive for arthralgias (Right thoracic paraspinous and rhomboid muscle spasticity), back pain, leg pain (Bilateral lower extremities) and myalgias.   Integumentary:  Negative.   Neurological: Negative.    Hematological: Negative.    Psychiatric/Behavioral:  Positive for sleep disturbance.              X-Ray Thoracic Spine AP Lateral  Narrative: EXAMINATION:  XR THORACIC SPINE AP LATERAL    CLINICAL HISTORY:  Unspecified abdominal pain    TECHNIQUE:  Thoracic spine, frontal and lateral views    COMPARISON:  10/03/2015 and 03/09/2020    FINDINGS:  The thoracic vertebra are normally aligned.  Degenerative disc changes are present at every level from the T3 through T11.  No destructive bony lesions or fractures are seen.  Impression: Degenerative disc changes are present at multiple levels but no acute bony abnormalities are seen.    Place of service: [facility]    Electronically signed by: Cristin Martinez  Date:    01/17/2025  Time:    10:25  X-Ray Chest PA And " Lateral  Narrative: EXAMINATION:  XR CHEST PA AND LATERAL    CLINICAL HISTORY:  Unspecified abdominal pain    TECHNIQUE:  PA and lateral chest    COMPARISON:  05/14/2019, 04/18/2023, 08/10/2023, and 08/14/2023    FINDINGS:  The cardiac size is upper limits of normal and unchanged.  No acute infiltrates or pleural effusions are seen.  There are degenerative changes in the thoracic spine .  Surgical clips are seen in the left breast region.  Impression: Chronic changes but no evidence of acute disease.    Place of service: [facility]    Electronically signed by: Cristin Martinez  Date:    01/17/2025  Time:    09:09         Past Medical History:   Diagnosis Date    Allergy     Anemia     Anxiety     Arthritis     Asthma     Bell palsy     Bilateral lower extremity edema     Chronic diastolic CHF (congestive heart failure)     Depression     Diabetes mellitus, type 2     DVT (deep venous thrombosis)     right LE    Dyslipidemia     Endometriosis     Eye injury 2014    stuck with tree branch od ?     Focal seizure 5/3/2021    GERD (gastroesophageal reflux disease)     Glaucoma     History of DVT (deep vein thrombosis) 1/23/2018    History of uterine fibroid     Hyperlipidemia     Hypertension     Invasive lobular carcinoma of right breast in female     s/p surgery, radiation, tamoxifen    Nuclear sclerosis of both eyes 9/27/2016    Obesity     Pancreas cyst     Sleep apnea     uses C pap    Supraventricular tachycardia     SVT (supraventricular tachycardia) 05/2019    Thyroid disease     Venous insufficiency      Past Surgical History:   Procedure Laterality Date    ABLATION, CHEMICAL SEALANT, VARICOSE VEIN Right 02/23/2024    Procedure: Right GSV VenaSeal Ablation;  Surgeon: Pepito Walker DO;  Location: [facility];  Service: Peripheral Vascular;  Laterality: Right;    ABLATION, CHEMICAL SEALANT, VARICOSE VEIN Left 02/29/2024    Left GSV Venaseal Ablation performed by Dr. Zach Walker.    CATARACT  EXTRACTION W/  INTRAOCULAR LENS IMPLANT Left 2023    Procedure: CEIOL OMNI OS;  Surgeon: Juan Antonio Mcintosh MD;  Location: [facility] OR;  Service: Ophthalmology;  Laterality: Left;  consider block     SECTION      CHOLECYSTECTOMY      COLONOSCOPY      CRANIOTOMY FOR EXCISION OF INTRACRANIAL TUMOR Left 2021    Procedure: CRANIOTOMY, FOR INTRACRANIAL NEOPLASM EXCISION Left craniotomy for tumor resection;  Surgeon: Hernandez Bravo MD;  Location: [facility] OR;  Service: Neurosurgery;  Laterality: Left;    CRANIOTOMY, WITH NEOPLASM EXCISION USING COMPUTER-ASSISTED NAVIGATION Left 2023    Procedure: 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, USING COMPUTER-ASSISTED NAVIGATION-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;  Surgeon: Hernandez Bravo MD;  Location: [facility] OR;  Service: Neurosurgery;  Laterality: Left;    ESOPHAGOGASTRODUODENOSCOPY N/A 2023    Procedure: EGD (ESOPHAGOGASTRODUODENOSCOPY);  Surgeon: Issac Wade MD;  Location: [facility] ENDO;  Service: Endoscopy;  Laterality: N/A;    glaucoma laser Bilateral     HERNIA REPAIR      KNEE SURGERY Right     (R) knee scope; torn meniscus    MASTECTOMY, PARTIAL Right 2020    Procedure: MASTECTOMY, PARTIAL-Right with radiological marker Consent day of surgery; Neoprobe, technitium, Frozen;  Surgeon: Narda Keating MD;  Location: [address];  Service: General;  Laterality: Right;    RADIOFREQUENCY ABLATION Right 3/1/2024    Procedure: Right SSV RF Ablation;  Surgeon: Pepito Walker DO;  Location: [facility] OR;  Service: Peripheral Vascular;  Laterality: Right;    SENTINEL LYMPH NODE BIOPSY Right 2020    Procedure: BIOPSY, LYMPH NODE, SENTINEL;  Surgeon: Narda Keating MD;  Location: [facility] OR [address] [place] [address];  Service: General;  Laterality: Right;    TOTAL REDUCTION MAMMOPLASTY Bilateral 2020    Procedure: MAMMOPLASTY, REDUCTION-Bilateral;  Surgeon: Zack Hood MD;  Location: [facility] OR [address] [place] [address];   Service: Plastics;  Laterality: Bilateral;    VEIN SURGERY  2003, 2004    Rt leg x2     Social History     Socioeconomic History    Marital status:    Tobacco Use    Smoking status: Never     Passive exposure: Never    Smokeless tobacco: Never   Substance and Sexual Activity    Alcohol use: Never     Alcohol/week: 0.0 standard drinks of alcohol    Drug use: Never    Sexual activity: Yes     Partners: Male     Birth control/protection: None   Social History Narrative    1/18/19: lives with her . They have multiple children, but the youngest is 38. No children at home right now. No pets at home. Splits time between here and [place]. Her son lives in [place].      Social Drivers of Health     Financial Resource Strain: Low Risk  (12/26/2023)    Overall Financial Resource Strain (CARDIA)     Difficulty of Paying Living Expenses: Not very hard   Food Insecurity: No Food Insecurity (12/26/2023)    Hunger Vital Sign     Worried About Running Out of Food in the Last Year: Never true     Ran Out of Food in the Last Year: Never true   Transportation Needs: No Transportation Needs (12/26/2023)    PRAPARE - Transportation     Lack of Transportation (Medical): No     Lack of Transportation (Non-Medical): No   Physical Activity: Insufficiently Active (12/26/2023)    Exercise Vital Sign     Days of Exercise per Week: 3 days     Minutes of Exercise per Session: 20 min   Stress: No Stress Concern Present (12/26/2023)    [employer] - Occupational Stress Questionnaire     Feeling of Stress : Only a little   Recent Concern: Stress - Stress Concern Present (12/20/2023)    [employer] - Occupational Stress Questionnaire     Feeling of Stress : To some extent   Housing Stability: Low Risk  (12/26/2023)    Housing Stability Vital Sign     Unable to Pay for Housing in the Last Year: No     Number of Places Lived in the Last Year: 1     Unstable Housing in the Last  "Year: No     Family History   Problem Relation Name Age of Onset    Diabetes Mother Sara Vick     No Known Problems Father      No Known Problems Sister      No Known Problems Brother      Colon cancer Maternal Aunt      No Known Problems Maternal Uncle      No Known Problems Paternal Aunt      No Known Problems Paternal Uncle      No Known Problems Maternal Grandmother      No Known Problems Maternal Grandfather      No Known Problems Paternal Grandmother      No Known Problems Paternal Grandfather      Amblyopia Neg Hx      Blindness Neg Hx      Cancer Neg Hx      Cataracts Neg Hx      Glaucoma Neg Hx      Hypertension Neg Hx      Macular degeneration Neg Hx      Retinal detachment Neg Hx      Strabismus Neg Hx      Stroke Neg Hx      Thyroid disease Neg Hx      Celiac disease Neg Hx      Colon polyps Neg Hx      Esophageal cancer Neg Hx      Inflammatory bowel disease Neg Hx      Irritable bowel syndrome Neg Hx      Liver cancer Neg Hx      Liver disease Neg Hx      Rectal cancer Neg Hx      Stomach cancer Neg Hx      Ulcerative colitis Neg Hx      Cystic fibrosis Neg Hx      Crohn's disease Neg Hx      Hemochromatosis Neg Hx      Cirrhosis Neg Hx       Review of patient's allergies indicates:   Allergen Reactions    Oxycodone hcl-oxycodone-asa Hives, Itching and Other (See Comments)    Percodan yadira      Other reaction(s): Unknown         OBJECTIVE:  Vitals:    02/12/25 0933   BP: 125/64   Pulse: 84   Resp: 18     /64 (BP Location: Left arm, Patient Position: Sitting)   Pulse 84   Resp 18   Ht 5' 2" (1.575 m)   Wt 73.9 kg (163 lb)   BMI 29.81 kg/m²   Physical Exam  Vitals and nursing note reviewed.   Constitutional:       General: She is not in acute distress.     Appearance: Normal appearance. She is not ill-appearing, toxic-appearing or diaphoretic.   HENT:      Head: Normocephalic and atraumatic.      Nose: Nose normal.      Mouth/Throat:      Mouth: Mucous membranes are moist.   Eyes:      " Extraocular Movements: Extraocular movements intact.      Pupils: Pupils are equal, round, and reactive to light.   Cardiovascular:      Rate and Rhythm: Normal rate and regular rhythm.      Heart sounds: Normal heart sounds.   Pulmonary:      Effort: Pulmonary effort is normal. No respiratory distress.      Breath sounds: Normal breath sounds. No stridor. No wheezing or rhonchi.   Abdominal:      General: Bowel sounds are normal.      Palpations: Abdomen is soft.   Musculoskeletal:         General: No swelling or deformity. Normal range of motion.      Cervical back: Normal and normal range of motion. No spasms or tenderness. No pain with movement. Normal range of motion.      Thoracic back: Spasms (Right rhomboid) and tenderness present.      Lumbar back: No spasms, tenderness or bony tenderness. Normal range of motion. Negative right straight leg raise test and negative left straight leg raise test. No scoliosis.      Right lower leg: No edema.      Left lower leg: No edema.   Skin:     General: Skin is warm.   Neurological:      General: No focal deficit present.      Mental Status: She is alert and oriented to person, place, and time. Mental status is at baseline.      Cranial Nerves: No cranial nerve deficit.      Sensory: Sensation is intact. No sensory deficit.      Motor: No weakness.      Coordination: Coordination normal.      Gait: Gait normal.      Deep Tendon Reflexes: Reflexes are normal and symmetric.   Psychiatric:         Mood and Affect: Mood normal.         Behavior: Behavior normal.          ASSESSMENT: 74 y.o. year old female with pain, consistent with     Encounter Diagnoses   Name Primary?    Chronic right-sided thoracic back pain     Encounter for long-term (current) use of high-risk medication Yes    Muscle spasticity         PLAN:   1. reviewed  2..Addiction, Dependency, Tolerance, Opioid abuse-misuse, Death, Diversion Discussed. Overdose reversal drug Naloxone discussed  3. Opioid  contract signed today  4. Medrol Dosepak prescribed for anti-inflammatory benefit.  Celebrex prescribed for anti-inflammatory benefit and Flexeril prescribed for muscle spasticity.       Requested Prescriptions     Signed Prescriptions Disp Refills    methylPREDNISolone (MEDROL DOSEPACK) 4 mg tablet 21 each 0     Sig: use as directed    cyclobenzaprine (FLEXERIL) 10 MG tablet 90 tablet 0     Sig: Take 1 tablet (10 mg total) by mouth 3 (three) times daily as needed.    celecoxib (CELEBREX) 200 MG capsule 30 capsule 0     Sig: Take 1 capsule (200 mg total) by mouth once daily.     5. Urine drug screen and confirmation testing was ordered as documented on the requisition form in order to monitor for compliance with prescribed opiates and to ensure that there was no misuse/abuse of other non-prescribed opiates or illicit drugs.  I will determine if the patient is suitable for continuation of opioid therapy after obtaining  the definitive urine drug screen for confirmation.  6. Start physical therapy 2 to 3 times week x6 weeks for thoracic pain and spasticity  Orders Placed This Encounter   Procedures    Controlled Substance Monitoring Panel, Random, Urine     Standing Status:   Future     Number of Occurrences:   1     Standing Expiration Date:   4/13/2026     Order Specific Question:   Send normal result to authorizing provider's In Basket if patient is active on MyChart:     Answer:   Yes    Ambulatory Referral/Consult to Physical Therapy/Occupational Therapy     Standing Status:   Future     Standing Expiration Date:   3/12/2026     Referral Priority:   Routine     Referral Type:   Physical Medicine     Referral Reason:   Specialty Services Required     Requested Specialty:   Physical Therapy     Number of Visits Requested:   1    POCT Urine Drug Screen ([facility])     Interpretive Information:     Negative:  No drug detected at the cut off level.   Positive:  This result represents presumptive positive for the    tested drug, other substances may yield a positive response other   than the analyte of interest. This result should be utilized for   diagnostic purpose only. Confirmation testing will be performed upon physician request only.         7. Return in 4-6 weeks for re-evaluation    The total time spent for evaluation and management on 02/12/2025 including reviewing separately obtained history, performing a medically appropriate exam and evaluation, documenting clinical information in the health record, independently interpreting results and communicating them to the patient/family/caregiver, and ordering medications/tests/procedures was between 15-29 minutes.    The above plan and management options were discussed at length with patient. Patient is in agreement with the above and verbalized understanding. It will be communicated with the referring physician via electronic record, fax, or mail.    Melani Conner  02/12/2025

## 2025-02-12 NOTE — PATIENT INSTRUCTIONS
FOLLOW UP IN 1 MONTH WITH  [facility] WILL CONTACT YOU.  PHYSICAL THERAPY FOR 6 WEEKS

## 2025-02-19 ENCOUNTER — OFFICE VISIT (OUTPATIENT)
Dept: FAMILY MEDICINE | Facility: CLINIC | Age: 75
End: 2025-02-19
Payer: MEDICARE

## 2025-02-19 VITALS
OXYGEN SATURATION: 99 % | RESPIRATION RATE: 18 BRPM | HEART RATE: 71 BPM | TEMPERATURE: 98 F | BODY MASS INDEX: 29.26 KG/M2 | SYSTOLIC BLOOD PRESSURE: 130 MMHG | HEIGHT: 62 IN | WEIGHT: 159 LBS | DIASTOLIC BLOOD PRESSURE: 58 MMHG

## 2025-02-19 DIAGNOSIS — F32.A DEPRESSION, UNSPECIFIED DEPRESSION TYPE: ICD-10-CM

## 2025-02-19 PROCEDURE — 99212 OFFICE O/P EST SF 10 MIN: CPT | Mod: ,,, | Performed by: FAMILY MEDICINE

## 2025-02-19 RX ORDER — MELOXICAM 15 MG/1
15 TABLET ORAL DAILY
COMMUNITY
Start: 2025-02-12

## 2025-02-19 RX ORDER — SERTRALINE HYDROCHLORIDE 25 MG/1
25 TABLET, FILM COATED ORAL DAILY
Qty: 90 TABLET | Refills: 1 | Status: CANCELLED | OUTPATIENT
Start: 2025-02-19 | End: 2026-02-19

## 2025-02-19 NOTE — PROGRESS NOTES
Eugenie Coates is a 74 y.o. female seen today for follow-up on her depression.  She reports his Zoloft is helping a great deal and she would like to the medication at the current dose.    Past Medical History:   Diagnosis Date    Allergy     Anemia     Anxiety     Arthritis     Asthma     Bell palsy     Bilateral lower extremity edema     Chronic diastolic CHF (congestive heart failure)     Depression     Diabetes mellitus, type 2     DVT (deep venous thrombosis)     right LE    Dyslipidemia     Endometriosis     Eye injury 2014    stuck with tree branch od ?     Focal seizure 5/3/2021    GERD (gastroesophageal reflux disease)     Glaucoma     History of DVT (deep vein thrombosis) 1/23/2018    History of uterine fibroid     Hyperlipidemia     Hypertension     Invasive lobular carcinoma of right breast in female     s/p surgery, radiation, tamoxifen    Nuclear sclerosis of both eyes 9/27/2016    Obesity     Pancreas cyst     Sleep apnea     uses C pap    Supraventricular tachycardia     SVT (supraventricular tachycardia) 05/2019    Thyroid disease     Venous insufficiency      Family History   Problem Relation Name Age of Onset    Diabetes Mother Sara Vick     No Known Problems Father      No Known Problems Sister      No Known Problems Brother      Colon cancer Maternal Aunt      No Known Problems Maternal Uncle      No Known Problems Paternal Aunt      No Known Problems Paternal Uncle      No Known Problems Maternal Grandmother      No Known Problems Maternal Grandfather      No Known Problems Paternal Grandmother      No Known Problems Paternal Grandfather      Amblyopia Neg Hx      Blindness Neg Hx      Cancer Neg Hx      Cataracts Neg Hx      Glaucoma Neg Hx      Hypertension Neg Hx      Macular degeneration Neg Hx      Retinal detachment Neg Hx      Strabismus Neg Hx      Stroke Neg Hx      Thyroid disease Neg Hx      Celiac disease Neg Hx      Colon polyps Neg Hx      Esophageal cancer Neg Hx       Inflammatory bowel disease Neg Hx      Irritable bowel syndrome Neg Hx      Liver cancer Neg Hx      Liver disease Neg Hx      Rectal cancer Neg Hx      Stomach cancer Neg Hx      Ulcerative colitis Neg Hx      Cystic fibrosis Neg Hx      Crohn's disease Neg Hx      Hemochromatosis Neg Hx      Cirrhosis Neg Hx       Medications Ordered Prior to Encounter[1]  Immunization History   Administered Date(s) Administered    COVID-19 MRNA, LN-S PF (MODERNA HALF 0.25 ML DOSE) 07/05/2022    COVID-19 Vaccine 08/24/2021, 02/07/2022    COVID-19, MRNA, LN-S, PF (MODERNA FULL 0.5 ML DOSE) 07/27/2021, 05/24/2023, 02/02/2024    COVID-19, mRNA, LNP-S, bivalent booster, PF (Moderna Omicron)12 + YEARS 09/27/2022    Influenza 11/25/2016    Influenza - Quadrivalent 11/03/2014, 11/10/2015    Influenza - Quadrivalent - High Dose - PF (65 years and older) 08/18/2020, 08/13/2021    Influenza - Trivalent - Fluad - Adjuvanted - PF (65 years and older 08/22/2019    Influenza - Trivalent - Fluzone High Dose - PF (65 years and older) 10/02/2017, 09/19/2018    Pneumococcal Conjugate - 13 Valent 09/06/2016    Pneumococcal Polysaccharide - 23 Valent 09/18/2017    RSVpreF (Arexvy) 02/02/2024    Tdap 11/30/2016    Zoster 10/06/2016, 11/25/2016    Zoster Recombinant 05/24/2019, 08/24/2019       Review of Systems   Constitutional:  Negative for fever, malaise/fatigue and weight loss.   Respiratory:  Negative for shortness of breath.    Cardiovascular:  Negative for chest pain and palpitations.   Gastrointestinal:  Negative for nausea and vomiting.   Psychiatric/Behavioral:  Negative for depression.         Vitals:    02/19/25 0828   BP: (!) 130/58   Pulse: 71   Resp: 18   Temp: 98 °F (36.7 °C)       Physical Exam  Vitals reviewed.   Eyes:      Conjunctiva/sclera: Conjunctivae normal.   Pulmonary:      Effort: Pulmonary effort is normal.   Neurological:      Mental Status: She is alert.   Psychiatric:         Mood and Affect: Mood normal.          Behavior: Behavior normal.         Thought Content: Thought content normal.         Judgment: Judgment normal.          Assessment and Plan  1. Depression, unspecified depression type             Return to clinic in mid July or as needed.    Health Maintenance Topics with due status: Not Due       Topic Last Completion Date    TETANUS VACCINE 11/30/2016    Colorectal Cancer Screening 11/01/2023    DEXA Scan 01/03/2024    Mammogram 10/15/2024    Diabetes Urine Screening 01/07/2025    Hemoglobin A1c 01/07/2025    Foot Exam 01/09/2025    Lipid Panel 01/15/2025              [1]   Current Outpatient Medications on File Prior to Visit   Medication Sig Dispense Refill    atorvastatin (LIPITOR) 80 MG tablet Take 1 tablet (80 mg total) by mouth once daily. 90 tablet 3    b complex vitamins tablet Take 1 tablet by mouth once daily.      blood sugar diagnostic Strp Test twice daily.  Accucheck viva test strips and lancets. 300 each 3    calcium carb-vit D3-magnesium (CORAL CALCIUM) 250-200-125 mg-unit-mg Cap Take 1 capsule by mouth once daily.      celecoxib (CELEBREX) 200 MG capsule Take 1 capsule (200 mg total) by mouth once daily. 30 capsule 0    cyclobenzaprine (FLEXERIL) 10 MG tablet Take 1 tablet (10 mg total) by mouth 3 (three) times daily as needed. 90 tablet 0    dorzolamide-timolol 2-0.5% (COSOPT) 22.3-6.8 mg/mL ophthalmic solution Place 1 drop into both eyes 2 (two) times daily.      ezetimibe (ZETIA) 10 mg tablet Take 1 tablet (10 mg total) by mouth once daily. 90 tablet 3    glucosamine-chondroitin (OSTEO BI-FLEX) 250-200 mg Tab Take 1 tablet by mouth once daily.      lancets (ACCU-CHEK SOFTCLIX LANCETS) Misc 1 Units by Misc.(Non-Drug; Combo Route) route 2 (two) times daily. 300 each 3    latanoprost 0.005 % ophthalmic solution Place 1 drop into both eyes every evening. 2.5 mL 12    linaCLOtide (LINZESS) 72 mcg Cap capsule Take 1 capsule (72 mcg total) by mouth before breakfast. 90 capsule 3    meloxicam (MOBIC)  15 MG tablet Take 15 mg by mouth once daily.      metFORMIN (GLUCOPHAGE) 500 MG tablet Take 1 tablet (500 mg total) by mouth daily with breakfast. 90 tablet 1    methylPREDNISolone (MEDROL DOSEPACK) 4 mg tablet use as directed 21 each 0    NIFEdipine (ADALAT CC) 90 MG TbSR Take 1 tablet (90 mg total) by mouth once daily. 90 tablet 1    pantoprazole (PROTONIX) 40 MG tablet Take 1 tablet (40 mg total) by mouth once daily. 90 tablet 3    sertraline (ZOLOFT) 25 MG tablet Take 1 tablet (25 mg total) by mouth once daily. 90 tablet 1    [DISCONTINUED] omeprazole (PRILOSEC OTC) 20 MG tablet Take 1 tablet (20 mg total) by mouth once daily. (Patient not taking: Reported on 2/19/2025) 90 tablet 3     No current facility-administered medications on file prior to visit.

## 2025-02-20 ENCOUNTER — OFFICE VISIT (OUTPATIENT)
Dept: CARDIOLOGY | Facility: CLINIC | Age: 75
End: 2025-02-20
Payer: MEDICARE

## 2025-02-20 VITALS
WEIGHT: 158.75 LBS | HEIGHT: 62 IN | BODY MASS INDEX: 29.21 KG/M2 | HEART RATE: 77 BPM | DIASTOLIC BLOOD PRESSURE: 67 MMHG | SYSTOLIC BLOOD PRESSURE: 121 MMHG

## 2025-02-20 DIAGNOSIS — E78.2 MIXED HYPERLIPIDEMIA: ICD-10-CM

## 2025-02-20 DIAGNOSIS — I35.0 NONRHEUMATIC AORTIC VALVE STENOSIS: Primary | ICD-10-CM

## 2025-02-20 DIAGNOSIS — I10 ESSENTIAL HYPERTENSION: ICD-10-CM

## 2025-02-20 PROCEDURE — 99215 OFFICE O/P EST HI 40 MIN: CPT | Mod: PBBFAC,PN | Performed by: INTERNAL MEDICINE

## 2025-02-20 NOTE — PROGRESS NOTES
Subjective:    Patient ID:  Eugenie Coates is a 74 y.o. female patient here for evaluation aortic stenosis      History of Present Illness:      74 y.o. female with HTN, HLD, DM, AVNRT s/p RFA 2019, hx of DVT, venous insufficiency, seizures, and meningioma s/p surgical resection,  aortic stenosis came here for establishment of care.  She followed up with [facility] in [place] before and also evaluated by structural Cardiology for aortic stenosis last year.  She was recommended to have cardiac catheterization in the past however due to resolution of chest pain patient did not want to proceed with the procedure.  Currently patient denies any exertional anginal symptoms at baseline.  Denies any syncope.  Compliant with the medications.        Review of patient's allergies indicates:   Allergen Reactions    Oxycodone hcl-oxycodone-asa Hives, Itching and Other (See Comments)    Percodan yadira      Other reaction(s): Unknown       Past Medical History:   Diagnosis Date    Allergy     Anemia     Anxiety     Arthritis     Asthma     Bell palsy     Bilateral lower extremity edema     Chronic diastolic CHF (congestive heart failure)     Depression     Diabetes mellitus, type 2     DVT (deep venous thrombosis)     right LE    Dyslipidemia     Endometriosis     Eye injury 2014    stuck with tree branch od ?     Focal seizure 5/3/2021    GERD (gastroesophageal reflux disease)     Glaucoma     History of DVT (deep vein thrombosis) 1/23/2018    History of uterine fibroid     Hyperlipidemia     Hypertension     Invasive lobular carcinoma of right breast in female     s/p surgery, radiation, tamoxifen    Nuclear sclerosis of both eyes 9/27/2016    Obesity     Pancreas cyst     Sleep apnea     uses C pap    Supraventricular tachycardia     SVT (supraventricular tachycardia) 05/2019    Thyroid disease     Venous insufficiency      Past Surgical History:   Procedure Laterality Date    ABLATION, CHEMICAL SEALANT, VARICOSE VEIN  Right 2024    Procedure: Right GSV VenaSeal Ablation;  Surgeon: Pepito Walker DO;  Location: [facility] OR;  Service: Peripheral Vascular;  Laterality: Right;    ABLATION, CHEMICAL SEALANT, VARICOSE VEIN Left 2024    Left GSV Venaseal Ablation performed by Dr. Zach Walker.    CATARACT EXTRACTION W/  INTRAOCULAR LENS IMPLANT Left 2023    Procedure: CEIOL OMNI OS;  Surgeon: Juan Antonio Mcintosh MD;  [facility];  Service: Ophthalmology;  Laterality: Left;  consider block     SECTION      CHOLECYSTECTOMY      COLONOSCOPY      CRANIOTOMY FOR EXCISION OF INTRACRANIAL TUMOR Left 2021    Procedure: CRANIOTOMY, FOR INTRACRANIAL NEOPLASM EXCISION Left craniotomy for tumor resection;  Surgeon: Hernandez Bravo MD;  Location: [facility];  Service: Neurosurgery;  Laterality: Left;    CRANIOTOMY, WITH NEOPLASM EXCISION USING COMPUTER-ASSISTED NAVIGATION Left 2023    Procedure: 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, USING COMPUTER-ASSISTED NAVIGATION-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;  Surgeon: Hernandez Bravo MD;  Location: [facility] OR;  Service: Neurosurgery;  Laterality: Left;    ESOPHAGOGASTRODUODENOSCOPY N/A 2023    Procedure: EGD (ESOPHAGOGASTRODUODENOSCOPY);  Surgeon: Issac Wade MD;  Location: [facility];  Service: Endoscopy;  Laterality: N/A;    glaucoma laser Bilateral     HERNIA REPAIR      KNEE SURGERY Right     (R) knee scope; torn meniscus    MASTECTOMY, PARTIAL Right 2020    Procedure: MASTECTOMY, PARTIAL-Right with radiological marker Consent day of surgery; Neoprobe, technitium, Frozen;  Surgeon: Narda Keating MD;  Location: [address];  Service: General;  Laterality: Right;    RADIOFREQUENCY ABLATION Right 3/1/2024    Procedure: Right SSV RF Ablation;  Surgeon: Pepito Walker DO;  Location: [facility] OR;  Service: Peripheral Vascular;  Laterality: Right;    SENTINEL LYMPH NODE BIOPSY Right 2020     Procedure: BIOPSY, LYMPH NODE, SENTINEL;  Surgeon: Narda Keating MD;  Location: [facility] OR [address] [place] [address];  Service: General;  Laterality: Right;    TOTAL REDUCTION MAMMOPLASTY Bilateral 12/07/2020    Procedure: MAMMOPLASTY, REDUCTION-Bilateral;  Surgeon: Zack Hood MD;  Location: [facility] OR [address] [place] [address];  Service: Plastics;  Laterality: Bilateral;    VEIN SURGERY  2003, 2004    Rt leg x2     Social History[1]     Review of Systems   Negative except as mentioned in HPI         Objective        Vitals:    02/20/25 1319   BP: 121/67   Pulse: 77       LIPIDS - LAST 2   Lab Results   Component Value Date    CHOL 188 01/15/2025    CHOL 201 (H) 07/15/2024    HDL 53 01/15/2025    HDL 49 07/15/2024    LDLCALC 107 01/15/2025    LDLCALC 112 07/15/2024    TRIG 142 (H) 01/15/2025    TRIG 198 (H) 07/15/2024    CHOLHDL 3.5 01/15/2025    CHOLHDL 4.1 07/15/2024       CBC - LAST 2  Lab Results   Component Value Date    WBC 9.17 01/07/2025    WBC 8.41 09/25/2024    RBC 5.42 (H) 01/07/2025    RBC 5.25 09/25/2024    HGB 14.6 01/07/2025    HGB 13.9 09/25/2024    HCT 47.4 (H) 01/07/2025    HCT 46.4 09/25/2024    MCV 87.5 01/07/2025    MCV 88 09/25/2024    MCH 26.9 (L) 01/07/2025    MCH 26.5 (L) 09/25/2024    MCHC 30.8 (L) 01/07/2025    MCHC 30.0 (L) 09/25/2024    RDW 13.3 01/07/2025    RDW 13.2 09/25/2024     01/07/2025     09/25/2024    MPV 11.4 01/07/2025    MPV 12.2 09/25/2024    GRAN 8.6 (H) 08/14/2023    GRAN 79.9 (H) 08/14/2023    LYMPH 17.7 (L) 01/07/2025    LYMPH 1.62 01/07/2025    MONO 6.3 (H) 01/07/2025    MONO 6.4 (H) 07/15/2024    BASO 0.05 01/07/2025    BASO 0.05 07/15/2024    NRBC 0.0 01/07/2025    NRBC 0.0 07/15/2024       CHEMISTRY & LIVER FUNCTION - LAST 2  Lab Results   Component Value Date     01/07/2025     11/06/2024    K 4.1 01/07/2025    K 4.4 11/06/2024     01/07/2025     11/06/2024    CO2 25 01/07/2025    CO2 26 11/06/2024    ANIONGAP 14 01/07/2025    ANIONGAP 11 09/25/2024    BUN  17 01/07/2025    BUN 13 11/06/2024    CREATININE 0.92 01/07/2025    CREATININE 1.11 11/06/2024     (H) 01/07/2025     11/06/2024    CALCIUM 9.2 01/07/2025    CALCIUM 9.8 11/06/2024    MG 2.2 08/20/2023    MG 2.3 08/19/2023    ALBUMIN 3.9 01/07/2025    ALBUMIN 3.6 11/06/2024    PROT 7.4 01/07/2025    PROT 7.7 07/15/2024    ALKPHOS 137 01/07/2025    ALKPHOS 137 07/15/2024    ALT 20 01/07/2025    ALT 25 07/15/2024    AST 23 01/07/2025    AST 22 07/15/2024    BILITOT 0.6 01/07/2025    BILITOT 0.7 07/15/2024        CARDIAC PROFILE - LAST 2  Lab Results   Component Value Date     (H) 04/28/2022    TROPONINI 0.012 05/14/2019        COAGULATION - LAST 2  Lab Results   Component Value Date    LABPT 14.3 05/03/2021    INR 0.92 08/10/2023    INR 1.1 05/03/2021    APTT 22.1 (L) 08/10/2023    APTT 26.5 05/03/2021       ENDOCRINE & PSA - LAST 2  Lab Results   Component Value Date    HGBA1C 5.7 01/07/2025    HGBA1C 6.1 07/15/2024    MICROALBUR 1.2 01/07/2025    MICROALBUR 0.7 12/26/2023    TSH 0.300 (L) 07/17/2024    TSH 0.442 04/18/2024        ECHOCARDIOGRAM RESULTS  Results for orders placed during the hospital encounter of 08/16/24    Echo    Interpretation Summary    Left Ventricle: The left ventricle is normal in size. Normal wall thickness. There is concentric remodeling. There is normal systolic function with a visually estimated ejection fraction of 60 - 65%.    Right Ventricle: Normal right ventricular cavity size. Wall thickness is normal. Systolic function is normal.    Left Atrium: Left atrium is mildly dilated.    Aortic Valve: The aortic valve is a trileaflet valve. There is moderate aortic valve sclerosis. Mildly restricted motion. There is moderate stenosis. Aortic valve area by VTI is 1.38 cm². Aortic valve peak velocity is 3.06 m/s. Mean gradient is 21 mmHg. The dimensionless index is 0.44.    Mitral Valve: There is moderate mitral annular calcification present.    Tricuspid Valve: There is  mild regurgitation.    Pulmonary Artery: The estimated pulmonary artery systolic pressure is 28 mmHg.    IVC/SVC: Normal venous pressure at 3 mmHg.      CURRENT/PREVIOUS VISIT EKG  Results for orders placed or performed in visit on 05/13/24   EKG 12-lead    Collection Time: 05/13/24  8:48 AM   Result Value Ref Range    QRS Duration 100 ms    [facility] QTC Calculation 450 ms    Narrative    Test Reason : I10,    Vent. Rate : 087 BPM     Atrial Rate : 087 BPM     P-R Int : 214 ms          QRS Dur : 100 ms      QT Int : 374 ms       P-R-T Axes : 033 -17 072 degrees     QTc Int : 450 ms    Sinus rhythm with 1st degree A-V block  Moderate voltage criteria for LVH, may be normal variant ( R in aVL ,   Isaac product )  Possible Anterior infarct (cited on or before 12-AUG-2023)  Abnormal ECG  When compared with ECG of 08-NOV-2023 13:31,  Questionable change in initial forces of Anterior-septal leads  Confirmed by Claudine TIAN, Mark GONZALES ([id number]) on 5/17/2024 11:36:18 AM    Referred By: LEORA GODOY           Confirmed By:Mark Chow MD     No valid procedures specified.   No results found for this or any previous visit.    No valid procedures specified.          PREVIOUS STRESS TEST              PREVIOUS ANGIOGRAM        PHYSICAL EXAM    CONSTITUTIONAL: Well built, well nourished in no apparent distress  HEENT: No pallor  NECK: no JVD  LUNGS: CTA b/l  HEART: regular rate and rhythm, S1, S2 normal, 3/6 ejection systolic murmur in aortic area  ABDOMEN:  Nondistended  EXTREMITIES:  Bilateral ankle edema, chronic venous insufficiency  NEURO: AAO X 3   SKIN:  No rash  Psych:  Normal affect    I HAVE REVIEWED :    The vital signs, nurses notes, and all the pertinent recent radiology studies, cardiovascular studies and labs.  I have independently reviewed the patient's most recent EKG.        Current Outpatient Medications   Medication Instructions    atorvastatin (LIPITOR) 80 mg, Oral, Daily    b complex vitamins tablet 1  tablet, Daily    blood sugar diagnostic Strp Test twice daily.  [employer] viva test strips and lancets.    calcium carb-vit D3-magnesium (CORAL CALCIUM) 250-200-125 mg-unit-mg Cap 1 capsule, Daily    celecoxib (CELEBREX) 200 mg, Oral, Daily    cyclobenzaprine (FLEXERIL) 10 mg, Oral, 3 times daily PRN    dorzolamide-timolol 2-0.5% (COSOPT) 22.3-6.8 mg/mL ophthalmic solution 1 drop, 2 times daily    ezetimibe (ZETIA) 10 mg, Oral, Daily    glucosamine-chondroitin (OSTEO BI-FLEX) 250-200 mg Tab 1 tablet, Daily    lancets (ACCU-CHEK SOFTCLIX LANCETS) Misc 1 Units, Misc.(Non-Drug; Combo Route), 2 times daily    latanoprost 0.005 % ophthalmic solution 1 drop, Both Eyes, Nightly    linaCLOtide (LINZESS) 72 mcg, Oral, Before breakfast    meloxicam (MOBIC) 15 mg, Daily    metFORMIN (GLUCOPHAGE) 500 mg, Oral, With breakfast    methylPREDNISolone (MEDROL DOSEPACK) 4 mg tablet use as directed    NIFEdipine (ADALAT CC) 90 mg, Oral, Daily    pantoprazole (PROTONIX) 40 mg, Oral, Daily    sertraline (ZOLOFT) 25 mg, Oral, Daily        ECG reviewed by me:  Sinus rhythm, septal infarct, left axis deviation  Assessment     Nonrheumatic aortic valve stenosis  -     Ambulatory referral/consult to Cardiology  -     IN OFFICE EKG 12-LEAD (to [place])  -     Echo Saline Bubble? No; Ultrasound enhancing contrast? No; Future; Expected date: 07/01/2025    Essential hypertension    Mixed hyperlipidemia     History of SVT ablation    History of meningioma status post resection    Diabetes    Venous insufficiency  Plan   Blood Pressure controlled.  Patient denies any exertional angina.  Discussed with the patient regarding cardiac catheterization which was recommended previously by Cardiology.  Patient does not want to undergo any procedures at this point.  Advised symptom monitoring for now.  Continue medical management.  Continue statin and Zetia.  Repeat echo in 5 months.  Follow up in 5 months    Follow up in about 5 months (around 7/20/2025).               [1]   Social History  Tobacco Use    Smoking status: Never     Passive exposure: Never    Smokeless tobacco: Never   Substance Use Topics    Alcohol use: Never     Alcohol/week: 0.0 standard drinks of alcohol    Drug use: Never

## 2025-02-27 NOTE — PROGRESS NOTES
Subjective:         Patient ID: Eugenie Coates is a 74 y.o. female.    Chief Complaint: Low-back Pain (Patient is having right low back pain that radiates up to right upper back.)      Pain  This is a chronic problem. The current episode started more than 1 year ago. The problem occurs daily. The problem has been waxing and waning. Associated symptoms include arthralgias. Pertinent negatives include no change in bowel habit, chest pain, chills, coughing, fever, sore throat, swollen glands, vertigo or vomiting.     Review of Systems   Constitutional:  Negative for activity change, appetite change, chills, fever and unexpected weight change.   HENT:  Negative for drooling, ear discharge, ear pain, facial swelling, nosebleeds, sore throat, trouble swallowing, voice change and goiter.    Eyes:  Negative for photophobia, pain, discharge, redness and visual disturbance.   Respiratory:  Negative for apnea, cough, choking, chest tightness, shortness of breath, wheezing and stridor.    Cardiovascular:  Negative for chest pain, palpitations and leg swelling.   Gastrointestinal:  Negative for abdominal distention, change in bowel habit, diarrhea, vomiting and fecal incontinence.   Endocrine: Negative for cold intolerance, heat intolerance, polydipsia, polyphagia and polyuria.   Genitourinary:  Negative for bladder incontinence, dysuria, flank pain, frequency and hot flashes.   Musculoskeletal:  Positive for arthralgias, back pain and leg pain.   Integumentary:  Negative for color change and pallor.   Allergic/Immunologic: Negative for immunocompromised state.   Neurological:  Negative for dizziness, vertigo, seizures, syncope, facial asymmetry, speech difficulty, light-headedness, memory loss and coordination difficulties.   Hematological:  Negative for adenopathy. Does not bruise/bleed easily.   Psychiatric/Behavioral:  Negative for agitation, behavioral problems, confusion, decreased concentration, dysphoric mood,  hallucinations and self-injury. The patient is not nervous/anxious and is not hyperactive.            Past Medical History:   Diagnosis Date    Allergy     Anemia     Anxiety     Arthritis     Asthma     Bell palsy     Bilateral lower extremity edema     Chronic diastolic CHF (congestive heart failure)     Depression     Diabetes mellitus, type 2     DVT (deep venous thrombosis)     right LE    Dyslipidemia     Endometriosis     Eye injury     stuck with tree branch od ?     Focal seizure 5/3/2021    GERD (gastroesophageal reflux disease)     Glaucoma     History of DVT (deep vein thrombosis) 2018    History of uterine fibroid     Hyperlipidemia     Hypertension     Invasive lobular carcinoma of right breast in female     s/p surgery, radiation, tamoxifen    Nuclear sclerosis of both eyes 2016    Obesity     Pancreas cyst     Sleep apnea     uses C pap    Supraventricular tachycardia     SVT (supraventricular tachycardia) 2019    Thyroid disease     Venous insufficiency      Past Surgical History:   Procedure Laterality Date    ABLATION, CHEMICAL SEALANT, VARICOSE VEIN Right 2024    Procedure: Right GSV VenaSeal Ablation;  Surgeon: Pepito Walker DO;  Location: [facility] OR;  Service: Peripheral Vascular;  Laterality: Right;    ABLATION, CHEMICAL SEALANT, VARICOSE VEIN Left 2024    Left GSV Venaseal Ablation performed by Dr. Zach Walker.    CATARACT EXTRACTION W/  INTRAOCULAR LENS IMPLANT Left 2023    Procedure: CEIOL OMNI OS;  Surgeon: Juan Antonio Mcintosh MD;  Location: [facility] OR;  Service: Ophthalmology;  Laterality: Left;  consider block     SECTION      CHOLECYSTECTOMY      COLONOSCOPY      CRANIOTOMY FOR EXCISION OF INTRACRANIAL TUMOR Left 2021    Procedure: CRANIOTOMY, FOR INTRACRANIAL NEOPLASM EXCISION Left craniotomy for tumor resection;  Surgeon: Hernandez Bravo MD;  Location: [facility] OR;  Service: Neurosurgery;  Laterality: Left;    CRANIOTOMY, WITH NEOPLASM  EXCISION USING COMPUTER-ASSISTED NAVIGATION Left 08/14/2023    Procedure: 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, USING COMPUTER-ASSISTED NAVIGATION-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;  Surgeon: Hernandez Bravo MD;  Location: [facility];  Service: Neurosurgery;  Laterality: Left;    ESOPHAGOGASTRODUODENOSCOPY N/A 02/17/2023    Procedure: EGD (ESOPHAGOGASTRODUODENOSCOPY);  Surgeon: Issac Wade MD;  Location: [facility] ENDO;  Service: Endoscopy;  Laterality: N/A;    glaucoma laser Bilateral     HERNIA REPAIR      KNEE SURGERY Right 2008    (R) knee scope; torn meniscus    MASTECTOMY, PARTIAL Right 12/07/2020    Procedure: MASTECTOMY, PARTIAL-Right with radiological marker Consent day of surgery; Neoprobe, technitium, Frozen;  Surgeon: Narda Keating MD;  Location: [facility] OR [address] [place] [address];  Service: General;  Laterality: Right;    RADIOFREQUENCY ABLATION Right 3/1/2024    Procedure: Right SSV RF Ablation;  Surgeon: Pepito Walker DO;  Location: [facility] OR;  Service: Peripheral Vascular;  Laterality: Right;    SENTINEL LYMPH NODE BIOPSY Right 12/07/2020    Procedure: BIOPSY, LYMPH NODE, SENTINEL;  Surgeon: Narda Keating MD;  [facility];  Service: General;  Laterality: Right;    TOTAL REDUCTION MAMMOPLASTY Bilateral 12/07/2020    Procedure: MAMMOPLASTY, REDUCTION-Bilateral;  Surgeon: Zack Hood MD;  Location: [facility] OR [address] [place] [address];  Service: Plastics;  Laterality: Bilateral;    VEIN SURGERY  2003, 2004    Rt leg x2     Social History[1]  Family History   Problem Relation Name Age of Onset    Diabetes Mother Sara Vick     No Known Problems Father      No Known Problems Sister      No Known Problems Brother      Colon cancer Maternal Aunt      No Known Problems Maternal Uncle      No Known Problems Paternal Aunt      No Known Problems Paternal Uncle      No Known Problems Maternal Grandmother      No Known Problems Maternal Grandfather      No Known Problems Paternal  "Grandmother      No Known Problems Paternal Grandfather      Amblyopia Neg Hx      Blindness Neg Hx      Cancer Neg Hx      Cataracts Neg Hx      Glaucoma Neg Hx      Hypertension Neg Hx      Macular degeneration Neg Hx      Retinal detachment Neg Hx      Strabismus Neg Hx      Stroke Neg Hx      Thyroid disease Neg Hx      Celiac disease Neg Hx      Colon polyps Neg Hx      Esophageal cancer Neg Hx      Inflammatory bowel disease Neg Hx      Irritable bowel syndrome Neg Hx      Liver cancer Neg Hx      Liver disease Neg Hx      Rectal cancer Neg Hx      Stomach cancer Neg Hx      Ulcerative colitis Neg Hx      Cystic fibrosis Neg Hx      Crohn's disease Neg Hx      Hemochromatosis Neg Hx      Cirrhosis Neg Hx       Review of patient's allergies indicates:   Allergen Reactions    Oxycodone hcl-oxycodone-asa Hives, Itching and Other (See Comments)    Percodan yadira      Other reaction(s): Unknown        Objective:  Vitals:    03/07/25 0805 03/07/25 0807   BP: (!) 129/59    Pulse: 90    Resp: 18    Weight: 72.1 kg (159 lb)    Height: 5' 2.5" (1.588 m)    PainSc:   7   7   PainLoc: Back          Physical Exam  Vitals and nursing note reviewed. Exam conducted with a chaperone present.   Constitutional:       General: She is awake. She is not in acute distress.     Appearance: Normal appearance. She is not ill-appearing, toxic-appearing or diaphoretic.   HENT:      Head: Normocephalic and atraumatic.      Nose: Nose normal.      Mouth/Throat:      Mouth: Mucous membranes are moist.      Pharynx: Oropharynx is clear.   Eyes:      Conjunctiva/sclera: Conjunctivae normal.      Pupils: Pupils are equal, round, and reactive to light.   Cardiovascular:      Rate and Rhythm: Normal rate.   Pulmonary:      Effort: Pulmonary effort is normal. No respiratory distress.   Abdominal:      Palpations: Abdomen is soft.      Tenderness: There is no guarding.   Musculoskeletal:         General: Normal range of motion.      Cervical back: " Normal range of motion and neck supple. No rigidity.   Skin:     General: Skin is warm and dry.      Coloration: Skin is not jaundiced or pale.   Neurological:      General: No focal deficit present.      Mental Status: She is alert and oriented to person, place, and time. Mental status is at baseline.      Cranial Nerves: No cranial nerve deficit (II-XII).   Psychiatric:         Mood and Affect: Mood normal.         Behavior: Behavior normal. Behavior is cooperative.         Thought Content: Thought content normal.           X-Ray Thoracic Spine AP Lateral  Narrative: EXAMINATION:  XR THORACIC SPINE AP LATERAL    CLINICAL HISTORY:  Unspecified abdominal pain    TECHNIQUE:  Thoracic spine, frontal and lateral views    COMPARISON:  10/03/2015 and 03/09/2020    FINDINGS:  The thoracic vertebra are normally aligned.  Degenerative disc changes are present at every level from the T3 through T11.  No destructive bony lesions or fractures are seen.  Impression: Degenerative disc changes are present at multiple levels but no acute bony abnormalities are seen.    Place of service: [facility]    Electronically signed by: Cristin Martinez  Date:    01/17/2025  Time:    10:25  X-Ray Chest PA And Lateral  Narrative: EXAMINATION:  XR CHEST PA AND LATERAL    CLINICAL HISTORY:  Unspecified abdominal pain    TECHNIQUE:  PA and lateral chest    COMPARISON:  05/14/2019, 04/18/2023, 08/10/2023, and 08/14/2023    FINDINGS:  The cardiac size is upper limits of normal and unchanged.  No acute infiltrates or pleural effusions are seen.  There are degenerative changes in the thoracic spine .  Surgical clips are seen in the left breast region.  Impression: Chronic changes but no evidence of acute disease.    Place of service: [facility]    Electronically signed by: Cristin Martinez  Date:    01/17/2025  Time:    09:09       Office Visit on 02/12/2025   Component Date Value Ref Range Status    POC Amphetamines 02/12/2025  Negative  Negative, Inconclusive Final    POC Barbiturates 02/12/2025 Negative  Negative, Inconclusive Final    POC Benzodiazepines 02/12/2025 Negative  Negative, Inconclusive Final    POC Cocaine 02/12/2025 Negative  Negative, Inconclusive Final    POC THC 02/12/2025 Negative  Negative, Inconclusive Final    POC Methadone 02/12/2025 Negative  Negative, Inconclusive Final    POC Methamphetamine 02/12/2025 Negative  Negative, Inconclusive Final    POC Opiates 02/12/2025 Negative  Negative, Inconclusive Final    POC Oxycodone 02/12/2025 Negative  Negative, Inconclusive Final    POC Phencyclidine 02/12/2025 Negative  Negative, Inconclusive Final    POC Methylenedioxymethamphetamine * 02/12/2025 Negative  Negative, Inconclusive Final    POC Tricyclic Antidepressants 02/12/2025 Negative  Negative, Inconclusive Final    POC Buprenorphine 02/12/2025 Negative   Final     Acceptable 02/12/2025 Yes   Final    POC Temperature (Urine) 02/12/2025 90   Final    Creatinine, U 02/12/2025 196.1  mg/dL Final    Specific Gravity 02/12/2025 1.009   Final    pH 02/12/2025 8.8   Final    Oxidants 02/12/2025 Negative  Cutoff: 200 mg/L Final    Comment 02/12/2025 Normal   Final    Codeine 02/12/2025 Not Detected  Cutoff: 25 ng/mL Final    C6BG 02/12/2025 Not Detected  Cutoff: 100 ng/mL Final    Morphine 02/12/2025 Not Detected  Cutoff: 25 ng/mL Final    M6BG 02/12/2025 Not Detected  Cutoff: 100 ng/mL Final    6 Monoacetylmorphine 02/12/2025 Not Detected  Cutoff: 25 ng/mL Final    Hydrocodone 02/12/2025 Not Detected  Cutoff: 25 ng/mL Final    Norhydrocodone 02/12/2025 Not Detected  Cutoff: 25 ng/mL Final    Dihydrocodeine 02/12/2025 Not Detected  Cutoff: 25 ng/mL Final    Hydromorphone 02/12/2025 Not Detected  Cutoff: 25 ng/mL Final    Hydromorphone-3-beta-glucuronide 02/12/2025 Not Detected  Cutoff: 100 ng/mL Final    Oxycodone 02/12/2025 Not Detected  Cutoff: 25 ng/mL Final    Noroxycodone 02/12/2025 Not Detected  Cutoff:  25 ng/mL Final    Oxymorphone 02/12/2025 Not Detected  Cutoff: 25 ng/mL Final    Oxymorphone-3-beta-glucuronid 02/12/2025 Not Detected  Cutoff: 100 ng/mL Final    Noroxymorphone 02/12/2025 Not Detected  Cutoff: 25 ng/mL Final    Fentanyl 02/12/2025 Not Detected  Cutoff: 2 ng/mL Final    Norfentanyl 02/12/2025 Not Detected  Cutoff: 2 ng/mL Final    Meperidine 02/12/2025 Not Detected  Cutoff: 25 ng/mL Final    Normeperidine 02/12/2025 Not Detected  Cutoff: 25 ng/mL Final    Naloxone 02/12/2025 Not Detected  Cutoff: 25 ng/mL Final    Naloxone-3-beta-glucuronide 02/12/2025 Not Detected  Cutoff: 100 ng/mL Final    Methadone 02/12/2025 Not Detected  Cutoff: 25 ng/mL Final    EDDP 02/12/2025 Not Detected  Cutoff: 25 ng/mL Final    Propoxyphene 02/12/2025 Not Detected  Cutoff: 25 ng/mL Final    Norpropoxyphene 02/12/2025 Not Detected  Cutoff: 25 ng/mL Final    Tramadol 02/12/2025 Not Detected  Cutoff: 25 ng/mL Final    O-desmethyltramadol 02/12/2025 Not Detected  Cutoff: 25 ng/mL Final    Tapentadol 02/12/2025 Not Detected  Cutoff: 25 ng/mL Final    N-Desmethyltapentadol 02/12/2025 Not Detected  Cutoff: 50 ng/mL Final    M TAPENTADOL-BETA-GLUCURONIDE 02/12/2025 Not Detected  Cutoff: 100 ng/mL Final    Buprenorphine 02/12/2025 Not Detected  Cutoff: 5 ng/mL Final    Norbuprenorphine 02/12/2025 SEE COMMENTS  Cutoff: 5 ng/mL Final    Norbuprenorphine glucuronide 02/12/2025 Not Detected  Cutoff: 20 ng/mL Final    Opioid Interpretation 02/12/2025 SEE COMMENTS   Final    Cocaine 02/12/2025 Negative  Cutoff: 150 ng/mL Final    Tetrahydrocannabinol 02/12/2025 Negative  Cutoff: 50 ng/mL Final    Alprazolam 02/12/2025 Not Detected  Cutoff: 10 ng/mL Final    Alpha-Hydroxyalprazolam 02/12/2025 Not Detected  Cutoff: 10 ng/mL Final    Alpha-Hydroxyalprazolam Glucuronide 02/12/2025 Not Detected  Cutoff: 50 ng/mL Final    Chlordiazepoxide 02/12/2025 Not Detected  Cutoff: 10 ng/mL Final    Clobazam 02/12/2025 Not Detected  Cutoff: 10 ng/mL  Final    N-Desmethylclobazam 02/12/2025 Not Detected  Cutoff: 200 ng/mL Final    Clonazepam 02/12/2025 Not Detected  Cutoff: 10 ng/mL Final    7-aminoclonazepam 02/12/2025 Not Detected  Cutoff: 10 ng/mL Final    Diazepam 02/12/2025 Not Detected  Cutoff: 10 ng/mL Final    Nordiazepam 02/12/2025 Not Detected  Cutoff: 10 ng/mL Final    Flunitrazepam 02/12/2025 Not Detected  Cutoff: 10 ng/mL Final    7-aminoflunitrazepam 02/12/2025 Not Detected  Cutoff: 10 ng/mL Final    Flurazepam 02/12/2025 Not Detected  Cutoff: 10 ng/mL Final    2-Hydroxy Ethyl Flurazepam 02/12/2025 Not Detected  Cutoff: 10 ng/mL Final    Lorazepam 02/12/2025 Not Detected  Cutoff: 10 ng/mL Final    Lorazepam Glucuronide 02/12/2025 Not Detected  Cutoff: 50 ng/mL Final    Midazolam 02/12/2025 Not Detected  Cutoff: 10 ng/mL Final    Alpha-Hydroxy Midazolam 02/12/2025 Not Detected  Cutoff: 10 ng/mL Final    Oxazepam 02/12/2025 Not Detected  Cutoff: 10 ng/mL Final    Oxazepam Glucuronide 02/12/2025 Not Detected  Cutoff: 50 ng/mL Final    Prazepam 02/12/2025 Not Detected  Cutoff: 10 ng/mL Final    Temazepam 02/12/2025 Not Detected  Cutoff: 10 ng/mL Final    Temazepam Glucuronide 02/12/2025 Not Detected  Cutoff: 50 ng/mL Final    Triazolam 02/12/2025 Not Detected  Cutoff: 10 ng/mL Final    Alpha-Hydroxy Triazolam 02/12/2025 Not Detected  Cutoff: 10 ng/mL Final    Zolpidem 02/12/2025 Not Detected  Cutoff: 10 ng/mL Final    Zolpidem Phenyl-4-Carboxylic acid 02/12/2025 Not Detected  Cutoff: 10 ng/mL Final    Benzodiazepine Interpretation 02/12/2025 SEE COMMENTS   Final    List Patient's Current Medications 02/12/2025 ---   Final    Methamphetamine 02/12/2025 Not Detected  Cutoff: 100 ng/mL Final    Amphetamine 02/12/2025 Not Detected  Cutoff: 100 ng/mL Final    3,4-methylenedioxymethamphetamine * 02/12/2025 Not Detected  Cutoff: 100 ng/mL Final    3,3-lnechlxtqatmrk-Y-ethylamphetam* 02/12/2025 Not Detected  Cutoff: 100 ng/mL Final     3,4-methylenedioxyamphetamine (MDA) 02/12/2025 Not Detected  Cutoff: 100 ng/mL Final    Ephedrine 02/12/2025 Not Detected  Cutoff: 100 ng/mL Final    Pseudoephedrine 02/12/2025 Not Detected  Cutoff: 100 ng/mL Final    Phentermine 02/12/2025 Not Detected  Cutoff: 100 ng/mL Final    Phencyclidine (PCP) 02/12/2025 Not Detected  Cutoff: 20 ng/mL Final    Methylphenidate 02/12/2025 Not Detected  Cutoff: 20 ng/mL Final    Ritalinic acid 02/12/2025 Not Detected  Cutoff: 100 ng/mL Final    Stimulant Interpretation 02/12/2025 SEE COMMENTS   Final    Barbiturates 02/12/2025 Negative  Cutoff: 200 ng/mL Final   Office Visit on 01/17/2025   Component Date Value Ref Range Status    Color, UA POC 01/17/2025 yellow   Final    Spec Grav UA 01/17/2025 1.02   Final    pH, UA 01/17/2025 8.0   Final    WBC, UA 01/17/2025 neg   Final    Nitrite, UA 01/17/2025 nrg   Final    Protein, POC 01/17/2025 neg   Final    Glucose, UA 01/17/2025 neg   Final    Ketones, UA 01/17/2025 neg   Final    Bilirubin, POC 01/17/2025 neg   Final    Urobilinogen, UA 01/17/2025 0.2   Final    Blood, UA 01/17/2025 trace   Final    Color, UA 01/17/2025 Light Yellow  Colorless, Straw, Yellow, Dark Yellow, Light Yellow Final    Clarity, UA 01/17/2025 Clear  Clear Final    pH, UA 01/17/2025 8.0  5.0 to 8.0 pH Units Final    Leukocytes, UA 01/17/2025 Negative  Negative Final    Nitrites, UA 01/17/2025 Negative  Negative Final    Protein, UA 01/17/2025 Negative  Negative Final    Glucose, UA 01/17/2025 Normal  Normal mg/dL Final    Ketones, UA 01/17/2025 Negative  Negative mg/dL Final    Urobilinogen, UA 01/17/2025 Normal  0.2, 1.0, Normal mg/dL Final    Bilirubin, UA 01/17/2025 Negative  Negative Final    Blood, UA 01/17/2025 Negative  Negative Final    Specific Gravity, UA 01/17/2025 1.015  <=1.030 Final   Lab Visit on 01/15/2025   Component Date Value Ref Range Status    Triglycerides 01/15/2025 142 (H)  37 - 140 mg/dL Final    Cholesterol 01/15/2025 188  <=200  mg/dL Final    HDL Cholesterol 01/15/2025 53  35 - 60 mg/dL Final    Cholesterol/HDL Ratio (Risk Factor) 01/15/2025 3.5   Final    Non-HDL 01/15/2025 135  mg/dL Final    LDL Calculated 01/15/2025 107  mg/dL Final    LDL/HDL 01/15/2025 2.0   Final    VLDL 01/15/2025 28  mg/dL Final   Office Visit on 01/07/2025   Component Date Value Ref Range Status    Creatinine, Urine 01/07/2025 97  15 - 325 mg/dL Final    Microalbumin 01/07/2025 1.2  <=3.0 mg/dL Final    Microalbumin/Creatinine Ratio 01/07/2025 12.4  0.0 - 30.0 mg/g Final    Sodium 01/07/2025 141  136 - 145 mmol/L Final    Potassium 01/07/2025 4.1  3.5 - 5.1 mmol/L Final    Chloride 01/07/2025 106  98 - 107 mmol/L Final    CO2 01/07/2025 25  23 - 31 mmol/L Final    Anion Gap 01/07/2025 14  7 - 16 mmol/L Final    Glucose 01/07/2025 143 (H)  82 - 115 mg/dL Final    BUN 01/07/2025 17  10 - 20 mg/dL Final    Creatinine 01/07/2025 0.92  0.55 - 1.02 mg/dL Final    BUN/Creatinine Ratio 01/07/2025 18  6 - 20 Final    Calcium 01/07/2025 9.2  8.4 - 10.2 mg/dL Final    Total Protein 01/07/2025 7.4  5.8 - 7.6 g/dL Final    Albumin 01/07/2025 3.9  3.4 - 4.8 g/dL Final    Globulin 01/07/2025 3.5  2.0 - 4.0 g/dL Final    A/G Ratio 01/07/2025 1.1   Final    Bilirubin, Total 01/07/2025 0.6  <=1.5 mg/dL Final    Alk Phos 01/07/2025 137  40 - 150 U/L Final    ALT 01/07/2025 20  <=55 U/L Final    AST 01/07/2025 23  5 - 34 U/L Final    eGFR 01/07/2025 66  >=60 mL/min/1.73m2 Final    Hemoglobin A1C 01/07/2025 5.7  <=7.0 % Final    Estimated Average Glucose 01/07/2025 117  mg/dL Final    WBC 01/07/2025 9.17  4.50 - 11.00 K/uL Final    RBC 01/07/2025 5.42 (H)  4.20 - 5.40 M/uL Final    Hemoglobin 01/07/2025 14.6  12.0 - 16.0 g/dL Final    Hematocrit 01/07/2025 47.4 (H)  38.0 - 47.0 % Final    MCV 01/07/2025 87.5  80.0 - 96.0 fL Final    MCH 01/07/2025 26.9 (L)  27.0 - 31.0 pg Final    MCHC 01/07/2025 30.8 (L)  32.0 - 36.0 g/dL Final    RDW 01/07/2025 13.3  11.5 - 14.5 % Final    Platelet  Count 01/07/2025 258  150 - 400 K/uL Final    MPV 01/07/2025 11.4  9.4 - 12.4 fL Final    Neutrophils % 01/07/2025 73.7 (H)  53.0 - 65.0 % Final    Lymphocytes % 01/07/2025 17.7 (L)  27.0 - 41.0 % Final    Monocytes % 01/07/2025 6.3 (H)  2.0 - 6.0 % Final    Eosinophils % 01/07/2025 1.6  1.0 - 4.0 % Final    Basophils % 01/07/2025 0.5  0.0 - 1.0 % Final    Immature Granulocytes % 01/07/2025 0.2  0.0 - 0.4 % Final    nRBC, Auto 01/07/2025 0.0  <=0.0 % Final    Neutrophils, Abs 01/07/2025 6.75  1.80 - 7.70 K/uL Final    Lymphocytes, Absolute 01/07/2025 1.62  1.00 - 4.80 K/uL Final    Monocytes, Absolute 01/07/2025 0.58  0.00 - 0.80 K/uL Final    Eosinophils, Absolute 01/07/2025 0.15  0.00 - 0.50 K/uL Final    Basophils, Absolute 01/07/2025 0.05  0.00 - 0.20 K/uL Final    Immature Granulocytes, Absolute 01/07/2025 0.02  0.00 - 0.04 K/uL Final    nRBC, Absolute 01/07/2025 0.00  <=0.00 x10e3/uL Final    Diff Type 01/07/2025 Auto   Final   Lab Visit on 11/06/2024   Component Date Value Ref Range Status    Sodium 11/06/2024 141  mmol/L Final    Potassium 11/06/2024 4.4  mmol/L Final    Chloride 11/06/2024 109  mmol/L Final    CO2 11/06/2024 26  mmol/L Final    Glucose 11/06/2024 133  mg/dL Final    BUN 11/06/2024 13  mg/dL Final    Creatinine 11/06/2024 1.11  mg/dL Final    BUN/Creatinine Ratio 11/06/2024 12   Final    Calcium 11/06/2024 9.8  mg/dL Final    Albumin 11/06/2024 3.6  g/dL Final    Phosphorus 11/06/2024 3.4  mg/dL Final    eGFR 11/06/2024 53 (L)  >=60 mL/min/1.73m2 Final   Lab Visit on 09/25/2024   Component Date Value Ref Range Status    Albumin 09/25/2024 3.9  3.5 - 5.2 g/dL Final    Sodium 09/25/2024 143  136 - 145 mmol/L Final    Potassium 09/25/2024 4.2  3.5 - 5.1 mmol/L Final    Chloride 09/25/2024 109  95 - 110 mmol/L Final    CO2 09/25/2024 23  23 - 29 mmol/L Final    Glucose 09/25/2024 128 (H)  70 - 110 mg/dL Final    BUN 09/25/2024 12  8 - 23 mg/dL Final    Creatinine 09/25/2024 1.0  0.5 - 1.4 mg/dL  Final    Calcium 09/25/2024 9.9  8.7 - 10.5 mg/dL Final    Anion Gap 09/25/2024 11  8 - 16 mmol/L Final    eGFR 09/25/2024 59.5 (A)  >60 mL/min/1.73 m^2 Final    WBC 09/25/2024 8.41  3.90 - 12.70 K/uL Final    RBC 09/25/2024 5.25  4.00 - 5.40 M/uL Final    Hemoglobin 09/25/2024 13.9  12.0 - 16.0 g/dL Final    Hematocrit 09/25/2024 46.4  37.0 - 48.5 % Final    MCV 09/25/2024 88  82 - 98 fL Final    MCH 09/25/2024 26.5 (L)  27.0 - 31.0 pg Final    MCHC 09/25/2024 30.0 (L)  32.0 - 36.0 g/dL Final    RDW 09/25/2024 13.2  11.5 - 14.5 % Final    Platelets 09/25/2024 167  150 - 450 K/uL Final    MPV 09/25/2024 12.2  9.2 - 12.9 fL Final         No orders of the defined types were placed in this encounter.      Requested Prescriptions     Pending Prescriptions Disp Refills    cyclobenzaprine (FLEXERIL) 10 MG tablet 90 tablet 0     Sig: Take 1 tablet (10 mg total) by mouth 3 (three) times daily as needed.    celecoxib (CELEBREX) 200 MG capsule 30 capsule 0     Sig: Take 1 capsule (200 mg total) by mouth once daily.       Assessment:     1. Muscle spasticity    2. Chronic right-sided thoracic back pain           Plan:    Not using narcotics from our office March 2025    History of  breast cancer, status post mastectomy at [facility] and radiation at [facility] in 2020.      History of DVT,     History of brain mass status post craniotomy in 2021 and 2023.      Complaint neck pain right shoulder right arm pain numbness and tingling radicular in nature    Complain of right thoracic and rhomboid muscle spasticity for several weeks with progressive worsening     She states physical therapy is coming to her house she has had 2 visits      No MRI thoracic spine cervical spine for review    X-ray thoracic spine [facility] January 17, 2025  The thoracic vertebra are normally aligned.  Degenerative disc changes are present at every level from the T3 through T11.  No destructive bony lesions or  fractures are seen.     Impression:     Degenerative disc changes are present at multiple levels but no acute bony abnormalities are seen.      She declines investigational procedures such as MRI    She would like to continue with conservative management    She states current medication is helping    Continue nonnarcotic medication as directed    Follow-up 6 months    Dr. Conner February 2026    Bring original prescription medication bottles/container/box with labels to each visit    Greater than 30 minutes spent on this encounter including 10 minutes reviewing imaging and notes, 15 minutes with the patient, 5 minutes documentation                [1]   Social History  Socioeconomic History    Marital status:    Tobacco Use    Smoking status: Never     Passive exposure: Never    Smokeless tobacco: Never   Substance and Sexual Activity    Alcohol use: Never     Alcohol/week: 0.0 standard drinks of alcohol    Drug use: Never    Sexual activity: Yes     Partners: Male     Birth control/protection: None   Social History Narrative    1/18/19: lives with her . They have multiple children, but the youngest is 38. No children at home right now. No pets at home. Splits time between here and [place]. Her son lives in [place].      Social Drivers of Health     Financial Resource Strain: Low Risk  (2/12/2025)    Overall Financial Resource Strain (CARDIA)     Difficulty of Paying Living Expenses: Not hard at all   Food Insecurity: No Food Insecurity (2/12/2025)    Hunger Vital Sign     Worried About Running Out of Food in the Last Year: Never true     Ran Out of Food in the Last Year: Never true   Transportation Needs: No Transportation Needs (2/12/2025)    PRAPARE - Transportation     Lack of Transportation (Medical): No     Lack of Transportation (Non-Medical): No   Physical Activity: Insufficiently Active (2/12/2025)    Exercise Vital Sign     Days of Exercise per Week: 3 days     Minutes of Exercise per  Session: 20 min   Stress: Stress Concern Present (2/12/2025)    [employer] [place] [employer] - Occupational Stress Questionnaire     Feeling of Stress : To some extent   Housing Stability: Low Risk  (2/12/2025)    Housing Stability Vital Sign     Unable to Pay for Housing in the Last Year: No     Number of Times Moved in the Last Year: 0     Homeless in the Last Year: No

## 2025-03-05 ENCOUNTER — EXTERNAL HOME HEALTH (OUTPATIENT)
Dept: HOME HEALTH SERVICES | Facility: HOSPITAL | Age: 75
End: 2025-03-05
Payer: MEDICARE

## 2025-03-07 ENCOUNTER — OFFICE VISIT (OUTPATIENT)
Dept: PAIN MEDICINE | Facility: CLINIC | Age: 75
End: 2025-03-07
Payer: MEDICARE

## 2025-03-07 VITALS
HEIGHT: 63 IN | HEART RATE: 90 BPM | RESPIRATION RATE: 18 BRPM | SYSTOLIC BLOOD PRESSURE: 129 MMHG | BODY MASS INDEX: 28.17 KG/M2 | WEIGHT: 159 LBS | DIASTOLIC BLOOD PRESSURE: 59 MMHG

## 2025-03-07 DIAGNOSIS — M62.838 MUSCLE SPASTICITY: Primary | Chronic | ICD-10-CM

## 2025-03-07 DIAGNOSIS — G89.29 CHRONIC RIGHT-SIDED THORACIC BACK PAIN: Chronic | ICD-10-CM

## 2025-03-07 DIAGNOSIS — M54.6 CHRONIC RIGHT-SIDED THORACIC BACK PAIN: Chronic | ICD-10-CM

## 2025-03-07 PROCEDURE — 99215 OFFICE O/P EST HI 40 MIN: CPT | Mod: PBBFAC | Performed by: PHYSICIAN ASSISTANT

## 2025-03-07 PROCEDURE — 99214 OFFICE O/P EST MOD 30 MIN: CPT | Mod: S$PBB,,, | Performed by: PHYSICIAN ASSISTANT

## 2025-03-07 PROCEDURE — 99999 PR PBB SHADOW E&M-EST. PATIENT-LVL V: CPT | Mod: PBBFAC,,, | Performed by: PHYSICIAN ASSISTANT

## 2025-03-07 RX ORDER — CELECOXIB 200 MG/1
200 CAPSULE ORAL DAILY
Qty: 90 CAPSULE | Refills: 1 | Status: SHIPPED | OUTPATIENT
Start: 2025-03-07

## 2025-03-07 RX ORDER — CYCLOBENZAPRINE HCL 10 MG
10 TABLET ORAL 3 TIMES DAILY PRN
Qty: 270 TABLET | Refills: 1 | Status: SHIPPED | OUTPATIENT
Start: 2025-03-07

## 2025-03-17 DIAGNOSIS — R92.8 ABNORMAL MAMMOGRAM: Primary | ICD-10-CM

## 2025-03-17 NOTE — PROGRESS NOTES
Please see message below.      Sandy Griggs Ileah R., LPN  Caller: Unspecified (Today,  8:35 AM)  Good Morning , Yes Ma'am it looks like we already have an order for US Breast we are just needing a Diagnostic Mammo please .          Previous Messages       ----- Message -----  From: Joanne Berger LPN  Sent: 3/17/2025  10:04 AM CDT  To: Sandy Griggs  Subject: RE: Diagnostic Mammo And US Breast              Good morning. Are we needing to put in an order for diagnostic mammogram and a US? Thank you for you help!  ----- Message -----  From: Sandy Griggs  Sent: 3/17/2025   8:45 AM CDT  To: Kory Gamboa  Subject: Diagnostic Mammo And US Breast                  Good Morning ,         I see where Mrs Coates is Scheduled for a Screening Mammo on 4/17/2025 At the [facility] in [place] . Per radiology report from Diagnostic Mammo And US Breast on 10/15/2024 @ [facility] patients needs to be scheduled for a 6 Month Follow up not a Screening Mammo . We have also received an order for an US Breast but we would need a new order for a Diagnostic Mammo in order to get the patient scheduled correctly for her exams .Please let us know if you have any question .    Thank you ,  [facility] Scheduling        Dr Horn was consulted regarding this order and dx code. New order has been placed for scheduling. Mrs. Delgado notified.

## 2025-03-25 ENCOUNTER — OFFICE VISIT (OUTPATIENT)
Dept: NEUROLOGY | Facility: CLINIC | Age: 75
End: 2025-03-25
Payer: MEDICARE

## 2025-03-25 VITALS
DIASTOLIC BLOOD PRESSURE: 58 MMHG | BODY MASS INDEX: 29.26 KG/M2 | RESPIRATION RATE: 18 BRPM | HEIGHT: 62 IN | OXYGEN SATURATION: 98 % | WEIGHT: 159 LBS | SYSTOLIC BLOOD PRESSURE: 122 MMHG | HEART RATE: 81 BPM

## 2025-03-25 DIAGNOSIS — F33.9 DEPRESSION, RECURRENT: ICD-10-CM

## 2025-03-25 DIAGNOSIS — G93.89 BRAIN MASS: Primary | ICD-10-CM

## 2025-03-25 PROCEDURE — 99999 PR PBB SHADOW E&M-EST. PATIENT-LVL V: CPT | Mod: PBBFAC,,, | Performed by: PSYCHIATRY & NEUROLOGY

## 2025-03-25 PROCEDURE — 99215 OFFICE O/P EST HI 40 MIN: CPT | Mod: PBBFAC | Performed by: PSYCHIATRY & NEUROLOGY

## 2025-03-25 PROCEDURE — 99214 OFFICE O/P EST MOD 30 MIN: CPT | Mod: S$PBB,,, | Performed by: PSYCHIATRY & NEUROLOGY

## 2025-03-25 RX ORDER — BRIMONIDINE TARTRATE 2 MG/ML
1 SOLUTION/ DROPS OPHTHALMIC 3 TIMES DAILY
COMMUNITY
Start: 2025-02-26

## 2025-03-25 NOTE — PROGRESS NOTES
Subjective:       Patient ID: Eugenie Coates is a 74 y.o. female     Chief Complaint:  No chief complaint on file.       Allergies:  Oxycodone hcl-oxycodone-asa and Percodan yadira    Current Medications:  Encounter Medications[1]    History of Present Illness  75 yo BF in f/u s/p meningioma resection  and    Doing well and on no AED's   Follows [facility] in [place] every 6-12 months            Past Medical History:   Diagnosis Date    Allergy     Anemia     Anxiety     Arthritis     Asthma     Bell palsy     Bilateral lower extremity edema     Chronic diastolic CHF (congestive heart failure)     Depression     Diabetes mellitus, type 2     DVT (deep venous thrombosis)     right LE    Dyslipidemia     Endometriosis     Eye injury     stuck with tree branch od ?     Focal seizure 5/3/2021    GERD (gastroesophageal reflux disease)     Glaucoma     History of DVT (deep vein thrombosis) 2018    History of uterine fibroid     Hyperlipidemia     Hypertension     Invasive lobular carcinoma of right breast in female     s/p surgery, radiation, tamoxifen    Nuclear sclerosis of both eyes 2016    Obesity     Pancreas cyst     Sleep apnea     uses C pap    Supraventricular tachycardia     SVT (supraventricular tachycardia) 2019    Thyroid disease     Venous insufficiency        Past Surgical History:   Procedure Laterality Date    ABLATION, CHEMICAL SEALANT, VARICOSE VEIN Right 2024    Procedure: Right GSV VenaSeal Ablation;  Surgeon: Pepito Walker DO;  Location: [facility] OR;  Service: Peripheral Vascular;  Laterality: Right;    ABLATION, CHEMICAL SEALANT, VARICOSE VEIN Left 2024    Left GSV Venaseal Ablation performed by Dr. Zach Walker.    CATARACT EXTRACTION W/  INTRAOCULAR LENS IMPLANT Left 2023    Procedure: CEIOL OMNI OS;  Surgeon: Juan Antonio Mcintosh MD;  Location: [facility] OR;  Service: Ophthalmology;  Laterality: Left;  consider block      SECTION      CHOLECYSTECTOMY      COLONOSCOPY      CRANIOTOMY FOR EXCISION OF INTRACRANIAL TUMOR Left 05/04/2021    Procedure: CRANIOTOMY, FOR INTRACRANIAL NEOPLASM EXCISION Left craniotomy for tumor resection;  Surgeon: Hernandez Bravo MD;  Location: [facility] OR;  Service: Neurosurgery;  Laterality: Left;    CRANIOTOMY, WITH NEOPLASM EXCISION USING COMPUTER-ASSISTED NAVIGATION Left 08/14/2023    Procedure: 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, USING COMPUTER-ASSISTED NAVIGATION-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;  Surgeon: Hernandez Bravo MD;  Location: [facility] OR;  Service: Neurosurgery;  Laterality: Left;    ESOPHAGOGASTRODUODENOSCOPY N/A 02/17/2023    Procedure: EGD (ESOPHAGOGASTRODUODENOSCOPY);  Surgeon: Issac Wade MD;  Location: [facility] ENDO;  Service: Endoscopy;  Laterality: N/A;    glaucoma laser Bilateral     HERNIA REPAIR      KNEE SURGERY Right 2008    (R) knee scope; torn meniscus    MASTECTOMY, PARTIAL Right 12/07/2020    Procedure: MASTECTOMY, PARTIAL-Right with radiological marker Consent day of surgery; [place]oprobe, technitium, Frozen;  Surgeon: Narda Keating MD;  Location: [address];  Service: General;  Laterality: Right;    RADIOFREQUENCY ABLATION Right 3/1/2024    Procedure: Right SSV RF Ablation;  Surgeon: Pepito Walker DO;  Location: [facility];  Service: Peripheral Vascular;  Laterality: Right;    SENTINEL LYMPH NODE BIOPSY Right 12/07/2020    Procedure: BIOPSY, LYMPH NODE, SENTINEL;  Surgeon: Narda Keating MD;  Location: [facility] OR [address] [place] [address];  Service: General;  Laterality: Right;    TOTAL REDUCTION MAMMOPLASTY Bilateral 12/07/2020    Procedure: MAMMOPLASTY, REDUCTION-Bilateral;  Surgeon: Zack Hood MD;  Location: [facility] OR [address] [place] [address];  Service: Plastics;  Laterality: Bilateral;    VEIN SURGERY  2003, 2004    Rt leg x2       Social History  Ms. Coates  reports that she has never smoked. She has never been exposed to tobacco smoke. She  "has never used smokeless tobacco. She reports that she does not drink alcohol and does not use drugs.    Family History  Ms.'s Jaxon family history includes Colon cancer in her maternal aunt; Diabetes in her mother; No Known Problems in her brother, father, maternal grandfather, maternal grandmother, maternal uncle, paternal aunt, paternal grandfather, paternal grandmother, paternal uncle, and sister.    Review of Systems  Review of Systems   All other systems reviewed and are negative.   Objective:   BP (!) 122/58 (BP Location: Left arm, Patient Position: Sitting)   Pulse 81   Resp 18   Ht 5' 2" (1.575 m)   Wt 72.1 kg (159 lb)   SpO2 98%   BMI 29.08 kg/m²    NEUROLOGICAL EXAMINATION:     MENTAL STATUS   Oriented to person, place, and time.   Level of consciousness: alert  Knowledge: good.     CRANIAL NERVES   Cranial nerves II through XII intact.     MOTOR EXAM     Strength   Strength 5/5 throughout.     GAIT AND COORDINATION     Gait  Gait: normal     Physical Exam  Vitals reviewed.   Constitutional:       Appearance: She is normal weight.   Neurological:      Mental Status: She is alert and oriented to person, place, and time. Mental status is at baseline.      Cranial Nerves: Cranial nerves 2-12 are intact.      Motor: Motor strength is normal.     Gait: Gait is intact.        Assessment:     Brain mass    Depression, recurrent         Primary Diagnosis and ICD10  Brain mass [G93.89]    Plan:     There are no Patient Instructions on file for this visit.    There are no discontinued medications.    Requested Prescriptions      No prescriptions requested or ordered in this encounter              [1]  Outpatient Encounter Medications as of 3/25/2025   Medication Sig Dispense Refill    atorvastatin (LIPITOR) 80 MG tablet Take 1 tablet (80 mg total) by mouth once daily. 90 tablet 3    b complex vitamins tablet Take 1 tablet by mouth once daily.      blood sugar diagnostic Strp Test twice daily.  Accucheck " viva test strips and lancets. 300 each 3    brimonidine 0.2% (ALPHAGAN) 0.2 % Drop Place 1 drop into both eyes 3 (three) times daily.      calcium carb-vit D3-magnesium (CORAL CALCIUM) 250-200-125 mg-unit-mg Cap Take 1 capsule by mouth once daily.      celecoxib (CELEBREX) 200 MG capsule Take 1 capsule (200 mg total) by mouth once daily. 90 capsule 1    cyclobenzaprine (FLEXERIL) 10 MG tablet Take 1 tablet (10 mg total) by mouth 3 (three) times daily as needed for Muscle spasms. 270 tablet 1    dorzolamide-timolol 2-0.5% (COSOPT) 22.3-6.8 mg/mL ophthalmic solution Place 1 drop into both eyes 2 (two) times daily.      ezetimibe (ZETIA) 10 mg tablet Take 1 tablet (10 mg total) by mouth once daily. 90 tablet 3    glucosamine-chondroitin (OSTEO BI-FLEX) 250-200 mg Tab Take 1 tablet by mouth once daily.      lancets (ACCU-CHEK SOFTCLIX LANCETS) Misc 1 Units by Misc.(Non-Drug; Combo Route) route 2 (two) times daily. 300 each 3    latanoprost 0.005 % ophthalmic solution Place 1 drop into both eyes every evening. 2.5 mL 12    linaCLOtide (LINZESS) 72 mcg Cap capsule Take 1 capsule (72 mcg total) by mouth before breakfast. 90 capsule 3    meloxicam (MOBIC) 15 MG tablet Take 15 mg by mouth once daily.      metFORMIN (GLUCOPHAGE) 500 MG tablet Take 1 tablet (500 mg total) by mouth daily with breakfast. 90 tablet 1    NIFEdipine (ADALAT CC) 90 MG TbSR Take 1 tablet (90 mg total) by mouth once daily. 90 tablet 1    pantoprazole (PROTONIX) 40 MG tablet Take 1 tablet (40 mg total) by mouth once daily. 90 tablet 3    sertraline (ZOLOFT) 25 MG tablet Take 1 tablet (25 mg total) by mouth once daily. 90 tablet 1    [] methylPREDNISolone (MEDROL DOSEPACK) 4 mg tablet use as directed 21 each 0    [DISCONTINUED] celecoxib (CELEBREX) 200 MG capsule Take 1 capsule (200 mg total) by mouth once daily. 30 capsule 0    [DISCONTINUED] cyclobenzaprine (FLEXERIL) 10 MG tablet Take 1 tablet (10 mg total) by mouth 3 (three)  times daily as needed. 90 tablet 0    [DISCONTINUED] omeprazole (PRILOSEC OTC) 20 MG tablet Take 1 tablet (20 mg total) by mouth once daily. (Patient not taking: Reported on 2/19/2025) 90 tablet 3     No facility-administered encounter medications on file as of 3/25/2025.

## 2025-04-16 ENCOUNTER — RESULTS FOLLOW-UP (OUTPATIENT)
Dept: FAMILY MEDICINE | Facility: CLINIC | Age: 75
End: 2025-04-16
Payer: MEDICARE

## 2025-04-16 ENCOUNTER — HOSPITAL ENCOUNTER (OUTPATIENT)
Dept: RADIOLOGY | Facility: HOSPITAL | Age: 75
Discharge: HOME | End: 2025-04-16
Attending: FAMILY MEDICINE
Payer: MEDICARE

## 2025-04-16 ENCOUNTER — TELEPHONE (OUTPATIENT)
Dept: FAMILY MEDICINE | Facility: CLINIC | Age: 75
End: 2025-04-16
Payer: MEDICARE

## 2025-04-16 DIAGNOSIS — R92.8 ABNORMAL MAMMOGRAM: ICD-10-CM

## 2025-04-16 PROCEDURE — 76642 ULTRASOUND BREAST LIMITED: CPT | Mod: 26,LT,, | Performed by: RADIOLOGY

## 2025-04-16 PROCEDURE — 76642 ULTRASOUND BREAST LIMITED: CPT | Mod: TC,LT

## 2025-04-16 PROCEDURE — 77061 BREAST TOMOSYNTHESIS UNI: CPT | Mod: 26,LT,, | Performed by: RADIOLOGY

## 2025-04-16 PROCEDURE — 77065 DX MAMMO INCL CAD UNI: CPT | Mod: 26,LT,, | Performed by: RADIOLOGY

## 2025-04-16 PROCEDURE — 77061 BREAST TOMOSYNTHESIS UNI: CPT | Mod: TC,LT

## 2025-04-16 NOTE — TELEPHONE ENCOUNTER
----- Message from Omar Horn MD sent at 4/16/2025 10:29 AM CDT -----  Likely benign breast mass left, but per recommendation please order a follow-up problem-solving left breast ultrasound and mammogram for 6 months  ----- Message -----  From: Abel Garcia Jr., MD  Sent: 4/16/2025  10:04 AM CDT  To: Omar Horn MD

## 2025-04-17 DIAGNOSIS — R92.8 ABNORMAL MAMMOGRAM: Primary | ICD-10-CM

## 2025-04-30 ENCOUNTER — TELEPHONE (OUTPATIENT)
Dept: FAMILY MEDICINE | Facility: CLINIC | Age: 75
End: 2025-04-30
Payer: MEDICARE

## 2025-05-01 ENCOUNTER — TELEPHONE (OUTPATIENT)
Dept: FAMILY MEDICINE | Facility: CLINIC | Age: 75
End: 2025-05-01
Payer: MEDICARE

## 2025-05-09 ENCOUNTER — OFFICE VISIT (OUTPATIENT)
Dept: PODIATRY | Facility: CLINIC | Age: 75
End: 2025-05-09
Payer: MEDICARE

## 2025-05-09 VITALS — HEIGHT: 60 IN | BODY MASS INDEX: 31.2 KG/M2 | WEIGHT: 158.94 LBS

## 2025-05-09 DIAGNOSIS — E11.65 TYPE 2 DIABETES MELLITUS WITH HYPERGLYCEMIA, WITHOUT LONG-TERM CURRENT USE OF INSULIN: Primary | ICD-10-CM

## 2025-05-09 DIAGNOSIS — B35.1 ONYCHOMYCOSIS DUE TO DERMATOPHYTE: ICD-10-CM

## 2025-05-09 DIAGNOSIS — M20.41 HAMMER TOES OF BOTH FEET: ICD-10-CM

## 2025-05-09 DIAGNOSIS — M20.42 HAMMER TOES OF BOTH FEET: ICD-10-CM

## 2025-05-09 PROCEDURE — 99999 PR PBB SHADOW E&M-EST. PATIENT-LVL III: CPT | Mod: PBBFAC,,, | Performed by: PODIATRIST

## 2025-05-09 PROCEDURE — 99213 OFFICE O/P EST LOW 20 MIN: CPT | Mod: PBBFAC,PO | Performed by: PODIATRIST

## 2025-05-09 NOTE — PROGRESS NOTES
Subjective:      Patient ID: Eugenie Coates is a 75 y.o. female.    Chief Complaint: Diabetic Foot Exam and Nail Care (DM foot care, DM)      Eugenie is a 75 y.o. female who presents to the clinic for routine evaluation and treatment of diabetic feet. Eugenie has a past medical history of Allergy, Anemia, Anxiety, Arthritis, Asthma, Bell palsy, Bilateral lower extremity edema, Chronic diastolic CHF (congestive heart failure), Depression, Diabetes mellitus, type 2, DVT (deep venous thrombosis), Dyslipidemia, Endometriosis, Eye injury (2014), Focal seizure (5/3/2021), GERD (gastroesophageal reflux disease), Glaucoma, History of DVT (deep vein thrombosis) (1/23/2018), History of uterine fibroid, Hyperlipidemia, Hypertension, Invasive lobular carcinoma of right breast in female, Nuclear sclerosis of both eyes (9/27/2016), Obesity, Pancreas cyst, Sleep apnea, Supraventricular tachycardia, SVT (supraventricular tachycardia) (05/2019), Thyroid disease, and Venous insufficiency. Patient relates needing her toenails trimmed. Denies being painful with wearing shoe gear.  Has not attempted to self treat. Notes excellent control over her blood glucose.  Denies claudication or neuropathy pain symptoms.  Denies any additional pedal complaints.      PCP: FLACA Morse  Date Last Seen by PCP:2/25     Hemoglobin A1C   Date Value Ref Range Status   01/07/2025 5.7 <=7.0 % Final     Comment:       Normal:               <5.7%  Pre-Diabetic:       5.7% to 6.4%  Diabetic:             >6.4%  Diabetic Goal:     <7%   07/15/2024 6.1 4.5 - 6.6 % Final     Comment:       Normal:               <5.7%  Pre-Diabetic:       5.7% to 6.4%  Diabetic:             >6.4%  Diabetic Goal:     <7%   04/18/2024 6.3 4.5 - 6.6 % Final     Comment:       Normal:               <5.7%  Pre-Diabetic:       5.7% to 6.4%  Diabetic:             >6.4%  Diabetic Goal:     <7%   08/14/2023 7.1 (H) 0.0 - 5.6 % Final     Comment:     Reference Interval:  5.0 -  5.6 Normal   5.7 - 6.4 High Risk   > 6.5 Diabetic      Hgb A1c results are standardized based on the (NGSP) National   Glycohemoglobin Standardization Program.      Hemoglobin A1C levels are related to mean serum/plasma glucose   during the preceding 2-3 months.        03/01/2023 7.1 (H) 4.0 - 5.6 % Final     Comment:     ADA Screening Guidelines:  5.7-6.4%  Consistent with prediabetes  >or=6.5%  Consistent with diabetes    High levels of fetal hemoglobin interfere with the HbA1C  assay. Heterozygous hemoglobin variants (HbS, HgC, etc)do  not significantly interfere with this assay.   However, presence of multiple variants may affect accuracy.     09/26/2022 6.4 (H) 4.0 - 5.6 % Final     Comment:     ADA Screening Guidelines:  5.7-6.4%  Consistent with prediabetes  >or=6.5%  Consistent with diabetes    High levels of fetal hemoglobin interfere with the HbA1C  assay. Heterozygous hemoglobin variants (HbS, HgC, etc)do  not significantly interfere with this assay.   However, presence of multiple variants may affect accuracy.             Past Medical History:   Diagnosis Date    Allergy     Anemia     Anxiety     Arthritis     Asthma     Bell palsy     Bilateral lower extremity edema     Chronic diastolic CHF (congestive heart failure)     Depression     Diabetes mellitus, type 2     DVT (deep venous thrombosis)     right LE    Dyslipidemia     Endometriosis     Eye injury 2014    stuck with tree branch od ?     Focal seizure 5/3/2021    GERD (gastroesophageal reflux disease)     Glaucoma     History of DVT (deep vein thrombosis) 1/23/2018    History of uterine fibroid     Hyperlipidemia     Hypertension     Invasive lobular carcinoma of right breast in female     s/p surgery, radiation, tamoxifen    Nuclear sclerosis of both eyes 9/27/2016    Obesity     Pancreas cyst     Sleep apnea     uses C pap    Supraventricular tachycardia     SVT (supraventricular tachycardia) 05/2019    Thyroid disease     Venous insufficiency         Past Surgical History:   Procedure Laterality Date    ABLATION, CHEMICAL SEALANT, VARICOSE VEIN Right 2024    Procedure: Right GSV VenaSeal Ablation;  Surgeon: Pepito Walker DO;  Location: [facility] OR;  Service: Peripheral Vascular;  Laterality: Right;    ABLATION, CHEMICAL SEALANT, VARICOSE VEIN Left 2024    Left GSV Venaseal Ablation performed by Dr. Zach Walker.    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    Procedure: CRANIOTOMY, FOR INTRACRANIAL NEOPLASM EXCISION Left craniotomy for tumor resection;  Surgeon: Hernandez Bravo MD;  Location: [facility];  Service: Neurosurgery;  Laterality: Left;    CRANIOTOMY, WITH NEOPLASM EXCISION USING COMPUTER-ASSISTED NAVIGATION Left 2023    Procedure: 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, USING COMPUTER-ASSISTED NAVIGATION-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;  Surgeon: Hernandez Bravo MD;  Location: [facility] OR;  Service: Neurosurgery;  Laterality: Left;    ESOPHAGOGASTRODUODENOSCOPY N/A 2023    Procedure: EGD (ESOPHAGOGASTRODUODENOSCOPY);  Surgeon: Issac Wade MD;  Location: [facility] ENDO;  Service: Endoscopy;  Laterality: N/A;    glaucoma laser Bilateral     HERNIA REPAIR      KNEE SURGERY Right     (R) knee scope; torn meniscus    MASTECTOMY, PARTIAL Right 2020    Procedure: MASTECTOMY, PARTIAL-Right with radiological marker Consent day of surgery; Neoprobe, technitium, Frozen;  Surgeon: Narda Keating MD;  Location: [address];  Service: General;  Laterality: Right;    RADIOFREQUENCY ABLATION Right 3/1/2024    Procedure: Right SSV RF Ablation;  Surgeon: Pepito Walker DO;  Location: [facility] OR;   Service: Peripheral Vascular;  Laterality: Right;    SENTINEL LYMPH NODE BIOPSY Right 12/07/2020    Procedure: BIOPSY, LYMPH NODE, SENTINEL;  Surgeon: Narda Keating MD;  Location: [facility] OR [address] [place] [address];  Service: General;  Laterality: Right;    TOTAL REDUCTION MAMMOPLASTY Bilateral 12/07/2020    Procedure: MAMMOPLASTY, REDUCTION-Bilateral;  Surgeon: Zack Hood MD;  Location: [facility] OR [address] [place] [address];  Service: Plastics;  Laterality: Bilateral;    VEIN SURGERY  2003, 2004    Rt leg x2       Family History   Problem Relation Name Age of Onset    Diabetes Mother Sara Vick     No Known Problems Father      No Known Problems Sister      No Known Problems Brother      Colon cancer Maternal Aunt      No Known Problems Maternal Uncle      No Known Problems Paternal Aunt      No Known Problems Paternal Uncle      No Known Problems Maternal Grandmother      No Known Problems Maternal Grandfather      No Known Problems Paternal Grandmother      No Known Problems Paternal Grandfather      Amblyopia Neg Hx      Blindness Neg Hx      Cancer Neg Hx      Cataracts Neg Hx      Glaucoma Neg Hx      Hypertension Neg Hx      Macular degeneration Neg Hx      Retinal detachment Neg Hx      Strabismus Neg Hx      Stroke Neg Hx      Thyroid disease Neg Hx      Celiac disease Neg Hx      Colon polyps Neg Hx      Esophageal cancer Neg Hx      Inflammatory bowel disease Neg Hx      Irritable bowel syndrome Neg Hx      Liver cancer Neg Hx      Liver disease Neg Hx      Rectal cancer Neg Hx      Stomach cancer Neg Hx      Ulcerative colitis Neg Hx      Cystic fibrosis Neg Hx      Crohn's disease Neg Hx      Hemochromatosis Neg Hx      Cirrhosis Neg Hx         Social History     Socioeconomic History    Marital status:    Tobacco Use    Smoking status: Never     Passive exposure: Never    Smokeless tobacco: Never   Substance and Sexual Activity    Alcohol use: Never     Alcohol/week: 0.0 standard drinks of alcohol    Drug use: Never     Sexual activity: Yes     Partners: Male     Birth control/protection: None   Social History Narrative    1/18/19: lives with her . They have multiple children, but the youngest is 38. No children at home right now. No pets at home. Splits time between here and [place]. Her son lives in [place].      Social Drivers of Health     Financial Resource Strain: Low Risk  (2/12/2025)    Overall Financial Resource Strain (CARDIA)     Difficulty of Paying Living Expenses: Not hard at all   Food Insecurity: No Food Insecurity (2/12/2025)    Hunger Vital Sign     Worried About Running Out of Food in the Last Year: Never true     Ran Out of Food in the Last Year: Never true   Transportation Needs: No Transportation Needs (2/12/2025)    PRAPARE - Transportation     Lack of Transportation (Medical): No     Lack of Transportation (Non-Medical): No   Physical Activity: Insufficiently Active (2/12/2025)    Exercise Vital Sign     Days of Exercise per Week: 3 days     Minutes of Exercise per Session: 20 min   Stress: Stress Concern Present (2/12/2025)    [employer] - Occupational Stress Questionnaire     Feeling of Stress : To some extent   Housing Stability: Low Risk  (2/12/2025)    Housing Stability Vital Sign     Unable to Pay for Housing in the Last Year: No     Number of Times Moved in the Last Year: 0     Homeless in the Last Year: No       Current Outpatient Medications   Medication Sig Dispense Refill    atorvastatin (LIPITOR) 80 MG tablet Take 1 tablet (80 mg total) by mouth once daily. 90 tablet 3    b complex vitamins tablet Take 1 tablet by mouth once daily.      blood sugar diagnostic Strp Test twice daily.  Accucheck viva test strips and lancets. 300 each 3    brimonidine 0.2% (ALPHAGAN) 0.2 % Drop Place 1 drop into both eyes 3 (three) times daily.      calcium carb-vit D3-magnesium (CORAL CALCIUM) 250-200-125 mg-unit-mg Cap Take 1 capsule by mouth once daily.      celecoxib  (CELEBREX) 200 MG capsule Take 1 capsule (200 mg total) by mouth once daily. 90 capsule 1    cyclobenzaprine (FLEXERIL) 10 MG tablet Take 1 tablet (10 mg total) by mouth 3 (three) times daily as needed for Muscle spasms. 270 tablet 1    dorzolamide-timolol 2-0.5% (COSOPT) 22.3-6.8 mg/mL ophthalmic solution Place 1 drop into both eyes 2 (two) times daily.      ezetimibe (ZETIA) 10 mg tablet Take 1 tablet (10 mg total) by mouth once daily. 90 tablet 3    glucosamine-chondroitin (OSTEO BI-FLEX) 250-200 mg Tab Take 1 tablet by mouth once daily.      lancets (ACCU-CHEK SOFTCLIX LANCETS) Misc 1 Units by Misc.(Non-Drug; Combo Route) route 2 (two) times daily. 300 each 3    latanoprost 0.005 % ophthalmic solution Place 1 drop into both eyes every evening. 2.5 mL 12    linaCLOtide (LINZESS) 72 mcg Cap capsule Take 1 capsule (72 mcg total) by mouth before breakfast. 90 capsule 3    meloxicam (MOBIC) 15 MG tablet Take 15 mg by mouth once daily.      metFORMIN (GLUCOPHAGE) 500 MG tablet Take 1 tablet (500 mg total) by mouth daily with breakfast. 90 tablet 1    NIFEdipine (ADALAT CC) 90 MG TbSR Take 1 tablet (90 mg total) by mouth once daily. 90 tablet 1    pantoprazole (PROTONIX) 40 MG tablet Take 1 tablet (40 mg total) by mouth once daily. 90 tablet 3    sertraline (ZOLOFT) 25 MG tablet Take 1 tablet (25 mg total) by mouth once daily. 90 tablet 1     No current facility-administered medications for this visit.       Review of patient's allergies indicates:   Allergen Reactions    Percodan [oxycodone hcl-oxycodone-asa] Hives and Itching         Review of Systems   Constitutional: Negative for chills and fever.   Cardiovascular:  Positive for leg swelling. Negative for claudication.   Skin:  Positive for color change and nail changes.   Musculoskeletal:  Positive for joint swelling. Negative for joint pain, muscle cramps and muscle weakness.   Gastrointestinal:  Negative for nausea and vomiting.   Neurological:  Positive for  numbness. Negative for paresthesias.   Psychiatric/Behavioral:  Negative for altered mental status.            Objective:      Physical Exam  Constitutional:       General: She is not in acute distress.     Appearance: She is well-developed. She is not diaphoretic.   Cardiovascular:      Pulses:           Dorsalis pedis pulses are 2+ on the right side and 2+ on the left side.        Posterior tibial pulses are 2+ on the right side and 2+ on the left side.      Comments: CFT < 3 seconds bilateral.  Pedal hair growth decreased bilateral.  Varicosities noted bilateral.  Mild to moderate non pitting edema noted to Rt. lower extremity and moderate edema of the Lt. LE.  Toes are cool to touch bilateral.    Musculoskeletal:         General: No tenderness.      Comments: Muscle strength 5/5 in all muscle groups bilateral.  No tenderness nor crepitation with ROM of foot/ankle joints bilateral.  No tenderness with palpation of bilateral foot and ankle.  Bilateral pes planus foot type.  Bilateral hallux abducto valgus.  Bilateral semi-reducible contracture of toes 2-5.     Skin:     General: Skin is warm and dry.      Coloration: Skin is not pale.      Findings: No abrasion, bruising, burn, ecchymosis, erythema, laceration, lesion, petechiae or rash.      Nails: There is no clubbing.      Comments: Pedal skin appears edematous bilateral.  Toenails x 10 appear thickened by 2 mm, elongated by 4 mm, and discolored with subungual debris.  Hemosiderin staining noted to the Rt. Lower leg.     Neurological:      Mental Status: She is alert and oriented to person, place, and time.      Sensory: Sensory deficit present.      Motor: No weakness or atrophy.      Comments: Protective sensation per Gravity-Heron monofilament intact bilateral.    Vibratory sensation decreased bilateral.    Light touch intact bilateral.               Assessment:       Encounter Diagnoses   Name Primary?    Type 2 diabetes mellitus with hyperglycemia,  "without long-term current use of insulin Yes    Hammer toes of both feet     Onychomycosis due to dermatophyte              Plan:       Eugenie Arvizu" was seen today for diabetic foot exam and nail care.    Diagnoses and all orders for this visit:    Type 2 diabetes mellitus with hyperglycemia, without long-term current use of insulin    Hammer toes of both feet    Onychomycosis due to dermatophyte        I counseled the patient on her conditions, their implications and medical management.    Shoe inspection. Diabetic Foot Education. Patient reminded of the importance of good nutrition and blood sugar control to help prevent podiatric complications of diabetes. Patient instructed on proper foot hygeine. We discussed wearing proper shoe gear, daily foot inspections, never walking without protective shoe gear, never putting sharp instruments to feet    Advised to continue wearing diabetic shoes/insoles that accommodate for digital deformities.     With patient's permission, nails were aggressively reduced and debrided x 10 to their soft tissue attachment mechanically and with electric , removing all offending nail and debris. Patient relates relief following the procedure.  She will continue to monitor the areas daily, inspect her feet, wear protective shoe gear when ambulatory, moisturizer to maintain skin integrity and follow in this office in approximately 4 months, sooner p.r.n.    Giuliano Garnett DPM              "

## 2025-05-12 ENCOUNTER — TELEPHONE (OUTPATIENT)
Dept: FAMILY MEDICINE | Facility: CLINIC | Age: 75
End: 2025-05-12
Payer: MEDICARE

## 2025-05-12 NOTE — TELEPHONE ENCOUNTER
----- Message from Nurse Quintero sent at 5/7/2025  2:25 PM CDT -----  Regarding: FW: Results    ----- Message -----  From: Betzaida Gallardo  Sent: 5/7/2025  12:31 PM CDT  To: [facility] Staff  Subject: Results                                          Pt states they received a letter to call about mammogram results.Pt phone :# [phone]

## 2025-06-09 ENCOUNTER — OFFICE VISIT (OUTPATIENT)
Dept: FAMILY MEDICINE | Facility: CLINIC | Age: 75
End: 2025-06-09
Payer: MEDICARE

## 2025-06-09 VITALS
HEIGHT: 60 IN | SYSTOLIC BLOOD PRESSURE: 144 MMHG | WEIGHT: 152 LBS | RESPIRATION RATE: 18 BRPM | OXYGEN SATURATION: 98 % | BODY MASS INDEX: 29.84 KG/M2 | TEMPERATURE: 98 F | HEART RATE: 77 BPM | DIASTOLIC BLOOD PRESSURE: 78 MMHG

## 2025-06-09 DIAGNOSIS — I10 ESSENTIAL HYPERTENSION: Primary | ICD-10-CM

## 2025-06-09 PROCEDURE — 99213 OFFICE O/P EST LOW 20 MIN: CPT | Mod: ,,, | Performed by: FAMILY MEDICINE

## 2025-06-09 RX ORDER — NIFEDIPINE 30 MG/1
60 TABLET, EXTENDED RELEASE ORAL DAILY
Qty: 60 TABLET | Refills: 2 | Status: SHIPPED | OUTPATIENT
Start: 2025-06-09 | End: 2026-06-09

## 2025-06-09 NOTE — PROGRESS NOTES
Eugenie Coates is a 75 y.o. female seen today for symptoms of nausea dizziness and headache after a recent refill on her nifedipine.  She reports the pharmacy told her that this was a different manufacture and we discussed decreasing the dose to see if this will help her side effects symptoms.  I have also asked her to contact her cardiologist as soon as possible for follow-up.  In the meantime we will decrease the dose to 60 mg daily and have the patient return to clinic later this week for nurse visit.  She denies any shortness a breath or any chest discomfort currently.  Her peripheral edema has been improved.    Past Medical History:   Diagnosis Date    Allergy     Anemia     Anxiety     Arthritis     Asthma     Bell palsy     Bilateral lower extremity edema     Chronic diastolic CHF (congestive heart failure)     Depression     Diabetes mellitus, type 2     DVT (deep venous thrombosis)     right LE    Dyslipidemia     Endometriosis     Eye injury 2014    stuck with tree branch od ?     Focal seizure 5/3/2021    GERD (gastroesophageal reflux disease)     Glaucoma     History of DVT (deep vein thrombosis) 1/23/2018    History of uterine fibroid     Hyperlipidemia     Hypertension     Invasive lobular carcinoma of right breast in female     s/p surgery, radiation, tamoxifen    Nuclear sclerosis of both eyes 9/27/2016    Obesity     Pancreas cyst     Sleep apnea     uses C pap    Supraventricular tachycardia     SVT (supraventricular tachycardia) 05/2019    Thyroid disease     Venous insufficiency      Family History   Problem Relation Name Age of Onset    Diabetes Mother Sara Vick     No Known Problems Father      No Known Problems Sister      No Known Problems Brother      Colon cancer Maternal Aunt      No Known Problems Maternal Uncle      No Known Problems Paternal Aunt      No Known Problems Paternal Uncle      No Known Problems Maternal Grandmother      No Known Problems Maternal Grandfather      No  Known Problems Paternal Grandmother      No Known Problems Paternal Grandfather      Amblyopia Neg Hx      Blindness Neg Hx      Cancer Neg Hx      Cataracts Neg Hx      Glaucoma Neg Hx      Hypertension Neg Hx      Macular degeneration Neg Hx      Retinal detachment Neg Hx      Strabismus Neg Hx      Stroke Neg Hx      Thyroid disease Neg Hx      Celiac disease Neg Hx      Colon polyps Neg Hx      Esophageal cancer Neg Hx      Inflammatory bowel disease Neg Hx      Irritable bowel syndrome Neg Hx      Liver cancer Neg Hx      Liver disease Neg Hx      Rectal cancer Neg Hx      Stomach cancer Neg Hx      Ulcerative colitis Neg Hx      Cystic fibrosis Neg Hx      Crohn's disease Neg Hx      Hemochromatosis Neg Hx      Cirrhosis Neg Hx       Medications Ordered Prior to Encounter[1]  Immunization History   Administered Date(s) Administered    COVID-19 MRNA, LN-S PF (MODERNA HALF 0.25 ML DOSE) 07/05/2022    COVID-19 Vaccine 08/24/2021, 02/07/2022    COVID-19, MRNA, LN-S, PF (MODERNA FULL 0.5 ML DOSE) 07/27/2021, 05/24/2023, 02/02/2024    COVID-19, mRNA, LNP-S, bivalent booster, PF (Moderna Omicron)12 + YEARS 09/27/2022    Influenza 11/25/2016    Influenza - Quadrivalent 11/03/2014, 11/10/2015    Influenza - Quadrivalent - High Dose - PF (65 years and older) 08/18/2020, 08/13/2021    Influenza - Trivalent - Fluad - Adjuvanted - PF (65 years and older 08/22/2019    Influenza - Trivalent - Fluzone High Dose - PF (65 years and older) 10/02/2017, 09/19/2018    Pneumococcal Conjugate - 13 Valent 09/06/2016    Pneumococcal Polysaccharide - 23 Valent 09/18/2017    RSVpreF (Arexvy) 02/02/2024    Tdap 11/30/2016    Zoster 10/06/2016, 11/25/2016    Zoster Recombinant 05/24/2019, 08/24/2019       Review of Systems   Constitutional:  Positive for malaise/fatigue. Negative for fever and weight loss.   Respiratory:  Negative for shortness of breath.    Cardiovascular:  Negative for chest pain and palpitations.   Gastrointestinal:   Positive for nausea. Negative for vomiting.   Neurological:  Positive for headaches.   Psychiatric/Behavioral:  Negative for depression.         Vitals:    06/09/25 0920   BP: (!) 144/78   Pulse: 77   Resp:    Temp:        Physical Exam  Vitals reviewed.   Constitutional:       Appearance: Normal appearance.   HENT:      Head: Normocephalic.   Eyes:      Extraocular Movements: Extraocular movements intact.      Conjunctiva/sclera: Conjunctivae normal.      Pupils: Pupils are equal, round, and reactive to light.   Neck:      Thyroid: No thyroid mass or thyromegaly.   Cardiovascular:      Rate and Rhythm: Normal rate and regular rhythm.      Heart sounds: Murmur heard.      Systolic murmur is present with a grade of 3/6.      No gallop.   Pulmonary:      Effort: Pulmonary effort is normal. No respiratory distress.      Breath sounds: Normal breath sounds. No wheezing or rales.   Skin:     General: Skin is warm and dry.      Coloration: Skin is not jaundiced or pale.   Neurological:      Mental Status: She is alert.   Psychiatric:         Mood and Affect: Mood normal.         Behavior: Behavior normal.         Thought Content: Thought content normal.         Judgment: Judgment normal.          Assessment and Plan  1. Essential hypertension  -     NIFEdipine (ADALAT CC) 30 MG TbSR; Take 2 tablets (60 mg total) by mouth once daily.  Dispense: 60 tablet; Refill: 2             Return to clinic in 3 days for nurse visit to re-evaluate her hypertension and symptoms.    Health Maintenance Topics with due status: Not Due       Topic Last Completion Date    TETANUS VACCINE 08/30/2022    Colorectal Cancer Screening 11/01/2023    DEXA Scan 01/03/2024    Diabetes Urine Screening 01/07/2025    Foot Exam 01/09/2025    Lipid Panel 01/15/2025    Mammogram 04/16/2025              [1]   Current Outpatient Medications on File Prior to Visit   Medication Sig Dispense Refill    atorvastatin (LIPITOR) 80 MG tablet Take 1 tablet (80 mg  total) by mouth once daily. 90 tablet 3    b complex vitamins tablet Take 1 tablet by mouth once daily.      blood sugar diagnostic Strp Test twice daily.  Accucheck viva test strips and lancets. 300 each 3    brimonidine 0.2% (ALPHAGAN) 0.2 % Drop Place 1 drop into both eyes 3 (three) times daily.      calcium carb-vit D3-magnesium (CORAL CALCIUM) 250-200-125 mg-unit-mg Cap Take 1 capsule by mouth once daily.      celecoxib (CELEBREX) 200 MG capsule Take 1 capsule (200 mg total) by mouth once daily. 90 capsule 1    cyclobenzaprine (FLEXERIL) 10 MG tablet Take 1 tablet (10 mg total) by mouth 3 (three) times daily as needed for Muscle spasms. 270 tablet 1    dorzolamide-timolol 2-0.5% (COSOPT) 22.3-6.8 mg/mL ophthalmic solution Place 1 drop into both eyes 2 (two) times daily.      ezetimibe (ZETIA) 10 mg tablet Take 1 tablet (10 mg total) by mouth once daily. 90 tablet 3    glucosamine-chondroitin (OSTEO BI-FLEX) 250-200 mg Tab Take 1 tablet by mouth once daily.      lancets (ACCU-CHEK SOFTCLIX LANCETS) Misc 1 Units by Misc.(Non-Drug; Combo Route) route 2 (two) times daily. 300 each 3    latanoprost 0.005 % ophthalmic solution Place 1 drop into both eyes every evening. 2.5 mL 12    linaCLOtide (LINZESS) 72 mcg Cap capsule Take 1 capsule (72 mcg total) by mouth before breakfast. 90 capsule 3    meloxicam (MOBIC) 15 MG tablet Take 15 mg by mouth once daily.      metFORMIN (GLUCOPHAGE) 500 MG tablet Take 1 tablet (500 mg total) by mouth daily with breakfast. 90 tablet 1    pantoprazole (PROTONIX) 40 MG tablet Take 1 tablet (40 mg total) by mouth once daily. 90 tablet 3    sertraline (ZOLOFT) 25 MG tablet Take 1 tablet (25 mg total) by mouth once daily. 90 tablet 1    [DISCONTINUED] NIFEdipine (ADALAT CC) 90 MG TbSR Take 1 tablet (90 mg total) by mouth once daily. 90 tablet 1    [DISCONTINUED] omeprazole (PRILOSEC OTC) 20 MG tablet Take 1 tablet (20 mg total) by mouth once daily. (Patient not taking: Reported on  2/19/2025) 90 tablet 3     No current facility-administered medications on file prior to visit.

## 2025-06-12 ENCOUNTER — CLINICAL SUPPORT (OUTPATIENT)
Dept: FAMILY MEDICINE | Facility: CLINIC | Age: 75
End: 2025-06-12
Payer: MEDICARE

## 2025-06-12 VITALS — HEART RATE: 67 BPM | SYSTOLIC BLOOD PRESSURE: 122 MMHG | DIASTOLIC BLOOD PRESSURE: 66 MMHG | OXYGEN SATURATION: 97 %

## 2025-06-12 DIAGNOSIS — I10 ESSENTIAL HYPERTENSION: Primary | ICD-10-CM

## 2025-06-12 NOTE — PROGRESS NOTES
"Presents today for blood pressure check. During last visit on 05/09/25, Dr Horn decreased her nifedipine to 60 mg (2 30mg once daily) to see if that helped her nausea/dizziness. Patient reports she is still experiencing the symptoms, they have not gotten better; that "everything happens at night". Patient reports is unsure if the decreased dose of the medication has helped due to still experiencing the symptoms although her readings have improved. She also reports she has not been able to get in touch with her cardiologist in [place], [place].    Left arm while sitting. 122/66. States took blood pressure medication today. Dr. Horn notified. He recommended she hold her meloxicam that she received from the pain clinic, take tylenol throughout the day instead; check blood glucose when she feels nauseous/dizzy- keep log; and to return to clinic next week for a nurse visit with blood pressure and blood glucose log. Patient verbalized understanding.     Patient's home blood pressure log:      "

## 2025-06-13 ENCOUNTER — EXTERNAL HOME HEALTH (OUTPATIENT)
Dept: HOME HEALTH SERVICES | Facility: HOSPITAL | Age: 75
End: 2025-06-13
Payer: MEDICARE

## 2025-06-17 DIAGNOSIS — Z98.890 S/P CRANIOTOMY: ICD-10-CM

## 2025-06-17 DIAGNOSIS — G93.89 BRAIN MASS: Primary | ICD-10-CM

## 2025-06-17 RX ORDER — DIAZEPAM 5 MG/1
5 TABLET ORAL ONCE
Qty: 1 TABLET | Refills: 0 | Status: SHIPPED | OUTPATIENT
Start: 2025-06-17 | End: 2025-06-18

## 2025-06-17 NOTE — PROGRESS NOTES
I called and spoke to Ms. Coates about her request for a sedative prior to her MRI on 06/24/2025.  After discussion with the patient and her  and review of [place] , I escribed Valium 5mg x1 to the [facility] pharmacy.  They will pick the prescription up prior to the imaging appointment and take it as prescribed.  Ms. Coates is aware that she must have a  to and from the imaging and states that her  will be her .

## 2025-06-19 ENCOUNTER — CLINICAL SUPPORT (OUTPATIENT)
Dept: FAMILY MEDICINE | Facility: CLINIC | Age: 75
End: 2025-06-19
Payer: MEDICARE

## 2025-06-19 VITALS — HEART RATE: 73 BPM | DIASTOLIC BLOOD PRESSURE: 60 MMHG | OXYGEN SATURATION: 98 % | SYSTOLIC BLOOD PRESSURE: 122 MMHG

## 2025-06-19 DIAGNOSIS — E11.9 TYPE 2 DIABETES MELLITUS WITHOUT COMPLICATION, WITHOUT LONG-TERM CURRENT USE OF INSULIN: ICD-10-CM

## 2025-06-19 DIAGNOSIS — I10 ESSENTIAL HYPERTENSION: Primary | ICD-10-CM

## 2025-06-19 NOTE — PROGRESS NOTES
"Patient here for a nurse visit to follow up on blood pressure readings and blood glucose levels. When patient was here last Dr Horn recommended she stop taking her meloxicam and to check her blood sugar level when she feels dizzy/nauseated. Patient reports the nausea has gotten better but that she still gets dizzy, shaky (she described as nervous), with weak legs. Asked her if she had been experiencing these symptoms the whole time she's been on the medicines or if they recently started. Patient stated they have been on-going since she's been taking the medicines. Asked her if they are constant throughout the day or if they come/go. Patient stated they come and go. Asked her if she had been checking her blood sugar when she has the episodes like Dr Horn recommended and if the reading was high or low. Patient stated yes and that her readings were low. Asked her what she would do to alleviate the dizziness after seeing the low blood sugar reading; if she took insulin or ate a little snack. Patient stated she does not take insulin, only metformin 500 mg daily; that she would lay down and place a towel on her head or neck and that would "calm her down".     Left arm while sitting. 122/60. States took blood pressure medication today. ELFEGO Morse notified. After reviewing patient's blood pressure and blood sugar log and patient's recent labs, Cristina recommended patient to hold her metformin, check her blood sugar daily and when/if she experiences the episodes of dizziness, and to return to clinic next week for another nurse visit with logs. Informed patient of Cristina's recommendation, patient verbalized understanding. Also informed patient that we would draw labs on her next week as she is due and since her next office visit with Dr Horn will be 3 weeks after this next nurse visit, patient verbalized understanding.      Patient's home blood pressure/blood sugar log:      "

## 2025-06-23 ENCOUNTER — PATIENT MESSAGE (OUTPATIENT)
Dept: RADIOLOGY | Facility: HOSPITAL | Age: 75
End: 2025-06-23
Payer: MEDICARE

## 2025-06-24 ENCOUNTER — OFFICE VISIT (OUTPATIENT)
Dept: NEUROSURGERY | Facility: CLINIC | Age: 75
End: 2025-06-24
Attending: NEUROLOGICAL SURGERY
Payer: MEDICARE

## 2025-06-24 ENCOUNTER — HOSPITAL ENCOUNTER (OUTPATIENT)
Dept: RADIOLOGY | Facility: HOSPITAL | Age: 75
Discharge: HOME | End: 2025-06-24
Attending: NEUROLOGICAL SURGERY
Payer: MEDICARE

## 2025-06-24 VITALS
SYSTOLIC BLOOD PRESSURE: 126 MMHG | HEART RATE: 81 BPM | RESPIRATION RATE: 18 BRPM | DIASTOLIC BLOOD PRESSURE: 82 MMHG | BODY MASS INDEX: 29.82 KG/M2 | HEIGHT: 60 IN | WEIGHT: 151.88 LBS

## 2025-06-24 DIAGNOSIS — G93.89 BRAIN MASS: Primary | ICD-10-CM

## 2025-06-24 DIAGNOSIS — G93.89 BRAIN MASS: ICD-10-CM

## 2025-06-24 DIAGNOSIS — D42.0 ATYPICAL INTRACRANIAL MENINGIOMA: ICD-10-CM

## 2025-06-24 PROCEDURE — 70553 MRI BRAIN STEM W/O & W/DYE: CPT | Mod: 26,,, | Performed by: RADIOLOGY

## 2025-06-24 PROCEDURE — A9585 GADOBUTROL INJECTION: HCPCS | Mod: PO | Performed by: NEUROLOGICAL SURGERY

## 2025-06-24 PROCEDURE — 70553 MRI BRAIN STEM W/O & W/DYE: CPT | Mod: TC,PO

## 2025-06-24 PROCEDURE — 25500020 PHARM REV CODE 255: Mod: PO | Performed by: NEUROLOGICAL SURGERY

## 2025-06-24 PROCEDURE — 99215 OFFICE O/P EST HI 40 MIN: CPT | Mod: S$PBB,,, | Performed by: NEUROLOGICAL SURGERY

## 2025-06-24 RX ORDER — GADOBUTROL 604.72 MG/ML
6 INJECTION INTRAVENOUS
Status: COMPLETED | OUTPATIENT
Start: 2025-06-24 | End: 2025-06-24

## 2025-06-24 RX ADMIN — GADOBUTROL 6 ML: 604.72 INJECTION INTRAVENOUS at 09:06

## 2025-06-24 NOTE — PROGRESS NOTES
Neurosurgery History & Physical    Patient ID: Eugenie Coates is a 75 y.o. female.    No chief complaint on file.    Interval HPI 6/24/2025:  She has been dealing with high blood sugar and some bilateral leg weakness.  She denies any seizure activity.    Interval HPI 12/17/2024:  Ms. Coates returns today for routine six-month follow-up resection of recurrent atypical meningioma.  Overall she has been doing well, with stable RLE weakness.  She has been dealing with some GI issues with her PCP.       Interval HPI 6/18/2024:  Ms. Coates is now 10 months s/p left parietal craniotomy for resection of recurrent atypical meningioma.  Overall, she reports doing well.       She denies headaches weakness, and other neurologic symptoms.  She reports some continued ambulation issues with her right leg.  She uses her cane for assistance as needed.       She has not had any seizures and was removed from her seizure medication a few months ago by Dr. Monae.       HPI 12/13/2023:  Ms. Coates is approximately 4 months status post craniotomy for recurrent atypical meningioma with mass effect and brain compression. She has completed radiation therapy with Rad/Onc at [facility] in [place], [place]. She is taking the seizure medication but is having undesired side effects with mood lability. No seizure activity since surgery.      She is ambulating independently with occasional use of a cane for assistance.      She denies new weakness, numbness or other neurologic symptom.     Review of Systems    Past Medical History:   Diagnosis Date    Allergy     Anemia     Anxiety     Arthritis     Asthma     Bell palsy     Bilateral lower extremity edema     Chronic diastolic CHF (congestive heart failure)     Depression     Diabetes mellitus, type 2     DVT (deep venous thrombosis)     right LE    Dyslipidemia     Endometriosis     Eye injury 2014    stuck with tree branch od ?     Focal seizure 5/3/2021    GERD (gastroesophageal reflux  disease)     Glaucoma     History of DVT (deep vein thrombosis) 1/23/2018    History of uterine fibroid     Hyperlipidemia     Hypertension     Invasive lobular carcinoma of right breast in female     s/p surgery, radiation, tamoxifen    Nuclear sclerosis of both eyes 9/27/2016    Obesity     Pancreas cyst     Sleep apnea     uses C pap    Supraventricular tachycardia     SVT (supraventricular tachycardia) 05/2019    Thyroid disease     Venous insufficiency      Social History[1]  Family History   Problem Relation Name Age of Onset    Diabetes Mother Sara Vick     No Known Problems Father      No Known Problems Sister      No Known Problems Brother      Colon cancer Maternal Aunt      No Known Problems Maternal Uncle      No Known Problems Paternal Aunt      No Known Problems Paternal Uncle      No Known Problems Maternal Grandmother      No Known Problems Maternal Grandfather      No Known Problems Paternal Grandmother      No Known Problems Paternal Grandfather      Amblyopia Neg Hx      Blindness Neg Hx      Cancer Neg Hx      Cataracts Neg Hx      Glaucoma Neg Hx      Hypertension Neg Hx      Macular degeneration Neg Hx      Retinal detachment Neg Hx      Strabismus Neg Hx      Stroke Neg Hx      Thyroid disease Neg Hx      Celiac disease Neg Hx      Colon polyps Neg Hx      Esophageal cancer Neg Hx      Inflammatory bowel disease Neg Hx      Irritable bowel syndrome Neg Hx      Liver cancer Neg Hx      Liver disease Neg Hx      Rectal cancer Neg Hx      Stomach cancer Neg Hx      Ulcerative colitis Neg Hx      Cystic fibrosis Neg Hx      Crohn's disease Neg Hx      Hemochromatosis Neg Hx      Cirrhosis Neg Hx       Review of patient's allergies indicates:   Allergen Reactions    Oxycodone hcl-oxycodone-asa Hives, Itching and Other (See Comments)    Percodan yadira      Other reaction(s): Unknown     Current Medications[2]  There were no vitals taken for this visit.    Neurological Exam  AAOx4, NAD  Strength 5/5  in the BUE/BLE  Sensation grossly intact  Incision well healed    Imaging:    MRI of the brain with and without contrast dated 06/24/2025 is personally reviewed and discussed with the patient.The pattern of enhancement as well as the appearance of the underlying brain in the region of the previously resected meningioma appears stable when compared to MRI from 12/17/2024.  I see no new mass effect new areas of enhancement or worsening area of encephalomalacia.    Assessment/Plan:   Ms. Coates is a 74yo female s/p resection of left parietal brain lesion consistent with atypical meningioma.  She continues to do well neurologically.  Imaging is stable.       Plan to follow-up in 6 months with repeat MRI brain with and without contrast.    I spent a total of 40 minutes on the day of the visit.This includes face to face time and non-face to face time preparing to see the patient (eg, review of tests), obtaining and/or reviewing separately obtained history, documenting clinical information in the electronic or other health record, independently interpreting results and communicating results to the patient/family/caregiver, or care coordinator.       [1]   Social History  Socioeconomic History    Marital status:    Tobacco Use    Smoking status: Never     Passive exposure: Never    Smokeless tobacco: Never   Substance and Sexual Activity    Alcohol use: Never     Alcohol/week: 0.0 standard drinks of alcohol    Drug use: Never    Sexual activity: Yes     Partners: Male     Birth control/protection: None   Social History Narrative    1/18/19: lives with her . They have multiple children, but the youngest is 38. No children at home right now. No pets at home. Splits time between here and [place]. Her son lives in [place].      Social Drivers of Health     Financial Resource Strain: Low Risk  (2/12/2025)    Overall Financial Resource Strain (CARDIA)     Difficulty of Paying Living Expenses: Not hard at all   Food  Insecurity: No Food Insecurity (2/12/2025)    Hunger Vital Sign     Worried About Running Out of Food in the Last Year: Never true     Ran Out of Food in the Last Year: Never true   Transportation Needs: No Transportation Needs (2/12/2025)    PRAPARE - Transportation     Lack of Transportation (Medical): No     Lack of Transportation (Non-Medical): No   Physical Activity: Insufficiently Active (2/12/2025)    Exercise Vital Sign     Days of Exercise per Week: 3 days     Minutes of Exercise per Session: 20 min   Stress: Stress Concern Present (2/12/2025)    [facility] - Occupational Stress Questionnaire     Feeling of Stress : To some extent   Housing Stability: Low Risk  (2/12/2025)    Housing Stability Vital Sign     Unable to Pay for Housing in the Last Year: No     Number of Times Moved in the Last Year: 0     Homeless in the Last Year: No   [2]   Current Outpatient Medications:     atorvastatin (LIPITOR) 80 MG tablet, Take 1 tablet (80 mg total) by mouth once daily., Disp: 90 tablet, Rfl: 3    b complex vitamins tablet, Take 1 tablet by mouth once daily., Disp: , Rfl:     blood sugar diagnostic Strp, Test twice daily.  Accucheck viva test strips and lancets., Disp: 300 each, Rfl: 3    brimonidine 0.2% (ALPHAGAN) 0.2 % Drop, Place 1 drop into both eyes 3 (three) times daily., Disp: , Rfl:     calcium carb-vit D3-magnesium (CORAL CALCIUM) 250-200-125 mg-unit-mg Cap, Take 1 capsule by mouth once daily., Disp: , Rfl:     celecoxib (CELEBREX) 200 MG capsule, Take 1 capsule (200 mg total) by mouth once daily., Disp: 90 capsule, Rfl: 1    cyclobenzaprine (FLEXERIL) 10 MG tablet, Take 1 tablet (10 mg total) by mouth 3 (three) times daily as needed for Muscle spasms., Disp: 270 tablet, Rfl: 1    diazePAM (VALIUM) 5 MG tablet, Take 1 tablet (5 mg total) by mouth once. Take 1 hour prior to imaging. for 1 dose, Disp: 1 tablet, Rfl: 0    dorzolamide-timolol 2-0.5% (COSOPT) 22.3-6.8 mg/mL  ophthalmic solution, Place 1 drop into both eyes 2 (two) times daily., Disp: , Rfl:     ezetimibe (ZETIA) 10 mg tablet, Take 1 tablet (10 mg total) by mouth once daily., Disp: 90 tablet, Rfl: 3    glucosamine-chondroitin (OSTEO BI-FLEX) 250-200 mg Tab, Take 1 tablet by mouth once daily., Disp: , Rfl:     lancets (ACCU-CHEK SOFTCLIX LANCETS) Misc, 1 Units by Misc.(Non-Drug; Combo Route) route 2 (two) times daily., Disp: 300 each, Rfl: 3    latanoprost 0.005 % ophthalmic solution, Place 1 drop into both eyes every evening., Disp: 2.5 mL, Rfl: 12    linaCLOtide (LINZESS) 72 mcg Cap capsule, Take 1 capsule (72 mcg total) by mouth before breakfast., Disp: 90 capsule, Rfl: 3    meloxicam (MOBIC) 15 MG tablet, Take 15 mg by mouth once daily., Disp: , Rfl:     metFORMIN (GLUCOPHAGE) 500 MG tablet, Take 1 tablet (500 mg total) by mouth daily with breakfast., Disp: 90 tablet, Rfl: 1    NIFEdipine (ADALAT CC) 30 MG TbSR, Take 2 tablets (60 mg total) by mouth once daily., Disp: 60 tablet, Rfl: 2    pantoprazole (PROTONIX) 40 MG tablet, Take 1 tablet (40 mg total) by mouth once daily., Disp: 90 tablet, Rfl: 3    sertraline (ZOLOFT) 25 MG tablet, Take 1 tablet (25 mg total) by mouth once daily., Disp: 90 tablet, Rfl: 1  No current facility-administered medications for this visit.

## 2025-06-26 ENCOUNTER — TELEPHONE (OUTPATIENT)
Dept: NEUROSURGERY | Facility: CLINIC | Age: 75
End: 2025-06-26
Payer: MEDICARE

## 2025-06-27 ENCOUNTER — CLINICAL SUPPORT (OUTPATIENT)
Dept: FAMILY MEDICINE | Facility: CLINIC | Age: 75
End: 2025-06-27
Payer: MEDICARE

## 2025-06-27 VITALS — SYSTOLIC BLOOD PRESSURE: 128 MMHG | DIASTOLIC BLOOD PRESSURE: 76 MMHG

## 2025-06-27 DIAGNOSIS — I10 ESSENTIAL HYPERTENSION: Primary | ICD-10-CM

## 2025-06-27 NOTE — PROGRESS NOTES
Pt here for nurse visit for blood pressure. She reports she has taking prescribed bp medication today. She emptied her bladder prior to bp check today. She did bring a home log which was also reviewed by Dr Horn today. She reports that she no longer takes mobic or celebrex. Dr Horn made aware of patients bp reading in clinic today. He advised for patient to keep schedule follow up and continue current medication treatment at this time. Mrs. Coates notified and she voiced understanding.      BP log

## 2025-07-16 ENCOUNTER — OFFICE VISIT (OUTPATIENT)
Dept: FAMILY MEDICINE | Facility: CLINIC | Age: 75
End: 2025-07-16
Payer: MEDICARE

## 2025-07-16 VITALS
BODY MASS INDEX: 29.64 KG/M2 | HEIGHT: 60 IN | TEMPERATURE: 98 F | RESPIRATION RATE: 18 BRPM | OXYGEN SATURATION: 99 % | DIASTOLIC BLOOD PRESSURE: 64 MMHG | HEART RATE: 78 BPM | WEIGHT: 151 LBS | SYSTOLIC BLOOD PRESSURE: 124 MMHG

## 2025-07-16 DIAGNOSIS — C50.911 INVASIVE LOBULAR CARCINOMA OF RIGHT BREAST IN FEMALE: ICD-10-CM

## 2025-07-16 DIAGNOSIS — R92.30 DENSE BREAST TISSUE ON MAMMOGRAM, UNSPECIFIED TYPE: ICD-10-CM

## 2025-07-16 DIAGNOSIS — E78.2 MIXED HYPERLIPIDEMIA: ICD-10-CM

## 2025-07-16 DIAGNOSIS — E11.9 TYPE 2 DIABETES MELLITUS WITHOUT COMPLICATION, WITHOUT LONG-TERM CURRENT USE OF INSULIN: Primary | ICD-10-CM

## 2025-07-16 DIAGNOSIS — I10 ESSENTIAL HYPERTENSION: ICD-10-CM

## 2025-07-16 LAB
ALBUMIN SERPL BCP-MCNC: 3.9 G/DL (ref 3.4–4.8)
ALBUMIN/GLOB SERPL: 1.2 {RATIO}
ALP SERPL-CCNC: 106 U/L (ref 40–150)
ALT SERPL W P-5'-P-CCNC: 14 U/L
ANION GAP SERPL CALCULATED.3IONS-SCNC: 13 MMOL/L (ref 7–16)
AST SERPL W P-5'-P-CCNC: 18 U/L (ref 11–45)
BASOPHILS # BLD AUTO: 0.03 K/UL (ref 0–0.2)
BASOPHILS NFR BLD AUTO: 0.4 % (ref 0–1)
BILIRUB SERPL-MCNC: 0.8 MG/DL
BUN SERPL-MCNC: 16 MG/DL (ref 10–20)
BUN/CREAT SERPL: 18 (ref 6–20)
CALCIUM SERPL-MCNC: 9.1 MG/DL (ref 8.4–10.2)
CHLORIDE SERPL-SCNC: 110 MMOL/L (ref 98–107)
CHOLEST SERPL-MCNC: 189 MG/DL
CHOLEST/HDLC SERPL: 4.1 {RATIO}
CO2 SERPL-SCNC: 24 MMOL/L (ref 23–31)
CREAT SERPL-MCNC: 0.87 MG/DL (ref 0.55–1.02)
DIFFERENTIAL METHOD BLD: ABNORMAL
EGFR (NO RACE VARIABLE) (RUSH/TITUS): 70 ML/MIN/1.73M2
EOSINOPHIL # BLD AUTO: 0.13 K/UL (ref 0–0.5)
EOSINOPHIL NFR BLD AUTO: 1.7 % (ref 1–4)
ERYTHROCYTE [DISTWIDTH] IN BLOOD BY AUTOMATED COUNT: 13.7 % (ref 11.5–14.5)
EST. AVERAGE GLUCOSE BLD GHB EST-MCNC: 128 MG/DL
GLOBULIN SER-MCNC: 3.2 G/DL (ref 2–4)
GLUCOSE SERPL-MCNC: 113 MG/DL (ref 82–115)
HBA1C MFR BLD HPLC: 6.1 %
HCT VFR BLD AUTO: 43 % (ref 38–47)
HDLC SERPL-MCNC: 46 MG/DL (ref 35–60)
HGB BLD-MCNC: 13.4 G/DL (ref 12–16)
IMM GRANULOCYTES # BLD AUTO: 0.02 K/UL (ref 0–0.04)
IMM GRANULOCYTES NFR BLD: 0.3 % (ref 0–0.4)
LDLC SERPL CALC-MCNC: 111 MG/DL
LDLC/HDLC SERPL: 2.4 {RATIO}
LYMPHOCYTES # BLD AUTO: 1.98 K/UL (ref 1–4.8)
LYMPHOCYTES NFR BLD AUTO: 26.6 % (ref 27–41)
MCH RBC QN AUTO: 27.2 PG (ref 27–31)
MCHC RBC AUTO-ENTMCNC: 31.2 G/DL (ref 32–36)
MCV RBC AUTO: 87.2 FL (ref 80–96)
MONOCYTES # BLD AUTO: 0.5 K/UL (ref 0–0.8)
MONOCYTES NFR BLD AUTO: 6.7 % (ref 2–6)
MPC BLD CALC-MCNC: 11.2 FL (ref 9.4–12.4)
NEUTROPHILS # BLD AUTO: 4.78 K/UL (ref 1.8–7.7)
NEUTROPHILS NFR BLD AUTO: 64.3 % (ref 53–65)
NONHDLC SERPL-MCNC: 143 MG/DL
NRBC # BLD AUTO: 0 X10E3/UL
NRBC, AUTO (.00): 0 %
PLATELET # BLD AUTO: 256 K/UL (ref 150–400)
POTASSIUM SERPL-SCNC: 3.9 MMOL/L (ref 3.5–5.1)
PROT SERPL-MCNC: 7.1 G/DL (ref 5.8–7.6)
RBC # BLD AUTO: 4.93 M/UL (ref 4.2–5.4)
SODIUM SERPL-SCNC: 143 MMOL/L (ref 136–145)
TRIGL SERPL-MCNC: 158 MG/DL (ref 37–140)
VLDLC SERPL-MCNC: 32 MG/DL
WBC # BLD AUTO: 7.44 K/UL (ref 4.5–11)

## 2025-07-16 PROCEDURE — 85025 COMPLETE CBC W/AUTO DIFF WBC: CPT | Mod: ,,, | Performed by: CLINICAL MEDICAL LABORATORY

## 2025-07-16 PROCEDURE — 80053 COMPREHEN METABOLIC PANEL: CPT | Mod: ,,, | Performed by: CLINICAL MEDICAL LABORATORY

## 2025-07-16 PROCEDURE — 99213 OFFICE O/P EST LOW 20 MIN: CPT | Mod: ,,, | Performed by: FAMILY MEDICINE

## 2025-07-16 PROCEDURE — 80061 LIPID PANEL: CPT | Mod: ,,, | Performed by: CLINICAL MEDICAL LABORATORY

## 2025-07-16 PROCEDURE — 83036 HEMOGLOBIN GLYCOSYLATED A1C: CPT | Mod: ,,, | Performed by: CLINICAL MEDICAL LABORATORY

## 2025-07-16 NOTE — PROGRESS NOTES
Eugenie Coates is a 75 y.o. female seen today for follow-up on her history of elevated cholesterol and diabetes.  She reports she is doing well with no chest pain or shortness for breath and is fasting today for follow-up blood work.  She also needs her follow-up mammogram.    Past Medical History:   Diagnosis Date    Allergy     Anemia     Anxiety     Arthritis     Asthma     Bell palsy     Bilateral lower extremity edema     Chronic diastolic CHF (congestive heart failure)     Depression     Diabetes mellitus, type 2     DVT (deep venous thrombosis)     right LE    Dyslipidemia     Endometriosis     Eye injury 2014    stuck with tree branch od ?     Focal seizure 5/3/2021    GERD (gastroesophageal reflux disease)     Glaucoma     History of DVT (deep vein thrombosis) 1/23/2018    History of uterine fibroid     Hyperlipidemia     Hypertension     Invasive lobular carcinoma of right breast in female     s/p surgery, radiation, tamoxifen    Nuclear sclerosis of both eyes 9/27/2016    Obesity     Pancreas cyst     Sleep apnea     uses C pap    Supraventricular tachycardia     SVT (supraventricular tachycardia) 05/2019    Thyroid disease     Venous insufficiency      Family History   Problem Relation Name Age of Onset    Diabetes Mother Sara Vick     No Known Problems Father      No Known Problems Sister      No Known Problems Brother      Colon cancer Maternal Aunt      No Known Problems Maternal Uncle      No Known Problems Paternal Aunt      No Known Problems Paternal Uncle      No Known Problems Maternal Grandmother      No Known Problems Maternal Grandfather      No Known Problems Paternal Grandmother      No Known Problems Paternal Grandfather      Amblyopia Neg Hx      Blindness Neg Hx      Cancer Neg Hx      Cataracts Neg Hx      Glaucoma Neg Hx      Hypertension Neg Hx      Macular degeneration Neg Hx      Retinal detachment Neg Hx      Strabismus Neg Hx      Stroke Neg Hx      Thyroid disease Neg Hx       Celiac disease Neg Hx      Colon polyps Neg Hx      Esophageal cancer Neg Hx      Inflammatory bowel disease Neg Hx      Irritable bowel syndrome Neg Hx      Liver cancer Neg Hx      Liver disease Neg Hx      Rectal cancer Neg Hx      Stomach cancer Neg Hx      Ulcerative colitis Neg Hx      Cystic fibrosis Neg Hx      Crohn's disease Neg Hx      Hemochromatosis Neg Hx      Cirrhosis Neg Hx       Medications Ordered Prior to Encounter[1]  Immunization History   Administered Date(s) Administered    COVID-19 MRNA, LN-S PF (MODERNA HALF 0.25 ML DOSE) 07/05/2022    COVID-19 Vaccine 08/24/2021, 02/07/2022    COVID-19, MRNA, LN-S, PF (MODERNA FULL 0.5 ML DOSE) 07/27/2021, 05/24/2023, 02/02/2024    COVID-19, mRNA, LNP-S, PF (Moderna) Ages 12+ 02/02/2024    COVID-19, mRNA, LNP-S, bivalent booster, PF (Moderna Omicron)12 + YEARS 09/27/2022, 05/24/2023    Influenza 11/25/2016    Influenza - Quadrivalent 11/03/2014, 11/10/2015    Influenza - Quadrivalent - High Dose - PF (65 years and older) 08/18/2020, 08/13/2021, 08/02/2022, 09/07/2023    Influenza - Trivalent - Fluad - Adjuvanted - PF (65 years and older 08/22/2019, 09/04/2024    Influenza - Trivalent - Fluarix, Flulaval, Fluzone, Afluria - PF 08/18/2020    Influenza - Trivalent - Fluzone High Dose - PF (65 years and older) 10/02/2017, 09/19/2018    Pneumococcal Conjugate - 13 Valent 09/06/2016    Pneumococcal Conjugate - 20 Valent 05/08/2023, 01/07/2025    Pneumococcal Polysaccharide - 23 Valent 09/18/2017    RSVpreF (Arexvy) 02/02/2024    Tdap 11/30/2016, 08/30/2022    Zoster 10/06/2016, 11/25/2016    Zoster Recombinant 05/24/2019, 08/24/2019       Review of Systems   Constitutional:  Negative for fever, malaise/fatigue and weight loss.   Respiratory:  Negative for shortness of breath.    Cardiovascular:  Negative for chest pain and palpitations.   Gastrointestinal:  Negative for nausea and vomiting.   Psychiatric/Behavioral:  Negative for depression.         Vitals:     07/16/25 0846   BP: 124/64   Pulse: 78   Resp: 18   Temp: 98 °F (36.7 °C)       Physical Exam  Vitals reviewed.   Constitutional:       Appearance: Normal appearance.   HENT:      Head: Normocephalic.   Eyes:      Extraocular Movements: Extraocular movements intact.      Conjunctiva/sclera: Conjunctivae normal.      Pupils: Pupils are equal, round, and reactive to light.   Neck:      Thyroid: No thyroid mass or thyromegaly.   Cardiovascular:      Rate and Rhythm: Normal rate and regular rhythm.      Heart sounds: Normal heart sounds. No murmur heard.     No gallop.   Pulmonary:      Effort: Pulmonary effort is normal. No respiratory distress.      Breath sounds: Normal breath sounds. No wheezing or rales.   Skin:     General: Skin is warm and dry.      Coloration: Skin is not jaundiced or pale.   Neurological:      Mental Status: She is alert.   Psychiatric:         Mood and Affect: Mood normal.         Behavior: Behavior normal.         Thought Content: Thought content normal.         Judgment: Judgment normal.          Assessment and Plan  1. Type 2 diabetes mellitus without complication, without long-term current use of insulin  -     Hemoglobin A1C; Future; Expected date: 07/16/2025    2. Essential hypertension  -     CBC Auto Differential; Future; Expected date: 07/16/2025  -     Comprehensive Metabolic Panel; Future; Expected date: 07/16/2025    3. Mixed hyperlipidemia  -     Lipid Panel; Future; Expected date: 07/16/2025    4. Invasive lobular carcinoma of right breast in female  -     Mammo Digital Diagnostic Bilat with Sean (XPD); Future; Expected date: 07/16/2025    5. Dense breast tissue on mammogram, unspecified type  -     Mammo Digital Diagnostic Bilat with Saen (XPD); Future; Expected date: 07/16/2025             Return to clinic in 6 months or as needed once her lab work is in.    Health Maintenance Topics with due status: Not Due       Topic Last Completion Date    TETANUS VACCINE 08/30/2022     Colorectal Cancer Screening 11/01/2023    DEXA Scan 01/03/2024    Influenza Vaccine 09/06/2024    Diabetes Urine Screening 01/07/2025    Foot Exam 01/09/2025    Lipid Panel 01/15/2025    Mammogram 04/16/2025              [1]   Current Outpatient Medications on File Prior to Visit   Medication Sig Dispense Refill    atorvastatin (LIPITOR) 80 MG tablet Take 1 tablet (80 mg total) by mouth once daily. 90 tablet 3    b complex vitamins tablet Take 1 tablet by mouth once daily.      blood sugar diagnostic Strp Test twice daily.  Accucheck viva test strips and lancets. 300 each 3    brimonidine 0.2% (ALPHAGAN) 0.2 % Drop Place 1 drop into both eyes 3 (three) times daily.      calcium carb-vit D3-magnesium (CORAL CALCIUM) 250-200-125 mg-unit-mg Cap Take 1 capsule by mouth once daily.      cyclobenzaprine (FLEXERIL) 10 MG tablet Take 1 tablet (10 mg total) by mouth 3 (three) times daily as needed for Muscle spasms. 270 tablet 1    dorzolamide-timolol 2-0.5% (COSOPT) 22.3-6.8 mg/mL ophthalmic solution Place 1 drop into both eyes 2 (two) times daily.      ezetimibe (ZETIA) 10 mg tablet Take 1 tablet (10 mg total) by mouth once daily. 90 tablet 3    glucosamine-chondroitin (OSTEO BI-FLEX) 250-200 mg Tab Take 1 tablet by mouth once daily.      lancets (ACCU-CHEK SOFTCLIX LANCETS) Misc 1 Units by Misc.(Non-Drug; Combo Route) route 2 (two) times daily. 300 each 3    latanoprost 0.005 % ophthalmic solution Place 1 drop into both eyes every evening. 2.5 mL 12    linaCLOtide (LINZESS) 72 mcg Cap capsule Take 1 capsule (72 mcg total) by mouth before breakfast. 90 capsule 3    NIFEdipine (ADALAT CC) 30 MG TbSR Take 2 tablets (60 mg total) by mouth once daily. 60 tablet 2    pantoprazole (PROTONIX) 40 MG tablet Take 1 tablet (40 mg total) by mouth once daily. 90 tablet 3    sertraline (ZOLOFT) 25 MG tablet Take 1 tablet (25 mg total) by mouth once daily. 90 tablet 1    celecoxib (CELEBREX) 200 MG capsule Take 1 capsule (200 mg total)  by mouth once daily. (Patient not taking: Reported on 7/16/2025) 90 capsule 1    diazePAM (VALIUM) 5 MG tablet Take 1 tablet (5 mg total) by mouth once. Take 1 hour prior to imaging. for 1 dose 1 tablet 0    meloxicam (MOBIC) 15 MG tablet Take 15 mg by mouth once daily. (Patient not taking: Reported on 7/16/2025)      metFORMIN (GLUCOPHAGE) 500 MG tablet Take 1 tablet (500 mg total) by mouth daily with breakfast. (Patient not taking: Reported on 7/16/2025) 90 tablet 1    [DISCONTINUED] omeprazole (PRILOSEC OTC) 20 MG tablet Take 1 tablet (20 mg total) by mouth once daily. (Patient not taking: Reported on 2/19/2025) 90 tablet 3     No current facility-administered medications on file prior to visit.

## 2025-07-23 ENCOUNTER — TELEPHONE (OUTPATIENT)
Dept: FAMILY MEDICINE | Facility: CLINIC | Age: 75
End: 2025-07-23
Payer: MEDICARE

## 2025-07-23 NOTE — TELEPHONE ENCOUNTER
----- Message from Omar Horn MD sent at 7/17/2025  7:59 AM CDT -----  Good A1c but office visit for LDL and triglycerides  ----- Message -----  From: Lab, Background User  Sent: 7/16/2025   8:04 PM CDT  To: Omar Horn MD

## 2025-07-24 ENCOUNTER — OFFICE VISIT (OUTPATIENT)
Dept: FAMILY MEDICINE | Facility: CLINIC | Age: 75
End: 2025-07-24
Payer: MEDICARE

## 2025-07-24 VITALS
SYSTOLIC BLOOD PRESSURE: 122 MMHG | DIASTOLIC BLOOD PRESSURE: 62 MMHG | TEMPERATURE: 98 F | WEIGHT: 150 LBS | HEART RATE: 68 BPM | BODY MASS INDEX: 29.45 KG/M2 | HEIGHT: 60 IN | RESPIRATION RATE: 19 BRPM | OXYGEN SATURATION: 98 %

## 2025-07-24 DIAGNOSIS — E11.9 TYPE 2 DIABETES MELLITUS WITHOUT COMPLICATION, WITHOUT LONG-TERM CURRENT USE OF INSULIN: ICD-10-CM

## 2025-07-24 DIAGNOSIS — E78.2 MIXED HYPERLIPIDEMIA: Primary | ICD-10-CM

## 2025-07-24 PROCEDURE — 99214 OFFICE O/P EST MOD 30 MIN: CPT | Mod: ,,, | Performed by: FAMILY MEDICINE

## 2025-07-24 RX ORDER — ROSUVASTATIN CALCIUM 40 MG/1
40 TABLET, COATED ORAL NIGHTLY
Qty: 90 TABLET | Refills: 3 | Status: SHIPPED | OUTPATIENT
Start: 2025-07-24 | End: 2025-07-24

## 2025-07-24 RX ORDER — ROSUVASTATIN CALCIUM 40 MG/1
40 TABLET, COATED ORAL NIGHTLY
Qty: 90 TABLET | Refills: 3 | Status: SHIPPED | OUTPATIENT
Start: 2025-07-24 | End: 2026-07-24

## 2025-07-24 NOTE — PROGRESS NOTES
Eugenie Coates is a 75 y.o. female seen today for lab follow-up.  Her LDL cholesterol was elevated at 111 despite the use of Zetia and atorvastatin to 40 mg q.h.s..  The patient is also on a very low-fat diet.  We discussed changing to rosuvastatin and I have asked her to let us know if she has any side effects of the medication immediately.  The patient will continue her Zetia.  Her triglycerides were mildly elevated at 1:58 a.m. and she will continue to cut back on intake of carbohydrates.    Past Medical History:   Diagnosis Date    Allergy     Anemia     Anxiety     Arthritis     Asthma     Bell palsy     Bilateral lower extremity edema     Chronic diastolic CHF (congestive heart failure)     Depression     Diabetes mellitus, type 2     DVT (deep venous thrombosis)     right LE    Dyslipidemia     Endometriosis     Eye injury 2014    stuck with tree branch od ?     Focal seizure 5/3/2021    GERD (gastroesophageal reflux disease)     Glaucoma     History of DVT (deep vein thrombosis) 1/23/2018    History of uterine fibroid     Hyperlipidemia     Hypertension     Invasive lobular carcinoma of right breast in female     s/p surgery, radiation, tamoxifen    Nuclear sclerosis of both eyes 9/27/2016    Obesity     Pancreas cyst     Sleep apnea     uses C pap    Supraventricular tachycardia     SVT (supraventricular tachycardia) 05/2019    Thyroid disease     Venous insufficiency      Family History   Problem Relation Name Age of Onset    Diabetes Mother Sara Vick     No Known Problems Father      No Known Problems Sister      No Known Problems Brother      Colon cancer Maternal Aunt      No Known Problems Maternal Uncle      No Known Problems Paternal Aunt      No Known Problems Paternal Uncle      No Known Problems Maternal Grandmother      No Known Problems Maternal Grandfather      No Known Problems Paternal Grandmother      No Known Problems Paternal Grandfather      Amblyopia Neg Hx      Blindness Neg Hx       Cancer Neg Hx      Cataracts Neg Hx      Glaucoma Neg Hx      Hypertension Neg Hx      Macular degeneration Neg Hx      Retinal detachment Neg Hx      Strabismus Neg Hx      Stroke Neg Hx      Thyroid disease Neg Hx      Celiac disease Neg Hx      Colon polyps Neg Hx      Esophageal cancer Neg Hx      Inflammatory bowel disease Neg Hx      Irritable bowel syndrome Neg Hx      Liver cancer Neg Hx      Liver disease Neg Hx      Rectal cancer Neg Hx      Stomach cancer Neg Hx      Ulcerative colitis Neg Hx      Cystic fibrosis Neg Hx      Crohn's disease Neg Hx      Hemochromatosis Neg Hx      Cirrhosis Neg Hx       Medications Ordered Prior to Encounter[1]  Immunization History   Administered Date(s) Administered    COVID-19 MRNA, LN-S PF (MODERNA HALF 0.25 ML DOSE) 07/05/2022    COVID-19 Vaccine 08/24/2021, 02/07/2022    COVID-19, MRNA, LN-S, PF (MODERNA FULL 0.5 ML DOSE) 07/27/2021, 05/24/2023, 02/02/2024    COVID-19, mRNA, LNP-S, PF (Moderna) Ages 12+ 02/02/2024    COVID-19, mRNA, LNP-S, bivalent booster, PF (Moderna Omicron)12 + YEARS 09/27/2022, 05/24/2023    Influenza 11/25/2016    Influenza - Quadrivalent 11/03/2014, 11/10/2015    Influenza - Quadrivalent - High Dose - PF (65 years and older) 08/18/2020, 08/13/2021, 08/02/2022, 09/07/2023    Influenza - Trivalent - Fluad - Adjuvanted - PF (65 years and older 08/22/2019, 09/04/2024    Influenza - Trivalent - Fluarix, Flulaval, Fluzone, Afluria - PF 08/18/2020    Influenza - Trivalent - Fluzone High Dose - PF (65 years and older) 10/02/2017, 09/19/2018    Pneumococcal Conjugate - 13 Valent 09/06/2016    Pneumococcal Conjugate - 20 Valent 05/08/2023, 01/07/2025    Pneumococcal Polysaccharide - 23 Valent 09/18/2017    RSVpreF (Arexvy) 02/02/2024    Tdap 11/30/2016, 08/30/2022    Zoster 10/06/2016, 11/25/2016    Zoster Recombinant 05/24/2019, 08/24/2019       Review of Systems   Constitutional:  Negative for fever, malaise/fatigue and weight loss.   Respiratory:   Negative for shortness of breath.    Cardiovascular:  Negative for chest pain and palpitations.   Gastrointestinal:  Negative for nausea and vomiting.   Psychiatric/Behavioral:  Negative for depression.         Vitals:    07/24/25 1643   BP: 122/62   Pulse: 68   Resp: 19   Temp: 97.9 °F (36.6 °C)       Physical Exam  Vitals reviewed.   Eyes:      Conjunctiva/sclera: Conjunctivae normal.   Pulmonary:      Effort: Pulmonary effort is normal.   Neurological:      Mental Status: She is alert.   Psychiatric:         Mood and Affect: Mood normal.         Behavior: Behavior normal.         Thought Content: Thought content normal.         Judgment: Judgment normal.          Assessment and Plan  1. Mixed hyperlipidemia  -     Discontinue: rosuvastatin (CRESTOR) 40 MG Tab; Take 1 tablet (40 mg total) by mouth every evening.  Dispense: 90 tablet; Refill: 3  -     rosuvastatin (CRESTOR) 40 MG Tab; Take 1 tablet (40 mg total) by mouth every evening.  Dispense: 90 tablet; Refill: 3  -     CBC Auto Differential; Future; Expected date: 01/24/2026  -     Comprehensive Metabolic Panel; Future; Expected date: 01/24/2026  -     Lipid Panel; Future; Expected date: 01/24/2026    2. Type 2 diabetes mellitus without complication, without long-term current use of insulin  -     Hemoglobin A1C; Future; Expected date: 01/24/2026             Return to clinic in six months for follow-up lab work and then an office visit or as needed.    Health Maintenance Topics with due status: Not Due       Topic Last Completion Date    TETANUS VACCINE 08/30/2022    Colorectal Cancer Screening 11/01/2023    DEXA Scan 01/03/2024    Influenza Vaccine 09/06/2024    Diabetes Urine Screening 01/07/2025    Foot Exam 01/09/2025    Mammogram 04/16/2025    Lipid Panel 07/16/2025    Hemoglobin A1c 07/16/2025              [1]   Current Outpatient Medications on File Prior to Visit   Medication Sig Dispense Refill    b complex vitamins tablet Take 1 tablet by mouth once  daily.      blood sugar diagnostic Strp Test twice daily.  Accucheck viva test strips and lancets. 300 each 3    brimonidine 0.2% (ALPHAGAN) 0.2 % Drop Place 1 drop into both eyes 3 (three) times daily.      calcium carb-vit D3-magnesium (CORAL CALCIUM) 250-200-125 mg-unit-mg Cap Take 1 capsule by mouth once daily.      cyclobenzaprine (FLEXERIL) 10 MG tablet Take 1 tablet (10 mg total) by mouth 3 (three) times daily as needed for Muscle spasms. 270 tablet 1    dorzolamide-timolol 2-0.5% (COSOPT) 22.3-6.8 mg/mL ophthalmic solution Place 1 drop into both eyes 2 (two) times daily.      ezetimibe (ZETIA) 10 mg tablet Take 1 tablet (10 mg total) by mouth once daily. 90 tablet 3    glucosamine-chondroitin (OSTEO BI-FLEX) 250-200 mg Tab Take 1 tablet by mouth once daily.      lancets (ACCU-CHEK SOFTCLIX LANCETS) Misc 1 Units by Misc.(Non-Drug; Combo Route) route 2 (two) times daily. 300 each 3    latanoprost 0.005 % ophthalmic solution Place 1 drop into both eyes every evening. 2.5 mL 12    linaCLOtide (LINZESS) 72 mcg Cap capsule Take 1 capsule (72 mcg total) by mouth before breakfast. 90 capsule 3    NIFEdipine (ADALAT CC) 30 MG TbSR Take 2 tablets (60 mg total) by mouth once daily. 60 tablet 2    pantoprazole (PROTONIX) 40 MG tablet Take 1 tablet (40 mg total) by mouth once daily. 90 tablet 3    sertraline (ZOLOFT) 25 MG tablet Take 1 tablet (25 mg total) by mouth once daily. 90 tablet 1    [DISCONTINUED] atorvastatin (LIPITOR) 80 MG tablet Take 1 tablet (80 mg total) by mouth once daily. 90 tablet 3    metFORMIN (GLUCOPHAGE) 500 MG tablet Take 1 tablet (500 mg total) by mouth daily with breakfast. (Patient not taking: Reported on 6/24/2025) 90 tablet 1    [DISCONTINUED] omeprazole (PRILOSEC OTC) 20 MG tablet Take 1 tablet (20 mg total) by mouth once daily. (Patient not taking: Reported on 2/19/2025) 90 tablet 3     No current facility-administered medications on file prior to visit.

## 2025-07-29 ENCOUNTER — HOSPITAL ENCOUNTER (OUTPATIENT)
Dept: CARDIOLOGY | Facility: HOSPITAL | Age: 75
Discharge: HOME | End: 2025-07-29
Attending: INTERNAL MEDICINE
Payer: MEDICARE

## 2025-07-29 ENCOUNTER — OFFICE VISIT (OUTPATIENT)
Dept: CARDIOLOGY | Facility: CLINIC | Age: 75
End: 2025-07-29
Payer: MEDICARE

## 2025-07-29 VITALS
HEART RATE: 76 BPM | BODY MASS INDEX: 28.98 KG/M2 | SYSTOLIC BLOOD PRESSURE: 121 MMHG | DIASTOLIC BLOOD PRESSURE: 67 MMHG | WEIGHT: 147.63 LBS | HEIGHT: 60 IN | OXYGEN SATURATION: 99 %

## 2025-07-29 DIAGNOSIS — I35.0 NONRHEUMATIC AORTIC VALVE STENOSIS: Primary | ICD-10-CM

## 2025-07-29 DIAGNOSIS — I35.0 NONRHEUMATIC AORTIC VALVE STENOSIS: ICD-10-CM

## 2025-07-29 DIAGNOSIS — I10 ESSENTIAL HYPERTENSION: ICD-10-CM

## 2025-07-29 DIAGNOSIS — E78.2 MIXED HYPERLIPIDEMIA: ICD-10-CM

## 2025-07-29 LAB
AORTIC ROOT ANNULUS: 2.7 CM
AORTIC VALVE CUSP SEPERATION: 0.8 CM
APICAL FOUR CHAMBER EJECTION FRACTION: 62 %
AV INDEX (PROSTH): 0.45
AV MEAN GRADIENT: 23 MMHG
AV PEAK GRADIENT: 41 MMHG
AV VALVE AREA BY VELOCITY RATIO: 1.5 CM²
AV VALVE AREA: 1.3 CM²
AV VELOCITY RATIO: 0.53
CV ECHO LV RWT: 0.41 CM
DOP CALC AO PEAK VEL: 3.2 M/S
DOP CALC AO VTI: 54.6 CM
DOP CALC LVOT AREA: 2.8 CM2
DOP CALC LVOT DIAMETER: 1.9 CM
DOP CALC LVOT PEAK VEL: 1.7 M/S
DOP CALCLVOT PEAK VEL VTI: 24.4 CM
E WAVE DECELERATION TIME: 106 MSEC
E/E' RATIO: 8 M/S
ECHO LV POSTERIOR WALL: 0.9 CM (ref 0.6–1.1)
FRACTIONAL SHORTENING: 27.3 % (ref 28–44)
INTERVENTRICULAR SEPTUM: 0.8 CM (ref 0.6–1.1)
IVRT: 84 MSEC
LEFT ATRIUM SIZE: 3.7 CM
LEFT INTERNAL DIMENSION IN SYSTOLE: 3.2 CM (ref 2.1–4)
LEFT VENTRICLE DIASTOLIC VOLUME: 88 ML
LEFT VENTRICLE END DIASTOLIC VOLUME APICAL 4 CHAMBER: 56.9 ML
LEFT VENTRICLE SYSTOLIC VOLUME: 39 ML
LEFT VENTRICULAR INTERNAL DIMENSION IN DIASTOLE: 4.4 CM (ref 3.5–6)
LEFT VENTRICULAR MASS: 118.6 G
LV LATERAL E/E' RATIO: 7.4 M/S
LV SEPTAL E/E' RATIO: 9.4 M/S
LVED V (TEICH): 87.69 ML
LVES V (TEICH): 39.42 ML
LVOT MG: 6 MMHG
LVOT MV: 1.12 CM/S
MV PEAK A VEL: 0 M/S
MV PEAK E VEL: 1.41 M/S
MV STENOSIS PRESSURE HALF TIME: 42 MS
MV VALVE AREA P 1/2 METHOD: 5.24 CM2
OHS CV RV/LV RATIO: 0.43 CM
PISA TR MAX VEL: 2.6 M/S
PV MV: 0.93 M/S
PV PEAK GRADIENT: 7 MMHG
PV PEAK VELOCITY: 1.36 M/S
RIGHT VENTRICLE DIASTOLIC BASEL DIMENSION: 1.9 CM
RIGHT VENTRICULAR END-DIASTOLIC DIMENSION: 1.94 CM
TDI LATERAL: 0.19 M/S
TDI SEPTAL: 0.15 M/S
TDI: 0.17 M/S
TR MAX PG: 27 MMHG

## 2025-07-29 PROCEDURE — 99213 OFFICE O/P EST LOW 20 MIN: CPT | Mod: PBBFAC,PN | Performed by: INTERNAL MEDICINE

## 2025-07-29 PROCEDURE — 99214 OFFICE O/P EST MOD 30 MIN: CPT | Mod: S$PBB,,, | Performed by: INTERNAL MEDICINE

## 2025-07-29 PROCEDURE — 93306 TTE W/DOPPLER COMPLETE: CPT

## 2025-07-29 PROCEDURE — 99999 PR PBB SHADOW E&M-EST. PATIENT-LVL III: CPT | Mod: PBBFAC,,, | Performed by: INTERNAL MEDICINE

## 2025-08-21 ENCOUNTER — OFFICE VISIT (OUTPATIENT)
Dept: PAIN MEDICINE | Facility: CLINIC | Age: 75
End: 2025-08-21
Payer: MEDICARE

## 2025-08-21 VITALS
SYSTOLIC BLOOD PRESSURE: 126 MMHG | DIASTOLIC BLOOD PRESSURE: 60 MMHG | RESPIRATION RATE: 18 BRPM | HEART RATE: 76 BPM | WEIGHT: 143 LBS | BODY MASS INDEX: 28.07 KG/M2 | HEIGHT: 60 IN

## 2025-08-21 DIAGNOSIS — G89.29 CHRONIC RIGHT-SIDED THORACIC BACK PAIN: Primary | Chronic | ICD-10-CM

## 2025-08-21 DIAGNOSIS — M54.6 CHRONIC RIGHT-SIDED THORACIC BACK PAIN: Primary | Chronic | ICD-10-CM

## 2025-08-21 DIAGNOSIS — M62.838 MUSCLE SPASTICITY: Chronic | ICD-10-CM

## 2025-08-21 PROCEDURE — 99215 OFFICE O/P EST HI 40 MIN: CPT | Mod: PBBFAC | Performed by: PHYSICIAN ASSISTANT

## 2025-08-21 PROCEDURE — 99999 PR PBB SHADOW E&M-EST. PATIENT-LVL V: CPT | Mod: PBBFAC,,, | Performed by: PHYSICIAN ASSISTANT

## 2025-08-21 PROCEDURE — 99214 OFFICE O/P EST MOD 30 MIN: CPT | Mod: S$PBB,,, | Performed by: PHYSICIAN ASSISTANT

## 2025-08-21 RX ORDER — TIZANIDINE 4 MG/1
4 TABLET ORAL EVERY 8 HOURS
Qty: 270 TABLET | Refills: 2 | Status: SHIPPED | OUTPATIENT
Start: 2025-08-21

## 2025-08-21 RX ORDER — CYCLOBENZAPRINE HCL 10 MG
10 TABLET ORAL 3 TIMES DAILY PRN
Qty: 270 TABLET | Refills: 2 | Status: SHIPPED | OUTPATIENT
Start: 2025-08-21 | End: 2025-08-21

## (undated) DEVICE — GLOVE 6.5 PROTEXIS PI BLUE

## (undated) DEVICE — TRAY MINOR GEN SURG

## (undated) DEVICE — CYSTOTOME IRRIG 24G 13MM

## (undated) DEVICE — SUT MCRYL PLUS 4-0 PS2 27IN

## (undated) DEVICE — GLOVE PROTEXIS PI SYN SURG 7.5

## (undated) DEVICE — CATH RESPONSE QPLR JSN 6F 120

## (undated) DEVICE — KNIFE ANGLE 1MM

## (undated) DEVICE — TIP I & A

## (undated) DEVICE — SOL BETADINE 5%

## (undated) DEVICE — ADHESIVE DERMABOND ADVANCED

## (undated) DEVICE — CATH BLAZER II HTD STD CRV 5MM

## (undated) DEVICE — COVER PROBE NL STRL 3.6X96IN

## (undated) DEVICE — SEE MEDLINE ITEM 146417

## (undated) DEVICE — TIP PHACO 45 DEGREE KELMAN

## (undated) DEVICE — SYR DISP LL 5CC

## (undated) DEVICE — ELECTRODE POLYHESIVEPRE-ATTACH

## (undated) DEVICE — BANDAGE ELAS SOFTWRAP ST 6X5YD

## (undated) DEVICE — APPLICATOR CHLORAPREP ORN 26ML

## (undated) DEVICE — NDL FLTR 5MCRN BLNT TIP 18GX1

## (undated) DEVICE — CATH CS OCTAPOLAR DX 7FRX110CM

## (undated) DEVICE — SYS LABEL CORRECT MED

## (undated) DEVICE — SOL 0.9% NACL IRRI.IN STERIL

## (undated) DEVICE — BANDAGE GAUZE COT STRL 4.5X4.1

## (undated) DEVICE — KIT VENASEAL CLOSURE SYSTEM

## (undated) DEVICE — GLOVE SENSICARE PI SURG 6.5

## (undated) DEVICE — SLEEVE ULTRA INFUSION

## (undated) DEVICE — PATCH ENSITE PRECISION NAVX SE

## (undated) DEVICE — DRAPE STERI INSTRUMENT 1018

## (undated) DEVICE — INTRODUCER AVANTI 7.5F .038X11

## (undated) DEVICE — GOWN POLY REINF BRTH SLV XL

## (undated) DEVICE — CATH HIS OCTAPOLAR 7FRX115CM

## (undated) DEVICE — CATH VENCLOSE FR ABLATION 60CM

## (undated) DEVICE — SYR LUER LOCK 1CC

## (undated) DEVICE — SPONGE LAP 18X18 PREWASHED

## (undated) DEVICE — NDL 18GA X1 1/2 REG BEVEL

## (undated) DEVICE — PAD DEFIB CADENCE ADULT R2

## (undated) DEVICE — Device

## (undated) DEVICE — GAUZE FLUFF XXLG 36X36 2 PLY

## (undated) DEVICE — INTRO 8.5FR 63CM SRO

## (undated) DEVICE — SEE MEDLINE ITEM 157128

## (undated) DEVICE — BANDAGE COMPR 6IN 5.8YD

## (undated) DEVICE — SHIELD EYE PLASTIC 3100G

## (undated) DEVICE — ELECTRODE BLADE INSULATED 1 IN

## (undated) DEVICE — SUT VICRYL 3-0 27 SH

## (undated) DEVICE — BANDAGE COHES LTX TAN 4INX5YD

## (undated) DEVICE — CONTAINER SPECIMEN STRL 4OZ

## (undated) DEVICE — SUT ETHILON 3-0 PS2 18 BLK

## (undated) DEVICE — DRAPE OPTHALMIC W/POUCH

## (undated) DEVICE — COVER SNAP KAP 26IN

## (undated) DEVICE — NEOGUARD COVER 4X30CM STERILE

## (undated) DEVICE — BLADE SURG BVL ANG COAX 2.4MM

## (undated) DEVICE — ELECTRODE REM PLYHSV RETURN 9

## (undated) DEVICE — NDL HYPODERMIC BLUNT 18G 1.5IN

## (undated) DEVICE — SYS CLSR DERMABOND PRINEO 22CM

## (undated) DEVICE — PACK EP DRAPE

## (undated) DEVICE — SYS OMNI GLAUCOMA TREATMENT

## (undated) DEVICE — PACK UNIVERSAL SPLIT II

## (undated) DEVICE — GLOVE SURG ULTRA TOUCH 7.5

## (undated) DEVICE — PACK CUSTOM EYE KIT

## (undated) DEVICE — SEE MEDLINE ITEM 152622